# Patient Record
Sex: MALE | Race: WHITE | NOT HISPANIC OR LATINO | Employment: OTHER | URBAN - METROPOLITAN AREA
[De-identification: names, ages, dates, MRNs, and addresses within clinical notes are randomized per-mention and may not be internally consistent; named-entity substitution may affect disease eponyms.]

---

## 2023-04-04 ENCOUNTER — OFFICE VISIT (OUTPATIENT)
Dept: PRIMARY CARE | Facility: CLINIC | Age: 73
End: 2023-04-04
Payer: COMMERCIAL

## 2023-04-04 VITALS
RESPIRATION RATE: 12 BRPM | SYSTOLIC BLOOD PRESSURE: 160 MMHG | DIASTOLIC BLOOD PRESSURE: 80 MMHG | HEIGHT: 72 IN | HEART RATE: 95 BPM | OXYGEN SATURATION: 97 %

## 2023-04-04 DIAGNOSIS — K21.9 GASTRO-ESOPHAGEAL REFLUX DISEASE WITHOUT ESOPHAGITIS: ICD-10-CM

## 2023-04-04 DIAGNOSIS — E55.9 VITAMIN D DEFICIENCY, UNSPECIFIED: ICD-10-CM

## 2023-04-04 DIAGNOSIS — I15.9 SECONDARY HYPERTENSION: ICD-10-CM

## 2023-04-04 DIAGNOSIS — L30.9 DERMATITIS, UNSPECIFIED: ICD-10-CM
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
## 2023-04-04 DIAGNOSIS — D50.9 IRON DEFICIENCY ANEMIA, UNSPECIFIED IRON DEFICIENCY ANEMIA TYPE: ICD-10-CM

## 2023-04-04 DIAGNOSIS — I89.0 LYMPHEDEMA OF BOTH LOWER EXTREMITIES: ICD-10-CM

## 2023-04-04 DIAGNOSIS — I73.9 PERIPHERAL VASCULAR DISEASE, UNSPECIFIED (CMS-HCC): ICD-10-CM

## 2023-04-04 DIAGNOSIS — M17.9 OSTEOARTHRITIS OF KNEE, UNSPECIFIED LATERALITY, UNSPECIFIED OSTEOARTHRITIS TYPE: ICD-10-CM

## 2023-04-04 DIAGNOSIS — D64.9 ANEMIA, UNSPECIFIED TYPE: Primary | ICD-10-CM

## 2023-04-04 DIAGNOSIS — N18.32 STAGE 3B CHRONIC KIDNEY DISEASE (MULTI): ICD-10-CM

## 2023-04-04 DIAGNOSIS — I13.10 HYPERTENSIVE HEART AND CHRONIC KIDNEY DISEASE WITHOUT HEART FAILURE, WITH STAGE 1 THROUGH STAGE 4 CHRONIC KIDNEY DISEASE, OR UNSPECIFIED CHRONIC KIDNEY DISEASE: ICD-10-CM

## 2023-04-04 PROBLEM — R29.6 FALLS: Status: ACTIVE | Noted: 2023-04-04

## 2023-04-04 PROBLEM — N20.0 RENAL STONES: Status: ACTIVE | Noted: 2023-04-04

## 2023-04-04 PROBLEM — I87.013: Status: ACTIVE | Noted: 2023-04-04

## 2023-04-04 PROBLEM — N18.9 CHRONIC KIDNEY DISEASE: Status: ACTIVE | Noted: 2020-10-01

## 2023-04-04 PROBLEM — R26.81 UNSTEADY GAIT: Status: ACTIVE | Noted: 2023-04-04

## 2023-04-04 PROBLEM — I10 HYPERTENSION: Status: ACTIVE | Noted: 2020-08-20

## 2023-04-04 PROCEDURE — 1159F MED LIST DOCD IN RCRD: CPT | Performed by: INTERNAL MEDICINE

## 2023-04-04 PROCEDURE — 3079F DIAST BP 80-89 MM HG: CPT | Performed by: INTERNAL MEDICINE

## 2023-04-04 PROCEDURE — 3077F SYST BP >= 140 MM HG: CPT | Performed by: INTERNAL MEDICINE

## 2023-04-04 PROCEDURE — 99214 OFFICE O/P EST MOD 30 MIN: CPT | Performed by: INTERNAL MEDICINE

## 2023-04-04 RX ORDER — FAMOTIDINE 40 MG/1
20 TABLET, FILM COATED ORAL 2 TIMES DAILY
COMMUNITY
Start: 2016-12-07

## 2023-04-04 NOTE — PROGRESS NOTES
Called pt with recommendations from Ariadna Wing NP. Pt verbalized understanding. Pt scheduled to see Ariadna Wing NP 1/21/21 w/ BMP prior. Ariane LARKIN    Subjective   Chief complaint: Abdi Retana is a 72 y.o. male who presents for NH follow up (Pt is being seen today for nursing home follow up at [facility] ).    HPI:  Patient is discharged from the nursing home and he has been home doing well he has everything arranged in his house he has a wheelchair and a walker he can move around he has a steps to get him in the house and he used the proper transportation when he get the left in the wheelchair patient is doing well today is here to discuss his medication.  He denies any chest pain or shortness of breath        Objective   /80 (BP Location: Left arm, Patient Position: Sitting)   Pulse 95   Resp 12   Ht 1.829 m (6')   SpO2 97%   Physical Exam  Vitals reviewed.   Constitutional:       Appearance: Normal appearance.   HENT:      Head: Normocephalic and atraumatic.   Cardiovascular:      Rate and Rhythm: Normal rate and regular rhythm.   Pulmonary:      Effort: Pulmonary effort is normal.      Breath sounds: Normal breath sounds.   Abdominal:      General: Bowel sounds are normal.      Palpations: Abdomen is soft.   Musculoskeletal:      Cervical back: Neck supple.   Lymphadenopathy:      Comments: Is a lymphedema in the lower   Skin:     General: Skin is warm and dry.      Comments: Venous stasis changes on the skin in the lower extremities dark brown discoloration because of the venous stasis.  There is a +1-2 pitting edema   Neurological:      General: No focal deficit present.      Mental Status: He is alert.      Comments: Patient is weak in the wheelchair   Psychiatric:         Mood and Affect: Mood normal.         Behavior: Behavior is cooperative.         I have reviewed and reconciled the medication list with the patient today.   Current Outpatient Medications:     famotidine (Pepcid) 40 mg tablet, Take 0.5 tablets (20 mg) by mouth in the morning and 0.5 tablets (20 mg) before bedtime., Disp: , Rfl:      Imaging:  No results found.     Labs  reviewed:    Lab Results   Component Value Date    WBC 6.8 08/21/2020    HGB 7.5 (L) 08/21/2020    HCT 24.8 (L) 08/21/2020     08/21/2020    ALT 8 (L) 08/18/2020    AST 12 08/18/2020     08/20/2020    K 4.4 08/20/2020     (H) 08/20/2020    CREATININE 1.27 08/20/2020    BUN 9 08/20/2020    CO2 24 08/20/2020    INR 1.3 (H) 08/12/2020       Assessment/Plan   Problem List Items Addressed This Visit          Circulatory    Hypertension     Patient is not taking blood pressure medication blood pressure running 130/80 at home         Peripheral vascular disease, unspecified (CMS/[facility])     Monitor         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, with stage 1 through stage 4 chronic kidney disease, or unspecified chronic kidney disease     Check renal function panel next visit            Digestive    Gastro-esophageal reflux disease without esophagitis     Pepcid            Genitourinary    Chronic kidney disease       Musculoskeletal    Lymphedema of both lower extremities     Elevate the leg was compression stocking         OA (osteoarthritis) of knee     Tylenol for the pain            Endocrine/Metabolic    Vitamin D deficiency, unspecified     Continue vitamin D            Hematologic    Anemia - Primary    Iron deficiency anemia, unspecified     Continue ferrous sulfate            Infectious/Inflammatory    Dermatitis, unspecified     Topical steroid            Other    Falls     Will need physical therapy            Continue current medications as listed  Follow up in 2 months for complete physical

## 2023-06-06 ENCOUNTER — CLINICAL SUPPORT (OUTPATIENT)
Dept: PRIMARY CARE | Facility: CLINIC | Age: 73
End: 2023-06-06
Payer: COMMERCIAL

## 2023-06-06 DIAGNOSIS — D50.9 IRON DEFICIENCY ANEMIA, UNSPECIFIED IRON DEFICIENCY ANEMIA TYPE: ICD-10-CM

## 2023-06-06 DIAGNOSIS — E78.00 ELEVATED LDL CHOLESTEROL LEVEL: ICD-10-CM

## 2023-06-06 DIAGNOSIS — E03.9 HYPOTHYROIDISM, UNSPECIFIED TYPE: ICD-10-CM

## 2023-06-06 DIAGNOSIS — Z12.5 SCREENING PSA (PROSTATE SPECIFIC ANTIGEN): ICD-10-CM

## 2023-06-06 DIAGNOSIS — E55.9 VITAMIN D DEFICIENCY, UNSPECIFIED: ICD-10-CM

## 2023-06-06 DIAGNOSIS — I10 BENIGN ESSENTIAL HYPERTENSION: ICD-10-CM

## 2023-06-06 DIAGNOSIS — N18.32 STAGE 3B CHRONIC KIDNEY DISEASE (MULTI): ICD-10-CM

## 2023-06-06 DIAGNOSIS — Z00.00 ENCOUNTER FOR ANNUAL WELLNESS VISIT (AWV) IN MEDICARE PATIENT: ICD-10-CM

## 2023-06-06 DIAGNOSIS — D64.9 ANEMIA, UNSPECIFIED TYPE: ICD-10-CM

## 2023-06-06 DIAGNOSIS — I10 PRIMARY HYPERTENSION: ICD-10-CM

## 2023-06-06 LAB
ALANINE AMINOTRANSFERASE (SGPT) (U/L) IN SER/PLAS: 10 U/L (ref 10–52)
ALBUMIN (G/DL) IN SER/PLAS: 3.4 G/DL (ref 3.4–5)
ALKALINE PHOSPHATASE (U/L) IN SER/PLAS: 60 U/L (ref 33–136)
ANION GAP IN SER/PLAS: 12 MMOL/L (ref 10–20)
ASPARTATE AMINOTRANSFERASE (SGOT) (U/L) IN SER/PLAS: 15 U/L (ref 9–39)
BILIRUBIN TOTAL (MG/DL) IN SER/PLAS: 0.6 MG/DL (ref 0–1.2)
CALCIDIOL (25 OH VITAMIN D3) (NG/ML) IN SER/PLAS: 44 NG/ML
CALCIUM (MG/DL) IN SER/PLAS: 8.8 MG/DL (ref 8.6–10.6)
CARBON DIOXIDE, TOTAL (MMOL/L) IN SER/PLAS: 27 MMOL/L (ref 21–32)
CHLORIDE (MMOL/L) IN SER/PLAS: 106 MMOL/L (ref 98–107)
CHOLESTEROL (MG/DL) IN SER/PLAS: 169 MG/DL (ref 0–199)
CHOLESTEROL IN HDL (MG/DL) IN SER/PLAS: 41 MG/DL
CHOLESTEROL/HDL RATIO: 4.1
CREATININE (MG/DL) IN SER/PLAS: 1.12 MG/DL (ref 0.5–1.3)
ERYTHROCYTE DISTRIBUTION WIDTH (RATIO) BY AUTOMATED COUNT: 14.1 % (ref 11.5–14.5)
ERYTHROCYTE MEAN CORPUSCULAR HEMOGLOBIN CONCENTRATION (G/DL) BY AUTOMATED: 30.3 G/DL (ref 32–36)
ERYTHROCYTE MEAN CORPUSCULAR VOLUME (FL) BY AUTOMATED COUNT: 97 FL (ref 80–100)
ERYTHROCYTES (10*6/UL) IN BLOOD BY AUTOMATED COUNT: 4.56 X10E12/L (ref 4.5–5.9)
GFR MALE: 69 ML/MIN/1.73M2
GLUCOSE (MG/DL) IN SER/PLAS: 92 MG/DL (ref 74–99)
HEMATOCRIT (%) IN BLOOD BY AUTOMATED COUNT: 44.2 % (ref 41–52)
HEMOGLOBIN (G/DL) IN BLOOD: 13.4 G/DL (ref 13.5–17.5)
LDL: 118 MG/DL (ref 0–99)
LEUKOCYTES (10*3/UL) IN BLOOD BY AUTOMATED COUNT: 5.9 X10E9/L (ref 4.4–11.3)
NRBC (PER 100 WBCS) BY AUTOMATED COUNT: 0 /100 WBC (ref 0–0)
PLATELETS (10*3/UL) IN BLOOD AUTOMATED COUNT: 219 X10E9/L (ref 150–450)
POTASSIUM (MMOL/L) IN SER/PLAS: 4.5 MMOL/L (ref 3.5–5.3)
PROSTATE SPECIFIC ANTIGEN,SCREEN: 1 NG/ML (ref 0–4)
PROTEIN TOTAL: 6.8 G/DL (ref 6.4–8.2)
SODIUM (MMOL/L) IN SER/PLAS: 140 MMOL/L (ref 136–145)
THYROTROPIN (MIU/L) IN SER/PLAS BY DETECTION LIMIT <= 0.05 MIU/L: 4.34 MIU/L (ref 0.44–3.98)
TRIGLYCERIDE (MG/DL) IN SER/PLAS: 48 MG/DL (ref 0–149)
UREA NITROGEN (MG/DL) IN SER/PLAS: 26 MG/DL (ref 6–23)
VLDL: 10 MG/DL (ref 0–40)

## 2023-06-06 PROCEDURE — 84153 ASSAY OF PSA TOTAL: CPT

## 2023-06-06 PROCEDURE — 84443 ASSAY THYROID STIM HORMONE: CPT

## 2023-06-06 PROCEDURE — 85027 COMPLETE CBC AUTOMATED: CPT

## 2023-06-06 PROCEDURE — 82306 VITAMIN D 25 HYDROXY: CPT

## 2023-06-06 PROCEDURE — 80053 COMPREHEN METABOLIC PANEL: CPT

## 2023-06-06 PROCEDURE — 80061 LIPID PANEL: CPT

## 2023-06-09 ENCOUNTER — OFFICE VISIT (OUTPATIENT)
Dept: PRIMARY CARE | Facility: CLINIC | Age: 73
End: 2023-06-09
Payer: COMMERCIAL

## 2023-06-09 VITALS
SYSTOLIC BLOOD PRESSURE: 172 MMHG | HEART RATE: 73 BPM | RESPIRATION RATE: 12 BRPM | DIASTOLIC BLOOD PRESSURE: 80 MMHG | HEIGHT: 74 IN | OXYGEN SATURATION: 98 %

## 2023-06-09 DIAGNOSIS — N18.32 STAGE 3B CHRONIC KIDNEY DISEASE (MULTI): ICD-10-CM

## 2023-06-09 DIAGNOSIS — I13.10 HYPERTENSIVE HEART AND CHRONIC KIDNEY DISEASE WITHOUT HEART FAILURE, WITH STAGE 1 THROUGH STAGE 4 CHRONIC KIDNEY DISEASE, OR UNSPECIFIED CHRONIC KIDNEY DISEASE: ICD-10-CM

## 2023-06-09 DIAGNOSIS — Z00.00 ENCOUNTER FOR ANNUAL WELLNESS VISIT (AWV) IN MEDICARE PATIENT: ICD-10-CM

## 2023-06-09 DIAGNOSIS — I73.9 PERIPHERAL VASCULAR DISEASE, UNSPECIFIED (CMS-HCC): ICD-10-CM

## 2023-06-09 DIAGNOSIS — D50.9 IRON DEFICIENCY ANEMIA, UNSPECIFIED IRON DEFICIENCY ANEMIA TYPE: ICD-10-CM

## 2023-06-09 DIAGNOSIS — I15.9 SECONDARY HYPERTENSION: ICD-10-CM

## 2023-06-09 DIAGNOSIS — L03.90 CELLULITIS, UNSPECIFIED CELLULITIS SITE: ICD-10-CM

## 2023-06-09 DIAGNOSIS — K21.9 GASTRO-ESOPHAGEAL REFLUX DISEASE WITHOUT ESOPHAGITIS: ICD-10-CM

## 2023-06-09 DIAGNOSIS — L30.9 DERMATITIS, UNSPECIFIED: ICD-10-CM

## 2023-06-09 DIAGNOSIS — D64.9 ANEMIA, UNSPECIFIED TYPE: Primary | ICD-10-CM

## 2023-06-09 PROBLEM — N18.30 CHRONIC KIDNEY DISEASE, STAGE 3 UNSPECIFIED (MULTI): Status: ACTIVE | Noted: 2020-10-01

## 2023-06-09 LAB
POC APPEARANCE, URINE: ABNORMAL
POC BILIRUBIN, URINE: NEGATIVE
POC BLOOD, URINE: NEGATIVE
POC COLOR, URINE: YELLOW
POC GLUCOSE, URINE: NEGATIVE MG/DL
POC KETONES, URINE: NEGATIVE MG/DL
POC LEUKOCYTES, URINE: NEGATIVE
POC NITRITE,URINE: NEGATIVE
POC OCCULT BLOOD STOOL #1: NEGATIVE
POC PH, URINE: 6 PH
POC PROTEIN, URINE: NEGATIVE MG/DL
POC SPECIFIC GRAVITY, URINE: 1.01
POC UROBILINOGEN, URINE: 0.2 EU/DL

## 2023-06-09 PROCEDURE — 81002 URINALYSIS NONAUTO W/O SCOPE: CPT | Performed by: INTERNAL MEDICINE

## 2023-06-09 PROCEDURE — 3079F DIAST BP 80-89 MM HG: CPT | Performed by: INTERNAL MEDICINE

## 2023-06-09 PROCEDURE — 1160F RVW MEDS BY RX/DR IN RCRD: CPT | Performed by: INTERNAL MEDICINE

## 2023-06-09 PROCEDURE — G0439 PPPS, SUBSEQ VISIT: HCPCS | Performed by: INTERNAL MEDICINE

## 2023-06-09 PROCEDURE — 99214 OFFICE O/P EST MOD 30 MIN: CPT | Performed by: INTERNAL MEDICINE

## 2023-06-09 PROCEDURE — 1159F MED LIST DOCD IN RCRD: CPT | Performed by: INTERNAL MEDICINE

## 2023-06-09 PROCEDURE — 3077F SYST BP >= 140 MM HG: CPT | Performed by: INTERNAL MEDICINE

## 2023-06-09 PROCEDURE — 93000 ELECTROCARDIOGRAM COMPLETE: CPT | Performed by: INTERNAL MEDICINE

## 2023-06-09 PROCEDURE — 82270 OCCULT BLOOD FECES: CPT | Performed by: INTERNAL MEDICINE

## 2023-06-09 RX ORDER — DOXYCYCLINE 100 MG/1
100 CAPSULE ORAL 2 TIMES DAILY
Qty: 60 CAPSULE | Refills: 0 | Status: SHIPPED | OUTPATIENT
Start: 2023-06-09 | End: 2023-06-14 | Stop reason: SDUPTHER

## 2023-06-09 RX ORDER — POTASSIUM CHLORIDE 750 MG/1
10 TABLET, FILM COATED, EXTENDED RELEASE ORAL DAILY
Qty: 30 TABLET | Refills: 11 | Status: SHIPPED | OUTPATIENT
Start: 2023-06-09 | End: 2023-06-14 | Stop reason: SDUPTHER

## 2023-06-09 RX ORDER — LOSARTAN POTASSIUM 50 MG/1
50 TABLET ORAL DAILY
Qty: 30 TABLET | Refills: 11 | Status: SHIPPED | OUTPATIENT
Start: 2023-06-09 | End: 2023-06-14 | Stop reason: SDUPTHER

## 2023-06-09 RX ORDER — FERROUS SULFATE 325(65) MG
325 TABLET ORAL EVERY 24 HOURS
COMMUNITY
Start: 2020-10-28 | End: 2023-10-13 | Stop reason: SDUPTHER

## 2023-06-09 RX ORDER — TORSEMIDE 20 MG/1
20 TABLET ORAL DAILY
Qty: 30 TABLET | Refills: 11 | Status: SHIPPED | OUTPATIENT
Start: 2023-06-09 | End: 2023-06-14 | Stop reason: SDUPTHER

## 2023-06-09 RX ORDER — CALCIUM CARBONATE/VITAMIN D3 500-10/5ML
1 LIQUID (ML) ORAL DAILY
Qty: 90 CAPSULE | Refills: 3 | Status: SHIPPED | OUTPATIENT
Start: 2023-06-09 | End: 2023-06-14 | Stop reason: SDUPTHER

## 2023-06-09 ASSESSMENT — ENCOUNTER SYMPTOMS
OCCASIONAL FEELINGS OF UNSTEADINESS: 0
DEPRESSION: 0
LOSS OF SENSATION IN FEET: 0

## 2023-06-09 ASSESSMENT — PATIENT HEALTH QUESTIONNAIRE - PHQ9
2. FEELING DOWN, DEPRESSED OR HOPELESS: NOT AT ALL
SUM OF ALL RESPONSES TO PHQ9 QUESTIONS 1 AND 2: 0
1. LITTLE INTEREST OR PLEASURE IN DOING THINGS: NOT AT ALL

## 2023-06-09 NOTE — PROGRESS NOTES
"Subjective :  Chief Complaint: Abdi Retana is an 72 y.o. male here for an annual wellness visit and general medical care and f/u.     HPI:  Follow-up annual physical exam        Review of Systems  All systems reviewed and negative except for what was mentioned in the HPI    Objective   /80   Pulse 73   Resp 12   Ht 1.88 m (6' 2\")   SpO2 98%     Physical Exam  Musculoskeletal:      Right lower leg: 3+ Edema present.      Left lower leg: 3+ Edema present.   Skin:     Comments: Multiple venous ulcer in the right lower extremity with erythema and right leg         Imaging:  No results found.     Labs reviewed:    Lab Results   Component Value Date    WBC 5.9 06/06/2023    HGB 13.4 (L) 06/06/2023    HCT 44.2 06/06/2023     06/06/2023    CHOL 169 06/06/2023    TRIG 48 06/06/2023    HDL 41.0 06/06/2023    ALT 10 06/06/2023    AST 15 06/06/2023     06/06/2023    K 4.5 06/06/2023     06/06/2023    CREATININE 1.12 06/06/2023    BUN 26 (H) 06/06/2023    CO2 27 06/06/2023    TSH 4.34 (H) 06/06/2023    INR 1.3 (H) 08/12/2020       Past Medical, Surgical, and Family History reviewed and updated in chart.    I have reviewed and reconciled the medication list with the patient today.   Current Outpatient Medications:     aspirin 81.25 MG split tablet, Take 81 mg by mouth once daily., Disp: , Rfl:     famotidine (Pepcid) 40 mg tablet, Take 0.5 tablets (20 mg) by mouth 2 times a day., Disp: , Rfl:     ferrous sulfate (FeroSuL) 325 (65 Fe) MG tablet, Take 1 tablet (325 mg) by mouth once every 24 hours., Disp: , Rfl:     vitamin D3-folic acid 2,500 unit- 1 mg tablet, Take by mouth once every 24 hours., Disp: , Rfl:     zinc oxide 3 X 10 \"-yard bandage, Apply 2 each topically., Disp: , Rfl:      List of current healthcare providers:  Patient Care Team:  Cheyenne Arango MD as PCP - General (Internal Medicine)     HRA:     Over the past 2 weeks, how often have you been bothered by any of the following " problems?  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, with stage 1 through stage 4 chronic kidney disease, or unspecified chronic kidney disease     Check renal function panel next visit            Digestive    Gastro-esophageal reflux disease without esophagitis       Genitourinary    Chronic kidney disease       Hematologic    Anemia - Primary    Iron deficiency anemia, unspecified     Continue ferrous sulfate            Infectious/Inflammatory    Cellulitis    Dermatitis, unspecified       Other    Encounter for annual wellness visit (AWV) in Medicare patient    Relevant Orders    POCT UA (nonautomated) manually resulted (Completed)    POCT occult blood stool manually resulted (Completed)    ECG 12 lead (Clinic Performed)     The following health maintenance schedule was reviewed with the patient and provided in printed form in the after visit summary:  Health Maintenance   Topic Date Due    Medicare Annual Wellness Visit (AWV)  Never done    Hepatitis C Screening  Never done    Zoster Vaccines (1 of 2) Never done    Abdominal Aortic Aneurysm (AAA) Screening  Never done    Pneumococcal Vaccine: 65+ Years (1 - PCV) Never done    Diabetes Screening  08/12/2021    Influenza Vaccine (Season Ended) 09/01/2023    TSH Level  06/06/2024    Lipid Panel  06/06/2028    DTaP/Tdap/Td Vaccines (2 - Td or Tdap) 06/29/2028    Colorectal Cancer Screening  08/13/2030    COVID-19 Vaccine  Completed    HIB Vaccines  Aged Out    Hepatitis B Vaccines  Aged Out    IPV Vaccines  Aged Out    Hepatitis A Vaccines  Aged Out    Meningococcal Vaccine  Aged Out    Rotavirus Vaccines   Aged Out    HPV Vaccines  Aged Out    Irritable Bowel Syndrome  Discontinued       Advance Care Planning   Patient has a living will             Orders Placed This Encounter   Procedures    POCT UA (nonautomated) manually resulted    POCT occult blood stool manually resulted    ECG 12 lead (Clinic Performed)   Patient has cellulitis of the lower extremity we will start doxycycline.  Increased leg edema due to venous stasis Demadex 20 mg daily.  Venous ulcers apply zinc oxide to the open area  Lidex cream to the erythema in the lower extremities.  Hypomagnesemia apply magnesium 400 mg daily  Hypokalemia potassium chloride 10 daily  Lymphedema  GERD Pepcid  Hypertension new onset losartan 50 mg daily  Anemia ferrous sulfate  Hypomagnesemia magnesium.    Continue current medications as listed  Follow up in In 1 months to check the renal function panel

## 2023-06-14 DIAGNOSIS — Z00.00 ENCOUNTER FOR ANNUAL WELLNESS VISIT (AWV) IN MEDICARE PATIENT: Primary | ICD-10-CM

## 2023-06-14 DIAGNOSIS — I15.9 SECONDARY HYPERTENSION: ICD-10-CM

## 2023-06-14 DIAGNOSIS — L03.90 CELLULITIS, UNSPECIFIED CELLULITIS SITE: ICD-10-CM

## 2023-06-14 RX ORDER — POTASSIUM CHLORIDE 750 MG/1
10 TABLET, FILM COATED, EXTENDED RELEASE ORAL DAILY
Qty: 30 TABLET | Refills: 11 | Status: SHIPPED | OUTPATIENT
Start: 2023-06-14 | End: 2024-04-29

## 2023-06-14 RX ORDER — LOSARTAN POTASSIUM 50 MG/1
50 TABLET ORAL DAILY
Qty: 30 TABLET | Refills: 11 | Status: SHIPPED | OUTPATIENT
Start: 2023-06-14 | End: 2024-04-29

## 2023-06-14 RX ORDER — DOXYCYCLINE 100 MG/1
100 CAPSULE ORAL 2 TIMES DAILY
Qty: 60 CAPSULE | Refills: 0 | Status: SHIPPED | OUTPATIENT
Start: 2023-06-14 | End: 2023-07-14

## 2023-06-14 RX ORDER — TORSEMIDE 20 MG/1
20 TABLET ORAL DAILY
Qty: 30 TABLET | Refills: 11 | Status: SHIPPED | OUTPATIENT
Start: 2023-06-14 | End: 2024-04-29

## 2023-06-14 RX ORDER — FLUOCINONIDE 0.5 MG/G
OINTMENT TOPICAL 2 TIMES DAILY PRN
Qty: 60 G | Refills: 0 | Status: SHIPPED | OUTPATIENT
Start: 2023-06-14 | End: 2023-12-11

## 2023-06-14 RX ORDER — CALCIUM CARBONATE/VITAMIN D3 500-10/5ML
1 LIQUID (ML) ORAL DAILY
Qty: 90 CAPSULE | Refills: 3 | Status: SHIPPED | OUTPATIENT
Start: 2023-06-14 | End: 2023-10-13 | Stop reason: SDUPTHER

## 2023-06-23 ENCOUNTER — APPOINTMENT (OUTPATIENT)
Dept: PRIMARY CARE | Facility: CLINIC | Age: 73
End: 2023-06-23
Payer: COMMERCIAL

## 2023-06-30 ENCOUNTER — OFFICE VISIT (OUTPATIENT)
Dept: PRIMARY CARE | Facility: CLINIC | Age: 73
End: 2023-06-30
Payer: COMMERCIAL

## 2023-06-30 VITALS
OXYGEN SATURATION: 95 % | SYSTOLIC BLOOD PRESSURE: 144 MMHG | HEIGHT: 74 IN | RESPIRATION RATE: 12 BRPM | HEART RATE: 87 BPM | DIASTOLIC BLOOD PRESSURE: 78 MMHG

## 2023-06-30 DIAGNOSIS — R26.81 UNSTEADY GAIT: ICD-10-CM

## 2023-06-30 DIAGNOSIS — D50.8 OTHER IRON DEFICIENCY ANEMIA: ICD-10-CM

## 2023-06-30 DIAGNOSIS — I15.9 SECONDARY HYPERTENSION: ICD-10-CM

## 2023-06-30 DIAGNOSIS — I89.0 LYMPHEDEMA OF BOTH LOWER EXTREMITIES: ICD-10-CM

## 2023-06-30 DIAGNOSIS — E55.9 VITAMIN D DEFICIENCY, UNSPECIFIED: ICD-10-CM

## 2023-06-30 DIAGNOSIS — Z00.00 ENCOUNTER FOR ANNUAL WELLNESS VISIT (AWV) IN MEDICARE PATIENT: ICD-10-CM

## 2023-06-30 DIAGNOSIS — N18.2 STAGE 2 CHRONIC KIDNEY DISEASE: Primary | ICD-10-CM

## 2023-06-30 PROCEDURE — 3078F DIAST BP <80 MM HG: CPT | Performed by: INTERNAL MEDICINE

## 2023-06-30 PROCEDURE — 3077F SYST BP >= 140 MM HG: CPT | Performed by: INTERNAL MEDICINE

## 2023-06-30 PROCEDURE — G0439 PPPS, SUBSEQ VISIT: HCPCS | Performed by: INTERNAL MEDICINE

## 2023-06-30 PROCEDURE — 1160F RVW MEDS BY RX/DR IN RCRD: CPT | Performed by: INTERNAL MEDICINE

## 2023-06-30 PROCEDURE — 99214 OFFICE O/P EST MOD 30 MIN: CPT | Performed by: INTERNAL MEDICINE

## 2023-06-30 PROCEDURE — 1159F MED LIST DOCD IN RCRD: CPT | Performed by: INTERNAL MEDICINE

## 2023-06-30 PROCEDURE — 93000 ELECTROCARDIOGRAM COMPLETE: CPT | Performed by: INTERNAL MEDICINE

## 2023-06-30 PROCEDURE — 81003 URINALYSIS AUTO W/O SCOPE: CPT | Performed by: INTERNAL MEDICINE

## 2023-06-30 PROCEDURE — 82274 ASSAY TEST FOR BLOOD FECAL: CPT | Performed by: INTERNAL MEDICINE

## 2023-06-30 ASSESSMENT — ENCOUNTER SYMPTOMS
LOSS OF SENSATION IN FEET: 0
OCCASIONAL FEELINGS OF UNSTEADINESS: 0
DEPRESSION: 0

## 2023-06-30 NOTE — ASSESSMENT & PLAN NOTE
Blood pressure slightly elevated in the office today.  For patient, blood pressure is lower at home.  Asked the patient to keep a blood pressure log to ensure correct medication regimen.

## 2023-06-30 NOTE — ASSESSMENT & PLAN NOTE
Discussed the importance of exercise with the patient.  Encouraged him to continue daily exercise.  Encouraged him to walk and get up from chair whenever he can.

## 2023-06-30 NOTE — PROGRESS NOTES
"Subjective :  Chief Complaint: Abdi Retana is an 73 y.o. male here for an annual wellness visit and general medical care and f/u.     HPI:  Patient is here for his yearly physical exam.  He denies any new medical concerns today.  Patient would like to discuss what he can do to maintain healthy lifestyle.  Patient would also like to review blood work.        Review of Systems  All systems reviewed and negative except for what was mentioned in the HPI    Objective   /78   Pulse 87   Resp 12   Ht 1.88 m (6' 2\")   SpO2 95%     Physical Exam  Constitutional:       Appearance: He is obese. He is not ill-appearing or diaphoretic.   HENT:      Head: Normocephalic.      Nose: Nose normal. No congestion.      Mouth/Throat:      Mouth: Mucous membranes are moist.   Eyes:      Pupils: Pupils are equal, round, and reactive to light.   Cardiovascular:      Rate and Rhythm: Normal rate.   Pulmonary:      Effort: No respiratory distress.      Breath sounds: No wheezing.   Abdominal:      General: Bowel sounds are normal.   Musculoskeletal:         General: No deformity.      Cervical back: Normal range of motion.      Right lower leg: Edema present.      Left lower leg: Edema present.      Comments: Bilateral lower extremity erythematous discoloration   Skin:     General: Skin is dry.      Findings: No rash.   Neurological:      Mental Status: He is alert and oriented to person, place, and time. Mental status is at baseline.      Motor: Weakness present.      Comments: Wheelchair dependent in office         Imaging:  ECG 12 lead (Clinic Performed)    Result Date: 6/9/2023  LVH nonspecific ST and T changes       Labs reviewed:    Lab Results   Component Value Date    WBC 5.9 06/06/2023    HGB 13.4 (L) 06/06/2023    HCT 44.2 06/06/2023     06/06/2023    CHOL 169 06/06/2023    TRIG 48 06/06/2023    HDL 41.0 06/06/2023    ALT 10 06/06/2023    AST 15 06/06/2023     06/06/2023    K 4.5 06/06/2023     " "06/06/2023    CREATININE 1.12 06/06/2023    BUN 26 (H) 06/06/2023    CO2 27 06/06/2023    TSH 4.34 (H) 06/06/2023    INR 1.3 (H) 08/12/2020       Past Medical, Surgical, and Family History reviewed and updated in chart.    I have reviewed and reconciled the medication list with the patient today.   Current Outpatient Medications:     aspirin 81.25 MG split tablet, Take 81 mg by mouth once daily., Disp: , Rfl:     doxycycline (Vibramycin) 100 mg capsule, Take 1 capsule (100 mg) by mouth 2 times a day. Take with at least 8 ounces (large glass) of water, do not lie down for 30 minutes after, Disp: 60 capsule, Rfl: 0    fluocinonide (Lidex) 0.05 % ointment, Apply topically 2 times a day as needed for irritation or rash. Avoid face and groin., Disp: 60 g, Rfl: 0    losartan (Cozaar) 50 mg tablet, Take 1 tablet (50 mg) by mouth once daily., Disp: 30 tablet, Rfl: 11    potassium chloride CR (Klor-Con) 10 mEq ER tablet, Take 1 tablet (10 mEq) by mouth once daily. Do not crush, chew, or split., Disp: 30 tablet, Rfl: 11    torsemide (Demadex) 20 mg tablet, Take 1 tablet (20 mg) by mouth once daily., Disp: 30 tablet, Rfl: 11    famotidine (Pepcid) 40 mg tablet, Take 0.5 tablets (20 mg) by mouth 2 times a day., Disp: , Rfl:     ferrous sulfate (FeroSuL) 325 (65 Fe) MG tablet, Take 1 tablet (325 mg) by mouth once every 24 hours., Disp: , Rfl:     magnesium oxide 400 mg magnesium capsule, Take 1 capsule (400 mg) by mouth once daily. (Patient not taking: Reported on 6/30/2023), Disp: 90 capsule, Rfl: 3    vitamin D3-folic acid 2,500 unit- 1 mg tablet, Take by mouth once every 24 hours., Disp: , Rfl:     zinc oxide 3 X 10 \"-yard bandage, Apply 2 each topically once daily., Disp: 36 each, Rfl: 10     List of current healthcare providers:  Patient Care Team:  Cheyenne Arango MD as PCP - General (Internal Medicine)     HRA:     Over the past 2 weeks, how often have you been bothered by any of the following problems?  Little interest " or pleasure in doing things: Not at all  Feeling down, depressed, or hopeless: Not at all  Patient Health Questionnaire-2 Score: 0                         Assessment/Plan :  Problem List Items Addressed This Visit          Cardiac and Vasculature    Hypertension     Blood pressure slightly elevated in the office today.  For patient, blood pressure is lower at home.  Asked the patient to keep a blood pressure log to ensure correct medication regimen.         Relevant Orders    ECG 12 lead (Clinic Performed)       Endocrine/Metabolic    Vitamin D deficiency, unspecified     No longer deficient on supplementation.            Genitourinary and Reproductive    Chronic kidney disease - Primary     Patient with stable kidney functions per most recent labs.            Health Encounters    Encounter for annual wellness visit (AWV) in Medicare patient    Relevant Orders    ECG 12 lead (Clinic Performed)       Hematology and Neoplasia    Iron deficiency anemia, unspecified     CBC within normal limits.  Continue iron supplement.            Symptoms and Signs    Lymphedema of both lower extremities     Discussed importance of torsemide for lower extremity edema.  Patient stated that he had stopped taking it and took a different supplement instead due to the urinary side effects.  Encourage patient to take torsemide 10 mg by cutting pill in half to avoid severe urinary symptoms.         Unsteady gait     Discussed the importance of exercise with the patient.  Encouraged him to continue daily exercise.  Encouraged him to walk and get up from chair whenever he can.          The following health maintenance schedule was reviewed with the patient and provided in printed form in the after visit summary:  Health Maintenance   Topic Date Due    Hepatitis C Screening  Never done    Zoster Vaccines (1 of 2) Never done    Abdominal Aortic Aneurysm (AAA) Screening  Never done    Pneumococcal Vaccine: 65+ Years (1 - PCV) Never done     Diabetes Screening  08/12/2021    COVID-19 Vaccine (5 - Booster for Moiz series) 08/25/2022    Influenza Vaccine (Season Ended) 09/01/2023    TSH Level  06/06/2024    Medicare Annual Wellness Visit (AWV)  06/10/2024    Lipid Panel  06/06/2028    DTaP/Tdap/Td Vaccines (2 - Td or Tdap) 06/29/2028    Colorectal Cancer Screening  08/13/2030    HIB Vaccines  Aged Out    Hepatitis B Vaccines  Aged Out    IPV Vaccines  Aged Out    Hepatitis A Vaccines  Aged Out    Meningococcal Vaccine  Aged Out    Rotavirus Vaccines  Aged Out    HPV Vaccines  Aged Out    Irritable Bowel Syndrome  Discontinued       Advance Care Planning   Patient does not have a living will.             Orders Placed This Encounter   Procedures    ECG 12 lead (Clinic Performed)       Continue current medications as listed.  Please begin torsemide, but may cut pill in half to avoid urinary symptoms.  Follow up in 4 months.

## 2023-06-30 NOTE — ASSESSMENT & PLAN NOTE
Discussed importance of torsemide for lower extremity edema.  Patient stated that he had stopped taking it and took a different supplement instead due to the urinary side effects.  Encourage patient to take torsemide 10 mg by cutting pill in half to avoid severe urinary symptoms.

## 2023-07-08 NOTE — ASSESSMENT & PLAN NOTE
Thank you for choosing us for your care. I have placed an order for a prescription for antibiotic eye drops so that you can start treatment. View your full visit summary for details by clicking on the link below. Your pharmacist will able to address any questions you may have about the medication.     If you re not feeling better within 2-3 days, please schedule an appointment.  You can schedule an appointment right here in [facility], or call [phone]  If the visit is for the same symptoms as your eVisit, we ll refund the cost of your eVisit if seen within seven days.      Bacterial Conjunctivitis    You have an infection in the membranes covering the white part of the eye. This part of the eye is called the conjunctiva. The infection is called conjunctivitis. The most common symptoms of conjunctivitis include a thick, pus-like discharge from the eye, swollen eyelids, redness, eyelids sticking together upon awakening, and a gritty or scratchy feeling in the eye. Your infection was caused by bacteria. It may be treated with medicine. With treatment, the infection takes about 7 to 10 days to resolve.   Home care    Use prescribed antibiotic eye drops or ointment as directed to treat the infection.    Apply a warm compress (towel soaked in warm water) to the affected eye 3 to 4 times a day. Do this just before applying medicine to the eye.    Use a warm, wet cloth to wipe away crusting of the eyelids in the morning. This is caused by mucus drainage during the night. You may also use saline irrigating solution or artificial tears to rinse away mucus in the eye. Do not put a patch over the eye.    Wash your hands before and after touching the infected eye. This is to prevent spreading the infection to the other eye, and to other people. Don't share your towels or washcloths with others.    You may use acetaminophen or ibuprofen to control pain, unless another medicine was prescribed. Talk with your healthcare  Elevate the leg was compression stocking   provider before using these medicines if you have chronic liver or kidney disease. Also talk with your provider if you have ever had a stomach ulcer or digestive bleeding.    Don't wear contact lenses until your eyes have healed and all symptoms are gone.    Follow-up care  Follow up with your healthcare provider, or as advised.  When to seek medical advice  Call your healthcare provider right away if any of these occur:    Worsening vision    Increasing pain in the eye    Increasing swelling or redness of the eyelid    Redness spreading around the eye  [employer] last reviewed this educational content on 4/1/2020 2000-2022 [employer]. All rights reserved. This information is not intended as a substitute for professional medical care. Always follow your healthcare professional's instructions.

## 2023-09-20 PROBLEM — N18.9 CHRONIC KIDNEY DISEASE: Status: RESOLVED | Noted: 2020-10-01 | Resolved: 2023-09-20

## 2023-10-03 ENCOUNTER — CLINICAL SUPPORT (OUTPATIENT)
Dept: PRIMARY CARE | Facility: CLINIC | Age: 73
End: 2023-10-03
Payer: COMMERCIAL

## 2023-10-03 DIAGNOSIS — E78.00 ELEVATED LDL CHOLESTEROL LEVEL: ICD-10-CM

## 2023-10-03 DIAGNOSIS — I10 BENIGN ESSENTIAL HYPERTENSION: ICD-10-CM

## 2023-10-03 DIAGNOSIS — E03.9 HYPOTHYROIDISM, UNSPECIFIED TYPE: ICD-10-CM

## 2023-10-03 DIAGNOSIS — D50.9 IRON DEFICIENCY ANEMIA, UNSPECIFIED IRON DEFICIENCY ANEMIA TYPE: ICD-10-CM

## 2023-10-03 DIAGNOSIS — I10 PRIMARY HYPERTENSION: ICD-10-CM

## 2023-10-03 LAB
ALBUMIN SERPL BCP-MCNC: 3.6 G/DL (ref 3.4–5)
ALP SERPL-CCNC: 53 U/L (ref 33–136)
ALT SERPL W P-5'-P-CCNC: 11 U/L (ref 10–52)
ANION GAP SERPL CALC-SCNC: 14 MMOL/L (ref 10–20)
AST SERPL W P-5'-P-CCNC: 15 U/L (ref 9–39)
BILIRUB SERPL-MCNC: 0.7 MG/DL (ref 0–1.2)
BUN SERPL-MCNC: 28 MG/DL (ref 6–23)
CALCIUM SERPL-MCNC: 8.8 MG/DL (ref 8.6–10.6)
CHLORIDE SERPL-SCNC: 106 MMOL/L (ref 98–107)
CHOLEST SERPL-MCNC: 155 MG/DL (ref 0–199)
CHOLESTEROL/HDL RATIO: 3.7
CO2 SERPL-SCNC: 24 MMOL/L (ref 21–32)
CREAT SERPL-MCNC: 1.36 MG/DL (ref 0.5–1.3)
ERYTHROCYTE [DISTWIDTH] IN BLOOD BY AUTOMATED COUNT: 14.6 % (ref 11.5–14.5)
GFR SERPL CREATININE-BSD FRML MDRD: 55 ML/MIN/1.73M*2
GLUCOSE SERPL-MCNC: 96 MG/DL (ref 74–99)
HCT VFR BLD AUTO: 38.7 % (ref 41–52)
HDLC SERPL-MCNC: 42 MG/DL
HGB BLD-MCNC: 12.1 G/DL (ref 13.5–17.5)
LDLC SERPL CALC-MCNC: 104 MG/DL (ref 140–190)
MCH RBC QN AUTO: 30.3 PG (ref 26–34)
MCHC RBC AUTO-ENTMCNC: 31.3 G/DL (ref 32–36)
MCV RBC AUTO: 97 FL (ref 80–100)
NON HDL CHOLESTEROL: 113 MG/DL (ref 0–149)
NRBC BLD-RTO: 0 /100 WBCS (ref 0–0)
PLATELET # BLD AUTO: 201 X10*3/UL (ref 150–450)
PMV BLD AUTO: 11 FL (ref 7.5–11.5)
POTASSIUM SERPL-SCNC: 4.6 MMOL/L (ref 3.5–5.3)
PROT SERPL-MCNC: 6.6 G/DL (ref 6.4–8.2)
RBC # BLD AUTO: 4 X10*6/UL (ref 4.5–5.9)
SODIUM SERPL-SCNC: 139 MMOL/L (ref 136–145)
TRIGL SERPL-MCNC: 47 MG/DL (ref 0–149)
VLDL: 9 MG/DL (ref 0–40)
WBC # BLD AUTO: 8.1 X10*3/UL (ref 4.4–11.3)

## 2023-10-03 PROCEDURE — 80053 COMPREHEN METABOLIC PANEL: CPT

## 2023-10-03 PROCEDURE — 36415 COLL VENOUS BLD VENIPUNCTURE: CPT

## 2023-10-03 PROCEDURE — 85027 COMPLETE CBC AUTOMATED: CPT

## 2023-10-03 PROCEDURE — 80061 LIPID PANEL: CPT

## 2023-10-06 ENCOUNTER — OFFICE VISIT (OUTPATIENT)
Dept: PRIMARY CARE | Facility: CLINIC | Age: 73
End: 2023-10-06
Payer: COMMERCIAL

## 2023-10-06 VITALS
OXYGEN SATURATION: 94 % | DIASTOLIC BLOOD PRESSURE: 84 MMHG | HEART RATE: 72 BPM | SYSTOLIC BLOOD PRESSURE: 157 MMHG | RESPIRATION RATE: 12 BRPM

## 2023-10-06 DIAGNOSIS — I15.9 SECONDARY HYPERTENSION: ICD-10-CM

## 2023-10-06 DIAGNOSIS — N18.31 STAGE 3A CHRONIC KIDNEY DISEASE (MULTI): Primary | ICD-10-CM

## 2023-10-06 DIAGNOSIS — Z23 FLU VACCINE NEED: ICD-10-CM

## 2023-10-06 PROCEDURE — 1160F RVW MEDS BY RX/DR IN RCRD: CPT | Performed by: INTERNAL MEDICINE

## 2023-10-06 PROCEDURE — G0008 ADMIN INFLUENZA VIRUS VAC: HCPCS | Performed by: INTERNAL MEDICINE

## 2023-10-06 PROCEDURE — 1126F AMNT PAIN NOTED NONE PRSNT: CPT | Performed by: INTERNAL MEDICINE

## 2023-10-06 PROCEDURE — 3077F SYST BP >= 140 MM HG: CPT | Performed by: INTERNAL MEDICINE

## 2023-10-06 PROCEDURE — 3079F DIAST BP 80-89 MM HG: CPT | Performed by: INTERNAL MEDICINE

## 2023-10-06 PROCEDURE — 1159F MED LIST DOCD IN RCRD: CPT | Performed by: INTERNAL MEDICINE

## 2023-10-06 PROCEDURE — 90662 IIV NO PRSV INCREASED AG IM: CPT | Performed by: INTERNAL MEDICINE

## 2023-10-06 PROCEDURE — 99214 OFFICE O/P EST MOD 30 MIN: CPT | Performed by: INTERNAL MEDICINE

## 2023-10-06 NOTE — PROGRESS NOTES
Subjective   Chief complaint: Matt Retana is a 73 y.o. male who presents for Follow-up (Pt is here for blood work follow up).    HPI:  Pt is here for blood work follow-up. Pt needs a flu shot today. He is also here to fix his medication list.         Objective   /84   Pulse 72   Resp 12   SpO2 94%   Physical Exam  Vitals reviewed.   Constitutional:       Appearance: Normal appearance. He is obese.   HENT:      Head: Normocephalic and atraumatic.   Cardiovascular:      Rate and Rhythm: Normal rate and regular rhythm.   Pulmonary:      Effort: Pulmonary effort is normal.      Breath sounds: Normal breath sounds.   Musculoskeletal:      Comments: Pt uses a wheelchair   Neurological:      Mental Status: He is alert and oriented to person, place, and time.   Psychiatric:         Mood and Affect: Mood normal.         Behavior: Behavior normal.         I have reviewed and reconciled the medication list with the patient today.   Current Outpatient Medications:     aspirin 81.25 MG split tablet, Take 81 mg by mouth once daily., Disp: , Rfl:     famotidine (Pepcid) 40 mg tablet, Take 0.5 tablets (20 mg) by mouth 2 times a day., Disp: , Rfl:     ferrous sulfate (FeroSuL) 325 (65 Fe) MG tablet, Take 1 tablet (325 mg) by mouth once every 24 hours., Disp: , Rfl:     fluocinonide (Lidex) 0.05 % ointment, Apply topically 2 times a day as needed for irritation or rash. Avoid face and groin., Disp: 60 g, Rfl: 0    losartan (Cozaar) 50 mg tablet, Take 1 tablet (50 mg) by mouth once daily., Disp: 30 tablet, Rfl: 11    magnesium oxide 400 mg magnesium capsule, Take 1 capsule (400 mg) by mouth once daily., Disp: 90 capsule, Rfl: 3    potassium chloride CR (Klor-Con) 10 mEq ER tablet, Take 1 tablet (10 mEq) by mouth once daily. Do not crush, chew, or split., Disp: 30 tablet, Rfl: 11    torsemide (Demadex) 20 mg tablet, Take 1 tablet (20 mg) by mouth once daily., Disp: 30 tablet, Rfl: 11    vitamin D3-folic acid 2,500 unit- 1 mg  "tablet, Take by mouth once every 24 hours., Disp: , Rfl:     zinc oxide 3 X 10 \"-yard bandage, Apply 2 each topically once daily., Disp: 36 each, Rfl: 10     Imaging:  No results found.     Labs reviewed:    Lab Results   Component Value Date    WBC 8.1 10/03/2023    HGB 12.1 (L) 10/03/2023    HCT 38.7 (L) 10/03/2023     10/03/2023    CHOL 155 10/03/2023    TRIG 47 10/03/2023    HDL 42.0 10/03/2023    ALT 11 10/03/2023    AST 15 10/03/2023     10/03/2023    K 4.6 10/03/2023     10/03/2023    CREATININE 1.36 (H) 10/03/2023    BUN 28 (H) 10/03/2023    CO2 24 10/03/2023    TSH 4.34 (H) 06/06/2023    INR 1.3 (H) 08/12/2020       Assessment/Plan   Problem List Items Addressed This Visit       Hypertension     Continue with losartan.          Chronic kidney disease, stage 3 unspecified (CMS/[facility]) - Primary     Secondary to diuretic medication. Stable, will continue to monitor.           Other Visit Diagnoses       Flu vaccine need        Relevant Orders    Flu vaccine, quadrivalent, high-dose, preservative free, age 65y+ (FLUZONE)            Continue current medications as listed  Follow up in 3 mo for routine labs, monitor kidney function.      "

## 2023-10-06 NOTE — PROGRESS NOTES
"Subjective   Chief complaint: Matt Retana is a 73 y.o. male who presents for Follow-up (Pt is here for blood work follow up).    HPI:  HPI    Objective   /84   Pulse 72   Resp 12   SpO2 94%   Physical Exam    I have reviewed and reconciled the medication list with the patient today.   Current Outpatient Medications:     aspirin 81.25 MG split tablet, Take 81 mg by mouth once daily., Disp: , Rfl:     famotidine (Pepcid) 40 mg tablet, Take 0.5 tablets (20 mg) by mouth 2 times a day., Disp: , Rfl:     ferrous sulfate (FeroSuL) 325 (65 Fe) MG tablet, Take 1 tablet (325 mg) by mouth once every 24 hours., Disp: , Rfl:     fluocinonide (Lidex) 0.05 % ointment, Apply topically 2 times a day as needed for irritation or rash. Avoid face and groin., Disp: 60 g, Rfl: 0    losartan (Cozaar) 50 mg tablet, Take 1 tablet (50 mg) by mouth once daily., Disp: 30 tablet, Rfl: 11    magnesium oxide 400 mg magnesium capsule, Take 1 capsule (400 mg) by mouth once daily., Disp: 90 capsule, Rfl: 3    potassium chloride CR (Klor-Con) 10 mEq ER tablet, Take 1 tablet (10 mEq) by mouth once daily. Do not crush, chew, or split., Disp: 30 tablet, Rfl: 11    torsemide (Demadex) 20 mg tablet, Take 1 tablet (20 mg) by mouth once daily., Disp: 30 tablet, Rfl: 11    vitamin D3-folic acid 2,500 unit- 1 mg tablet, Take by mouth once every 24 hours., Disp: , Rfl:     zinc oxide 3 X 10 \"-yard bandage, Apply 2 each topically once daily., Disp: 36 each, Rfl: 10     Imaging:  No results found.     Labs reviewed:    Lab Results   Component Value Date    WBC 8.1 10/03/2023    HGB 12.1 (L) 10/03/2023    HCT 38.7 (L) 10/03/2023     10/03/2023    CHOL 155 10/03/2023    TRIG 47 10/03/2023    HDL 42.0 10/03/2023    ALT 11 10/03/2023    AST 15 10/03/2023     10/03/2023    K 4.6 10/03/2023     10/03/2023    CREATININE 1.36 (H) 10/03/2023    BUN 28 (H) 10/03/2023    CO2 24 10/03/2023    TSH 4.34 (H) 06/06/2023    INR 1.3 (H) 08/12/2020 "       Assessment/Plan   Problem List Items Addressed This Visit       Hypertension     Continue with losartan.          Chronic kidney disease, stage 3 unspecified (CMS/HCC) - Primary     Secondary to diuretic medication. Stable, will continue to monitor.           Other Visit Diagnoses       Flu vaccine need        Relevant Orders    Flu vaccine, quadrivalent, high-dose, preservative free, age 65y+ (FLUZONE)            Continue {Continue medications:70038} medications as listed  Follow up in ***

## 2023-10-06 NOTE — PROGRESS NOTES
"Subjective   Chief complaint: Matt Retana is a 73 y.o. male who presents for Follow-up (Pt is here for blood work follow up).    HPI:  HPI    Objective   /84   Pulse 72   Resp 12   SpO2 94%   Physical Exam    I have reviewed and reconciled the medication list with the patient today.   Current Outpatient Medications:     aspirin 81.25 MG split tablet, Take 81 mg by mouth once daily., Disp: , Rfl:     famotidine (Pepcid) 40 mg tablet, Take 0.5 tablets (20 mg) by mouth 2 times a day., Disp: , Rfl:     ferrous sulfate (FeroSuL) 325 (65 Fe) MG tablet, Take 1 tablet (325 mg) by mouth once every 24 hours., Disp: , Rfl:     fluocinonide (Lidex) 0.05 % ointment, Apply topically 2 times a day as needed for irritation or rash. Avoid face and groin., Disp: 60 g, Rfl: 0    losartan (Cozaar) 50 mg tablet, Take 1 tablet (50 mg) by mouth once daily., Disp: 30 tablet, Rfl: 11    magnesium oxide 400 mg magnesium capsule, Take 1 capsule (400 mg) by mouth once daily., Disp: 90 capsule, Rfl: 3    potassium chloride CR (Klor-Con) 10 mEq ER tablet, Take 1 tablet (10 mEq) by mouth once daily. Do not crush, chew, or split., Disp: 30 tablet, Rfl: 11    torsemide (Demadex) 20 mg tablet, Take 1 tablet (20 mg) by mouth once daily., Disp: 30 tablet, Rfl: 11    vitamin D3-folic acid 2,500 unit- 1 mg tablet, Take by mouth once every 24 hours., Disp: , Rfl:     zinc oxide 3 X 10 \"-yard bandage, Apply 2 each topically once daily., Disp: 36 each, Rfl: 10     Imaging:  No results found.     Labs reviewed:    Lab Results   Component Value Date    WBC 8.1 10/03/2023    HGB 12.1 (L) 10/03/2023    HCT 38.7 (L) 10/03/2023     10/03/2023    CHOL 155 10/03/2023    TRIG 47 10/03/2023    HDL 42.0 10/03/2023    ALT 11 10/03/2023    AST 15 10/03/2023     10/03/2023    K 4.6 10/03/2023     10/03/2023    CREATININE 1.36 (H) 10/03/2023    BUN 28 (H) 10/03/2023    CO2 24 10/03/2023    TSH 4.34 (H) 06/06/2023    INR 1.3 (H) 08/12/2020 "       Assessment/Plan   Problem List Items Addressed This Visit       Hypertension     Continue with losartan.          Chronic kidney disease, stage 3 unspecified (CMS/HCC) - Primary     Secondary to diuretic medication. Stable, will continue to monitor.           Other Visit Diagnoses       Flu vaccine need        Relevant Orders    Flu vaccine, quadrivalent, high-dose, preservative free, age 65y+ (FLUZONE)            Continue {Continue medications:08496} medications as listed  Follow up in ***

## 2023-10-13 DIAGNOSIS — L03.90 CELLULITIS, UNSPECIFIED CELLULITIS SITE: ICD-10-CM

## 2023-10-13 DIAGNOSIS — E55.9 VITAMIN D DEFICIENCY: ICD-10-CM

## 2023-10-13 DIAGNOSIS — D64.9 ANEMIA, UNSPECIFIED TYPE: ICD-10-CM

## 2023-10-16 RX ORDER — CALCIUM CARBONATE/VITAMIN D3 500-10/5ML
1 LIQUID (ML) ORAL DAILY
Qty: 90 CAPSULE | Refills: 3 | Status: SHIPPED | OUTPATIENT
Start: 2023-10-16

## 2023-10-16 RX ORDER — FERROUS SULFATE 325(65) MG
325 TABLET ORAL EVERY 24 HOURS
Qty: 90 TABLET | Refills: 3 | Status: SHIPPED | OUTPATIENT
Start: 2023-10-16

## 2023-12-07 DIAGNOSIS — I15.9 SECONDARY HYPERTENSION: ICD-10-CM

## 2023-12-07 DIAGNOSIS — L03.90 CELLULITIS, UNSPECIFIED CELLULITIS SITE: ICD-10-CM

## 2023-12-11 RX ORDER — FLUOCINONIDE 0.5 MG/G
OINTMENT TOPICAL
Qty: 60 G | Refills: 0 | Status: SHIPPED | OUTPATIENT
Start: 2023-12-11 | End: 2024-02-29

## 2024-01-09 ENCOUNTER — CLINICAL SUPPORT (OUTPATIENT)
Dept: PRIMARY CARE | Facility: CLINIC | Age: 74
End: 2024-01-09
Payer: COMMERCIAL

## 2024-01-09 DIAGNOSIS — I15.9 SECONDARY HYPERTENSION: ICD-10-CM

## 2024-01-09 DIAGNOSIS — D64.9 ANEMIA, UNSPECIFIED TYPE: ICD-10-CM

## 2024-01-09 DIAGNOSIS — E03.9 HYPOTHYROIDISM, UNSPECIFIED TYPE: ICD-10-CM

## 2024-01-09 DIAGNOSIS — I10 BENIGN ESSENTIAL HYPERTENSION: ICD-10-CM

## 2024-01-09 DIAGNOSIS — D50.9 IRON DEFICIENCY ANEMIA, UNSPECIFIED IRON DEFICIENCY ANEMIA TYPE: ICD-10-CM

## 2024-01-09 LAB
ALBUMIN SERPL BCP-MCNC: 3.6 G/DL (ref 3.4–5)
ALP SERPL-CCNC: 58 U/L (ref 33–136)
ALT SERPL W P-5'-P-CCNC: 11 U/L (ref 10–52)
ANION GAP SERPL CALC-SCNC: 12 MMOL/L (ref 10–20)
AST SERPL W P-5'-P-CCNC: 13 U/L (ref 9–39)
BILIRUB SERPL-MCNC: 0.5 MG/DL (ref 0–1.2)
BUN SERPL-MCNC: 22 MG/DL (ref 6–23)
CALCIUM SERPL-MCNC: 9.1 MG/DL (ref 8.6–10.6)
CHLORIDE SERPL-SCNC: 105 MMOL/L (ref 98–107)
CO2 SERPL-SCNC: 28 MMOL/L (ref 21–32)
CREAT SERPL-MCNC: 1.33 MG/DL (ref 0.5–1.3)
EGFRCR SERPLBLD CKD-EPI 2021: 56 ML/MIN/1.73M*2
ERYTHROCYTE [DISTWIDTH] IN BLOOD BY AUTOMATED COUNT: 14 % (ref 11.5–14.5)
GLUCOSE SERPL-MCNC: 99 MG/DL (ref 74–99)
HCT VFR BLD AUTO: 38.6 % (ref 41–52)
HGB BLD-MCNC: 12 G/DL (ref 13.5–17.5)
MCH RBC QN AUTO: 30.1 PG (ref 26–34)
MCHC RBC AUTO-ENTMCNC: 31.1 G/DL (ref 32–36)
MCV RBC AUTO: 97 FL (ref 80–100)
NRBC BLD-RTO: 0 /100 WBCS (ref 0–0)
PLATELET # BLD AUTO: 227 X10*3/UL (ref 150–450)
POTASSIUM SERPL-SCNC: 4.5 MMOL/L (ref 3.5–5.3)
PROT SERPL-MCNC: 7.1 G/DL (ref 6.4–8.2)
RBC # BLD AUTO: 3.99 X10*6/UL (ref 4.5–5.9)
SODIUM SERPL-SCNC: 140 MMOL/L (ref 136–145)
WBC # BLD AUTO: 7 X10*3/UL (ref 4.4–11.3)

## 2024-01-09 PROCEDURE — 85027 COMPLETE CBC AUTOMATED: CPT

## 2024-01-09 PROCEDURE — 36415 COLL VENOUS BLD VENIPUNCTURE: CPT

## 2024-01-09 PROCEDURE — 80053 COMPREHEN METABOLIC PANEL: CPT

## 2024-01-12 ENCOUNTER — OFFICE VISIT (OUTPATIENT)
Dept: PRIMARY CARE | Facility: CLINIC | Age: 74
End: 2024-01-12
Payer: COMMERCIAL

## 2024-01-12 ENCOUNTER — APPOINTMENT (OUTPATIENT)
Dept: PRIMARY CARE | Facility: CLINIC | Age: 74
End: 2024-01-12
Payer: COMMERCIAL

## 2024-01-12 VITALS
RESPIRATION RATE: 12 BRPM | OXYGEN SATURATION: 97 % | DIASTOLIC BLOOD PRESSURE: 74 MMHG | HEART RATE: 71 BPM | SYSTOLIC BLOOD PRESSURE: 118 MMHG

## 2024-01-12 DIAGNOSIS — I89.0 LYMPHEDEMA OF BOTH LOWER EXTREMITIES: ICD-10-CM

## 2024-01-12 DIAGNOSIS — N18.31 STAGE 3A CHRONIC KIDNEY DISEASE (MULTI): ICD-10-CM

## 2024-01-12 DIAGNOSIS — S81.801A WOUND OF RIGHT LOWER EXTREMITY, INITIAL ENCOUNTER: ICD-10-CM

## 2024-01-12 DIAGNOSIS — D64.9 ANEMIA, UNSPECIFIED TYPE: Primary | ICD-10-CM

## 2024-01-12 DIAGNOSIS — I73.9 PERIPHERAL VASCULAR DISEASE, UNSPECIFIED (CMS-HCC): ICD-10-CM

## 2024-01-12 PROCEDURE — 3074F SYST BP LT 130 MM HG: CPT | Performed by: INTERNAL MEDICINE

## 2024-01-12 PROCEDURE — 3078F DIAST BP <80 MM HG: CPT | Performed by: INTERNAL MEDICINE

## 2024-01-12 PROCEDURE — 99214 OFFICE O/P EST MOD 30 MIN: CPT | Performed by: INTERNAL MEDICINE

## 2024-01-12 PROCEDURE — 1126F AMNT PAIN NOTED NONE PRSNT: CPT | Performed by: INTERNAL MEDICINE

## 2024-01-12 PROCEDURE — 1159F MED LIST DOCD IN RCRD: CPT | Performed by: INTERNAL MEDICINE

## 2024-01-12 NOTE — ASSESSMENT & PLAN NOTE
Wounds on R lower extremity. Continue zinc oxide dressings BID. Start doxycycline 100 mg BID x 10 days. Follow-up with HUSSEIN Meeks at  wound center on Monday, 1/15/24. Will touch base with NP following evaluation, likely will need to be seen at wound center weekly.

## 2024-01-12 NOTE — ASSESSMENT & PLAN NOTE
1/9/24 CBC - RBC 3.99, hemoglobin 12.0, hematocrit 38.6, MCHC 31.1. Stable, continue to monitor with CBC at follow-ups.

## 2024-01-12 NOTE — ASSESSMENT & PLAN NOTE
1/9/24 CMP - Cr 1.33, eGFR 56. Discussed with patient to increase water intake. Re-check renal function at follow-up with CMP.

## 2024-01-12 NOTE — PROGRESS NOTES
"Subjective   Chief complaint: Abdi Retana \"Matt\" is a 73 y.o. male who presents for Follow-up (Pt is here for blood work follow up. Pt c/o having a sore on right leg that has been there over a month).    HPI:  Patient c/o sore on R leg x 1 month, he states it appeared suddenly one day. Also following up on blood work.        Objective   /74   Pulse 71   Resp 12   SpO2 97%   Physical Exam  Constitutional:       General: He is not in acute distress.     Appearance: Normal appearance.   HENT:      Head: Normocephalic and atraumatic.   Cardiovascular:      Rate and Rhythm: Normal rate and regular rhythm.      Pulses: Normal pulses.      Heart sounds: Normal heart sounds.   Pulmonary:      Effort: Pulmonary effort is normal.      Breath sounds: Normal breath sounds.   Skin:     General: Skin is warm and dry.      Findings: Lesion present.      Comments: 2 ulcers on R lower leg. No erythema. No drainage.   Neurological:      General: No focal deficit present.      Mental Status: He is alert and oriented to person, place, and time.         I have reviewed and reconciled the medication list with the patient today.   Current Outpatient Medications:     aspirin 81.25 MG split tablet, Take 81 mg by mouth once daily., Disp: , Rfl:     famotidine (Pepcid) 40 mg tablet, Take 0.5 tablets (20 mg) by mouth 2 times a day., Disp: , Rfl:     ferrous sulfate (FeroSuL) 325 (65 Fe) MG tablet, Take 1 tablet (325 mg) by mouth once every 24 hours., Disp: 90 tablet, Rfl: 3    fluocinonide (Lidex) 0.05 % ointment, APPLY TOPICALLY TWICE DAILY AS  NEEDED FOR IRRITATION OR RASH.  AVOID FACE AND GROIN, Disp: 60 g, Rfl: 0    losartan (Cozaar) 50 mg tablet, Take 1 tablet (50 mg) by mouth once daily., Disp: 30 tablet, Rfl: 11    magnesium oxide 400 mg magnesium capsule, Take 1 capsule (400 mg) by mouth once daily., Disp: 90 capsule, Rfl: 3    potassium chloride CR (Klor-Con) 10 mEq ER tablet, Take 1 tablet (10 mEq) by mouth once daily. Do " "not crush, chew, or split., Disp: 30 tablet, Rfl: 11    torsemide (Demadex) 20 mg tablet, Take 1 tablet (20 mg) by mouth once daily., Disp: 30 tablet, Rfl: 11    vitamin D3-folic acid 2,500 unit- 1 mg tablet, Take 1 mg by mouth once every 24 hours., Disp: 90 tablet, Rfl: 3    zinc oxide 3 X 10 \"-yard bandage, Apply 2 each topically once daily., Disp: 36 each, Rfl: 10     Imaging:  No results found.     Labs reviewed:    Lab Results   Component Value Date    WBC 7.0 01/09/2024    HGB 12.0 (L) 01/09/2024    HCT 38.6 (L) 01/09/2024     01/09/2024    CHOL 155 10/03/2023    TRIG 47 10/03/2023    HDL 42.0 10/03/2023    ALT 11 01/09/2024    AST 13 01/09/2024     01/09/2024    K 4.5 01/09/2024     01/09/2024    CREATININE 1.33 (H) 01/09/2024    BUN 22 01/09/2024    CO2 28 01/09/2024    TSH 4.34 (H) 06/06/2023    INR 1.3 (H) 08/12/2020       Assessment/Plan   Problem List Items Addressed This Visit       Anemia - Primary     1/9/24 CBC - RBC 3.99, hemoglobin 12.0, hematocrit 38.6, MCHC 31.1. Stable, continue to monitor with CBC at follow-ups.         Peripheral vascular disease, unspecified (CMS/HCC)     Wounds on R lower extremity. Continue zinc oxide dressings BID. Start doxycycline 100 mg BID x 10 days. Follow-up with HUSSEIN Meeks at  wound center on Monday, 1/15/24. Will touch base with NP following evaluation, likely will need to be seen at wound center weekly.         Lymphedema of both lower extremities     Bilateral lower extremity edema. Increase torsemide to 40 mg daily x 1 week, then return to taking 20 mg daily. Will re-evaluate at follow-up appointment.         Chronic kidney disease, stage 3 unspecified (CMS/HCC)     1/9/24 CMP - Cr 1.33, eGFR 56. Discussed with patient to increase water intake. Re-check renal function at follow-up with CMP.            Continue current medications as listed - increase diuretic, start abx  Follow up at wound clinic on Monday, 1/15     "

## 2024-01-12 NOTE — ASSESSMENT & PLAN NOTE
Bilateral lower extremity edema. Increase torsemide to 40 mg daily x 1 week, then return to taking 20 mg daily. Will re-evaluate at follow-up appointment.

## 2024-01-13 RX ORDER — DOXYCYCLINE 100 MG/1
100 CAPSULE ORAL 2 TIMES DAILY
Qty: 60 CAPSULE | Refills: 0 | Status: SHIPPED | OUTPATIENT
Start: 2024-01-13 | End: 2024-02-29

## 2024-01-22 ENCOUNTER — OFFICE VISIT (OUTPATIENT)
Dept: WOUND CARE | Facility: CLINIC | Age: 74
End: 2024-01-22
Payer: COMMERCIAL

## 2024-01-22 PROCEDURE — 11045 DBRDMT SUBQ TISS EACH ADDL: CPT

## 2024-01-22 PROCEDURE — 99213 OFFICE O/P EST LOW 20 MIN: CPT | Mod: 25

## 2024-01-22 PROCEDURE — 11042 DBRDMT SUBQ TIS 1ST 20SQCM/<: CPT

## 2024-01-29 ENCOUNTER — OFFICE VISIT (OUTPATIENT)
Dept: WOUND CARE | Facility: CLINIC | Age: 74
End: 2024-01-29
Payer: COMMERCIAL

## 2024-01-29 PROCEDURE — 11042 DBRDMT SUBQ TIS 1ST 20SQCM/<: CPT

## 2024-02-05 ENCOUNTER — OFFICE VISIT (OUTPATIENT)
Dept: WOUND CARE | Facility: CLINIC | Age: 74
End: 2024-02-05
Payer: COMMERCIAL

## 2024-02-05 PROCEDURE — 11042 DBRDMT SUBQ TIS 1ST 20SQCM/<: CPT

## 2024-02-12 ENCOUNTER — OFFICE VISIT (OUTPATIENT)
Dept: WOUND CARE | Facility: CLINIC | Age: 74
End: 2024-02-12
Payer: COMMERCIAL

## 2024-02-12 PROCEDURE — 11042 DBRDMT SUBQ TIS 1ST 20SQCM/<: CPT

## 2024-02-26 ENCOUNTER — CLINICAL SUPPORT (OUTPATIENT)
Dept: WOUND CARE | Facility: CLINIC | Age: 74
End: 2024-02-26
Payer: COMMERCIAL

## 2024-02-26 PROCEDURE — 29581 APPL MULTLAYER CMPRN SYS LEG: CPT

## 2024-02-29 DIAGNOSIS — L03.90 CELLULITIS, UNSPECIFIED CELLULITIS SITE: ICD-10-CM

## 2024-02-29 DIAGNOSIS — I15.9 SECONDARY HYPERTENSION: ICD-10-CM

## 2024-02-29 DIAGNOSIS — I89.0 LYMPHEDEMA OF BOTH LOWER EXTREMITIES: ICD-10-CM

## 2024-02-29 RX ORDER — FLUOCINONIDE 0.5 MG/G
OINTMENT TOPICAL
Qty: 60 G | Refills: 0 | Status: SHIPPED | OUTPATIENT
Start: 2024-02-29 | End: 2024-05-16

## 2024-02-29 RX ORDER — DOXYCYCLINE 100 MG/1
CAPSULE ORAL
Qty: 60 CAPSULE | Refills: 0 | Status: SHIPPED | OUTPATIENT
Start: 2024-02-29 | End: 2024-05-16

## 2024-03-04 ENCOUNTER — OFFICE VISIT (OUTPATIENT)
Dept: WOUND CARE | Facility: CLINIC | Age: 74
End: 2024-03-04
Payer: COMMERCIAL

## 2024-03-04 PROCEDURE — 29581 APPL MULTLAYER CMPRN SYS LEG: CPT

## 2024-03-04 PROCEDURE — 11045 DBRDMT SUBQ TISS EACH ADDL: CPT

## 2024-03-04 PROCEDURE — 11042 DBRDMT SUBQ TIS 1ST 20SQCM/<: CPT

## 2024-03-11 ENCOUNTER — APPOINTMENT (OUTPATIENT)
Dept: WOUND CARE | Facility: CLINIC | Age: 74
End: 2024-03-11
Payer: COMMERCIAL

## 2024-03-18 ENCOUNTER — APPOINTMENT (OUTPATIENT)
Dept: WOUND CARE | Facility: CLINIC | Age: 74
End: 2024-03-18
Payer: COMMERCIAL

## 2024-03-25 ENCOUNTER — APPOINTMENT (OUTPATIENT)
Dept: WOUND CARE | Facility: CLINIC | Age: 74
End: 2024-03-25
Payer: COMMERCIAL

## 2024-04-09 ENCOUNTER — CLINICAL SUPPORT (OUTPATIENT)
Dept: PRIMARY CARE | Facility: CLINIC | Age: 74
End: 2024-04-09
Payer: COMMERCIAL

## 2024-04-09 DIAGNOSIS — I10 BENIGN ESSENTIAL HYPERTENSION: ICD-10-CM

## 2024-04-09 DIAGNOSIS — D50.9 IRON DEFICIENCY ANEMIA, UNSPECIFIED IRON DEFICIENCY ANEMIA TYPE: ICD-10-CM

## 2024-04-09 DIAGNOSIS — I15.9 SECONDARY HYPERTENSION: ICD-10-CM

## 2024-04-09 DIAGNOSIS — E03.9 HYPOTHYROIDISM, UNSPECIFIED TYPE: ICD-10-CM

## 2024-04-09 LAB
ALBUMIN SERPL BCP-MCNC: 3.3 G/DL (ref 3.4–5)
ALP SERPL-CCNC: 53 U/L (ref 33–136)
ALT SERPL W P-5'-P-CCNC: 10 U/L (ref 10–52)
ANION GAP SERPL CALC-SCNC: 14 MMOL/L (ref 10–20)
AST SERPL W P-5'-P-CCNC: 16 U/L (ref 9–39)
BILIRUB SERPL-MCNC: 0.5 MG/DL (ref 0–1.2)
BUN SERPL-MCNC: 20 MG/DL (ref 6–23)
CALCIUM SERPL-MCNC: 8.7 MG/DL (ref 8.6–10.6)
CHLORIDE SERPL-SCNC: 103 MMOL/L (ref 98–107)
CO2 SERPL-SCNC: 27 MMOL/L (ref 21–32)
CREAT SERPL-MCNC: 1.39 MG/DL (ref 0.5–1.3)
EGFRCR SERPLBLD CKD-EPI 2021: 54 ML/MIN/1.73M*2
GLUCOSE SERPL-MCNC: 81 MG/DL (ref 74–99)
POTASSIUM SERPL-SCNC: 3.9 MMOL/L (ref 3.5–5.3)
PROT SERPL-MCNC: 6.9 G/DL (ref 6.4–8.2)
SODIUM SERPL-SCNC: 140 MMOL/L (ref 136–145)

## 2024-04-09 PROCEDURE — 80053 COMPREHEN METABOLIC PANEL: CPT

## 2024-04-09 PROCEDURE — 36415 COLL VENOUS BLD VENIPUNCTURE: CPT

## 2024-04-11 ENCOUNTER — OFFICE VISIT (OUTPATIENT)
Dept: PRIMARY CARE | Facility: CLINIC | Age: 74
End: 2024-04-11
Payer: COMMERCIAL

## 2024-04-11 VITALS
HEART RATE: 83 BPM | SYSTOLIC BLOOD PRESSURE: 136 MMHG | RESPIRATION RATE: 18 BRPM | DIASTOLIC BLOOD PRESSURE: 76 MMHG | OXYGEN SATURATION: 91 %

## 2024-04-11 DIAGNOSIS — I15.9 SECONDARY HYPERTENSION: ICD-10-CM

## 2024-04-11 DIAGNOSIS — I13.10 HYPERTENSIVE HEART AND CHRONIC KIDNEY DISEASE WITHOUT HEART FAILURE, WITH STAGE 1 THROUGH STAGE 4 CHRONIC KIDNEY DISEASE, OR UNSPECIFIED CHRONIC KIDNEY DISEASE: ICD-10-CM

## 2024-04-11 DIAGNOSIS — N20.0 RENAL STONES: ICD-10-CM

## 2024-04-11 DIAGNOSIS — I87.2 VENOUS (PERIPHERAL) INSUFFICIENCY: ICD-10-CM

## 2024-04-11 DIAGNOSIS — I89.0 LYMPHEDEMA OF BOTH LOWER EXTREMITIES: ICD-10-CM

## 2024-04-11 DIAGNOSIS — M17.9 OSTEOARTHRITIS OF KNEE, UNSPECIFIED LATERALITY, UNSPECIFIED OSTEOARTHRITIS TYPE: ICD-10-CM

## 2024-04-11 DIAGNOSIS — L30.9 DERMATITIS, UNSPECIFIED: ICD-10-CM

## 2024-04-11 DIAGNOSIS — N18.31 STAGE 3A CHRONIC KIDNEY DISEASE (MULTI): Primary | ICD-10-CM

## 2024-04-11 DIAGNOSIS — L03.90 CELLULITIS, UNSPECIFIED CELLULITIS SITE: ICD-10-CM

## 2024-04-11 PROBLEM — S89.90XA INJURY OF LOWER EXTREMITY: Status: ACTIVE | Noted: 2024-04-11

## 2024-04-11 PROCEDURE — 1159F MED LIST DOCD IN RCRD: CPT | Performed by: INTERNAL MEDICINE

## 2024-04-11 PROCEDURE — 3075F SYST BP GE 130 - 139MM HG: CPT | Performed by: INTERNAL MEDICINE

## 2024-04-11 PROCEDURE — 1157F ADVNC CARE PLAN IN RCRD: CPT | Performed by: INTERNAL MEDICINE

## 2024-04-11 PROCEDURE — 3078F DIAST BP <80 MM HG: CPT | Performed by: INTERNAL MEDICINE

## 2024-04-11 PROCEDURE — 81003 URINALYSIS AUTO W/O SCOPE: CPT | Performed by: INTERNAL MEDICINE

## 2024-04-11 PROCEDURE — 99214 OFFICE O/P EST MOD 30 MIN: CPT | Performed by: INTERNAL MEDICINE

## 2024-04-11 PROCEDURE — 1126F AMNT PAIN NOTED NONE PRSNT: CPT | Performed by: INTERNAL MEDICINE

## 2024-04-11 RX ORDER — FLUCONAZOLE 150 MG/1
150 TABLET ORAL DAILY
Qty: 4 TABLET | Refills: 0 | Status: SHIPPED | OUTPATIENT
Start: 2024-04-11

## 2024-04-11 RX ORDER — SULFAMETHOXAZOLE AND TRIMETHOPRIM 800; 160 MG/1; MG/1
1 TABLET ORAL 2 TIMES DAILY
Qty: 28 TABLET | Refills: 0 | Status: SHIPPED | OUTPATIENT
Start: 2024-04-11 | End: 2024-04-25

## 2024-04-11 RX ORDER — CLOTRIMAZOLE AND BETAMETHASONE DIPROPIONATE 10; .64 MG/G; MG/G
1 CREAM TOPICAL 2 TIMES DAILY
Qty: 45 G | Refills: 0 | Status: SHIPPED | OUTPATIENT
Start: 2024-04-11 | End: 2024-05-16

## 2024-04-11 ASSESSMENT — ENCOUNTER SYMPTOMS
OCCASIONAL FEELINGS OF UNSTEADINESS: 1
DEPRESSION: 0
LOSS OF SENSATION IN FEET: 0

## 2024-04-11 ASSESSMENT — PAIN SCALES - GENERAL: PAINLEVEL: 0-NO PAIN

## 2024-04-11 NOTE — PROGRESS NOTES
"Subjective   Chief complaint: Abdi Retana \"Matt\" is a 73 y.o. male who presents for Follow-up (Pt is being seen for followup visit post blood work), Blood in Urine (Pt c/o seeing blood in urine ), and Rash (Pt c/o itchy flaky rash on buttocks).    HPI:  73 y.o. male reports to the office for follow up of   HTN, CHF.  Venous stasis and venous ulcers  Patient is complaining of rash on the buttock area in the scrotum it is very itchy and red and irritated he has been using zinc oxide without improvement  2.  Patient is complaining of a blood in the urine his UA was positive for UTI started Bactrim DS.  Severe pain in both knees left knee is bone-on-bone would like referral to orthopedic but explained to him that until the ulcers in his legs are healed and not going to operate on his knee.        Objective   /76   Pulse 83   Resp 18   SpO2 91%   Physical Exam  Constitutional:       Appearance: Normal appearance.   HENT:      Head: Normocephalic and atraumatic.      Mouth/Throat:      Mouth: Mucous membranes are moist.      Pharynx: Oropharynx is clear.   Eyes:      Pupils: Pupils are equal, round, and reactive to light.   Cardiovascular:      Rate and Rhythm: Normal rate and regular rhythm.   Pulmonary:      Effort: Pulmonary effort is normal. No respiratory distress.   Abdominal:      General: Abdomen is flat. There is no distension.   Musculoskeletal:         General: Normal range of motion.      Cervical back: Normal range of motion.      Comments: Patient has a Unna boot around both feet he follow-up with the wound center.   Skin:     General: Skin is warm and dry.   Neurological:      General: No focal deficit present.      Mental Status: He is alert.       I have reviewed and reconciled the medication list with the patient today.   Current Outpatient Medications:     aspirin 81.25 MG split tablet, Take 81 mg by mouth once daily., Disp: , Rfl:     doxycycline (Vibramycin) 100 mg capsule, TAKE 1 CAPSULE BY " MOUTH TWICE  DAILY - TAKE WITH AT LEAST 8  OUNCES (LARGE GLASS) OF WATER,  DO NOT LIE DOWN FOR 30 MINUTES  AFTER, Disp: 60 capsule, Rfl: 0    famotidine (Pepcid) 40 mg tablet, Take 0.5 tablets (20 mg) by mouth 2 times a day., Disp: , Rfl:     ferrous sulfate (FeroSuL) 325 (65 Fe) MG tablet, Take 1 tablet (325 mg) by mouth once every 24 hours., Disp: 90 tablet, Rfl: 3    fluocinonide (Lidex) 0.05 % ointment, APPLY TOPICALLY TWICE DAILY AS  NEEDED FOR IRRITATION OR RASH.  AVOID FACE AND GROIN, Disp: 60 g, Rfl: 0    losartan (Cozaar) 50 mg tablet, Take 1 tablet (50 mg) by mouth once daily., Disp: 30 tablet, Rfl: 11    magnesium oxide 400 mg magnesium capsule, Take 1 capsule (400 mg) by mouth once daily., Disp: 90 capsule, Rfl: 3    potassium chloride CR (Klor-Con) 10 mEq ER tablet, Take 1 tablet (10 mEq) by mouth once daily. Do not crush, chew, or split., Disp: 30 tablet, Rfl: 11    torsemide (Demadex) 20 mg tablet, Take 1 tablet (20 mg) by mouth once daily., Disp: 30 tablet, Rfl: 11    vitamin D3-folic acid 2,500 unit- 1 mg tablet, Take 1 mg by mouth once every 24 hours., Disp: 90 tablet, Rfl: 3     Imaging:  No results found.     Labs reviewed:    Lab Results   Component Value Date    WBC 7.0 01/09/2024    HGB 12.0 (L) 01/09/2024    HCT 38.6 (L) 01/09/2024     01/09/2024    CHOL 155 10/03/2023    TRIG 47 10/03/2023    HDL 42.0 10/03/2023    ALT 10 04/09/2024    AST 16 04/09/2024     04/09/2024    K 3.9 04/09/2024     04/09/2024    CREATININE 1.39 (H) 04/09/2024    BUN 20 04/09/2024    CO2 27 04/09/2024    TSH 4.34 (H) 06/06/2023    INR 1.3 (H) 08/12/2020       Assessment/Plan   Problem List Items Addressed This Visit       Cellulitis    Dermatitis, unspecified     Dermatitis involve both buttocks  Most likely fungal infection in  Diflucan  Lotrisone mixed with the zinc oxide         Hypertension     Continue with losartan.          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 Increase torsemide to 40 mg daily x 1 week, then return to taking 20 mg daily. Will re-evaluate at follow-up appointment.         OA (osteoarthritis) of knee     Tylenol for the painReferral to orthopedic surgeon         Renal stones     Nonobstructing we will monitor         Venous (peripheral) insufficiency     Venous insufficiency with venous ulcer follow-up with the wound physician         Chronic kidney disease, stage 3 unspecified (CMS/HCC) - Primary     1/9/24 CMP - Cr 1.33, eGFR 56. Discussed with patient to increase water intake. Re-check renal function at follow-up with CMP.            Continue current medications as listed  Follow up in 3 months

## 2024-04-11 NOTE — ASSESSMENT & PLAN NOTE
Dermatitis involve both buttocks  Most likely fungal infection in  Diflucan  Lotrisone mixed with the zinc oxide

## 2024-04-12 ENCOUNTER — APPOINTMENT (OUTPATIENT)
Dept: PRIMARY CARE | Facility: CLINIC | Age: 74
End: 2024-04-12
Payer: COMMERCIAL

## 2024-04-28 DIAGNOSIS — L03.90 CELLULITIS, UNSPECIFIED CELLULITIS SITE: ICD-10-CM

## 2024-04-28 DIAGNOSIS — I15.9 SECONDARY HYPERTENSION: ICD-10-CM

## 2024-04-29 RX ORDER — LOSARTAN POTASSIUM 50 MG/1
50 TABLET ORAL DAILY
Qty: 90 TABLET | Refills: 3 | Status: SHIPPED | OUTPATIENT
Start: 2024-04-29

## 2024-04-29 RX ORDER — POTASSIUM CHLORIDE 750 MG/1
10 TABLET, EXTENDED RELEASE ORAL DAILY
Qty: 90 TABLET | Refills: 3 | Status: SHIPPED | OUTPATIENT
Start: 2024-04-29

## 2024-04-29 RX ORDER — TORSEMIDE 20 MG/1
20 TABLET ORAL DAILY
Qty: 90 TABLET | Refills: 3 | Status: SHIPPED | OUTPATIENT
Start: 2024-04-29

## 2024-05-15 DIAGNOSIS — I89.0 LYMPHEDEMA OF BOTH LOWER EXTREMITIES: ICD-10-CM

## 2024-05-15 DIAGNOSIS — L03.90 CELLULITIS, UNSPECIFIED CELLULITIS SITE: ICD-10-CM

## 2024-05-15 DIAGNOSIS — I15.9 SECONDARY HYPERTENSION: ICD-10-CM

## 2024-05-16 RX ORDER — DOXYCYCLINE 100 MG/1
CAPSULE ORAL
Qty: 60 CAPSULE | Refills: 0 | Status: SHIPPED | OUTPATIENT
Start: 2024-05-16

## 2024-05-16 RX ORDER — CLOTRIMAZOLE AND BETAMETHASONE DIPROPIONATE 10; .64 MG/G; MG/G
CREAM TOPICAL
Qty: 45 G | Refills: 0 | Status: SHIPPED | OUTPATIENT
Start: 2024-05-16

## 2024-05-16 RX ORDER — FLUOCINONIDE 0.5 MG/G
OINTMENT TOPICAL
Qty: 60 G | Refills: 0 | Status: SHIPPED | OUTPATIENT
Start: 2024-05-16

## 2024-07-09 ENCOUNTER — APPOINTMENT (OUTPATIENT)
Dept: PRIMARY CARE | Facility: CLINIC | Age: 74
End: 2024-07-09
Payer: COMMERCIAL

## 2024-07-09 DIAGNOSIS — Z00.00 ENCOUNTER FOR ANNUAL WELLNESS VISIT (AWV) IN MEDICARE PATIENT: ICD-10-CM

## 2024-07-09 DIAGNOSIS — N18.31 STAGE 3A CHRONIC KIDNEY DISEASE (MULTI): ICD-10-CM

## 2024-07-09 DIAGNOSIS — E03.9 HYPOTHYROIDISM, UNSPECIFIED TYPE: ICD-10-CM

## 2024-07-09 DIAGNOSIS — D50.9 IRON DEFICIENCY ANEMIA, UNSPECIFIED IRON DEFICIENCY ANEMIA TYPE: ICD-10-CM

## 2024-07-09 DIAGNOSIS — Z12.5 SCREENING PSA (PROSTATE SPECIFIC ANTIGEN): ICD-10-CM

## 2024-07-09 DIAGNOSIS — E55.9 VITAMIN D DEFICIENCY, UNSPECIFIED: ICD-10-CM

## 2024-07-09 DIAGNOSIS — I10 PRIMARY HYPERTENSION: ICD-10-CM

## 2024-07-09 DIAGNOSIS — E78.00 ELEVATED LDL CHOLESTEROL LEVEL: ICD-10-CM

## 2024-07-09 DIAGNOSIS — I10 BENIGN ESSENTIAL HYPERTENSION: ICD-10-CM

## 2024-07-09 DIAGNOSIS — D64.9 ANEMIA, UNSPECIFIED TYPE: ICD-10-CM

## 2024-07-09 LAB
25(OH)D3 SERPL-MCNC: 39 NG/ML (ref 30–100)
ERYTHROCYTE [DISTWIDTH] IN BLOOD BY AUTOMATED COUNT: 15.1 % (ref 11.5–14.5)
HCT VFR BLD AUTO: 37.4 % (ref 41–52)
HGB BLD-MCNC: 11 G/DL (ref 13.5–17.5)
MCH RBC QN AUTO: 28.3 PG (ref 26–34)
MCHC RBC AUTO-ENTMCNC: 29.4 G/DL (ref 32–36)
MCV RBC AUTO: 96 FL (ref 80–100)
NRBC BLD-RTO: 0 /100 WBCS (ref 0–0)
PLATELET # BLD AUTO: 270 X10*3/UL (ref 150–450)
PSA SERPL-MCNC: 0.96 NG/ML
RBC # BLD AUTO: 3.89 X10*6/UL (ref 4.5–5.9)
TSH SERPL-ACNC: 2.25 MIU/L (ref 0.44–3.98)
WBC # BLD AUTO: 7.1 X10*3/UL (ref 4.4–11.3)

## 2024-07-09 PROCEDURE — 80061 LIPID PANEL: CPT

## 2024-07-09 PROCEDURE — 84443 ASSAY THYROID STIM HORMONE: CPT

## 2024-07-09 PROCEDURE — G0103 PSA SCREENING: HCPCS

## 2024-07-09 PROCEDURE — 80053 COMPREHEN METABOLIC PANEL: CPT

## 2024-07-09 PROCEDURE — 36415 COLL VENOUS BLD VENIPUNCTURE: CPT

## 2024-07-09 PROCEDURE — 85027 COMPLETE CBC AUTOMATED: CPT

## 2024-07-09 PROCEDURE — 82306 VITAMIN D 25 HYDROXY: CPT

## 2024-07-10 LAB
ALBUMIN SERPL BCP-MCNC: 3.4 G/DL (ref 3.4–5)
ALP SERPL-CCNC: 51 U/L (ref 33–136)
ALT SERPL W P-5'-P-CCNC: 6 U/L (ref 10–52)
ANION GAP SERPL CALC-SCNC: 14 MMOL/L (ref 10–20)
AST SERPL W P-5'-P-CCNC: 12 U/L (ref 9–39)
BILIRUB SERPL-MCNC: 0.5 MG/DL (ref 0–1.2)
BUN SERPL-MCNC: 17 MG/DL (ref 6–23)
CALCIUM SERPL-MCNC: 8.5 MG/DL (ref 8.6–10.6)
CHLORIDE SERPL-SCNC: 105 MMOL/L (ref 98–107)
CHOLEST SERPL-MCNC: 131 MG/DL (ref 0–199)
CHOLESTEROL/HDL RATIO: 3.6
CO2 SERPL-SCNC: 26 MMOL/L (ref 21–32)
CREAT SERPL-MCNC: 1.23 MG/DL (ref 0.5–1.3)
EGFRCR SERPLBLD CKD-EPI 2021: 62 ML/MIN/1.73M*2
GLUCOSE SERPL-MCNC: 94 MG/DL (ref 74–99)
HDLC SERPL-MCNC: 36.6 MG/DL
LDLC SERPL CALC-MCNC: 84 MG/DL
NON HDL CHOLESTEROL: 94 MG/DL (ref 0–149)
POTASSIUM SERPL-SCNC: 4.6 MMOL/L (ref 3.5–5.3)
PROT SERPL-MCNC: 6.8 G/DL (ref 6.4–8.2)
SODIUM SERPL-SCNC: 140 MMOL/L (ref 136–145)
TRIGL SERPL-MCNC: 51 MG/DL (ref 0–149)
VLDL: 10 MG/DL (ref 0–40)

## 2024-07-11 ENCOUNTER — APPOINTMENT (OUTPATIENT)
Dept: PRIMARY CARE | Facility: CLINIC | Age: 74
End: 2024-07-11
Payer: COMMERCIAL

## 2024-07-11 VITALS
OXYGEN SATURATION: 92 % | DIASTOLIC BLOOD PRESSURE: 73 MMHG | RESPIRATION RATE: 14 BRPM | HEART RATE: 85 BPM | SYSTOLIC BLOOD PRESSURE: 132 MMHG

## 2024-07-11 DIAGNOSIS — D50.9 IRON DEFICIENCY ANEMIA, UNSPECIFIED IRON DEFICIENCY ANEMIA TYPE: ICD-10-CM

## 2024-07-11 DIAGNOSIS — I15.9 SECONDARY HYPERTENSION: Primary | ICD-10-CM

## 2024-07-11 DIAGNOSIS — K21.9 GASTRO-ESOPHAGEAL REFLUX DISEASE WITHOUT ESOPHAGITIS: ICD-10-CM

## 2024-07-11 DIAGNOSIS — R21 GROIN RASH: ICD-10-CM

## 2024-07-11 DIAGNOSIS — L30.9 DERMATITIS, UNSPECIFIED: ICD-10-CM

## 2024-07-11 DIAGNOSIS — N39.0 URINARY TRACT INFECTION WITHOUT HEMATURIA, SITE UNSPECIFIED: ICD-10-CM

## 2024-07-11 DIAGNOSIS — Z00.00 MEDICARE ANNUAL WELLNESS VISIT, SUBSEQUENT: ICD-10-CM

## 2024-07-11 DIAGNOSIS — D64.9 ANEMIA, UNSPECIFIED TYPE: ICD-10-CM

## 2024-07-11 LAB
POC APPEARANCE, URINE: ABNORMAL
POC BILIRUBIN, URINE: NEGATIVE
POC BLOOD, URINE: NEGATIVE
POC COLOR, URINE: YELLOW
POC GLUCOSE, URINE: NEGATIVE MG/DL
POC KETONES, URINE: NEGATIVE MG/DL
POC LEUKOCYTES, URINE: ABNORMAL
POC NITRITE,URINE: POSITIVE
POC OCCULT BLOOD STOOL #1: NEGATIVE
POC OCCULT BLOOD STOOL #2: NEGATIVE
POC OCCULT BLOOD STOOL #3: NEGATIVE
POC PH, URINE: 8 PH
POC PROTEIN, URINE: ABNORMAL MG/DL
POC SPECIFIC GRAVITY, URINE: 1.01
POC UROBILINOGEN, URINE: 1 EU/DL

## 2024-07-11 ASSESSMENT — LIFESTYLE VARIABLES
AUDIT-C TOTAL SCORE: 0
SKIP TO QUESTIONS 9-10: 1
HOW MANY STANDARD DRINKS CONTAINING ALCOHOL DO YOU HAVE ON A TYPICAL DAY: PATIENT DOES NOT DRINK
HOW OFTEN DO YOU HAVE A DRINK CONTAINING ALCOHOL: NEVER
HOW OFTEN DO YOU HAVE SIX OR MORE DRINKS ON ONE OCCASION: NEVER

## 2024-07-11 ASSESSMENT — ANXIETY QUESTIONNAIRES
3. WORRYING TOO MUCH ABOUT DIFFERENT THINGS: NOT AT ALL
1. FEELING NERVOUS, ANXIOUS, OR ON EDGE: NOT AT ALL
IF YOU CHECKED OFF ANY PROBLEMS ON THIS QUESTIONNAIRE, HOW DIFFICULT HAVE THESE PROBLEMS MADE IT FOR YOU TO DO YOUR WORK, TAKE CARE OF THINGS AT HOME, OR GET ALONG WITH OTHER PEOPLE: NOT DIFFICULT AT ALL
4. TROUBLE RELAXING: NOT AT ALL
GAD7 TOTAL SCORE: 0
2. NOT BEING ABLE TO STOP OR CONTROL WORRYING: NOT AT ALL
7. FEELING AFRAID AS IF SOMETHING AWFUL MIGHT HAPPEN: NOT AT ALL
6. BECOMING EASILY ANNOYED OR IRRITABLE: NOT AT ALL
5. BEING SO RESTLESS THAT IT IS HARD TO SIT STILL: NOT AT ALL

## 2024-07-11 ASSESSMENT — ACTIVITIES OF DAILY LIVING (ADL)
DRESSING YOURSELF: INDEPENDENT
HEARING - RIGHT EAR: FUNCTIONAL
HEARING - LEFT EAR: FUNCTIONAL
ADEQUATE_TO_COMPLETE_ADL: YES
WALKS IN HOME: DEPENDENT
BATHING: INDEPENDENT
GROOMING: INDEPENDENT
PATIENT'S MEMORY ADEQUATE TO SAFELY COMPLETE DAILY ACTIVITIES?: YES
FEEDING YOURSELF: INDEPENDENT
ASSISTIVE_DEVICE: WHEELCHAIR
JUDGMENT_ADEQUATE_SAFELY_COMPLETE_DAILY_ACTIVITIES: YES
TOILETING: INDEPENDENT

## 2024-07-11 ASSESSMENT — COLUMBIA-SUICIDE SEVERITY RATING SCALE - C-SSRS
2. HAVE YOU ACTUALLY HAD ANY THOUGHTS OF KILLING YOURSELF?: NO
6. HAVE YOU EVER DONE ANYTHING, STARTED TO DO ANYTHING, OR PREPARED TO DO ANYTHING TO END YOUR LIFE?: NO
1. IN THE PAST MONTH, HAVE YOU WISHED YOU WERE DEAD OR WISHED YOU COULD GO TO SLEEP AND NOT WAKE UP?: NO

## 2024-07-11 ASSESSMENT — PATIENT HEALTH QUESTIONNAIRE - PHQ9
1. LITTLE INTEREST OR PLEASURE IN DOING THINGS: NOT AT ALL
SUM OF ALL RESPONSES TO PHQ9 QUESTIONS 1 AND 2: 0
2. FEELING DOWN, DEPRESSED OR HOPELESS: NOT AT ALL

## 2024-07-11 ASSESSMENT — SOCIAL DETERMINANTS OF HEALTH (SDOH)

## 2024-07-11 ASSESSMENT — ENCOUNTER SYMPTOMS
DEPRESSION: 0
LOSS OF SENSATION IN FEET: 0
OCCASIONAL FEELINGS OF UNSTEADINESS: 1

## 2024-07-11 ASSESSMENT — PAIN SCALES - GENERAL: PAINLEVEL: 0-NO PAIN

## 2024-07-11 NOTE — PROGRESS NOTES
ANNUAL WELLNESS VISIT    Subjective :  Chief Complaint: Abdi Retana is an 74 y.o. male here for an annual wellness visit and general medical care and f/u.     HPI:  Pt here for annual wellness exam.       Endorses some burning with urination. Completed antibiotic therapy for previous UTI.    FUNGAL INFECTION on back side. Improved with medication.       Objective   /73   Pulse 85   Resp 14   SpO2 92%     Physical Exam  Constitutional:       Appearance: Normal appearance.   HENT:      Head: Normocephalic and atraumatic.      Right Ear: Tympanic membrane and external ear normal.      Left Ear: Tympanic membrane and external ear normal.      Nose: Nose normal.      Mouth/Throat:      Mouth: Mucous membranes are moist.   Eyes:      Extraocular Movements: Extraocular movements intact.   Cardiovascular:      Rate and Rhythm: Normal rate and regular rhythm.   Pulmonary:      Effort: Pulmonary effort is normal. No respiratory distress.      Breath sounds: Normal breath sounds.   Abdominal:      General: Bowel sounds are normal.      Palpations: Abdomen is soft.      Tenderness: There is no abdominal tenderness.   Skin:     General: Skin is dry.   Neurological:      General: No focal deficit present.      Mental Status: He is alert. Mental status is at baseline.   Psychiatric:         Mood and Affect: Mood normal.         Thought Content: Thought content normal.         Judgment: Judgment normal.         Imaging:  No results found.     Labs reviewed:    Lab Results   Component Value Date    WBC 7.1 07/09/2024    HGB 11.0 (L) 07/09/2024    HCT 37.4 (L) 07/09/2024     07/09/2024    CHOL 131 07/09/2024    TRIG 51 07/09/2024    HDL 36.6 07/09/2024    ALT 6 (L) 07/09/2024    AST 12 07/09/2024     07/09/2024    K 4.6 07/09/2024     07/09/2024    CREATININE 1.23 07/09/2024    BUN 17 07/09/2024    CO2 26 07/09/2024    TSH 2.25 07/09/2024    INR 1.3 (H) 08/12/2020       Past Medical, Surgical, and Family  History reviewed and updated in chart.    I have reviewed and reconciled the medication list with the patient today.   Current Outpatient Medications:     aspirin 81.25 MG split tablet, Take 81 mg by mouth once daily., Disp: , Rfl:     clotrimazole-betamethasone (Lotrisone) cream, MIX WITH ZINC OXIDE AND APPLY  TOPICALLY TO AFFECTED AREA(S)  TWICE DAILY, Disp: 45 g, Rfl: 0    famotidine (Pepcid) 40 mg tablet, Take 0.5 tablets (20 mg) by mouth 2 times a day., Disp: , Rfl:     ferrous sulfate (FeroSuL) 325 (65 Fe) MG tablet, Take 1 tablet (325 mg) by mouth once every 24 hours., Disp: 90 tablet, Rfl: 3    fluconazole (Diflucan) 150 mg tablet, Take 1 tablet (150 mg) by mouth once daily., Disp: 4 tablet, Rfl: 0    fluocinonide (Lidex) 0.05 % ointment, APPLY TOPICALLY TWICE DAILY AS  NEEDED FOR IRRITATION OR RASH.  AVOID FACE AND GROIN, Disp: 60 g, Rfl: 0    losartan (Cozaar) 50 mg tablet, TAKE 1 TABLET BY MOUTH ONCE  DAILY, Disp: 90 tablet, Rfl: 3    magnesium oxide 400 mg magnesium capsule, Take 1 capsule (400 mg) by mouth once daily., Disp: 90 capsule, Rfl: 3    potassium chloride CR 10 mEq ER tablet, TAKE 1 TABLET BY MOUTH ONCE  DAILY DO NOT CRUSH, CHEW, OR  SPLIT, Disp: 90 tablet, Rfl: 3    torsemide (Demadex) 20 mg tablet, TAKE 1 TABLET BY MOUTH ONCE  DAILY, Disp: 90 tablet, Rfl: 3    vitamin D3-folic acid 2,500 unit- 1 mg tablet, Take 1 mg by mouth once every 24 hours., Disp: 90 tablet, Rfl: 3    zinc oxide 10 % cream, To mix with Lotrisone and apply twice daily, Disp: 50 g, Rfl: 0     List of current healthcare providers:  Patient Care Team:  Cheyenne Arango MD as PCP - General (Internal Medicine)  Cheyenne Arango MD as PCP - [employer] PCP     HRA:  Over the past 2 weeks, how often have you been bothered by any of the following problems?  Little interest or pleasure in doing things: Not at all  Feeling down, depressed, or hopeless: Not at all  Patient Health Questionnaire-2 Score: 0    Juan Galindo  Risk  One or more falls in the last year? No  How many Times?    Was the patient injured in the fall?    Has trouble stepping onto curb? No  Advised to use a cane or walker to get around safely? Yes  Often has to rush to toilet? Yes  Feels unsteady when walking? Yes  Has lost some feeling in feet? No  Often feels sad or depressed? No  Steadies self on furniture while walking at home? Yes  Takes medicine that makes them feel lightheaded or more tired than usual? No  Worried about Falling? Yes  Takes medicine to sleep or improve mood? No  Needs to push with hands when rising from a chair? Yes                                ADL Screening  Hearing - Right Ear: Functional  Hearing - Left Ear: Functional  Bathing: Independent  Dressing: Independent  Walks in Home: Dependent         Assessment/Plan :  Problem List Items Addressed This Visit          Cardiac and Vasculature    Hypertension - Primary       Gastrointestinal and Abdominal    Gastro-esophageal reflux disease without esophagitis     OTC pepsid            Genitourinary and Reproductive    UTI (urinary tract infection)     Ciprofloxacin 500mg BID for 7 days            Hematology and Neoplasia    Anemia    Iron deficiency anemia, unspecified     OTC iron (non-constipation)            Skin    Dermatitis, unspecified     Lotrisone cream bid     Add diflucan 100mg for 5 days after abx completed for UTI          Other Visit Diagnoses       Medicare annual wellness visit, subsequent        Relevant Orders    POCT UA (nonautomated) manually resulted (Completed)    ECG 12 lead (Clinic Performed)    POCT occult blood stool manually resulted (Completed)          The following health maintenance schedule was reviewed with the patient and provided in printed form in the after visit summary:  Health Maintenance   Topic Date Due    CKD: Urine Protein Screening  Never done    RSV Pregnant patients and/or  patients aged 60+ years (1 - 1-dose 60+ series) Never done    Abdominal  Aortic Aneurysm (AAA) Screening  Never done    Pneumococcal Vaccine: 65+ Years (1 of 1 - PCV) Never done    Influenza Vaccine (1) 09/01/2024    TSH Level  07/09/2025    Medicare Annual Wellness Visit (AWV)  07/12/2025    DTaP/Tdap/Td Vaccines (2 - Td or Tdap) 06/29/2028    Lipid Panel  07/09/2029    Colorectal Cancer Screening  08/13/2030    HIB Vaccines  Aged Out    Hepatitis B Vaccines  Aged Out    IPV Vaccines  Aged Out    Hepatitis A Vaccines  Aged Out    Meningococcal Vaccine  Aged Out    Rotavirus Vaccines  Aged Out    HPV Vaccines  Aged Out    Zoster Vaccines  Discontinued    Hepatitis C Screening  Discontinued    Diabetes Screening  Discontinued    COVID-19 Vaccine  Discontinued    Irritable Bowel Syndrome  Discontinued       Advance Care Planning   ermias             Orders Placed This Encounter   Procedures    POCT UA (nonautomated) manually resulted     Order Specific Question:   Release result to [employer]     Answer:   Immediate [1]    POCT occult blood stool manually resulted     Order Specific Question:   Release result to MyChart     Answer:   Immediate [1]    ECG 12 lead (Clinic Performed)       Continue current medications as listed  Follow up in END OF OCTOBER

## 2024-07-12 RX ORDER — FLUCONAZOLE 150 MG/1
150 TABLET ORAL ONCE
Qty: 1 TABLET | Refills: 0 | Status: SHIPPED | OUTPATIENT
Start: 2024-07-12 | End: 2024-07-12

## 2024-07-12 RX ORDER — CLOTRIMAZOLE AND BETAMETHASONE DIPROPIONATE 10; .64 MG/G; MG/G
1 CREAM TOPICAL 2 TIMES DAILY
Qty: 15 G | Refills: 0 | Status: SHIPPED | OUTPATIENT
Start: 2024-07-12 | End: 2024-09-10

## 2024-07-12 RX ORDER — CIPROFLOXACIN 500 MG/1
500 TABLET ORAL 2 TIMES DAILY
Qty: 14 TABLET | Refills: 0 | Status: SHIPPED | OUTPATIENT
Start: 2024-07-12 | End: 2024-07-19

## 2024-10-22 ENCOUNTER — APPOINTMENT (OUTPATIENT)
Dept: PRIMARY CARE | Facility: CLINIC | Age: 74
End: 2024-10-22
Payer: COMMERCIAL

## 2024-10-24 ENCOUNTER — APPOINTMENT (OUTPATIENT)
Dept: PRIMARY CARE | Facility: CLINIC | Age: 74
End: 2024-10-24
Payer: COMMERCIAL

## 2024-11-26 ENCOUNTER — APPOINTMENT (OUTPATIENT)
Dept: CARDIOLOGY | Facility: HOSPITAL | Age: 74
End: 2024-11-26
Payer: MEDICARE

## 2024-11-26 ENCOUNTER — HOSPITAL ENCOUNTER (OUTPATIENT)
Facility: HOSPITAL | Age: 74
Setting detail: OBSERVATION
Discharge: NURSING, REHAB OR LONG-TERM CARE | End: 2024-11-28
Attending: EMERGENCY MEDICINE | Admitting: INTERNAL MEDICINE
Payer: MEDICARE

## 2024-11-26 ENCOUNTER — APPOINTMENT (OUTPATIENT)
Dept: RADIOLOGY | Facility: HOSPITAL | Age: 74
End: 2024-11-26
Payer: MEDICARE

## 2024-11-26 DIAGNOSIS — R53.1 GENERALIZED WEAKNESS: Primary | ICD-10-CM

## 2024-11-26 DIAGNOSIS — R26.2 UNABLE TO AMBULATE: ICD-10-CM

## 2024-11-26 LAB
ALBUMIN SERPL BCP-MCNC: 3.4 G/DL (ref 3.4–5)
ALP SERPL-CCNC: 61 U/L (ref 33–136)
ALT SERPL W P-5'-P-CCNC: 10 U/L (ref 10–52)
ANION GAP SERPL CALC-SCNC: 12 MMOL/L (ref 10–20)
AST SERPL W P-5'-P-CCNC: 27 U/L (ref 9–39)
BASOPHILS # BLD AUTO: 0.09 X10*3/UL (ref 0–0.1)
BASOPHILS NFR BLD AUTO: 1.1 %
BILIRUB SERPL-MCNC: 0.5 MG/DL (ref 0–1.2)
BNP SERPL-MCNC: 36 PG/ML (ref 0–99)
BUN SERPL-MCNC: 21 MG/DL (ref 6–23)
CALCIUM SERPL-MCNC: 8.5 MG/DL (ref 8.6–10.3)
CARDIAC TROPONIN I PNL SERPL HS: 8 NG/L (ref 0–20)
CARDIAC TROPONIN I PNL SERPL HS: 9 NG/L (ref 0–20)
CHLORIDE SERPL-SCNC: 103 MMOL/L (ref 98–107)
CO2 SERPL-SCNC: 25 MMOL/L (ref 21–32)
CREAT SERPL-MCNC: 1.37 MG/DL (ref 0.5–1.3)
EGFRCR SERPLBLD CKD-EPI 2021: 54 ML/MIN/1.73M*2
EOSINOPHIL # BLD AUTO: 0.14 X10*3/UL (ref 0–0.4)
EOSINOPHIL NFR BLD AUTO: 1.7 %
ERYTHROCYTE [DISTWIDTH] IN BLOOD BY AUTOMATED COUNT: 15.1 % (ref 11.5–14.5)
GLUCOSE SERPL-MCNC: 89 MG/DL (ref 74–99)
HCT VFR BLD AUTO: 35.6 % (ref 41–52)
HGB BLD-MCNC: 10.4 G/DL (ref 13.5–17.5)
IMM GRANULOCYTES # BLD AUTO: 0.02 X10*3/UL (ref 0–0.5)
IMM GRANULOCYTES NFR BLD AUTO: 0.2 % (ref 0–0.9)
LYMPHOCYTES # BLD AUTO: 0.94 X10*3/UL (ref 0.8–3)
LYMPHOCYTES NFR BLD AUTO: 11.4 %
MCH RBC QN AUTO: 26 PG (ref 26–34)
MCHC RBC AUTO-ENTMCNC: 29.2 G/DL (ref 32–36)
MCV RBC AUTO: 89 FL (ref 80–100)
MONOCYTES # BLD AUTO: 0.54 X10*3/UL (ref 0.05–0.8)
MONOCYTES NFR BLD AUTO: 6.5 %
NEUTROPHILS # BLD AUTO: 6.55 X10*3/UL (ref 1.6–5.5)
NEUTROPHILS NFR BLD AUTO: 79.1 %
NRBC BLD-RTO: 0 /100 WBCS (ref 0–0)
PLATELET # BLD AUTO: 309 X10*3/UL (ref 150–450)
POTASSIUM SERPL-SCNC: 4.3 MMOL/L (ref 3.5–5.3)
PROT SERPL-MCNC: 8.5 G/DL (ref 6.4–8.2)
RBC # BLD AUTO: 4 X10*6/UL (ref 4.5–5.9)
SODIUM SERPL-SCNC: 136 MMOL/L (ref 136–145)
WBC # BLD AUTO: 8.3 X10*3/UL (ref 4.4–11.3)

## 2024-11-26 PROCEDURE — 84484 ASSAY OF TROPONIN QUANT: CPT | Performed by: EMERGENCY MEDICINE

## 2024-11-26 PROCEDURE — 85025 COMPLETE CBC W/AUTO DIFF WBC: CPT | Performed by: EMERGENCY MEDICINE

## 2024-11-26 PROCEDURE — 80053 COMPREHEN METABOLIC PANEL: CPT | Performed by: EMERGENCY MEDICINE

## 2024-11-26 PROCEDURE — 36415 COLL VENOUS BLD VENIPUNCTURE: CPT | Performed by: EMERGENCY MEDICINE

## 2024-11-26 PROCEDURE — 83880 ASSAY OF NATRIURETIC PEPTIDE: CPT | Performed by: EMERGENCY MEDICINE

## 2024-11-26 PROCEDURE — 71045 X-RAY EXAM CHEST 1 VIEW: CPT | Mod: FOREIGN READ | Performed by: RADIOLOGY

## 2024-11-26 PROCEDURE — 99222 1ST HOSP IP/OBS MODERATE 55: CPT | Performed by: INTERNAL MEDICINE

## 2024-11-26 PROCEDURE — G0378 HOSPITAL OBSERVATION PER HR: HCPCS

## 2024-11-26 PROCEDURE — 93005 ELECTROCARDIOGRAM TRACING: CPT

## 2024-11-26 PROCEDURE — 71045 X-RAY EXAM CHEST 1 VIEW: CPT

## 2024-11-26 PROCEDURE — 99285 EMERGENCY DEPT VISIT HI MDM: CPT

## 2024-11-26 RX ORDER — LOSARTAN POTASSIUM 50 MG/1
50 TABLET ORAL DAILY
Status: DISCONTINUED | OUTPATIENT
Start: 2024-11-26 | End: 2024-11-28 | Stop reason: HOSPADM

## 2024-11-26 RX ORDER — ACETAMINOPHEN 160 MG/5ML
650 SOLUTION ORAL EVERY 4 HOURS PRN
Status: DISCONTINUED | OUTPATIENT
Start: 2024-11-26 | End: 2024-11-28 | Stop reason: HOSPADM

## 2024-11-26 RX ORDER — ACETAMINOPHEN 325 MG/1
650 TABLET ORAL EVERY 4 HOURS PRN
Status: DISCONTINUED | OUTPATIENT
Start: 2024-11-26 | End: 2024-11-28 | Stop reason: HOSPADM

## 2024-11-26 RX ORDER — PANTOPRAZOLE SODIUM 40 MG/10ML
40 INJECTION, POWDER, LYOPHILIZED, FOR SOLUTION INTRAVENOUS
Status: DISCONTINUED | OUTPATIENT
Start: 2024-11-27 | End: 2024-11-28 | Stop reason: HOSPADM

## 2024-11-26 RX ORDER — ENOXAPARIN SODIUM 100 MG/ML
40 INJECTION SUBCUTANEOUS EVERY 24 HOURS
Status: DISCONTINUED | OUTPATIENT
Start: 2024-11-26 | End: 2024-11-28 | Stop reason: HOSPADM

## 2024-11-26 RX ORDER — FLUOCINONIDE 0.5 MG/G
CREAM TOPICAL 2 TIMES DAILY PRN
Status: DISCONTINUED | OUTPATIENT
Start: 2024-11-26 | End: 2024-11-28 | Stop reason: HOSPADM

## 2024-11-26 RX ORDER — TORSEMIDE 20 MG/1
20 TABLET ORAL DAILY
Status: DISCONTINUED | OUTPATIENT
Start: 2024-11-26 | End: 2024-11-28 | Stop reason: HOSPADM

## 2024-11-26 RX ORDER — ONDANSETRON HYDROCHLORIDE 2 MG/ML
4 INJECTION, SOLUTION INTRAVENOUS EVERY 8 HOURS PRN
Status: DISCONTINUED | OUTPATIENT
Start: 2024-11-26 | End: 2024-11-28 | Stop reason: HOSPADM

## 2024-11-26 RX ORDER — FERROUS SULFATE 325(65) MG
65 TABLET ORAL EVERY 24 HOURS
Status: DISCONTINUED | OUTPATIENT
Start: 2024-11-26 | End: 2024-11-28 | Stop reason: HOSPADM

## 2024-11-26 RX ORDER — ACETAMINOPHEN 650 MG/1
650 SUPPOSITORY RECTAL EVERY 4 HOURS PRN
Status: DISCONTINUED | OUTPATIENT
Start: 2024-11-26 | End: 2024-11-28 | Stop reason: HOSPADM

## 2024-11-26 RX ORDER — ASPIRIN 81 MG/1
81 TABLET ORAL DAILY
Status: DISCONTINUED | OUTPATIENT
Start: 2024-11-26 | End: 2024-11-28 | Stop reason: HOSPADM

## 2024-11-26 RX ORDER — ONDANSETRON 4 MG/1
4 TABLET, FILM COATED ORAL EVERY 8 HOURS PRN
Status: DISCONTINUED | OUTPATIENT
Start: 2024-11-26 | End: 2024-11-28 | Stop reason: HOSPADM

## 2024-11-26 RX ORDER — PANTOPRAZOLE SODIUM 40 MG/1
40 TABLET, DELAYED RELEASE ORAL
Status: DISCONTINUED | OUTPATIENT
Start: 2024-11-27 | End: 2024-11-28 | Stop reason: HOSPADM

## 2024-11-26 ASSESSMENT — PAIN - FUNCTIONAL ASSESSMENT: PAIN_FUNCTIONAL_ASSESSMENT: 0-10

## 2024-11-26 ASSESSMENT — ENCOUNTER SYMPTOMS
WEAKNESS: 1
HEMATOLOGIC/LYMPHATIC NEGATIVE: 1
PSYCHIATRIC NEGATIVE: 1
ACTIVITY CHANGE: 1
FATIGUE: 1
RESPIRATORY NEGATIVE: 1
MUSCULOSKELETAL NEGATIVE: 1
ALLERGIC/IMMUNOLOGIC NEGATIVE: 1
ENDOCRINE NEGATIVE: 1
GASTROINTESTINAL NEGATIVE: 1
EYES NEGATIVE: 1

## 2024-11-26 ASSESSMENT — PAIN SCALES - GENERAL: PAINLEVEL_OUTOF10: 0 - NO PAIN

## 2024-11-26 ASSESSMENT — COLUMBIA-SUICIDE SEVERITY RATING SCALE - C-SSRS
1. IN THE PAST MONTH, HAVE YOU WISHED YOU WERE DEAD OR WISHED YOU COULD GO TO SLEEP AND NOT WAKE UP?: NO
6. HAVE YOU EVER DONE ANYTHING, STARTED TO DO ANYTHING, OR PREPARED TO DO ANYTHING TO END YOUR LIFE?: NO
2. HAVE YOU ACTUALLY HAD ANY THOUGHTS OF KILLING YOURSELF?: NO

## 2024-11-26 NOTE — ED PROVIDER NOTES
HPI   Chief Complaint   Patient presents with    Weakness, Gen    Fall       This is a 74-year-old male who presents to the emergency department complaining of weakness.  The patient states that he has weakness in both of his legs.  The weakness has been progressive.  Patient reports a history of the same and was found to be anemic due to stomach ulcers.  He reports this occurred about 5 years ago.  He feels the same way now.  He denies any blood in the stools or black stools.  He denies fevers and chills.  He has been able to get around with his wheelchair or his walker but today could not get out of bed.  At that point he called EMS.  The patient states that he lives alone.  He also states that he feels short of breath when he exerts himself.    Past medical history: Hypertension, GERD, cellulitis, PVD, UTI, anemia              Patient History   Past Medical History:   Diagnosis Date    Personal history of other diseases of the circulatory system     History of hypertension     No past surgical history on file.  Family History   Problem Relation Name Age of Onset    Dementia Mother      Heart attack Mother      Heart attack Father       Social History     Tobacco Use    Smoking status: Former     Types: Cigarettes    Smokeless tobacco: Not on file   Substance Use Topics    Alcohol use: Yes    Drug use: Never       Physical Exam   ED Triage Vitals [11/26/24 1605]   Temperature Heart Rate Respirations BP   36.6 °C (97.9 °F) 87 18 (!) 152/109      Pulse Ox Temp Source Heart Rate Source Patient Position   96 % Oral Monitor Sitting      BP Location FiO2 (%)     Right arm --       Physical Exam  Vitals and nursing note reviewed.   HENT:      Head: Normocephalic and atraumatic.      Nose: Nose normal.   Eyes:      Conjunctiva/sclera: Conjunctivae normal.   Cardiovascular:      Rate and Rhythm: Normal rate and regular rhythm.      Pulses: Normal pulses.      Heart sounds: Normal heart sounds.   Pulmonary:      Effort:  Pulmonary effort is normal.      Breath sounds: Normal breath sounds.   Abdominal:      General: Bowel sounds are normal.      Palpations: Abdomen is soft.   Musculoskeletal:         General: Normal range of motion.      Cervical back: Normal range of motion and neck supple.      Right lower leg: Edema present.      Left lower leg: Edema present.   Skin:     Findings: No rash.      Comments: Chronic stasis changes of both lower extremity.  Socks cut off to reveal macerated skin.  There is an open area with tendon exposed in the right anterior ankle.   Neurological:      General: No focal deficit present.      Mental Status: He is alert and oriented to person, place, and time.   Psychiatric:         Mood and Affect: Mood normal.           ED Course & MDM   Diagnoses as of 11/27/24 0054   Generalized weakness   Unable to ambulate                 No data recorded     [place] Coma Scale Score: 15 (11/26/24 1604 : Brandon Montano RN)                           Medical Decision Making  Differential diagnosis: I have considered the following conditions in my assessment of this patient's condition:  COPD, asthma, CHF, DKA, ACS, pneumonia, anemia, GI bleed, sepsis, UTI, pulmonary embolus, sepsis, bronchitis, foreign body aspiration.    This is a 74-year-old male who presents to the emergency department complaining of weakness and difficulty walking.  The patient be further evaluated with labs, chest x-ray, EKG and urinalysis. The patient's labs showed a normal troponin.  Creatinine was mildly elevated at 1.37.  Calcium low at 8.5.  Hemoglobin mildly low at 10.4.  The patient requires admission for wound management and likely placement as he cannot ambulate and lives alone.    Amount and/or Complexity of Data Reviewed  ECG/medicine tests: independent interpretation performed.     Details: Normal sinus rhythm, PACs present, heart rate 88, no ST elevation.        Procedure  Procedures     Lew Gates MD  11/27/24 0057

## 2024-11-26 NOTE — ED TRIAGE NOTES
Pt to ED via EMS from home for complaints of general weakness, and repeat falls for about one month.  Pt states that he has cellulitis in bilateral legs, and states that falls have become more frequent.  Pt denies any pain today, and states when he falls he normally slides out of a chair, and denies any injury in the last month from falls.    Pt denies any chest pain, SOB, or any other symptoms.

## 2024-11-27 LAB
ANION GAP SERPL CALC-SCNC: 9 MMOL/L (ref 10–20)
APPEARANCE UR: ABNORMAL
APPEARANCE UR: CLEAR
BACTERIA #/AREA URNS AUTO: ABNORMAL /HPF
BACTERIA #/AREA URNS AUTO: ABNORMAL /HPF
BILIRUB UR STRIP.AUTO-MCNC: NEGATIVE MG/DL
BILIRUB UR STRIP.AUTO-MCNC: NEGATIVE MG/DL
BUN SERPL-MCNC: 20 MG/DL (ref 6–23)
CALCIUM SERPL-MCNC: 8.1 MG/DL (ref 8.6–10.3)
CHLORIDE SERPL-SCNC: 107 MMOL/L (ref 98–107)
CO2 SERPL-SCNC: 26 MMOL/L (ref 21–32)
COLOR UR: ABNORMAL
COLOR UR: COLORLESS
CREAT SERPL-MCNC: 1.42 MG/DL (ref 0.5–1.3)
EGFRCR SERPLBLD CKD-EPI 2021: 52 ML/MIN/1.73M*2
ERYTHROCYTE [DISTWIDTH] IN BLOOD BY AUTOMATED COUNT: 15.1 % (ref 11.5–14.5)
GLUCOSE SERPL-MCNC: 131 MG/DL (ref 74–99)
GLUCOSE UR STRIP.AUTO-MCNC: NORMAL MG/DL
GLUCOSE UR STRIP.AUTO-MCNC: NORMAL MG/DL
HCT VFR BLD AUTO: 26.2 % (ref 41–52)
HGB BLD-MCNC: 8 G/DL (ref 13.5–17.5)
HYALINE CASTS #/AREA URNS AUTO: ABNORMAL /LPF
HYALINE CASTS #/AREA URNS AUTO: ABNORMAL /LPF
KETONES UR STRIP.AUTO-MCNC: NEGATIVE MG/DL
KETONES UR STRIP.AUTO-MCNC: NEGATIVE MG/DL
LEUKOCYTE ESTERASE UR QL STRIP.AUTO: ABNORMAL
LEUKOCYTE ESTERASE UR QL STRIP.AUTO: ABNORMAL
MCH RBC QN AUTO: 26.6 PG (ref 26–34)
MCHC RBC AUTO-ENTMCNC: 30.5 G/DL (ref 32–36)
MCV RBC AUTO: 87 FL (ref 80–100)
MUCOUS THREADS #/AREA URNS AUTO: ABNORMAL /LPF
MUCOUS THREADS #/AREA URNS AUTO: ABNORMAL /LPF
NITRITE UR QL STRIP.AUTO: NEGATIVE
NITRITE UR QL STRIP.AUTO: NEGATIVE
NRBC BLD-RTO: 0 /100 WBCS (ref 0–0)
PH UR STRIP.AUTO: 5.5 [PH]
PH UR STRIP.AUTO: 6 [PH]
PLATELET # BLD AUTO: 276 X10*3/UL (ref 150–450)
POTASSIUM SERPL-SCNC: 3.3 MMOL/L (ref 3.5–5.3)
PROT UR STRIP.AUTO-MCNC: NEGATIVE MG/DL
PROT UR STRIP.AUTO-MCNC: NEGATIVE MG/DL
Q ONSET: 216 MS
QRS COUNT: 15 BEATS
QRS DURATION: 106 MS
QT INTERVAL: 386 MS
QTC CALCULATION(BAZETT): 467 MS
QTC FREDERICIA: 438 MS
R AXIS: 8 DEGREES
RBC # BLD AUTO: 3.01 X10*6/UL (ref 4.5–5.9)
RBC # UR STRIP.AUTO: ABNORMAL /UL
RBC # UR STRIP.AUTO: ABNORMAL /UL
RBC #/AREA URNS AUTO: >20 /HPF
RBC #/AREA URNS AUTO: ABNORMAL /HPF
SODIUM SERPL-SCNC: 139 MMOL/L (ref 136–145)
SP GR UR STRIP.AUTO: 1.01
SP GR UR STRIP.AUTO: 1.01
T AXIS: 6 DEGREES
T OFFSET: 409 MS
UROBILINOGEN UR STRIP.AUTO-MCNC: NORMAL MG/DL
UROBILINOGEN UR STRIP.AUTO-MCNC: NORMAL MG/DL
VENTRICULAR RATE: 88 BPM
WBC # BLD AUTO: 6.3 X10*3/UL (ref 4.4–11.3)
WBC #/AREA URNS AUTO: >50 /HPF
WBC #/AREA URNS AUTO: ABNORMAL /HPF
WBC CLUMPS #/AREA URNS AUTO: ABNORMAL /HPF

## 2024-11-27 PROCEDURE — 85027 COMPLETE CBC AUTOMATED: CPT | Performed by: INTERNAL MEDICINE

## 2024-11-27 PROCEDURE — 99232 SBSQ HOSP IP/OBS MODERATE 35: CPT | Performed by: STUDENT IN AN ORGANIZED HEALTH CARE EDUCATION/TRAINING PROGRAM

## 2024-11-27 PROCEDURE — 97530 THERAPEUTIC ACTIVITIES: CPT | Mod: GO

## 2024-11-27 PROCEDURE — 81001 URINALYSIS AUTO W/SCOPE: CPT | Performed by: STUDENT IN AN ORGANIZED HEALTH CARE EDUCATION/TRAINING PROGRAM

## 2024-11-27 PROCEDURE — 81001 URINALYSIS AUTO W/SCOPE: CPT | Performed by: EMERGENCY MEDICINE

## 2024-11-27 PROCEDURE — 97112 NEUROMUSCULAR REEDUCATION: CPT | Mod: GP

## 2024-11-27 PROCEDURE — 83036 HEMOGLOBIN GLYCOSYLATED A1C: CPT | Mod: AHULAB | Performed by: STUDENT IN AN ORGANIZED HEALTH CARE EDUCATION/TRAINING PROGRAM

## 2024-11-27 PROCEDURE — 97161 PT EVAL LOW COMPLEX 20 MIN: CPT | Mod: GP

## 2024-11-27 PROCEDURE — G0378 HOSPITAL OBSERVATION PER HR: HCPCS

## 2024-11-27 PROCEDURE — 2500000001 HC RX 250 WO HCPCS SELF ADMINISTERED DRUGS (ALT 637 FOR MEDICARE OP): Performed by: INTERNAL MEDICINE

## 2024-11-27 PROCEDURE — 87186 SC STD MICRODIL/AGAR DIL: CPT | Mod: AHULAB | Performed by: EMERGENCY MEDICINE

## 2024-11-27 PROCEDURE — 96372 THER/PROPH/DIAG INJ SC/IM: CPT | Performed by: INTERNAL MEDICINE

## 2024-11-27 PROCEDURE — 82374 ASSAY BLOOD CARBON DIOXIDE: CPT | Performed by: INTERNAL MEDICINE

## 2024-11-27 PROCEDURE — 2500000004 HC RX 250 GENERAL PHARMACY W/ HCPCS (ALT 636 FOR OP/ED): Performed by: INTERNAL MEDICINE

## 2024-11-27 PROCEDURE — 97165 OT EVAL LOW COMPLEX 30 MIN: CPT | Mod: GO

## 2024-11-27 PROCEDURE — 36415 COLL VENOUS BLD VENIPUNCTURE: CPT | Performed by: INTERNAL MEDICINE

## 2024-11-27 PROCEDURE — 36415 COLL VENOUS BLD VENIPUNCTURE: CPT | Performed by: STUDENT IN AN ORGANIZED HEALTH CARE EDUCATION/TRAINING PROGRAM

## 2024-11-27 RX ORDER — ASPIRIN 81 MG/1
81 TABLET ORAL DAILY
COMMUNITY

## 2024-11-27 RX ORDER — AMMONIUM LACTATE 12 G/100G
LOTION TOPICAL DAILY
Status: DISCONTINUED | OUTPATIENT
Start: 2024-11-27 | End: 2024-11-28 | Stop reason: HOSPADM

## 2024-11-27 SDOH — SOCIAL STABILITY: SOCIAL INSECURITY: ARE YOU OR HAVE YOU BEEN THREATENED OR ABUSED PHYSICALLY, EMOTIONALLY, OR SEXUALLY BY ANYONE?: NO

## 2024-11-27 SDOH — ECONOMIC STABILITY: FOOD INSECURITY: WITHIN THE PAST 12 MONTHS, YOU WORRIED THAT YOUR FOOD WOULD RUN OUT BEFORE YOU GOT THE MONEY TO BUY MORE.: NEVER TRUE

## 2024-11-27 SDOH — ECONOMIC STABILITY: INCOME INSECURITY: IN THE PAST 12 MONTHS HAS THE ELECTRIC, GAS, OIL, OR WATER COMPANY THREATENED TO SHUT OFF SERVICES IN YOUR HOME?: NO

## 2024-11-27 SDOH — SOCIAL STABILITY: SOCIAL INSECURITY: WERE YOU ABLE TO COMPLETE ALL THE BEHAVIORAL HEALTH SCREENINGS?: YES

## 2024-11-27 SDOH — SOCIAL STABILITY: SOCIAL INSECURITY: WITHIN THE LAST YEAR, HAVE YOU BEEN AFRAID OF YOUR PARTNER OR EX-PARTNER?: NO

## 2024-11-27 SDOH — SOCIAL STABILITY: SOCIAL INSECURITY: DO YOU FEEL UNSAFE GOING BACK TO THE PLACE WHERE YOU ARE LIVING?: NO

## 2024-11-27 SDOH — ECONOMIC STABILITY: FOOD INSECURITY: WITHIN THE PAST 12 MONTHS, THE FOOD YOU BOUGHT JUST DIDN'T LAST AND YOU DIDN'T HAVE MONEY TO GET MORE.: NEVER TRUE

## 2024-11-27 SDOH — ECONOMIC STABILITY: HOUSING INSECURITY: IN THE LAST 12 MONTHS, WAS THERE A TIME WHEN YOU WERE NOT ABLE TO PAY THE MORTGAGE OR RENT ON TIME?: NO

## 2024-11-27 SDOH — ECONOMIC STABILITY: HOUSING INSECURITY: AT ANY TIME IN THE PAST 12 MONTHS, WERE YOU HOMELESS OR LIVING IN A SHELTER (INCLUDING NOW)?: NO

## 2024-11-27 SDOH — SOCIAL STABILITY: SOCIAL INSECURITY: HAVE YOU HAD ANY THOUGHTS OF HARMING ANYONE ELSE?: NO

## 2024-11-27 SDOH — SOCIAL STABILITY: SOCIAL INSECURITY: ARE THERE ANY APPARENT SIGNS OF INJURIES/BEHAVIORS THAT COULD BE RELATED TO ABUSE/NEGLECT?: NO

## 2024-11-27 SDOH — SOCIAL STABILITY: SOCIAL INSECURITY: ABUSE: ADULT

## 2024-11-27 SDOH — SOCIAL STABILITY: SOCIAL INSECURITY: HAVE YOU HAD THOUGHTS OF HARMING ANYONE ELSE?: NO

## 2024-11-27 SDOH — SOCIAL STABILITY: SOCIAL INSECURITY: WITHIN THE LAST YEAR, HAVE YOU BEEN HUMILIATED OR EMOTIONALLY ABUSED IN OTHER WAYS BY YOUR PARTNER OR EX-PARTNER?: NO

## 2024-11-27 SDOH — SOCIAL STABILITY: SOCIAL INSECURITY
WITHIN THE LAST YEAR, HAVE YOU BEEN KICKED, HIT, SLAPPED, OR OTHERWISE PHYSICALLY HURT BY YOUR PARTNER OR EX-PARTNER?: NO

## 2024-11-27 SDOH — SOCIAL STABILITY: SOCIAL INSECURITY: DO YOU FEEL ANYONE HAS EXPLOITED OR TAKEN ADVANTAGE OF YOU FINANCIALLY OR OF YOUR PERSONAL PROPERTY?: NO

## 2024-11-27 SDOH — ECONOMIC STABILITY: HOUSING INSECURITY: IN THE PAST 12 MONTHS, HOW MANY TIMES HAVE YOU MOVED WHERE YOU WERE LIVING?: 1

## 2024-11-27 SDOH — SOCIAL STABILITY: SOCIAL INSECURITY: DOES ANYONE TRY TO KEEP YOU FROM HAVING/CONTACTING OTHER FRIENDS OR DOING THINGS OUTSIDE YOUR HOME?: NO

## 2024-11-27 SDOH — SOCIAL STABILITY: SOCIAL INSECURITY: HAS ANYONE EVER THREATENED TO HURT YOUR FAMILY OR YOUR PETS?: NO

## 2024-11-27 SDOH — SOCIAL STABILITY: SOCIAL INSECURITY
WITHIN THE LAST YEAR, HAVE YOU BEEN RAPED OR FORCED TO HAVE ANY KIND OF SEXUAL ACTIVITY BY YOUR PARTNER OR EX-PARTNER?: NO

## 2024-11-27 SDOH — ECONOMIC STABILITY: TRANSPORTATION INSECURITY: IN THE PAST 12 MONTHS, HAS LACK OF TRANSPORTATION KEPT YOU FROM MEDICAL APPOINTMENTS OR FROM GETTING MEDICATIONS?: NO

## 2024-11-27 SDOH — ECONOMIC STABILITY: FOOD INSECURITY: HOW HARD IS IT FOR YOU TO PAY FOR THE VERY BASICS LIKE FOOD, HOUSING, MEDICAL CARE, AND HEATING?: NOT HARD AT ALL

## 2024-11-27 ASSESSMENT — COGNITIVE AND FUNCTIONAL STATUS - GENERAL
EATING MEALS: A LOT
DAILY ACTIVITIY SCORE: 14
EATING MEALS: A LOT
MOBILITY SCORE: 11
STANDING UP FROM CHAIR USING ARMS: A LOT
TOILETING: A LOT
HELP NEEDED FOR BATHING: A LOT
MOBILITY SCORE: 11
DRESSING REGULAR LOWER BODY CLOTHING: A LOT
TURNING FROM BACK TO SIDE WHILE IN FLAT BAD: A LOT
MOVING TO AND FROM BED TO CHAIR: A LOT
TURNING FROM BACK TO SIDE WHILE IN FLAT BAD: A LOT
PERSONAL GROOMING: A LITTLE
CLIMB 3 TO 5 STEPS WITH RAILING: TOTAL
TOILETING: A LOT
HELP NEEDED FOR BATHING: A LOT
CLIMB 3 TO 5 STEPS WITH RAILING: TOTAL
MOVING FROM LYING ON BACK TO SITTING ON SIDE OF FLAT BED WITH BEDRAILS: A LOT
DRESSING REGULAR LOWER BODY CLOTHING: TOTAL
MOVING FROM LYING ON BACK TO SITTING ON SIDE OF FLAT BED WITH BEDRAILS: A LOT
WALKING IN HOSPITAL ROOM: A LOT
TURNING FROM BACK TO SIDE WHILE IN FLAT BAD: A LOT
DRESSING REGULAR UPPER BODY CLOTHING: A LOT
MOVING TO AND FROM BED TO CHAIR: A LOT
PERSONAL GROOMING: A LOT
HELP NEEDED FOR BATHING: A LOT
MOBILITY SCORE: 11
MOVING FROM LYING ON BACK TO SITTING ON SIDE OF FLAT BED WITH BEDRAILS: A LOT
PERSONAL GROOMING: A LOT
PATIENT BASELINE BEDBOUND: NO
STANDING UP FROM CHAIR USING ARMS: A LOT
DRESSING REGULAR LOWER BODY CLOTHING: A LOT
DAILY ACTIVITIY SCORE: 12
WALKING IN HOSPITAL ROOM: A LOT
TOILETING: TOTAL
DAILY ACTIVITIY SCORE: 12
CLIMB 3 TO 5 STEPS WITH RAILING: TOTAL
STANDING UP FROM CHAIR USING ARMS: A LOT
MOVING TO AND FROM BED TO CHAIR: A LOT
WALKING IN HOSPITAL ROOM: A LOT
DRESSING REGULAR UPPER BODY CLOTHING: A LITTLE
DRESSING REGULAR UPPER BODY CLOTHING: A LOT

## 2024-11-27 ASSESSMENT — ACTIVITIES OF DAILY LIVING (ADL)
JUDGMENT_ADEQUATE_SAFELY_COMPLETE_DAILY_ACTIVITIES: YES
HEARING - LEFT EAR: FUNCTIONAL
ADEQUATE_TO_COMPLETE_ADL: YES
LACK_OF_TRANSPORTATION: NO
FEEDING YOURSELF: NEEDS ASSISTANCE
WALKS IN HOME: NEEDS ASSISTANCE
BATHING_ASSISTANCE: SPONGE BATHING
ASSISTIVE_DEVICE: WALKER;WHEELCHAIR
ADL_ASSISTANCE: INDEPENDENT
GROOMING: NEEDS ASSISTANCE
DRESSING YOURSELF: NEEDS ASSISTANCE
LACK_OF_TRANSPORTATION: NO
PATIENT'S MEMORY ADEQUATE TO SAFELY COMPLETE DAILY ACTIVITIES?: YES
ADL_ASSISTANCE: INDEPENDENT
LACK_OF_TRANSPORTATION: NO
TOILETING: NEEDS ASSISTANCE
HEARING - RIGHT EAR: FUNCTIONAL
BATHING: NEEDS ASSISTANCE
BATHING_ASSISTANCE: SPONGE BATHING

## 2024-11-27 ASSESSMENT — PAIN - FUNCTIONAL ASSESSMENT
PAIN_FUNCTIONAL_ASSESSMENT: 0-10

## 2024-11-27 ASSESSMENT — PAIN SCALES - GENERAL
PAINLEVEL_OUTOF10: 0 - NO PAIN
PAINLEVEL_OUTOF10: 2
PAINLEVEL_OUTOF10: 5 - MODERATE PAIN
PAINLEVEL_OUTOF10: 2
PAINLEVEL_OUTOF10: 0 - NO PAIN

## 2024-11-27 ASSESSMENT — PATIENT HEALTH QUESTIONNAIRE - PHQ9
2. FEELING DOWN, DEPRESSED OR HOPELESS: NOT AT ALL
1. LITTLE INTEREST OR PLEASURE IN DOING THINGS: NOT AT ALL
SUM OF ALL RESPONSES TO PHQ9 QUESTIONS 1 & 2: 0

## 2024-11-27 ASSESSMENT — PAIN DESCRIPTION - DESCRIPTORS: DESCRIPTORS: SPASM

## 2024-11-27 ASSESSMENT — LIFESTYLE VARIABLES
HOW MANY STANDARD DRINKS CONTAINING ALCOHOL DO YOU HAVE ON A TYPICAL DAY: PATIENT DOES NOT DRINK
HOW OFTEN DO YOU HAVE 6 OR MORE DRINKS ON ONE OCCASION: NEVER
AUDIT-C TOTAL SCORE: 0
AUDIT-C TOTAL SCORE: 0
SKIP TO QUESTIONS 9-10: 1
HOW OFTEN DO YOU HAVE A DRINK CONTAINING ALCOHOL: NEVER

## 2024-11-27 ASSESSMENT — PAIN DESCRIPTION - LOCATION
LOCATION: BACK
LOCATION: BACK

## 2024-11-27 NOTE — DISCHARGE INSTRUCTIONS
Apply lac-hydrin cream to scaly/flaky skin on lower legs every day after bathing.   Also daily: apply xeroform dressing to any denuded/moist and red skin. Cover with abd pad and secure with kerlix wrap.

## 2024-11-27 NOTE — PROGRESS NOTES
11/27/24 1113   Discharge Planning   Living Arrangements Alone   Support Systems Family members  (sister)   Assistance Needed ADLs   Type of Residence Private residence   Number of Stairs to Enter Residence 2   Number of Stairs Within Residence 2   Expected Discharge Disposition SNF   Financial Resource Strain   How hard is it for you to pay for the very basics like food, housing, medical care, and heating? Not hard   Housing Stability   In the last 12 months, was there a time when you were not able to pay the mortgage or rent on time? N   At any time in the past 12 months, were you homeless or living in a shelter (including now)? N   Transportation Needs   In the past 12 months, has lack of transportation kept you from medical appointments or from getting medications? no   In the past 12 months, has lack of transportation kept you from meetings, work, or from getting things needed for daily living? No   Patient Choice   Provider Choice list and CMS website (https://medicare.gov/care-compare#search) for post-acute Quality and Resource Measure Data were provided and reviewed with: Patient   Intensity of Service   Intensity of Service >30 min     TCC met with pt at bedside. Pt lives alone. States his sister comes 1x a week to help him. Sister does pt laundry. Pt has a walker and wc at home. Pt stated he can't walk. PT rec mod. Pt wants a SNF in the [facility] where his sister lives. Referrals sent.    Diagnosis: Generalized weakness with difficulty ambulating    Plan: PT OT       Hypertension  Plan:  Losartan 50 mg orally daily     Chronic venous stasis changes of the lower extremities  Plan:  No active cellulitis  He uses a steroid cream    Disposition: SNF    Barrier: auth    ADOD: 1-3 days

## 2024-11-27 NOTE — H&P
"History Of Present Illness  Abdi Retana \"Louise" is a 74 y.o. male who presented to [facility] ER complaining of generalized weakness.  He has weakness in both of his legs especially.  He had a similar problem when he was anemic with a GI bleed but he denies any dark stools.  He denies any fever chills and has been able to get around in his wheelchair or walker.  Today he fell out of bed and at that point called EMS.  He lives alone.  He does get fatigued and short of breath with activities of daily living.  His past medical history includes hypertension GERD cellulitis of the lower extremities peripheral vascular disease UTI and anemia.     Past Medical History  Past Medical History:   Diagnosis Date    Personal history of other diseases of the circulatory system     History of hypertension        Surgical History  No past surgical history on file.      Social History  He reports that he has quit smoking. His smoking use included cigarettes. He does not have any smokeless tobacco history on file. He reports current alcohol use. He reports that he does not use drugs.    Family History  Family History   Problem Relation Name Age of Onset    Dementia Mother      Heart attack Mother      Heart attack Father          Allergies  Clindamycin and Amoxicillin-pot clavulanate    Review of Systems   Constitutional:  Positive for activity change and fatigue.   HENT: Negative.     Eyes: Negative.    Respiratory: Negative.     Cardiovascular:  Positive for leg swelling.   Gastrointestinal: Negative.    Endocrine: Negative.    Genitourinary: Negative.    Musculoskeletal: Negative.    Skin: Negative.    Allergic/Immunologic: Negative.    Neurological:  Positive for weakness.        Generalized weakness but more so in his legs.  He is unable to ambulate with his walker   Hematological: Negative.    Psychiatric/Behavioral: Negative.     All other systems reviewed and are negative.       Physical Exam  Vitals and nursing note " "reviewed.   Constitutional:       Appearance: Normal appearance.   HENT:      Head: Normocephalic.      Right Ear: External ear normal.      Left Ear: External ear normal.      Nose: Nose normal.      Mouth/Throat:      Mouth: Mucous membranes are dry.      Pharynx: Oropharynx is clear.   Eyes:      Extraocular Movements: Extraocular movements intact.      Conjunctiva/sclera: Conjunctivae normal.      Pupils: Pupils are equal, round, and reactive to light.   Cardiovascular:      Rate and Rhythm: Normal rate and regular rhythm.   Pulmonary:      Effort: Pulmonary effort is normal.      Breath sounds: Normal breath sounds.   Abdominal:      General: Abdomen is flat. Bowel sounds are normal.      Palpations: Abdomen is soft.   Musculoskeletal:         General: Normal range of motion.      Right lower leg: Edema present.      Left lower leg: Edema present.   Skin:     General: Skin is warm and dry.   Neurological:      General: No focal deficit present.      Mental Status: He is alert. Mental status is at baseline.      Motor: Weakness present.      Comments: Generalized weakness   Psychiatric:         Mood and Affect: Mood normal.         Behavior: Behavior normal.          Last Recorded Vitals  Blood pressure (!) 152/109, pulse 87, temperature 36.6 °C (97.9 °F), temperature source Oral, resp. rate 18, height 1.88 m (6' 2\"), weight 136 kg (300 lb), SpO2 96%.    Relevant Results  Meds:  Scheduled medications  aspirin, 81 mg, oral, Daily  enoxaparin, 40 mg, subcutaneous, q24h  ferrous sulfate (325 mg ferrous sulfate), 65 mg of iron, oral, q24h  losartan, 50 mg, oral, Daily  [START ON 11/27/2024] pantoprazole, 40 mg, oral, Daily before breakfast   Or  [START ON 11/27/2024] pantoprazole, 40 mg, intravenous, Daily before breakfast  torsemide, 20 mg, oral, Daily      Continuous medications     PRN medications  PRN medications: acetaminophen **OR** acetaminophen **OR** acetaminophen, fluocinonide, ondansetron **OR** " ondansetron   Current Outpatient Medications   Medication Instructions    aspirin 81 mg, oral, Daily    clotrimazole-betamethasone (Lotrisone) cream MIX WITH ZINC OXIDE AND APPLY  TOPICALLY TO AFFECTED AREA(S)  TWICE DAILY    famotidine (PEPCID) 20 mg, oral, 2 times daily    ferrous sulfate (325 mg ferrous sulfate) (FEROSUL) 325 mg, oral, Every 24 hours    fluconazole (DIFLUCAN) 150 mg, oral, Daily    fluocinonide (Lidex) 0.05 % ointment APPLY TOPICALLY TWICE DAILY AS  NEEDED FOR IRRITATION OR RASH.  AVOID FACE AND GROIN    losartan (COZAAR) 50 mg, oral, Daily    magnesium oxide 400 mg, oral, Daily    potassium chloride CR 10 mEq ER tablet 10 mEq, oral, Daily, DO NOT CRUSH CHEW OR SPLIT    torsemide (DEMADEX) 20 mg, oral, Daily    vitamin D3-folic acid 2,500 unit- 1 mg tablet 1 mg, oral, Every 24 hours    zinc oxide 10 % cream To mix with Lotrisone and apply twice daily        Labs:  Results for orders placed or performed during the hospital encounter of 11/26/24 (from the past 24 hours)   CBC and Auto Differential   Result Value Ref Range    WBC 8.3 4.4 - 11.3 x10*3/uL    nRBC 0.0 0.0 - 0.0 /100 WBCs    RBC 4.00 (L) 4.50 - 5.90 x10*6/uL    Hemoglobin 10.4 (L) 13.5 - 17.5 g/dL    Hematocrit 35.6 (L) 41.0 - 52.0 %    MCV 89 80 - 100 fL    MCH 26.0 26.0 - 34.0 pg    MCHC 29.2 (L) 32.0 - 36.0 g/dL    RDW 15.1 (H) 11.5 - 14.5 %    Platelets 309 150 - 450 x10*3/uL    Neutrophils % 79.1 40.0 - 80.0 %    Immature Granulocytes %, Automated 0.2 0.0 - 0.9 %    Lymphocytes % 11.4 13.0 - 44.0 %    Monocytes % 6.5 2.0 - 10.0 %    Eosinophils % 1.7 0.0 - 6.0 %    Basophils % 1.1 0.0 - 2.0 %    Neutrophils Absolute 6.55 (H) 1.60 - 5.50 x10*3/uL    Immature Granulocytes Absolute, Automated 0.02 0.00 - 0.50 x10*3/uL    Lymphocytes Absolute 0.94 0.80 - 3.00 x10*3/uL    Monocytes Absolute 0.54 0.05 - 0.80 x10*3/uL    Eosinophils Absolute 0.14 0.00 - 0.40 x10*3/uL    Basophils Absolute 0.09 0.00 - 0.10 x10*3/uL   Comprehensive  metabolic panel   Result Value Ref Range    Glucose 89 74 - 99 mg/dL    Sodium 136 136 - 145 mmol/L    Potassium 4.3 3.5 - 5.3 mmol/L    Chloride 103 98 - 107 mmol/L    Bicarbonate 25 21 - 32 mmol/L    Anion Gap 12 10 - 20 mmol/L    Urea Nitrogen 21 6 - 23 mg/dL    Creatinine 1.37 (H) 0.50 - 1.30 mg/dL    eGFR 54 (L) >60 mL/min/1.73m*2    Calcium 8.5 (L) 8.6 - 10.3 mg/dL    Albumin 3.4 3.4 - 5.0 g/dL    Alkaline Phosphatase 61 33 - 136 U/L    Total Protein 8.5 (H) 6.4 - 8.2 g/dL    AST 27 9 - 39 U/L    Bilirubin, Total 0.5 0.0 - 1.2 mg/dL    ALT 10 10 - 52 U/L   B-Type Natriuretic Peptide   Result Value Ref Range    BNP 36 0 - 99 pg/mL   Troponin I, High Sensitivity, Initial   Result Value Ref Range    Troponin I, High Sensitivity 8 0 - 20 ng/L   Troponin, High Sensitivity, 1 Hour   Result Value Ref Range    Troponin I, High Sensitivity 9 0 - 20 ng/L      Imaging:  XR chest 1 view    Result Date: 11/26/2024  STUDY: Chest Radiograph;  11/26/2024 5:30PM INDICATION: Shortness of breath. COMPARISON: 8/5/2020 XR chest ACCESSION NUMBER(S): [id number] ORDERING CLINICIAN: VANGIE QUINTERO TECHNIQUE:  Frontal chest was obtained at 17:29 hours. FINDINGS: CARDIOMEDIASTINAL SILHOUETTE: Cardiomediastinal silhouette is normal in size and configuration.  LUNGS: There is mild elevation of the right hemidiaphragm on today's exam but no definite edema or consolidation is appreciated in the lungs.  There is no visible effusion or evidence of pneumothorax..  ABDOMEN: No remarkable upper abdominal findings.  BONES: No acute osseous changes.    No acute cardiopulmonary disease. Signed by Edwar Vazquez MD      Assessment/Plan   Generalized weakness with difficulty ambulating  Plan:  PT OT    Hypertension  Plan:  Losartan 50 mg orally daily    Chronic venous stasis changes of the lower extremities  Plan:  No active cellulitis  He uses a steroid cream    GERD  Plan:  Protonix    Class II obesity BMI 38  Lifestyle modification    DVT  prophylaxis  Lovenox 40 mg subcutaneously daily  SCDs    I spent 60 minutes in the professional and overall care of this patient.      Tello Lombardo DO

## 2024-11-27 NOTE — PROGRESS NOTES
"Pharmacy Medication History Review   Spoke to the patient, no changes. Pt on ASA    Abdi Retana \"Ray\" is a 74 y.o. male admitted for Generalized weakness. Pharmacy reviewed the patient's [id number] medications and allergies for accuracy.    The list below reflectives the updated PTA list. Please review each medication in order reconciliation for additional clarification and justification.     Prior to Admission Medications   Prescriptions Last Dose Informant   aspirin 81 mg EC tablet 11/26/2024 Morning    Sig: Take 1 tablet (81 mg) by mouth once daily.   clotrimazole-betamethasone (Lotrisone) cream     Sig: MIX WITH ZINC OXIDE AND APPLY  TOPICALLY TO AFFECTED AREA(S)  TWICE DAILY   famotidine (Pepcid) 40 mg tablet     Sig: Take 0.5 tablets (20 mg) by mouth 2 times a day.   ferrous sulfate (FeroSuL) 325 (65 Fe) MG tablet     Sig: Take 1 tablet (325 mg) by mouth once every 24 hours.   fluconazole (Diflucan) 150 mg tablet Not Taking    Sig: Take 1 tablet (150 mg) by mouth once daily.   Patient not taking: Reported on 11/27/2024   fluocinonide (Lidex) 0.05 % ointment     Sig: APPLY TOPICALLY TWICE DAILY AS  NEEDED FOR IRRITATION OR RASH.  AVOID FACE AND GROIN   losartan (Cozaar) 50 mg tablet 11/26/2024    Sig: TAKE 1 TABLET BY MOUTH ONCE  DAILY   magnesium oxide 400 mg magnesium capsule     Sig: Take 1 capsule (400 mg) by mouth once daily.   potassium chloride CR 10 mEq ER tablet 11/26/2024    Sig: TAKE 1 TABLET BY MOUTH ONCE  DAILY DO NOT CRUSH, CHEW, OR  SPLIT   torsemide (Demadex) 20 mg tablet 11/26/2024    Sig: TAKE 1 TABLET BY MOUTH ONCE  DAILY   vitamin D3-folic acid 2,500 unit- 1 mg tablet     Sig: Take 1 mg by mouth once every 24 hours.   zinc oxide 10 % cream     Sig: To mix with Lotrisone and apply twice daily      Facility-Administered Medications: None         The list below reflectives the updated allergy list. Please review each documented allergy for additional clarification and " justification.  Allergies  Reviewed by MONSERRAT Paniagua on 11/27/2024        Severity Reactions Comments    Clindamycin Not Specified Unknown     Amoxicillin-pot Clavulanate Low Rash Possible rash, received multiple antibiotics at this time            Below are additional concerns with the patient's PTA list.      Claire Lynne

## 2024-11-27 NOTE — PROGRESS NOTES
11/27/24 1211   Discharge Planning   Expected Discharge Disposition SNF   Intensity of Service   Intensity of Service >30 min     TCC met with pt at bedside. NFV is foc.

## 2024-11-27 NOTE — PROGRESS NOTES
"Occupational Therapy    Evaluation/Treatment    Patient Name: Abdi Retana \"Matt\"  MRN: [id number]  Department: [facility] [address]  Room: [address] [place] [address]  Today's Date: 11/27/24  Time Calculation  Start Time: 0958  Stop Time: 1032  Time Calculation (min): 34 min       Assessment:  OT Assessment: Pt functioning below baseline for ADLs requiring x2 assist for bed mobility and sit<>stand. Pt with difficulty completing functional ambulation due to decreased strength and activity tolerance.  Prognosis: Good  Evaluation/Treatment Tolerance: Patient tolerated treatment well  Medical Staff Made Aware: Yes  End of Session Communication: Bedside nurse  End of Session Patient Position: Bed, 3 rail up, Alarm on  OT Assessment Results: Decreased ADL status, Decreased endurance, Decreased upper extremity strength, Decreased functional mobility  Prognosis: Good  Evaluation/Treatment Tolerance: Patient tolerated treatment well  Medical Staff Made Aware: Yes  Strengths: Ability to acquire knowledge, Attitude of self, Insight into problems  Barriers to Participation: Comorbidities  Plan:  Treatment Interventions: ADL retraining, Functional transfer training, UE strengthening/ROM, Endurance training, Compensatory technique education  OT Frequency: 3 times per week  OT Discharge Recommendations: Moderate intensity level of continued care  Equipment Recommended upon Discharge: Wheeled walker  OT Recommended Transfer Status: Moderate assist, Assist of 2  OT - OK to Discharge: Yes  Treatment Interventions: ADL retraining, Functional transfer training, UE strengthening/ROM, Endurance training, Compensatory technique education    Subjective   Current Problem:  1. Generalized weakness        2. Unable to ambulate          General:   OT Received On: 11/27/24  General  Reason for Referral: 73 y/o M presenting with c/o generalized weakness > in the BLE.  Referred By: ODALYS Tolliver  Past Medical History Relevant to Rehab: HTN, GERD, cellulitis, PVD, UTI, " anemia.  Family/Caregiver Present: No  Co-Treatment: PT  Co-Treatment Reason: Co-evaluation with PT to maximize pt safety, transfer ability, and participation.  Prior to Session Communication: Bedside nurse  Patient Position Received: Bed, 3 rail up, Alarm on  Preferred Learning Style: auditory, kinesthetic, visual  General Comment: Pt agreeable to OT   Precautions:  Medical Precautions: Fall precautions    Pain:  Pain Assessment  Pain Assessment: 0-10  0-10 (Numeric) Pain Score: 0 - No pain    Objective   Cognition:  Overall Cognitive Status: Within Functional Limits  Orientation Level: Oriented X4    Home Living:  Type of Home: House  Lives With: Alone  Home Adaptive Equipment: Walker rolling or standard, Wheelchair-manual  Home Layout: Two level, Laundry in basement, Able to live on main level with bedroom/bathroom  Home Access: Stairs to enter with rails  Entrance Stairs-Rails: Both  Entrance Stairs-Number of Steps: 2+2  Bathroom Shower/Tub:  (pt sponge bathes)  Bathroom Equipment: Grab bars around toilet  Home Living Comments: pt has a lift chair  Prior Function:  Level of [place]: Independent with ADLs and functional transfers, Needs assistance with homemaking  Receives Help From: Family (sister completes laundry 1x per week)  ADL Assistance: Independent (pt reports indep, difficulty reaching down to B LE's, unable to get into the shower)  Bath: Sponge bathing  Homemaking Assistance: Needs assistance  Meal Prep:  (orders groceries from [employer], meals on wheels a few times a week)  Laundry: Total  Driving/Transportation:  (uses senior transportation)  Shopping:  (orders groceries from [employer] or [employer])  Transfers: Independent, Assistive device  Gait: Independent (Pt reports use of manual WC around home and in the community. IND WC mobility to bathroom door where pt ambulates into bathroom using walker. Pt uses manual WC in the community)  Prior Function Comments: Pt reports 1 other recent fall while  "exiting bathroom at home other than documented fall.     ADL:  LE Dressing Assistance: Total  LE Dressing Deficit: Don/doff L sock, Don/doff R sock  Activity Tolerance:  Endurance: Tolerates 10 - 20 min exercise with multiple rests  Functional Standing Tolerance:     Bed Mobility/Transfers: Bed Mobility  Bed Mobility: Yes  Bed Mobility 1  Bed Mobility 1: Supine to sitting  Level of Assistance 1: Moderate assistance, Minimal verbal cues  Bed Mobility Comments 1: pt requiring assist to bring B LE's bed and fully elevate trunk, VC's to sequence  Bed Mobility 2  Bed Mobility  2: Scooting  Level of Assistance 2: Moderate assistance, +2, Minimal verbal cues  Bed Mobility Comments 2: VC's to sequence, assist at TAPs  Bed Mobility 3  Bed Mobility 3: Sitting to supine  Level of Assistance 3: Moderate assistance, +2  Bed Mobility Comments 3: assist at trunk and to lift B LE's into bed    Transfers  Transfer: Yes  Transfer 1  Technique 1: Sit to stand, Stand to sit  Transfer Device 1: Walker, Gait belt  Transfer Level of Assistance 1: Moderate assistance, +2, Minimal verbal cues, Minimal tactile cues  Trials/Comments 1: pt requesting for assist to be \"slow\", assist requiring to complete stand; upon sitting pt requiring cues for hand placement, assist to lower      Functional Mobility:  Functional Mobility  Functional Mobility Performed: Yes  Functional Mobility 1  Surface 1: Level tile  Device 1: Rolling walker  Functional Mobility Support Devices: Gait belt  Assistance 1: Moderate assistance  Comments 1: pt able to step with L LE to the R, pt with difficulty lifting R LE to take lateral step. pt completed 3x, cues to navigate FWW.  Sitting Balance:  Static Sitting Balance  Static Sitting-Balance Support: Feet supported  Static Sitting-Level of Assistance: Close supervision  Dynamic Sitting Balance  Dynamic Sitting-Balance Support: Bilateral upper extremity supported  Dynamic Sitting-Level of Assistance: Close " supervision  Dynamic Sitting-Balance: Forward lean  Dynamic Sitting-Comments: pt requesting to stretch his back with B UE's on FWW, pt flexing trunk to complete, good balance     Sensation:  Light Touch: Partial deficits in the LLE, Partial deficits in the RLE    Perception:  Inattention/Neglect: Appears intact  Coordination:  Movements are Fluid and Coordinated: Yes   Hand Function:  Hand Function  Gross Grasp: Functional  Coordination: Functional  Extremities: RUE   RUE : Exceptions to WFL (AROM shoulder flexion to ~90 degrees) and LUE   LUE: Exceptions to WFL (AROM shoulder flexion to ~90 degrees)    Outcome Measures: [facility] Daily Activity  Putting on and taking off regular lower body clothing: Total  Bathing (including washing, rinsing, drying): A lot  Putting on and taking off regular upper body clothing: A little  Toileting, which includes using toilet, bedpan or urinal: Total  Taking care of personal grooming such as brushing teeth: A little  Eating Meals: None  Daily Activity - Total Score: 14      Education Documentation  Body Mechanics, taught by Britany Simon OT at 11/27/2024  1:14 PM.  Learner: Patient  Readiness: Acceptance  Method: Demonstration, Explanation  Response: Verbalizes Understanding, Demonstrated Understanding    ADL Training, taught by Britany Simon OT at 11/27/2024  1:14 PM.  Learner: Patient  Readiness: Acceptance  Method: Demonstration, Explanation  Response: Verbalizes Understanding, Demonstrated Understanding    Education Comments  No comments found.           Goals:  Encounter Problems       Encounter Problems (Active)       ADLs       Patient will perform UB and LB bathing with modified independent level of assistance and long-handled sponge.       Start:  11/27/24    Expected End:  12/11/24            Patient with complete upper body dressing with modified independent level of assistance donning and doffing all UE clothes.       Start:  11/27/24    Expected End:  12/11/24             Patient with complete lower body dressing with modified independent level of assistance donning and doffing all LE clothes  with PRN adaptive equipment.       Start:  11/27/24    Expected End:  12/11/24            Patient will complete daily grooming tasks brushing teeth and washing face/hair with modified independent level of assistance and PRN adaptive equipment.       Start:  11/27/24    Expected End:  12/11/24            Patient will complete toileting including hygiene clothing management/hygiene with modified independent level of assistance.       Start:  11/27/24    Expected End:  12/11/24               MOBILITY       Patient will perform Functional mobility min Household distances/Community Distances with modified independent level of assistance and front wheeled walker in order to improve safety and functional mobility.       Start:  11/27/24    Expected End:  12/11/24               TRANSFERS       Patient will perform bed mobility modified independent level of assistance and bed rails in order to improve safety and independence with mobility       Start:  11/27/24    Expected End:  12/11/24            Patient will complete functional transfer to chair with front wheeled walker with modified independent level of assistance.       Start:  11/27/24    Expected End:  12/11/24

## 2024-11-27 NOTE — CARE PLAN
The patient's goals for the shift include  : rest and management of needs.     The clinical goals for the shift include Pt will remain free from injury this shift

## 2024-11-27 NOTE — CARE PLAN
Problem: Pain - Adult  Goal: Verbalizes/displays adequate comfort level or baseline comfort level  Outcome: Progressing     Problem: Safety - Adult  Goal: Free from fall injury  Outcome: Progressing     Problem: Discharge Planning  Goal: Discharge to home or other facility with appropriate resources  Outcome: Progressing     Problem: Chronic Conditions and Co-morbidities  Goal: Patient's chronic conditions and co-morbidity symptoms are monitored and maintained or improved  Outcome: Progressing     Problem: Skin  Goal: Decreased wound size/increased tissue granulation at next dressing change  Outcome: Progressing  Goal: Participates in plan/prevention/treatment measures  Outcome: Progressing  Goal: Prevent/manage excess moisture  Outcome: Progressing  Goal: Prevent/minimize sheer/friction injuries  Outcome: Progressing  Goal: Promote/optimize nutrition  Outcome: Progressing  Goal: Promote skin healing  Outcome: Progressing     Problem: Fall/Injury  Goal: Not fall by end of shift  Outcome: Progressing  Goal: Be free from injury by end of the shift  Outcome: Progressing  Goal: Verbalize understanding of personal risk factors for fall in the hospital  Outcome: Progressing  Goal: Verbalize understanding of risk factor reduction measures to prevent injury from fall in the home  Outcome: Progressing  Goal: Use assistive devices by end of the shift  Outcome: Progressing  Goal: Pace activities to prevent fatigue by end of the shift  Outcome: Progressing     Problem: Nutrition  Goal: Less than 5 days NPO/clear liquids  Outcome: Progressing  Goal: Oral intake greater than 50%  Outcome: Progressing  Goal: Oral intake greater 75%  Outcome: Progressing  Goal: Consume prescribed supplement  Outcome: Progressing  Goal: Adequate PO fluid intake  Outcome: Progressing  Goal: Nutrition support goals are met within 48 hrs  Outcome: Progressing  Goal: Nutrition support is meeting 75% of nutrient needs  Outcome: Progressing  Goal: Tube  feed tolerance  Outcome: Progressing  Goal: BG  mg/dL  Outcome: Progressing  Goal: Lab values WNL  Outcome: Progressing  Goal: Electrolytes WNL  Outcome: Progressing  Goal: Promote healing  Outcome: Progressing  Goal: Maintain stable weight  Outcome: Progressing  Goal: Reduce weight from edema/fluid  Outcome: Progressing  Goal: Gradual weight gain  Outcome: Progressing  Goal: Improve ostomy output  Outcome: Progressing   The patient's goals for the shift include      The clinical goals for the shift include Pt will remain free from injury this shift

## 2024-11-27 NOTE — ED NOTES
This RN brought this patient into ED room 21 in order to clean this patient. This RN along with another RN and medic were cleaning this patient. It was noted that there was dry stool on this patient. Noted several micro-tears on his perineal region. Pt had wraps on his legs and socks. Pt states that he did not remove them for several months. Dr. Gates brought into room where he removed the wraps and socks. Large open sore on the right foot with white and red discoloration all over. Left food had no open sore but copious amounts of dry skin and white discoloration. Pt wrapped with zeroform, vaseline, and gauze.     Chelly Donovan RN  11/26/24 [id number]

## 2024-11-27 NOTE — CONSULTS
Wound Care Consult     Visit Date: 11/27/2024      Patient Name: Matt Retana         MRN: [id number]           YOB: 1950     Reason for Consult: To assess sacrum and BLE wounds. Care was provided as detailed below. Dr. Baird was contacted for orders.        Wound History: present on admission     Pertinent Labs:   Albumin   Date Value Ref Range Status   11/26/2024 3.4 3.4 - 5.0 g/dL Final       Wound Assessment:  Wound 11/27/24 Laceration Pretibial Distal;Right (Active)   Drainage Description Serosanguineous 11/27/24 0950   Drainage Amount Moderate 11/27/24 0950   Dressing Changed Reinforced 11/27/24 0950   Dressing Status Clean;Dry 11/27/24 0033       Wound 11/27/24 Venous Ulcer Leg Right (Active)   Dressing Status Clean;Dry 11/27/24 0950       Wound 11/27/24 Venous Ulcer Pretibial Left (Active)   Dressing Status Clean;Dry 11/27/24 0950       Wound 11/27/24 Other (comment) Coccyx (Active)   Drainage Amount None 11/27/24 0950   Dressing Silicone border dressing 11/27/24 0950   Dressing Changed New 11/27/24 0950                  RLE/anterior ankle.                Wound Team Summary Assessment:   Sacrum and buttocks: majority of purple/red tissue is blanchable. Purple tissue on left sacrum measures 2.5 x 2.5 cm. Sacral mepilex dressings were applied to sacrum and bilateral buttocks. These should be changed every 3 days or as needed with clean up.   Bilateral lower legs are 75% covered with dry, scaly, flaking skin. There are patches of open, macerated skin with pink to red wound base. Lower legs were cleaned with bath wipes at this time. Xeroform was applied to open areas and covered with Xtrasorb dressings secured with kerlix wrap.   Right lower leg, anterior ankle with suspected trauma wound. Moist, red wound bed with moderate serosanguineous drainage with some blood. This measures 2.5 x 10 x 1 cm. This was also dressed with xeroform, Xtrasorb and secured with Kerlix wrap.  Recommend lac-hydrin ointment  for scaly/flaking skin on BLE. To be applied daily after cleaning. Also daily, for open skin and RLE wound, apply xeroform dressing, cover with Xtrasorb or abd pad and secure with Kerlix wrap.     Wound Team Plan: Bedside nursing responsible for wound care. Please contact wound care nurse if wound condition changes.      Sherry Recio, PETREN, RN, CWOCN   With PETER RicksN, RN, CWOCN  11/27/2024  2:32 PM

## 2024-11-27 NOTE — PROGRESS NOTES
Physical Therapy    Physical Therapy Evaluation & Treatment    Patient Name: Matt Retana  MRN: [id number]  Department: [place] [address]  Room: [address] [place] [address]  Today's Date: 11/27/2024   Time Calculation  Start Time: 0950  Stop Time: 1031  Time Calculation (min): 41 min    Assessment/Plan   PT Assessment  PT Assessment Results: Decreased strength, Decreased range of motion, Decreased endurance, Impaired balance, Decreased mobility, Decreased safety awareness, Impaired sensation  Rehab Prognosis: Good  Barriers to Discharge: Pt lives alone and requires assist of 2 people for bed mobility, transfers, and short distance ambulation.  Evaluation/Treatment Tolerance: Patient limited by fatigue  Medical Staff Made Aware: Yes  Strengths: Ability to acquire knowledge, Access to adaptive/assistive products, Premorbid level of function  Barriers to Participation: Comorbidities, Coping skills  End of Session Communication: Bedside nurse, Care Coordinator  Assessment Comment: Pt presents today with decreased BLE strength, ROM, balance, activity tolerance, and Hx of falls. Currently, pt is below the reported PLOF requiring assistance of 2 people for transfers and short distance ambulation. Continued PT 3x/wk would benefit the pt to address the above deficits to bring the pt closer to the PLOF while reducing fall risk. At D/C, pt would benefit from moderate intensity therapy.  End of Session Patient Position: Bed, 3 rail up, Alarm on   IP OR SWING BED PT PLAN  Inpatient or Swing Bed: Inpatient  PT Plan  Treatment/Interventions: Bed mobility, Transfer training, Gait training, Stair training, Balance training, Strengthening, Endurance training, Range of motion, Therapeutic exercise, Therapeutic activity, Home exercise program, Postural re-education  PT Plan: Ongoing PT  PT Frequency: 3 times per week  PT Discharge Recommendations: Moderate intensity level of continued care  Equipment Recommended upon Discharge: Wheeled walker  PT Recommended Transfer  "Status: Assist x2, Assistive device (Moderate assistance)  PT - OK to Discharge: Yes (PT POC initiated today)    Subjective     General Visit Information:  General  Reason for Referral: 73 y/o M presenting with c/o generalized weakness > in the BLE.  Referred By: ODALYS Tolliver  Past Medical History Relevant to Rehab: HTN, GERD, cellulitis, PVD, UTI, anemia.  Family/Caregiver Present: No  Co-Treatment: OT  Co-Treatment Reason: Partial co-evaluation with OT to maximize pt safety, transfer ability, and participation.  Prior to Session Communication: Bedside nurse  Patient Position Received: Bed, 3 rail up, Alarm on  Preferred Learning Style: auditory, kinesthetic, visual  General Comment: Pt supine in bed upon PT arrival. Cleared to participate with RN, and agreeable to PT evaluation.  Home Living:  Home Living  Type of Home: House  Lives With: Alone  Home Adaptive Equipment: Walker rolling or standard, Wheelchair-manual  Home Layout: Two level, Laundry in basement, Able to live on main level with bedroom/bathroom  Home Access: Stairs to enter with rails  Entrance Stairs-Rails: Both  Entrance Stairs-Number of Steps: 2+2  Bathroom Equipment: Grab bars around toilet  Home Living Comments: Pt has lift chair on the main level  Prior Level of Function:  Prior Function Per Pt/Caregiver Report  Level of [place]: Independent with ADLs and functional transfers, Needs assistance with homemaking  Receives Help From: Family (Sister assists with laundry 1x/wk)  ADL Assistance: Independent  Bath: Sponge bathing (At kitchen sink with towel)  Toileting:  (Independent)  Dressing:  (Independent)  Homemaking Assistance: Needs assistance  Meal Prep:  (Pt receives meals on wheels M-F, and orders meals from [employer] or [employer].)  Laundry: Total (Pt reports sister completes Laundry 1x/wk)  Driving/Transportation:  (Pt reports calling \"senior transport\" for transportation)  Transfers: Independent, Assistive device (From manual WC to walker. " "Pt has \"stations\" around the home where he stands using walker)  Gait: Independent, Assistive device (Pt reports use of manual WC around home and in the community. IND WC mobility to bathroom door where pt ambulates into bathroom using walker. Pt uses manual WC in the community)  Prior Function Comments: Pt reports 1 other recent fall while exiting bathroom at home other than documented fall.  Precautions:  Precautions  Medical Precautions: Fall precautions    Objective   Pain:  Pain Assessment  Pain Assessment: 0-10  0-10 (Numeric) Pain Score: 0 - No pain  Pain Type: Chronic pain  Pain Location: Back  Pain Descriptors: Spasm  Pain Frequency: Intermittent  Pain Interventions: Repositioned  Response to Interventions: No change in pain  Cognition:  Cognition  Overall Cognitive Status: Within Functional Limits  Orientation Level: Oriented X4  Impulsive: Within functional limits    General Assessments:  Activity Tolerance  Activity Tolerance Comments: Fair tolerance to seated activity. Increased fatigue with standing    Sensation  Sensation Comment: Pt reports paresthesias in the BLE    Coordination  Coordination Comment: Appears intact    Postural Control  Postural Control: Impaired  Head Control: Forward head posture in sitting and standing  Trunk Control: Trunk flexion in standing with FWW support  Posture Comment: Rounded shoulders    Static Sitting Balance  Static Sitting-Balance Support: Bilateral upper extremity supported, Feet supported  Static Sitting-Level of Assistance: Close supervision  Static Sitting-Comment/Number of Minutes: At EOB  Dynamic Sitting Balance  Dynamic Sitting-Balance Support: Bilateral upper extremity supported, Feet supported  Dynamic Sitting-Level of Assistance: Contact guard, Close supervision  Dynamic Sitting-Balance: Forward lean, Trunk control activities  Dynamic Sitting-Comments: Pt progresses from contact guard to standby assist    Static Standing Balance  Static Standing-Balance " Support: Bilateral upper extremity supported  Static Standing-Level of Assistance: Moderate assistance (2-person)  Static Standing-Comment/Number of Minutes: With FWW support  Dynamic Standing Balance  Dynamic Standing-Balance Support: Bilateral upper extremity supported  Dynamic Standing-Level of Assistance: Moderate assistance (2-person)  Dynamic Standing-Comments: With FWW suppport  Functional Assessments:  Bed Mobility  Bed Mobility: Yes  Bed Mobility 1  Bed Mobility 1: Supine to sitting  Level of Assistance 1: Moderate assistance, Minimal verbal cues  Bed Mobility Comments 1: HOB elevated. Pt able to intiate BLE progression off the EOB but requires assist to complete. VC for reach/grab on bed rail to assist trunk into upright EOB sitting.  Bed Mobility 2  Bed Mobility  2: Scooting  Level of Assistance 2: Moderate assistance, +2, Minimal verbal cues  Bed Mobility Comments 2: VC for hand placement on the bed and for hip progression forward for foot placement on the ground in EOB sitting.  Bed Mobility 3  Bed Mobility 3: Sitting to supine  Level of Assistance 3: Moderate assistance, +2  Bed Mobility Comments 3: Assist with BLE lift into bed and trunk control on descent to the bed  Bed Mobility 4  Bed Mobility 4: Scooting  Level of Assistance 4: Maximum assistance, +2, Minimal verbal cues  Bed Mobility Comments 4: VC for B knee flexion and BLE push on bed to assist in scooting. Hospital bed placed in Trendelenburg-like position to assist in scooting toward the HOB. Assist provided during scoot via draw sheet.    Transfers  Transfer: Yes  Transfer 1  Transfer From 1: Bed to  Transfer to 1: Stand  Technique 1: Sit to stand  Transfer Device 1: Walker, Gait belt  Transfer Level of Assistance 1: Moderate assistance, +2, Minimal verbal cues, Minimal tactile cues  Trials/Comments 1: VC for foot placement on the ground before transfer. VC for B hand palacement on the bed and BUE push to stand . Pt opts to rest L hand on  FWW during transfer. Pt flexed at trunk, hips, and B knees in standing. VC for increased weight bearing through FWW and knee extension for improved posture without return demonstation.  Transfers 2  Transfer From 2: Stand to  Transfer to 2: Bed  Technique 2: Stand to sit  Transfer Device 2: Walker, Gait belt  Transfer Level of Assistance 2: Moderate assistance, +2, Minimal verbal cues  Trials/Comments 2: VC for BUE reach for the bed when sitting 1 at a time for controlled descent. Pt opts to leave L hand resting on FWW during transfer. Assist provided with controlled descent to the bed.    Ambulation/Gait Training  Ambulation/Gait Training Performed: Yes  Ambulation/Gait Training 1  Surface 1: Level tile  Device 1: Rolling walker  Gait Support Devices: Gait belt  Assistance 1: Moderate assistance, Minimal verbal cues  Quality of Gait 1: Wide base of support, Forward flexed posture, Decreased step length  Comments/Distance (ft) 1: 2 sidesteps R along the EOB. Pt demonstrates difficulty lifting the R foot for progression to the right. VC for L foot progresion to the right for more normalized size of AIDAN. VC for sequencing steps with FWW progression. Pt significantly flexed at trunk and hips with gaze directed at the ground. Pt reporting fatigue with trial.    Stairs  Stairs: No  Extremity/Trunk Assessments:  RLE   RLE : Exceptions to WFL  AROM RLE (degrees)  R Knee Extension 0-130:  (About 50% normal range achieved in EOB sitting)  Strength RLE  R Hip Flexion: 2+/5  R Knee Extension: 2+/5  R Ankle Dorsiflexion:  (At least 2-/5)  R Ankle Plantar Flexion:  (At least 2-/5)  AROM LLE (degrees)  L Knee Extension 0-130:  (About 50% normal range achieved in EOB sitting)  Treatments:  Balance/Neuromuscular Re-Education  Balance/Neuromuscular Re-Education Activity Performed: Yes  Balance/Neuromuscular Re-Education Activity 1: Pt performed EOB dynamic balance activity consisting of forward leading with BUE support for  improvement of back spasm and trunk control/seated balance. Performed 2x15 with CGA progressed to standby assist.  Outcome Measures:  [facility] Basic Mobility  Turning from your back to your side while in a flat bed without using bedrails: A lot  Moving from lying on your back to sitting on the side of a flat bed without using bedrails: A lot  Moving to and from bed to chair (including a wheelchair): A lot  Standing up from a chair using your arms (e.g. wheelchair or bedside chair): A lot  To walk in hospital room: A lot  Climbing 3-5 steps with railing: Total  Basic Mobility - Total Score: 11    Encounter Problems       Encounter Problems (Active)       Balance       Goal 1 (Progressing)       Start:  11/27/24    Expected End:  12/11/24       Pt performs all sitting balance with supervision and standing balance with mod assist x 1 using FWW            Mobility       STG - Patient will ambulate (Progressing)       Start:  11/27/24    Expected End:  12/11/24       20 ft with mod assist x 1 using FWW            PT Problem       PT Goal 1 (Not Progressing)       Start:  11/27/24    Expected End:  12/11/24       Pt demonstrates IND in performing 2 sets of 12 reps of prescribed BLE HEP            PT Transfers       STG - Patient to transfer to and from sit to supine (Progressing)       Start:  11/27/24    Expected End:  12/11/24       With CGA         STG - Patient will transfer sit to and from stand (Progressing)       Start:  11/27/24    Expected End:  12/11/24       With mod assist x 1 using FWW              Education Documentation  Precautions, taught by Benja Farmer PT at 11/27/2024 11:17 AM.  Learner: Patient  Readiness: Acceptance  Method: Explanation, Demonstration  Response: Needs Reinforcement    Body Mechanics, taught by Benja Farmer PT at 11/27/2024 11:17 AM.  Learner: Patient  Readiness: Acceptance  Method: Explanation, Demonstration  Response: Needs Reinforcement    Mobility Training, taught by Benja  LACI Farmer, PT at 11/27/2024 11:17 AM.  Learner: Patient  Readiness: Acceptance  Method: Explanation, Demonstration  Response: Needs Reinforcement    Education Comments  Pt educated on proper body mechanics and hand placement during transfers for safety and improved control.

## 2024-11-27 NOTE — PROGRESS NOTES
"Abdi Retana \"Louise" is a 74 y.o. male on day 0 of admission presenting with Generalized weakness.      Subjective   No acute events overnight        Objective     Last Recorded Vitals  /70 (BP Location: Left arm, Patient Position: Lying)   Pulse 79   Temp 36.9 °C (98.5 °F) (Temporal)   Resp 16   Wt 136 kg (300 lb)   SpO2 94%   Intake/Output last 3 Shifts:    Intake/Output Summary (Last 24 hours) at 11/27/2024 1458  Last data filed at 11/27/2024 1145  Gross per 24 hour   Intake 550 ml   Output 1375 ml   Net -825 ml       Admission Weight  Weight: 136 kg (300 lb) (11/26/24 1605)    Daily Weight  11/26/24 : 136 kg (300 lb)    Image Results  ECG 12 lead  Atrial fibrillation  Minimal voltage criteria for LVH, may be normal variant ( R in aVL )  Abnormal ECG  When compared with ECG of 05-AUG-2020 07:53,  Atrial fibrillation has replaced Sinus rhythm  T wave inversion now evident in Inferior leads  T wave inversion no longer evident in Lateral leads  See ED provider note for full interpretation and clinical correlation  Confirmed by Dori Bai ([id number]) on 11/27/2024 11:47:54 AM      Physical Exam  Vitals and nursing note reviewed.   Constitutional:       Appearance: Normal appearance.   HENT:      Head: Normocephalic.      Right Ear: External ear normal.      Left Ear: External ear normal.      Nose: Nose normal.      Mouth/Throat:      Mouth: Mucous membranes are dry.      Pharynx: Oropharynx is clear.   Eyes:      Extraocular Movements: Extraocular movements intact.      Conjunctiva/sclera: Conjunctivae normal.      Pupils: Pupils are equal, round, and reactive to light.   Cardiovascular:      Rate and Rhythm: Normal rate and regular rhythm.   Pulmonary:      Effort: Pulmonary effort is normal.      Breath sounds: Normal breath sounds.   Abdominal:      General: Abdomen is flat. Bowel sounds are normal.      Palpations: Abdomen is soft.   Musculoskeletal:         General: Normal range of motion.      " Right lower leg: Edema present.      Left lower leg: Edema present.   Skin:     General: Skin is warm and dry.   Neurological:      General: No focal deficit present.      Mental Status: He is alert. Mental status is at baseline.      Motor: Weakness present.      Comments: Generalized weakness   Psychiatric:         Mood and Affect: Mood normal.         Behavior: Behavior normal.      Relevant Results               Assessment/Plan                  Assessment & Plan  Generalized weakness  UA, TSH, and A1C order  Plan on discharge tomorrow morning   Generalized weakness with difficulty ambulating  Plan:  PT OT: recommending SNF      Hypertension  Plan:  Losartan 50 mg orally daily     Chronic venous stasis changes of the lower extremities  Plan:  No active cellulitis  He uses a steroid cream     GERD  Plan:  Protonix     Class II obesity BMI 38  Lifestyle modification     DVT prophylaxis  Lovenox 40 mg subcutaneously daily  LANs              Melissa Baird MD

## 2024-11-27 NOTE — CONSULTS
"Nutrition Assessment Note  Nutrition Assessment      Reason for Assessment  Reason for Assessment: Admission nursing screening  Admitted with generalized weakness.  MST score 0, but wounds noted.  BLE wounds are venous and coccyx wound not indicated as pressure related. Wound team following.  No nutrition intervention is indicated at this time. Please reconsult if there are changes in patient status & nutrition therapy is warranted.     History:  Food and Nutrient History  Energy Intake: Good > 75 %  Food and Nutrient History: Patient not available to interview.  Lives at home on his own, sister helps with shopping. Staff reports good appetite and charted intake is >75% all meals.    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, with stage 1 through stage 4 chronic kidney disease, or unspecified chronic kidney disease   Iron deficiency anemia, unspecified   Lymphedema of both lower extremities   OA (osteoarthritis) of knee   Obesity   Postphlebitic syndrome with ulcer of both lower extremities (Multi)   Renal stones   Unsteady gait   Venous (peripheral) insufficiency   Vitamin D deficiency, unspecified   Chronic kidney disease, stage 3 unspecified (Multi)   Obesity, unspecified   Encounter for annual wellness visit (AWV) in Medicare patient   Injury of lower extremity   UTI (urinary tract infection)   Generalized weakness     Anthropometrics:  Height: 188 cm (6' 2.02\")  Weight: 136 kg (300 lb)  BMI (Calculated): 38.5  Weight Change  Weight History / % Weight Change: No weight history available. uses wheel chair no PCP weights recorded.  IBW/kg (Dietitian Calculated): 86.4 kg     Nutrition Interventions/Recommendations   Nutrition Prescription  Individualized Nutrition Prescription Provided for : Consider " 2-3 gr sodium restriction.  Daily MVI.    Food and/or Nutrient Delivery Interventions  Goal: intake meets >75% estimated nutrient needs.          Follow Up  Last Date of Nutrition Visit: 11/27/24  Nutrition Follow-Up Needed?: Dietitian to reassess per policy  Follow up Comment: ERIC

## 2024-11-27 NOTE — CARE PLAN
The patient's goals for the shift include      The clinical goals for the shift include Pt will remain free from injury this shift      Problem: Pain - Adult  Goal: Verbalizes/displays adequate comfort level or baseline comfort level  Outcome: Progressing     Problem: Safety - Adult  Goal: Free from fall injury  Outcome: Progressing     Problem: Discharge Planning  Goal: Discharge to home or other facility with appropriate resources  Outcome: Progressing     Problem: Chronic Conditions and Co-morbidities  Goal: Patient's chronic conditions and co-morbidity symptoms are monitored and maintained or improved  Outcome: Progressing     Problem: Skin  Goal: Decreased wound size/increased tissue granulation at next dressing change  Outcome: Progressing  Goal: Participates in plan/prevention/treatment measures  Outcome: Progressing  Goal: Prevent/manage excess moisture  Outcome: Progressing  Goal: Prevent/minimize sheer/friction injuries  Outcome: Progressing  Goal: Promote/optimize nutrition  Outcome: Progressing  Goal: Promote skin healing  Outcome: Progressing     Problem: Fall/Injury  Goal: Not fall by end of shift  Outcome: Progressing  Goal: Be free from injury by end of the shift  Outcome: Progressing  Goal: Verbalize understanding of personal risk factors for fall in the hospital  Outcome: Progressing  Goal: Verbalize understanding of risk factor reduction measures to prevent injury from fall in the home  Outcome: Progressing  Goal: Use assistive devices by end of the shift  Outcome: Progressing  Goal: Pace activities to prevent fatigue by end of the shift  Outcome: Progressing

## 2024-11-28 ENCOUNTER — APPOINTMENT (OUTPATIENT)
Dept: RADIOLOGY | Facility: HOSPITAL | Age: 74
End: 2024-11-28
Payer: MEDICARE

## 2024-11-28 VITALS
OXYGEN SATURATION: 92 % | DIASTOLIC BLOOD PRESSURE: 58 MMHG | RESPIRATION RATE: 18 BRPM | TEMPERATURE: 96.8 F | SYSTOLIC BLOOD PRESSURE: 137 MMHG | BODY MASS INDEX: 38.5 KG/M2 | HEIGHT: 74 IN | WEIGHT: 300 LBS | HEART RATE: 78 BPM

## 2024-11-28 LAB
EST. AVERAGE GLUCOSE BLD GHB EST-MCNC: 111 MG/DL
HBA1C MFR BLD: 5.5 %

## 2024-11-28 PROCEDURE — 99238 HOSP IP/OBS DSCHRG MGMT 30/<: CPT | Performed by: STUDENT IN AN ORGANIZED HEALTH CARE EDUCATION/TRAINING PROGRAM

## 2024-11-28 PROCEDURE — 72110 X-RAY EXAM L-2 SPINE 4/>VWS: CPT | Performed by: RADIOLOGY

## 2024-11-28 PROCEDURE — 2500000001 HC RX 250 WO HCPCS SELF ADMINISTERED DRUGS (ALT 637 FOR MEDICARE OP): Performed by: INTERNAL MEDICINE

## 2024-11-28 PROCEDURE — 72110 X-RAY EXAM L-2 SPINE 4/>VWS: CPT

## 2024-11-28 PROCEDURE — 2500000002 HC RX 250 W HCPCS SELF ADMINISTERED DRUGS (ALT 637 FOR MEDICARE OP, ALT 636 FOR OP/ED): Mod: MUE | Performed by: STUDENT IN AN ORGANIZED HEALTH CARE EDUCATION/TRAINING PROGRAM

## 2024-11-28 PROCEDURE — 2500000004 HC RX 250 GENERAL PHARMACY W/ HCPCS (ALT 636 FOR OP/ED): Performed by: INTERNAL MEDICINE

## 2024-11-28 PROCEDURE — 2500000001 HC RX 250 WO HCPCS SELF ADMINISTERED DRUGS (ALT 637 FOR MEDICARE OP): Performed by: STUDENT IN AN ORGANIZED HEALTH CARE EDUCATION/TRAINING PROGRAM

## 2024-11-28 PROCEDURE — G0378 HOSPITAL OBSERVATION PER HR: HCPCS

## 2024-11-28 PROCEDURE — 96372 THER/PROPH/DIAG INJ SC/IM: CPT | Performed by: INTERNAL MEDICINE

## 2024-11-28 RX ORDER — POTASSIUM CHLORIDE 20 MEQ/1
40 TABLET, EXTENDED RELEASE ORAL ONCE
Status: COMPLETED | OUTPATIENT
Start: 2024-11-28 | End: 2024-11-28

## 2024-11-28 ASSESSMENT — PAIN SCALES - GENERAL
PAINLEVEL_OUTOF10: 3
PAINLEVEL_OUTOF10: 0 - NO PAIN

## 2024-11-28 ASSESSMENT — COGNITIVE AND FUNCTIONAL STATUS - GENERAL
HELP NEEDED FOR BATHING: A LOT
WALKING IN HOSPITAL ROOM: A LOT
CLIMB 3 TO 5 STEPS WITH RAILING: TOTAL
EATING MEALS: A LOT
DRESSING REGULAR UPPER BODY CLOTHING: A LOT
TURNING FROM BACK TO SIDE WHILE IN FLAT BAD: A LOT
MOBILITY SCORE: 11
STANDING UP FROM CHAIR USING ARMS: A LOT
DRESSING REGULAR LOWER BODY CLOTHING: A LOT
MOVING FROM LYING ON BACK TO SITTING ON SIDE OF FLAT BED WITH BEDRAILS: A LOT
TOILETING: A LOT
PERSONAL GROOMING: A LOT
MOVING TO AND FROM BED TO CHAIR: A LOT
DAILY ACTIVITIY SCORE: 12

## 2024-11-28 ASSESSMENT — PAIN SCALES - WONG BAKER: WONGBAKER_NUMERICALRESPONSE: HURTS LITTLE BIT

## 2024-11-28 ASSESSMENT — PAIN - FUNCTIONAL ASSESSMENT: PAIN_FUNCTIONAL_ASSESSMENT: 0-10

## 2024-11-28 ASSESSMENT — PAIN DESCRIPTION - ORIENTATION: ORIENTATION: MID

## 2024-11-28 NOTE — PROGRESS NOTES
"Abdi Retana \"Louise" is a 74 y.o. male on day 0 of admission presenting with Generalized weakness.      Subjective   No acute events overnight. Patient is doing well. He currently does not have any complaints        Objective     Last Recorded Vitals  /58 (BP Location: Left arm, Patient Position: Lying)   Pulse 78   Temp 36 °C (96.8 °F) (Temporal)   Resp 18   Wt 136 kg (300 lb)   SpO2 92%   Intake/Output last 3 Shifts:    Intake/Output Summary (Last 24 hours) at 11/28/2024 1233  Last data filed at 11/28/2024 0600  Gross per 24 hour   Intake 550 ml   Output 1950 ml   Net -1400 ml       Admission Weight  Weight: 136 kg (300 lb) (11/26/24 1605)    Daily Weight  11/27/24 : 136 kg (300 lb)    Image Results  ECG 12 lead  Atrial fibrillation  Minimal voltage criteria for LVH, may be normal variant ( R in aVL )  Abnormal ECG  When compared with ECG of 05-AUG-2020 07:53,  Atrial fibrillation has replaced Sinus rhythm  T wave inversion now evident in Inferior leads  T wave inversion no longer evident in Lateral leads  See ED provider note for full interpretation and clinical correlation  Confirmed by Dori Bai ([id number]) on 11/27/2024 11:47:54 AM      Physical Exam  Vitals and nursing note reviewed.   Constitutional:       Appearance: Normal appearance.   HENT:      Head: Normocephalic.      Right Ear: External ear normal.      Left Ear: External ear normal.      Nose: Nose normal.      Mouth/Throat:      Mouth: Mucous membranes are dry.      Pharynx: Oropharynx is clear.   Eyes:      Extraocular Movements: Extraocular movements intact.      Conjunctiva/sclera: Conjunctivae normal.      Pupils: Pupils are equal, round, and reactive to light.   Cardiovascular:      Rate and Rhythm: Normal rate and regular rhythm.   Pulmonary:      Effort: Pulmonary effort is normal.      Breath sounds: Normal breath sounds.   Abdominal:      General: Abdomen is flat. Bowel sounds are normal.      Palpations: Abdomen is soft. "   Musculoskeletal:         General: Normal range of motion.      Right lower leg: Edema present.      Left lower leg: Edema present.   Skin:     General: Skin is warm and dry.   Neurological:      General: No focal deficit present.      Mental Status: He is alert. Mental status is at baseline.      Motor: Weakness present.      Comments: Generalized weakness   Psychiatric:         Mood and Affect: Mood normal.         Behavior: Behavior normal.   Relevant Results               Assessment/Plan                  Assessment & Plan  Generalized weakness  UA, TSH, and A1C order  Plan on discharge tomorrow morning. He is awaiting SNF placement   Generalized weakness with difficulty ambulating  Plan:  PT OT: recommending SNF      Hypertension  Plan:  Losartan 50 mg orally daily     Chronic venous stasis changes of the lower extremities  Plan:  No active cellulitis  He uses a steroid cream     GERD  Plan:  Protonix     Class II obesity BMI 38  Lifestyle modification     DVT prophylaxis  Lovenox 40 mg subcutaneously daily  LANs              Melissa Baird MD

## 2024-11-28 NOTE — CARE PLAN
The patient's goals for the shift include injury free    The clinical goals for the shift include remain free of falls    Over the shift, the patient did make progress toward the following goals.           Problem: Fall/Injury  Goal: Not fall by end of shift  Outcome: Progressing  Goal: Be free from injury by end of the shift  Outcome: Progressing  Goal: Verbalize understanding of personal risk factors for fall in the hospital  Outcome: Progressing  Goal: Verbalize understanding of risk factor reduction measures to prevent injury from fall in the home  Outcome: Progressing  Goal: Use assistive devices by end of the shift  Outcome: Progressing  Goal: Pace activities to prevent fatigue by end of the shift  Outcome: Progressing

## 2024-11-28 NOTE — PROGRESS NOTES
11/28/24 1224   Discharge Planning   Expected Discharge Disposition SNF     [facility] called multiple times to talk to charge nurse at [facility]. No one is answering the phone. Will call [facility] tomorrow.

## 2024-11-28 NOTE — PROGRESS NOTES
11/28/24 1259   Discharge Planning   Expected Discharge Disposition SNF     Patient is medically ready for discharge  They are being discharged to: SNF  [facility] will pick patient up                            Facility [facility]             Nurse/ family notified pt will call family             Transport time 330 pm      Patient denies any other needs

## 2024-11-28 NOTE — DISCHARGE SUMMARY
"Discharge Diagnosis  Generalized weakness    Issues Requiring Follow-Up  PCP f/up  Discharge Meds     Medication List      CONTINUE taking these medications     aspirin 81 mg EC tablet   clotrimazole-betamethasone cream; Commonly known as: Lotrisone; MIX WITH   ZINC OXIDE AND APPLY  TOPICALLY TO AFFECTED AREA(S)  TWICE DAILY   famotidine 40 mg tablet; Commonly known as: Pepcid   ferrous sulfate (325 mg ferrous sulfate) tablet; Commonly known as:   FeroSuL; Take 1 tablet (325 mg) by mouth once every 24 hours.   fluocinonide 0.05 % ointment; Commonly known as: Lidex; APPLY TOPICALLY   TWICE DAILY AS  NEEDED FOR IRRITATION OR RASH.  AVOID FACE AND GROIN   losartan 50 mg tablet; Commonly known as: Cozaar; TAKE 1 TABLET BY MOUTH   ONCE  DAILY   magnesium oxide 400 mg magnesium capsule; Take 1 capsule (400 mg) by   mouth once daily.   potassium chloride CR 10 mEq ER tablet; Commonly known as: Klor-Con;   TAKE 1 TABLET BY MOUTH ONCE  DAILY DO NOT CRUSH, CHEW, OR  SPLIT   torsemide 20 mg tablet; Commonly known as: Demadex; TAKE 1 TABLET BY   MOUTH ONCE  DAILY   vitamin D3-folic acid 2,500 unit- 1 mg tablet; Take 1 mg by mouth once   every 24 hours.   zinc oxide 10 % cream; To mix with Lotrisone and apply twice daily     STOP taking these medications     fluconazole 150 mg tablet; Commonly known as: Diflucan       Test Results Pending At Discharge  Pending Labs       Order Current Status    Extra Urine Gray Tube Collected (11/27/24 3633)    Urinalysis with Reflex Culture and Microscopic In process    Urine Culture Preliminary result            Hospital Course   Abdi Retana \"Louise" is a 74 y.o. male who presented to [facility] ER complaining of generalized weakness.  He has weakness in both of his legs especially.  He had a similar problem when he was anemic with a GI bleed but he denies any dark stools.  He denies any fever chills and has been able to get around in his wheelchair or walker.  Today he fell out of bed " and at that point called EMS.  He lives alone.  He does get fatigued and short of breath with activities of daily living.  His past medical history includes hypertension GERD cellulitis of the lower extremities peripheral vascular disease UTI and anemia. Workup and imaging was all initially negative. Bilateral leg weakness most likely 2/2 to deconditioning. Patient discharged to SNF for rehab     On 11/29: Xray of lumbar spine read Likely large expansile lytic lesion of the L4 vertebral body raising high degree of concern for neoplasm. Called facility and contacted patient's NP- Beatriz at [facility] communicated these findings. Requested that patient be sent to [facility] ED for further workup and oncology consultation which we do not have at [facility]. Transfer now being facilitated     Pertinent Physical Exam At Time of Discharge  Physical Exam  Vitals and nursing note reviewed.   Constitutional:       Appearance: Normal appearance.   HENT:      Head: Normocephalic.      Right Ear: External ear normal.      Left Ear: External ear normal.      Nose: Nose normal.      Mouth/Throat:      Mouth: Mucous membranes are dry.      Pharynx: Oropharynx is clear.   Eyes:      Extraocular Movements: Extraocular movements intact.      Conjunctiva/sclera: Conjunctivae normal.      Pupils: Pupils are equal, round, and reactive to light.   Cardiovascular:      Rate and Rhythm: Normal rate and regular rhythm.   Pulmonary:      Effort: Pulmonary effort is normal.      Breath sounds: Normal breath sounds.   Abdominal:      General: Abdomen is flat. Bowel sounds are normal.      Palpations: Abdomen is soft.   Musculoskeletal:         General: Normal range of motion.      Right lower leg: Edema present.      Left lower leg: Edema present.   Skin:     General: Skin is warm and dry.   Neurological:      General: No focal deficit present.      Mental Status: He is alert. Mental status is at baseline.      Motor: Weakness present.       Comments: Generalized weakness   Psychiatric:         Mood and Affect: Mood normal.         Behavior: Behavior normal.   Outpatient Follow-Up  No future appointments.      Melissa Baird MD

## 2024-11-28 NOTE — NURSING NOTE
Tried twice to call report. Phone kept ringing both times on hold for 5-6 mins each. Writing out report

## 2024-11-28 NOTE — CARE PLAN
The patient's goals for the shift include      The clinical goals for the shift include pt will remain safe throughout this shift    Over the shift, the patient did make progress toward the following goals.   Problem: Pain - Adult  Goal: Verbalizes/displays adequate comfort level or baseline comfort level  Outcome: Progressing     Problem: Safety - Adult  Goal: Free from fall injury  Outcome: Progressing     Problem: Discharge Planning  Goal: Discharge to home or other facility with appropriate resources  Outcome: Progressing     Problem: Chronic Conditions and Co-morbidities  Goal: Patient's chronic conditions and co-morbidity symptoms are monitored and maintained or improved  Outcome: Progressing     Problem: Skin  Goal: Decreased wound size/increased tissue granulation at next dressing change  Outcome: Progressing  Flowsheets (Taken 11/27/2024 2206)  Decreased wound size/increased tissue granulation at next dressing change: Promote sleep for wound healing  Goal: Participates in plan/prevention/treatment measures  Outcome: Progressing  Flowsheets (Taken 11/27/2024 2206)  Participates in plan/prevention/treatment measures: Elevate heels  Goal: Prevent/manage excess moisture  Outcome: Progressing  Flowsheets (Taken 11/27/2024 2206)  Prevent/manage excess moisture: Follow provider orders for dressing changes  Goal: Prevent/minimize sheer/friction injuries  Outcome: Progressing  Flowsheets (Taken 11/27/2024 2206)  Prevent/minimize sheer/friction injuries: Use pull sheet  Goal: Promote/optimize nutrition  Outcome: Progressing  Flowsheets (Taken 11/27/2024 2206)  Promote/optimize nutrition: Consume > 50% meals/supplements  Goal: Promote skin healing  Outcome: Progressing  Flowsheets (Taken 11/27/2024 2206)  Promote skin healing: Protective dressings over bony prominences     Problem: Fall/Injury  Goal: Not fall by end of shift  Outcome: Progressing  Goal: Be free from injury by end of the shift  Outcome:  Progressing  Goal: Verbalize understanding of personal risk factors for fall in the hospital  Outcome: Progressing  Goal: Verbalize understanding of risk factor reduction measures to prevent injury from fall in the home  Outcome: Progressing  Goal: Use assistive devices by end of the shift  Outcome: Progressing  Goal: Pace activities to prevent fatigue by end of the shift  Outcome: Progressing     Problem: Nutrition  Goal: Less than 5 days NPO/clear liquids  Outcome: Progressing  Goal: Oral intake greater than 50%  Outcome: Progressing  Goal: Oral intake greater 75%  Outcome: Progressing  Goal: Consume prescribed supplement  Outcome: Progressing  Goal: Adequate PO fluid intake  Outcome: Progressing  Goal: Nutrition support goals are met within 48 hrs  Outcome: Progressing  Goal: Nutrition support is meeting 75% of nutrient needs  Outcome: Progressing  Goal: Tube feed tolerance  Outcome: Progressing  Goal: BG  mg/dL  Outcome: Progressing  Goal: Lab values WNL  Outcome: Progressing  Goal: Electrolytes WNL  Outcome: Progressing  Goal: Promote healing  Outcome: Progressing  Goal: Maintain stable weight  Outcome: Progressing  Goal: Reduce weight from edema/fluid  Outcome: Progressing  Goal: Gradual weight gain  Outcome: Progressing  Goal: Improve ostomy output  Outcome: Progressing

## 2024-11-29 LAB — BACTERIA UR CULT: ABNORMAL

## 2024-11-30 ENCOUNTER — HOSPITAL ENCOUNTER (INPATIENT)
Facility: HOSPITAL | Age: 74
End: 2024-11-30
Attending: EMERGENCY MEDICINE | Admitting: STUDENT IN AN ORGANIZED HEALTH CARE EDUCATION/TRAINING PROGRAM
Payer: MEDICARE

## 2024-11-30 ENCOUNTER — APPOINTMENT (OUTPATIENT)
Dept: RADIOLOGY | Facility: HOSPITAL | Age: 74
End: 2024-11-30
Payer: MEDICARE

## 2024-11-30 DIAGNOSIS — N28.89 RENAL MASS, RIGHT: ICD-10-CM

## 2024-11-30 DIAGNOSIS — M89.9 LESION OF LUMBAR SPINE: Primary | ICD-10-CM

## 2024-11-30 LAB
ALBUMIN SERPL BCP-MCNC: 2.8 G/DL (ref 3.4–5)
ALP SERPL-CCNC: 52 U/L (ref 33–136)
ALT SERPL W P-5'-P-CCNC: 9 U/L (ref 10–52)
ANION GAP SERPL CALC-SCNC: 10 MMOL/L (ref 10–20)
APTT PPP: 31 SECONDS (ref 27–38)
AST SERPL W P-5'-P-CCNC: 19 U/L (ref 9–39)
BASOPHILS # BLD AUTO: 0.08 X10*3/UL (ref 0–0.1)
BASOPHILS NFR BLD AUTO: 1.1 %
BILIRUB SERPL-MCNC: 0.4 MG/DL (ref 0–1.2)
BUN SERPL-MCNC: 19 MG/DL (ref 6–23)
CALCIUM SERPL-MCNC: 8.3 MG/DL (ref 8.6–10.6)
CHLORIDE SERPL-SCNC: 101 MMOL/L (ref 98–107)
CO2 SERPL-SCNC: 28 MMOL/L (ref 21–32)
CREAT SERPL-MCNC: 1.27 MG/DL (ref 0.5–1.3)
EGFRCR SERPLBLD CKD-EPI 2021: 59 ML/MIN/1.73M*2
EOSINOPHIL # BLD AUTO: 0.27 X10*3/UL (ref 0–0.4)
EOSINOPHIL NFR BLD AUTO: 3.6 %
ERYTHROCYTE [DISTWIDTH] IN BLOOD BY AUTOMATED COUNT: 14.9 % (ref 11.5–14.5)
GLUCOSE SERPL-MCNC: 102 MG/DL (ref 74–99)
HCT VFR BLD AUTO: 29.8 % (ref 41–52)
HGB BLD-MCNC: 9.4 G/DL (ref 13.5–17.5)
HOLD SPECIMEN: NORMAL
IMM GRANULOCYTES # BLD AUTO: 0.03 X10*3/UL (ref 0–0.5)
IMM GRANULOCYTES NFR BLD AUTO: 0.4 % (ref 0–0.9)
INR PPP: 1.3 (ref 0.9–1.1)
LYMPHOCYTES # BLD AUTO: 0.85 X10*3/UL (ref 0.8–3)
LYMPHOCYTES NFR BLD AUTO: 11.4 %
MCH RBC QN AUTO: 25.5 PG (ref 26–34)
MCHC RBC AUTO-ENTMCNC: 31.5 G/DL (ref 32–36)
MCV RBC AUTO: 81 FL (ref 80–100)
MONOCYTES # BLD AUTO: 0.47 X10*3/UL (ref 0.05–0.8)
MONOCYTES NFR BLD AUTO: 6.3 %
NEUTROPHILS # BLD AUTO: 5.78 X10*3/UL (ref 1.6–5.5)
NEUTROPHILS NFR BLD AUTO: 77.2 %
NRBC BLD-RTO: 0 /100 WBCS (ref 0–0)
PLATELET # BLD AUTO: 353 X10*3/UL (ref 150–450)
POTASSIUM SERPL-SCNC: 3.9 MMOL/L (ref 3.5–5.3)
PROT SERPL-MCNC: 7.3 G/DL (ref 6.4–8.2)
PROTHROMBIN TIME: 14.9 SECONDS (ref 9.8–12.8)
RBC # BLD AUTO: 3.69 X10*6/UL (ref 4.5–5.9)
SODIUM SERPL-SCNC: 135 MMOL/L (ref 136–145)
WBC # BLD AUTO: 7.5 X10*3/UL (ref 4.4–11.3)

## 2024-11-30 PROCEDURE — A9575 INJ GADOTERATE MEGLUMI 0.1ML: HCPCS | Performed by: EMERGENCY MEDICINE

## 2024-11-30 PROCEDURE — 36415 COLL VENOUS BLD VENIPUNCTURE: CPT | Performed by: NURSE PRACTITIONER

## 2024-11-30 PROCEDURE — 99285 EMERGENCY DEPT VISIT HI MDM: CPT | Mod: 25

## 2024-11-30 PROCEDURE — 74177 CT ABD & PELVIS W/CONTRAST: CPT

## 2024-11-30 PROCEDURE — 72128 CT CHEST SPINE W/O DYE: CPT | Mod: RCN

## 2024-11-30 PROCEDURE — 72128 CT CHEST SPINE W/O DYE: CPT | Mod: RCN | Performed by: RADIOLOGY

## 2024-11-30 PROCEDURE — 96374 THER/PROPH/DIAG INJ IV PUSH: CPT

## 2024-11-30 PROCEDURE — 72158 MRI LUMBAR SPINE W/O & W/DYE: CPT

## 2024-11-30 PROCEDURE — G0378 HOSPITAL OBSERVATION PER HR: HCPCS

## 2024-11-30 PROCEDURE — 2500000004 HC RX 250 GENERAL PHARMACY W/ HCPCS (ALT 636 FOR OP/ED): Performed by: NURSE PRACTITIONER

## 2024-11-30 PROCEDURE — 2500000001 HC RX 250 WO HCPCS SELF ADMINISTERED DRUGS (ALT 637 FOR MEDICARE OP)

## 2024-11-30 PROCEDURE — 1100000001 HC PRIVATE ROOM DAILY

## 2024-11-30 PROCEDURE — 2550000001 HC RX 255 CONTRASTS: Performed by: EMERGENCY MEDICINE

## 2024-11-30 PROCEDURE — 71260 CT THORAX DX C+: CPT | Performed by: RADIOLOGY

## 2024-11-30 PROCEDURE — 74177 CT ABD & PELVIS W/CONTRAST: CPT | Performed by: RADIOLOGY

## 2024-11-30 PROCEDURE — 71260 CT THORAX DX C+: CPT

## 2024-11-30 PROCEDURE — 99285 EMERGENCY DEPT VISIT HI MDM: CPT | Performed by: NURSE PRACTITIONER

## 2024-11-30 PROCEDURE — 72131 CT LUMBAR SPINE W/O DYE: CPT | Mod: RCN | Performed by: RADIOLOGY

## 2024-11-30 PROCEDURE — 85025 COMPLETE CBC W/AUTO DIFF WBC: CPT | Performed by: NURSE PRACTITIONER

## 2024-11-30 PROCEDURE — 80053 COMPREHEN METABOLIC PANEL: CPT | Performed by: NURSE PRACTITIONER

## 2024-11-30 PROCEDURE — 72131 CT LUMBAR SPINE W/O DYE: CPT | Mod: RCN

## 2024-11-30 PROCEDURE — 72158 MRI LUMBAR SPINE W/O & W/DYE: CPT | Performed by: RADIOLOGY

## 2024-11-30 PROCEDURE — 85610 PROTHROMBIN TIME: CPT | Performed by: NURSE PRACTITIONER

## 2024-11-30 RX ORDER — MORPHINE SULFATE 4 MG/ML
2 INJECTION INTRAVENOUS
Status: COMPLETED | OUTPATIENT
Start: 2024-11-30 | End: 2024-11-30

## 2024-11-30 RX ORDER — MORPHINE SULFATE 4 MG/ML
4 INJECTION INTRAVENOUS ONCE
Status: DISCONTINUED | OUTPATIENT
Start: 2024-11-30 | End: 2024-11-30

## 2024-11-30 RX ORDER — ACETAMINOPHEN 325 MG/1
975 TABLET ORAL EVERY 8 HOURS PRN
Status: DISCONTINUED | OUTPATIENT
Start: 2024-11-30 | End: 2024-12-05 | Stop reason: HOSPADM

## 2024-11-30 RX ORDER — FERROUS SULFATE 325(65) MG
65 TABLET ORAL
Status: DISCONTINUED | OUTPATIENT
Start: 2024-12-01 | End: 2024-12-05 | Stop reason: HOSPADM

## 2024-11-30 RX ORDER — ASPIRIN 81 MG/1
81 TABLET ORAL DAILY
Status: DISCONTINUED | OUTPATIENT
Start: 2024-11-30 | End: 2024-12-05 | Stop reason: HOSPADM

## 2024-11-30 RX ORDER — PANTOPRAZOLE SODIUM 40 MG/1
40 TABLET, DELAYED RELEASE ORAL
Status: DISCONTINUED | OUTPATIENT
Start: 2024-12-01 | End: 2024-12-01

## 2024-11-30 RX ORDER — FAMOTIDINE 20 MG/1
20 TABLET, FILM COATED ORAL 2 TIMES DAILY
Status: DISCONTINUED | OUTPATIENT
Start: 2024-11-30 | End: 2024-12-05 | Stop reason: HOSPADM

## 2024-11-30 RX ORDER — AMOXICILLIN 250 MG
2 CAPSULE ORAL NIGHTLY
Status: DISCONTINUED | OUTPATIENT
Start: 2024-11-30 | End: 2024-12-05 | Stop reason: HOSPADM

## 2024-11-30 RX ORDER — OXYCODONE HYDROCHLORIDE 5 MG/1
5 TABLET ORAL EVERY 6 HOURS PRN
Status: DISCONTINUED | OUTPATIENT
Start: 2024-11-30 | End: 2024-12-04

## 2024-11-30 RX ORDER — LOSARTAN POTASSIUM 50 MG/1
50 TABLET ORAL DAILY
Status: DISCONTINUED | OUTPATIENT
Start: 2024-11-30 | End: 2024-12-05 | Stop reason: HOSPADM

## 2024-11-30 RX ORDER — POLYETHYLENE GLYCOL 3350 17 G/17G
17 POWDER, FOR SOLUTION ORAL DAILY
Status: DISCONTINUED | OUTPATIENT
Start: 2024-11-30 | End: 2024-12-05 | Stop reason: HOSPADM

## 2024-11-30 RX ORDER — GADOTERATE MEGLUMINE 376.9 MG/ML
20 INJECTION INTRAVENOUS
Status: COMPLETED | OUTPATIENT
Start: 2024-11-30 | End: 2024-11-30

## 2024-11-30 RX ORDER — HYDROMORPHONE HYDROCHLORIDE 1 MG/ML
0.2 INJECTION, SOLUTION INTRAMUSCULAR; INTRAVENOUS; SUBCUTANEOUS EVERY 4 HOURS PRN
Status: DISCONTINUED | OUTPATIENT
Start: 2024-11-30 | End: 2024-12-04

## 2024-11-30 RX ORDER — ENOXAPARIN SODIUM 100 MG/ML
40 INJECTION SUBCUTANEOUS EVERY 24 HOURS
Status: DISCONTINUED | OUTPATIENT
Start: 2024-11-30 | End: 2024-12-05 | Stop reason: HOSPADM

## 2024-11-30 RX ADMIN — IOHEXOL 90 ML: 350 INJECTION, SOLUTION INTRAVENOUS at 11:47

## 2024-11-30 RX ADMIN — ACETAMINOPHEN 975 MG: 325 TABLET ORAL at 20:29

## 2024-11-30 RX ADMIN — MORPHINE SULFATE 2 MG: 4 INJECTION INTRAVENOUS at 13:19

## 2024-11-30 RX ADMIN — FAMOTIDINE 20 MG: 20 TABLET ORAL at 20:29

## 2024-11-30 RX ADMIN — GADOTERATE MEGLUMINE 20 ML: 376.9 INJECTION INTRAVENOUS at 14:20

## 2024-11-30 RX ADMIN — SENNOSIDES AND DOCUSATE SODIUM 2 TABLET: 50; 8.6 TABLET ORAL at 20:29

## 2024-11-30 ASSESSMENT — COLUMBIA-SUICIDE SEVERITY RATING SCALE - C-SSRS
2. HAVE YOU ACTUALLY HAD ANY THOUGHTS OF KILLING YOURSELF?: NO
1. IN THE PAST MONTH, HAVE YOU WISHED YOU WERE DEAD OR WISHED YOU COULD GO TO SLEEP AND NOT WAKE UP?: NO
6. HAVE YOU EVER DONE ANYTHING, STARTED TO DO ANYTHING, OR PREPARED TO DO ANYTHING TO END YOUR LIFE?: NO

## 2024-11-30 ASSESSMENT — PAIN SCALES - GENERAL
PAINLEVEL_OUTOF10: 0 - NO PAIN
PAINLEVEL_OUTOF10: 4
PAINLEVEL_OUTOF10: 0 - NO PAIN

## 2024-11-30 ASSESSMENT — COGNITIVE AND FUNCTIONAL STATUS - GENERAL
STANDING UP FROM CHAIR USING ARMS: TOTAL
MOVING TO AND FROM BED TO CHAIR: A LOT
TOILETING: A LOT
CLIMB 3 TO 5 STEPS WITH RAILING: TOTAL
TOILETING: A LOT
MOBILITY SCORE: 9
CLIMB 3 TO 5 STEPS WITH RAILING: TOTAL
HELP NEEDED FOR BATHING: A LOT
STANDING UP FROM CHAIR USING ARMS: TOTAL
WALKING IN HOSPITAL ROOM: TOTAL
DRESSING REGULAR LOWER BODY CLOTHING: A LOT
MOBILITY SCORE: 9
DRESSING REGULAR LOWER BODY CLOTHING: A LOT
MOVING FROM LYING ON BACK TO SITTING ON SIDE OF FLAT BED WITH BEDRAILS: A LOT
TURNING FROM BACK TO SIDE WHILE IN FLAT BAD: A LOT
MOVING FROM LYING ON BACK TO SITTING ON SIDE OF FLAT BED WITH BEDRAILS: A LOT
DAILY ACTIVITIY SCORE: 16
DRESSING REGULAR UPPER BODY CLOTHING: A LOT
DAILY ACTIVITIY SCORE: 16
DRESSING REGULAR UPPER BODY CLOTHING: A LOT
TURNING FROM BACK TO SIDE WHILE IN FLAT BAD: A LOT
MOVING TO AND FROM BED TO CHAIR: A LOT
WALKING IN HOSPITAL ROOM: TOTAL
HELP NEEDED FOR BATHING: A LOT

## 2024-11-30 ASSESSMENT — ENCOUNTER SYMPTOMS
WEAKNESS: 1
FATIGUE: 1
SHORTNESS OF BREATH: 1
CONSTIPATION: 1
BACK PAIN: 1
WOUND: 1

## 2024-11-30 ASSESSMENT — PAIN - FUNCTIONAL ASSESSMENT
PAIN_FUNCTIONAL_ASSESSMENT: 0-10

## 2024-11-30 ASSESSMENT — PAIN DESCRIPTION - LOCATION: LOCATION: OTHER (COMMENT)

## 2024-11-30 NOTE — ED TRIAGE NOTES
Pt to ED via EMS from [facility] due to concerning CT scan by thalia. Pt is here to learn more about lesions that thalia found on CT scan of pt spine. Pt expresses his concern for generalized weakness, recent falls, and back spasms. Pt vital signs stable on arrival.

## 2024-11-30 NOTE — ED PROVIDER NOTES
Emergency Department Encounter  [facility] EMERGENCY MEDICINE    Patient: Abdi Retana  MRN: [id number]  : 1950  Date of Evaluation: 2024  ED Provider: DIVINE Bhardwaj      Chief Complaint       Chief Complaint   Patient presents with    Weakness, Gen        Limitations to History: none  Historian: patient  Records reviewed: EMR inpatient and outpatient notes, Care Everywhere    This is a 74-year-old male with a PMH of hypertension, GERD, cellulitis who presents to the emergency room for an abnormal finding on a lumbar x-ray.  Patient was recently admitted to [facility] with generalized weakness, especially to the lower extremities.  Patient had an x-ray of his lumbar spine just prior to discharge on 2024 and was sent to a SNF facility.  Patient states that someone called the SNF facility and told him to come to [facility] for further evaluation because there was a abnormal finding on the lumbar x-ray.  Patient states that for 2 weeks he has had difficulty ambulating and feels like he has decreased strength in his lower extremities.  Denies any incontinence, numbness or tingling.  He reports to intermittent lower back pain especially with position changes.  Denies any back pain currently.  Denies any abdominal pain, weight loss, nausea, vomiting, chest pain, cough or shortness of breath.    PMH: HTN, GERD, cellulitis  PSH: Denies  Allergies: Clindamycin, Augmentin  Social HX: Former smoker, denies alcohol use, + former marijuana use.  Family HX: No family history pertinent to current presenting problem  Medications: Reviewed per EMR    ROS:     Review of Systems   Musculoskeletal:  Positive for back pain.   Neurological:  Positive for weakness.     14 systems reviewed and otherwise acutely negative except as in the [place].        Past History     Past Medical History:   Diagnosis Date    Personal history of other diseases of the circulatory system     History of  hypertension     No past surgical history on file.      Medications/Allergies     Previous Medications    ASPIRIN 81 MG EC TABLET    Take 1 tablet (81 mg) by mouth once daily.    CLOTRIMAZOLE-BETAMETHASONE (LOTRISONE) CREAM    MIX WITH ZINC OXIDE AND APPLY  TOPICALLY TO AFFECTED AREA(S)  TWICE DAILY    FAMOTIDINE (PEPCID) 40 MG TABLET    Take 0.5 tablets (20 mg) by mouth 2 times a day.    FERROUS SULFATE (FEROSUL) 325 (65 FE) MG TABLET    Take 1 tablet (325 mg) by mouth once every 24 hours.    FLUOCINONIDE (LIDEX) 0.05 % OINTMENT    APPLY TOPICALLY TWICE DAILY AS  NEEDED FOR IRRITATION OR RASH.  AVOID FACE AND GROIN    LOSARTAN (COZAAR) 50 MG TABLET    TAKE 1 TABLET BY MOUTH ONCE  DAILY    MAGNESIUM OXIDE 400 MG MAGNESIUM CAPSULE    Take 1 capsule (400 mg) by mouth once daily.    POTASSIUM CHLORIDE CR 10 MEQ ER TABLET    TAKE 1 TABLET BY MOUTH ONCE  DAILY DO NOT CRUSH, CHEW, OR  SPLIT    TORSEMIDE (DEMADEX) 20 MG TABLET    TAKE 1 TABLET BY MOUTH ONCE  DAILY    VITAMIN D3-FOLIC ACID 2,500 UNIT- 1 MG TABLET    Take 1 mg by mouth once every 24 hours.    ZINC OXIDE 10 % CREAM    To mix with Lotrisone and apply twice daily     Allergies   Allergen Reactions    Clindamycin Unknown    Amoxicillin-Pot Clavulanate Rash     Possible rash, received multiple antibiotics at this time        Physical Exam       ED Triage Vitals [11/30/24 0948]   Temperature Heart Rate Respirations BP   36.8 °C (98.2 °F) 83 16 129/71      Pulse Ox Temp Source Heart Rate Source Patient Position   94 % Oral Monitor Lying      BP Location FiO2 (%)     -- --       Physical Exam:    Appearance: Alert, oriented , cooperative,  in no acute distress.     Skin: Chronic changes to lower extremities.    ENT: Hearing grossly intact. External auditory canals patent, tympanic membranes intact with visible landmarks. Nares patent, mucus membranes moist. Dentition without lesions. Pharynx clear, uvula midline.     Neck: Supple, without meningismus.     Pulmonary:  Clear bilaterally with good chest wall excursion. No rales, rhonchi or wheezing. No accessory muscle use or stridor.    Cardiac: Normal S1, S2 without murmur, rub, gallop or extrasystole. No JVD, Carotids without bruits.    Abdomen: Soft, nontender, active bowel sounds.  No palpable organomegaly.  No rebound or guarding.      Musculoskeletal: Full range of motion. No deformity. Pulses full and equal.     Neurological:  Normal sensation, no weakness, no focal findings identified.    Psychiatric: Appropriate mood and affect.       Diagnostics   Labs:  Results for orders placed or performed during the hospital encounter of 11/30/24 (from the past 24 hours)   CBC and Auto Differential   Result Value Ref Range    WBC 7.5 4.4 - 11.3 x10*3/uL    nRBC 0.0 0.0 - 0.0 /100 WBCs    RBC 3.69 (L) 4.50 - 5.90 x10*6/uL    Hemoglobin 9.4 (L) 13.5 - 17.5 g/dL    Hematocrit 29.8 (L) 41.0 - 52.0 %    MCV 81 80 - 100 fL    MCH 25.5 (L) 26.0 - 34.0 pg    MCHC 31.5 (L) 32.0 - 36.0 g/dL    RDW 14.9 (H) 11.5 - 14.5 %    Platelets 353 150 - 450 x10*3/uL    Neutrophils % 77.2 40.0 - 80.0 %    Immature Granulocytes %, Automated 0.4 0.0 - 0.9 %    Lymphocytes % 11.4 13.0 - 44.0 %    Monocytes % 6.3 2.0 - 10.0 %    Eosinophils % 3.6 0.0 - 6.0 %    Basophils % 1.1 0.0 - 2.0 %    Neutrophils Absolute 5.78 (H) 1.60 - 5.50 x10*3/uL    Immature Granulocytes Absolute, Automated 0.03 0.00 - 0.50 x10*3/uL    Lymphocytes Absolute 0.85 0.80 - 3.00 x10*3/uL    Monocytes Absolute 0.47 0.05 - 0.80 x10*3/uL    Eosinophils Absolute 0.27 0.00 - 0.40 x10*3/uL    Basophils Absolute 0.08 0.00 - 0.10 x10*3/uL   Comprehensive metabolic panel   Result Value Ref Range    Glucose 102 (H) 74 - 99 mg/dL    Sodium 135 (L) 136 - 145 mmol/L    Potassium 3.9 3.5 - 5.3 mmol/L    Chloride 101 98 - 107 mmol/L    Bicarbonate 28 21 - 32 mmol/L    Anion Gap 10 10 - 20 mmol/L    Urea Nitrogen 19 6 - 23 mg/dL    Creatinine 1.27 0.50 - 1.30 mg/dL    eGFR 59 (L) >60 mL/min/1.73m*2     Calcium 8.3 (L) 8.6 - 10.6 mg/dL    Albumin 2.8 (L) 3.4 - 5.0 g/dL    Alkaline Phosphatase 52 33 - 136 U/L    Total Protein 7.3 6.4 - 8.2 g/dL    AST 19 9 - 39 U/L    Bilirubin, Total 0.4 0.0 - 1.2 mg/dL    ALT 9 (L) 10 - 52 U/L   Coagulation Screen   Result Value Ref Range    Protime 14.9 (H) 9.8 - 12.8 seconds    INR 1.3 (H) 0.9 - 1.1    aPTT 31 27 - 38 seconds   Lavender Top   Result Value Ref Range    Extra Tube Hold for add-ons.    SST TOP   Result Value Ref Range    Extra Tube Hold for add-ons.    PST Top   Result Value Ref Range    Extra Tube Hold for add-ons.       Radiographs:  MR lumbar spine w and wo IV contrast   Final Result   1. Expansile enhancing marrow replacing lesion involving the near   entirety of the L4 vertebral body with extension into the pedicles   bilaterally, suggestive of a neoplastic process. There is mild   associated compression deformity of the L4 vertebral body and   convexity of the posterior margin of the vertebral body, with   associated soft tissue component extending into the epidural space   and bilateral neural foramina. It results in moderate to severe   spinal canal narrowing at the L4 level and moderate to severe   bilateral neural foramina narrowing at L4-L5.   2. Multilevel degenerative changes throughout the lumbar spine, most   pronounced at L1-L2.        I personally reviewed the images/study and I agree with the findings   as stated by Dr. Anthony Slater M.D. This study was interpreted at   [facility], [place], [place].        MACRO:   None        Signed by: Desi Becerra 11/30/2024 3:28 PM   Dictation workstation:   [id number]      CT chest abdomen pelvis w IV contrast   Final Result   1. 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 Mildly dilated segmental branches of the renal vein and   the main renal vein with hypoattenuation  in the lumen which likely   represents a thrombus likely tumor thrombus. No evidence of thrombus   in the IVC.   2. Enhancing right adrenal nodule highly concerning for metastasis.   Indeterminate left adrenal 1.3 cm nodule.   3. Expansile lytic osseous lesion centered at the L4 vertebral body,   likely representing metastatic lesion. Please see dedicated CT   imaging of the lumbar spine for further evaluation.   4. Innumerable solid pulmonary nodules measuring up to 1.1 cm are new   compared to the prior CT on 08/05/2020 likely representing metastasis   from the renal mass.   5. Right pleural effusion with adjacent lung atelectasis..   6. Focal wall thickening and adjacent stranding near sigmoid colon   diverticula, suggestive of mild acute diverticulitis. No pericolonic   abscess, fluid collection or perforation.   7. Large amount of stool burden in the rectum with mild mesorectal   fat stranding likely representing stercoral proctitis.             I personally reviewed the image(s)/study and resident interpretation.   I agree with the findings as stated by resident Dillon Thompson.   Data analyzed and images interpreted at [facility], [place], [place].        MACRO:   None.        Signed by: Jesus Rodriguez 11/30/2024 1:25 PM   Dictation workstation:   [id number]      CT lumbar spine wo IV contrast   Final Result   There is a lytic expansile osseous lesion involving the L4 vertebral   body with extension to the bilateral L4 pedicles most compatible with   underlying osseous neoplastic process. There is mild pathologic   collapse of the L4 vertebral body. There is convexity of the lytic   ill-defined posterior margin of the L4 vertebral body as well as   suspected associated epidural soft tissue contributing to   encroachment upon the spinal canal with concern for severe narrowing   of the spinal canal at the L4 level. There is extension of soft   tissue fullness into the  paraspinal soft tissues surrounding the L4   vertebral body. Further evaluation with MRI imaging would be   recommended.        There are bilateral L3 pars interarticularis defects.        The coronal reconstructed images demonstrate a levocurvature of the   lower thoracic and lumbar spine.        There is exaggeration of the kyphotic curvature of the thoracic spine.        There is multilevel spondylosis within the thoracic and lumbar   regions.        Please see the dedicated report of the CT of the chest, abdomen, and   pelvis for details of findings in these regions.        MACRO:   None        Signed by: Juan Jose Quiroz 11/30/2024 12:17 PM   Dictation workstation:   [id number]      CT thoracic spine wo IV contrast   Final Result   There is a lytic expansile osseous lesion involving the L4 vertebral   body with extension to the bilateral L4 pedicles most compatible with   underlying osseous neoplastic process. There is mild pathologic   collapse of the L4 vertebral body. There is convexity of the lytic   ill-defined posterior margin of the L4 vertebral body as well as   suspected associated epidural soft tissue contributing to   encroachment upon the spinal canal with concern for severe narrowing   of the spinal canal at the L4 level. There is extension of soft   tissue fullness into the paraspinal soft tissues surrounding the L4   vertebral body. Further evaluation with MRI imaging would be   recommended.        There are bilateral L3 pars interarticularis defects.        The coronal reconstructed images demonstrate a levocurvature of the   lower thoracic and lumbar spine.        There is exaggeration of the kyphotic curvature of the thoracic spine.        There is multilevel spondylosis within the thoracic and lumbar   regions.        Please see the dedicated report of the CT of the chest, abdomen, and   pelvis for details of findings in these regions.        MACRO:   None        Signed by: Juan Jose Quiroz  "11/30/2024 12:17 PM   Dictation workstation:   [id number]              Assessment   In brief, Abdi Retana is a 74 y.o. male who presented to the emergency department for an abnormal lumbar x-ray.          ED Course/MDM     Diagnoses as of 11/30/24 1644   Lesion of lumbar spine   Renal mass, right      Visit Vitals  /66   Pulse 77   Temp 36.8 °C (98.2 °F) (Oral)   Resp 17   Ht 1.88 m (6' 2\")   Wt 122 kg (270 lb) Comment: per pt guess.   SpO2 95%   BMI 34.67 kg/m²   Smoking Status Former   BSA 2.52 m²       Medications   morphine injection 4 mg (4 mg intravenous Not Given 11/30/24 1402)   iohexol (OMNIPaque) 350 mg iodine/mL solution 90 mL (90 mL intravenous Given 11/30/24 1147)   morphine injection 2 mg (2 mg intravenous Given 11/30/24 1319)   gadoterate meglumine (Dotarem) 0.5 mmol/mL contrast injection 20 mL (20 mL intravenous Given 11/30/24 1420)       Patient remained stable while in the emergency department. Previous outpatient and ED records were reviewed. Outside records were reviewed.  IV was established and labs obtained.  CBC with a normal white blood cell count of 7.5.  Hemoglobin and hematocrit slightly low but stable at 9.4 and 29.8.  Comprehensive metabolic panel with a glucose of 102, sodium 135 and calcium of 8.3.  Initially a CT chest, abdomen and pelvis with IV contrast was obtained.  Large infiltrative right renal mass with perinephric extension, invasion of the collecting system, most consistent with a renal cell carcinoma.  Mildly dilated segmental branches of the renal vein and the main renal vein with hypoattenuation in the lumen which likely represents a thrombus likely tumor thrombus.  No evidence of thrombus in the IVC.  Enhancing right adrenal nodule highly concerning for metastasis.  Expansile lytic osseous lesion centered at the L4 vertebral body, likely represents metastatic lesion.  Innumerable solid pulmonary nodules measuring up to 1.1 cm are new compared to the prior CT on " 8/5/2020 likely represent metastasis from the renal mass.  Focal wall thickening and adjacent stranding near the sigmoid colon, suggestive of mild acute diverticulitis. Patient did not have any abdominal pain, nausea or vomiting and therefore antibiotics were not initiated.  CT L-spine was obtained which shows a lytic expansile osseous lesion involving the L4 vertebral body with extension to the bilateral L4 pedicles most compatible with underlying osseous neoplastic process.  Mild pathological collapse of the L4 vertebral body.  Convexity of the lytic ill-defined posterior margin of the L4 vertebral body as well as suspected associated epidural soft tissue contributing to encroachment on the spinal canal with concern for severe narrowing of the spinal canal at the L4 level.  MRI of the L-spine was obtained.  IMPRESSION:  1. Expansile enhancing marrow replacing lesion involving the near  entirety of the L4 vertebral body with extension into the pedicles  bilaterally, suggestive of a neoplastic process. There is mild  associated compression deformity of the L4 vertebral body and  convexity of the posterior margin of the vertebral body, with  associated soft tissue component extending into the epidural space  and bilateral neural foramina. It results in moderate to severe  spinal canal narrowing at the L4 level and moderate to severe  bilateral neural foramina narrowing at L4-L5.    Patient does not have any neurological deficits on exam.  No signs of cauda equina on the MRI.  Patient did not have any back pain. Will need a spine consult inpatient but oncology work up should take priority at this point since the patient did not have any neurological deficits or back pain, will defer to inpatient team for spine consult.  Patient was admitted to the medicine team for further evaluation and management    Final Impression      1. Lesion of lumbar spine      2.  Renal mass      DISPOSITION  Disposition: Admitted to the  medicine team    Comment: Please note this report has been produced using speech recognition software and may contain errors related to that system including errors in grammar, punctuation, and spelling, as well as words and phrases that may be inappropriate.  If there are any questions or concerns please feel free to contact the dictating provider for clarification.    HUSSEIN Bhardwaj-DIVINE Cordero  11/30/24 [id number]

## 2024-11-30 NOTE — SIGNIFICANT EVENT
S  CC  LE weakness    HPI  73 yo with PMH most notable for HTN, PVD, chronic lymphedema, and CKDIII (baseline Cr 1.2-1.3, GFR 50s) presenting from SNF after imaging resulted w c/f new L4/lesion. Given weakness pt just admitted for c/f possible spinal cord involvement. Pt reports 2 weeks of worsening weakness when trying to switch from chair to walker. Reports multiple falls w/o LOC or hitting head due to this. Typically is able to get around well on his own with his walker. Denies bladder/bowel incontinence, saddle anesthesia, numbness/tingling anywhere. Pt reports back spasms and lumber back pain for the past 2 weeks. Denies additional complaints. Denies hematuria.     MEDs  Current Outpatient Medications   Medication Instructions    aspirin 81 mg, Daily    clotrimazole-betamethasone (Lotrisone) cream MIX WITH ZINC OXIDE AND APPLY  TOPICALLY TO AFFECTED AREA(S)  TWICE DAILY    famotidine (PEPCID) 20 mg, oral, 2 times daily    ferrous sulfate (325 mg ferrous sulfate) (FEROSUL) 325 mg, oral, Every 24 hours    fluocinonide (Lidex) 0.05 % ointment APPLY TOPICALLY TWICE DAILY AS  NEEDED FOR IRRITATION OR RASH.  AVOID FACE AND GROIN    losartan (COZAAR) 50 mg, oral, Daily    magnesium oxide 400 mg, oral, Daily    potassium chloride CR 10 mEq ER tablet 10 mEq, oral, Daily, DO NOT CRUSH CHEW OR SPLIT    torsemide (DEMADEX) 20 mg, oral, Daily    vitamin D3-folic acid 2,500 unit- 1 mg tablet 1 mg, oral, Every 24 hours    zinc oxide 10 % cream To mix with Lotrisone and apply twice daily        PSH  -reviewed (non-contributory)    Fam hx:   Cancer: colon in father; no other reported family hx of cancer    SOC  -lives alone  -ambulates with walker or wheel chair at home, able to get to bathroom and walk some on own  -tobacco: former 1ppd smoker, quit in 20s  -alcohol: 1-2 drinks per day, denies withdrawal symptoms  -substance: denies  -barriers to healthcare: denies  -code status: full  -surrogate decision maker: brother,  Fabiano      O  VT  Temperature:  [36.8 °C (98.2 °F)] 36.8 °C (98.2 °F)  Heart Rate:  [77-89] 77  Respirations:  [16-18] 17  BP: (129-131)/(65-71) 131/66     RESP        I/O  No intake or output data in the 24 hours ending 11/30/24 1642     PE  General: no acute distress  Cardio: normal rate, regular rhythm, no rubs/murmurs, no S3/S4  Pulm: non-labored, no accessory muscle usage, BS symmetric, no crackles/wheezing/stridor  GI: non-distended, appropriately soft, no masses, non-tender  : no CVA tenderness, significant point tenderness in L4/L5 region  MSK: no joint swelling, grossly full active ROM  HEENT: grossly normocephalic  Neuro: grossly oriented, CN grossly intact, extremities moving symmetrically, equal sensation in bilateral UE and LE; strength 4/5 in LE; 5/5 in UE  Psych: appropriate affect  Volume: mucous membranes moist, 3+ pitting edema bilaterally; venous stasis dermatitis changes with ulcers bilaterally- no obvious drainage noted  Perfusion: no cyanosis, distal extremities warm    LAB  -see EMR    IMG  CT CAP 11/30  IMPRESSION:  1. 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 Mildly dilated segmental branches of the renal vein and  the main renal vein with hypoattenuation in the lumen which likely  represents a thrombus likely tumor thrombus. No evidence of thrombus  in the IVC.  2. Enhancing right adrenal nodule highly concerning for metastasis.  Indeterminate left adrenal 1.3 cm nodule.  3. Expansile lytic osseous lesion centered at the L4 vertebral body,  likely representing metastatic lesion. Please see dedicated CT  imaging of the lumbar spine for further evaluation.  4. Innumerable solid pulmonary nodules measuring up to 1.1 cm are new  compared to the prior CT on 08/05/2020 likely representing metastasis  from the renal mass.  5. Right pleural effusion with adjacent lung atelectasis..  6. Focal wall  thickening and adjacent stranding near sigmoid colon  diverticula, suggestive of mild acute diverticulitis. No pericolonic  abscess, fluid collection or perforation.  7. Large amount of stool burden in the rectum with mild mesorectal  fat stranding likely representing stercoral proctitis.    CT T-L spine 11/30  IMPRESSION:  There is a lytic expansile osseous lesion involving the L4 vertebral  body with extension to the bilateral L4 pedicles most compatible with  underlying osseous neoplastic process. There is mild pathologic  collapse of the L4 vertebral body. There is convexity of the lytic  ill-defined posterior margin of the L4 vertebral body as well as  suspected associated epidural soft tissue contributing to  encroachment upon the spinal canal with concern for severe narrowing  of the spinal canal at the L4 level. There is extension of soft  tissue fullness into the paraspinal soft tissues surrounding the L4  vertebral body. Further evaluation with MRI imaging would be  recommended.      There are bilateral L3 pars interarticularis defects.      The coronal reconstructed images demonstrate a levocurvature of the  lower thoracic and lumbar spine.      There is exaggeration of the kyphotic curvature of the thoracic spine.      There is multilevel spondylosis within the thoracic and lumbar  regions.    MRI L spine 11/30   IMPRESSION:  1. Expansile enhancing marrow replacing lesion involving the near  entirety of the L4 vertebral body with extension into the pedicles  bilaterally, suggestive of a neoplastic process. There is mild  associated compression deformity of the L4 vertebral body and  convexity of the posterior margin of the vertebral body, with  associated soft tissue component extending into the epidural space  and bilateral neural foramina. It results in moderate to severe  spinal canal narrowing at the L4 level and moderate to severe  bilateral neural foramina narrowing at L4-L5.  2. Multilevel  degenerative changes throughout the lumbar spine, most  pronounced at L1-L2.    A/P  75 yo with PMH most notable for HTN, PVD, chronic lymphedema, and CKDIII (baseline Cr 1.2-1.3, GFR 50s) presenting from SNF with LE weakness iso recently discovered L4/5 lesion with mod to severe spinal cord compression. On exam pt has 4/5 strength in LE with normal sensation. Denies bladder/bowel incontinence. 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 Pt with history of smoking but no family hx of RCC. Ortho spine consulted given worsening weakness with spinal lesion. Keeping NPO and holding on AC for now. Imaging also notable for new R renal v thrombus likely related to suspected malignancy. Kidney function is at baseline and making urine. Will continue to monitor off AC prior to ortho spine eval. Pt likely to need IR guided biopsy of lesion for tissue diagnosis pending ortho eval. Remainder of plan as below.     #weakness  #new L4 lesion with canal narrowing  #R renal mass with lesions in adrenals, lungs c/f RCC  -imaging c/f metastatic RCC to adrenals, lung, spine - no bx yet  -MRI L spine c/f L4 lesion with mod to severe spinal canal narrowing likely contributing to weakness  -RCRI: 0 pts, 3.9% risk of major cardiac event; 3.63 METs per DASI score  -tylenol 975mg q8h mild pain, oxy 5mg for mod-severe q6h, dilaudid 0.2mg for breakthrough q4h  -ortho spine consulted for additional recs give MRI findings  -IR consult in AM pending ortho final recs for biopsy- possible bx of kidney mass or adrenal mass; pt also with lung nodules that could potentially be targets if needed  -will need onc f/up as outpatient for further eval, consider discussion with onc in AM on further scans needed for staging (possible brain MRI?)   -keep NPO for now, holding AC - will give diet once hear back from ortho  -pt informed of c/f metastatic cancer based on imaging    #R renal v  thrombus  -seen on CT, likely related to underlying suspected malignancy  -Cr at baseline, having urine output   -ideally pt should be anticoagulated for this however with high risk lesion in spine and possible intervention holding on full AC for now  -added APLS labs on to AM labs in case pt to be sent home on DOAC  -could consider vasc med input on AC pending interventions    #CKDIII   -baseline Cr 1.2-1.3, GFR 50s  -avoid nephrotoxic agents  -renally dose medications  -monitor I/Os    #chronic lymphedema  #PVD  #LE wounds  -follows [facility] through [facility]  -no white count or wound discharge, low c/f infection  -on torsemide 20mg daily, holding for now will add back as indicated  -holding home K, Mag supplement-will replete as indicated inpatient  -wound care consult placed    #HTN  -holding losartan for the time being pending surgical eval    #GERD  #prior hx gastric ulcer (H Pylori negative)  -on ppi BID unsure of when stopped (did not see in notes)  -continue pepcid 20mg BID   -add pantoprazole 40mg daily   -avoid NSAIDs    #SAMAI  -continue home iron supplement daily    Disposition  -PT/OT ordered  -f/u: PCP, ortho spine, likely onc    Diet: NPO pending spine eval  Electrolytes: K >4; Mg >2  DVT ppx: holding lovenox pending spine eval, SCDs ordered  GI ppx: not indicated  Lines/tubes: PIV  O2: RA  Baseline Cr: 1.2-1.3  T+S: ordered for AM draw  Code status: full code- confirmed on admit  Surrogate decision maker: Fabiano Retana, brother, [phone]     Pt to be formally staffed with attending in AM.     Dee Serna  Internal Medicine, PGY3

## 2024-11-30 NOTE — H&P
History Of Present Illness  Matt Retana is a 74-year-old male with a medical history of hypertension, GERD, cellulitis, and peripheral vascular disease, presenting to the emergency department for evaluation of an abnormal finding on a lumbar spine x-ray.    For the past 3 weeks, the patient has experienced progressive difficulty ambulating and decreased strength in his lower extremities. He has used a walker for 2-3 years but recently noticed worsening bilateral leg weakness, with multiple near-falls that he prevented by holding onto walls. Last week, while using his lift chair, he was unable to take steps due to profound leg weakness, accompanied by a strange sensation in his lower back and legs. He denies incontinence, numbness, or tingling.  The patient reports intermittent chronic lower back pain that worsens with positional changes, relieved by leaning forward while using his walker. However, he denies current back pain. He also reports new-onset exertional shortness of breath over the past 3 weeks. He denies abdominal pain, weight loss, nausea, vomiting, chest pain, fever, chills, night sweats, or cough.      Of note he was recently hospitalized at [facility] for generalized weakness, primarily in the lower extremities, and was diagnosed with a UTI, deconditioning, and peripheral vascular disease. He was discharged to a skilled nursing facility. Before discharge, a lumbar spine x-ray revealed a concerning abnormality, prompting referral to [facility] for further evaluation.    Imaging Findings:  Lumbar x-ray from [facility]:    Large expansile lytic lesion in the L4 vertebral body, raising high suspicion for neoplasm.    Xray in [facility]:  large expansile lytic lesion of the L4 vertebral body raising high degree of concern for neoplasm.      Past Medical History  He has a past medical history of Personal history of other diseases of the circulatory system.    Surgical History  He has no past surgical  history on file.     Social History  He reports that he has quit smoking. His smoking use included cigarettes. He does not have any smokeless tobacco history on file. He reports current alcohol use. He reports that he does not use drugs.    Family History  Family History   Problem Relation Name Age of Onset    Dementia Mother      Heart attack Mother      Heart attack Father          Allergies  Clindamycin and Amoxicillin-pot clavulanate    Review of Systems   Constitutional:  Positive for fatigue.   Respiratory:  Positive for shortness of breath.    Cardiovascular:  Positive for leg swelling.   Gastrointestinal:  Positive for constipation.   Musculoskeletal:  Positive for back pain and gait problem.   Skin:  Positive for wound.   Neurological:  Positive for weakness.        Physical Exam  Constitutional:       Appearance: Normal appearance. He is obese.   HENT:      Mouth/Throat:      Mouth: Mucous membranes are moist.   Eyes:      Extraocular Movements: Extraocular movements intact.      Pupils: Pupils are equal, round, and reactive to light.   Cardiovascular:      Rate and Rhythm: Normal rate and regular rhythm.      Pulses: Normal pulses.      Heart sounds: Normal heart sounds.   Pulmonary:      Effort: Pulmonary effort is normal.      Breath sounds: Normal breath sounds.   Abdominal:      General: Bowel sounds are normal.      Palpations: Abdomen is soft.   Musculoskeletal:         General: Swelling present.      Right lower leg: Edema present.      Left lower leg: Edema present.      Comments: Strength : RUE 5/5 LUE 5/5, RLE 2/5 LLE 2/5, sensation intact, no saddle anesthesia, both lower extremities have ace wrapped, bilateral pitting edema, pulses palpable      Skin:     General: Skin is warm.   Neurological:      General: No focal deficit present.      Mental Status: He is alert and oriented to person, place, and time.      Motor: Weakness present.   Psychiatric:         Mood and Affect: Mood normal.          Behavior: Behavior normal.          Last Recorded Vitals  /66   Pulse 77   Temp 36.8 °C (98.2 °F) (Oral)   Resp 17   Wt 122 kg (270 lb) Comment: per pt guess.  SpO2 95%     Relevant Results  Results for orders placed or performed during the hospital encounter of 11/30/24 (from the past 24 hours)   CBC and Auto Differential   Result Value Ref Range    WBC 7.5 4.4 - 11.3 x10*3/uL    nRBC 0.0 0.0 - 0.0 /100 WBCs    RBC 3.69 (L) 4.50 - 5.90 x10*6/uL    Hemoglobin 9.4 (L) 13.5 - 17.5 g/dL    Hematocrit 29.8 (L) 41.0 - 52.0 %    MCV 81 80 - 100 fL    MCH 25.5 (L) 26.0 - 34.0 pg    MCHC 31.5 (L) 32.0 - 36.0 g/dL    RDW 14.9 (H) 11.5 - 14.5 %    Platelets 353 150 - 450 x10*3/uL    Neutrophils % 77.2 40.0 - 80.0 %    Immature Granulocytes %, Automated 0.4 0.0 - 0.9 %    Lymphocytes % 11.4 13.0 - 44.0 %    Monocytes % 6.3 2.0 - 10.0 %    Eosinophils % 3.6 0.0 - 6.0 %    Basophils % 1.1 0.0 - 2.0 %    Neutrophils Absolute 5.78 (H) 1.60 - 5.50 x10*3/uL    Immature Granulocytes Absolute, Automated 0.03 0.00 - 0.50 x10*3/uL    Lymphocytes Absolute 0.85 0.80 - 3.00 x10*3/uL    Monocytes Absolute 0.47 0.05 - 0.80 x10*3/uL    Eosinophils Absolute 0.27 0.00 - 0.40 x10*3/uL    Basophils Absolute 0.08 0.00 - 0.10 x10*3/uL   Comprehensive metabolic panel   Result Value Ref Range    Glucose 102 (H) 74 - 99 mg/dL    Sodium 135 (L) 136 - 145 mmol/L    Potassium 3.9 3.5 - 5.3 mmol/L    Chloride 101 98 - 107 mmol/L    Bicarbonate 28 21 - 32 mmol/L    Anion Gap 10 10 - 20 mmol/L    Urea Nitrogen 19 6 - 23 mg/dL    Creatinine 1.27 0.50 - 1.30 mg/dL    eGFR 59 (L) >60 mL/min/1.73m*2    Calcium 8.3 (L) 8.6 - 10.6 mg/dL    Albumin 2.8 (L) 3.4 - 5.0 g/dL    Alkaline Phosphatase 52 33 - 136 U/L    Total Protein 7.3 6.4 - 8.2 g/dL    AST 19 9 - 39 U/L    Bilirubin, Total 0.4 0.0 - 1.2 mg/dL    ALT 9 (L) 10 - 52 U/L   Coagulation Screen   Result Value Ref Range    Protime 14.9 (H) 9.8 - 12.8 seconds    INR 1.3 (H) 0.9 - 1.1    aPTT 31 27 -  38 seconds   Lavender Top   Result Value Ref Range    Extra Tube Hold for add-ons.    SST TOP   Result Value Ref Range    Extra Tube Hold for add-ons.    PST Top   Result Value Ref Range    Extra Tube Hold for add-ons.      Last colonoscopy in 2020:   - Hemorrhoids found on perianal exam.                         - Diverticulosis in the sigmoid colon.                         - One 10 mm polyp at the anus. Biopsied.    EGD in 2020:  - Normal upper third of esophagus and middle third of  esophagus.                         - Small hiatal hernia.                         - Non-bleeding gastric ulcer with a visible vessel.                          Injected. Clip (MR conditional) was placed.                         - A single gastric polyp. Resection not attempted.                         - Normal gastric fundus. Biopsied.                         - Non-obstructing oozing duodenal ulcers with adherent                          clot. NSAID induced etiology. There is no evidence of                          perforation. Injected. Treated with bipolar cautery.     Biopsies:   A. GASTRIC, RANDOM, BIOPSY:     - CHRONIC INACTIVE ANTRAL AND OXYNTIC GASTRITIS.  B. ANUS, BIOPSY:     - BENIGN HYPERPLASTIC SQUAMOUS MUCOSA.    Spine MRI 11/30:  1. Expansile enhancing marrow replacing lesion involving the near  entirety of the L4 vertebral body with extension into the pedicles  bilaterally, suggestive of a neoplastic process. There is mild  associated compression deformity of the L4 vertebral body and  convexity of the posterior margin of the vertebral body, with  associated soft tissue component extending into the epidural space  and bilateral neural foramina. It results in moderate to severe  spinal canal narrowing at the L4 level and moderate to severe  bilateral neural foramina narrowing at L4-L5.  2. Multilevel degenerative changes throughout the lumbar spine, most  pronounced at L1-L2.       CT thoracic and spine:  There is a lytic  expansile osseous lesion involving the L4 vertebral  body with extension to the bilateral L4 pedicles most compatible with  underlying osseous neoplastic process. There is mild pathologic  collapse of the L4 vertebral body. There is convexity of the lytic  ill-defined posterior margin of the L4 vertebral body as well as  suspected associated epidural soft tissue contributing to  encroachment upon the spinal canal with concern for severe narrowing  of the spinal canal at the L4 level. There is extension of soft  tissue fullness into the paraspinal soft tissues surrounding the L4  vertebral body. Further evaluation with MRI imaging would be  recommended.  There are bilateral L3 pars interarticularis defects.  The coronal reconstructed images demonstrate a levocurvature of the  lower thoracic and lumbar spine.  There is exaggeration of the kyphotic curvature of the thoracic spine.  There is multilevel spondylosis within the thoracic and lumbar  regions.     CT abd/pel/chest 11/30:  1. 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 Mildly dilated segmental branches of the renal vein and  the main renal vein with hypoattenuation in the lumen which likely  represents a thrombus likely tumor thrombus. No evidence of thrombus  in the IVC.  2. Enhancing right adrenal nodule highly concerning for metastasis.  Indeterminate left adrenal 1.3 cm nodule.  3. Expansile lytic osseous lesion centered at the L4 vertebral body,  likely representing metastatic lesion. Please see dedicated CT  imaging of the lumbar spine for further evaluation.  4. Innumerable solid pulmonary nodules measuring up to 1.1 cm are new  compared to the prior CT on 08/05/2020 likely representing metastasis  from the renal mass.  5. Right pleural effusion with adjacent lung atelectasis..  6. Focal wall thickening and adjacent stranding near sigmoid  colon  diverticula, suggestive of mild acute diverticulitis. No pericolonic  abscess, fluid collection or perforation.  7. Large amount of stool burden in the rectum with mild mesorectal  fat stranding likely representing stercoral proctitis.      Assessment/Plan     Matt Retana is a 74 y.o male with PMH significant for HTN, GERD, PVD, anemia, GIB and history of cellulitis presented with BLE weakness and imaging concerning for expansile lytic osseous lesion in L4 vertebral body and mod to severe lumbar canal narrowing with no saddle anesthesia and no sensory deficit, secondary to metastasis and found to have a right renal mass with metastasis to adrenal , lung and spine. 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  Ortho spine consulted. Anti coagulation pending on ortho eval.      Plan:  # BLE weakness   # spinal canal narrowing 2/2  bone metastasis   # lytic expansile osseous lesion at L4   :: MRI 11/30: Expansile enhancing marrow replacing lesion involving the near  entirety of the L4 vertebral body with extension into the pedicles  bilaterally, suggestive of a neoplastic process with moderate to severe  spinal canal narrowing at the L4 level  :: no fecal/ urine incontinence, no saddle anesthesia  :: BLE strength 2/5   Plan:  - ortho spine surgery consulted and recs appreciated       # renal mass   # renal vein thrombosis  :: Mildly dilated segmental branches of the renal vein and  the main renal vein with hypoattenuation in the lumen which likely  represents a thrombus likely tumor thrombus. No evidence of thrombus  in the IVC.  :: likely secondary to renal cell carcinoma   Plan:  - consider starting AC awaiting for ortho clearance first     # innumerable metastatic pulmonary nodules  # right metastatic adrenal nodule   # spine metastasis   :: consistent with stage 4 renal cell carcinoma in the right kidney and metastatic to the bone, lung and adrenal   Plan:  - consult oncology in AM       #chronic normocytic  anemia   :: history of GIB   :: Anemia secondary to UGI bleed/large duodenal ulcers s/p EGD with endoscopic therapy in 2020   :: EGD was consistent with Non-bleeding gastric ulcer with a visible vessel, clips placed in 2020   :: baseline Hb 10-9 , now 9.4 MCV 81  :: was diagnosed with SAMIA in 2020 after episode of GIB  Plan:  - monitor CBC   - iron, ferritin, TIBC, SI, B12, folate levels       #Chronic venous stasis changes of the lower extremities  # peripheral vascular disease   :: history of cellulitis, no active wound now, use steroid cream at home   :: following with wound clinic   Plan:  - wound care consult        # diverticulosis   # GERD   :: colonoscopy in 8/13/20: Diverticulosis in the sigmoid colon  :: CTAP 11/30/24: Focal wall thickening and adjacent stranding near sigmoid colon  diverticula, suggestive of mild acute diverticulitis. No pericolonic  abscess, fluid collection or perforation.  :: no hematochezia, no recent bleeding  :: chronic constipation   Plan:  - bowel regimen, miralax , senna as needed   - add protonix     #HTN   On losartan 50 mg daily at home   Plan:  - hold on losartan 50 for now     #CKD stage III   :: baseline creatinine 1.3-1.4   :: presentation creatinine 1.27  :: had contrast for imaging    Plan:  - avoid nephrotoxin  - renally dose medications   - avoid NSAIDs     Diet: NPO pending spine eval  Electrolytes: K >4; Mg >2  DVT ppx: holding lovenox pending spine eval   GI ppx: Protonix   Lines/tubes: PIV  O2: RA  Baseline Cr: 1.2-1.3  Code status: full code- confirmed on admit  Surrogate decision maker: Fabiano Retana, brother, [phone]   Annabelle Villarreal MD

## 2024-12-01 VITALS
HEART RATE: 79 BPM | HEIGHT: 74 IN | DIASTOLIC BLOOD PRESSURE: 65 MMHG | WEIGHT: 270 LBS | TEMPERATURE: 98.2 F | OXYGEN SATURATION: 94 % | BODY MASS INDEX: 34.65 KG/M2 | RESPIRATION RATE: 19 BRPM | SYSTOLIC BLOOD PRESSURE: 113 MMHG

## 2024-12-01 LAB
ABO GROUP (TYPE) IN BLOOD: NORMAL
ALBUMIN SERPL BCP-MCNC: 2.9 G/DL (ref 3.4–5)
ALP SERPL-CCNC: 52 U/L (ref 33–136)
ALT SERPL W P-5'-P-CCNC: 10 U/L (ref 10–52)
ANION GAP SERPL CALC-SCNC: 11 MMOL/L (ref 10–20)
ANTIBODY SCREEN: NORMAL
APTT PPP: 31 SECONDS (ref 27–38)
AST SERPL W P-5'-P-CCNC: 17 U/L (ref 9–39)
B2 GLYCOPROT1 IGA SER-ACNC: 4.2 U/ML
B2 GLYCOPROT1 IGG SER-ACNC: <1.4 U/ML
B2 GLYCOPROT1 IGM SER-ACNC: 6.4 U/ML
BASOPHILS # BLD AUTO: 0.08 X10*3/UL (ref 0–0.1)
BASOPHILS NFR BLD AUTO: 1.1 %
BILIRUB DIRECT SERPL-MCNC: 0.1 MG/DL (ref 0–0.3)
BILIRUB SERPL-MCNC: 0.4 MG/DL (ref 0–1.2)
BUN SERPL-MCNC: 22 MG/DL (ref 6–23)
CALCIUM SERPL-MCNC: 8.8 MG/DL (ref 8.6–10.6)
CARDIOLIPIN IGA SERPL-ACNC: 2.5 APL U/ML
CARDIOLIPIN IGG SER IA-ACNC: <1.6 GPL U/ML
CARDIOLIPIN IGM SER IA-ACNC: 4.3 MPL U/ML
CHLORIDE SERPL-SCNC: 101 MMOL/L (ref 98–107)
CO2 SERPL-SCNC: 29 MMOL/L (ref 21–32)
CREAT SERPL-MCNC: 1.42 MG/DL (ref 0.5–1.3)
EGFRCR SERPLBLD CKD-EPI 2021: 52 ML/MIN/1.73M*2
EOSINOPHIL # BLD AUTO: 0.36 X10*3/UL (ref 0–0.4)
EOSINOPHIL NFR BLD AUTO: 4.9 %
ERYTHROCYTE [DISTWIDTH] IN BLOOD BY AUTOMATED COUNT: 15.3 % (ref 11.5–14.5)
GLUCOSE SERPL-MCNC: 94 MG/DL (ref 74–99)
HCT VFR BLD AUTO: 32.3 % (ref 41–52)
HGB BLD-MCNC: 9.6 G/DL (ref 13.5–17.5)
IMM GRANULOCYTES # BLD AUTO: 0.02 X10*3/UL (ref 0–0.5)
IMM GRANULOCYTES NFR BLD AUTO: 0.3 % (ref 0–0.9)
INR PPP: 1.3 (ref 0.9–1.1)
LYMPHOCYTES # BLD AUTO: 1.16 X10*3/UL (ref 0.8–3)
LYMPHOCYTES NFR BLD AUTO: 15.7 %
MAGNESIUM SERPL-MCNC: 2.12 MG/DL (ref 1.6–2.4)
MCH RBC QN AUTO: 25.3 PG (ref 26–34)
MCHC RBC AUTO-ENTMCNC: 29.7 G/DL (ref 32–36)
MCV RBC AUTO: 85 FL (ref 80–100)
MONOCYTES # BLD AUTO: 0.57 X10*3/UL (ref 0.05–0.8)
MONOCYTES NFR BLD AUTO: 7.7 %
NEUTROPHILS # BLD AUTO: 5.21 X10*3/UL (ref 1.6–5.5)
NEUTROPHILS NFR BLD AUTO: 70.3 %
NRBC BLD-RTO: 0 /100 WBCS (ref 0–0)
PHOSPHATE SERPL-MCNC: 4.1 MG/DL (ref 2.5–4.9)
PLATELET # BLD AUTO: 375 X10*3/UL (ref 150–450)
POTASSIUM SERPL-SCNC: 4 MMOL/L (ref 3.5–5.3)
PROT SERPL-MCNC: 7.5 G/DL (ref 6.4–8.2)
PROTHROMBIN TIME: 14.8 SECONDS (ref 9.8–12.8)
RBC # BLD AUTO: 3.8 X10*6/UL (ref 4.5–5.9)
RH FACTOR (ANTIGEN D): NORMAL
SODIUM SERPL-SCNC: 137 MMOL/L (ref 136–145)
WBC # BLD AUTO: 7.4 X10*3/UL (ref 4.4–11.3)

## 2024-12-01 PROCEDURE — 86850 RBC ANTIBODY SCREEN: CPT

## 2024-12-01 PROCEDURE — 85730 THROMBOPLASTIN TIME PARTIAL: CPT

## 2024-12-01 PROCEDURE — 36415 COLL VENOUS BLD VENIPUNCTURE: CPT

## 2024-12-01 PROCEDURE — 83735 ASSAY OF MAGNESIUM: CPT

## 2024-12-01 PROCEDURE — G0378 HOSPITAL OBSERVATION PER HR: HCPCS

## 2024-12-01 PROCEDURE — 2500000004 HC RX 250 GENERAL PHARMACY W/ HCPCS (ALT 636 FOR OP/ED)

## 2024-12-01 PROCEDURE — 86900 BLOOD TYPING SEROLOGIC ABO: CPT

## 2024-12-01 PROCEDURE — 2500000001 HC RX 250 WO HCPCS SELF ADMINISTERED DRUGS (ALT 637 FOR MEDICARE OP)

## 2024-12-01 PROCEDURE — 86146 BETA-2 GLYCOPROTEIN ANTIBODY: CPT

## 2024-12-01 PROCEDURE — 86147 CARDIOLIPIN ANTIBODY EA IG: CPT

## 2024-12-01 PROCEDURE — 99223 1ST HOSP IP/OBS HIGH 75: CPT

## 2024-12-01 PROCEDURE — 85025 COMPLETE CBC W/AUTO DIFF WBC: CPT

## 2024-12-01 PROCEDURE — 84100 ASSAY OF PHOSPHORUS: CPT

## 2024-12-01 PROCEDURE — 80048 BASIC METABOLIC PNL TOTAL CA: CPT

## 2024-12-01 PROCEDURE — 97530 THERAPEUTIC ACTIVITIES: CPT | Mod: GP

## 2024-12-01 PROCEDURE — 1100000001 HC PRIVATE ROOM DAILY

## 2024-12-01 PROCEDURE — 82248 BILIRUBIN DIRECT: CPT

## 2024-12-01 PROCEDURE — 97161 PT EVAL LOW COMPLEX 20 MIN: CPT | Mod: GP

## 2024-12-01 PROCEDURE — 85610 PROTHROMBIN TIME: CPT

## 2024-12-01 PROCEDURE — 97165 OT EVAL LOW COMPLEX 30 MIN: CPT | Mod: GO

## 2024-12-01 PROCEDURE — 97535 SELF CARE MNGMENT TRAINING: CPT | Mod: GO

## 2024-12-01 RX ORDER — BISACODYL 10 MG/1
10 SUPPOSITORY RECTAL DAILY PRN
Status: DISCONTINUED | OUTPATIENT
Start: 2024-12-01 | End: 2024-12-05 | Stop reason: HOSPADM

## 2024-12-01 RX ADMIN — SENNOSIDES AND DOCUSATE SODIUM 2 TABLET: 50; 8.6 TABLET ORAL at 20:09

## 2024-12-01 RX ADMIN — ASPIRIN 81 MG: 81 TABLET, COATED ORAL at 08:41

## 2024-12-01 RX ADMIN — FAMOTIDINE 20 MG: 20 TABLET ORAL at 08:44

## 2024-12-01 RX ADMIN — FERROUS SULFATE TAB 325 MG (65 MG ELEMENTAL FE) 325 MG: 325 (65 FE) TAB at 08:41

## 2024-12-01 RX ADMIN — FAMOTIDINE 20 MG: 20 TABLET ORAL at 20:09

## 2024-12-01 RX ADMIN — PANTOPRAZOLE SODIUM 40 MG: 40 TABLET, DELAYED RELEASE ORAL at 06:36

## 2024-12-01 RX ADMIN — POLYETHYLENE GLYCOL 3350 17 G: 17 POWDER, FOR SOLUTION ORAL at 08:41

## 2024-12-01 RX ADMIN — ENOXAPARIN SODIUM 40 MG: 100 INJECTION SUBCUTANEOUS at 20:10

## 2024-12-01 RX ADMIN — ACETAMINOPHEN 975 MG: 325 TABLET ORAL at 15:00

## 2024-12-01 ASSESSMENT — COGNITIVE AND FUNCTIONAL STATUS - GENERAL
MOVING FROM LYING ON BACK TO SITTING ON SIDE OF FLAT BED WITH BEDRAILS: TOTAL
TURNING FROM BACK TO SIDE WHILE IN FLAT BAD: A LOT
MOVING TO AND FROM BED TO CHAIR: TOTAL
MOBILITY SCORE: 6
WALKING IN HOSPITAL ROOM: TOTAL
MOVING FROM LYING ON BACK TO SITTING ON SIDE OF FLAT BED WITH BEDRAILS: A LOT
CLIMB 3 TO 5 STEPS WITH RAILING: TOTAL
DRESSING REGULAR LOWER BODY CLOTHING: TOTAL
MOVING TO AND FROM BED TO CHAIR: A LOT
HELP NEEDED FOR BATHING: TOTAL
DRESSING REGULAR LOWER BODY CLOTHING: A LOT
EATING MEALS: A LITTLE
TOILETING: A LOT
STANDING UP FROM CHAIR USING ARMS: TOTAL
TOILETING: A LOT
DAILY ACTIVITIY SCORE: 13
DAILY ACTIVITIY SCORE: 16
STANDING UP FROM CHAIR USING ARMS: TOTAL
PERSONAL GROOMING: A LITTLE
TURNING FROM BACK TO SIDE WHILE IN FLAT BAD: TOTAL
HELP NEEDED FOR BATHING: A LOT
DRESSING REGULAR UPPER BODY CLOTHING: A LITTLE
WALKING IN HOSPITAL ROOM: TOTAL
DRESSING REGULAR UPPER BODY CLOTHING: A LOT
CLIMB 3 TO 5 STEPS WITH RAILING: TOTAL
MOBILITY SCORE: 9

## 2024-12-01 ASSESSMENT — PAIN SCALES - GENERAL
PAINLEVEL_OUTOF10: 0 - NO PAIN
PAINLEVEL_OUTOF10: 3

## 2024-12-01 ASSESSMENT — ACTIVITIES OF DAILY LIVING (ADL)
BATHING_ASSISTANCE: TOTAL
ADL_ASSISTANCE: INDEPENDENT
ADL_ASSISTANCE: INDEPENDENT
HOME_MANAGEMENT_TIME_ENTRY: 15
HOME_MANAGEMENT_TIME_ENTRY: 24

## 2024-12-01 ASSESSMENT — PAIN - FUNCTIONAL ASSESSMENT
PAIN_FUNCTIONAL_ASSESSMENT: 0-10

## 2024-12-01 NOTE — PROGRESS NOTES
"Occupational Therapy    Evaluation/Treatment    Patient Name: Abdi Retana \"Matt\"  MRN: [id number]  Department: [facility]  Room: [id number]  Today's Date: 12/01/24  Time Calculation  Start Time: 0853  Stop Time: 0937  Time Calculation (min): 44 min       Assessment:  OT Assessment: Pt will benefit from continued skilled OT to increase independence in ADLs, functional mobility, activity tolerance, safety, strength, and cognition.  Prognosis: Good  Barriers to Discharge: None  Evaluation/Treatment Tolerance: Patient tolerated treatment well  Medical Staff Made Aware: Yes  End of Session Communication: Bedside nurse  End of Session Patient Position: Bed, 3 rail up, Alarm on  OT Assessment Results: Decreased ADL status, Decreased upper extremity strength, Decreased safe judgment during ADL, Decreased cognition, Decreased endurance, Decreased functional mobility, Decreased gross motor control, Decreased IADLs  Prognosis: Good  Barriers to Discharge: None  Evaluation/Treatment Tolerance: Patient tolerated treatment well  Medical Staff Made Aware: Yes  Strengths: Attitude of self  Barriers to Participation: Comorbidities  Plan:  Treatment Interventions: ADL retraining, Functional transfer training, UE strengthening/ROM, Endurance training, Cognitive reorientation, Patient/family training, Equipment evaluation/education, Neuromuscular reeducation, Compensatory technique education  OT Frequency: 3 times per week  OT Discharge Recommendations: Moderate intensity level of continued care  Equipment Recommended upon Discharge:  (TBD)  OT Recommended Transfer Status: Assist of 2  OT - OK to Discharge: Yes (upon medical clearance)  Treatment Interventions: ADL retraining, Functional transfer training, UE strengthening/ROM, Endurance training, Cognitive reorientation, Patient/family training, Equipment evaluation/education, Neuromuscular reeducation, Compensatory technique education    Subjective   Current Problem:  1. Lesion of " lumbar spine        2. Renal mass, right          General:   OT Received On: 12/01/24  General  Reason for Referral: L4 osteolytic lesion, 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, Other Injuries: L kidney mass, multiple pulmonary nodules  Past Medical History Relevant to Rehab: HTN, GERD, PVD, cellulitis, former smoker  Family/Caregiver Present: No  Co-Treatment: PT  Co-Treatment Reason: low AMPAC, to maximize pt safety with mobility  Prior to Session Communication: Bedside nurse  Patient Position Received: Bed, 3 rail up, Alarm on  General Comment: Pt supine in bed upon arrival, agreeable to OT, tangential throughout session   Precautions:  Medical Precautions: Fall precautions    Pain:  Pain Assessment  Pain Assessment: 0-10  0-10 (Numeric) Pain Score: 0 - No pain    Objective   Cognition:  Orientation Level: Oriented X4  Cognition Comments: pt tangential throughout session, reporting forgetful and requesting therapist to write down name  Insight: Mild  Impulsive: Within functional limits  Processing Speed: Within funtional limits     Home Living:  Type of Home: House  Lives With: Alone  Home Adaptive Equipment: Walker rolling or standard, Wheelchair-manual (lift chair)  Home Layout: Two level, Able to live on main level with bedroom/bathroom  Home Access: Stairs to enter with rails  Entrance Stairs-Number of Steps: 2 + 2  Bathroom Shower/Tub:  (sponge bathes)  Bathroom Equipment: Grab bars around toilet  Home Living Comments: pt admitted from SNF, has been there since recent d/c on 11/26  Prior Function:  Level of [place]: Independent with ADLs and functional transfers, Needs assistance with homemaking  Receives Help From: Family  ADL Assistance: Independent (sponge bathes)  Homemaking Assistance: Needs assistance (has meal on wheels daily, sister completes laundry)  Ambulatory Assistance:  (reports w/c in house, walker for transfers\)  Prior Function Comments: reports 3 recent falls    ADL:  Eating Assistance: Stand  by (anticipated)  Grooming Assistance: Stand by (anticipated)  Bathing Assistance: Total (anticipated)  UE Dressing Assistance: Minimal  LE Dressing Assistance: Total (anticipated)  Toileting Assistance with Device: Maximal (anticipated)  Activities of Daily Living:      UE Dressing  UE Dressing Comments: donned/doffed gown seated EOB min A    Activity Tolerance:  Endurance: Tolerates 30 min exercise with multiple rests    Bed Mobility/Transfers: Bed Mobility  Bed Mobility: Yes  Bed Mobility 1  Bed Mobility 1: Supine to sitting, Sitting to supine  Level of Assistance 1: Minimal verbal cues, Maximum assistance, +2  Bed Mobility Comments 1: HOB elevated  Bed Mobility 2  Bed Mobility  2: Scooting  Level of Assistance 2: Dependent, +2  Bed Mobility Comments 2: boost up in bed    Transfers  Transfer: Yes  Transfer 1  Transfer From 1: Sit to, Stand to  Transfer to 1: Stand, Sit  Technique 1: Sit to stand, Stand to sit  Transfer Device 1:  (B arm in arm)  Transfer Level of Assistance 1: Maximum assistance, +2, Minimal verbal cues  Trials/Comments 1: att 2x, trial 1 unable to clear bed max A x2, trial 2 cleared bed max A x2    Functional Mobility:  Functional Mobility  Functional Mobility Performed: No  Sitting Balance:  Static Sitting Balance  Static Sitting-Balance Support: Feet supported  Static Sitting-Level of Assistance: Close supervision  Dynamic Sitting Balance  Dynamic Sitting-Balance Support: Feet supported  Dynamic Sitting-Level of Assistance: Close supervision  Standing Balance:  Static Standing Balance  Static Standing-Balance Support: Bilateral upper extremity supported  Static Standing-Level of Assistance: Maximum assistance (x2)    Modalities:  Modalities Used: No    Therapy/Activity:      Therapeutic Activity  Therapeutic Activity Performed: Yes  Therapeutic Activity 1: bed mob, transfer, extended time seated EOB  Therapeutic Activity 2: extended time for pt education on OT POC and d/c  rec    Sensation:  Light Touch: No apparent deficits  Strength:  Strength Comments: B shoulder flex/abduction 2+/5, distally WFL    Extremities: RUE   RUE : Exceptions to WFL and LUE   LUE: Exceptions to WFL    Outcome Measures: [facility] Daily Activity  Putting on and taking off regular lower body clothing: Total  Bathing (including washing, rinsing, drying): Total  Putting on and taking off regular upper body clothing: A little  Toileting, which includes using toilet, bedpan or urinal: A lot  Taking care of personal grooming such as brushing teeth: A little  Eating Meals: A little  Daily Activity - Total Score: 13     and OT Adult Other Outcome Measures  4AT: negative    Education Documentation  Body Mechanics, taught by Giorgi Mcgill OT at 12/1/2024 11:45 AM.  Learner: Patient  Readiness: Acceptance  Method: Explanation  Response: Verbalizes Understanding, Needs Reinforcement    Precautions, taught by Giorgi Mcgill OT at 12/1/2024 11:45 AM.  Learner: Patient  Readiness: Acceptance  Method: Explanation  Response: Verbalizes Understanding, Needs Reinforcement    ADL Training, taught by Giorgi Mcgill OT at 12/1/2024 11:45 AM.  Learner: Patient  Readiness: Acceptance  Method: Explanation  Response: Verbalizes Understanding, Needs Reinforcement    Education Comments  No comments found.      Goals:  Encounter Problems       Encounter Problems (Active)       ADLs       Patient with complete upper body dressing with set-up level of assistance donning and doffing all UE clothes with PRN adaptive equipment while edge of bed  (Progressing)       Start:  12/01/24    Expected End:  12/22/24            Patient will complete daily grooming tasks washing face/hair with modified independent level of assistance and PRN adaptive equipment while edge of bed . (Progressing)       Start:  12/01/24    Expected End:  12/22/24               COGNITION/SAFETY       Patient will score WFL on standardized cognitive assessment with verbal cues  and within reasonable time frame (Progressing)       Start:  12/01/24    Expected End:  12/22/24               TRANSFERS       Patient will perform bed mobility moderate assist level of assistance and bed rails in order to improve safety and independence with mobility (Progressing)       Start:  12/01/24    Expected End:  12/22/24            Patient will complete sit to stand transfer with moderate assist level of assistance and least restrictive device in order to improve safety and prepare for out of bed mobility. (Progressing)       Start:  12/01/24    Expected End:  12/22/24               VERO Cr/MELITON

## 2024-12-01 NOTE — PROGRESS NOTES
"bAdi Retana \"Matt\" is a 74 y.o. male on day 1 of admission presenting with Lesion of lumbar spine.      Subjective   Patient was seen at bedside. He was asking about to get a written form of information from his imaging finding and care plan. He was working with PT while we saw him. He stated \" maybe I am not looking comprehensive at this time because I have beard which I have never looked like this\"        Objective     Last Recorded Vitals  /67 (BP Location: Left arm, Patient Position: Lying)   Pulse 71   Temp 35.9 °C (96.7 °F) (Temporal)   Resp 18   Wt 122 kg (270 lb) Comment: per pt guess.  SpO2 95%   Intake/Output last 3 Shifts:    Intake/Output Summary (Last 24 hours) at 12/1/2024 1013  Last data filed at 11/30/2024 1825  Gross per 24 hour   Intake 0 ml   Output --   Net 0 ml       Admission Weight  Weight: 122 kg (270 lb) (per pt guess.) (11/30/24 0948)    Daily Weight  11/30/24 : 122 kg (270 lb)    Image Results  MR lumbar spine w and wo IV contrast  Narrative: Interpreted By:  Desi Becerra and Baker Zachary   STUDY:  MR LUMBAR SPINE W AND WO IV CONTRAST;  11/30/2024 2:29 pm      INDICATION:  Signs/Symptoms:lower extremity weakness, lytic lesion on CT with  severe spinal cord compression.      COMPARISON:  Same day lumbar spine CT at 12:01 p.m.      ACCESSION NUMBER(S):  [id number]      ORDERING CLINICIAN:  LATA REN      TECHNIQUE:  Pre and postcontrast sagittal T1, T2, STIR, pre and postcontrast  axial T1 and T2 weighted images of the lumbar spine were acquired. 20  mL of intravenous Dotarem was administered without complication.      FINDINGS:  Alignment: There is trace anterolisthesis of L2 on L3 and trace  retrolisthesis of L5 on S1. Alignment is otherwise maintained.      Vertebrae/Intervertebral Discs: There is expansile marrow replacement  of the near entirety of the L4 vertebral body with extension into the  pedicles bilaterally and associated heterogenous enhancement " on  postcontrast imaging. There is also a mild compression deformity of  the L4 vertebral body with a approximately 20% height loss, as well  as mild convexity of the posterior margin of the vertebral body.  There is epidural extension of neoplasm along the posterior margin of  the vertebral body measuring up to 7 cm in maximum thickness along  the anterolateral aspect of thecal sac. There is abnormal soft tissue  encroaching upon bilateral L3-L4 and L4-L5 neural foramina, most  significantly causing moderate to severe bilateral neural foraminal  narrowing at L4-L5. The prevertebral soft tissue component is also  noted, left greater than right encroaching upon bilateral psoas  muscles.      The remaining vertebral bodies demonstrate normal signal without  height loss. There are mild multilevel degenerative endplate changes  and anterior osteophytosis. No additional areas of abnormal  enhancement. Abnormal areas of enhancement and increased STIR signal  also noted throughout the L3-L4 and L4-L5 intervertebral disc spaces.  The intervertebral discs otherwise demonstrate normal signal and  morphology.      Conus: The lower thoracic cord appears unremarkable. The conus  terminates at L1.      T12-L1: Small disc bulge with mild bilateral neural foramina  narrowing. No significant spinal canal narrowing.      L1-2: Disc bulge, bilateral facet hypertrophy, and ligamentum flavum  thickening resulting in moderate to severe spinal canal narrowing and  moderate bilateral neural foramina narrowing.      L2-3: Circumferential disc bulge, bilateral facet hypertrophy, and  ligamentum flavum thickening resulting in flattening of the anterior  thecal sac and moderate bilateral neural foramina narrowing. No  significant spinal canal narrowing.      L3-4: Small disc bulge, severe bilateral facet hypertrophy, and  ligamentum flavum thickening resulting in moderate bilateral neural  foramina narrowing. No significant spinal canal  narrowing at this  level.      L4-5: Expansile marrow replacing lesion within the L4 vertebral body  with associated epidural and neural foramina soft tissue component  which results in moderate to severe spinal canal narrowing at the L4  level, and moderate to severe bilateral neural foramina narrowing at  L4-L5. Additionally bilateral facet hypertrophy contributes to neural  foramina narrowing.      L5-S1: Small disc bulge, bilateral facet hypertrophy, and ligamentum  flavum thickening resulting in moderate-to-severe bilateral neural  foramina narrowing. No significant spinal canal narrowing.      Increased STIR signal and enhancement within th prevertebral soft  tissues at the L4 and L5 levels. Paravertebral soft tissues otherwise  unremarkable. Nonspecific edema within the dependent portions of the  posterior lumbar soft tissues      Impression: 1. Expansile enhancing marrow replacing lesion involving the near  entirety of the L4 vertebral body with extension into the pedicles  bilaterally, suggestive of a neoplastic process. There is mild  associated compression deformity of the L4 vertebral body and  convexity of the posterior margin of the vertebral body, with  associated soft tissue component extending into the epidural space  and bilateral neural foramina. It results in moderate to severe  spinal canal narrowing at the L4 level and moderate to severe  bilateral neural foramina narrowing at L4-L5.  2. Multilevel degenerative changes throughout the lumbar spine, most  pronounced at L1-L2.      I personally reviewed the images/study and I agree with the findings  as stated by Dr. Anthony Slater M.D. This study was interpreted at  [facility], [place], [place].      MACRO:  None      Signed by: Desi Becerra 11/30/2024 3:28 PM  Dictation workstation:   [id number]  CT chest abdomen pelvis w IV contrast  Narrative: Interpreted By:  Jesus Rodriguez,  and Donna Carranza    STUDY:  CT CHEST ABDOMEN PELVIS W IV CONTRAST;  11/30/2024 12:01 pm      INDICATION:  Signs/Symptoms:lytic lesion found on x-ray lumbar spine 11-28-24.      COMPARISON:  None.      ACCESSION NUMBER(S):  [id number]      ORDERING CLINICIAN:  LATA REN      TECHNIQUE:  Contiguous axial images of the chest, abdomen, and pelvis were  obtained after the intravenous administration of 100 mL Omnipaque 350  contrast.  Coronal and sagittal reformatted images were reconstructed  from the axial data.      FINDINGS:  CT CHEST:      MEDIASTINUM AND LYMPH NODES:  No enlarged intrathoracic or axillary  lymph nodes by imaging criteria.  The esophagus appears within normal  limits. No pneumomediastinum.      VESSELS:  Normal caliber aorta without dissection. No significant  aortic atherosclerosis.      HEART: Normal size.  No coronary artery calcifications. No  significant pericardial effusion.      LUNG, AIRWAYS, PLEURA: There is a 1.2 cm soft tissue density nodule  in the right lateral tracheal wall above the level of the reshma  which may represent a polyp or a retained mucus. Small amount of  mucus within the distal trachea. There is a small right pleural  effusion with the adjacent lung atelectasis. No pneumothorax.  Innumerable solid pulmonary throughout the lungs, for example  measuring 1.0 cm in the right middle lobe (series 204, image 143),  and 1.1 cm in the left lower lobe (series 204, image 158).      CHEST WALL SOFT TISSUES: Right-greater-than-left gynecomastia.      OSSEOUS STRUCTURES: No acute osseous abnormality. Please see  separately dictated thoracic spine.          CT ABDOMEN/PELVIS:      ABDOMINAL WALL: No acute abnormality.      LIVER: The liver is normal in size and homogeneous in attenuation.  There is a 3.0 cm hypodense cystic lesion in the right hepatic dome,  unchanged from the prior exam.      BILE DUCTS: No significant intrahepatic or extrahepatic dilatation.      GALLBLADDER: No significant  abnormality.      SPLEEN: No significant abnormality.      PANCREAS: No significant abnormality.      ADRENALS: There is a 2.9 x 1.6 x 1.4 cm right adrenal nodule highly  concerning for metastasis. There is also a indeterminate 1.3 x 1.3 cm  left adrenal nodule.      KIDNEYS, URETERS, BLADDER: There is a large infiltrative right upper  and interpolar region heterogeneously enhancing mass measuring 7.8 x  6.7 cm in axial dimensions and 10 cm in craniocaudal dimension. The  mass has central areas of necrosis and demonstrates lobular areas of  extension into the perinephric fat anteriorly and posteriorly. There  is contiguous infiltration into the renal collecting system. The main  renal vein and segmental branches appear mildly dilated. There is  questionable linear hypoattenuation within the segmental branches of  the renal vein as well as the main renal vein which raises concern  for thrombus likely tumor thrombus. The confluence of the renal vein  with the IVC appears unremarkable. No evidence of  thrombus within  the IVC. There is a tortuous perinephric collateral vein draining  into the IVC anteriorly. There is mild right renal pelvis urothelial  thickening which can be secondary to tumor extension into the renal  pelvis.          The left kidney is atrophic in appearance with multiple large  calcifications extending to the renal pelvis.      Numerous bilateral simple attenuating cysts, measuring up to 8 cm in  the lower pole of the right kidney. Nonobstructive calcifications  likely representing stones are identified in the right kidney. No  hydronephrosis. The bladder is unremarkable.      REPRODUCTIVE ORGANS: Prostate is nonenlarged.      VESSELS: No acute vascular injury.      LYMPH NODES/RETROPERITONEUM: No acute retroperitoneal abnormality. No  enlarged lymph nodes.      BOWEL/MESENTERY/PERITONEUM: There is a focal area of mild wall  thickening and adjacent stranding centered around a diverticula of  the  sigmoid colon (series 301, image 113 and series 302, image 54)  suggestive of mild acute diverticulitis. There is a large amount of  stool burden in the rectum with the mild mesorectal and presacral fat  stranding likely representing stercoral proctitis. The stomach is  unremarkable. Small and large bowel loops are otherwise within normal  limits. The appendix is not definitively seen, however there is no  pericecal stranding or fluid. Normal appendix. No ascites, free air  or fluid collection.      MUSCULOSKELETAL: There is an expansile lytic osseous lesion centered  in the L4 vertebral body with the adjacent prevertebral soft tissue  component and extension posteriorly into the spinal canal, most  compatible with underlying osseous neoplastic process. Please see  dedicated CT imaging of the lumbar spine for further evaluation.          Impression: 1. 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 Mildly dilated segmental branches of the renal vein and  the main renal vein with hypoattenuation in the lumen which likely  represents a thrombus likely tumor thrombus. No evidence of thrombus  in the IVC.  2. Enhancing right adrenal nodule highly concerning for metastasis.  Indeterminate left adrenal 1.3 cm nodule.  3. Expansile lytic osseous lesion centered at the L4 vertebral body,  likely representing metastatic lesion. Please see dedicated CT  imaging of the lumbar spine for further evaluation.  4. Innumerable solid pulmonary nodules measuring up to 1.1 cm are new  compared to the prior CT on 08/05/2020 likely representing metastasis  from the renal mass.  5. Right pleural effusion with adjacent lung atelectasis..  6. Focal wall thickening and adjacent stranding near sigmoid colon  diverticula, suggestive of mild acute diverticulitis. No pericolonic  abscess, fluid collection or perforation.  7. Large amount of stool  burden in the rectum with mild mesorectal  fat stranding likely representing stercoral proctitis.          I personally reviewed the image(s)/study and resident interpretation.  I agree with the findings as stated by resident Dillon Thompson.  Data analyzed and images interpreted at [facility], [place], [place].      MACRO:  None.      Signed by: Jesus Rodriguez 11/30/2024 1:25 PM  Dictation workstation:   [id number]  CT lumbar spine wo IV contrast, CT thoracic spine wo IV contrast  Narrative: Interpreted By:  Juan Jose Quiroz,   STUDY:  CT LUMBAR SPINE WO IV CONTRAST; CT THORACIC SPINE WO IV CONTRAST  11/30/2024 12:01 pm      INDICATION:  Signs/Symptoms:lytic lesion on x-ray lumbar spine;  Signs/Symptoms:lytic lesion found on lumbar x-ray          COMPARISON:  None.      ACCESSION NUMBER(S):  [id number]; [id number]      ORDERING CLINICIAN:  LATA REN      TECHNIQUE:  Axial, coronal, and sagittal CT images of the thoracic and lumbar  spine were reconstructed from a CT of the chest, abdomen, and pelvis  dated 11/30/2024.      FINDINGS:  There is a lytic expansile osseous lesion involving the L4 vertebral  body with extension to the bilateral L4 pedicles most compatible with  underlying osseous neoplastic process. There is mild pathologic  collapse of the L4 vertebral body. There is convexity of the lytic  ill-defined posterior margin of the L4 vertebral body as well as  suspected associated epidural soft tissue contributing to  encroachment upon the spinal canal with concern for severe narrowing  of the spinal canal at the L4 level. There is extension of soft  tissue fullness into the paraspinal soft tissues surrounding the L4  vertebral body. Further evaluation with MRI imaging would be  recommended.      There are bilateral L3 pars interarticularis defects.      The coronal reconstructed images demonstrate a levocurvature of the  lower thoracic and lumbar spine.       There is exaggeration of the kyphotic curvature of the thoracic spine.      There is multilevel spondylosis within the thoracic and lumbar  regions.      At the L1/2 level, there is a posterior disc/osteophyte complex  contributing to at least moderate spinal canal narrowing.      At the T12/L1 level, there is posterior osteophytic spurring  contributing to mild-to-moderate bony encroachment upon the spinal  canal.      At the T10/11 level, there are hypertrophic degenerative facet  changes a posterior disc/osteophyte complex contributing to moderate  bony encroachment upon the spinal canal.      At the T9/10 level, there is a posterior disc/osteophyte complex and  degenerative facet changes contributing to moderate bony encroachment  upon the spinal canal.      At the T8/9 level, there is a posterior disc/osteophyte complex and  degenerative facet changes contributing to mild-to-moderate bony  encroachment upon the spinal canal.      At the T7/8 level, there is a posterior disc/osteophyte complex and  degenerative facet changes contributing to mild-to-moderate spinal  canal narrowing.      At the T6/7 level, there is a posterior disc/osteophyte complex and  degenerative facet changes contributing to mild spinal canal  narrowing.      At the T5/6 level, there are degenerative facet changes contributing  to mild bony encroachment upon the spinal canal.      Please see the dedicated report of the CT of the chest, abdomen, and  pelvis for details of findings in these regions.          Impression: There is a lytic expansile osseous lesion involving the L4 vertebral  body with extension to the bilateral L4 pedicles most compatible with  underlying osseous neoplastic process. There is mild pathologic  collapse of the L4 vertebral body. There is convexity of the lytic  ill-defined posterior margin of the L4 vertebral body as well as  suspected associated epidural soft tissue contributing to  encroachment upon the spinal  canal with concern for severe narrowing  of the spinal canal at the L4 level. There is extension of soft  tissue fullness into the paraspinal soft tissues surrounding the L4  vertebral body. Further evaluation with MRI imaging would be  recommended.      There are bilateral L3 pars interarticularis defects.      The coronal reconstructed images demonstrate a levocurvature of the  lower thoracic and lumbar spine.      There is exaggeration of the kyphotic curvature of the thoracic spine.      There is multilevel spondylosis within the thoracic and lumbar  regions.      Please see the dedicated report of the CT of the chest, abdomen, and  pelvis for details of findings in these regions.      MACRO:  None      Signed by: Juan Jose Quiroz 11/30/2024 12:17 PM  Dictation workstation:   [id number]      Physical Exam  Constitutional:       Appearance: Normal appearance. He is obese.   HENT:      Mouth/Throat:      Mouth: Mucous membranes are moist.   Eyes:      Extraocular Movements: Extraocular movements intact.      Pupils: Pupils are equal, round, and reactive to light.   Cardiovascular:      Rate and Rhythm: Normal rate and regular rhythm.      Pulses: Normal pulses.      Heart sounds: Normal heart sounds.   Pulmonary:      Effort: Pulmonary effort is normal.      Breath sounds: Normal breath sounds.   Abdominal:      General: Bowel sounds are normal.      Palpations: Abdomen is soft.   Musculoskeletal:         General: Swelling present.      Right lower leg: Edema present.      Left lower leg: Edema present.      Comments: Strength : RUE 5/5 LUE 5/5, RLE 2-3/5 LLE 2-3/5, sensation intact, no saddle anesthesia, both lower extremities have ace wrapped, bilateral pitting edema, pulses palpable      Skin:     General: Skin is warm.   Neurological:      General: No focal deficit present.      Mental Status: He is alert and oriented to person, place, and time.      Motor: Weakness present.   Psychiatric:         Mood and Affect:  Mood normal.         Behavior: Behavior normal.   Relevant Results  Results for orders placed or performed during the hospital encounter of 11/30/24 (from the past 24 hours)   CBC and Auto Differential   Result Value Ref Range    WBC 7.5 4.4 - 11.3 x10*3/uL    nRBC 0.0 0.0 - 0.0 /100 WBCs    RBC 3.69 (L) 4.50 - 5.90 x10*6/uL    Hemoglobin 9.4 (L) 13.5 - 17.5 g/dL    Hematocrit 29.8 (L) 41.0 - 52.0 %    MCV 81 80 - 100 fL    MCH 25.5 (L) 26.0 - 34.0 pg    MCHC 31.5 (L) 32.0 - 36.0 g/dL    RDW 14.9 (H) 11.5 - 14.5 %    Platelets 353 150 - 450 x10*3/uL    Neutrophils % 77.2 40.0 - 80.0 %    Immature Granulocytes %, Automated 0.4 0.0 - 0.9 %    Lymphocytes % 11.4 13.0 - 44.0 %    Monocytes % 6.3 2.0 - 10.0 %    Eosinophils % 3.6 0.0 - 6.0 %    Basophils % 1.1 0.0 - 2.0 %    Neutrophils Absolute 5.78 (H) 1.60 - 5.50 x10*3/uL    Immature Granulocytes Absolute, Automated 0.03 0.00 - 0.50 x10*3/uL    Lymphocytes Absolute 0.85 0.80 - 3.00 x10*3/uL    Monocytes Absolute 0.47 0.05 - 0.80 x10*3/uL    Eosinophils Absolute 0.27 0.00 - 0.40 x10*3/uL    Basophils Absolute 0.08 0.00 - 0.10 x10*3/uL   Comprehensive metabolic panel   Result Value Ref Range    Glucose 102 (H) 74 - 99 mg/dL    Sodium 135 (L) 136 - 145 mmol/L    Potassium 3.9 3.5 - 5.3 mmol/L    Chloride 101 98 - 107 mmol/L    Bicarbonate 28 21 - 32 mmol/L    Anion Gap 10 10 - 20 mmol/L    Urea Nitrogen 19 6 - 23 mg/dL    Creatinine 1.27 0.50 - 1.30 mg/dL    eGFR 59 (L) >60 mL/min/1.73m*2    Calcium 8.3 (L) 8.6 - 10.6 mg/dL    Albumin 2.8 (L) 3.4 - 5.0 g/dL    Alkaline Phosphatase 52 33 - 136 U/L    Total Protein 7.3 6.4 - 8.2 g/dL    AST 19 9 - 39 U/L    Bilirubin, Total 0.4 0.0 - 1.2 mg/dL    ALT 9 (L) 10 - 52 U/L   Coagulation Screen   Result Value Ref Range    Protime 14.9 (H) 9.8 - 12.8 seconds    INR 1.3 (H) 0.9 - 1.1    aPTT 31 27 - 38 seconds   Lavender Top   Result Value Ref Range    Extra Tube Hold for add-ons.    SST TOP   Result Value Ref Range    Extra Tube  Hold for add-ons.    PST Top   Result Value Ref Range    Extra Tube Hold for add-ons.    CBC and Auto Differential   Result Value Ref Range    WBC 7.4 4.4 - 11.3 x10*3/uL    nRBC 0.0 0.0 - 0.0 /100 WBCs    RBC 3.80 (L) 4.50 - 5.90 x10*6/uL    Hemoglobin 9.6 (L) 13.5 - 17.5 g/dL    Hematocrit 32.3 (L) 41.0 - 52.0 %    MCV 85 80 - 100 fL    MCH 25.3 (L) 26.0 - 34.0 pg    MCHC 29.7 (L) 32.0 - 36.0 g/dL    RDW 15.3 (H) 11.5 - 14.5 %    Platelets 375 150 - 450 x10*3/uL    Neutrophils % 70.3 40.0 - 80.0 %    Immature Granulocytes %, Automated 0.3 0.0 - 0.9 %    Lymphocytes % 15.7 13.0 - 44.0 %    Monocytes % 7.7 2.0 - 10.0 %    Eosinophils % 4.9 0.0 - 6.0 %    Basophils % 1.1 0.0 - 2.0 %    Neutrophils Absolute 5.21 1.60 - 5.50 x10*3/uL    Immature Granulocytes Absolute, Automated 0.02 0.00 - 0.50 x10*3/uL    Lymphocytes Absolute 1.16 0.80 - 3.00 x10*3/uL    Monocytes Absolute 0.57 0.05 - 0.80 x10*3/uL    Eosinophils Absolute 0.36 0.00 - 0.40 x10*3/uL    Basophils Absolute 0.08 0.00 - 0.10 x10*3/uL   Hepatic function panel   Result Value Ref Range    Albumin 2.9 (L) 3.4 - 5.0 g/dL    Bilirubin, Total 0.4 0.0 - 1.2 mg/dL    Bilirubin, Direct 0.1 0.0 - 0.3 mg/dL    Alkaline Phosphatase 52 33 - 136 U/L    ALT 10 10 - 52 U/L    AST 17 9 - 39 U/L    Total Protein 7.5 6.4 - 8.2 g/dL   Magnesium   Result Value Ref Range    Magnesium 2.12 1.60 - 2.40 mg/dL   Type and Screen   Result Value Ref Range    ABO TYPE A     Rh TYPE POS     ANTIBODY SCREEN NEG    Coagulation Screen   Result Value Ref Range    Protime 14.8 (H) 9.8 - 12.8 seconds    INR 1.3 (H) 0.9 - 1.1    aPTT 31 27 - 38 seconds   Phosphorus   Result Value Ref Range    Phosphorus 4.1 2.5 - 4.9 mg/dL   Basic Metabolic Panel   Result Value Ref Range    Glucose 94 74 - 99 mg/dL    Sodium 137 136 - 145 mmol/L    Potassium 4.0 3.5 - 5.3 mmol/L    Chloride 101 98 - 107 mmol/L    Bicarbonate 29 21 - 32 mmol/L    Anion Gap 11 10 - 20 mmol/L    Urea Nitrogen 22 6 - 23 mg/dL     Creatinine 1.42 (H) 0.50 - 1.30 mg/dL    eGFR 52 (L) >60 mL/min/1.73m*2    Calcium 8.8 8.6 - 10.6 mg/dL       Assessment/Plan      75 yo with PMH most notable for HTN, PVD, chronic lymphedema, and CKDIII (baseline Cr 1.2-1.3, GFR 50s) presenting from SNF with LE weakness iso recently discovered L4/5 lesion with mod to severe spinal cord compression. On exam pt has 4/5 strength in LE with normal sensation. Denies bladder/bowel incontinence. 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 Ortho spine consulted given worsening weakness with spinal lesion recommended IR biopsy of L4 .Imaging also notable for new R renal v thrombus likely related to suspected malignancy. Kidney function is at baseline and making urine.     Updates 12/1:  - FU on IR biopsy of L4 osteolytic lesion   - FU on brain and C/T MRI for cancer staging  - ortho spine following recs appreciated, no need for steroid for now     #weakness  #new L4 lesion with canal narrowing  #R renal mass with lesions in adrenals, lungs c/f RCC  -imaging c/f metastatic RCC to adrenals, lung, spine - no bx yet  -MRI L spine c/f L4 lesion with mod to severe spinal canal narrowing likely contributing to weakness  -RCRI: 0 pts, 3.9% risk of major cardiac event; 3.63 METs per DASI score  -tylenol 975mg q8h mild pain, oxy 5mg for mod-severe q6h, dilaudid 0.2mg for breakthrough q4h  -ortho spine consulted for additional recs give MRI findings  -IR consult  biopsy of spine  -will need onc f/up as outpatient for further eval, consider discussion with onc   -keep NPO midnight   -pt informed of c/f metastatic cancer based on imaging  - brain, C/T MRI pending      #R renal v thrombus  -seen on CT, likely related to underlying suspected malignancy  -Cr at baseline, having urine output   -ideally pt should be anticoagulated for this however with high risk lesion in spine and possible intervention holding on full AC for  now       #CKDIII   -baseline Cr 1.2-1.3, GFR 50s  -avoid nephrotoxic agents  -renally dose medications  -monitor I/Os     #chronic lymphedema  #PVD  #LE wounds  -follows with [facility] through [facility]  -no white count or wound discharge, low c/f infection  -on torsemide 20mg daily, holding for now will add back as indicated  -holding home K, Mag supplement-will replete as indicated inpatient  -wound care consult placed     #HTN  -holding losartan for the time being pending surgical eval     #GERD  #prior hx gastric ulcer (H Pylori negative)  -on ppi BID unsure of when stopped (did not see in notes)  -continue pepcid 20mg BID   -add pantoprazole 40mg daily   -avoid NSAIDs     #SAMIA  -continue home iron supplement daily     Disposition  -PT/OT ordered  -f/u: PCP, ortho spine, likely onc     Diet: regular diet   Electrolytes: K >4; Mg >2  DVT ppx: lovenox  GI ppx: protonix   Lines/tubes: PIV  O2: RA  Baseline Cr: 1.2-1.3  Code status: full code- confirmed on admit  Surrogate decision maker: Fabiano Retana, brother, [phone]     Annabelle Villarreal MD

## 2024-12-01 NOTE — PROGRESS NOTES
Physical Therapy    Physical Therapy Evaluation & Treatment    Patient Name: Matt Retana  MRN: [id number]  Department: [place] [id number]  Room: [facility]  Today's Date: 12/1/2024   Time Calculation  Start Time: 0853  Stop Time: 0937  Time Calculation (min): 44 min    Assessment/Plan   PT Assessment  PT Assessment Results: Decreased strength, Decreased range of motion, Decreased endurance, Impaired balance, Decreased mobility  Rehab Prognosis: Good  Barriers to Discharge: medical POC  End of Session Communication: Bedside nurse  Assessment Comment: 75yo male presents with dec strength, balance, endurance limiting functional mobility.  Pt would benefit from continued skilled therapy to address these deficits and improve safety and indep  End of Session Patient Position: Bed, 3 rail up, Alarm on   IP OR SWING BED PT PLAN  Inpatient or Swing Bed: Inpatient  PT Plan  Treatment/Interventions: Bed mobility, Transfer training, Gait training, Stair training, Balance training, Strengthening, Endurance training, Range of motion, Therapeutic exercise, Therapeutic activity, Postural re-education  PT Plan: Ongoing PT  PT Frequency: 3 times per week  PT Discharge Recommendations: Moderate intensity level of continued care  Equipment Recommended upon Discharge:  (TBD)  PT Recommended Transfer Status: Assist x2, Total assist  PT - OK to Discharge: Yes (PT eval completed & recs made)    Subjective     General Visit Information:  General  Reason for Referral: L4 osteolytic lesion, 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, also with L kidney mass, multiple pulmonary nodules  Past Medical History Relevant to Rehab: HTN, GERD, PVD, cellulitis, former smoker  Family/Caregiver Present: No  Co-Treatment: OT  Co-Treatment Reason: low AMPAC, to maximize pt safety with mobility  Prior to Session Communication: Bedside nurse  Patient Position Received: Bed, 3 rail up, Alarm on  Preferred Learning Style: auditory, verbal, written  General Comment: Pt supine in bed  upon arrival, cooperative with redirection  Home Living:  Home Living  Type of Home: House  Lives With: Alone  Home Adaptive Equipment: Walker rolling or standard, Wheelchair-manual (lift chair)  Home Layout: Two level, Able to live on main level with bedroom/bathroom  Home Access: Stairs to enter with rails  Entrance Stairs-Number of Steps: 4 total  Bathroom Equipment: Grab bars around toilet  Home Living Comments: Was at SNF x ~1 day PTA  Prior Level of Function:  Prior Function Per Pt/Caregiver Report  Level of [place]: Independent with ADLs and functional transfers, Needs assistance with homemaking  Receives Help From: Family  ADL Assistance: Independent  Homemaking Assistance:  (MOW daily; sister does laundry)  Ambulatory Assistance:  (minimal amb with WW; otherwise using wheelchair)  Prior Function Comments: reports 3 recent falls  Precautions:  Precautions  Medical Precautions: Fall precautions  Precautions Comment: per ortho spine consult: WBAT, out of bed as tolerated, no restrictions    Objective   Pain:  Pain Assessment  Pain Assessment: 0-10  0-10 (Numeric) Pain Score: 0 - No pain  Cognition:  Cognition  Orientation Level: Oriented X4  Cognition Comments: tangential needing frequent redirection; endorses feeling forgetful & having a hard time keeping up with everything going on recently  Insight: Mild    General Assessments:     Activity Tolerance  Endurance: Tolerates 30 min exercise with multiple rests    Sensation  Light Touch: No apparent deficits    Perception  Inattention/Neglect: Appears intact  Initiation: Appears intact  Motor Planning: Appears intact  Perseveration: Not present    Coordination  Movements are Fluid and Coordinated: Yes    Postural Control  Postural Control: Impaired  Head Control: FHP    Static Sitting Balance  Static Sitting-Balance Support: Feet supported  Static Sitting-Level of Assistance: Close supervision    Static Standing Balance  Static Standing-Balance Support:  Bilateral upper extremity supported  Static Standing-Level of Assistance: Maximum assistance (x2)  Functional Assessments:  Bed Mobility  Bed Mobility: Yes  Bed Mobility 1  Bed Mobility 1: Supine to sitting, Sitting to supine  Level of Assistance 1: Maximum assistance, +2, Moderate verbal cues, Moderate tactile cues  Bed Mobility Comments 1: HOB elevated, use of draw sheet  Bed Mobility 2  Bed Mobility  2: Scooting  Level of Assistance 2: Dependent, +2    Transfers  Transfer: Yes  Transfer 1  Technique 1: Sit to stand, Stand to sit  Transfer Level of Assistance 1: Arm in arm assistance, Maximum assistance, +2  Trials/Comments 1: x2 trials; first, did not clear bed; second achieves ~75% standing    Ambulation/Gait Training  Ambulation/Gait Training Performed: No    Stairs  Stairs: No  Extremity/Trunk Assessments:  RLE   RLE :  (DF/PF 4-/5; knee ext 2-/5)  LLE   LLE :  (DF/PF 4-/5; knee ext 2-/5)  Treatments:  Therapeutic Activity  Therapeutic Activity Performed: Yes  Therapeutic Activity 1: Pt radha sitting EOB x ~20 min - initially with min/CGA, progressing to SBA with cues for upright posture. Completes small ant/post WSing with SBA for positional changes.  Outcome Measures:  [facility] Basic Mobility  Turning from your back to your side while in a flat bed without using bedrails: Total  Moving from lying on your back to sitting on the side of a flat bed without using bedrails: Total  Moving to and from bed to chair (including a wheelchair): Total  Standing up from a chair using your arms (e.g. wheelchair or bedside chair): Total  To walk in hospital room: Total  Climbing 3-5 steps with railing: Total  Basic Mobility - Total Score: 6    Encounter Problems       Encounter Problems (Active)       Balance       STG - Maintains static standing balance with upper extremity support on WW and modA x 1 min as a pregait activity  (Progressing)       Start:  12/01/24    Expected End:  12/15/24       INTERVENTIONS:  1. Practice  standing with minimal support.  2. Educate patient about standing tolerance.  3. Educate patient about independence with gait, transfers, and ADL's.  4. Educate patient about use of assistive device.  5. Educate patient about self-directed care.            Mobility       STG - Patient will ambulate >5' with modA and WW (Not Progressing)       Start:  12/01/24    Expected End:  12/15/24               PT Transfers       STG - Patient to transfer to and from sit to supine with modA  (Progressing)       Start:  12/01/24    Expected End:  12/15/24            STG - Patient will transfer sit to and from stand with modA and WW (Progressing)       Start:  12/01/24    Expected End:  12/15/24               PT Transfers       STG - Transfer from bed to chair with modAx2 with WW (Not Progressing)       Start:  12/01/24    Expected End:  12/15/24               Pain - Adult              Education Documentation  Precautions, taught by Judy Durant PT at 12/1/2024  2:28 PM.  Learner: Patient  Readiness: Acceptance  Method: Explanation  Response: Needs Reinforcement  Comment: role of PT, POC, safety with mobility    Mobility Training, taught by Judy Durant PT at 12/1/2024  2:28 PM.  Learner: Patient  Readiness: Acceptance  Method: Explanation  Response: Needs Reinforcement  Comment: role of PT, POC, safety with mobility    Education Comments  No comments found.      12/01/24 at 2:30 PM - Judy Durant PT

## 2024-12-01 NOTE — PROGRESS NOTES
"Adbi Retana \"Louise" is a 74 y.o. male on day 1 of admission presenting with Lesion of lumbar spine.    Transitional Care Coordinator Note: Met with patient to discuss discharge planning s/p admission.  Spoke with patient and sister Micaela via phone regarding therapy recs. List provided to both patient and sister via email to review provide choices. TCC team to follow.  Andreea Wang RN TCC via Epic.      "

## 2024-12-01 NOTE — PROGRESS NOTES
"Pharmacy Medication History Review    Abdi Retana \"Louise" is a 74 y.o. male admitted for Lesion of lumbar spine. Pharmacy reviewed the patient's [id number] medications and allergies for accuracy.    Medications ADDED:  None  Medications CHANGED:  None  Medications REMOVED:   None    The list below reflects the updated PTA list.   Prior to Admission Medications   Prescriptions Last Dose Informant   aspirin 81 mg EC tablet  Self   Sig: Take 1 tablet (81 mg) by mouth once daily.   clotrimazole-betamethasone (Lotrisone) cream  Self   Sig: MIX WITH ZINC OXIDE AND APPLY  TOPICALLY TO AFFECTED AREA(S)  TWICE DAILY   famotidine (Pepcid) 40 mg tablet  Self   Sig: Take 0.5 tablets (20 mg) by mouth 2 times a day.   ferrous sulfate (FeroSuL) 325 (65 Fe) MG tablet  Self   Sig: Take 1 tablet (325 mg) by mouth once every 24 hours.   fluocinonide (Lidex) 0.05 % ointment Not Taking Self   Sig: APPLY TOPICALLY TWICE DAILY AS  NEEDED FOR IRRITATION OR RASH.  AVOID FACE AND GROIN   Patient not taking: Reported on 12/1/2024   losartan (Cozaar) 50 mg tablet  Self   Sig: TAKE 1 TABLET BY MOUTH ONCE  DAILY   magnesium oxide 400 mg magnesium capsule  Self   Sig: Take 1 capsule (400 mg) by mouth once daily.   potassium chloride CR 10 mEq ER tablet  Self   Sig: TAKE 1 TABLET BY MOUTH ONCE  DAILY DO NOT CRUSH, CHEW, OR  SPLIT   torsemide (Demadex) 20 mg tablet  Self   Sig: TAKE 1 TABLET BY MOUTH ONCE  DAILY   vitamin D3-folic acid 2,500 unit- 1 mg tablet  Self   Sig: Take 1 mg by mouth once every 24 hours.   zinc oxide 10 % cream  Self   Sig: To mix with Lotrisone and apply twice daily      Facility-Administered Medications: None        The list below reflects the updated allergy list. Please review each documented allergy for additional clarification and justification.  Allergies  Reviewed by Akash Johnson on 12/1/2024        Severity Reactions Comments    Clindamycin Not Specified Unknown     Amoxicillin-pot Clavulanate Low Rash Possible " "rash, received multiple antibiotics at this time            Patient declines M2B at discharge.     Sources:   Pharmacy dispense history  Patient interview Moderate historian  Chart Review  Care Everywhere  Discharge medication list from [facility] 11/26/24         Additional Comments:  Pt stated he has went from home to [facility] to [facility] for 1 night then was transferred to [facility] in the last week.      CARRI KUO  Pharmacy Technician  12/01/24     Secure Chat preferred   If no response call [phone] or Vocera \"Med Rec\"    "

## 2024-12-01 NOTE — HOSPITAL COURSE
73 yo with PMH most notable for HTN, PVD, chronic lymphedema, and CKDIII (baseline Cr 1.2-1.3, GFR 50s) presenting from SNF with LE weakness iso recently discovered L4/5 lesion with mod to severe spinal cord compression. On exam pt has 4/5 strength in LE with normal sensation. Denies bladder/bowel incontinence. 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 Ortho spine consulted given worsening weakness with spinal lesion recommended IR biopsy of L4. Imaging also notable for new R renal v thrombus likely related to suspected malignancy.    12/2/24: Per discussion with IR team, the IR-guided biopsy will occur as an outpatient. Per discussion with ortho spine team, the MRI C-spine and MRI T-spine can be done as an outpatient.     12/3/24: Precert was initiated and follow-up appointment with IR for L4 biopsy was ordered (spoke with outpatient IR team). Patient did not tolerate MRI brain + C/T spine wwo (per radiology, will need anesthesia), so this order was cancelled and will be done as an outpatient.     12/4/24. Patient remained stable. Precert accepted.     12/5/24. Patient discharged to [place] at [facility]. Referral placed for follow-up with hematology-oncology, and outpatient orders for IR-guided biopsy of L4 vertebral body lesion and for MRI brain, C-spine, and T-spine with and without contrast placed.

## 2024-12-01 NOTE — CARE PLAN
"The patient's goals for the shift include \"Sleep.\"    The clinical goals for the shift include Patient will remain injury free this shift.      Problem: Pain - Adult  Goal: Verbalizes/displays adequate comfort level or baseline comfort level  12/1/2024 0748 by Michael Cook RN  Outcome: Progressing  12/1/2024 0748 by Michael Cook RN  Outcome: Progressing     Problem: Safety - Adult  Goal: Free from fall injury  12/1/2024 0748 by Michael Cook RN  Outcome: Progressing  12/1/2024 0748 by Michael Cook RN  Outcome: Progressing     Problem: Discharge Planning  Goal: Discharge to home or other facility with appropriate resources  12/1/2024 0748 by Michael Cook RN  Outcome: Progressing  12/1/2024 0748 by Michael Cook RN  Outcome: Progressing     Problem: Chronic Conditions and Co-morbidities  Goal: Patient's chronic conditions and co-morbidity symptoms are monitored and maintained or improved  12/1/2024 0748 by Michael Cook RN  Outcome: Progressing  12/1/2024 0748 by Michael Cook RN  Outcome: Progressing     Problem: Skin  Goal: Prevent/manage excess moisture  Outcome: Progressing  Goal: Prevent/minimize sheer/friction injuries  Outcome: Progressing  Goal: Promote/optimize nutrition  Outcome: Progressing       "

## 2024-12-01 NOTE — CARE PLAN
"The patient's goals for the shift include \"Sleep.\"    The clinical goals for the shift include Patient will remain injury free this shift.      Problem: Skin  Goal: Prevent/manage excess moisture  12/1/2024 0749 by Michael Cook RN  Flowsheets (Taken 12/1/2024 0749)  Prevent/manage excess moisture: Moisturize dry skin  12/1/2024 0748 by Michael Cook RN  Outcome: Progressing  Goal: Prevent/minimize sheer/friction injuries  12/1/2024 0749 by Michael Cook RN  Flowsheets (Taken 12/1/2024 0749)  Prevent/minimize sheer/friction injuries: Use pull sheet  12/1/2024 0748 by Michael Cook RN  Outcome: Progressing  Goal: Promote/optimize nutrition  12/1/2024 0749 by Michael Cook RN  Flowsheets (Taken 12/1/2024 0749)  Promote/optimize nutrition: Monitor/record intake including meals  12/1/2024 0748 by Michael Cook RN  Outcome: Progressing       "

## 2024-12-01 NOTE — CONSULTS
Orthopaedic Surgery Consult H&P    HPI:   Orthopaedic Problems/Injuries: L4 osteolytic lesion  Other Injuries: L kidney mass, multiple pulmonary nodules    74 y.o. male PMH HTN, GERD, PVD, cellulitis, former smoker presents w 2 weeks progressive BLLE weakness and inability to ambulate. Patient reports that the weakness caused him to fall at home, requiring him to call 911 prior to admission to [facility]. The patient notes that he has used a walker for the last several years and was able to go up and down several steps prior to the last week. He lives on his own, and since he has gotten weak his ability to ambulate around his house has declined. Patient denies bowel/bladder incontinence, and dense numbness.    PMH: per above/EMR  PSH: per above/EMR  SocHx:      - Former smoker      - EtOH use per EHR      - Other drug use per EHR  FamHx:  Non-contributory to this patient's acute orthopaedic problem.   Allergies: Reviewed in EMR  Meds: Reviewed in EMR    ROS      - 14 point ROS negative except as above    Physical Exam:  Gen: AOx3, NAD  HEENT: normocephalic atraumatic  Psych: appropriate mood and affect  Resp: nonlabored breathing  Cardiac: Extremities WWP, RRR to peripheral palpation  Neuro: CN 2-12 grossly intact  Skin: no rashes    Spine Exam:    C5: SILT   Deltoid 5/5 Left; 5/5 Right  C6: SILT   Wrist Ext: 5/5 Left; 5/5 Right  C7: SILT   Triceps: 5/5 Left; 5/5 Right  C8: SILT   Finger flexion: 5/5 Left; 5/5 Right  T1: SILT    Interossei: 5/5 Left; 5/5 Right    Bicep Reflex 2+   Bilaterally  Last: Negative    L1: SILT       L2: SILT      Hip flexors 4/5 Left; 4/5 Right  L3: SILT      Knee extension 5/5 Left; 5/5 Right  L4: SILT      Tib Ant. (Dorsiflexion) 5/5 Left; 5/5 Right  L5: SILT      EHL 5/5 Left; 5/5 Right  S1: SILT      Plantarflexion 5/5 Left; 5/5 Right    Patellar reflex: 2+   Bilaterally    Babinkski: Intact  5-6 beats clonus Bilaterally    A full secondary exam was performed and all relevant findings  discussed and noted above.    Imaging:  MR lumbar spine w and wo IV contrast   Final Result   1. Expansile enhancing marrow replacing lesion involving the near   entirety of the L4 vertebral body with extension into the pedicles   bilaterally, suggestive of a neoplastic process. There is mild   associated compression deformity of the L4 vertebral body and   convexity of the posterior margin of the vertebral body, with   associated soft tissue component extending into the epidural space   and bilateral neural foramina. It results in moderate to severe   spinal canal narrowing at the L4 level and moderate to severe   bilateral neural foramina narrowing at L4-L5.   2. Multilevel degenerative changes throughout the lumbar spine, most   pronounced at L1-L2.        I personally reviewed the images/study and I agree with the findings   as stated by Dr. Anthony Slater M.D. This study was interpreted at   [facility], [place], [place].        MACRO:   None        Signed by: Desi Becerra 11/30/2024 3:28 PM   Dictation workstation:   [id number]      CT chest abdomen pelvis w IV contrast   Final Result   1. 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 Mildly dilated segmental branches of the renal vein and   the main renal vein with hypoattenuation in the lumen which likely   represents a thrombus likely tumor thrombus. No evidence of thrombus   in the IVC.   2. Enhancing right adrenal nodule highly concerning for metastasis.   Indeterminate left adrenal 1.3 cm nodule.   3. Expansile lytic osseous lesion centered at the L4 vertebral body,   likely representing metastatic lesion. Please see dedicated CT   imaging of the lumbar spine for further evaluation.   4. Innumerable solid pulmonary nodules measuring up to 1.1 cm are new   compared to the prior CT on 08/05/2020 likely representing  metastasis   from the renal mass.   5. Right pleural effusion with adjacent lung atelectasis..   6. Focal wall thickening and adjacent stranding near sigmoid colon   diverticula, suggestive of mild acute diverticulitis. No pericolonic   abscess, fluid collection or perforation.   7. Large amount of stool burden in the rectum with mild mesorectal   fat stranding likely representing stercoral proctitis.             I personally reviewed the image(s)/study and resident interpretation.   I agree with the findings as stated by resident Dillon Thompson.   Data analyzed and images interpreted at [facility], [place], [place].        MACRO:   None.        Signed by: Jesus Rodriguez 11/30/2024 1:25 PM   Dictation workstation:   [id number]      CT lumbar spine wo IV contrast   Final Result   There is a lytic expansile osseous lesion involving the L4 vertebral   body with extension to the bilateral L4 pedicles most compatible with   underlying osseous neoplastic process. There is mild pathologic   collapse of the L4 vertebral body. There is convexity of the lytic   ill-defined posterior margin of the L4 vertebral body as well as   suspected associated epidural soft tissue contributing to   encroachment upon the spinal canal with concern for severe narrowing   of the spinal canal at the L4 level. There is extension of soft   tissue fullness into the paraspinal soft tissues surrounding the L4   vertebral body. Further evaluation with MRI imaging would be   recommended.        There are bilateral L3 pars interarticularis defects.        The coronal reconstructed images demonstrate a levocurvature of the   lower thoracic and lumbar spine.        There is exaggeration of the kyphotic curvature of the thoracic spine.        There is multilevel spondylosis within the thoracic and lumbar   regions.        Please see the dedicated report of the CT of the chest, abdomen, and   pelvis for details  of findings in these regions.        MACRO:   None        Signed by: Juan Jose Quiroz 11/30/2024 12:17 PM   Dictation workstation:   [id number]      CT thoracic spine wo IV contrast   Final Result   There is a lytic expansile osseous lesion involving the L4 vertebral   body with extension to the bilateral L4 pedicles most compatible with   underlying osseous neoplastic process. There is mild pathologic   collapse of the L4 vertebral body. There is convexity of the lytic   ill-defined posterior margin of the L4 vertebral body as well as   suspected associated epidural soft tissue contributing to   encroachment upon the spinal canal with concern for severe narrowing   of the spinal canal at the L4 level. There is extension of soft   tissue fullness into the paraspinal soft tissues surrounding the L4   vertebral body. Further evaluation with MRI imaging would be   recommended.        There are bilateral L3 pars interarticularis defects.        The coronal reconstructed images demonstrate a levocurvature of the   lower thoracic and lumbar spine.        There is exaggeration of the kyphotic curvature of the thoracic spine.        There is multilevel spondylosis within the thoracic and lumbar   regions.        Please see the dedicated report of the CT of the chest, abdomen, and   pelvis for details of findings in these regions.        MACRO:   None        Signed by: Juan Jose Quiroz 11/30/2024 12:17 PM   Dictation workstation:   [id number]          Assessment:  Orthopaedic Problems/Injuries:  L4 osteolytic lesion    74M with new R renal mass with suspected mets to the lungs, adrenal and spine with findings of a L4 osteolytic lesion.    Plan:  - 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  - Agree with medicine admission for Tumor of unknown origin workup  - Given suspicion for RCC mets, patient will require MRI C and T spine for completion  - IR biopsy of L4 lesion for formal diagnosis, suspected RCC mets  - WB: WBAT, out of bed as tolerated,  no restrictions  - Abx: Not  indicated at this time  - Diet: Regular ok from orthopedic perspective  - DVT: Per primary, treatment of renal vein thrombosis as needed  - Cho: per primary    - Dispo: Pending hospital course    Barrie Joseph MD  On Call Resident - PGY-1 Orthopedic Surgery  [facility]  Epic Message Preferred  On-Call Pager [phone]    This patient was seen and staffed within 30 minutes of initial consult.  _________________________________________________________    This patient will be followed by the Ortho Spine Team while inpatient. See team members and contacts below:    First Call: Daryl Serra, PGY-2  Second Call: Michel Viera, PGY-4

## 2024-12-02 LAB
ALBUMIN SERPL BCP-MCNC: 2.9 G/DL (ref 3.4–5)
ANION GAP SERPL CALC-SCNC: 12 MMOL/L (ref 10–20)
APTT PPP: 30 SECONDS (ref 27–38)
BASOPHILS # BLD AUTO: 0.08 X10*3/UL (ref 0–0.1)
BASOPHILS NFR BLD AUTO: 1.1 %
BUN SERPL-MCNC: 23 MG/DL (ref 6–23)
CALCIUM SERPL-MCNC: 8.5 MG/DL (ref 8.6–10.6)
CHLORIDE SERPL-SCNC: 101 MMOL/L (ref 98–107)
CO2 SERPL-SCNC: 27 MMOL/L (ref 21–32)
CREAT SERPL-MCNC: 1.43 MG/DL (ref 0.5–1.3)
DRVVT SCREEN TO CONFIRM RATIO: 1.01 RATIO
DRVVT/DRVVT CFM NRMLZD PPP-RTO: 1.11 RATIO
DRVVT/DRVVT CFM P DOAC NEUT NORM PPP-RTO: 0.91 RATIO
EGFRCR SERPLBLD CKD-EPI 2021: 51 ML/MIN/1.73M*2
EOSINOPHIL # BLD AUTO: 0.33 X10*3/UL (ref 0–0.4)
EOSINOPHIL NFR BLD AUTO: 4.5 %
ERYTHROCYTE [DISTWIDTH] IN BLOOD BY AUTOMATED COUNT: 15.1 % (ref 11.5–14.5)
GLUCOSE SERPL-MCNC: 101 MG/DL (ref 74–99)
HCT VFR BLD AUTO: 30.1 % (ref 41–52)
HGB BLD-MCNC: 9.3 G/DL (ref 13.5–17.5)
IMM GRANULOCYTES # BLD AUTO: 0.04 X10*3/UL (ref 0–0.5)
IMM GRANULOCYTES NFR BLD AUTO: 0.5 % (ref 0–0.9)
INR PPP: 1.3 (ref 0.9–1.1)
LA 2 SCREEN W REFLEX-IMP: NORMAL
LYMPHOCYTES # BLD AUTO: 1.12 X10*3/UL (ref 0.8–3)
LYMPHOCYTES NFR BLD AUTO: 15.2 %
MAGNESIUM SERPL-MCNC: 2.2 MG/DL (ref 1.6–2.4)
MCH RBC QN AUTO: 26.4 PG (ref 26–34)
MCHC RBC AUTO-ENTMCNC: 30.9 G/DL (ref 32–36)
MCV RBC AUTO: 86 FL (ref 80–100)
MONOCYTES # BLD AUTO: 0.54 X10*3/UL (ref 0.05–0.8)
MONOCYTES NFR BLD AUTO: 7.3 %
NEUTROPHILS # BLD AUTO: 5.27 X10*3/UL (ref 1.6–5.5)
NEUTROPHILS NFR BLD AUTO: 71.4 %
NORMALIZED SCT PPP-RTO: 0.87 RATIO
NRBC BLD-RTO: 0 /100 WBCS (ref 0–0)
PHOSPHATE SERPL-MCNC: 3.7 MG/DL (ref 2.5–4.9)
PLATELET # BLD AUTO: 356 X10*3/UL (ref 150–450)
POTASSIUM SERPL-SCNC: 4.1 MMOL/L (ref 3.5–5.3)
PROTHROMBIN TIME: 15.2 SECONDS (ref 9.8–12.8)
RBC # BLD AUTO: 3.52 X10*6/UL (ref 4.5–5.9)
SILICA CLOTTING TIME CONFIRMATION: 1.16 RATIO
SILICA CLOTTING TIME SCREEN: 1.01 RATIO
SODIUM SERPL-SCNC: 136 MMOL/L (ref 136–145)
WBC # BLD AUTO: 7.4 X10*3/UL (ref 4.4–11.3)

## 2024-12-02 PROCEDURE — 36415 COLL VENOUS BLD VENIPUNCTURE: CPT

## 2024-12-02 PROCEDURE — 1100000001 HC PRIVATE ROOM DAILY

## 2024-12-02 PROCEDURE — 2500000004 HC RX 250 GENERAL PHARMACY W/ HCPCS (ALT 636 FOR OP/ED): Performed by: STUDENT IN AN ORGANIZED HEALTH CARE EDUCATION/TRAINING PROGRAM

## 2024-12-02 PROCEDURE — 85025 COMPLETE CBC W/AUTO DIFF WBC: CPT

## 2024-12-02 PROCEDURE — 80069 RENAL FUNCTION PANEL: CPT

## 2024-12-02 PROCEDURE — 84100 ASSAY OF PHOSPHORUS: CPT

## 2024-12-02 PROCEDURE — 2500000001 HC RX 250 WO HCPCS SELF ADMINISTERED DRUGS (ALT 637 FOR MEDICARE OP)

## 2024-12-02 PROCEDURE — 83735 ASSAY OF MAGNESIUM: CPT

## 2024-12-02 PROCEDURE — 99232 SBSQ HOSP IP/OBS MODERATE 35: CPT

## 2024-12-02 PROCEDURE — 85610 PROTHROMBIN TIME: CPT

## 2024-12-02 PROCEDURE — G0378 HOSPITAL OBSERVATION PER HR: HCPCS

## 2024-12-02 RX ORDER — LORAZEPAM 0.5 MG/1
0.25 TABLET ORAL ONCE
Status: COMPLETED | OUTPATIENT
Start: 2024-12-02 | End: 2024-12-03

## 2024-12-02 RX ADMIN — SENNOSIDES AND DOCUSATE SODIUM 2 TABLET: 50; 8.6 TABLET ORAL at 20:39

## 2024-12-02 RX ADMIN — ENOXAPARIN SODIUM 40 MG: 100 INJECTION SUBCUTANEOUS at 20:39

## 2024-12-02 RX ADMIN — FAMOTIDINE 20 MG: 20 TABLET ORAL at 20:39

## 2024-12-02 ASSESSMENT — PAIN SCALES - GENERAL
PAINLEVEL_OUTOF10: 0 - NO PAIN
PAINLEVEL_OUTOF10: 0 - NO PAIN

## 2024-12-02 ASSESSMENT — PAIN - FUNCTIONAL ASSESSMENT: PAIN_FUNCTIONAL_ASSESSMENT: 0-10

## 2024-12-02 NOTE — CONSULTS
Wound Care Consult     Visit Date: 12/2/2024      Patient Name: Matt Retana         MRN: [id number]           YOB: 1950     Reason for Consult: ARLETTE LE        Wound History: a 74-year-old male with a medical history of hypertension, GERD, cellulitis, and peripheral vascular disease     Wound location: ARLETTE LE                 Undermining: no  Tracking: no  Wound type: venous compromise resulting in tissue damage.   Wound bed: Dry red scar tissue with flaky scaly plaques overlaying  Draining: min serous fluid  Periwound skin: dry, edematous, hyperkeratotic    Recommendations: DAILY for ARLETTE LE.  Wash and dry bilateral legs and feet then apply a good moisturizer such as Eucerin/Aquaphor (pharmacy item need a physician order).  FOR WOUNDS: Clean with normal saline or Vashe wound cleanser ([employer] order # [id number]).  Apply Xeroform dressing to open wound bed. Cover with ABD Pads and secure with kerlix gauze. Elevate heels off the bed surface at all times.      Wound location: Right anterior ankle        Undermining: no  Tracking: no  Wound type: MDRPI  Wound bed: moist red early granulation tissue  Draining: serous   Periwound skin: Dry red scar tissue with flaky scaly plaques overlaying    Recommendations: DAILY For right anterior ankle. Clean with normal saline or Vashe wound cleanser ([employer] order # [id number]). Cover wound bed with Medihoney ([employer] order #[id number]), then cover with Aquacel Extra ([employer] order #[id number]). Cover with ABD and Kerlix gauze.     Wound Team Plan: Primary provider, please review recommendation. If you agree with recommendation please enter as wound orders in EMR. Thank you.      While inpatient, Secure chat with questions or reconsult wound care if condition worsens or changes. For urgent communications please message the group through [employer] messaging at: [facility], Thank you.     Qing Mzaariegos RN, CWON  12/2/2024  4:04 PM

## 2024-12-02 NOTE — CARE PLAN
"The patient's goals for the shift include \"Sleep.\"    The clinical goals for the shift include Pt will remain safe and free of injury during night.    Pt remained safe and free of injury during night. No pain or other distress noted. MNNPO for IR biopsy today.  No other distress noted. Call light in reach. Resting quietly at this time.     Alma Canales RN      "

## 2024-12-02 NOTE — PROGRESS NOTES
"Abdi Retana \"Louise" is a 74 y.o. male on day 2 of admission presenting with Lesion of lumbar spine.    Subjective   No acute events overnight. No acute worsening of LE weakness overnight. NPO for IR-guided biopsy of L4 lesion today.        Objective     Last Recorded Vitals  /84   Pulse 73   Temp 36.5 °C (97.7 °F) (Temporal)   Resp 18   Wt 122 kg (270 lb) Comment: per pt guess.  SpO2 93%   Intake/Output last 3 Shifts:    Intake/Output Summary (Last 24 hours) at 12/2/2024 1120  Last data filed at 12/2/2024 0600  Gross per 24 hour   Intake --   Output 275 ml   Net -275 ml       Admission Weight  Weight: 122 kg (270 lb) (per pt guess.) (11/30/24 0948)    Daily Weight  11/30/24 : 122 kg (270 lb)    Image Results  MR lumbar spine w and wo IV contrast  Narrative: Interpreted By:  Desi Becerra and Baker Zachary   STUDY:  MR LUMBAR SPINE W AND WO IV CONTRAST;  11/30/2024 2:29 pm      INDICATION:  Signs/Symptoms:lower extremity weakness, lytic lesion on CT with  severe spinal cord compression.      COMPARISON:  Same day lumbar spine CT at 12:01 p.m.      ACCESSION NUMBER(S):  [id number]      ORDERING CLINICIAN:  LATA REN      TECHNIQUE:  Pre and postcontrast sagittal T1, T2, STIR, pre and postcontrast  axial T1 and T2 weighted images of the lumbar spine were acquired. 20  mL of intravenous Dotarem was administered without complication.      FINDINGS:  Alignment: There is trace anterolisthesis of L2 on L3 and trace  retrolisthesis of L5 on S1. Alignment is otherwise maintained.      Vertebrae/Intervertebral Discs: There is expansile marrow replacement  of the near entirety of the L4 vertebral body with extension into the  pedicles bilaterally and associated heterogenous enhancement on  postcontrast imaging. There is also a mild compression deformity of  the L4 vertebral body with a approximately 20% height loss, as well  as mild convexity of the posterior margin of the vertebral body.  There is epidural " extension of neoplasm along the posterior margin of  the vertebral body measuring up to 7 cm in maximum thickness along  the anterolateral aspect of thecal sac. There is abnormal soft tissue  encroaching upon bilateral L3-L4 and L4-L5 neural foramina, most  significantly causing moderate to severe bilateral neural foraminal  narrowing at L4-L5. The prevertebral soft tissue component is also  noted, left greater than right encroaching upon bilateral psoas  muscles.      The remaining vertebral bodies demonstrate normal signal without  height loss. There are mild multilevel degenerative endplate changes  and anterior osteophytosis. No additional areas of abnormal  enhancement. Abnormal areas of enhancement and increased STIR signal  also noted throughout the L3-L4 and L4-L5 intervertebral disc spaces.  The intervertebral discs otherwise demonstrate normal signal and  morphology.      Conus: The lower thoracic cord appears unremarkable. The conus  terminates at L1.      T12-L1: Small disc bulge with mild bilateral neural foramina  narrowing. No significant spinal canal narrowing.      L1-2: Disc bulge, bilateral facet hypertrophy, and ligamentum flavum  thickening resulting in moderate to severe spinal canal narrowing and  moderate bilateral neural foramina narrowing.      L2-3: Circumferential disc bulge, bilateral facet hypertrophy, and  ligamentum flavum thickening resulting in flattening of the anterior  thecal sac and moderate bilateral neural foramina narrowing. No  significant spinal canal narrowing.      L3-4: Small disc bulge, severe bilateral facet hypertrophy, and  ligamentum flavum thickening resulting in moderate bilateral neural  foramina narrowing. No significant spinal canal narrowing at this  level.      L4-5: Expansile marrow replacing lesion within the L4 vertebral body  with associated epidural and neural foramina soft tissue component  which results in moderate to severe spinal canal narrowing at  the L4  level, and moderate to severe bilateral neural foramina narrowing at  L4-L5. Additionally bilateral facet hypertrophy contributes to neural  foramina narrowing.      L5-S1: Small disc bulge, bilateral facet hypertrophy, and ligamentum  flavum thickening resulting in moderate-to-severe bilateral neural  foramina narrowing. No significant spinal canal narrowing.      Increased STIR signal and enhancement within th prevertebral soft  tissues at the L4 and L5 levels. Paravertebral soft tissues otherwise  unremarkable. Nonspecific edema within the dependent portions of the  posterior lumbar soft tissues      Impression: 1. Expansile enhancing marrow replacing lesion involving the near  entirety of the L4 vertebral body with extension into the pedicles  bilaterally, suggestive of a neoplastic process. There is mild  associated compression deformity of the L4 vertebral body and  convexity of the posterior margin of the vertebral body, with  associated soft tissue component extending into the epidural space  and bilateral neural foramina. It results in moderate to severe  spinal canal narrowing at the L4 level and moderate to severe  bilateral neural foramina narrowing at L4-L5.  2. Multilevel degenerative changes throughout the lumbar spine, most  pronounced at L1-L2.      I personally reviewed the images/study and I agree with the findings  as stated by Dr. Anthony Slater M.D. This study was interpreted at  [place], [place].      MACRO:  None      Signed by: Desi Becerra 11/30/2024 3:28 PM  Dictation workstation:   [id number]  CT chest abdomen pelvis w IV contrast  Narrative: Interpreted By:  Jesus Rodriguez  and Donna Carranza   STUDY:  CT CHEST ABDOMEN PELVIS W IV CONTRAST;  11/30/2024 12:01 pm      INDICATION:  Signs/Symptoms:lytic lesion found on x-ray lumbar spine 11-28-24.      COMPARISON:  None.      ACCESSION NUMBER(S):  [id number]      ORDERING  CLINICIAN:  LATA REN      TECHNIQUE:  Contiguous axial images of the chest, abdomen, and pelvis were  obtained after the intravenous administration of 100 mL Omnipaque 350  contrast.  Coronal and sagittal reformatted images were reconstructed  from the axial data.      FINDINGS:  CT CHEST:      MEDIASTINUM AND LYMPH NODES:  No enlarged intrathoracic or axillary  lymph nodes by imaging criteria.  The esophagus appears within normal  limits. No pneumomediastinum.      VESSELS:  Normal caliber aorta without dissection. No significant  aortic atherosclerosis.      HEART: Normal size.  No coronary artery calcifications. No  significant pericardial effusion.      LUNG, AIRWAYS, PLEURA: There is a 1.2 cm soft tissue density nodule  in the right lateral tracheal wall above the level of the reshma  which may represent a polyp or a retained mucus. Small amount of  mucus within the distal trachea. There is a small right pleural  effusion with the adjacent lung atelectasis. No pneumothorax.  Innumerable solid pulmonary throughout the lungs, for example  measuring 1.0 cm in the right middle lobe (series 204, image 143),  and 1.1 cm in the left lower lobe (series 204, image 158).      CHEST WALL SOFT TISSUES: Right-greater-than-left gynecomastia.      OSSEOUS STRUCTURES: No acute osseous abnormality. Please see  separately dictated thoracic spine.          CT ABDOMEN/PELVIS:      ABDOMINAL WALL: No acute abnormality.      LIVER: The liver is normal in size and homogeneous in attenuation.  There is a 3.0 cm hypodense cystic lesion in the right hepatic dome,  unchanged from the prior exam.      BILE DUCTS: No significant intrahepatic or extrahepatic dilatation.      GALLBLADDER: No significant abnormality.      SPLEEN: No significant abnormality.      PANCREAS: No significant abnormality.      ADRENALS: There is a 2.9 x 1.6 x 1.4 cm right adrenal nodule highly  concerning for metastasis. There is also a indeterminate 1.3 x 1.3  cm  left adrenal nodule.      KIDNEYS, URETERS, BLADDER: There is a large infiltrative right upper  and interpolar region heterogeneously enhancing mass measuring 7.8 x  6.7 cm in axial dimensions and 10 cm in craniocaudal dimension. The  mass has central areas of necrosis and demonstrates lobular areas of  extension into the perinephric fat anteriorly and posteriorly. There  is contiguous infiltration into the renal collecting system. The main  renal vein and segmental branches appear mildly dilated. There is  questionable linear hypoattenuation within the segmental branches of  the renal vein as well as the main renal vein which raises concern  for thrombus likely tumor thrombus. The confluence of the renal vein  with the IVC appears unremarkable. No evidence of  thrombus within  the IVC. There is a tortuous perinephric collateral vein draining  into the IVC anteriorly. There is mild right renal pelvis urothelial  thickening which can be secondary to tumor extension into the renal  pelvis.          The left kidney is atrophic in appearance with multiple large  calcifications extending to the renal pelvis.      Numerous bilateral simple attenuating cysts, measuring up to 8 cm in  the lower pole of the right kidney. Nonobstructive calcifications  likely representing stones are identified in the right kidney. No  hydronephrosis. The bladder is unremarkable.      REPRODUCTIVE ORGANS: Prostate is nonenlarged.      VESSELS: No acute vascular injury.      LYMPH NODES/RETROPERITONEUM: No acute retroperitoneal abnormality. No  enlarged lymph nodes.      BOWEL/MESENTERY/PERITONEUM: There is a focal area of mild wall  thickening and adjacent stranding centered around a diverticula of  the sigmoid colon (series 301, image 113 and series 302, image 54)  suggestive of mild acute diverticulitis. There is a large amount of  stool burden in the rectum with the mild mesorectal and presacral fat  stranding likely representing  stercoral proctitis. The stomach is  unremarkable. Small and large bowel loops are otherwise within normal  limits. The appendix is not definitively seen, however there is no  pericecal stranding or fluid. Normal appendix. No ascites, free air  or fluid collection.      MUSCULOSKELETAL: There is an expansile lytic osseous lesion centered  in the L4 vertebral body with the adjacent prevertebral soft tissue  component and extension posteriorly into the spinal canal, most  compatible with underlying osseous neoplastic process. Please see  dedicated CT imaging of the lumbar spine for further evaluation.          Impression: 1. 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 Mildly dilated segmental branches of the renal vein and  the main renal vein with hypoattenuation in the lumen which likely  represents a thrombus likely tumor thrombus. No evidence of thrombus  in the IVC.  2. Enhancing right adrenal nodule highly concerning for metastasis.  Indeterminate left adrenal 1.3 cm nodule.  3. Expansile lytic osseous lesion centered at the L4 vertebral body,  likely representing metastatic lesion. Please see dedicated CT  imaging of the lumbar spine for further evaluation.  4. Innumerable solid pulmonary nodules measuring up to 1.1 cm are new  compared to the prior CT on 08/05/2020 likely representing metastasis  from the renal mass.  5. Right pleural effusion with adjacent lung atelectasis..  6. Focal wall thickening and adjacent stranding near sigmoid colon  diverticula, suggestive of mild acute diverticulitis. No pericolonic  abscess, fluid collection or perforation.  7. Large amount of stool burden in the rectum with mild mesorectal  fat stranding likely representing stercoral proctitis.          I personally reviewed the image(s)/study and resident interpretation.  I agree with the findings as stated by resident Carranza  Donna.  Data analyzed and images interpreted at [facility], [place], [place].      MACRO:  None.      Signed by: Jesus Rodriguez 11/30/2024 1:25 PM  Dictation workstation:   [id number]  CT lumbar spine wo IV contrast, CT thoracic spine wo IV contrast  Narrative: Interpreted By:  Juan Jose Quiroz,   STUDY:  CT LUMBAR SPINE WO IV CONTRAST; CT THORACIC SPINE WO IV CONTRAST  11/30/2024 12:01 pm      INDICATION:  Signs/Symptoms:lytic lesion on x-ray lumbar spine;  Signs/Symptoms:lytic lesion found on lumbar x-ray          COMPARISON:  None.      ACCESSION NUMBER(S):  [id number]; [id number]      ORDERING CLINICIAN:  LATA REN      TECHNIQUE:  Axial, coronal, and sagittal CT images of the thoracic and lumbar  spine were reconstructed from a CT of the chest, abdomen, and pelvis  dated 11/30/2024.      FINDINGS:  There is a lytic expansile osseous lesion involving the L4 vertebral  body with extension to the bilateral L4 pedicles most compatible with  underlying osseous neoplastic process. There is mild pathologic  collapse of the L4 vertebral body. There is convexity of the lytic  ill-defined posterior margin of the L4 vertebral body as well as  suspected associated epidural soft tissue contributing to  encroachment upon the spinal canal with concern for severe narrowing  of the spinal canal at the L4 level. There is extension of soft  tissue fullness into the paraspinal soft tissues surrounding the L4  vertebral body. Further evaluation with MRI imaging would be  recommended.      There are bilateral L3 pars interarticularis defects.      The coronal reconstructed images demonstrate a levocurvature of the  lower thoracic and lumbar spine.      There is exaggeration of the kyphotic curvature of the thoracic spine.      There is multilevel spondylosis within the thoracic and lumbar  regions.      At the L1/2 level, there is a posterior disc/osteophyte complex  contributing  to at least moderate spinal canal narrowing.      At the T12/L1 level, there is posterior osteophytic spurring  contributing to mild-to-moderate bony encroachment upon the spinal  canal.      At the T10/11 level, there are hypertrophic degenerative facet  changes a posterior disc/osteophyte complex contributing to moderate  bony encroachment upon the spinal canal.      At the T9/10 level, there is a posterior disc/osteophyte complex and  degenerative facet changes contributing to moderate bony encroachment  upon the spinal canal.      At the T8/9 level, there is a posterior disc/osteophyte complex and  degenerative facet changes contributing to mild-to-moderate bony  encroachment upon the spinal canal.      At the T7/8 level, there is a posterior disc/osteophyte complex and  degenerative facet changes contributing to mild-to-moderate spinal  canal narrowing.      At the T6/7 level, there is a posterior disc/osteophyte complex and  degenerative facet changes contributing to mild spinal canal  narrowing.      At the T5/6 level, there are degenerative facet changes contributing  to mild bony encroachment upon the spinal canal.      Please see the dedicated report of the CT of the chest, abdomen, and  pelvis for details of findings in these regions.          Impression: There is a lytic expansile osseous lesion involving the L4 vertebral  body with extension to the bilateral L4 pedicles most compatible with  underlying osseous neoplastic process. There is mild pathologic  collapse of the L4 vertebral body. There is convexity of the lytic  ill-defined posterior margin of the L4 vertebral body as well as  suspected associated epidural soft tissue contributing to  encroachment upon the spinal canal with concern for severe narrowing  of the spinal canal at the L4 level. There is extension of soft  tissue fullness into the paraspinal soft tissues surrounding the L4  vertebral body. Further evaluation with MRI imaging would  be  recommended.      There are bilateral L3 pars interarticularis defects.      The coronal reconstructed images demonstrate a levocurvature of the  lower thoracic and lumbar spine.      There is exaggeration of the kyphotic curvature of the thoracic spine.      There is multilevel spondylosis within the thoracic and lumbar  regions.      Please see the dedicated report of the CT of the chest, abdomen, and  pelvis for details of findings in these regions.      MACRO:  None      Signed by: Juan Jose Quiroz 11/30/2024 12:17 PM  Dictation workstation:   [id number]      Physical Exam  Constitutional:       Appearance: Normal appearance. He is obese.   HENT:      Mouth/Throat:      Mouth: Mucous membranes are moist.   Eyes:      Extraocular Movements: Extraocular movements intact.      Pupils: Pupils are equal, round, and reactive to light.   Cardiovascular:      Rate and Rhythm: Normal rate and regular rhythm.      Pulses: Normal pulses.      Heart sounds: Normal heart sounds.   Pulmonary:      Effort: Pulmonary effort is normal.      Breath sounds: Normal breath sounds.   Abdominal:      General: Bowel sounds are normal.      Palpations: Abdomen is soft.   Musculoskeletal:         General: Swelling present.      Right lower leg: Edema present.      Left lower leg: Edema present.      Comments: Strength : RUE 5/5 LUE 5/5, RLE 3/5 LLE 3/5, sensation intact, no saddle anesthesia, both lower extremities have ace wrapped, bilateral pitting edema, pulses palpable      Skin:     General: Skin is warm.   Neurological:      General: No focal deficit present.      Mental Status: He is alert and oriented to person, place, and time.      Motor: Weakness present.   Psychiatric:         Mood and Affect: Mood normal.         Behavior: Behavior normal.   Relevant Results  Results for orders placed or performed during the hospital encounter of 11/30/24 (from the past 24 hours)   CBC and Auto Differential   Result Value Ref Range    WBC  7.4 4.4 - 11.3 x10*3/uL    nRBC 0.0 0.0 - 0.0 /100 WBCs    RBC 3.52 (L) 4.50 - 5.90 x10*6/uL    Hemoglobin 9.3 (L) 13.5 - 17.5 g/dL    Hematocrit 30.1 (L) 41.0 - 52.0 %    MCV 86 80 - 100 fL    MCH 26.4 26.0 - 34.0 pg    MCHC 30.9 (L) 32.0 - 36.0 g/dL    RDW 15.1 (H) 11.5 - 14.5 %    Platelets 356 150 - 450 x10*3/uL    Neutrophils % 71.4 40.0 - 80.0 %    Immature Granulocytes %, Automated 0.5 0.0 - 0.9 %    Lymphocytes % 15.2 13.0 - 44.0 %    Monocytes % 7.3 2.0 - 10.0 %    Eosinophils % 4.5 0.0 - 6.0 %    Basophils % 1.1 0.0 - 2.0 %    Neutrophils Absolute 5.27 1.60 - 5.50 x10*3/uL    Immature Granulocytes Absolute, Automated 0.04 0.00 - 0.50 x10*3/uL    Lymphocytes Absolute 1.12 0.80 - 3.00 x10*3/uL    Monocytes Absolute 0.54 0.05 - 0.80 x10*3/uL    Eosinophils Absolute 0.33 0.00 - 0.40 x10*3/uL    Basophils Absolute 0.08 0.00 - 0.10 x10*3/uL   Renal function panel   Result Value Ref Range    Glucose 101 (H) 74 - 99 mg/dL    Sodium 136 136 - 145 mmol/L    Potassium 4.1 3.5 - 5.3 mmol/L    Chloride 101 98 - 107 mmol/L    Bicarbonate 27 21 - 32 mmol/L    Anion Gap 12 10 - 20 mmol/L    Urea Nitrogen 23 6 - 23 mg/dL    Creatinine 1.43 (H) 0.50 - 1.30 mg/dL    eGFR 51 (L) >60 mL/min/1.73m*2    Calcium 8.5 (L) 8.6 - 10.6 mg/dL    Phosphorus 3.7 2.5 - 4.9 mg/dL    Albumin 2.9 (L) 3.4 - 5.0 g/dL   Magnesium   Result Value Ref Range    Magnesium 2.20 1.60 - 2.40 mg/dL   Coagulation Screen   Result Value Ref Range    Protime 15.2 (H) 9.8 - 12.8 seconds    INR 1.3 (H) 0.9 - 1.1    aPTT 30 27 - 38 seconds       Assessment/Plan      75 yo with PMH most notable for HTN, PVD, chronic lymphedema, and CKDIII (baseline Cr 1.2-1.3, GFR 50s) presenting from SNF with LE weakness iso recently discovered L4/5 lesion with mod to severe spinal cord compression. On exam pt has 4/5 strength in LE with normal sensation. Denies bladder/bowel incontinence. Further imaging reveals large renal mass with lesions noted in adrenals, lung, and spine  c/f metastatic RCC. Ortho spine consulted given worsening weakness with spinal lesion recommended IR biopsy of L4 .Imaging also notable for new R renal v thrombus likely related to suspected malignancy. Kidney function is at baseline and making urine.     Updates 12/2:  - FU on IR biopsy of L4 osteolytic lesion today  - FU on brain and C/T MRI for cancer staging today  - ortho spine following recs appreciated, no need for steroid for now     #weakness  #new L4 lesion with canal narrowing  #R renal mass with lesions in adrenals, lungs c/f RCC  -imaging c/f metastatic RCC to adrenals, lung, spine - no bx yet  -MRI L spine c/f L4 lesion with mod to severe spinal canal narrowing likely contributing to weakness  -RCRI: 0 pts, 3.9% risk of major cardiac event; 3.63 METs per DASI score  -tylenol 975mg q8h mild pain, oxy 5mg for mod-severe q6h, dilaudid 0.2mg for breakthrough q4h  -ortho spine consulted for additional recs give MRI findings  -IR consult  biopsy of spine  -will need onc f/up as outpatient for further eval, consider discussion with onc   -keep NPO until procedure today   -pt informed of c/f metastatic cancer based on imaging  -brain, C/T MRI pending      #R renal v thrombus  -seen on CT, likely related to underlying suspected malignancy  -Cr at baseline, having urine output   -ideally pt should be anticoagulated for this however with high risk lesion in spine and possible intervention holding on full AC for now    #CKDIII   -baseline Cr 1.2-1.3, GFR 50s  -avoid nephrotoxic agents  -renally dose medications  -monitor I/Os     #chronic lymphedema  #PVD  #LE wounds  -follows [facility] through [facility]  -no white count or wound discharge, low c/f infection  -on torsemide 20mg daily, holding for now will add back as indicated  -holding home K, Mag supplement-will replete as indicated inpatient  -wound care consult placed     #HTN  -holding losartan for the time being pending surgical eval      #GERD  #prior hx gastric ulcer (H Pylori negative)  -on ppi BID unsure of when stopped (did not see in notes)  -continue pepcid 20mg BID   -add pantoprazole 40mg daily   -avoid NSAIDs     #SAMIA  -continue home iron supplement daily     Disposition  -PT/OT ordered  -f/u: PCP, ortho spine, likely onc     Diet: regular diet   Electrolytes: K >4; Mg >2  DVT ppx: lovenox  GI ppx: protonix   Lines/tubes: PIV  O2: RA  Baseline Cr: 1.2-1.3  Code status: full code- confirmed on admit  Surrogate decision maker: Fabiano Retana, brother, [phone]     Adalberto Christensen MD   PGY-1, Internal Medicine Resident  [facility]

## 2024-12-02 NOTE — PROGRESS NOTES
Per previous Care Transitions note, patient and sister have been provided with a SNF list. LSW followed up with patient. Patient states sister will provide SNF choices. Patient provided sisters contact info Isai Retana . LSW attempted to follow up with sister but line was busy. LSW submitted referral to SNF that were listed on SNF list that was provided. Care Transitions will continue to follow.    [id number]-LSW updated patient that sisters # that was provided earlier is non working. Patient provided correct # for sister Isai Retana . Contact info added to chart. Sister reports she wants to discuss with patient before confirming FOC. Patient verbalized understanding. Care Transitions will continue to follow. Precert needed for discharge.     Laura Murry, KATELYNW

## 2024-12-02 NOTE — CARE PLAN
"The patient's goals for the shift include \"Sleep.\"    The clinical goals for the shift include patient will remain safe and free from injury    Over the shift, the patient did not make progress toward the following goals. Barriers to progression include none. Recommendations to address these barriers include none  Problem: Pain - Adult  Goal: Verbalizes/displays adequate comfort level or baseline comfort level  Outcome: Progressing     Problem: Safety - Adult  Goal: Free from fall injury  Outcome: Progressing     Problem: Discharge Planning  Goal: Discharge to home or other facility with appropriate resources  Outcome: Progressing     Problem: Chronic Conditions and Co-morbidities  Goal: Patient's chronic conditions and co-morbidity symptoms are monitored and maintained or improved  Outcome: Progressing     Problem: Skin  Goal: Prevent/manage excess moisture  Outcome: Progressing  Goal: Prevent/minimize sheer/friction injuries  Outcome: Progressing  Goal: Promote/optimize nutrition  Outcome: Progressing     Problem: Fall/Injury  Goal: Not fall by end of shift  Outcome: Progressing  Goal: Be free from injury by end of the shift  Outcome: Progressing  Goal: Verbalize understanding of personal risk factors for fall in the hospital  Outcome: Progressing  Goal: Verbalize understanding of risk factor reduction measures to prevent injury from fall in the home  Outcome: Progressing  Goal: Use assistive devices by end of the shift  Outcome: Progressing  Goal: Pace activities to prevent fatigue by end of the shift  Outcome: Progressing   .    "

## 2024-12-03 ENCOUNTER — APPOINTMENT (OUTPATIENT)
Dept: RADIOLOGY | Facility: HOSPITAL | Age: 74
End: 2024-12-03
Payer: MEDICARE

## 2024-12-03 LAB
ALBUMIN SERPL BCP-MCNC: 2.9 G/DL (ref 3.4–5)
ANION GAP SERPL CALC-SCNC: 11 MMOL/L (ref 10–20)
BASOPHILS # BLD AUTO: 0.1 X10*3/UL (ref 0–0.1)
BASOPHILS NFR BLD AUTO: 1.3 %
BUN SERPL-MCNC: 23 MG/DL (ref 6–23)
CALCIUM SERPL-MCNC: 8.8 MG/DL (ref 8.6–10.6)
CHLORIDE SERPL-SCNC: 104 MMOL/L (ref 98–107)
CO2 SERPL-SCNC: 27 MMOL/L (ref 21–32)
CREAT SERPL-MCNC: 1.48 MG/DL (ref 0.5–1.3)
EGFRCR SERPLBLD CKD-EPI 2021: 49 ML/MIN/1.73M*2
EOSINOPHIL # BLD AUTO: 0.35 X10*3/UL (ref 0–0.4)
EOSINOPHIL NFR BLD AUTO: 4.4 %
ERYTHROCYTE [DISTWIDTH] IN BLOOD BY AUTOMATED COUNT: 15.1 % (ref 11.5–14.5)
GLUCOSE SERPL-MCNC: 93 MG/DL (ref 74–99)
HCT VFR BLD AUTO: 31.1 % (ref 41–52)
HGB BLD-MCNC: 9.2 G/DL (ref 13.5–17.5)
IMM GRANULOCYTES # BLD AUTO: 0.04 X10*3/UL (ref 0–0.5)
IMM GRANULOCYTES NFR BLD AUTO: 0.5 % (ref 0–0.9)
LYMPHOCYTES # BLD AUTO: 1.27 X10*3/UL (ref 0.8–3)
LYMPHOCYTES NFR BLD AUTO: 16.1 %
MAGNESIUM SERPL-MCNC: 2.33 MG/DL (ref 1.6–2.4)
MCH RBC QN AUTO: 25.4 PG (ref 26–34)
MCHC RBC AUTO-ENTMCNC: 29.6 G/DL (ref 32–36)
MCV RBC AUTO: 86 FL (ref 80–100)
MONOCYTES # BLD AUTO: 0.58 X10*3/UL (ref 0.05–0.8)
MONOCYTES NFR BLD AUTO: 7.4 %
NEUTROPHILS # BLD AUTO: 5.53 X10*3/UL (ref 1.6–5.5)
NEUTROPHILS NFR BLD AUTO: 70.3 %
NRBC BLD-RTO: 0 /100 WBCS (ref 0–0)
PHOSPHATE SERPL-MCNC: 3.1 MG/DL (ref 2.5–4.9)
PLATELET # BLD AUTO: 395 X10*3/UL (ref 150–450)
POTASSIUM SERPL-SCNC: 4.1 MMOL/L (ref 3.5–5.3)
RBC # BLD AUTO: 3.62 X10*6/UL (ref 4.5–5.9)
SODIUM SERPL-SCNC: 138 MMOL/L (ref 136–145)
WBC # BLD AUTO: 7.9 X10*3/UL (ref 4.4–11.3)

## 2024-12-03 PROCEDURE — 99232 SBSQ HOSP IP/OBS MODERATE 35: CPT | Performed by: STUDENT IN AN ORGANIZED HEALTH CARE EDUCATION/TRAINING PROGRAM

## 2024-12-03 PROCEDURE — G0378 HOSPITAL OBSERVATION PER HR: HCPCS

## 2024-12-03 PROCEDURE — 2500000004 HC RX 250 GENERAL PHARMACY W/ HCPCS (ALT 636 FOR OP/ED)

## 2024-12-03 PROCEDURE — 2500000001 HC RX 250 WO HCPCS SELF ADMINISTERED DRUGS (ALT 637 FOR MEDICARE OP): Performed by: STUDENT IN AN ORGANIZED HEALTH CARE EDUCATION/TRAINING PROGRAM

## 2024-12-03 PROCEDURE — 80069 RENAL FUNCTION PANEL: CPT | Performed by: STUDENT IN AN ORGANIZED HEALTH CARE EDUCATION/TRAINING PROGRAM

## 2024-12-03 PROCEDURE — 2500000004 HC RX 250 GENERAL PHARMACY W/ HCPCS (ALT 636 FOR OP/ED): Performed by: STUDENT IN AN ORGANIZED HEALTH CARE EDUCATION/TRAINING PROGRAM

## 2024-12-03 PROCEDURE — 72141 MRI NECK SPINE W/O DYE: CPT | Mod: RSC | Performed by: RADIOLOGY

## 2024-12-03 PROCEDURE — 72146 MRI CHEST SPINE W/O DYE: CPT

## 2024-12-03 PROCEDURE — 83735 ASSAY OF MAGNESIUM: CPT

## 2024-12-03 PROCEDURE — 72141 MRI NECK SPINE W/O DYE: CPT

## 2024-12-03 PROCEDURE — 36415 COLL VENOUS BLD VENIPUNCTURE: CPT | Performed by: STUDENT IN AN ORGANIZED HEALTH CARE EDUCATION/TRAINING PROGRAM

## 2024-12-03 PROCEDURE — 70551 MRI BRAIN STEM W/O DYE: CPT | Mod: RSC | Performed by: RADIOLOGY

## 2024-12-03 PROCEDURE — 2500000001 HC RX 250 WO HCPCS SELF ADMINISTERED DRUGS (ALT 637 FOR MEDICARE OP)

## 2024-12-03 PROCEDURE — 72146 MRI CHEST SPINE W/O DYE: CPT | Mod: RSC | Performed by: RADIOLOGY

## 2024-12-03 PROCEDURE — 85025 COMPLETE CBC W/AUTO DIFF WBC: CPT | Performed by: STUDENT IN AN ORGANIZED HEALTH CARE EDUCATION/TRAINING PROGRAM

## 2024-12-03 PROCEDURE — 70551 MRI BRAIN STEM W/O DYE: CPT | Mod: RSC

## 2024-12-03 PROCEDURE — 1100000001 HC PRIVATE ROOM DAILY

## 2024-12-03 RX ORDER — ENOXAPARIN SODIUM 100 MG/ML
40 INJECTION SUBCUTANEOUS EVERY 24 HOURS
Qty: 30 EACH | Refills: 0 | Status: CANCELLED | OUTPATIENT
Start: 2024-12-03 | End: 2025-01-02

## 2024-12-03 RX ADMIN — POLYETHYLENE GLYCOL 3350 17 G: 17 POWDER, FOR SOLUTION ORAL at 08:23

## 2024-12-03 RX ADMIN — ACETAMINOPHEN 975 MG: 325 TABLET ORAL at 19:29

## 2024-12-03 RX ADMIN — ENOXAPARIN SODIUM 40 MG: 100 INJECTION SUBCUTANEOUS at 22:00

## 2024-12-03 RX ADMIN — LORAZEPAM 0.25 MG: 0.5 TABLET ORAL at 19:22

## 2024-12-03 RX ADMIN — FERROUS SULFATE TAB 325 MG (65 MG ELEMENTAL FE) 325 MG: 325 (65 FE) TAB at 08:23

## 2024-12-03 RX ADMIN — FAMOTIDINE 20 MG: 20 TABLET ORAL at 22:00

## 2024-12-03 RX ADMIN — FAMOTIDINE 20 MG: 20 TABLET ORAL at 08:23

## 2024-12-03 RX ADMIN — ASPIRIN 81 MG: 81 TABLET, COATED ORAL at 08:23

## 2024-12-03 RX ADMIN — LOSARTAN POTASSIUM 50 MG: 50 TABLET, FILM COATED ORAL at 08:23

## 2024-12-03 RX ADMIN — ACETAMINOPHEN 975 MG: 325 TABLET ORAL at 08:26

## 2024-12-03 ASSESSMENT — PAIN SCALES - GENERAL
PAINLEVEL_OUTOF10: 3
PAINLEVEL_OUTOF10: 0 - NO PAIN
PAINLEVEL_OUTOF10: 0 - NO PAIN
PAINLEVEL_OUTOF10: 1

## 2024-12-03 ASSESSMENT — PAIN - FUNCTIONAL ASSESSMENT
PAIN_FUNCTIONAL_ASSESSMENT: 0-10

## 2024-12-03 NOTE — CARE PLAN
"The patient's goals for the shift include \"Sleep.\"    The clinical goals for the shift include Pt will remain safe and free of injury during night.    Pt remained safe and free of injury during night. No pain or other distress noted. Call light in reach. Resting quietly at this time.     Alma Canales RN      "

## 2024-12-03 NOTE — PROGRESS NOTES
"Abdi Retana \"Louise" is a 74 y.o. male on day 3 of admission presenting with Lesion of lumbar spine.    Subjective   No acute events overnight. No acute worsening of LE weakness overnight. Per discussion with interventional radiology team, the IR-guided biopsy to be coordinated as an outpatient.        Objective     Last Recorded Vitals  /67 (BP Location: Left arm, Patient Position: Lying)   Pulse 76   Temp 36 °C (96.8 °F) (Temporal)   Resp 17   Wt 122 kg (270 lb) Comment: per pt guess.  SpO2 93%   Intake/Output last 3 Shifts:    Intake/Output Summary (Last 24 hours) at 12/3/2024 1001  Last data filed at 12/3/2024 0346  Gross per 24 hour   Intake --   Output 650 ml   Net -650 ml       Admission Weight  Weight: 122 kg (270 lb) (per pt guess.) (11/30/24 0948)    Daily Weight  11/30/24 : 122 kg (270 lb)    Image Results  MR lumbar spine w and wo IV contrast  Narrative: Interpreted By:  Desi Becerra and Baker Zachary   STUDY:  MR LUMBAR SPINE W AND WO IV CONTRAST;  11/30/2024 2:29 pm      INDICATION:  Signs/Symptoms:lower extremity weakness, lytic lesion on CT with  severe spinal cord compression.      COMPARISON:  Same day lumbar spine CT at 12:01 p.m.      ACCESSION NUMBER(S):  [id number]      ORDERING CLINICIAN:  LATA REN      TECHNIQUE:  Pre and postcontrast sagittal T1, T2, STIR, pre and postcontrast  axial T1 and T2 weighted images of the lumbar spine were acquired. 20  mL of intravenous Dotarem was administered without complication.      FINDINGS:  Alignment: There is trace anterolisthesis of L2 on L3 and trace  retrolisthesis of L5 on S1. Alignment is otherwise maintained.      Vertebrae/Intervertebral Discs: There is expansile marrow replacement  of the near entirety of the L4 vertebral body with extension into the  pedicles bilaterally and associated heterogenous enhancement on  postcontrast imaging. There is also a mild compression deformity of  the L4 vertebral body with a approximately " 20% height loss, as well  as mild convexity of the posterior margin of the vertebral body.  There is epidural extension of neoplasm along the posterior margin of  the vertebral body measuring up to 7 cm in maximum thickness along  the anterolateral aspect of thecal sac. There is abnormal soft tissue  encroaching upon bilateral L3-L4 and L4-L5 neural foramina, most  significantly causing moderate to severe bilateral neural foraminal  narrowing at L4-L5. The prevertebral soft tissue component is also  noted, left greater than right encroaching upon bilateral psoas  muscles.      The remaining vertebral bodies demonstrate normal signal without  height loss. There are mild multilevel degenerative endplate changes  and anterior osteophytosis. No additional areas of abnormal  enhancement. Abnormal areas of enhancement and increased STIR signal  also noted throughout the L3-L4 and L4-L5 intervertebral disc spaces.  The intervertebral discs otherwise demonstrate normal signal and  morphology.      Conus: The lower thoracic cord appears unremarkable. The conus  terminates at L1.      T12-L1: Small disc bulge with mild bilateral neural foramina  narrowing. No significant spinal canal narrowing.      L1-2: Disc bulge, bilateral facet hypertrophy, and ligamentum flavum  thickening resulting in moderate to severe spinal canal narrowing and  moderate bilateral neural foramina narrowing.      L2-3: Circumferential disc bulge, bilateral facet hypertrophy, and  ligamentum flavum thickening resulting in flattening of the anterior  thecal sac and moderate bilateral neural foramina narrowing. No  significant spinal canal narrowing.      L3-4: Small disc bulge, severe bilateral facet hypertrophy, and  ligamentum flavum thickening resulting in moderate bilateral neural  foramina narrowing. No significant spinal canal narrowing at this  level.      L4-5: Expansile marrow replacing lesion within the L4 vertebral body  with associated  epidural and neural foramina soft tissue component  which results in moderate to severe spinal canal narrowing at the L4  level, and moderate to severe bilateral neural foramina narrowing at  L4-L5. Additionally bilateral facet hypertrophy contributes to neural  foramina narrowing.      L5-S1: Small disc bulge, bilateral facet hypertrophy, and ligamentum  flavum thickening resulting in moderate-to-severe bilateral neural  foramina narrowing. No significant spinal canal narrowing.      Increased STIR signal and enhancement within th prevertebral soft  tissues at the L4 and L5 levels. Paravertebral soft tissues otherwise  unremarkable. Nonspecific edema within the dependent portions of the  posterior lumbar soft tissues      Impression: 1. Expansile enhancing marrow replacing lesion involving the near  entirety of the L4 vertebral body with extension into the pedicles  bilaterally, suggestive of a neoplastic process. There is mild  associated compression deformity of the L4 vertebral body and  convexity of the posterior margin of the vertebral body, with  associated soft tissue component extending into the epidural space  and bilateral neural foramina. It results in moderate to severe  spinal canal narrowing at the L4 level and moderate to severe  bilateral neural foramina narrowing at L4-L5.  2. Multilevel degenerative changes throughout the lumbar spine, most  pronounced at L1-L2.      I personally reviewed the images/study and I agree with the findings  as stated by Dr. Anthony Slater M.D. This study was interpreted at  [facility], [place], [place].      MACRO:  None      Signed by: Desi Becerra 11/30/2024 3:28 PM  Dictation workstation:   [id number]  CT chest abdomen pelvis w IV contrast  Narrative: Interpreted By:  Jesus Rodriguez,  and Donna Carranza   STUDY:  CT CHEST ABDOMEN PELVIS W IV CONTRAST;  11/30/2024 12:01 pm      INDICATION:  Signs/Symptoms:lytic lesion  found on x-ray lumbar spine 11-28-24.      COMPARISON:  None.      ACCESSION NUMBER(S):  [id number]      ORDERING CLINICIAN:  LATA REN      TECHNIQUE:  Contiguous axial images of the chest, abdomen, and pelvis were  obtained after the intravenous administration of 100 mL Omnipaque 350  contrast.  Coronal and sagittal reformatted images were reconstructed  from the axial data.      FINDINGS:  CT CHEST:      MEDIASTINUM AND LYMPH NODES:  No enlarged intrathoracic or axillary  lymph nodes by imaging criteria.  The esophagus appears within normal  limits. No pneumomediastinum.      VESSELS:  Normal caliber aorta without dissection. No significant  aortic atherosclerosis.      HEART: Normal size.  No coronary artery calcifications. No  significant pericardial effusion.      LUNG, AIRWAYS, PLEURA: There is a 1.2 cm soft tissue density nodule  in the right lateral tracheal wall above the level of the reshma  which may represent a polyp or a retained mucus. Small amount of  mucus within the distal trachea. There is a small right pleural  effusion with the adjacent lung atelectasis. No pneumothorax.  Innumerable solid pulmonary throughout the lungs, for example  measuring 1.0 cm in the right middle lobe (series 204, image 143),  and 1.1 cm in the left lower lobe (series 204, image 158).      CHEST WALL SOFT TISSUES: Right-greater-than-left gynecomastia.      OSSEOUS STRUCTURES: No acute osseous abnormality. Please see  separately dictated thoracic spine.          CT ABDOMEN/PELVIS:      ABDOMINAL WALL: No acute abnormality.      LIVER: The liver is normal in size and homogeneous in attenuation.  There is a 3.0 cm hypodense cystic lesion in the right hepatic dome,  unchanged from the prior exam.      BILE DUCTS: No significant intrahepatic or extrahepatic dilatation.      GALLBLADDER: No significant abnormality.      SPLEEN: No significant abnormality.      PANCREAS: No significant abnormality.      ADRENALS: There is a  2.9 x 1.6 x 1.4 cm right adrenal nodule highly  concerning for metastasis. There is also a indeterminate 1.3 x 1.3 cm  left adrenal nodule.      KIDNEYS, URETERS, BLADDER: There is a large infiltrative right upper  and interpolar region heterogeneously enhancing mass measuring 7.8 x  6.7 cm in axial dimensions and 10 cm in craniocaudal dimension. The  mass has central areas of necrosis and demonstrates lobular areas of  extension into the perinephric fat anteriorly and posteriorly. There  is contiguous infiltration into the renal collecting system. The main  renal vein and segmental branches appear mildly dilated. There is  questionable linear hypoattenuation within the segmental branches of  the renal vein as well as the main renal vein which raises concern  for thrombus likely tumor thrombus. The confluence of the renal vein  with the IVC appears unremarkable. No evidence of  thrombus within  the IVC. There is a tortuous perinephric collateral vein draining  into the IVC anteriorly. There is mild right renal pelvis urothelial  thickening which can be secondary to tumor extension into the renal  pelvis.          The left kidney is atrophic in appearance with multiple large  calcifications extending to the renal pelvis.      Numerous bilateral simple attenuating cysts, measuring up to 8 cm in  the lower pole of the right kidney. Nonobstructive calcifications  likely representing stones are identified in the right kidney. No  hydronephrosis. The bladder is unremarkable.      REPRODUCTIVE ORGANS: Prostate is nonenlarged.      VESSELS: No acute vascular injury.      LYMPH NODES/RETROPERITONEUM: No acute retroperitoneal abnormality. No  enlarged lymph nodes.      BOWEL/MESENTERY/PERITONEUM: There is a focal area of mild wall  thickening and adjacent stranding centered around a diverticula of  the sigmoid colon (series 301, image 113 and series 302, image 54)  suggestive of mild acute diverticulitis. There is a large  amount of  stool burden in the rectum with the mild mesorectal and presacral fat  stranding likely representing stercoral proctitis. The stomach is  unremarkable. Small and large bowel loops are otherwise within normal  limits. The appendix is not definitively seen, however there is no  pericecal stranding or fluid. Normal appendix. No ascites, free air  or fluid collection.      MUSCULOSKELETAL: There is an expansile lytic osseous lesion centered  in the L4 vertebral body with the adjacent prevertebral soft tissue  component and extension posteriorly into the spinal canal, most  compatible with underlying osseous neoplastic process. Please see  dedicated CT imaging of the lumbar spine for further evaluation.          Impression: 1. 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 Mildly dilated segmental branches of the renal vein and  the main renal vein with hypoattenuation in the lumen which likely  represents a thrombus likely tumor thrombus. No evidence of thrombus  in the IVC.  2. Enhancing right adrenal nodule highly concerning for metastasis.  Indeterminate left adrenal 1.3 cm nodule.  3. Expansile lytic osseous lesion centered at the L4 vertebral body,  likely representing metastatic lesion. Please see dedicated CT  imaging of the lumbar spine for further evaluation.  4. Innumerable solid pulmonary nodules measuring up to 1.1 cm are new  compared to the prior CT on 08/05/2020 likely representing metastasis  from the renal mass.  5. Right pleural effusion with adjacent lung atelectasis..  6. Focal wall thickening and adjacent stranding near sigmoid colon  diverticula, suggestive of mild acute diverticulitis. No pericolonic  abscess, fluid collection or perforation.  7. Large amount of stool burden in the rectum with mild mesorectal  fat stranding likely representing stercoral proctitis.          I personally  reviewed the image(s)/study and resident interpretation.  I agree with the findings as stated by resident Dillon Thompson.  Data analyzed and images interpreted at [facility], [place], [place].      MACRO:  None.      Signed by: Jesus Rodriguez 11/30/2024 1:25 PM  Dictation workstation:   [id number]  CT lumbar spine wo IV contrast, CT thoracic spine wo IV contrast  Narrative: Interpreted By:  Juan Jose Quiroz,   STUDY:  CT LUMBAR SPINE WO IV CONTRAST; CT THORACIC SPINE WO IV CONTRAST  11/30/2024 12:01 pm      INDICATION:  Signs/Symptoms:lytic lesion on x-ray lumbar spine;  Signs/Symptoms:lytic lesion found on lumbar x-ray          COMPARISON:  None.      ACCESSION NUMBER(S):  [id number]; [id number]      ORDERING CLINICIAN:  LATA REN      TECHNIQUE:  Axial, coronal, and sagittal CT images of the thoracic and lumbar  spine were reconstructed from a CT of the chest, abdomen, and pelvis  dated 11/30/2024.      FINDINGS:  There is a lytic expansile osseous lesion involving the L4 vertebral  body with extension to the bilateral L4 pedicles most compatible with  underlying osseous neoplastic process. There is mild pathologic  collapse of the L4 vertebral body. There is convexity of the lytic  ill-defined posterior margin of the L4 vertebral body as well as  suspected associated epidural soft tissue contributing to  encroachment upon the spinal canal with concern for severe narrowing  of the spinal canal at the L4 level. There is extension of soft  tissue fullness into the paraspinal soft tissues surrounding the L4  vertebral body. Further evaluation with MRI imaging would be  recommended.      There are bilateral L3 pars interarticularis defects.      The coronal reconstructed images demonstrate a levocurvature of the  lower thoracic and lumbar spine.      There is exaggeration of the kyphotic curvature of the thoracic spine.      There is multilevel spondylosis within the  thoracic and lumbar  regions.      At the L1/2 level, there is a posterior disc/osteophyte complex  contributing to at least moderate spinal canal narrowing.      At the T12/L1 level, there is posterior osteophytic spurring  contributing to mild-to-moderate bony encroachment upon the spinal  canal.      At the T10/11 level, there are hypertrophic degenerative facet  changes a posterior disc/osteophyte complex contributing to moderate  bony encroachment upon the spinal canal.      At the T9/10 level, there is a posterior disc/osteophyte complex and  degenerative facet changes contributing to moderate bony encroachment  upon the spinal canal.      At the T8/9 level, there is a posterior disc/osteophyte complex and  degenerative facet changes contributing to mild-to-moderate bony  encroachment upon the spinal canal.      At the T7/8 level, there is a posterior disc/osteophyte complex and  degenerative facet changes contributing to mild-to-moderate spinal  canal narrowing.      At the T6/7 level, there is a posterior disc/osteophyte complex and  degenerative facet changes contributing to mild spinal canal  narrowing.      At the T5/6 level, there are degenerative facet changes contributing  to mild bony encroachment upon the spinal canal.      Please see the dedicated report of the CT of the chest, abdomen, and  pelvis for details of findings in these regions.          Impression: There is a lytic expansile osseous lesion involving the L4 vertebral  body with extension to the bilateral L4 pedicles most compatible with  underlying osseous neoplastic process. There is mild pathologic  collapse of the L4 vertebral body. There is convexity of the lytic  ill-defined posterior margin of the L4 vertebral body as well as  suspected associated epidural soft tissue contributing to  encroachment upon the spinal canal with concern for severe narrowing  of the spinal canal at the L4 level. There is extension of soft  tissue fullness  into the paraspinal soft tissues surrounding the L4  vertebral body. Further evaluation with MRI imaging would be  recommended.      There are bilateral L3 pars interarticularis defects.      The coronal reconstructed images demonstrate a levocurvature of the  lower thoracic and lumbar spine.      There is exaggeration of the kyphotic curvature of the thoracic spine.      There is multilevel spondylosis within the thoracic and lumbar  regions.      Please see the dedicated report of the CT of the chest, abdomen, and  pelvis for details of findings in these regions.      MACRO:  None      Signed by: Juan Jose Quiroz 11/30/2024 12:17 PM  Dictation workstation:   [id number]      Physical Exam  Constitutional:       Appearance: Normal appearance. He is obese.   HENT:      Mouth/Throat:      Mouth: Mucous membranes are moist.   Eyes:      Extraocular Movements: Extraocular movements intact.      Pupils: Pupils are equal, round, and reactive to light.   Cardiovascular:      Rate and Rhythm: Normal rate and regular rhythm.      Pulses: Normal pulses.      Heart sounds: Normal heart sounds.   Pulmonary:      Effort: Pulmonary effort is normal.      Breath sounds: Normal breath sounds.   Abdominal:      General: Bowel sounds are normal.      Palpations: Abdomen is soft.   Musculoskeletal:         General: Swelling present.      Right lower leg: Edema present.      Left lower leg: Edema present.      Comments: Strength : RUE 5/5 LUE 5/5, RLE 3/5 LLE 3/5, sensation intact, no saddle anesthesia, both lower extremities have ace wrapped, bilateral pitting edema, pulses palpable      Skin:     General: Skin is warm.   Neurological:      General: No focal deficit present.      Mental Status: He is alert and oriented to person, place, and time.      Motor: Weakness present.   Psychiatric:         Mood and Affect: Mood normal.         Behavior: Behavior normal.   Relevant Results  Results for orders placed or performed during the  hospital encounter of 11/30/24 (from the past 24 hours)   Magnesium   Result Value Ref Range    Magnesium 2.33 1.60 - 2.40 mg/dL   CBC and Auto Differential   Result Value Ref Range    WBC 7.9 4.4 - 11.3 x10*3/uL    nRBC 0.0 0.0 - 0.0 /100 WBCs    RBC 3.62 (L) 4.50 - 5.90 x10*6/uL    Hemoglobin 9.2 (L) 13.5 - 17.5 g/dL    Hematocrit 31.1 (L) 41.0 - 52.0 %    MCV 86 80 - 100 fL    MCH 25.4 (L) 26.0 - 34.0 pg    MCHC 29.6 (L) 32.0 - 36.0 g/dL    RDW 15.1 (H) 11.5 - 14.5 %    Platelets 395 150 - 450 x10*3/uL    Neutrophils % 70.3 40.0 - 80.0 %    Immature Granulocytes %, Automated 0.5 0.0 - 0.9 %    Lymphocytes % 16.1 13.0 - 44.0 %    Monocytes % 7.4 2.0 - 10.0 %    Eosinophils % 4.4 0.0 - 6.0 %    Basophils % 1.3 0.0 - 2.0 %    Neutrophils Absolute 5.53 (H) 1.60 - 5.50 x10*3/uL    Immature Granulocytes Absolute, Automated 0.04 0.00 - 0.50 x10*3/uL    Lymphocytes Absolute 1.27 0.80 - 3.00 x10*3/uL    Monocytes Absolute 0.58 0.05 - 0.80 x10*3/uL    Eosinophils Absolute 0.35 0.00 - 0.40 x10*3/uL    Basophils Absolute 0.10 0.00 - 0.10 x10*3/uL   Renal function panel   Result Value Ref Range    Glucose 93 74 - 99 mg/dL    Sodium 138 136 - 145 mmol/L    Potassium 4.1 3.5 - 5.3 mmol/L    Chloride 104 98 - 107 mmol/L    Bicarbonate 27 21 - 32 mmol/L    Anion Gap 11 10 - 20 mmol/L    Urea Nitrogen 23 6 - 23 mg/dL    Creatinine 1.48 (H) 0.50 - 1.30 mg/dL    eGFR 49 (L) >60 mL/min/1.73m*2    Calcium 8.8 8.6 - 10.6 mg/dL    Phosphorus 3.1 2.5 - 4.9 mg/dL    Albumin 2.9 (L) 3.4 - 5.0 g/dL       Assessment/Plan      73 yo with PMH most notable for HTN, PVD, chronic lymphedema, and CKDIII (baseline Cr 1.2-1.3, GFR 50s) presenting from SNF with LE weakness iso recently discovered L4/5 lesion with mod to severe spinal cord compression. On exam pt has 4/5 strength in LE with normal sensation. Denies bladder/bowel incontinence. 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  Ortho spine consulted given worsening weakness with spinal lesion recommended IR biopsy of L4 .Imaging also notable for new R renal v thrombus likely related to suspected malignancy. Kidney function is at baseline and making urine.     #weakness  #new L4 lesion with canal narrowing  #R renal mass with lesions in adrenals, lungs c/f RCC  -imaging c/f metastatic RCC to adrenals, lung, spine - no bx yet  -MRI L spine c/f L4 lesion with mod to severe spinal canal narrowing likely contributing to weakness  -RCRI: 0 pts, 3.9% risk of major cardiac event; 3.63 METs per DASI score  -tylenol 975mg q8h mild pain, oxy 5mg for mod-severe q6h, dilaudid 0.2mg for breakthrough q4h  -ortho spine is okay with MRI brain + C/T spine being done as outpatient   -IR consult biopsy of spine to be done as outpatient (ordered and discussing with outpatient IR coordinator)  -will need onc f/up as outpatient for further eval, consider discussion with onc   -pt informed of c/f metastatic cancer based on imaging     #R renal v thrombus  -seen on CT, likely related to underlying suspected malignancy  -Cr at baseline, having urine output   -likely tumor thrombus which will not warrant therapeutic anticoagulation; on lovenox 40 mg daily     #CKDIII   -baseline Cr 1.2-1.3, GFR 50s  -avoid nephrotoxic agents  -renally dose medications  -monitor I/Os     #chronic lymphedema  #PVD  #LE wounds  -follows [facility] through [facility]  -no white count or wound discharge, low c/f infection  -on torsemide 20mg daily, holding for now will add back as indicated  -holding home K, Mag supplement-will replete as indicated inpatient  -wound care consult placed     #HTN  -losartan 50 mg daily      #GERD  #prior hx gastric ulcer (H Pylori negative)  -on ppi BID unsure of when stopped (did not see in notes)  -continue pepcid 20mg BID   -add pantoprazole 40mg daily   -avoid NSAIDs     #SAMIA  -continue home iron supplement daily      Disposition  -PT/OT ordered  -f/u: PCP, ortho spine, hematology-oncology  -will discussion with IDT regarding disposition to a different SNF than [facility] (current facility)     Diet: regular diet   Electrolytes: K >4; Mg >2  DVT ppx: lovenox  GI ppx: protonix   Lines/tubes: PIV  O2: RA  Baseline Cr: 1.2-1.3  Code status: full code- confirmed on admit  Surrogate decision maker: Fabiano Retana, brother, [phone]     Adalberto Christensen MD   PGY-1, Internal Medicine Resident  [facility]

## 2024-12-03 NOTE — DISCHARGE INSTRUCTIONS
Nawafgali Mr. Retana,     You were admitted to the hospital for progressive difficulty with ambulation.     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   We started you on lovenox; we will defer DVT prophylaxis to the providers at your skilled nursing home.   While you were here we gave you prophylactic lovenox and continued your home medications.     We're glad you are feeling well enough to go home.     Medications:  Please take the following medications as prescribed    Rest of medications per after visit summary    Appointments/follow-up:  Primary care follow-up (please have [facility] provider coordinate timing)  Interventional radiology appointment for IR-guided biopsy of L4 vertebral lesion, consult order placed (please have [facility] provider call to coordinate timing)  Hematology-oncology (after biopsy is performed), referral placed (please have [facility] provider call to coordinate timing)    We have requested an appointment with your doctor as soon as possible.   We have made those referrals, please schedule them at your convenience.  The central scheduling line is [phone] if you do not hear from one of these services or if you need to reschedule.    It was a pleasure caring for you at .    Sincerely,  Your  [facility]

## 2024-12-03 NOTE — PROGRESS NOTES
"Abdi Retana \"Louise" is a 74 y.o. male on day 3 of admission presenting with Lesion of lumbar spine.  Transitional Care Coordination Progress Note:  Patient discussed during interdisciplinary rounds.   Team members present: MD and TCC  Plan per Medical/Surgical team: Patient pending an MRI, patient will need to follow up with IR for Biopsy outpatient.  Payor: [facility] Medicare.  Discharge disposition: [facility].  Potential Barriers: None  ADOD: 12/05/24    TOMMIE OROZCO RN      "

## 2024-12-03 NOTE — CARE PLAN
"The patient's goals for the shift include \"Sleep.\"    The clinical goals for the shift include Pt will remain safe and free from injury    Over the shift, the patient did not make progress toward the following goals. Barriers to progression include none. Recommendations to address these barriers include none  Problem: Pain - Adult  Goal: Verbalizes/displays adequate comfort level or baseline comfort level  Outcome: Progressing     Problem: Safety - Adult  Goal: Free from fall injury  Outcome: Progressing     Problem: Discharge Planning  Goal: Discharge to home or other facility with appropriate resources  Outcome: Progressing     Problem: Chronic Conditions and Co-morbidities  Goal: Patient's chronic conditions and co-morbidity symptoms are monitored and maintained or improved  Outcome: Progressing     Problem: Skin  Goal: Prevent/manage excess moisture  Outcome: Progressing  Goal: Prevent/minimize sheer/friction injuries  Outcome: Progressing  Goal: Promote/optimize nutrition  Outcome: Progressing     Problem: Fall/Injury  Goal: Not fall by end of shift  Outcome: Progressing  Goal: Be free from injury by end of the shift  Outcome: Progressing  Goal: Verbalize understanding of personal risk factors for fall in the hospital  Outcome: Progressing  Goal: Verbalize understanding of risk factor reduction measures to prevent injury from fall in the home  Outcome: Progressing  Goal: Use assistive devices by end of the shift  Outcome: Progressing  Goal: Pace activities to prevent fatigue by end of the shift  Outcome: Progressing   .    "

## 2024-12-03 NOTE — PROGRESS NOTES
LSW followed up with sister Micaela. Sister confirmed [facility] as FOC. [facility] accepted. LSW requested direct precert team to initiate precert. Care Transitions will continue to follow.     [id number] notified sister on acceptance and precert process. Precert has been initiated and is pending.     HUMA Gottlieb

## 2024-12-04 LAB
ALBUMIN SERPL BCP-MCNC: 2.9 G/DL (ref 3.4–5)
ANION GAP SERPL CALC-SCNC: 11 MMOL/L (ref 10–20)
BUN SERPL-MCNC: 25 MG/DL (ref 6–23)
CALCIUM SERPL-MCNC: 8.7 MG/DL (ref 8.6–10.6)
CHLORIDE SERPL-SCNC: 104 MMOL/L (ref 98–107)
CO2 SERPL-SCNC: 27 MMOL/L (ref 21–32)
CREAT SERPL-MCNC: 1.41 MG/DL (ref 0.5–1.3)
EGFRCR SERPLBLD CKD-EPI 2021: 52 ML/MIN/1.73M*2
GLUCOSE SERPL-MCNC: 99 MG/DL (ref 74–99)
MAGNESIUM SERPL-MCNC: 2.45 MG/DL (ref 1.6–2.4)
PHOSPHATE SERPL-MCNC: 3.3 MG/DL (ref 2.5–4.9)
POTASSIUM SERPL-SCNC: 3.9 MMOL/L (ref 3.5–5.3)
SODIUM SERPL-SCNC: 138 MMOL/L (ref 136–145)

## 2024-12-04 PROCEDURE — 97530 THERAPEUTIC ACTIVITIES: CPT | Mod: GP

## 2024-12-04 PROCEDURE — 97535 SELF CARE MNGMENT TRAINING: CPT | Mod: GO

## 2024-12-04 PROCEDURE — 2500000001 HC RX 250 WO HCPCS SELF ADMINISTERED DRUGS (ALT 637 FOR MEDICARE OP)

## 2024-12-04 PROCEDURE — 36415 COLL VENOUS BLD VENIPUNCTURE: CPT

## 2024-12-04 PROCEDURE — 1100000001 HC PRIVATE ROOM DAILY

## 2024-12-04 PROCEDURE — 99233 SBSQ HOSP IP/OBS HIGH 50: CPT | Performed by: INTERNAL MEDICINE

## 2024-12-04 PROCEDURE — 83735 ASSAY OF MAGNESIUM: CPT

## 2024-12-04 PROCEDURE — 97530 THERAPEUTIC ACTIVITIES: CPT | Mod: GO

## 2024-12-04 PROCEDURE — 2500000001 HC RX 250 WO HCPCS SELF ADMINISTERED DRUGS (ALT 637 FOR MEDICARE OP): Performed by: STUDENT IN AN ORGANIZED HEALTH CARE EDUCATION/TRAINING PROGRAM

## 2024-12-04 PROCEDURE — 80069 RENAL FUNCTION PANEL: CPT | Performed by: STUDENT IN AN ORGANIZED HEALTH CARE EDUCATION/TRAINING PROGRAM

## 2024-12-04 PROCEDURE — 2500000004 HC RX 250 GENERAL PHARMACY W/ HCPCS (ALT 636 FOR OP/ED): Performed by: STUDENT IN AN ORGANIZED HEALTH CARE EDUCATION/TRAINING PROGRAM

## 2024-12-04 PROCEDURE — G0378 HOSPITAL OBSERVATION PER HR: HCPCS

## 2024-12-04 RX ADMIN — ACETAMINOPHEN 975 MG: 325 TABLET ORAL at 08:30

## 2024-12-04 RX ADMIN — FERROUS SULFATE TAB 325 MG (65 MG ELEMENTAL FE) 325 MG: 325 (65 FE) TAB at 08:31

## 2024-12-04 RX ADMIN — ENOXAPARIN SODIUM 40 MG: 100 INJECTION SUBCUTANEOUS at 20:00

## 2024-12-04 RX ADMIN — FAMOTIDINE 20 MG: 20 TABLET ORAL at 08:30

## 2024-12-04 RX ADMIN — FAMOTIDINE 20 MG: 20 TABLET ORAL at 20:00

## 2024-12-04 RX ADMIN — LOSARTAN POTASSIUM 50 MG: 50 TABLET, FILM COATED ORAL at 08:31

## 2024-12-04 ASSESSMENT — PAIN SCALES - GENERAL
PAINLEVEL_OUTOF10: 0 - NO PAIN
PAINLEVEL_OUTOF10: 3
PAINLEVEL_OUTOF10: 0 - NO PAIN
PAINLEVEL_OUTOF10: 0 - NO PAIN

## 2024-12-04 ASSESSMENT — COGNITIVE AND FUNCTIONAL STATUS - GENERAL
HELP NEEDED FOR BATHING: A LOT
STANDING UP FROM CHAIR USING ARMS: A LOT
DRESSING REGULAR UPPER BODY CLOTHING: A LITTLE
MOBILITY SCORE: 13
WALKING IN HOSPITAL ROOM: A LITTLE
PERSONAL GROOMING: A LITTLE
MOVING TO AND FROM BED TO CHAIR: A LOT
TOILETING: A LOT
CLIMB 3 TO 5 STEPS WITH RAILING: A LOT
MOVING FROM LYING ON BACK TO SITTING ON SIDE OF FLAT BED WITH BEDRAILS: A LOT
TURNING FROM BACK TO SIDE WHILE IN FLAT BAD: A LOT
DAILY ACTIVITIY SCORE: 14
EATING MEALS: A LITTLE
DRESSING REGULAR LOWER BODY CLOTHING: TOTAL

## 2024-12-04 ASSESSMENT — PAIN - FUNCTIONAL ASSESSMENT
PAIN_FUNCTIONAL_ASSESSMENT: 0-10

## 2024-12-04 ASSESSMENT — ACTIVITIES OF DAILY LIVING (ADL): HOME_MANAGEMENT_TIME_ENTRY: 8

## 2024-12-04 NOTE — PROGRESS NOTES
"Abdi Retana \"Louise" is a 74 y.o. male on day 4 of admission presenting with Lesion of lumbar spine.    Subjective   No acute events overnight. No acute worsening of LE weakness overnight. Per discussion with radiology team, the patient's MRI brain + C/T-spine will require anesthesia, so will plan to do this as an outpatient. Precert for new skilled nursing home is pending.        Objective     Last Recorded Vitals  /72   Pulse 66   Temp 36.4 °C (97.5 °F)   Resp 16   Wt 122 kg (270 lb) Comment: per pt guess.  SpO2 96%   Intake/Output last 3 Shifts:    Intake/Output Summary (Last 24 hours) at 12/4/2024 1112  Last data filed at 12/4/2024 1027  Gross per 24 hour   Intake 100 ml   Output 1300 ml   Net -1200 ml       Admission Weight  Weight: 122 kg (270 lb) (per pt guess.) (11/30/24 0948)    Daily Weight  11/30/24 : 122 kg (270 lb)    Image Results  MR lumbar spine w and wo IV contrast  Narrative: Interpreted By:  Desi Becerra and Baker Zachary   STUDY:  MR LUMBAR SPINE W AND WO IV CONTRAST;  11/30/2024 2:29 pm      INDICATION:  Signs/Symptoms:lower extremity weakness, lytic lesion on CT with  severe spinal cord compression.      COMPARISON:  Same day lumbar spine CT at 12:01 p.m.      ACCESSION NUMBER(S):  [id number]      ORDERING CLINICIAN:  LATA REN      TECHNIQUE:  Pre and postcontrast sagittal T1, T2, STIR, pre and postcontrast  axial T1 and T2 weighted images of the lumbar spine were acquired. 20  mL of intravenous Dotarem was administered without complication.      FINDINGS:  Alignment: There is trace anterolisthesis of L2 on L3 and trace  retrolisthesis of L5 on S1. Alignment is otherwise maintained.      Vertebrae/Intervertebral Discs: There is expansile marrow replacement  of the near entirety of the L4 vertebral body with extension into the  pedicles bilaterally and associated heterogenous enhancement on  postcontrast imaging. There is also a mild compression deformity of  the L4 vertebral " body with a approximately 20% height loss, as well  as mild convexity of the posterior margin of the vertebral body.  There is epidural extension of neoplasm along the posterior margin of  the vertebral body measuring up to 7 cm in maximum thickness along  the anterolateral aspect of thecal sac. There is abnormal soft tissue  encroaching upon bilateral L3-L4 and L4-L5 neural foramina, most  significantly causing moderate to severe bilateral neural foraminal  narrowing at L4-L5. The prevertebral soft tissue component is also  noted, left greater than right encroaching upon bilateral psoas  muscles.      The remaining vertebral bodies demonstrate normal signal without  height loss. There are mild multilevel degenerative endplate changes  and anterior osteophytosis. No additional areas of abnormal  enhancement. Abnormal areas of enhancement and increased STIR signal  also noted throughout the L3-L4 and L4-L5 intervertebral disc spaces.  The intervertebral discs otherwise demonstrate normal signal and  morphology.      Conus: The lower thoracic cord appears unremarkable. The conus  terminates at L1.      T12-L1: Small disc bulge with mild bilateral neural foramina  narrowing. No significant spinal canal narrowing.      L1-2: Disc bulge, bilateral facet hypertrophy, and ligamentum flavum  thickening resulting in moderate to severe spinal canal narrowing and  moderate bilateral neural foramina narrowing.      L2-3: Circumferential disc bulge, bilateral facet hypertrophy, and  ligamentum flavum thickening resulting in flattening of the anterior  thecal sac and moderate bilateral neural foramina narrowing. No  significant spinal canal narrowing.      L3-4: Small disc bulge, severe bilateral facet hypertrophy, and  ligamentum flavum thickening resulting in moderate bilateral neural  foramina narrowing. No significant spinal canal narrowing at this  level.      L4-5: Expansile marrow replacing lesion within the L4 vertebral  body  with associated epidural and neural foramina soft tissue component  which results in moderate to severe spinal canal narrowing at the L4  level, and moderate to severe bilateral neural foramina narrowing at  L4-L5. Additionally bilateral facet hypertrophy contributes to neural  foramina narrowing.      L5-S1: Small disc bulge, bilateral facet hypertrophy, and ligamentum  flavum thickening resulting in moderate-to-severe bilateral neural  foramina narrowing. No significant spinal canal narrowing.      Increased STIR signal and enhancement within th prevertebral soft  tissues at the L4 and L5 levels. Paravertebral soft tissues otherwise  unremarkable. Nonspecific edema within the dependent portions of the  posterior lumbar soft tissues      Impression: 1. Expansile enhancing marrow replacing lesion involving the near  entirety of the L4 vertebral body with extension into the pedicles  bilaterally, suggestive of a neoplastic process. There is mild  associated compression deformity of the L4 vertebral body and  convexity of the posterior margin of the vertebral body, with  associated soft tissue component extending into the epidural space  and bilateral neural foramina. It results in moderate to severe  spinal canal narrowing at the L4 level and moderate to severe  bilateral neural foramina narrowing at L4-L5.  2. Multilevel degenerative changes throughout the lumbar spine, most  pronounced at L1-L2.      I personally reviewed the images/study and I agree with the findings  as stated by Dr. Anthony Slater M.D. This study was interpreted at  [facility], [place], [place].      MACRO:  None      Signed by: Desi Becerra 11/30/2024 3:28 PM  Dictation workstation:   [id number]  CT chest abdomen pelvis w IV contrast  Narrative: Interpreted By:  Jesus Rodriguez,  and Donna Carranza   STUDY:  CT CHEST ABDOMEN PELVIS W IV CONTRAST;  11/30/2024 12:01 pm       INDICATION:  Signs/Symptoms:lytic lesion found on x-ray lumbar spine 11-28-24.      COMPARISON:  None.      ACCESSION NUMBER(S):  [id number]      ORDERING CLINICIAN:  LATA REN      TECHNIQUE:  Contiguous axial images of the chest, abdomen, and pelvis were  obtained after the intravenous administration of 100 mL Omnipaque 350  contrast.  Coronal and sagittal reformatted images were reconstructed  from the axial data.      FINDINGS:  CT CHEST:      MEDIASTINUM AND LYMPH NODES:  No enlarged intrathoracic or axillary  lymph nodes by imaging criteria.  The esophagus appears within normal  limits. No pneumomediastinum.      VESSELS:  Normal caliber aorta without dissection. No significant  aortic atherosclerosis.      HEART: Normal size.  No coronary artery calcifications. No  significant pericardial effusion.      LUNG, AIRWAYS, PLEURA: There is a 1.2 cm soft tissue density nodule  in the right lateral tracheal wall above the level of the reshma  which may represent a polyp or a retained mucus. Small amount of  mucus within the distal trachea. There is a small right pleural  effusion with the adjacent lung atelectasis. No pneumothorax.  Innumerable solid pulmonary throughout the lungs, for example  measuring 1.0 cm in the right middle lobe (series 204, image 143),  and 1.1 cm in the left lower lobe (series 204, image 158).      CHEST WALL SOFT TISSUES: Right-greater-than-left gynecomastia.      OSSEOUS STRUCTURES: No acute osseous abnormality. Please see  separately dictated thoracic spine.          CT ABDOMEN/PELVIS:      ABDOMINAL WALL: No acute abnormality.      LIVER: The liver is normal in size and homogeneous in attenuation.  There is a 3.0 cm hypodense cystic lesion in the right hepatic dome,  unchanged from the prior exam.      BILE DUCTS: No significant intrahepatic or extrahepatic dilatation.      GALLBLADDER: No significant abnormality.      SPLEEN: No significant abnormality.      PANCREAS: No  significant abnormality.      ADRENALS: There is a 2.9 x 1.6 x 1.4 cm right adrenal nodule highly  concerning for metastasis. There is also a indeterminate 1.3 x 1.3 cm  left adrenal nodule.      KIDNEYS, URETERS, BLADDER: There is a large infiltrative right upper  and interpolar region heterogeneously enhancing mass measuring 7.8 x  6.7 cm in axial dimensions and 10 cm in craniocaudal dimension. The  mass has central areas of necrosis and demonstrates lobular areas of  extension into the perinephric fat anteriorly and posteriorly. There  is contiguous infiltration into the renal collecting system. The main  renal vein and segmental branches appear mildly dilated. There is  questionable linear hypoattenuation within the segmental branches of  the renal vein as well as the main renal vein which raises concern  for thrombus likely tumor thrombus. The confluence of the renal vein  with the IVC appears unremarkable. No evidence of  thrombus within  the IVC. There is a tortuous perinephric collateral vein draining  into the IVC anteriorly. There is mild right renal pelvis urothelial  thickening which can be secondary to tumor extension into the renal  pelvis.          The left kidney is atrophic in appearance with multiple large  calcifications extending to the renal pelvis.      Numerous bilateral simple attenuating cysts, measuring up to 8 cm in  the lower pole of the right kidney. Nonobstructive calcifications  likely representing stones are identified in the right kidney. No  hydronephrosis. The bladder is unremarkable.      REPRODUCTIVE ORGANS: Prostate is nonenlarged.      VESSELS: No acute vascular injury.      LYMPH NODES/RETROPERITONEUM: No acute retroperitoneal abnormality. No  enlarged lymph nodes.      BOWEL/MESENTERY/PERITONEUM: There is a focal area of mild wall  thickening and adjacent stranding centered around a diverticula of  the sigmoid colon (series 301, image 113 and series 302, image 54)  suggestive  of mild acute diverticulitis. There is a large amount of  stool burden in the rectum with the mild mesorectal and presacral fat  stranding likely representing stercoral proctitis. The stomach is  unremarkable. Small and large bowel loops are otherwise within normal  limits. The appendix is not definitively seen, however there is no  pericecal stranding or fluid. Normal appendix. No ascites, free air  or fluid collection.      MUSCULOSKELETAL: There is an expansile lytic osseous lesion centered  in the L4 vertebral body with the adjacent prevertebral soft tissue  component and extension posteriorly into the spinal canal, most  compatible with underlying osseous neoplastic process. Please see  dedicated CT imaging of the lumbar spine for further evaluation.          Impression: 1. 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 Mildly dilated segmental branches of the renal vein and  the main renal vein with hypoattenuation in the lumen which likely  represents a thrombus likely tumor thrombus. No evidence of thrombus  in the IVC.  2. Enhancing right adrenal nodule highly concerning for metastasis.  Indeterminate left adrenal 1.3 cm nodule.  3. Expansile lytic osseous lesion centered at the L4 vertebral body,  likely representing metastatic lesion. Please see dedicated CT  imaging of the lumbar spine for further evaluation.  4. Innumerable solid pulmonary nodules measuring up to 1.1 cm are new  compared to the prior CT on 08/05/2020 likely representing metastasis  from the renal mass.  5. Right pleural effusion with adjacent lung atelectasis..  6. Focal wall thickening and adjacent stranding near sigmoid colon  diverticula, suggestive of mild acute diverticulitis. No pericolonic  abscess, fluid collection or perforation.  7. Large amount of stool burden in the rectum with mild mesorectal  fat stranding likely representing  stercoral proctitis.          I personally reviewed the image(s)/study and resident interpretation.  I agree with the findings as stated by resident Dillon Thompson.  Data analyzed and images interpreted at [facility], [place], [place].      MACRO:  None.      Signed by: Jesus Rodriguez 11/30/2024 1:25 PM  Dictation workstation:   [id number]  CT lumbar spine wo IV contrast, CT thoracic spine wo IV contrast  Narrative: Interpreted By:  Juan Jose Quiroz,   STUDY:  CT LUMBAR SPINE WO IV CONTRAST; CT THORACIC SPINE WO IV CONTRAST  11/30/2024 12:01 pm      INDICATION:  Signs/Symptoms:lytic lesion on x-ray lumbar spine;  Signs/Symptoms:lytic lesion found on lumbar x-ray          COMPARISON:  None.      ACCESSION NUMBER(S):  [id number]; [id number]      ORDERING CLINICIAN:  LATA REN      TECHNIQUE:  Axial, coronal, and sagittal CT images of the thoracic and lumbar  spine were reconstructed from a CT of the chest, abdomen, and pelvis  dated 11/30/2024.      FINDINGS:  There is a lytic expansile osseous lesion involving the L4 vertebral  body with extension to the bilateral L4 pedicles most compatible with  underlying osseous neoplastic process. There is mild pathologic  collapse of the L4 vertebral body. There is convexity of the lytic  ill-defined posterior margin of the L4 vertebral body as well as  suspected associated epidural soft tissue contributing to  encroachment upon the spinal canal with concern for severe narrowing  of the spinal canal at the L4 level. There is extension of soft  tissue fullness into the paraspinal soft tissues surrounding the L4  vertebral body. Further evaluation with MRI imaging would be  recommended.      There are bilateral L3 pars interarticularis defects.      The coronal reconstructed images demonstrate a levocurvature of the  lower thoracic and lumbar spine.      There is exaggeration of the kyphotic curvature of the thoracic spine.       There is multilevel spondylosis within the thoracic and lumbar  regions.      At the L1/2 level, there is a posterior disc/osteophyte complex  contributing to at least moderate spinal canal narrowing.      At the T12/L1 level, there is posterior osteophytic spurring  contributing to mild-to-moderate bony encroachment upon the spinal  canal.      At the T10/11 level, there are hypertrophic degenerative facet  changes a posterior disc/osteophyte complex contributing to moderate  bony encroachment upon the spinal canal.      At the T9/10 level, there is a posterior disc/osteophyte complex and  degenerative facet changes contributing to moderate bony encroachment  upon the spinal canal.      At the T8/9 level, there is a posterior disc/osteophyte complex and  degenerative facet changes contributing to mild-to-moderate bony  encroachment upon the spinal canal.      At the T7/8 level, there is a posterior disc/osteophyte complex and  degenerative facet changes contributing to mild-to-moderate spinal  canal narrowing.      At the T6/7 level, there is a posterior disc/osteophyte complex and  degenerative facet changes contributing to mild spinal canal  narrowing.      At the T5/6 level, there are degenerative facet changes contributing  to mild bony encroachment upon the spinal canal.      Please see the dedicated report of the CT of the chest, abdomen, and  pelvis for details of findings in these regions.          Impression: There is a lytic expansile osseous lesion involving the L4 vertebral  body with extension to the bilateral L4 pedicles most compatible with  underlying osseous neoplastic process. There is mild pathologic  collapse of the L4 vertebral body. There is convexity of the lytic  ill-defined posterior margin of the L4 vertebral body as well as  suspected associated epidural soft tissue contributing to  encroachment upon the spinal canal with concern for severe narrowing  of the spinal canal at the L4  level. There is extension of soft  tissue fullness into the paraspinal soft tissues surrounding the L4  vertebral body. Further evaluation with MRI imaging would be  recommended.      There are bilateral L3 pars interarticularis defects.      The coronal reconstructed images demonstrate a levocurvature of the  lower thoracic and lumbar spine.      There is exaggeration of the kyphotic curvature of the thoracic spine.      There is multilevel spondylosis within the thoracic and lumbar  regions.      Please see the dedicated report of the CT of the chest, abdomen, and  pelvis for details of findings in these regions.      MACRO:  None      Signed by: Juan Jose Quiroz 11/30/2024 12:17 PM  Dictation workstation:   [id number]      Physical Exam  Constitutional:       Appearance: Normal appearance. He is obese.   HENT:      Mouth/Throat:      Mouth: Mucous membranes are moist.   Eyes:      Extraocular Movements: Extraocular movements intact.      Pupils: Pupils are equal, round, and reactive to light.   Cardiovascular:      Rate and Rhythm: Normal rate and regular rhythm.      Pulses: Normal pulses.      Heart sounds: Normal heart sounds.   Pulmonary:      Effort: Pulmonary effort is normal.      Breath sounds: Normal breath sounds.   Abdominal:      General: Bowel sounds are normal.      Palpations: Abdomen is soft.   Musculoskeletal:         General: Swelling present.      Right lower leg: Edema present.      Left lower leg: Edema present.      Comments: Strength : RUE 5/5 LUE 5/5, RLE 3/5 LLE 3/5, sensation intact, no saddle anesthesia, both lower extremities have ace wrapped, bilateral pitting edema, pulses palpable      Skin:     General: Skin is warm.   Neurological:      General: No focal deficit present.      Mental Status: He is alert and oriented to person, place, and time.      Motor: Weakness present.   Psychiatric:         Mood and Affect: Mood normal.         Behavior: Behavior normal.   Relevant  Results  Results for orders placed or performed during the hospital encounter of 11/30/24 (from the past 24 hours)   Magnesium   Result Value Ref Range    Magnesium 2.45 (H) 1.60 - 2.40 mg/dL   Renal function panel   Result Value Ref Range    Glucose 99 74 - 99 mg/dL    Sodium 138 136 - 145 mmol/L    Potassium 3.9 3.5 - 5.3 mmol/L    Chloride 104 98 - 107 mmol/L    Bicarbonate 27 21 - 32 mmol/L    Anion Gap 11 10 - 20 mmol/L    Urea Nitrogen 25 (H) 6 - 23 mg/dL    Creatinine 1.41 (H) 0.50 - 1.30 mg/dL    eGFR 52 (L) >60 mL/min/1.73m*2    Calcium 8.7 8.6 - 10.6 mg/dL    Phosphorus 3.3 2.5 - 4.9 mg/dL    Albumin 2.9 (L) 3.4 - 5.0 g/dL       Assessment/Plan      75 yo with PMH most notable for HTN, PVD, chronic lymphedema, and CKDIII (baseline Cr 1.2-1.3, GFR 50s) presenting from SNF with LE weakness iso recently discovered L4/5 lesion with mod to severe spinal cord compression. On exam pt has 4/5 strength in LE with normal sensation. Denies bladder/bowel incontinence. 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 Ortho spine consulted given worsening weakness with spinal lesion recommended IR biopsy of L4 .Imaging also notable for new R renal v thrombus likely related to suspected malignancy. Kidney function is at baseline and making urine.     #weakness  #new L4 lesion with canal narrowing  #R renal mass with lesions in adrenals, lungs c/f RCC  -imaging c/f metastatic RCC to adrenals, lung, spine - no bx yet  -MRI L spine c/f L4 lesion with mod to severe spinal canal narrowing likely contributing to weakness  -RCRI: 0 pts, 3.9% risk of major cardiac event; 3.63 METs per DASI score  -tylenol 975mg q8h mild pain, oxy 5mg for mod-severe q6h, dilaudid 0.2mg for breakthrough q4h  -ortho spine is okay with MRI brain + C/T spine being done as outpatient   -IR consult biopsy of spine to be done as outpatient (ordered and discussing with outpatient IR  coordinator)  -will need onc f/up as outpatient for further eval, consider discussion with onc   -pt informed of c/f metastatic cancer based on imaging     #R renal v thrombus  -seen on CT, likely related to underlying suspected malignancy  -Cr at baseline, having urine output   -likely tumor thrombus which will not warrant therapeutic anticoagulation; on lovenox 40 mg daily     #CKDIII   -baseline Cr 1.2-1.3, GFR 50s  -avoid nephrotoxic agents  -renally dose medications  -monitor I/Os     #chronic lymphedema  #PVD  #LE wounds  -follows with [facility] through [facility]  -no white count or wound discharge, low c/f infection  -on torsemide 20mg daily, holding for now will add back as indicated  -holding home K, Mag supplement-will replete as indicated inpatient  -wound care consult placed     #HTN  -losartan 50 mg daily      #GERD  #prior hx gastric ulcer (H Pylori negative)  -on ppi BID unsure of when stopped (did not see in notes)  -continue pepcid 20mg BID   -add pantoprazole 40mg daily   -avoid NSAIDs     #SAMIA  -continue home iron supplement daily     Disposition  -PT/OT ordered  -f/u: PCP, ortho spine, hematology-oncology  -will discussion with IDT regarding disposition to a different SNF than [facility] (current facility)     Diet: regular diet   Electrolytes: K >4; Mg >2  DVT ppx: lovenox  GI ppx: protonix   Lines/tubes: PIV  O2: RA  Baseline Cr: 1.2-1.3  Code status: full code- confirmed on admit  Surrogate decision maker: Fabiano Retana, brother, [phone]     Adalberto Christensen MD   PGY-1, Internal Medicine Resident  [facility]

## 2024-12-04 NOTE — CARE PLAN
The clinical goals for the shift include patient will remain safe and free from injury through end of shift      Problem: Pain - Adult  Goal: Verbalizes/displays adequate comfort level or baseline comfort level  Outcome: Progressing     Problem: Safety - Adult  Goal: Free from fall injury  Outcome: Progressing     Problem: Discharge Planning  Goal: Discharge to home or other facility with appropriate resources  Outcome: Progressing     Problem: Chronic Conditions and Co-morbidities  Goal: Patient's chronic conditions and co-morbidity symptoms are monitored and maintained or improved  Outcome: Progressing     Problem: Skin  Goal: Prevent/manage excess moisture  Outcome: Progressing  Flowsheets (Taken 12/4/2024 0530)  Prevent/manage excess moisture: Cleanse incontinence/protect with barrier cream  Goal: Prevent/minimize sheer/friction injuries  Outcome: Progressing  Flowsheets (Taken 12/4/2024 0530)  Prevent/minimize sheer/friction injuries: Turn/reposition every 2 hours/use positioning/transfer devices  Goal: Promote/optimize nutrition  Outcome: Progressing  Flowsheets (Taken 12/4/2024 0530)  Promote/optimize nutrition: Monitor/record intake including meals     Problem: Fall/Injury  Goal: Not fall by end of shift  Outcome: Progressing  Goal: Be free from injury by end of the shift  Outcome: Progressing  Goal: Verbalize understanding of personal risk factors for fall in the hospital  Outcome: Progressing  Goal: Verbalize understanding of risk factor reduction measures to prevent injury from fall in the home  Outcome: Progressing  Goal: Use assistive devices by end of the shift  Outcome: Progressing  Goal: Pace activities to prevent fatigue by end of the shift  Outcome: Progressing

## 2024-12-04 NOTE — PROGRESS NOTES
"Physical Therapy    Physical Therapy Treatment    Patient Name: Abdi Retana \"Matt\"  MRN: [id number]  Department: [place] [id number]  Room: [address] [place] [address]  Today's Date: 12/4/2024  Time Calculation  Start Time: 1058  Stop Time: 1139  Time Calculation (min): 41 min         Assessment/Plan   PT Assessment  Barriers to Discharge: medical needs  End of Session Communication: Bedside nurse  Assessment Comment: Pt tolerated session well.  continues to require max Ax2 for bed mobility but able to transfer sit to stand x2 trials with mod Ax2.  Standing balance with CGA and RW.  Sidestepping to reposition at HOB with Min A for RW negotiation and CGAx2 for balance.  Pt continues to benefit from skilled PT and remains appropriate for moderate intensity PT upon discharge.  End of Session Patient Position: Bed, 3 rail up, Alarm on  PT Plan  Inpatient/Swing Bed or Outpatient: Inpatient  PT Plan  Treatment/Interventions: Bed mobility, Transfer training, Gait training, Stair training, Balance training, Strengthening, Endurance training, Range of motion, Therapeutic exercise, Therapeutic activity, Postural re-education  PT Plan: Ongoing PT  PT Frequency: 3 times per week  PT Discharge Recommendations: Moderate intensity level of continued care  Equipment Recommended upon Discharge:  (TBD at next level of care)  PT Recommended Transfer Status: Assist x2, Assistive device (RW)  PT - OK to Discharge: Yes (eval complete and discharge recommendations made)      General Visit Information:   PT  Visit  PT Received On: 12/04/24  General  Reason for Referral: L4 osteolytic lesion, 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, also with L kidney mass, multiple pulmonary nodules  Past Medical History Relevant to Rehab: HTN, GERD, PVD, cellulitis, former smoker  Co-Treatment: OT  Co-Treatment Reason: to maximize safety with mobility and therpeautic potential  Prior to Session Communication: Bedside nurse  Patient Position Received: Bed, 4 rail up, Alarm on  General Comment: " Pt cleared for PT by RN. Pt alert and agreeable to PT. +PIV, external catheter    Subjective   Precautions:  Precautions  Medical Precautions: Fall precautions    Objective   Pain:  Pain Assessment  Pain Assessment: 0-10  0-10 (Numeric) Pain Score: 0 - No pain  Cognition:  Cognition  Overall Cognitive Status: Within Functional Limits  Coordination:  Movements are Fluid and Coordinated: Yes  Postural Control:  Postural Control  Postural Control: Within Functional Limits  Static Sitting Balance  Static Sitting-Balance Support: No upper extremity supported, Feet supported  Static Sitting-Level of Assistance: Close supervision  Static Sitting-Comment/Number of Minutes: 20 mins total in sitting  Dynamic Sitting Balance  Dynamic Sitting-Balance Support: Bilateral upper extremity supported, Feet supported  Dynamic Sitting-Level of Assistance: Close supervision  Dynamic Sitting-Balance: Forward lean, Lateral lean  Static Standing Balance  Static Standing-Balance Support: Bilateral upper extremity supported (RW)  Static Standing-Level of Assistance: Contact guard  Static Standing-Comment/Number of Minutes: 4 mins x2 trials  Dynamic Standing Balance  Dynamic Standing-Balance Support: Bilateral upper extremity supported (RW)  Dynamic Standing-Level of Assistance: Contact guard  Dynamic Standing-Balance:  (sidestepping to reposition at HOB)  Activity Tolerance:  Activity Tolerance  Endurance: Tolerates 10 - 20 min exercise with multiple rests  Treatments:  Therapeutic Activity  Therapeutic Activity Performed: Yes  Therapeutic Activity 1: bed mobility, transfers, sitting balance with fwd/bwd and lateral weight shifts, standing balance, sidestepping, pt edu on PT POC, answering lots of questions about therapy plan, understanding why LEs are weak, and benefits of therapy at this time    Bed Mobility  Bed Mobility: Yes  Bed Mobility 1  Bed Mobility 1: Supine to sitting, Sitting to supine  Level of Assistance 1: Maximum assistance,  +2  Bed Mobility 2  Bed Mobility  2: Scooting (forward at EOB)  Level of Assistance 2: Maximum assistance    Ambulation/Gait Training  Ambulation/Gait Training Performed: Yes  Ambulation/Gait Training 1  Surface 1: Level tile  Device 1: Rolling walker  Assistance 1: Contact guard, Minimum assistance (x2)  Quality of Gait 1: Wide base of support, Decreased step length, Forward flexed posture  Comments/Distance (ft) 1: sidestepping to R at EOB, 1 step fwd/bwd.  Min A for RW negotiation, CGA x2 for balance  Transfers  Transfer: Yes  Transfer 1  Transfer From 1: Bed to  Transfer to 1: Stand  Technique 1: Sit to stand, Stand to sit  Transfer Device 1: Walker  Transfer Level of Assistance 1: Moderate assistance, +2  Trials/Comments 1: 2x throughout session    Stairs  Stairs: No    Outcome Measures:  [facility] Basic Mobility  Turning from your back to your side while in a flat bed without using bedrails: A lot  Moving from lying on your back to sitting on the side of a flat bed without using bedrails: A lot  Moving to and from bed to chair (including a wheelchair): A lot  Standing up from a chair using your arms (e.g. wheelchair or bedside chair): A lot  To walk in hospital room: A little  Climbing 3-5 steps with railing: A lot  Basic Mobility - Total Score: 13    Education Documentation  Body Mechanics, taught by Jessica Loyola, PT at 12/4/2024 12:00 PM.  Learner: Patient  Readiness: Acceptance  Method: Explanation  Response: Verbalizes Understanding    Mobility Training, taught by Jessica Loyola, PT at 12/4/2024 12:00 PM.  Learner: Patient  Readiness: Acceptance  Method: Explanation  Response: Verbalizes Understanding    Education Comments  No comments found.      Encounter Problems       Encounter Problems (Active)       Balance       STG - Maintains static standing balance with upper extremity support on WW and modA x 1 min as a pregait activity  (Progressing)       Start:  12/01/24    Expected End:  12/15/24                    Mobility       STG - Patient will ambulate >5' with modA and WW (Progressing)       Start:  12/01/24    Expected End:  12/15/24               PT Transfers       STG - Patient to transfer to and from sit to supine with modA  (Progressing)       Start:  12/01/24    Expected End:  12/15/24            STG - Patient will transfer sit to and from stand with modA and WW (Progressing)       Start:  12/01/24    Expected End:  12/15/24               PT Transfers       STG - Transfer from bed to chair with modAx2 with WW (Progressing)       Start:  12/01/24    Expected End:  12/15/24               Pain - Adult

## 2024-12-04 NOTE — PROGRESS NOTES
"Occupational Therapy    Occupational Therapy Treatment    Name: Abdi Retana \"Matt\"  MRN: [id number]  : 1950  Date: 24  Room: [address] [place], [place] [address]      Time Calculation  Start Time: 1058  Stop Time: 1139  Time Calculation (min): 41 min    Assessment:  OT Assessment: Pt demos improved activity tolerance, functional transfers, and mobility. Continued skilled OT services at Mod intensity to allow for a safe and functional d/c.  Prognosis: Good  Barriers to Discharge: Decreased caregiver support  End of Session Communication: Bedside nurse  End of Session Patient Position: Bed, 3 rail up, Alarm on  Plan:  Treatment Interventions: ADL retraining, Functional transfer training, UE strengthening/ROM, Endurance training, Cognitive reorientation, Patient/family training, Equipment evaluation/education, Neuromuscular reeducation, Compensatory technique education  OT Frequency: 3 times per week  OT Discharge Recommendations: Moderate intensity level of continued care  Equipment Recommended upon Discharge:  (TBD at next level of care)  OT Recommended Transfer Status: Assist of 2  OT - OK to Discharge: Yes (upon medical clearance)    Subjective   General:  OT Last Visit  OT Received On: 24  Reason for Referral: L4 osteolytic lesion, 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 Pleasant and agreeable to OT/PT session   Precautions:  Medical Precautions: Fall precautions    Lines/Tubes/Drains:  External Urinary Catheter Male (Active)   Number of days: 2       Cognition:  Overall Cognitive Status: Within Functional Limits  Orientation Level: Oriented X4    Pain Assessment:  Pain Assessment  Pain Assessment: " 0-10  0-10 (Numeric) Pain Score: 0 - No pain     Objective   Activities of Daily Living:     LE Dressing  LE Dressing: Yes  Sock Level of Assistance: Dependent       Functional Standing Tolerance:  Functional Standing Tolerance  Time: 4 min  Activity: steps and static standing  Functional Standing Tolerance Comments: 2x  Functional Mobility  Functional Mobility Performed: Yes  Functional Mobility 1  Surface 1: Level tile  Device 1: Rolling walker  Assistance 1: Minimum assistance (x2)  Comments 1: Side stepping x5 (2x) and 2 steps forward and back. Assist for balance, sequencing, and walker management.  Bed Mobility/Transfers:   Bed Mobility  Bed Mobility: Yes  Bed Mobility 1  Bed Mobility 1: Supine to sitting, Sitting to supine  Level of Assistance 1: Maximum assistance, +2  Bed Mobility 2  Bed Mobility  2: Scooting (forward at EOB)  Level of Assistance 2: Maximum assistance  Transfers  Transfer: Yes  Transfer 1  Transfer From 1: Bed to  Transfer to 1: Stand  Technique 1: Sit to stand, Stand to sit  Transfer Device 1: Walker  Transfer Level of Assistance 1: Moderate assistance, +2  Trials/Comments 1: 2x      Balance:  Static Sitting Balance  Static Sitting-Balance Support: Feet supported  Static Sitting-Level of Assistance: Close supervision  Static Standing Balance  Static Standing-Balance Support: Bilateral upper extremity supported  Static Standing-Level of Assistance: Minimum assistance (x2)    Therapy/Activity:      Therapeutic Activity  Therapeutic Activity 1: extensive pt edu on dispo planning, understanding hospital stay, therapy plan and it's benefits. Pt responsive to edu, all questions answered.     Outcome Measures:  [facility] Daily Activity  Putting on and taking off regular lower body clothing: Total  Bathing (including washing, rinsing, drying): A lot  Putting on and taking off regular upper body clothing: A little  Toileting, which includes using toilet, bedpan or urinal: A lot  Taking care of personal  grooming such as brushing teeth: A little  Eating Meals: A little  Daily Activity - Total Score: 14     Education Documentation  Body Mechanics, taught by Margarita Bradford OT at 12/4/2024 12:44 PM.  Learner: Patient  Readiness: Acceptance  Method: Explanation  Response: Verbalizes Understanding    Precautions, taught by Margarita Bradford OT at 12/4/2024 12:44 PM.  Learner: Patient  Readiness: Acceptance  Method: Explanation  Response: Verbalizes Understanding    ADL Training, taught by Margarita Bradford OT at 12/4/2024 12:44 PM.  Learner: Patient  Readiness: Acceptance  Method: Explanation  Response: Verbalizes Understanding    Education Comments  No comments found.        Goals:  Encounter Problems       Encounter Problems (Active)       ADLs       Patient with complete upper body dressing with set-up level of assistance donning and doffing all UE clothes with PRN adaptive equipment while edge of bed  (Progressing)       Start:  12/01/24    Expected End:  12/22/24            Patient will complete daily grooming tasks washing face/hair with modified independent level of assistance and PRN adaptive equipment while edge of bed . (Progressing)       Start:  12/01/24    Expected End:  12/22/24               COGNITION/SAFETY       Patient will score WFL on standardized cognitive assessment with verbal cues and within reasonable time frame (Progressing)       Start:  12/01/24    Expected End:  12/22/24               TRANSFERS       Patient will perform bed mobility moderate assist level of assistance and bed rails in order to improve safety and independence with mobility (Progressing)       Start:  12/01/24    Expected End:  12/22/24            Patient will complete sit to stand transfer with moderate assist level of assistance and least restrictive device in order to improve safety and prepare for out of bed mobility. (Progressing)       Start:  12/01/24    Expected End:  12/22/24 12/04/24 at 12:45 PM   MARGARITA  SMITH DIANA, OT   [phone]

## 2024-12-04 NOTE — PROGRESS NOTES
Updated therapy notes sent to [facility]. LSW escalated precert. Care Transitions will continue to follow.     1355-LSW updated patient on acceptance at [facility]. LSW explained patient is medically ready for discharge and once auth is received transportation will be arranged. Patient expressed concern about MRI inpatient vs outpatient. Chart notes report procedures to be done as outpatient. Patient reports wanting procedures while inpatient. LSW updated TCC and MD. Pending precert. Care transitions will continue to follow.     1621-Auth received. Ave at [facility] reports they received 7000 from [facility] and requested a PASRR from [facility]. Ave at [facility] state they cannot admit patient without a PASRR. LSW spoke with sister who states patient was previously at [facility] in 2020 for about 2 years and went home after long term stay. Sister reports patient went to [facility] around Thanksgiving time and then discharge to [facility] for 1 day prior to [place] [facility]. LSW provided updated to [facility] and asked for confirmation that only 7000 is needed for discharge. Team confirmed they are agreeable to morning discharge. LSW requested stretcher transport for 9am; pending Roundtrip confirmation.     HUMA Gottlieb

## 2024-12-05 VITALS
DIASTOLIC BLOOD PRESSURE: 70 MMHG | BODY MASS INDEX: 34.65 KG/M2 | OXYGEN SATURATION: 95 % | HEIGHT: 74 IN | HEART RATE: 66 BPM | SYSTOLIC BLOOD PRESSURE: 126 MMHG | WEIGHT: 270 LBS | TEMPERATURE: 97.5 F | RESPIRATION RATE: 18 BRPM

## 2024-12-05 LAB
ALBUMIN SERPL BCP-MCNC: 2.8 G/DL (ref 3.4–5)
ANION GAP SERPL CALC-SCNC: 12 MMOL/L (ref 10–20)
BUN SERPL-MCNC: 26 MG/DL (ref 6–23)
CALCIUM SERPL-MCNC: 8.6 MG/DL (ref 8.6–10.6)
CHLORIDE SERPL-SCNC: 105 MMOL/L (ref 98–107)
CO2 SERPL-SCNC: 25 MMOL/L (ref 21–32)
CREAT SERPL-MCNC: 1.46 MG/DL (ref 0.5–1.3)
EGFRCR SERPLBLD CKD-EPI 2021: 50 ML/MIN/1.73M*2
GLUCOSE SERPL-MCNC: 90 MG/DL (ref 74–99)
MAGNESIUM SERPL-MCNC: 2.35 MG/DL (ref 1.6–2.4)
PHOSPHATE SERPL-MCNC: 3.1 MG/DL (ref 2.5–4.9)
POTASSIUM SERPL-SCNC: 4.3 MMOL/L (ref 3.5–5.3)
SODIUM SERPL-SCNC: 138 MMOL/L (ref 136–145)

## 2024-12-05 PROCEDURE — 80069 RENAL FUNCTION PANEL: CPT | Performed by: STUDENT IN AN ORGANIZED HEALTH CARE EDUCATION/TRAINING PROGRAM

## 2024-12-05 PROCEDURE — 2500000001 HC RX 250 WO HCPCS SELF ADMINISTERED DRUGS (ALT 637 FOR MEDICARE OP): Performed by: STUDENT IN AN ORGANIZED HEALTH CARE EDUCATION/TRAINING PROGRAM

## 2024-12-05 PROCEDURE — 2500000001 HC RX 250 WO HCPCS SELF ADMINISTERED DRUGS (ALT 637 FOR MEDICARE OP)

## 2024-12-05 PROCEDURE — 2500000004 HC RX 250 GENERAL PHARMACY W/ HCPCS (ALT 636 FOR OP/ED)

## 2024-12-05 PROCEDURE — G0378 HOSPITAL OBSERVATION PER HR: HCPCS

## 2024-12-05 PROCEDURE — 36415 COLL VENOUS BLD VENIPUNCTURE: CPT

## 2024-12-05 PROCEDURE — 99239 HOSP IP/OBS DSCHRG MGMT >30: CPT | Performed by: INTERNAL MEDICINE

## 2024-12-05 PROCEDURE — 83735 ASSAY OF MAGNESIUM: CPT

## 2024-12-05 RX ADMIN — ASPIRIN 81 MG: 81 TABLET, COATED ORAL at 08:37

## 2024-12-05 RX ADMIN — POLYETHYLENE GLYCOL 3350 17 G: 17 POWDER, FOR SOLUTION ORAL at 08:37

## 2024-12-05 RX ADMIN — FAMOTIDINE 20 MG: 20 TABLET ORAL at 08:37

## 2024-12-05 RX ADMIN — ACETAMINOPHEN 975 MG: 325 TABLET ORAL at 01:22

## 2024-12-05 RX ADMIN — FERROUS SULFATE TAB 325 MG (65 MG ELEMENTAL FE) 325 MG: 325 (65 FE) TAB at 08:37

## 2024-12-05 RX ADMIN — ACETAMINOPHEN 975 MG: 325 TABLET ORAL at 08:37

## 2024-12-05 RX ADMIN — LOSARTAN POTASSIUM 50 MG: 50 TABLET, FILM COATED ORAL at 08:37

## 2024-12-05 ASSESSMENT — PAIN SCALES - GENERAL
PAINLEVEL_OUTOF10: 1
PAINLEVEL_OUTOF10: 0 - NO PAIN

## 2024-12-05 ASSESSMENT — PAIN - FUNCTIONAL ASSESSMENT
PAIN_FUNCTIONAL_ASSESSMENT: 0-10
PAIN_FUNCTIONAL_ASSESSMENT: 0-10

## 2024-12-05 NOTE — CARE PLAN
The clinical goals for the shift include pt will remin safe and free from falls through end of shift    Problem: Pain - Adult  Goal: Verbalizes/displays adequate comfort level or baseline comfort level  Outcome: Progressing     Problem: Safety - Adult  Goal: Free from fall injury  Outcome: Progressing     Problem: Discharge Planning  Goal: Discharge to home or other facility with appropriate resources  Outcome: Progressing     Problem: Chronic Conditions and Co-morbidities  Goal: Patient's chronic conditions and co-morbidity symptoms are monitored and maintained or improved  Outcome: Progressing     Problem: Skin  Goal: Prevent/manage excess moisture  Outcome: Progressing  Flowsheets (Taken 12/5/2024 0417)  Prevent/manage excess moisture: Cleanse incontinence/protect with barrier cream  Goal: Prevent/minimize sheer/friction injuries  Outcome: Progressing  Flowsheets (Taken 12/5/2024 0417)  Prevent/minimize sheer/friction injuries: Turn/reposition every 2 hours/use positioning/transfer devices  Goal: Promote/optimize nutrition  Outcome: Progressing  Flowsheets (Taken 12/5/2024 0417)  Promote/optimize nutrition: Monitor/record intake including meals     Problem: Fall/Injury  Goal: Not fall by end of shift  Outcome: Progressing  Goal: Be free from injury by end of the shift  Outcome: Progressing  Goal: Verbalize understanding of personal risk factors for fall in the hospital  Outcome: Progressing  Goal: Verbalize understanding of risk factor reduction measures to prevent injury from fall in the home  Outcome: Progressing  Goal: Use assistive devices by end of the shift  Outcome: Progressing  Goal: Pace activities to prevent fatigue by end of the shift  Outcome: Progressing

## 2024-12-05 NOTE — DISCHARGE SUMMARY
Discharge Diagnosis  Lesion of lumbar spine    Issues Requiring Follow-Up  #Right renal mass with lesions in adrenal glands, lungs, spine, concern for underlying malignancy with metastases   #Lesion involving L4 vertebral body with impingement of thecal sac   #R renal tumor thrombus   #CKD stage 3   #chronic lymphedema   #Hypertension  #GERD  #SAMIA    Discharge Meds     Medication List      CONTINUE taking these medications     aspirin 81 mg EC tablet   clotrimazole-betamethasone cream; Commonly known as: Lotrisone; MIX WITH   ZINC OXIDE AND APPLY  TOPICALLY TO AFFECTED AREA(S)  TWICE DAILY   famotidine 40 mg tablet; Commonly known as: Pepcid   ferrous sulfate (325 mg ferrous sulfate) tablet; Commonly known as:   FeroSuL; Take 1 tablet (325 mg) by mouth once every 24 hours.   losartan 50 mg tablet; Commonly known as: Cozaar; TAKE 1 TABLET BY MOUTH   ONCE  DAILY   magnesium oxide 400 mg magnesium capsule; Take 1 capsule (400 mg) by   mouth once daily.   potassium chloride CR 10 mEq ER tablet; Commonly known as: Klor-Con;   TAKE 1 TABLET BY MOUTH ONCE  DAILY DO NOT CRUSH, CHEW, OR  SPLIT   torsemide 20 mg tablet; Commonly known as: Demadex; TAKE 1 TABLET BY   MOUTH ONCE  DAILY   vitamin D3-folic acid 2,500 unit- 1 mg tablet; Take 1 mg by mouth once   every 24 hours.   zinc oxide 10 % cream; To mix with Lotrisone and apply twice daily     STOP taking these medications     fluocinonide 0.05 % ointment; Commonly known as: Lidex       Test Results Pending At Discharge  Pending Labs       No current pending labs.            Hospital Course  73 yo with PMH most notable for HTN, PVD, chronic lymphedema, and CKDIII (baseline Cr 1.2-1.3, GFR 50s) presenting from SNF with LE weakness iso recently discovered L4/5 lesion with mod to severe spinal cord compression. On exam pt has 4/5 strength in LE with normal sensation. Denies bladder/bowel incontinence. Further imaging reveals large renal mass with lesions noted in adrenals,  lung, and spine c/f metastatic RCC. Ortho spine consulted given worsening weakness with spinal lesion recommended IR biopsy of L4. Imaging also notable for new R renal v thrombus likely related to suspected malignancy.    12/2/24: Per discussion with IR team, the IR-guided biopsy will occur as an outpatient. Per discussion with ortho spine team, the MRI C-spine and MRI T-spine can be done as an outpatient.     12/3/24: Precert was initiated and follow-up appointment with IR for L4 biopsy was ordered (spoke with outpatient IR team). Patient did not tolerate MRI brain + C/T spine wwo (per radiology, will need anesthesia), so this order was cancelled and will be done as an outpatient.     12/4/24. Patient remained stable. Precert accepted.     12/5/24. Patient discharged to [facility]. Referral placed for follow-up with hematology-oncology, and outpatient orders for IR-guided biopsy of L4 vertebral body lesion and for MRI brain, C-spine, and T-spine with and without contrast placed.     Pertinent Physical Exam At Time of Discharge  Physical Exam  Constitutional:       Appearance: Normal appearance. He is obese.   HENT:      Mouth/Throat:      Mouth: Mucous membranes are moist.   Eyes:      Extraocular Movements: Extraocular movements intact.      Pupils: Pupils are equal, round, and reactive to light.   Cardiovascular:      Rate and Rhythm: Normal rate and regular rhythm.      Pulses: Normal pulses.      Heart sounds: Normal heart sounds.   Pulmonary:      Effort: Pulmonary effort is normal.      Breath sounds: Normal breath sounds.   Abdominal:      General: Bowel sounds are normal.      Palpations: Abdomen is soft.   Musculoskeletal:         General: Swelling present.      Right lower leg: Edema present.      Left lower leg: Edema present.      Comments: Strength : RUE 5/5 LUE 5/5, RLE 3/5 LLE 3/5, sensation intact, no saddle anesthesia, both lower extremities have ace wrapped,  bilateral pitting edema, pulses palpable     Outpatient Follow-Up  No future appointments.    Adalberto Christensen MD   PGY-1, Internal Medicine Resident  [facility]

## 2024-12-05 NOTE — PROGRESS NOTES
[facility] confirmed they can admit with a new 7000 since they one they received from [facility] was from 2020. New 7000 available in [facility]. [facility] confirmed 9am transport for discharge to [facility]. Report #[phone]. LSW updated KARISSA, MD, sister, SNF, and the floor. LSW attempted to notify patient on room phone but issue with connection.     HUMA Gottlieb

## 2024-12-05 NOTE — CARE PLAN
Problem: Pain - Adult  Goal: Verbalizes/displays adequate comfort level or baseline comfort level  Outcome: Progressing     Problem: Safety - Adult  Goal: Free from fall injury  Outcome: Progressing     Problem: Discharge Planning  Goal: Discharge to home or other facility with appropriate resources  Outcome: Progressing     Problem: Chronic Conditions and Co-morbidities  Goal: Patient's chronic conditions and co-morbidity symptoms are monitored and maintained or improved  Outcome: Progressing     Problem: Skin  Goal: Prevent/manage excess moisture  Outcome: Progressing  Goal: Prevent/minimize sheer/friction injuries  Outcome: Progressing  Goal: Promote/optimize nutrition  Outcome: Progressing     Problem: Fall/Injury  Goal: Not fall by end of shift  Outcome: Progressing  Goal: Be free from injury by end of the shift  Outcome: Progressing  Goal: Verbalize understanding of personal risk factors for fall in the hospital  Outcome: Progressing  Goal: Verbalize understanding of risk factor reduction measures to prevent injury from fall in the home  Outcome: Progressing  Goal: Use assistive devices by end of the shift  Outcome: Progressing  Goal: Pace activities to prevent fatigue by end of the shift  Outcome: Progressing   The patient's goals for the shift include    The clinical goals for the shift include pt will remin safe and free from falls through end of shift

## 2024-12-06 ENCOUNTER — NURSING HOME VISIT (OUTPATIENT)
Dept: POST ACUTE CARE | Facility: EXTERNAL LOCATION | Age: 74
End: 2024-12-06
Payer: MEDICARE

## 2024-12-06 DIAGNOSIS — I13.10 HYPERTENSIVE HEART AND CHRONIC KIDNEY DISEASE WITHOUT HEART FAILURE, WITH STAGE 1 THROUGH STAGE 4 CHRONIC KIDNEY DISEASE, OR UNSPECIFIED CHRONIC KIDNEY DISEASE: Primary | ICD-10-CM

## 2024-12-06 DIAGNOSIS — M89.9 LESION OF LUMBAR SPINE: ICD-10-CM

## 2024-12-06 DIAGNOSIS — K21.9 GASTRO-ESOPHAGEAL REFLUX DISEASE WITHOUT ESOPHAGITIS: ICD-10-CM

## 2024-12-06 DIAGNOSIS — I73.9 PERIPHERAL VASCULAR DISEASE, UNSPECIFIED (CMS-HCC): ICD-10-CM

## 2024-12-06 DIAGNOSIS — D64.9 ANEMIA, UNSPECIFIED TYPE: ICD-10-CM

## 2024-12-06 DIAGNOSIS — N28.89 RENAL MASS: ICD-10-CM

## 2024-12-06 DIAGNOSIS — R53.1 WEAKNESS: ICD-10-CM

## 2024-12-06 PROCEDURE — 99309 SBSQ NF CARE MODERATE MDM 30: CPT | Performed by: NURSE PRACTITIONER

## 2024-12-06 NOTE — LETTER
"Patient: Matt Retana  : 1950    Encounter Date: 2024    PROGRESS NOTE    Subjective  Chief complaint: Abdi Retana \"Louise" is a 74 y.o. male who is an acute skilled patient being seen and evaluated for weakness    HPI: Recently admitted to this facility for rehabilitation.   Had required hospitalization for investigation of a suspicious lesion on his vertebrae.   He reports that he is scheduled for further scans and a biopsy - for a suspicious a mass on his kidney , vertebrae and lung tissue.   He reports that he has a good appetite. Is set up for dinner.   Requires extensive assistance with bed repositioning. Requires mechanical lift for all transfers   Denies any pain. Reports that he is fatigued.   HPI      Objective  Vital signs: 130/78-68-18-97.5     Physical Exam  Vitals and nursing note reviewed.   Constitutional:       General: He is not in acute distress.     Appearance: He is well-groomed. He is obese.   Eyes:      Extraocular Movements: Extraocular movements intact.   Cardiovascular:      Rate and Rhythm: Normal rate and regular rhythm.      Comments: Both legs wrapped with ace wraps   Dependent edema evident   Reports good tactile sensation in his feet   Pulmonary:      Effort: Pulmonary effort is normal.      Breath sounds: Normal breath sounds.      Comments: Lungs clear   No cough   Abdominal:      General: Bowel sounds are normal.      Palpations: Abdomen is soft.   Musculoskeletal:      Cervical back: Neck supple.      Right lower leg: No edema.      Left lower leg: No edema.   Neurological:      Mental Status: He is alert.   Psychiatric:         Mood and Affect: Mood normal.         Behavior: Behavior is cooperative.         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, with stage 1 through stage 4 chronic kidney disease, or unspecified chronic kidney disease - Primary     Monitor blood pressure  Losartan potassium  Monitor labs  Avoid nephrotoxins         Lesion of lumbar spine     F/u for biopsy with IR  Multiple outpatient appointments          Renal mass     F/u outpatient  Requires biopsy   Has multiple outpatient appointments          Weakness     Working with therapy towards goals, continue  Monitor progress  Encourage all abilities   Requires mechanical lift for all transfers           Medications, treatments, and labs reviewed  Continue medications and treatments as listed in EMR    DIVINE Presley      Electronically Signed By: DIVINE Presley   12/12/24  6:57 PM

## 2024-12-08 NOTE — PROGRESS NOTES
"PROGRESS NOTE    Subjective   Chief complaint: Abdi Retana \"Louise" is a 74 y.o. male who is an acute skilled patient being seen and evaluated for weakness    HPI: Recently admitted to this facility for rehabilitation.   Had required hospitalization for investigation of a suspicious lesion on his vertebrae.   He reports that he is scheduled for further scans and a biopsy - for a suspicious a mass on his kidney , vertebrae and lung tissue.   He reports that he has a good appetite. Is set up for dinner.   Requires extensive assistance with bed repositioning. Requires mechanical lift for all transfers   Denies any pain. Reports that he is fatigued.   HPI      Objective   Vital signs: 130/78-68-18-97.5     Physical Exam  Vitals and nursing note reviewed.   Constitutional:       General: He is not in acute distress.     Appearance: He is well-groomed. He is obese.   Eyes:      Extraocular Movements: Extraocular movements intact.   Cardiovascular:      Rate and Rhythm: Normal rate and regular rhythm.      Comments: Both legs wrapped with ace wraps   Dependent edema evident   Reports good tactile sensation in his feet   Pulmonary:      Effort: Pulmonary effort is normal.      Breath sounds: Normal breath sounds.      Comments: Lungs clear   No cough   Abdominal:      General: Bowel sounds are normal.      Palpations: Abdomen is soft.   Musculoskeletal:      Cervical back: Neck supple.      Right lower leg: No edema.      Left lower leg: No edema.   Neurological:      Mental Status: He is alert.   Psychiatric:         Mood and Affect: Mood normal.         Behavior: Behavior is cooperative.         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, with stage 1 through stage 4 chronic kidney disease, or unspecified chronic kidney disease - Primary     Monitor blood pressure  Losartan potassium  Monitor labs  Avoid nephrotoxins         Lesion of lumbar spine     F/u for biopsy with IR  Multiple outpatient appointments          Renal mass     F/u outpatient  Requires biopsy   Has multiple outpatient appointments          Weakness     Working with therapy towards goals, continue  Monitor progress  Encourage all abilities   Requires mechanical lift for all transfers           Medications, treatments, and labs reviewed  Continue medications and treatments as listed in EMR    Sherry Tate, APRN-CNP

## 2024-12-09 ENCOUNTER — NURSING HOME VISIT (OUTPATIENT)
Dept: POST ACUTE CARE | Facility: EXTERNAL LOCATION | Age: 74
End: 2024-12-09
Payer: MEDICARE

## 2024-12-09 DIAGNOSIS — I73.9 PERIPHERAL VASCULAR DISEASE, UNSPECIFIED (CMS-HCC): ICD-10-CM

## 2024-12-09 DIAGNOSIS — M89.9 LESION OF LUMBAR SPINE: Primary | ICD-10-CM

## 2024-12-09 DIAGNOSIS — I13.10 HYPERTENSIVE HEART AND CHRONIC KIDNEY DISEASE WITHOUT HEART FAILURE, WITH STAGE 1 THROUGH STAGE 4 CHRONIC KIDNEY DISEASE, OR UNSPECIFIED CHRONIC KIDNEY DISEASE: ICD-10-CM

## 2024-12-09 DIAGNOSIS — N28.89 RENAL MASS: ICD-10-CM

## 2024-12-09 DIAGNOSIS — K21.9 GASTRO-ESOPHAGEAL REFLUX DISEASE WITHOUT ESOPHAGITIS: ICD-10-CM

## 2024-12-09 DIAGNOSIS — D64.9 ANEMIA, UNSPECIFIED TYPE: ICD-10-CM

## 2024-12-09 PROBLEM — R53.1 WEAKNESS: Status: ACTIVE | Noted: 2024-12-09

## 2024-12-09 PROCEDURE — 99305 1ST NF CARE MODERATE MDM 35: CPT | Performed by: INTERNAL MEDICINE

## 2024-12-09 NOTE — PROGRESS NOTES
"HISTORY & PHYSICAL    Subjective   Chief complaint: Abdi Retana \"Matt\" is a 74 y.o. male who is being seen and evaluated for multiple medical problems.  Patient presents for admission to SNF.    HPI:  HPI  Patient is a 74-year-old male presenting for admission nursing facility following a hospitalization.  Patient had presented to hospital with lower extremity weakness, recently discovered L4-5 lesion with moderate to severe spinal cord compression.  Imaging also revealed new right renal mass with lesion concerning for malignancy.  Patient is to be following up outpatient for interventional radiology guided biopsy of L4 vertebral body lesion, further imaging of brain, C-spine and T-spine.  PT OT evaluated patient with recommendations for continued therapy at SNF.  Patient was seen and examined at the bedside, appears to be in no acute distress.  Denies chest pain, shortness of breath, nausea vomiting fever chills.  Past Medical History:   Diagnosis Date    CKD (chronic kidney disease)     GERD (gastroesophageal reflux disease)     Hypertension     OA (osteoarthritis)     Personal history of other diseases of the circulatory system     History of hypertension    PVD (peripheral vascular disease) (CMS-[facility])        No past surgical history on file.    Family History   Problem Relation Name Age of Onset    Dementia Mother      Heart attack Mother      Heart attack Father         Social History     Socioeconomic History    Marital status: Single   Tobacco Use    Smoking status: Former     Types: Cigarettes   Substance and Sexual Activity    Alcohol use: Yes    Drug use: Never     Social Drivers of Health     Financial Resource Strain: Low Risk  (11/27/2024)    Overall Financial Resource Strain (CARDIA)     Difficulty of Paying Living Expenses: Not hard at all   Food Insecurity: No Food Insecurity (11/27/2024)    Hunger Vital Sign     Worried About Running Out of Food in the Last Year: Never true     Ran Out of Food in the " Last Year: Never true   Transportation Needs: No Transportation Needs (11/27/2024)    PRAPARE - Transportation     Lack of Transportation (Medical): No     Lack of Transportation (Non-Medical): No   Intimate Partner Violence: Not At Risk (11/27/2024)    Humiliation, Afraid, Rape, and Kick questionnaire     Fear of Current or Ex-Partner: No     Emotionally Abused: No     Physically Abused: No     Sexually Abused: No   Housing Stability: Low Risk  (11/27/2024)    Housing Stability Vital Sign     Unable to Pay for Housing in the Last Year: No     Number of Times Moved in the Last Year: 1     Homeless in the Last Year: No       Vital signs: 134/73, 97.3, 79, 18, 97%    Objective   Physical Exam  Constitutional:       General: He is not in acute distress.  Eyes:      Extraocular Movements: Extraocular movements intact.   Cardiovascular:      Rate and Rhythm: Normal rate and regular rhythm.   Pulmonary:      Effort: Pulmonary effort is normal.      Breath sounds: Normal breath sounds.   Abdominal:      General: Bowel sounds are normal.      Palpations: Abdomen is soft.   Musculoskeletal:      Cervical back: Neck supple.      Right lower leg: No edema.      Left lower leg: No edema.   Neurological:      Mental Status: He is alert.   Psychiatric:         Mood and Affect: Mood normal.         Behavior: Behavior is cooperative.         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, with stage 1 through stage 4 chronic kidney disease, or unspecified chronic kidney disease     Monitor blood pressure  Losartan potassium  Monitor labs  Avoid nephrotoxins         Lesion of lumbar spine  - Primary     F/u for biopsy with IR         Renal mass     F/u outpatient          Hospital records reviewed  Medications, treatments, and labs reviewed  Continue medications and treatments as listed in EMR  Discussed with nursing and therapy      Scribe Attestation  I, Andrey Ferris   attest that this documentation has been prepared under the direction and in the presence of Cheyenne Arango MD    Provider Attestation - Scribe documentation  All medical record entries made by the Scribe were at my direction and personally dictated by me. I have reviewed the chart and agree that the record accurately reflects my personal performance of the history, physical exam, discussion and plan.   Cheyenne Arango MD

## 2024-12-09 NOTE — LETTER
"Patient: Matt Retana  : 1950    Encounter Date: 2024    HISTORY & PHYSICAL    Subjective  Chief complaint: Abdi Retana \"Louise" is a 74 y.o. male who is being seen and evaluated for multiple medical problems.  Patient presents for admission to SNF.    HPI:  HPI  Patient is a 74-year-old male presenting for admission nursing facility following a hospitalization.  Patient had presented to hospital with lower extremity weakness, recently discovered L4-5 lesion with moderate to severe spinal cord compression.  Imaging also revealed new right renal mass with lesion concerning for malignancy.  Patient is to be following up outpatient for interventional radiology guided biopsy of L4 vertebral body lesion, further imaging of brain, C-spine and T-spine.  PT OT evaluated patient with recommendations for continued therapy at SNF.  Patient was seen and examined at the bedside, appears to be in no acute distress.  Denies chest pain, shortness of breath, nausea vomiting fever chills.  Past Medical History:   Diagnosis Date   • CKD (chronic kidney disease)    • GERD (gastroesophageal reflux disease)    • Hypertension    • OA (osteoarthritis)    • Personal history of other diseases of the circulatory system     History of hypertension   • PVD (peripheral vascular disease) (CMS-HCC)        No past surgical history on file.    Family History   Problem Relation Name Age of Onset   • Dementia Mother     • Heart attack Mother     • Heart attack Father         Social History     Socioeconomic History   • Marital status: Single   Tobacco Use   • Smoking status: Former     Types: Cigarettes   Substance and Sexual Activity   • Alcohol use: Yes   • Drug use: Never     Social Drivers of Health     Financial Resource Strain: Low Risk  (2024)    Overall Financial Resource Strain (CARDIA)    • Difficulty of Paying Living Expenses: Not hard at all   Food Insecurity: No Food Insecurity (2024)    Hunger Vital Sign    • Worried " About Running Out of Food in the Last Year: Never true    • Ran Out of Food in the Last Year: Never true   Transportation Needs: No Transportation Needs (11/27/2024)    PRAPARE - Transportation    • Lack of Transportation (Medical): No    • Lack of Transportation (Non-Medical): No   Intimate Partner Violence: Not At Risk (11/27/2024)    Humiliation, Afraid, Rape, and Kick questionnaire    • Fear of Current or Ex-Partner: No    • Emotionally Abused: No    • Physically Abused: No    • Sexually Abused: No   Housing Stability: Low Risk  (11/27/2024)    Housing Stability Vital Sign    • Unable to Pay for Housing in the Last Year: No    • Number of Times Moved in the Last Year: 1    • Homeless in the Last Year: No       Vital signs: 134/73, 97.3, 79, 18, 97%    Objective  Physical Exam  Constitutional:       General: He is not in acute distress.  Eyes:      Extraocular Movements: Extraocular movements intact.   Cardiovascular:      Rate and Rhythm: Normal rate and regular rhythm.   Pulmonary:      Effort: Pulmonary effort is normal.      Breath sounds: Normal breath sounds.   Abdominal:      General: Bowel sounds are normal.      Palpations: Abdomen is soft.   Musculoskeletal:      Cervical back: Neck supple.      Right lower leg: No edema.      Left lower leg: No edema.   Neurological:      Mental Status: He is alert.   Psychiatric:         Mood and Affect: Mood normal.         Behavior: Behavior is cooperative.         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, with stage 1 through stage 4 chronic kidney disease, or unspecified chronic kidney disease     Monitor blood  pressure  Losartan potassium  Monitor labs  Avoid nephrotoxins         Lesion of lumbar spine - Primary     F/u for biopsy with IR         Renal mass     F/u outpatient          Hospital records reviewed  Medications, treatments, and labs reviewed  Continue medications and treatments as listed in EMR  Discussed with nursing and therapy      Scribe Attestation  IYana Scribe   attest that this documentation has been prepared under the direction and in the presence of Cheyenne Arango MD    Provider Attestation - Scribe documentation  All medical record entries made by the Scribe were at my direction and personally dictated by me. I have reviewed the chart and agree that the record accurately reflects my personal performance of the history, physical exam, discussion and plan.   Cheyenne Arango MD      Electronically Signed By: Cheyenne Arango MD   12/10/24  1:02 PM

## 2024-12-10 ENCOUNTER — NURSING HOME VISIT (OUTPATIENT)
Dept: POST ACUTE CARE | Facility: EXTERNAL LOCATION | Age: 74
End: 2024-12-10
Payer: MEDICARE

## 2024-12-10 DIAGNOSIS — K21.9 GASTRO-ESOPHAGEAL REFLUX DISEASE WITHOUT ESOPHAGITIS: ICD-10-CM

## 2024-12-10 DIAGNOSIS — R53.1 WEAKNESS: ICD-10-CM

## 2024-12-10 DIAGNOSIS — I13.10 HYPERTENSIVE HEART AND CHRONIC KIDNEY DISEASE WITHOUT HEART FAILURE, WITH STAGE 1 THROUGH STAGE 4 CHRONIC KIDNEY DISEASE, OR UNSPECIFIED CHRONIC KIDNEY DISEASE: ICD-10-CM

## 2024-12-10 DIAGNOSIS — I73.9 PERIPHERAL VASCULAR DISEASE, UNSPECIFIED (CMS-HCC): Primary | ICD-10-CM

## 2024-12-10 PROCEDURE — 99309 SBSQ NF CARE MODERATE MDM 30: CPT | Performed by: INTERNAL MEDICINE

## 2024-12-10 NOTE — LETTER
"Patient: Matt Retana  : 1950    Encounter Date: 12/10/2024    PROGRESS NOTE    Subjective  Chief complaint: Abdi Retana \"Louise" is a 74 y.o. male who is an acute skilled patient being seen and evaluated for weakness    HPI:  HPI  Patient is working in PT OT. Therapy has been working with the patient to improve strength and endurance with ADLs, transfers, and mobility for increased independence with functional tasks.  Patient seen and examined at the bedside, appears to be in no acute distress.  Patient is stable and has no new complaints.  Nursing staff voices no new concerns today.    Objective  Vital signs: 134/73, 98.1, 79, 18, 97%    Physical Exam  Constitutional:       General: He is not in acute distress.  Eyes:      Extraocular Movements: Extraocular movements intact.   Cardiovascular:      Rate and Rhythm: Normal rate and regular rhythm.   Pulmonary:      Effort: Pulmonary effort is normal.      Breath sounds: Normal breath sounds.   Abdominal:      General: Bowel sounds are normal.      Palpations: Abdomen is soft.   Musculoskeletal:      Cervical back: Neck supple.      Right lower leg: No edema.      Left lower leg: No edema.   Neurological:      Mental Status: He is alert.      Motor: Weakness present.   Psychiatric:         Mood and Affect: Mood normal.         Behavior: Behavior is cooperative.         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, with stage 1 through stage 4 chronic kidney disease, or unspecified chronic kidney disease     Monitor blood pressure  Losartan potassium  Monitor labs  Avoid nephrotoxins         Weakness     Continue to work with therapy towards goals " established          Medications, treatments, and labs reviewed  Continue medications and treatments as listed in EMR      Scribe Attestation  I, Andrey Ferris   attest that this documentation has been prepared under the direction and in the presence of Cheyenne Arango MD    Provider Attestation - Scribe documentation  All medical record entries made by the Scribe were at my direction and personally dictated by me. I have reviewed the chart and agree that the record accurately reflects my personal performance of the history, physical exam, discussion and plan.   Cheyenne Arango MD        Electronically Signed By: Cheyenne Arango MD   12/11/24  1:45 PM

## 2024-12-10 NOTE — PROGRESS NOTES
"PROGRESS NOTE    Subjective   Chief complaint: Abdi Retana \"Louise" is a 74 y.o. male who is an acute skilled patient being seen and evaluated for weakness    HPI:  HPI  Patient is working in PT OT. Therapy has been working with the patient to improve strength and endurance with ADLs, transfers, and mobility for increased independence with functional tasks.  Patient seen and examined at the bedside, appears to be in no acute distress.  Patient is stable and has no new complaints.  Nursing staff voices no new concerns today.    Objective   Vital signs: 134/73, 98.1, 79, 18, 97%    Physical Exam  Constitutional:       General: He is not in acute distress.  Eyes:      Extraocular Movements: Extraocular movements intact.   Cardiovascular:      Rate and Rhythm: Normal rate and regular rhythm.   Pulmonary:      Effort: Pulmonary effort is normal.      Breath sounds: Normal breath sounds.   Abdominal:      General: Bowel sounds are normal.      Palpations: Abdomen is soft.   Musculoskeletal:      Cervical back: Neck supple.      Right lower leg: No edema.      Left lower leg: No edema.   Neurological:      Mental Status: He is alert.      Motor: Weakness present.   Psychiatric:         Mood and Affect: Mood normal.         Behavior: Behavior is cooperative.         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, with stage 1 through stage 4 chronic kidney disease, or unspecified chronic kidney disease     Monitor blood pressure  Losartan potassium  Monitor labs  Avoid nephrotoxins         Weakness     Continue to work with therapy towards goals established          Medications, treatments, and labs reviewed  Continue " medications and treatments as listed in EMR      Scribe Attestation  I, Andrey Ferris   attest that this documentation has been prepared under the direction and in the presence of Cheyenne Arango MD    Provider Attestation - Scribe documentation  All medical record entries made by the Scribe were at my direction and personally dictated by me. I have reviewed the chart and agree that the record accurately reflects my personal performance of the history, physical exam, discussion and plan.   Cheyenne Arango MD

## 2024-12-12 ENCOUNTER — NURSING HOME VISIT (OUTPATIENT)
Dept: POST ACUTE CARE | Facility: EXTERNAL LOCATION | Age: 74
End: 2024-12-12
Payer: MEDICARE

## 2024-12-12 DIAGNOSIS — I13.10 HYPERTENSIVE HEART AND CHRONIC KIDNEY DISEASE WITHOUT HEART FAILURE, WITH STAGE 1 THROUGH STAGE 4 CHRONIC KIDNEY DISEASE, OR UNSPECIFIED CHRONIC KIDNEY DISEASE: Primary | ICD-10-CM

## 2024-12-12 DIAGNOSIS — K21.9 GASTRO-ESOPHAGEAL REFLUX DISEASE WITHOUT ESOPHAGITIS: ICD-10-CM

## 2024-12-12 DIAGNOSIS — I73.9 PERIPHERAL VASCULAR DISEASE, UNSPECIFIED (CMS-HCC): ICD-10-CM

## 2024-12-12 DIAGNOSIS — R53.1 WEAKNESS: ICD-10-CM

## 2024-12-12 PROBLEM — I89.0 LYMPHEDEMA OF BOTH LOWER EXTREMITIES: Status: RESOLVED | Noted: 2023-04-04 | Resolved: 2024-12-12

## 2024-12-12 PROBLEM — I10 HYPERTENSION: Status: RESOLVED | Noted: 2020-08-20 | Resolved: 2024-12-12

## 2024-12-12 PROBLEM — I87.013: Status: RESOLVED | Noted: 2023-04-04 | Resolved: 2024-12-12

## 2024-12-12 PROCEDURE — 99308 SBSQ NF CARE LOW MDM 20: CPT | Performed by: INTERNAL MEDICINE

## 2024-12-12 NOTE — PROGRESS NOTES
"PROGRESS NOTE    Subjective   Chief complaint: Abdi Retana \"Louise" is a 74 y.o. male who is an acute skilled patient being seen and evaluated for weakness    HPI:  HPI  Patient is working with therapies. Therapy has been working with the patient to improve strength and endurance with ADLs, transfers, and mobility for increased independence with functional tasks.  Patient is working on standing balance in parallel bars with therapy patient seen and examined at the bedside, appears to be in no acute distress.  Patient is stable and has no new complaints.  Nursing staff voices no new concerns today.    Objective   Vital signs: 127/71, 97.4, 75, 16, 97%    Physical Exam  Constitutional:       General: He is not in acute distress.  Eyes:      Extraocular Movements: Extraocular movements intact.   Cardiovascular:      Rate and Rhythm: Normal rate and regular rhythm.   Pulmonary:      Effort: Pulmonary effort is normal.      Breath sounds: Normal breath sounds.   Abdominal:      General: Bowel sounds are normal.      Palpations: Abdomen is soft.   Musculoskeletal:      Cervical back: Neck supple.      Right lower leg: No edema.      Left lower leg: No edema.   Neurological:      Mental Status: He is alert.      Motor: Weakness present.   Psychiatric:         Mood and Affect: Mood normal.         Behavior: Behavior is cooperative.         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, with stage 1 through stage 4 chronic kidney disease, or unspecified chronic kidney disease - Primary     Monitor blood pressure  Losartan potassium  Monitor labs  Avoid nephrotoxins         Weakness     Working with therapy towards goals, " continue          Medications, treatments, and labs reviewed  Continue medications and treatments as listed in EMR      Scribe Attestation  I, Andrey Ferris   attest that this documentation has been prepared under the direction and in the presence of Cheyenne Arango MD    Provider Attestation - Scribe documentation  All medical record entries made by the Scribe were at my direction and personally dictated by me. I have reviewed the chart and agree that the record accurately reflects my personal performance of the history, physical exam, discussion and plan.   Cheyenne Arango MD

## 2024-12-12 NOTE — LETTER
"Patient: Matt Retana  : 1950    Encounter Date: 2024    PROGRESS NOTE    Subjective  Chief complaint: Abdi Retana \"Louise" is a 74 y.o. male who is an acute skilled patient being seen and evaluated for weakness    HPI:  HPI  Patient is working with therapies. Therapy has been working with the patient to improve strength and endurance with ADLs, transfers, and mobility for increased independence with functional tasks.  Patient is working on standing balance in parallel bars with therapy patient seen and examined at the bedside, appears to be in no acute distress.  Patient is stable and has no new complaints.  Nursing staff voices no new concerns today.    Objective  Vital signs: 127/71, 97.4, 75, 16, 97%    Physical Exam  Constitutional:       General: He is not in acute distress.  Eyes:      Extraocular Movements: Extraocular movements intact.   Cardiovascular:      Rate and Rhythm: Normal rate and regular rhythm.   Pulmonary:      Effort: Pulmonary effort is normal.      Breath sounds: Normal breath sounds.   Abdominal:      General: Bowel sounds are normal.      Palpations: Abdomen is soft.   Musculoskeletal:      Cervical back: Neck supple.      Right lower leg: No edema.      Left lower leg: No edema.   Neurological:      Mental Status: He is alert.      Motor: Weakness present.   Psychiatric:         Mood and Affect: Mood normal.         Behavior: Behavior is cooperative.         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, with stage 1 through stage 4 chronic kidney disease, or unspecified chronic kidney disease - Primary     Monitor blood pressure  Losartan potassium  Monitor labs  Avoid nephrotoxins   "       Weakness     Working with therapy towards goals, continue          Medications, treatments, and labs reviewed  Continue medications and treatments as listed in EMR      Scribe Attestation  I, Andrey Ferris   attest that this documentation has been prepared under the direction and in the presence of Cheyenne Arango MD    Provider Attestation - Scribe documentation  All medical record entries made by the Scribe were at my direction and personally dictated by me. I have reviewed the chart and agree that the record accurately reflects my personal performance of the history, physical exam, discussion and plan.   Cheyenne Arango MD        Electronically Signed By: Cheyenne Arango MD   12/13/24  4:57 PM

## 2024-12-12 NOTE — ASSESSMENT & PLAN NOTE
Working with therapy towards goals, continue  Monitor progress  Encourage all abilities   Requires mechanical lift for all transfers

## 2024-12-12 NOTE — ASSESSMENT & PLAN NOTE
Bilateral lower extremity wounds, treatment in place  Wound care practitioner follows   No inflammation   Hx lymphedema

## 2024-12-13 ENCOUNTER — NURSING HOME VISIT (OUTPATIENT)
Dept: POST ACUTE CARE | Facility: EXTERNAL LOCATION | Age: 74
End: 2024-12-13
Payer: MEDICARE

## 2024-12-13 DIAGNOSIS — K21.9 GASTRO-ESOPHAGEAL REFLUX DISEASE WITHOUT ESOPHAGITIS: ICD-10-CM

## 2024-12-13 DIAGNOSIS — I13.10 HYPERTENSIVE HEART AND CHRONIC KIDNEY DISEASE WITHOUT HEART FAILURE, WITH STAGE 1 THROUGH STAGE 4 CHRONIC KIDNEY DISEASE, OR UNSPECIFIED CHRONIC KIDNEY DISEASE: Primary | ICD-10-CM

## 2024-12-13 DIAGNOSIS — D64.9 ANEMIA, UNSPECIFIED TYPE: ICD-10-CM

## 2024-12-13 DIAGNOSIS — I73.9 PERIPHERAL VASCULAR DISEASE, UNSPECIFIED (CMS-HCC): ICD-10-CM

## 2024-12-13 DIAGNOSIS — M89.9 LESION OF LUMBAR SPINE: ICD-10-CM

## 2024-12-13 DIAGNOSIS — N28.89 RENAL MASS: ICD-10-CM

## 2024-12-13 DIAGNOSIS — R53.1 WEAKNESS: ICD-10-CM

## 2024-12-13 PROCEDURE — 99309 SBSQ NF CARE MODERATE MDM 30: CPT | Performed by: NURSE PRACTITIONER

## 2024-12-13 NOTE — LETTER
"Patient: Matt Retana  : 1950    Encounter Date: 2024    PROGRESS NOTE    Subjective  Chief complaint: Abdi Retana \"Louise" is a 74 y.o. male who is an acute skilled patient being seen and evaluated for weakness    HPI: Is currently in the therapy gym, the therapist reports that he is making progress. He is concerned about his upcoming biopsy. Continues to require mechanical lift for all transfers.   Stressed with him the benefit for exercise program. He denies any pain or discomfort. Has limited tactile sensation in feet and legs. The wound care provider - reports that his open wound on his legs are improving.   Reviewed medications and labs.   HPI      Objective  Vital signs: 123/62-77-18-98.4     Physical Exam  Vitals and nursing note reviewed.   Constitutional:       General: He is not in acute distress.     Appearance: He is well-groomed. He is obese.   Eyes:      Extraocular Movements: Extraocular movements intact.   Cardiovascular:      Rate and Rhythm: Normal rate and regular rhythm.      Comments: Both legs wrapped with ace wraps   Dependent edema evident   Reports limited tactile sensation in his feet   Pulmonary:      Effort: Pulmonary effort is normal.      Breath sounds: Normal breath sounds.      Comments: Lungs clear   No cough   Abdominal:      General: Bowel sounds are normal.      Palpations: Abdomen is soft.   Musculoskeletal:      Cervical back: Neck supple.      Right lower leg: No edema.      Left lower leg: No edema.   Neurological:      Mental Status: He is alert.   Psychiatric:         Mood and Affect: Mood normal.         Behavior: Behavior is cooperative.         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, with stage 1 through stage 4 chronic kidney disease, or unspecified chronic kidney disease - Primary     Monitor blood pressure  Losartan potassium  Monitor labs  Avoid nephrotoxins  Lungs clear    Edema stable          Lesion of lumbar spine     F/u for biopsy with IR  Multiple outpatient appointments          Renal mass     F/u outpatient  Requires biopsy   Has multiple outpatient appointments          Weakness     Continue therapy, working towards goals  Monitor progress   Requires mechanical lift for all transfers           Medications, treatments, and labs reviewed  Continue medications and treatments as listed in EMR    DIVINE Presley      Electronically Signed By: DIVINE Presley   12/17/24  9:28 PM

## 2024-12-15 NOTE — PROGRESS NOTES
"PROGRESS NOTE    Subjective   Chief complaint: Abdi Retana \"Louise" is a 74 y.o. male who is an acute skilled patient being seen and evaluated for weakness    HPI: Is currently in the therapy gym, the therapist reports that he is making progress. He is concerned about his upcoming biopsy. Continues to require mechanical lift for all transfers.   Stressed with him the benefit for exercise program. He denies any pain or discomfort. Has limited tactile sensation in feet and legs. The wound care provider - reports that his open wound on his legs are improving.   Reviewed medications and labs.   HPI      Objective   Vital signs: 123/62-77-18-98.4     Physical Exam  Vitals and nursing note reviewed.   Constitutional:       General: He is not in acute distress.     Appearance: He is well-groomed. He is obese.   Eyes:      Extraocular Movements: Extraocular movements intact.   Cardiovascular:      Rate and Rhythm: Normal rate and regular rhythm.      Comments: Both legs wrapped with ace wraps   Dependent edema evident   Reports limited tactile sensation in his feet   Pulmonary:      Effort: Pulmonary effort is normal.      Breath sounds: Normal breath sounds.      Comments: Lungs clear   No cough   Abdominal:      General: Bowel sounds are normal.      Palpations: Abdomen is soft.   Musculoskeletal:      Cervical back: Neck supple.      Right lower leg: No edema.      Left lower leg: No edema.   Neurological:      Mental Status: He is alert.   Psychiatric:         Mood and Affect: Mood normal.         Behavior: Behavior is cooperative.         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, with stage 1 through stage 4 chronic kidney disease, or unspecified chronic kidney disease - Primary     Monitor blood pressure  Losartan potassium  Monitor labs  Avoid nephrotoxins  Lungs clear    Edema stable          Lesion of lumbar spine     F/u for biopsy with IR  Multiple outpatient appointments          Renal mass     F/u outpatient  Requires biopsy   Has multiple outpatient appointments          Weakness     Continue therapy, working towards goals  Monitor progress   Requires mechanical lift for all transfers           Medications, treatments, and labs reviewed  Continue medications and treatments as listed in EMR    Sherry Tate, APRN-CNP

## 2024-12-17 ENCOUNTER — NURSING HOME VISIT (OUTPATIENT)
Dept: POST ACUTE CARE | Facility: EXTERNAL LOCATION | Age: 74
End: 2024-12-17
Payer: MEDICARE

## 2024-12-17 DIAGNOSIS — R53.1 WEAKNESS: ICD-10-CM

## 2024-12-17 DIAGNOSIS — I13.10 HYPERTENSIVE HEART AND CHRONIC KIDNEY DISEASE WITHOUT HEART FAILURE, WITH STAGE 1 THROUGH STAGE 4 CHRONIC KIDNEY DISEASE, OR UNSPECIFIED CHRONIC KIDNEY DISEASE: Primary | ICD-10-CM

## 2024-12-17 DIAGNOSIS — K21.9 GASTRO-ESOPHAGEAL REFLUX DISEASE WITHOUT ESOPHAGITIS: ICD-10-CM

## 2024-12-17 PROCEDURE — 99309 SBSQ NF CARE MODERATE MDM 30: CPT | Performed by: INTERNAL MEDICINE

## 2024-12-17 NOTE — PROGRESS NOTES
"PROGRESS NOTE    Subjective   Chief complaint: Abdi Retana \"Matt\" is a 74 y.o. male who is an acute skilled patient being seen and evaluated for weakness    HPI:  HPI    An interactive audio and/or video telecommunication system which permits real time communications between the patient (at the originating site) and provider (at a distant site) was utilized to provide this telehealth service after obtaining verbal consent.   Patient presents for f/u therapy and general medical care.  Patient seen and examined at bedside.  Therapy has been working with the patient to improve strength, endurance, ADLs, and transfers d/t generalized weakness.  Patient has no acute concerns today. Patient denies constitutional symptoms.  Nursing reporting no new concerns.    Objective   Vital signs: 127/71, 97.8, 80, 18, 98%    Physical Exam  Constitutional:       General: He is not in acute distress.  Eyes:      Extraocular Movements: Extraocular movements intact.   Pulmonary:      Effort: Pulmonary effort is normal.   Musculoskeletal:      Cervical back: Neck supple.   Neurological:      Mental Status: He is alert.   Psychiatric:         Mood and Affect: Mood normal.         Behavior: Behavior is cooperative.         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, with stage 1 through stage 4 chronic kidney disease, or unspecified chronic kidney disease - Primary     Monitor blood pressure  Losartan potassium  Monitor labs  Avoid nephrotoxins         Weakness     Continue therapy, working towards goals          Medications, treatments, and labs reviewed  Continue medications and treatments as listed in EMR      Scribe Attestation  MARIANA, Andrey Ferris   attest that this documentation has been prepared under the direction and in the presence of " Cheyenne Arango MD    Provider Attestation - Scribe documentation  All medical record entries made by the Scribe were at my direction and personally dictated by me. I have reviewed the chart and agree that the record accurately reflects my personal performance of the history, physical exam, discussion and plan.   Cheyenne Arango MD

## 2024-12-17 NOTE — LETTER
"Patient: Matt Retana  : 1950    Encounter Date: 2024    PROGRESS NOTE    Subjective  Chief complaint: Abdi Retana \"Louise" is a 74 y.o. male who is an acute skilled patient being seen and evaluated for weakness    HPI:  HPI    An interactive audio and/or video telecommunication system which permits real time communications between the patient (at the originating site) and provider (at a distant site) was utilized to provide this telehealth service after obtaining verbal consent.   Patient presents for f/u therapy and general medical care.  Patient seen and examined at bedside.  Therapy has been working with the patient to improve strength, endurance, ADLs, and transfers d/t generalized weakness.  Patient has no acute concerns today. Patient denies constitutional symptoms.  Nursing reporting no new concerns.    Objective  Vital signs: 127/71, 97.8, 80, 18, 98%    Physical Exam  Constitutional:       General: He is not in acute distress.  Eyes:      Extraocular Movements: Extraocular movements intact.   Pulmonary:      Effort: Pulmonary effort is normal.   Musculoskeletal:      Cervical back: Neck supple.   Neurological:      Mental Status: He is alert.   Psychiatric:         Mood and Affect: Mood normal.         Behavior: Behavior is cooperative.         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, with stage 1 through stage 4 chronic kidney disease, or unspecified chronic kidney disease - Primary     Monitor blood pressure  Losartan potassium  Monitor labs  Avoid nephrotoxins         Weakness     Continue therapy, working towards goals          Medications, treatments, and labs reviewed  Continue medications and treatments as listed in EMR      Scribe Attestation  I, Andrey Ferris   attest that this documentation " has been prepared under the direction and in the presence of Cheyenne Arango MD    Provider Attestation - Scribe documentation  All medical record entries made by the Scribe were at my direction and personally dictated by me. I have reviewed the chart and agree that the record accurately reflects my personal performance of the history, physical exam, discussion and plan.   Cheyenne Arango MD        Electronically Signed By: Cheyenne Arango MD   12/18/24 10:32 AM

## 2024-12-18 ENCOUNTER — NURSING HOME VISIT (OUTPATIENT)
Dept: POST ACUTE CARE | Facility: EXTERNAL LOCATION | Age: 74
End: 2024-12-18
Payer: MEDICARE

## 2024-12-18 DIAGNOSIS — N28.89 RENAL MASS: ICD-10-CM

## 2024-12-18 DIAGNOSIS — K21.9 GASTRO-ESOPHAGEAL REFLUX DISEASE WITHOUT ESOPHAGITIS: ICD-10-CM

## 2024-12-18 DIAGNOSIS — I13.10 HYPERTENSIVE HEART AND CHRONIC KIDNEY DISEASE WITHOUT HEART FAILURE, WITH STAGE 1 THROUGH STAGE 4 CHRONIC KIDNEY DISEASE, OR UNSPECIFIED CHRONIC KIDNEY DISEASE: ICD-10-CM

## 2024-12-18 DIAGNOSIS — D64.9 ANEMIA, UNSPECIFIED TYPE: Primary | ICD-10-CM

## 2024-12-18 DIAGNOSIS — N39.0 URINARY TRACT INFECTION WITHOUT HEMATURIA, SITE UNSPECIFIED: ICD-10-CM

## 2024-12-18 DIAGNOSIS — I73.9 PERIPHERAL VASCULAR DISEASE, UNSPECIFIED (CMS-HCC): ICD-10-CM

## 2024-12-18 PROCEDURE — 99309 SBSQ NF CARE MODERATE MDM 30: CPT | Performed by: NURSE PRACTITIONER

## 2024-12-18 NOTE — LETTER
"Patient: Matt Retana  : 1950    Encounter Date: 2024    PROGRESS NOTE    Subjective  Chief complaint: Abdi Retana \"Louise" is a 74 y.o. male who is an acute skilled patient being seen and evaluated for weakness    HPI: Remains in therapy. Is currently in bed.   Is motivated to get up. Continues to require mechanical lift for all transfers.    Reviewed labs - urine culture. Indicative of acute UTI. Will start antibiotics.    Remains afebrile. Denies any dysuria. Urine - dark in color, mae colored. To encourage fluids. Is scheduled for renal biopsy this week.   HPI      Objective  Vital signs: 127/71-80-18-97.8     Physical Exam  Vitals and nursing note reviewed.   Constitutional:       General: He is not in acute distress.     Appearance: He is well-groomed. He is obese.   Eyes:      Extraocular Movements: Extraocular movements intact.   Cardiovascular:      Rate and Rhythm: Normal rate and regular rhythm.      Comments: Both legs wrapped with ace wraps   Dependent edema evident   Reports limited tactile sensation in his feet   Pulmonary:      Effort: Pulmonary effort is normal.      Breath sounds: Normal breath sounds.      Comments: Lungs clear   No cough   Abdominal:      General: Bowel sounds are normal.      Palpations: Abdomen is soft.   Musculoskeletal:      Cervical back: Neck supple.      Right lower leg: No edema.      Left lower leg: No edema.   Neurological:      Mental Status: He is alert.   Psychiatric:         Mood and Affect: Mood normal.         Behavior: Behavior is cooperative.         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

## 2024-12-18 NOTE — ASSESSMENT & PLAN NOTE
Monitor blood pressure  Losartan potassium  Monitor labs  Avoid nephrotoxins  Lungs clear    Edema stable

## 2024-12-18 NOTE — ASSESSMENT & PLAN NOTE
Bilateral lower extremity wounds, treatment in place  Wound care practitioner follows - reports wounds are healing   No inflammation   Hx lymphedema

## 2024-12-18 NOTE — ASSESSMENT & PLAN NOTE
Continue therapy, working towards goals  Monitor progress   Requires mechanical lift for all transfers

## 2024-12-19 ENCOUNTER — NURSING HOME VISIT (OUTPATIENT)
Dept: POST ACUTE CARE | Facility: EXTERNAL LOCATION | Age: 74
End: 2024-12-19
Payer: MEDICARE

## 2024-12-19 DIAGNOSIS — I13.10 HYPERTENSIVE HEART AND CHRONIC KIDNEY DISEASE WITHOUT HEART FAILURE, WITH STAGE 1 THROUGH STAGE 4 CHRONIC KIDNEY DISEASE, OR UNSPECIFIED CHRONIC KIDNEY DISEASE: Primary | ICD-10-CM

## 2024-12-19 DIAGNOSIS — I73.9 PERIPHERAL VASCULAR DISEASE, UNSPECIFIED (CMS-HCC): ICD-10-CM

## 2024-12-19 DIAGNOSIS — R53.1 WEAKNESS: ICD-10-CM

## 2024-12-19 DIAGNOSIS — D64.9 ANEMIA, UNSPECIFIED TYPE: ICD-10-CM

## 2024-12-19 DIAGNOSIS — M89.9 LESION OF LUMBAR SPINE: ICD-10-CM

## 2024-12-19 PROCEDURE — 99309 SBSQ NF CARE MODERATE MDM 30: CPT | Performed by: INTERNAL MEDICINE

## 2024-12-19 NOTE — LETTER
"Patient: Matt Retana  : 1950    Encounter Date: 2024    PROGRESS NOTE    Subjective  Chief complaint: Abdi Retana \"Louise" is a 74 y.o. male who is an acute skilled patient being seen and evaluated for weakness    HPI:  HPI  An interactive audio and/or video telecommunication system which permits real time communications between the patient (at the originating site) and provider (at a distant site) was utilized to provide this telehealth service after obtaining verbal consent.   Patient presents for f/u therapy and general medical care.  Patient seen and examined at bedside.  Therapy has been working with the patient to improve strength, endurance, ADLs, and transfers d/t generalized weakness.  Patient has no acute concerns today. Patient denies constitutional symptoms.  Nursing reporting no new concerns.    Objective  Vital signs: 127/71, 97.8, 80, 18, 98%    Physical Exam  Constitutional:       General: He is not in acute distress.  Eyes:      Extraocular Movements: Extraocular movements intact.   Pulmonary:      Effort: Pulmonary effort is normal.   Musculoskeletal:      Cervical back: Neck supple.   Neurological:      Mental Status: He is alert.   Psychiatric:         Mood and Affect: Mood normal.         Behavior: Behavior is cooperative.         Assessment/Plan  Problem List Items Addressed This Visit       Anemia     Monitor labs  Ferrous sulfate         Peripheral vascular disease, unspecified (CMS-HCC)     Bilateral lower extremity wounds, treatment in place.  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, with stage 1 through stage 4 chronic kidney disease, or unspecified chronic kidney disease - Primary     Monitor blood pressure  Losartan potassium  Monitor labs  Avoid nephrotoxins         Lesion of lumbar spine     F/u for biopsy with IR         Weakness     Continue therapy, work towards goals          Medications, treatments, and " labs reviewed  Continue medications and treatments as listed in EMR      Scribe Attestation  I, Andrey Ferris   attest that this documentation has been prepared under the direction and in the presence of Cheyenne Arango MD    Provider Attestation - Scribe documentation  All medical record entries made by the Scribe were at my direction and personally dictated by me. I have reviewed the chart and agree that the record accurately reflects my personal performance of the history, physical exam, discussion and plan.   Cheyenne Arango MD        Electronically Signed By: Cheyenne Arango MD   12/20/24  3:11 PM

## 2024-12-19 NOTE — PROGRESS NOTES
"PROGRESS NOTE    Subjective   Chief complaint: Abdi Retana \"Louise" is a 74 y.o. male who is an acute skilled patient being seen and evaluated for weakness    HPI:  HPI  An interactive audio and/or video telecommunication system which permits real time communications between the patient (at the originating site) and provider (at a distant site) was utilized to provide this telehealth service after obtaining verbal consent.   Patient presents for f/u therapy and general medical care.  Patient seen and examined at bedside.  Therapy has been working with the patient to improve strength, endurance, ADLs, and transfers d/t generalized weakness.  Patient has no acute concerns today. Patient denies constitutional symptoms.  Nursing reporting no new concerns.    Objective   Vital signs: 127/71, 97.8, 80, 18, 98%    Physical Exam  Constitutional:       General: He is not in acute distress.  Eyes:      Extraocular Movements: Extraocular movements intact.   Pulmonary:      Effort: Pulmonary effort is normal.   Musculoskeletal:      Cervical back: Neck supple.   Neurological:      Mental Status: He is alert.   Psychiatric:         Mood and Affect: Mood normal.         Behavior: Behavior is cooperative.         Assessment/Plan   Problem List Items Addressed This Visit       Anemia     Monitor labs  Ferrous sulfate         Peripheral vascular disease, unspecified (CMS-[facility])     Bilateral lower extremity wounds, treatment in place.  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, with stage 1 through stage 4 chronic kidney disease, or unspecified chronic kidney disease - Primary     Monitor blood pressure  Losartan potassium  Monitor labs  Avoid nephrotoxins         Lesion of lumbar spine     F/u for biopsy with IR         Weakness     Continue therapy, work towards goals          Medications, treatments, and labs reviewed  Continue medications and treatments as listed in " EMR      Scribe Attestation  I, Andrey Ferris   attest that this documentation has been prepared under the direction and in the presence of Cheyenne Arango MD    Provider Attestation - Scribe documentation  All medical record entries made by the Scribe were at my direction and personally dictated by me. I have reviewed the chart and agree that the record accurately reflects my personal performance of the history, physical exam, discussion and plan.   Cheyenne Arango MD

## 2024-12-19 NOTE — PROGRESS NOTES
"PROGRESS NOTE    Subjective   Chief complaint: Abdi Retana \"Louise" is a 74 y.o. male who is an acute skilled patient being seen and evaluated for weakness    HPI: Remains in therapy. Is currently in bed.   Is motivated to get up. Continues to require mechanical lift for all transfers.    Reviewed labs - urine culture. Indicative of acute UTI. Will start antibiotics.    Remains afebrile. Denies any dysuria. Urine - dark in color, mae colored. To encourage fluids. Is scheduled for renal biopsy this week.   HPI      Objective   Vital signs: 127/71-80-18-97.8     Physical Exam  Vitals and nursing note reviewed.   Constitutional:       General: He is not in acute distress.     Appearance: He is well-groomed. He is obese.   Eyes:      Extraocular Movements: Extraocular movements intact.   Cardiovascular:      Rate and Rhythm: Normal rate and regular rhythm.      Comments: Both legs wrapped with ace wraps   Dependent edema evident   Reports limited tactile sensation in his feet   Pulmonary:      Effort: Pulmonary effort is normal.      Breath sounds: Normal breath sounds.      Comments: Lungs clear   No cough   Abdominal:      General: Bowel sounds are normal.      Palpations: Abdomen is soft.   Musculoskeletal:      Cervical back: Neck supple.      Right lower leg: No edema.      Left lower leg: No edema.   Neurological:      Mental Status: He is alert.   Psychiatric:         Mood and Affect: Mood normal.         Behavior: Behavior is cooperative.         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

## 2024-12-20 ENCOUNTER — APPOINTMENT (OUTPATIENT)
Dept: RADIOLOGY | Facility: HOSPITAL | Age: 74
End: 2024-12-20
Payer: MEDICARE

## 2024-12-20 ENCOUNTER — HOSPITAL ENCOUNTER (OUTPATIENT)
Dept: RADIOLOGY | Facility: HOSPITAL | Age: 74
End: 2024-12-20
Payer: MEDICARE

## 2024-12-20 ENCOUNTER — NURSING HOME VISIT (OUTPATIENT)
Dept: POST ACUTE CARE | Facility: EXTERNAL LOCATION | Age: 74
End: 2024-12-20

## 2024-12-20 DIAGNOSIS — K21.9 GASTRO-ESOPHAGEAL REFLUX DISEASE WITHOUT ESOPHAGITIS: ICD-10-CM

## 2024-12-20 DIAGNOSIS — N28.89 RENAL MASS: ICD-10-CM

## 2024-12-20 DIAGNOSIS — I13.10 HYPERTENSIVE HEART AND CHRONIC KIDNEY DISEASE WITHOUT HEART FAILURE, WITH STAGE 1 THROUGH STAGE 4 CHRONIC KIDNEY DISEASE, OR UNSPECIFIED CHRONIC KIDNEY DISEASE: ICD-10-CM

## 2024-12-20 DIAGNOSIS — R53.1 WEAKNESS: ICD-10-CM

## 2024-12-20 DIAGNOSIS — I73.9 PERIPHERAL VASCULAR DISEASE, UNSPECIFIED (CMS-HCC): ICD-10-CM

## 2024-12-20 DIAGNOSIS — D64.9 ANEMIA, UNSPECIFIED TYPE: Primary | ICD-10-CM

## 2024-12-20 PROCEDURE — 99309 SBSQ NF CARE MODERATE MDM 30: CPT | Performed by: NURSE PRACTITIONER

## 2024-12-20 NOTE — LETTER
"Patient: Matt Retana  : 1950    Encounter Date: 2024    PROGRESS NOTE    Subjective  Chief complaint: Abdi Retana \"Louise" is a 74 y.o. male who is an acute skilled patient being seen and evaluated for weakness    HPI: Remains in therapy.  Was scheduled for biopsy this morning.  Is disappointed that his biopsy appointment has been postponed because of transportation problems - states that he wasn't to get biopsy done so he can find out about appropriate treatment.   Continues to require mechanical lift for all transfers. Currently denies any discomfort.   Reassurance offered. Nursing is rescheduling appointment.   Is scheduled for therapy this morning.   Reports good sensation in lower legs.   HPI      Objective  Vital signs: 121/64-72-18-98.3     Physical Exam  Vitals and nursing note reviewed.   Constitutional:       General: He is not in acute distress.     Appearance: He is well-groomed. He is obese.   Eyes:      Extraocular Movements: Extraocular movements intact.   Cardiovascular:      Rate and Rhythm: Normal rate and regular rhythm.      Comments: Both legs wrapped with ace wraps   Dependent edema evident     Pulmonary:      Effort: Pulmonary effort is normal.      Breath sounds: Normal breath sounds.      Comments: Lungs clear   No cough   Abdominal:      General: Bowel sounds are normal.      Palpations: Abdomen is soft.   Musculoskeletal:      Cervical back: Neck supple.      Right lower leg: No edema.      Left lower leg: No edema.   Neurological:      Mental Status: He is alert.   Psychiatric:         Mood and Affect: Mood normal.         Behavior: Behavior is cooperative.         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

## 2024-12-21 ENCOUNTER — APPOINTMENT (OUTPATIENT)
Dept: RADIOLOGY | Facility: HOSPITAL | Age: 74
End: 2024-12-21
Payer: MEDICARE

## 2024-12-24 DIAGNOSIS — M89.9 LESION OF LUMBAR SPINE: ICD-10-CM

## 2024-12-24 DIAGNOSIS — N28.89 RENAL MASS: ICD-10-CM

## 2024-12-25 NOTE — PROGRESS NOTES
"PROGRESS NOTE    Subjective   Chief complaint: Abdi Retana \"Louise" is a 74 y.o. male who is an acute skilled patient being seen and evaluated for weakness    HPI: Remains in therapy.  Was scheduled for biopsy this morning.  Is disappointed that his biopsy appointment has been postponed because of transportation problems - states that he wasn't to get biopsy done so he can find out about appropriate treatment.   Continues to require mechanical lift for all transfers. Currently denies any discomfort.   Reassurance offered. Nursing is rescheduling appointment.   Is scheduled for therapy this morning.   Reports good sensation in lower legs.   HPI      Objective   Vital signs: 121/64-72-18-98.3     Physical Exam  Vitals and nursing note reviewed.   Constitutional:       General: He is not in acute distress.     Appearance: He is well-groomed. He is obese.   Eyes:      Extraocular Movements: Extraocular movements intact.   Cardiovascular:      Rate and Rhythm: Normal rate and regular rhythm.      Comments: Both legs wrapped with ace wraps   Dependent edema evident     Pulmonary:      Effort: Pulmonary effort is normal.      Breath sounds: Normal breath sounds.      Comments: Lungs clear   No cough   Abdominal:      General: Bowel sounds are normal.      Palpations: Abdomen is soft.   Musculoskeletal:      Cervical back: Neck supple.      Right lower leg: No edema.      Left lower leg: No edema.   Neurological:      Mental Status: He is alert.   Psychiatric:         Mood and Affect: Mood normal.         Behavior: Behavior is cooperative.         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

## 2024-12-26 NOTE — ASSESSMENT & PLAN NOTE
Continue therapy, work towards goals  Monitor progress   Requires mechanical lift for all transfers

## 2024-12-27 ENCOUNTER — NURSING HOME VISIT (OUTPATIENT)
Dept: POST ACUTE CARE | Facility: EXTERNAL LOCATION | Age: 74
End: 2024-12-27
Payer: MEDICARE

## 2024-12-27 DIAGNOSIS — D64.9 ANEMIA, UNSPECIFIED TYPE: Primary | ICD-10-CM

## 2024-12-27 DIAGNOSIS — I73.9 PERIPHERAL VASCULAR DISEASE, UNSPECIFIED (CMS-HCC): ICD-10-CM

## 2024-12-27 DIAGNOSIS — K21.9 GASTRO-ESOPHAGEAL REFLUX DISEASE WITHOUT ESOPHAGITIS: ICD-10-CM

## 2024-12-27 DIAGNOSIS — R53.1 WEAKNESS: ICD-10-CM

## 2024-12-27 DIAGNOSIS — I13.10 HYPERTENSIVE HEART AND CHRONIC KIDNEY DISEASE WITHOUT HEART FAILURE, WITH STAGE 1 THROUGH STAGE 4 CHRONIC KIDNEY DISEASE, OR UNSPECIFIED CHRONIC KIDNEY DISEASE: ICD-10-CM

## 2024-12-27 PROCEDURE — 99309 SBSQ NF CARE MODERATE MDM 30: CPT | Performed by: NURSE PRACTITIONER

## 2024-12-27 NOTE — LETTER
"Patient: Matt Retana  : 1950    Encounter Date: 2024    PROGRESS NOTE    Subjective  Chief complaint: Abdi Retana \"Louise" is a 74 y.o. male who is an acute skilled patient being seen and evaluated for weakness    HPI: Visited him in bed. Reports that he is working with therapy. Denies any pain or discomfort. Reports diminished tactile sensation in both legs and feet. Continues to require mechanical lift for all transfers. Reports frustration regarding - limited mobility. Reassurance provided   HPI      Objective  Vital signs: 138/76-76-19-97.4     Physical Exam  Vitals and nursing note reviewed.   Constitutional:       General: He is not in acute distress.     Appearance: He is well-groomed. He is obese.   Eyes:      Extraocular Movements: Extraocular movements intact.   Cardiovascular:      Rate and Rhythm: Normal rate and regular rhythm.      Comments: Both legs wrapped with ace wraps   Dependent edema evident     Pulmonary:      Effort: Pulmonary effort is normal.      Breath sounds: Normal breath sounds.      Comments: Lungs clear   No cough   Abdominal:      General: Bowel sounds are normal.      Palpations: Abdomen is soft.   Musculoskeletal:      Cervical back: Neck supple.      Right lower leg: No edema.      Left lower leg: No edema.   Neurological:      Mental Status: He is alert.   Psychiatric:         Mood and Affect: Mood normal.         Behavior: Behavior is cooperative.         Assessment/Plan  Problem List Items Addressed This Visit       Anemia - Primary     Monitor labs  Ferrous sulfate         Gastro-esophageal reflux disease without esophagitis     Monitor for GI symptoms  Famotidine         Peripheral vascular disease, unspecified (CMS-HCC)     Bilateral lower extremity wounds, treatment in place  Wound care practitioner follows - reports wounds are healing   No inflammation   Hx lymphedema   Limited tactile sensation in both legs and feet - chronic          Hypertensive heart and " chronic kidney disease without heart failure, with stage 1 through stage 4 chronic kidney disease, or unspecified chronic kidney disease     Monitor blood pressure  Losartan potassium  Monitor labs  Avoid nephrotoxins         Weakness     Continue therapy, work towards goals  Monitor progress   Requires mechanical lift for all transfers           Medications, treatments, and labs reviewed  Continue medications and treatments as listed in EMR    DIVINE Presley      Electronically Signed By: DIVINE Presley   12/28/24 12:03 PM   Quality 130: Documentation Of Current Medications In The Medical Record: Current Medications Documented Detail Level: Detailed Quality 110: Preventive Care And Screening: Influenza Immunization: Influenza Immunization not Administered for Documented Reasons. Quality 111:Pneumonia Vaccination Status For Older Adults: Pneumococcal Vaccination not Administered or Previously Received, Reason not Otherwise Specified

## 2024-12-28 NOTE — ASSESSMENT & PLAN NOTE
Bilateral lower extremity wounds, treatment in place  Wound care practitioner follows - reports wounds are healing   No inflammation   Hx lymphedema   Limited tactile sensation in both legs and feet - chronic

## 2024-12-28 NOTE — PROGRESS NOTES
"PROGRESS NOTE    Subjective   Chief complaint: Abdi Retana \"Louise" is a 74 y.o. male who is an acute skilled patient being seen and evaluated for weakness    HPI: Visited him in bed. Reports that he is working with therapy. Denies any pain or discomfort. Reports diminished tactile sensation in both legs and feet. Continues to require mechanical lift for all transfers. Reports frustration regarding - limited mobility. Reassurance provided   HPI      Objective   Vital signs: 138/76-76-19-97.4     Physical Exam  Vitals and nursing note reviewed.   Constitutional:       General: He is not in acute distress.     Appearance: He is well-groomed. He is obese.   Eyes:      Extraocular Movements: Extraocular movements intact.   Cardiovascular:      Rate and Rhythm: Normal rate and regular rhythm.      Comments: Both legs wrapped with ace wraps   Dependent edema evident     Pulmonary:      Effort: Pulmonary effort is normal.      Breath sounds: Normal breath sounds.      Comments: Lungs clear   No cough   Abdominal:      General: Bowel sounds are normal.      Palpations: Abdomen is soft.   Musculoskeletal:      Cervical back: Neck supple.      Right lower leg: No edema.      Left lower leg: No edema.   Neurological:      Mental Status: He is alert.   Psychiatric:         Mood and Affect: Mood normal.         Behavior: Behavior is cooperative.         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

## 2024-12-30 DIAGNOSIS — N28.89 RENAL MASS: Primary | ICD-10-CM

## 2025-01-02 ENCOUNTER — HOSPITAL ENCOUNTER (INPATIENT)
Facility: HOSPITAL | Age: 75
LOS: 2 days | Discharge: ANOTHER HOSPITAL | DRG: 872 | End: 2025-01-04
Attending: STUDENT IN AN ORGANIZED HEALTH CARE EDUCATION/TRAINING PROGRAM | Admitting: INTERNAL MEDICINE
Payer: MEDICARE

## 2025-01-02 ENCOUNTER — APPOINTMENT (OUTPATIENT)
Dept: RADIOLOGY | Facility: HOSPITAL | Age: 75
DRG: 872 | End: 2025-01-02
Payer: MEDICARE

## 2025-01-02 ENCOUNTER — APPOINTMENT (OUTPATIENT)
Dept: CARDIOLOGY | Facility: HOSPITAL | Age: 75
DRG: 872 | End: 2025-01-02
Payer: MEDICARE

## 2025-01-02 DIAGNOSIS — N17.9 AKI (ACUTE KIDNEY INJURY) (CMS-HCC): ICD-10-CM

## 2025-01-02 DIAGNOSIS — M89.9 LESION OF LUMBAR SPINE: Primary | ICD-10-CM

## 2025-01-02 DIAGNOSIS — N28.89 RENAL MASS: ICD-10-CM

## 2025-01-02 LAB
ALBUMIN SERPL BCP-MCNC: 2.8 G/DL (ref 3.4–5)
ALBUMIN SERPL BCP-MCNC: 3.2 G/DL (ref 3.4–5)
ALP SERPL-CCNC: 67 U/L (ref 33–136)
ALP SERPL-CCNC: 68 U/L (ref 33–136)
ALT SERPL W P-5'-P-CCNC: 11 U/L (ref 10–52)
ALT SERPL W P-5'-P-CCNC: 9 U/L (ref 10–52)
ANION GAP SERPL CALC-SCNC: 14 MMOL/L (ref 10–20)
ANION GAP SERPL CALC-SCNC: 16 MMOL/L (ref 10–20)
APPEARANCE UR: ABNORMAL
AST SERPL W P-5'-P-CCNC: 17 U/L (ref 9–39)
AST SERPL W P-5'-P-CCNC: 24 U/L (ref 9–39)
BACTERIA #/AREA URNS AUTO: ABNORMAL /HPF
BASOPHILS # BLD AUTO: 0.06 X10*3/UL (ref 0–0.1)
BASOPHILS # BLD AUTO: 0.09 X10*3/UL (ref 0–0.1)
BASOPHILS NFR BLD AUTO: 0.5 %
BASOPHILS NFR BLD AUTO: 0.5 %
BILIRUB SERPL-MCNC: 0.5 MG/DL (ref 0–1.2)
BILIRUB SERPL-MCNC: 0.7 MG/DL (ref 0–1.2)
BILIRUB UR STRIP.AUTO-MCNC: NEGATIVE MG/DL
BUN SERPL-MCNC: 48 MG/DL (ref 6–23)
BUN SERPL-MCNC: 50 MG/DL (ref 6–23)
CALCIUM SERPL-MCNC: 8.5 MG/DL (ref 8.6–10.3)
CALCIUM SERPL-MCNC: 9.1 MG/DL (ref 8.6–10.3)
CARDIAC TROPONIN I PNL SERPL HS: 9 NG/L (ref 0–20)
CHLORIDE SERPL-SCNC: 100 MMOL/L (ref 98–107)
CHLORIDE SERPL-SCNC: 103 MMOL/L (ref 98–107)
CO2 SERPL-SCNC: 21 MMOL/L (ref 21–32)
CO2 SERPL-SCNC: 25 MMOL/L (ref 21–32)
COLOR UR: ABNORMAL
CREAT SERPL-MCNC: 2.52 MG/DL (ref 0.5–1.3)
CREAT SERPL-MCNC: 2.66 MG/DL (ref 0.5–1.3)
EGFRCR SERPLBLD CKD-EPI 2021: 24 ML/MIN/1.73M*2
EGFRCR SERPLBLD CKD-EPI 2021: 26 ML/MIN/1.73M*2
EOSINOPHIL # BLD AUTO: 0.03 X10*3/UL (ref 0–0.4)
EOSINOPHIL # BLD AUTO: 0.04 X10*3/UL (ref 0–0.4)
EOSINOPHIL NFR BLD AUTO: 0.2 %
EOSINOPHIL NFR BLD AUTO: 0.3 %
ERYTHROCYTE [DISTWIDTH] IN BLOOD BY AUTOMATED COUNT: 16.1 % (ref 11.5–14.5)
ERYTHROCYTE [DISTWIDTH] IN BLOOD BY AUTOMATED COUNT: 16.5 % (ref 11.5–14.5)
GLUCOSE BLD MANUAL STRIP-MCNC: 119 MG/DL (ref 74–99)
GLUCOSE SERPL-MCNC: 116 MG/DL (ref 74–99)
GLUCOSE SERPL-MCNC: 133 MG/DL (ref 74–99)
GLUCOSE UR STRIP.AUTO-MCNC: NORMAL MG/DL
HCT VFR BLD AUTO: 32.4 % (ref 41–52)
HCT VFR BLD AUTO: 37.4 % (ref 41–52)
HGB BLD-MCNC: 11.4 G/DL (ref 13.5–17.5)
HGB BLD-MCNC: 9.9 G/DL (ref 13.5–17.5)
IMM GRANULOCYTES # BLD AUTO: 0.06 X10*3/UL (ref 0–0.5)
IMM GRANULOCYTES # BLD AUTO: 0.09 X10*3/UL (ref 0–0.5)
IMM GRANULOCYTES NFR BLD AUTO: 0.5 % (ref 0–0.9)
IMM GRANULOCYTES NFR BLD AUTO: 0.5 % (ref 0–0.9)
KETONES UR STRIP.AUTO-MCNC: NEGATIVE MG/DL
LACTATE SERPL-SCNC: 1.7 MMOL/L (ref 0.4–2)
LACTATE SERPL-SCNC: 2.7 MMOL/L (ref 0.4–2)
LEUKOCYTE ESTERASE UR QL STRIP.AUTO: ABNORMAL
LYMPHOCYTES # BLD AUTO: 0.18 X10*3/UL (ref 0.8–3)
LYMPHOCYTES # BLD AUTO: 0.27 X10*3/UL (ref 0.8–3)
LYMPHOCYTES NFR BLD AUTO: 1.5 %
LYMPHOCYTES NFR BLD AUTO: 1.5 %
MCH RBC QN AUTO: 26.1 PG (ref 26–34)
MCH RBC QN AUTO: 26.1 PG (ref 26–34)
MCHC RBC AUTO-ENTMCNC: 30.5 G/DL (ref 32–36)
MCHC RBC AUTO-ENTMCNC: 30.6 G/DL (ref 32–36)
MCV RBC AUTO: 86 FL (ref 80–100)
MCV RBC AUTO: 86 FL (ref 80–100)
MONOCYTES # BLD AUTO: 0.19 X10*3/UL (ref 0.05–0.8)
MONOCYTES # BLD AUTO: 0.5 X10*3/UL (ref 0.05–0.8)
MONOCYTES NFR BLD AUTO: 1.6 %
MONOCYTES NFR BLD AUTO: 2.7 %
MUCOUS THREADS #/AREA URNS AUTO: ABNORMAL /LPF
NEUTROPHILS # BLD AUTO: 11.58 X10*3/UL (ref 1.6–5.5)
NEUTROPHILS # BLD AUTO: 17.27 X10*3/UL (ref 1.6–5.5)
NEUTROPHILS NFR BLD AUTO: 94.6 %
NEUTROPHILS NFR BLD AUTO: 95.6 %
NITRITE UR QL STRIP.AUTO: NEGATIVE
NRBC BLD-RTO: 0 /100 WBCS (ref 0–0)
NRBC BLD-RTO: 0 /100 WBCS (ref 0–0)
PH UR STRIP.AUTO: 8.5 [PH]
PLATELET # BLD AUTO: 222 X10*3/UL (ref 150–450)
PLATELET # BLD AUTO: 294 X10*3/UL (ref 150–450)
POTASSIUM SERPL-SCNC: 4.6 MMOL/L (ref 3.5–5.3)
POTASSIUM SERPL-SCNC: 4.7 MMOL/L (ref 3.5–5.3)
POTASSIUM SERPL-SCNC: 6 MMOL/L (ref 3.5–5.3)
PROT SERPL-MCNC: 7.5 G/DL (ref 6.4–8.2)
PROT SERPL-MCNC: 8.6 G/DL (ref 6.4–8.2)
PROT UR STRIP.AUTO-MCNC: ABNORMAL MG/DL
RBC # BLD AUTO: 3.79 X10*6/UL (ref 4.5–5.9)
RBC # BLD AUTO: 4.37 X10*6/UL (ref 4.5–5.9)
RBC # UR STRIP.AUTO: ABNORMAL /UL
RBC #/AREA URNS AUTO: >20 /HPF
SODIUM SERPL-SCNC: 133 MMOL/L (ref 136–145)
SODIUM SERPL-SCNC: 135 MMOL/L (ref 136–145)
SP GR UR STRIP.AUTO: 1.02
UROBILINOGEN UR STRIP.AUTO-MCNC: NORMAL MG/DL
WBC # BLD AUTO: 12.1 X10*3/UL (ref 4.4–11.3)
WBC # BLD AUTO: 18.3 X10*3/UL (ref 4.4–11.3)
WBC #/AREA URNS AUTO: >50 /HPF

## 2025-01-02 PROCEDURE — 99222 1ST HOSP IP/OBS MODERATE 55: CPT

## 2025-01-02 PROCEDURE — 96365 THER/PROPH/DIAG IV INF INIT: CPT

## 2025-01-02 PROCEDURE — 36415 COLL VENOUS BLD VENIPUNCTURE: CPT | Performed by: STUDENT IN AN ORGANIZED HEALTH CARE EDUCATION/TRAINING PROGRAM

## 2025-01-02 PROCEDURE — 2500000004 HC RX 250 GENERAL PHARMACY W/ HCPCS (ALT 636 FOR OP/ED): Performed by: NURSE PRACTITIONER

## 2025-01-02 PROCEDURE — 2500000005 HC RX 250 GENERAL PHARMACY W/O HCPCS: Performed by: STUDENT IN AN ORGANIZED HEALTH CARE EDUCATION/TRAINING PROGRAM

## 2025-01-02 PROCEDURE — 1200000002 HC GENERAL ROOM WITH TELEMETRY DAILY

## 2025-01-02 PROCEDURE — 84132 ASSAY OF SERUM POTASSIUM: CPT | Performed by: NURSE PRACTITIONER

## 2025-01-02 PROCEDURE — 2500000004 HC RX 250 GENERAL PHARMACY W/ HCPCS (ALT 636 FOR OP/ED): Performed by: STUDENT IN AN ORGANIZED HEALTH CARE EDUCATION/TRAINING PROGRAM

## 2025-01-02 PROCEDURE — 84484 ASSAY OF TROPONIN QUANT: CPT | Performed by: STUDENT IN AN ORGANIZED HEALTH CARE EDUCATION/TRAINING PROGRAM

## 2025-01-02 PROCEDURE — 85025 COMPLETE CBC W/AUTO DIFF WBC: CPT | Performed by: NURSE PRACTITIONER

## 2025-01-02 PROCEDURE — 99223 1ST HOSP IP/OBS HIGH 75: CPT | Performed by: NURSE PRACTITIONER

## 2025-01-02 PROCEDURE — 71045 X-RAY EXAM CHEST 1 VIEW: CPT

## 2025-01-02 PROCEDURE — 82947 ASSAY GLUCOSE BLOOD QUANT: CPT

## 2025-01-02 PROCEDURE — 81001 URINALYSIS AUTO W/SCOPE: CPT | Performed by: PHYSICIAN ASSISTANT

## 2025-01-02 PROCEDURE — 2500000001 HC RX 250 WO HCPCS SELF ADMINISTERED DRUGS (ALT 637 FOR MEDICARE OP): Performed by: NURSE PRACTITIONER

## 2025-01-02 PROCEDURE — 71250 CT THORAX DX C-: CPT | Performed by: RADIOLOGY

## 2025-01-02 PROCEDURE — 87040 BLOOD CULTURE FOR BACTERIA: CPT | Mod: AHULAB | Performed by: STUDENT IN AN ORGANIZED HEALTH CARE EDUCATION/TRAINING PROGRAM

## 2025-01-02 PROCEDURE — 87086 URINE CULTURE/COLONY COUNT: CPT | Mod: AHULAB | Performed by: PHYSICIAN ASSISTANT

## 2025-01-02 PROCEDURE — 93010 ELECTROCARDIOGRAM REPORT: CPT | Performed by: INTERNAL MEDICINE

## 2025-01-02 PROCEDURE — 83605 ASSAY OF LACTIC ACID: CPT | Performed by: STUDENT IN AN ORGANIZED HEALTH CARE EDUCATION/TRAINING PROGRAM

## 2025-01-02 PROCEDURE — 85025 COMPLETE CBC W/AUTO DIFF WBC: CPT | Performed by: STUDENT IN AN ORGANIZED HEALTH CARE EDUCATION/TRAINING PROGRAM

## 2025-01-02 PROCEDURE — 99291 CRITICAL CARE FIRST HOUR: CPT | Performed by: INTERNAL MEDICINE

## 2025-01-02 PROCEDURE — 74176 CT ABD & PELVIS W/O CONTRAST: CPT | Performed by: RADIOLOGY

## 2025-01-02 PROCEDURE — 2500000004 HC RX 250 GENERAL PHARMACY W/ HCPCS (ALT 636 FOR OP/ED)

## 2025-01-02 PROCEDURE — 74176 CT ABD & PELVIS W/O CONTRAST: CPT

## 2025-01-02 PROCEDURE — 84132 ASSAY OF SERUM POTASSIUM: CPT | Performed by: STUDENT IN AN ORGANIZED HEALTH CARE EDUCATION/TRAINING PROGRAM

## 2025-01-02 PROCEDURE — 83605 ASSAY OF LACTIC ACID: CPT | Performed by: NURSE PRACTITIONER

## 2025-01-02 PROCEDURE — 71045 X-RAY EXAM CHEST 1 VIEW: CPT | Performed by: RADIOLOGY

## 2025-01-02 PROCEDURE — 99285 EMERGENCY DEPT VISIT HI MDM: CPT | Mod: 25 | Performed by: STUDENT IN AN ORGANIZED HEALTH CARE EDUCATION/TRAINING PROGRAM

## 2025-01-02 PROCEDURE — 96366 THER/PROPH/DIAG IV INF ADDON: CPT

## 2025-01-02 PROCEDURE — 93005 ELECTROCARDIOGRAM TRACING: CPT

## 2025-01-02 PROCEDURE — 96361 HYDRATE IV INFUSION ADD-ON: CPT

## 2025-01-02 PROCEDURE — 80053 COMPREHEN METABOLIC PANEL: CPT | Performed by: STUDENT IN AN ORGANIZED HEALTH CARE EDUCATION/TRAINING PROGRAM

## 2025-01-02 RX ORDER — ACETAMINOPHEN 650 MG/1
650 SUPPOSITORY RECTAL EVERY 4 HOURS PRN
Status: DISCONTINUED | OUTPATIENT
Start: 2025-01-02 | End: 2025-01-04 | Stop reason: HOSPADM

## 2025-01-02 RX ORDER — FERROUS SULFATE 325(65) MG
65 TABLET ORAL DAILY
Status: DISCONTINUED | OUTPATIENT
Start: 2025-01-02 | End: 2025-01-04 | Stop reason: HOSPADM

## 2025-01-02 RX ORDER — BISACODYL 10 MG/1
10 SUPPOSITORY RECTAL DAILY PRN
Status: ON HOLD | COMMUNITY

## 2025-01-02 RX ORDER — HEPARIN SODIUM 5000 [USP'U]/ML
5000 INJECTION, SOLUTION INTRAVENOUS; SUBCUTANEOUS EVERY 8 HOURS
Status: DISCONTINUED | OUTPATIENT
Start: 2025-01-02 | End: 2025-01-04 | Stop reason: HOSPADM

## 2025-01-02 RX ORDER — ACETAMINOPHEN 160 MG/5ML
650 SOLUTION ORAL EVERY 4 HOURS PRN
Status: DISCONTINUED | OUTPATIENT
Start: 2025-01-02 | End: 2025-01-02

## 2025-01-02 RX ORDER — SODIUM CHLORIDE, SODIUM LACTATE, POTASSIUM CHLORIDE, CALCIUM CHLORIDE 600; 310; 30; 20 MG/100ML; MG/100ML; MG/100ML; MG/100ML
75 INJECTION, SOLUTION INTRAVENOUS CONTINUOUS
Status: ACTIVE | OUTPATIENT
Start: 2025-01-02 | End: 2025-01-03

## 2025-01-02 RX ORDER — ACETAMINOPHEN 325 MG/1
650 TABLET ORAL 2 TIMES DAILY
Status: ON HOLD | COMMUNITY

## 2025-01-02 RX ORDER — HYDROMORPHONE HYDROCHLORIDE 0.2 MG/ML
0.2 INJECTION INTRAMUSCULAR; INTRAVENOUS; SUBCUTANEOUS EVERY 4 HOURS PRN
Status: DISCONTINUED | OUTPATIENT
Start: 2025-01-02 | End: 2025-01-04 | Stop reason: HOSPADM

## 2025-01-02 RX ORDER — ACETAMINOPHEN 160 MG/5ML
650 SOLUTION ORAL EVERY 4 HOURS PRN
Status: DISCONTINUED | OUTPATIENT
Start: 2025-01-02 | End: 2025-01-02 | Stop reason: SDUPTHER

## 2025-01-02 RX ORDER — ACETAMINOPHEN 325 MG/1
650 TABLET ORAL EVERY 4 HOURS PRN
Status: DISCONTINUED | OUTPATIENT
Start: 2025-01-02 | End: 2025-01-04 | Stop reason: HOSPADM

## 2025-01-02 RX ORDER — TRAMADOL HYDROCHLORIDE 50 MG/1
50 TABLET ORAL EVERY 6 HOURS PRN
Status: ON HOLD | COMMUNITY

## 2025-01-02 RX ORDER — TORSEMIDE 20 MG/1
20 TABLET ORAL DAILY
Status: DISCONTINUED | OUTPATIENT
Start: 2025-01-02 | End: 2025-01-04 | Stop reason: HOSPADM

## 2025-01-02 RX ORDER — FAMOTIDINE 20 MG/1
20 TABLET, FILM COATED ORAL 2 TIMES DAILY
Status: DISCONTINUED | OUTPATIENT
Start: 2025-01-02 | End: 2025-01-04 | Stop reason: HOSPADM

## 2025-01-02 RX ORDER — ACETAMINOPHEN 325 MG/1
650 TABLET ORAL EVERY 4 HOURS PRN
Status: DISCONTINUED | OUTPATIENT
Start: 2025-01-02 | End: 2025-01-02

## 2025-01-02 RX ORDER — ONDANSETRON HYDROCHLORIDE 2 MG/ML
INJECTION, SOLUTION INTRAVENOUS
Status: COMPLETED
Start: 2025-01-02 | End: 2025-01-02

## 2025-01-02 RX ORDER — ONDANSETRON HYDROCHLORIDE 2 MG/ML
4 INJECTION, SOLUTION INTRAVENOUS EVERY 4 HOURS PRN
Status: DISCONTINUED | OUTPATIENT
Start: 2025-01-02 | End: 2025-01-04 | Stop reason: HOSPADM

## 2025-01-02 RX ORDER — LOSARTAN POTASSIUM 50 MG/1
50 TABLET ORAL DAILY
Status: DISCONTINUED | OUTPATIENT
Start: 2025-01-02 | End: 2025-01-04 | Stop reason: HOSPADM

## 2025-01-02 RX ORDER — DEXTROMETHORPHAN POLISTIREX 30 MG/5 ML
1 SUSPENSION, EXTENDED RELEASE 12 HR ORAL DAILY PRN
Status: ON HOLD | COMMUNITY

## 2025-01-02 RX ORDER — ACETAMINOPHEN 160 MG/5ML
650 SOLUTION ORAL EVERY 4 HOURS PRN
Status: DISCONTINUED | OUTPATIENT
Start: 2025-01-02 | End: 2025-01-04 | Stop reason: HOSPADM

## 2025-01-02 RX ORDER — ACETAMINOPHEN 650 MG/1
650 SUPPOSITORY RECTAL EVERY 4 HOURS PRN
Status: DISCONTINUED | OUTPATIENT
Start: 2025-01-02 | End: 2025-01-02

## 2025-01-02 RX ORDER — ADHESIVE BANDAGE
30 BANDAGE TOPICAL DAILY PRN
Status: ON HOLD | COMMUNITY

## 2025-01-02 RX ORDER — DEXTROMETHORPHAN HYDROBROMIDE, GUAIFENESIN 5; 100 MG/5ML; MG/5ML
650 LIQUID ORAL EVERY 6 HOURS PRN
Status: ON HOLD | COMMUNITY

## 2025-01-02 RX ORDER — ACETAMINOPHEN 325 MG/1
650 TABLET ORAL EVERY 4 HOURS PRN
Status: DISCONTINUED | OUTPATIENT
Start: 2025-01-02 | End: 2025-01-02 | Stop reason: SDUPTHER

## 2025-01-02 RX ORDER — ASPIRIN 81 MG/1
81 TABLET ORAL DAILY
Status: DISCONTINUED | OUTPATIENT
Start: 2025-01-02 | End: 2025-01-04 | Stop reason: HOSPADM

## 2025-01-02 RX ADMIN — FERROUS SULFATE TAB 325 MG (65 MG ELEMENTAL FE) 325 MG: 325 (65 FE) TAB at 20:35

## 2025-01-02 RX ADMIN — SODIUM CHLORIDE, POTASSIUM CHLORIDE, SODIUM LACTATE AND CALCIUM CHLORIDE 75 ML/HR: 600; 310; 30; 20 INJECTION, SOLUTION INTRAVENOUS at 18:27

## 2025-01-02 RX ADMIN — FAMOTIDINE 20 MG: 20 TABLET, FILM COATED ORAL at 20:36

## 2025-01-02 RX ADMIN — ONDANSETRON 4 MG: 2 INJECTION, SOLUTION INTRAMUSCULAR; INTRAVENOUS at 22:41

## 2025-01-02 RX ADMIN — SODIUM CHLORIDE 1000 ML: 9 INJECTION, SOLUTION INTRAVENOUS at 06:54

## 2025-01-02 RX ADMIN — DEXTROSE MONOHYDRATE 2 G: 5 INJECTION INTRAVENOUS at 17:37

## 2025-01-02 RX ADMIN — ACETAMINOPHEN 650 MG: 650 SUPPOSITORY RECTAL at 22:40

## 2025-01-02 RX ADMIN — SULFAMETHOXAZOLE AND TRIMETHOPRIM 496 MG OF TRIMETHOPRIM: 80; 16 INJECTION, SOLUTION, CONCENTRATE INTRAVENOUS at 14:30

## 2025-01-02 RX ADMIN — HYDROMORPHONE HYDROCHLORIDE 0.2 MG: 0.2 INJECTION, SOLUTION INTRAMUSCULAR; INTRAVENOUS; SUBCUTANEOUS at 20:35

## 2025-01-02 ASSESSMENT — ACTIVITIES OF DAILY LIVING (ADL)
JUDGMENT_ADEQUATE_SAFELY_COMPLETE_DAILY_ACTIVITIES: UNABLE TO ASSESS
PATIENT'S MEMORY ADEQUATE TO SAFELY COMPLETE DAILY ACTIVITIES?: UNABLE TO ASSESS
WALKS IN HOME: UNABLE TO ASSESS
DRESSING YOURSELF: UNABLE TO ASSESS
TOILETING: UNABLE TO ASSESS
ASSISTIVE_DEVICE: OTHER (COMMENT)
BATHING: UNABLE TO ASSESS
LACK_OF_TRANSPORTATION: NO
HEARING - RIGHT EAR: UNABLE TO ASSESS
GROOMING: UNABLE TO ASSESS
FEEDING YOURSELF: UNABLE TO ASSESS
ADEQUATE_TO_COMPLETE_ADL: UNABLE TO ASSESS

## 2025-01-02 ASSESSMENT — COLUMBIA-SUICIDE SEVERITY RATING SCALE - C-SSRS
6. HAVE YOU EVER DONE ANYTHING, STARTED TO DO ANYTHING, OR PREPARED TO DO ANYTHING TO END YOUR LIFE?: NO
1. IN THE PAST MONTH, HAVE YOU WISHED YOU WERE DEAD OR WISHED YOU COULD GO TO SLEEP AND NOT WAKE UP?: NO
2. HAVE YOU ACTUALLY HAD ANY THOUGHTS OF KILLING YOURSELF?: NO

## 2025-01-02 ASSESSMENT — ENCOUNTER SYMPTOMS
BACK PAIN: 1
RESPIRATORY NEGATIVE: 1
PSYCHIATRIC NEGATIVE: 1
GASTROINTESTINAL NEGATIVE: 1
ACTIVITY CHANGE: 1

## 2025-01-02 ASSESSMENT — PAIN SCALES - GENERAL
PAINLEVEL_OUTOF10: 0 - NO PAIN
PAINLEVEL_OUTOF10: 4

## 2025-01-02 ASSESSMENT — PAIN - FUNCTIONAL ASSESSMENT: PAIN_FUNCTIONAL_ASSESSMENT: 0-10

## 2025-01-02 NOTE — H&P
History Of Present Illness  Matt Retana is a 74 y.o. male with a past medical history of recently found right renal mass, GERD, anemia, obesity, PVD, CKD recently admitted in November 2024 as well as December 2024 for weakness, patient was also found to have a lesion evolving L4 vertebral body, mets to adrenal glands,  lung as well, was discharged to skilled nursing facility from there with plans to follow-up with heme-onc as well as to get an IR guided biopsy of the L4 lesion and outpatient MRIs of the brain and spine with and without contrast.      Patient presents today as staff noticed blood in patient's urine as well as he had back pain.  Patient reports pain is in his right lower back, and has been going on over the past month.  Due to this patient came to the emergency department.  WBC was noted to be 18.3, hemoglobin 11.4, hematocrit 37.4, patient urine was positive for blood, protein, leukocyte Estrace and 3+ bacteria.  Creatinine was noted to be 2.52, his baseline is 1.42.  Patient's potassium was hemolyzed with a repeat of 4.6.  Patient's sister who is primary historian reports that patient was not able to get his biopsy last month as the facility mixed up transport.  She expresses worry that patient may fall to the cracks with his testing as he is very weak and unable to get around easily.    CT chest abdomen pelvis without IV contrast revealed istory of right renal cell carcinoma.  Multiple pulmonary nodules consistent with metastatic disease.  ABDOMEN AND PELVIS:  History upright or renal cell carcinoma. There is an unchanged necrotic mass involving the upper pole of the right kidney.  Bilateral renal cysts.  Bilateral nonobstructing intrarenal calculi with staghorn type calculi in the left kidney. There is no renal obstruction.  Complete bony destruction of L4 with a soft tissue mass with expansion of the vertebral body. Lytic lesion involving the superior endplate of L5. Findings are consistent with  bony metastases.      Past Medical History  He has a past medical history of CKD (chronic kidney disease), GERD (gastroesophageal reflux disease), Hypertension, OA (osteoarthritis), Personal history of other diseases of the circulatory system, and PVD (peripheral vascular disease) (CMS-[facility]).    Surgical History  He has no past surgical history on file.     Social History  He reports that he has quit smoking. His smoking use included cigarettes. He does not have any smokeless tobacco history on file. He reports current alcohol use. He reports that he does not use drugs.    Family History  Family History   Problem Relation Name Age of Onset    Dementia Mother      Heart attack Mother      Heart attack Father          Allergies  Clindamycin and Amoxicillin-pot clavulanate    Review of Systems   Constitutional:  Positive for activity change.   HENT: Negative.     Respiratory: Negative.     Gastrointestinal: Negative.    Genitourinary: Negative.    Musculoskeletal:  Positive for back pain.   Psychiatric/Behavioral: Negative.          Physical Exam  Constitutional:       Appearance: He is ill-appearing.   HENT:      Head: Normocephalic.      Mouth/Throat:      Mouth: Mucous membranes are moist.   Cardiovascular:      Rate and Rhythm: Normal rate.   Pulmonary:      Effort: Pulmonary effort is normal.   Abdominal:      General: Bowel sounds are normal.   Musculoskeletal:      Cervical back: Neck supple.      Comments: Back pain   Skin:     General: Skin is warm.      Comments: Bilateral lower extremity venous stasis   Neurological:      Mental Status: He is alert and oriented to person, place, and time.   Psychiatric:         Mood and Affect: Mood normal.         Behavior: Behavior normal.          Last Recorded Vitals  BP (!) 117/96 (BP Location: Right arm, Patient Position: Lying)   Pulse (!) 101   Temp 36.9 °C (98.5 °F) (Oral)   Resp 18   Wt 125 kg (276 lb)   SpO2 96%     Relevant Results      Results for orders  placed or performed during the hospital encounter of 01/02/25 (from the past 24 hours)   CBC and Auto Differential   Result Value Ref Range    WBC 18.3 (H) 4.4 - 11.3 x10*3/uL    nRBC 0.0 0.0 - 0.0 /100 WBCs    RBC 4.37 (L) 4.50 - 5.90 x10*6/uL    Hemoglobin 11.4 (L) 13.5 - 17.5 g/dL    Hematocrit 37.4 (L) 41.0 - 52.0 %    MCV 86 80 - 100 fL    MCH 26.1 26.0 - 34.0 pg    MCHC 30.5 (L) 32.0 - 36.0 g/dL    RDW 16.1 (H) 11.5 - 14.5 %    Platelets 294 150 - 450 x10*3/uL    Neutrophils % 94.6 40.0 - 80.0 %    Immature Granulocytes %, Automated 0.5 0.0 - 0.9 %    Lymphocytes % 1.5 13.0 - 44.0 %    Monocytes % 2.7 2.0 - 10.0 %    Eosinophils % 0.2 0.0 - 6.0 %    Basophils % 0.5 0.0 - 2.0 %    Neutrophils Absolute 17.27 (H) 1.60 - 5.50 x10*3/uL    Immature Granulocytes Absolute, Automated 0.09 0.00 - 0.50 x10*3/uL    Lymphocytes Absolute 0.27 (L) 0.80 - 3.00 x10*3/uL    Monocytes Absolute 0.50 0.05 - 0.80 x10*3/uL    Eosinophils Absolute 0.03 0.00 - 0.40 x10*3/uL    Basophils Absolute 0.09 0.00 - 0.10 x10*3/uL   Comprehensive metabolic panel   Result Value Ref Range    Glucose 116 (H) 74 - 99 mg/dL    Sodium 133 (L) 136 - 145 mmol/L    Potassium 6.0 (H) 3.5 - 5.3 mmol/L    Chloride 100 98 - 107 mmol/L    Bicarbonate 25 21 - 32 mmol/L    Anion Gap 14 10 - 20 mmol/L    Urea Nitrogen 48 (H) 6 - 23 mg/dL    Creatinine 2.52 (H) 0.50 - 1.30 mg/dL    eGFR 26 (L) >60 mL/min/1.73m*2    Calcium 9.1 8.6 - 10.3 mg/dL    Albumin 3.2 (L) 3.4 - 5.0 g/dL    Alkaline Phosphatase 67 33 - 136 U/L    Total Protein 8.6 (H) 6.4 - 8.2 g/dL    AST 24 9 - 39 U/L    Bilirubin, Total 0.7 0.0 - 1.2 mg/dL    ALT 11 10 - 52 U/L   Lactate   Result Value Ref Range    Lactate 1.7 0.4 - 2.0 mmol/L   Troponin I, High Sensitivity   Result Value Ref Range    Troponin I, High Sensitivity 9 0 - 20 ng/L   Potassium   Result Value Ref Range    Potassium 4.6 3.5 - 5.3 mmol/L   Urinalysis with Reflex Culture and Microscopic   Result Value Ref Range    Color, Urine  Red (N) Light-Yellow, Yellow, Dark-Yellow    Appearance, Urine Ex.Turbid (N) Clear    Specific Gravity, Urine 1.022 1.005 - 1.035    pH, Urine 8.5 (N) 5.0, 5.5, 6.0, 6.5, 7.0, 7.5, 8.0    Protein, Urine 300 (3+) (A) NEGATIVE, 10 (TRACE), 20 (TRACE) mg/dL    Glucose, Urine Normal Normal mg/dL    Blood, Urine OVER (3+) (A) NEGATIVE    Ketones, Urine NEGATIVE NEGATIVE mg/dL    Bilirubin, Urine NEGATIVE NEGATIVE    Urobilinogen, Urine Normal Normal mg/dL    Nitrite, Urine NEGATIVE NEGATIVE    Leukocyte Esterase, Urine 500 Pamela/µL (A) NEGATIVE   Microscopic Only, Urine   Result Value Ref Range    WBC, Urine >50 (A) 1-5, NONE /HPF    RBC, Urine >20 (A) NONE, 1-2, 3-5 /HPF    Bacteria, Urine 3+ (A) NONE SEEN /HPF    Mucus, Urine 1+ Reference range not established. /LPF          Assessment/Plan   Assessment & Plan  JAMAL (acute kidney injury) (CMS-[facility])  Hematuria  Urinary tract infection  Leukocytosis  Bilateral nonobstructing intrarenal calculi with staghorn type calculi in the left kidney  Bony destruction of L4 with soft tissue mass with expansion of the vertebral body, lytic lesion L5    Plan:  IV ceftriaxone  IV hydration  Hold nephrotoxic medications losartan and torsemide  Trend creatinine, consider nephrology consult  Will consult IR to see if patient can possibly get biopsy while he is inpatient since he missed his appointment on 1220    Patient would benefit from neurosurgery consult given L4 lesion, patient will benefit from MRI imaging of his brain CT, T, and L-spine with and without contrast during this admission given patient immobility and risk for falling through the cracks as we navigate new diagnosis of likely RCC.  Patient's renal function will need to improve prior to getting contrasted MRI studies, so we will hold off for now.  Patient Sister Micaela updated with this plan.      Bilateral lower extremity venous stasis  -Consult wound care    Please call patient's Sister Micaela with updates  [phone]    DVT prophylaxis-SCDs, will need to hold heparin at this point given hematuria       Mariano Grant, APRN-CNP

## 2025-01-02 NOTE — ASSESSMENT & PLAN NOTE
Hematuria  Urinary tract infection  Leukocytosis  Bilateral nonobstructing intrarenal calculi with staghorn type calculi in the left kidney  Bony destruction of L4 with soft tissue mass with expansion of the vertebral body, lytic lesion L5

## 2025-01-02 NOTE — CONSULTS
"Reason For Consult  Hematuria, renal mass    History Of Present Illness  Matt Retana is a 74 y.o. male with PMH for anemia, HTN, PVD, GERD, chronic lymphedema, renal mass with mets, and CKDIII (baseline crt 1.2) presented this morning from nursing home for hematuria. Patient is slightly confused and is accompanied by his sister who is helping provide information. His sister reports patient being admitted to [facility] in November where he was newly diagnosed with right renal mass and lesions in lungs and spine which has been causing him a tremendous amount of back pain within the past 2 months. He was moved into a nursing facility in the beginning of December because of immobility and around that time both the patient and sister noted bloody urine. Patient states urine has been like that for weeks now but they are unsure if he had a UA or was treated for UTI. He denies fever, chills, flank or suprapubic pain, dysuria, incomplete emptying, or frequency. He does note he feels urine is \"hard to get out\" at times due to the gel like consistency. His sister states he was supposed to have a biopsy for renal mass and spinal lesions with MRI a couple weeks ago but he missed the appointment due to transportation issues.     In the ED patient is tachycardic () and normotensive (-129). Labs remarkable for hyperkalemia, JAMAL-crt 2.5, EGFR 26, BUN 48, leukocytosis- WBC 18.3, H&H 11.4/37.4 (most recent 9.2/31.1 on 12/3). UA with 500 LE, WBC >50, >20 RBC, 3+ bacteria. CT A/P completed demonstrating unchanged  necrotic mass involving the upper pole of the right kidney and bilateral nonobstructing intrarenal calculi with staghorn type calculi in the left kidney. There is no renal obstruction. Urology was consulted.      Past Medical History  He has a past medical history of CKD (chronic kidney disease), GERD (gastroesophageal reflux disease), Hypertension, OA (osteoarthritis), Personal history of other diseases of the circulatory " "system, and PVD (peripheral vascular disease) (CMS-[facility]).    Surgical History  He has no past surgical history on file.     Social History  He reports that he has quit smoking. His smoking use included cigarettes. He does not have any smokeless tobacco history on file. He reports current alcohol use. He reports that he does not use drugs.    Family History  Family History   Problem Relation Name Age of Onset    Dementia Mother      Heart attack Mother      Heart attack Father          Allergies  Clindamycin and Amoxicillin-pot clavulanate    Review of Systems  12 point ROS negative unless stated above       Physical Exam  PE:  Constitutional: pale. Calm, drowsy  Eyes: PERRL, clear sclera   ENMT: dry mucous membranes  Cardiovascular: RRR. 2+ equal pulses of the distal extremities.  Respiratory/Thorax: Good symmetric chest expansion. On RA  Gastrointestinal: Abdomen slightly distended, soft, non tender  Genitourinary: external catheter in place with dark wine colored urine in canister- no obvious clots noted. No CVA tenderness  Extremities: No peripheral edema  Neurological: A&Oxself, place;  confused/forgetful.   Psychological: Appropriate mood   Skin: Warm and dry       Last Recorded Vitals  Blood pressure 102/50, pulse 88, temperature 36.9 °C (98.5 °F), temperature source Oral, resp. rate 18, height 1.88 m (6' 2\"), weight 125 kg (276 lb), SpO2 (!) 93%.    Relevant Results  Scheduled medications  [Held by provider] aspirin, 81 mg, oral, Daily  cefTRIAXone, 2 g, intravenous, q24h  famotidine, 20 mg, oral, BID  ferrous sulfate (325 mg ferrous sulfate), 65 mg of iron, oral, Daily  [Held by provider] heparin (porcine), 5,000 Units, subcutaneous, q8h  [Held by provider] losartan, 50 mg, oral, Daily  [Held by provider] torsemide, 20 mg, oral, Daily      Continuous medications  lactated Ringer's, 75 mL/hr      PRN medications  PRN medications: acetaminophen **OR** acetaminophen **OR** acetaminophen, HYDROmorphone  Results " for orders placed or performed during the hospital encounter of 01/02/25 (from the past 24 hours)   CBC and Auto Differential   Result Value Ref Range    WBC 18.3 (H) 4.4 - 11.3 x10*3/uL    nRBC 0.0 0.0 - 0.0 /100 WBCs    RBC 4.37 (L) 4.50 - 5.90 x10*6/uL    Hemoglobin 11.4 (L) 13.5 - 17.5 g/dL    Hematocrit 37.4 (L) 41.0 - 52.0 %    MCV 86 80 - 100 fL    MCH 26.1 26.0 - 34.0 pg    MCHC 30.5 (L) 32.0 - 36.0 g/dL    RDW 16.1 (H) 11.5 - 14.5 %    Platelets 294 150 - 450 x10*3/uL    Neutrophils % 94.6 40.0 - 80.0 %    Immature Granulocytes %, Automated 0.5 0.0 - 0.9 %    Lymphocytes % 1.5 13.0 - 44.0 %    Monocytes % 2.7 2.0 - 10.0 %    Eosinophils % 0.2 0.0 - 6.0 %    Basophils % 0.5 0.0 - 2.0 %    Neutrophils Absolute 17.27 (H) 1.60 - 5.50 x10*3/uL    Immature Granulocytes Absolute, Automated 0.09 0.00 - 0.50 x10*3/uL    Lymphocytes Absolute 0.27 (L) 0.80 - 3.00 x10*3/uL    Monocytes Absolute 0.50 0.05 - 0.80 x10*3/uL    Eosinophils Absolute 0.03 0.00 - 0.40 x10*3/uL    Basophils Absolute 0.09 0.00 - 0.10 x10*3/uL   Comprehensive metabolic panel   Result Value Ref Range    Glucose 116 (H) 74 - 99 mg/dL    Sodium 133 (L) 136 - 145 mmol/L    Potassium 6.0 (H) 3.5 - 5.3 mmol/L    Chloride 100 98 - 107 mmol/L    Bicarbonate 25 21 - 32 mmol/L    Anion Gap 14 10 - 20 mmol/L    Urea Nitrogen 48 (H) 6 - 23 mg/dL    Creatinine 2.52 (H) 0.50 - 1.30 mg/dL    eGFR 26 (L) >60 mL/min/1.73m*2    Calcium 9.1 8.6 - 10.3 mg/dL    Albumin 3.2 (L) 3.4 - 5.0 g/dL    Alkaline Phosphatase 67 33 - 136 U/L    Total Protein 8.6 (H) 6.4 - 8.2 g/dL    AST 24 9 - 39 U/L    Bilirubin, Total 0.7 0.0 - 1.2 mg/dL    ALT 11 10 - 52 U/L   Lactate   Result Value Ref Range    Lactate 1.7 0.4 - 2.0 mmol/L   Blood Culture    Specimen: Peripheral Venipuncture; Blood culture   Result Value Ref Range    Blood Culture Loaded on Instrument - Culture in progress    Blood Culture    Specimen: Peripheral Venipuncture; Blood culture   Result Value Ref Range     Blood Culture Loaded on Instrument - Culture in progress    Troponin I, High Sensitivity   Result Value Ref Range    Troponin I, High Sensitivity 9 0 - 20 ng/L   Potassium   Result Value Ref Range    Potassium 4.6 3.5 - 5.3 mmol/L   Urinalysis with Reflex Culture and Microscopic   Result Value Ref Range    Color, Urine Red (N) Light-Yellow, Yellow, Dark-Yellow    Appearance, Urine Ex.Turbid (N) Clear    Specific Gravity, Urine 1.022 1.005 - 1.035    pH, Urine 8.5 (N) 5.0, 5.5, 6.0, 6.5, 7.0, 7.5, 8.0    Protein, Urine 300 (3+) (A) NEGATIVE, 10 (TRACE), 20 (TRACE) mg/dL    Glucose, Urine Normal Normal mg/dL    Blood, Urine OVER (3+) (A) NEGATIVE    Ketones, Urine NEGATIVE NEGATIVE mg/dL    Bilirubin, Urine NEGATIVE NEGATIVE    Urobilinogen, Urine Normal Normal mg/dL    Nitrite, Urine NEGATIVE NEGATIVE    Leukocyte Esterase, Urine 500 Pamela/µL (A) NEGATIVE   Microscopic Only, Urine   Result Value Ref Range    WBC, Urine >50 (A) 1-5, NONE /HPF    RBC, Urine >20 (A) NONE, 1-2, 3-5 /HPF    Bacteria, Urine 3+ (A) NONE SEEN /HPF    Mucus, Urine 1+ Reference range not established. /LPF     CT chest abdomen pelvis wo IV contrast   Final Result   CHEST:   1.  History of right renal cell carcinoma.   2. Multiple pulmonary nodules consistent with metastatic disease.        ABDOMEN AND PELVIS:   1.  History upright or renal cell carcinoma. There is an unchanged   necrotic mass involving the upper pole of the right kidney.   2. Bilateral renal cysts.   3. Bilateral nonobstructing intrarenal calculi with staghorn type   calculi in the left kidney. There is no renal obstruction.   4. Complete bony destruction of L4 with a soft tissue mass with   expansion of the vertebral body. Lytic lesion involving the superior   endplate of L5. Findings are consistent with bony metastases.        MACRO:   None        Signed by: Lucia Desai 1/2/2025 10:33 AM   Dictation workstation:   [id number]      XR chest 1 view   Final Result   No  pneumonic consolidations are noted. There questionable perihilar   nodules which may correspond to the masses noted on the prior CT.             MACRO:   None        Signed by: Patrick Arnold 1/2/2025 8:33 AM   Dictation workstation:   [id number]             Assessment/Plan     Matt Retana is a 74 y.o. male with PMH for anemia, HTN, PVD, GERD, chronic lymphedema, renal mass with mets, and CKDIII (baseline crt 1.2) presented this morning from nursing home for hematuria. In the ED patient is tachycardic and normotensive. Labs remarkable for hyperkalemia, JAMAL-crt 2.5, EGFR 26, BUN 48, leukocytosis- WBC 18.3, H&H 11.4/37.4 (most recent 9.2/31.1 on 12/3). UA + for UTI. CT A/P demonstrates unchanged necrotic mass involving the upper pole of the right kidney and bilateral nonobstructing intrarenal calculi with staghorn type calculi in the left kidney.     Plan: renal mass, staghorn calculi, JAMAL, complicated UTI.   - Reviewed patient and findings with with Dr. Neville. Since there is no notable renal obstruction, we will proceed with medical management for now and hold off on any surgical urological intervention.   - trend labs tomorrow AM   - IV antibiotics  - Bladder scan patient to ensure he is not retaining urine. If retaining, please place a hematuria catheter and irrigate bladder as needed.   - PRN pain control  - monitor vitals signs   - IVF  - needs biopsy for renal mass-- likely be completed outpatient.       I spent 50 minutes in the professional and overall care of this patient.      Kalyn Posada, APRN-CNP

## 2025-01-02 NOTE — PROGRESS NOTES
Pharmacy Medication History     Source of Information: Per med list from SNF     Additional concerns with the patient's PTA list.   N/a   The following updates were made to the Prior to Admission medication list:     Medications ADDED:   Tylenol   Dulcolax   Enema   Milk of mag   Tramadol   Medications CHANGED:  N/a   Medications REMOVED:   N/a   Medications NOT TAKING:   Zinc oxide   Lotrisone     Allergy reviewed : No    Meds 2 Beds : No    Outpatient pharmacy confirmed and updated in chart : N/A    Pharmacy name: [facility]    The list below reflectives the updated PTA list. Please review each medication in order reconciliation for additional clarification and justification.    Prior to Admission Medications   Prescriptions Last Dose Informant   acetaminophen (Tylenol 8 HOUR) 650 mg ER tablet     Sig: Take 1 tablet (650 mg) by mouth every 6 hours if needed for mild pain (1 - 3). Do not crush, chew, or split.   acetaminophen (Tylenol) 325 mg tablet     Sig: Take 2 tablets (650 mg) by mouth 2 times a day.   aspirin 81 mg EC tablet  Self   Sig: Take 1 tablet (81 mg) by mouth once daily.   bisacodyl (Gentle Laxative, bisacodyl,) 10 mg suppository     Sig: Insert 1 suppository (10 mg) into the rectum once daily as needed for constipation.              famotidine (Pepcid) 20 mg tablet  Self   Sig: Take 1 tablet (20 mg) by mouth 2 times a day.   ferrous sulfate (FeroSuL) 325 (65 Fe) MG tablet  Self   Sig: Take 1 tablet (325 mg) by mouth once every 24 hours.   losartan (Cozaar) 50 mg tablet  Self   Sig: TAKE 1 TABLET BY MOUTH ONCE  DAILY   magnesium hydroxide (Milk of Magnesia) 400 mg/5 mL suspension     Sig: Take 30 mL by mouth once daily as needed for constipation (if no BM in 3 consecutive days).   magnesium oxide 400 mg magnesium capsule  Self   Sig: Take 1 capsule (400 mg) by mouth once daily.   mineral oil enema     Sig: Insert 133 mL (1 enema) into the rectum once daily as needed for constipation (if no BM in 8 hours  after getting suppository).   potassium chloride CR 10 mEq ER tablet  Self   Sig: TAKE 1 TABLET BY MOUTH ONCE  DAILY DO NOT CRUSH, CHEW, OR  SPLIT   torsemide (Demadex) 20 mg tablet  Self   Sig: TAKE 1 TABLET BY MOUTH ONCE  DAILY   traMADol (Ultram) 50 mg tablet     Sig: Take 1 tablet (50 mg) by mouth every 6 hours if needed for severe pain (7 - 10).   vitamin D3-folic acid 2,500 unit- 1 mg tablet  Self   Sig: Take 1 mg by mouth once every 24 hours.   Patient taking differently: Take 1 tablet by mouth once every 24 hours.                 Facility-Administered Medications: None       The list below reflectives the updated allergy list. Please review each documented allergy for additional clarification and justification.    Allergies   Allergen Reactions    Clindamycin Unknown    Amoxicillin-Pot Clavulanate Rash     Possible rash, received multiple antibiotics at this time          01/02/25 at 3:24 PM - Amelia Velasquez

## 2025-01-02 NOTE — PROGRESS NOTES
01/02/25 1246   [facility] Disability Status   Are you deaf or do you have serious difficulty hearing? N   Are you blind or do you have serious difficulty seeing, even when wearing glasses? N   Because of a physical, mental, or emotional condition, do you have serious difficulty concentrating, remembering, or making decisions? (5 years old or older) N   Do you have serious difficulty walking or climbing stairs? Y   Do you have serious difficulty dressing or bathing? Y   Because of a physical, mental, or emotional condition, do you have serious difficulty doing errands alone such as visiting the doctor? Y

## 2025-01-02 NOTE — ED TRIAGE NOTES
Tonight the nurse found blood in the pt's urine and combined with his back pain his facility nurse sent him into be seen

## 2025-01-02 NOTE — PROGRESS NOTES
Transitional Care Coordination Progress Note:  Plan per Medical/Surgical team: treatment of hematuria & JAMAL with IV fluids  Status: Inpatient   Payor source: [place] [facility]- WILL NEED PRECERT   Discharge disposition: [facility]   Potential Barriers: renal 48/2.52  Hx of renal cancer with mets to adrenals, lung & spine  Need prognosis & goals of care conversation   ADOD: 1/4/2025  MELITON Zamorano RN, BSN Transitional Care Coordinator ED# [phone]      01/02/25 1247   Discharge Planning   Living Arrangements Alone   Support Systems Family members   Assistance Needed renal work up, prognosis & goals of care conversation   Type of Residence Skilled nursing facility   Do you have animals or pets at home? No   Home or Post Acute Services Post acute facilities (Rehab/SNF/etc)   Type of Post Acute Facility Services Skilled nursing   Expected Discharge Disposition SNF   Does the patient need discharge transport arranged? Yes   RoundTrip coordination needed? Yes   Has discharge transport been arranged? No   Financial Resource Strain   How hard is it for you to pay for the very basics like food, housing, medical care, and heating? Not hard   Housing Stability   In the last 12 months, was there a time when you were not able to pay the mortgage or rent on time? N   In the past 12 months, how many times have you moved where you were living? 1   At any time in the past 12 months, were you homeless or living in a shelter (including now)? N   Transportation Needs   In the past 12 months, has lack of transportation kept you from medical appointments or from getting medications? no   In the past 12 months, has lack of transportation kept you from meetings, work, or from getting things needed for daily living? No   Patient Choice   Provider Choice list and CMS website (https://medicare.gov/care-compare#search) for post-acute Quality and Resource Measure Data were provided and reviewed with: Patient;Family   Patient /  Family choosing to utilize agency / facility established prior to hospitalization Yes   Stroke Family Assessment   Stroke Family Assessment Needed No   Intensity of Service   Intensity of Service 0-30 min

## 2025-01-02 NOTE — ED PROVIDER NOTES
"HPI   Chief Complaint   Patient presents with    Back Pain    Blood in Urine       HPI  74-year-old male with history of CKD, malignant neoplasm of the right kidney, HTN presents with reported hematuria from a nursing facility.  Patient had reportedly also complained of some back pain, on my evaluation patient states \"I sit in my chair from 8 AM to 10 PM every day, and at 10 PM my back starts hurting\".  He reports this happens daily and is not a new occurrence.  He reports that he is on antibiotics currently for UTI, this is not documented in his nursing facility notations though unclear whether or not he is currently truly on antibiotic course.  He denies fevers, chills, flank pain, dysuria, chest pain, palpitations, shortness of breath, abdominal pain, nausea, vomiting, diarrhea, constipation.      Patient History   Past Medical History:   Diagnosis Date    CKD (chronic kidney disease)     GERD (gastroesophageal reflux disease)     Hypertension     OA (osteoarthritis)     Personal history of other diseases of the circulatory system     History of hypertension    PVD (peripheral vascular disease) (CMS-[facility])      No past surgical history on file.  Family History   Problem Relation Name Age of Onset    Dementia Mother      Heart attack Mother      Heart attack Father       Social History     Tobacco Use    Smoking status: Former     Types: Cigarettes    Smokeless tobacco: Not on file   Substance Use Topics    Alcohol use: Yes    Drug use: Never       Physical Exam   ED Triage Vitals [01/02/25 0531]   Temperature Heart Rate Respirations BP   36.9 °C (98.5 °F) (!) 110 18 (!) 124/42      Pulse Ox Temp Source Heart Rate Source Patient Position   (!) 93 % Oral Monitor Lying      BP Location FiO2 (%)     Left arm --       Physical Exam  Vitals reviewed.   Constitutional:       Appearance: He is not toxic-appearing.   Eyes:      Pupils: Pupils are equal, round, and reactive to light.   Cardiovascular:      Rate and Rhythm: " Normal rate and regular rhythm.   Pulmonary:      Effort: Pulmonary effort is normal.      Breath sounds: Normal breath sounds. No stridor. No wheezing, rhonchi or rales.   Abdominal:      Palpations: Abdomen is soft.      Tenderness: There is no abdominal tenderness. There is no guarding or rebound.   Musculoskeletal:         General: Normal range of motion.      Cervical back: Normal range of motion.   Skin:     General: Skin is warm and dry.   Neurological:      General: No focal deficit present.      Mental Status: He is alert.           ED Course & MDM   ED Course as of 01/02/25 1603   Thu Jan 02, 2025   0649 EKG, interpreted me: Sinus tachycardia, rate 110 bpm.  PACs.  Normal axis.  No acute ST elevation or depression.  Significant artifact intermittently difficult interpretation.  QTc 446.  No evidence of STEMI at this time. [JG]   0717 WBC(!): 18.3  Concern for cystitis as source [JG]   0818 Creatinine(!): 2.52  Increased from 1.46 two weeks prior [JG]   1303 IMPRESSION:  CHEST:  1.  History of right renal cell carcinoma.  2. Multiple pulmonary nodules consistent with metastatic disease.    ABDOMEN AND PELVIS:  1.  History upright or renal cell carcinoma. There is an unchanged necrotic mass involving the upper pole of the right kidney.   2. Bilateral renal cysts.  3. Bilateral nonobstructing intrarenal calculi with staghorn type calculi in the left kidney. There is no renal obstruction.  4. Complete bony destruction of L4 with a soft tissue mass with expansion of the vertebral body. Lytic lesion involving the superior endplate of L5. Findings are consistent with bony metastases. [JG]      ED Course User Index  [JG] Elana Hwang MD         Diagnoses as of 01/02/25 1603   JAMAL (acute kidney injury) (CMS-HCC)                 No data recorded     Khushi Coma Scale Score: 15 (01/02/25 0546 : Pineda Blankenship RN)                     Medical Decision Making  74-year-old male with history of CKD, malignant  "neoplasm of the right kidney, HTN presents with reported hematuria from a nursing facility.  Patient had reportedly also complained of some back pain, on my evaluation patient states \"I sit in my chair from 8 AM to 10 PM every day, and at 10 PM my back starts hurting\".  He reports this happens daily and is not a new occurrence.  He reports that he is on antibiotics currently for UTI, this is not documented in his nursing facility notations though unclear whether or not he is currently truly on antibiotic course.  Alert, oriented to self. On lab workup patient found to have leukocytosis with WBC 18K and UA positive for 500 leukocyte esterase and WBC > 50. Started on IV antibiotics. Significant JAMAL with Cr 2.52 elevated from baseline of 1.46 two weeks prior. CT chest abdomen pelvis showing necrotic atrophic right kidney and new pulmonary nodules with lytic lesion in lumbar spine. Patient admitted for JAMAL with cystitis.    Sepsis documentation update:  UA resulted at 11:58. Patient meeting septic criteria at this time with identified source and end organ damage with Cr 2.52. Antibiotics ordered within sepsis time frame and administered at 14:30.     I performed a sepsis reperfusion exam on Abdi Retana on 1/3/2025 15:00.    A targeted fluid bolus of 1000 mL was given, patient with history of CHF and CKD, fluid resuscitation given accordingly.    Elana Hwang MD    Procedure  Procedures     Elana Hwang MD  01/02/25 0293       Elaan Hwang MD  01/03/25 1004    "

## 2025-01-03 ENCOUNTER — OFFICE VISIT (OUTPATIENT)
Dept: HEMATOLOGY/ONCOLOGY | Facility: HOSPITAL | Age: 75
DRG: 456 | End: 2025-01-03
Payer: MEDICARE

## 2025-01-03 LAB
ABO GROUP (TYPE) IN BLOOD: NORMAL
ALBUMIN SERPL BCP-MCNC: 2.6 G/DL (ref 3.4–5)
ANION GAP SERPL CALC-SCNC: 11 MMOL/L (ref 10–20)
ANTIBODY SCREEN: NORMAL
ATRIAL RATE: 127 BPM
BACTERIA UR CULT: NORMAL
BASOPHILS # BLD MANUAL: 0 X10*3/UL (ref 0–0.1)
BASOPHILS NFR BLD MANUAL: 0 %
BUN SERPL-MCNC: 55 MG/DL (ref 6–23)
CALCIUM SERPL-MCNC: 8.4 MG/DL (ref 8.6–10.3)
CHLORIDE SERPL-SCNC: 106 MMOL/L (ref 98–107)
CO2 SERPL-SCNC: 24 MMOL/L (ref 21–32)
CREAT SERPL-MCNC: 3.03 MG/DL (ref 0.5–1.3)
EGFRCR SERPLBLD CKD-EPI 2021: 21 ML/MIN/1.73M*2
EOSINOPHIL # BLD MANUAL: 0 X10*3/UL (ref 0–0.4)
EOSINOPHIL NFR BLD MANUAL: 0 %
ERYTHROCYTE [DISTWIDTH] IN BLOOD BY AUTOMATED COUNT: 16.8 % (ref 11.5–14.5)
GLUCOSE BLD MANUAL STRIP-MCNC: 108 MG/DL (ref 74–99)
GLUCOSE BLD MANUAL STRIP-MCNC: 111 MG/DL (ref 74–99)
GLUCOSE BLD MANUAL STRIP-MCNC: 118 MG/DL (ref 74–99)
GLUCOSE BLD MANUAL STRIP-MCNC: 128 MG/DL (ref 74–99)
GLUCOSE BLD MANUAL STRIP-MCNC: 96 MG/DL (ref 74–99)
GLUCOSE SERPL-MCNC: 134 MG/DL (ref 74–99)
HCT VFR BLD AUTO: 30.5 % (ref 41–52)
HGB BLD-MCNC: 9.1 G/DL (ref 13.5–17.5)
IMM GRANULOCYTES # BLD AUTO: 1.09 X10*3/UL (ref 0–0.5)
IMM GRANULOCYTES NFR BLD AUTO: 6.8 % (ref 0–0.9)
LACTATE SERPL-SCNC: 1.8 MMOL/L (ref 0.4–2)
LYMPHOCYTES # BLD MANUAL: 0.16 X10*3/UL (ref 0.8–3)
LYMPHOCYTES NFR BLD MANUAL: 1 %
MCH RBC QN AUTO: 26.1 PG (ref 26–34)
MCHC RBC AUTO-ENTMCNC: 29.8 G/DL (ref 32–36)
MCV RBC AUTO: 87 FL (ref 80–100)
MONOCYTES # BLD MANUAL: 0.64 X10*3/UL (ref 0.05–0.8)
MONOCYTES NFR BLD MANUAL: 4 %
NEUTROPHILS # BLD MANUAL: 15.2 X10*3/UL (ref 1.6–5.5)
NEUTS BAND # BLD MANUAL: 0.32 X10*3/UL (ref 0–0.5)
NEUTS BAND NFR BLD MANUAL: 2 %
NEUTS SEG # BLD MANUAL: 14.88 X10*3/UL (ref 1.6–5)
NEUTS SEG NFR BLD MANUAL: 93 %
NRBC BLD-RTO: 0 /100 WBCS (ref 0–0)
OVALOCYTES BLD QL SMEAR: ABNORMAL
PHOSPHATE SERPL-MCNC: 3.2 MG/DL (ref 2.5–4.9)
PLATELET # BLD AUTO: 222 X10*3/UL (ref 150–450)
POTASSIUM SERPL-SCNC: 5.1 MMOL/L (ref 3.5–5.3)
Q ONSET: 220 MS
QRS COUNT: 19 BEATS
QRS DURATION: 94 MS
QT INTERVAL: 330 MS
QTC CALCULATION(BAZETT): 464 MS
QTC FREDERICIA: 414 MS
R AXIS: 44 DEGREES
RBC # BLD AUTO: 3.49 X10*6/UL (ref 4.5–5.9)
RBC MORPH BLD: ABNORMAL
RH FACTOR (ANTIGEN D): NORMAL
SODIUM SERPL-SCNC: 136 MMOL/L (ref 136–145)
T AXIS: 59 DEGREES
T OFFSET: 385 MS
TOTAL CELLS COUNTED BLD: 100
VENTRICULAR RATE: 119 BPM
WBC # BLD AUTO: 16 X10*3/UL (ref 4.4–11.3)

## 2025-01-03 PROCEDURE — 2500000004 HC RX 250 GENERAL PHARMACY W/ HCPCS (ALT 636 FOR OP/ED): Performed by: INTERNAL MEDICINE

## 2025-01-03 PROCEDURE — 85027 COMPLETE CBC AUTOMATED: CPT | Performed by: NURSE PRACTITIONER

## 2025-01-03 PROCEDURE — 85007 BL SMEAR W/DIFF WBC COUNT: CPT | Performed by: NURSE PRACTITIONER

## 2025-01-03 PROCEDURE — 2500000004 HC RX 250 GENERAL PHARMACY W/ HCPCS (ALT 636 FOR OP/ED): Performed by: NURSE PRACTITIONER

## 2025-01-03 PROCEDURE — 2500000001 HC RX 250 WO HCPCS SELF ADMINISTERED DRUGS (ALT 637 FOR MEDICARE OP): Performed by: NURSE PRACTITIONER

## 2025-01-03 PROCEDURE — 99221 1ST HOSP IP/OBS SF/LOW 40: CPT | Performed by: NURSE PRACTITIONER

## 2025-01-03 PROCEDURE — 99223 1ST HOSP IP/OBS HIGH 75: CPT | Performed by: NEUROLOGICAL SURGERY

## 2025-01-03 PROCEDURE — 99239 HOSP IP/OBS DSCHRG MGMT >30: CPT | Performed by: INTERNAL MEDICINE

## 2025-01-03 PROCEDURE — 80069 RENAL FUNCTION PANEL: CPT | Performed by: NURSE PRACTITIONER

## 2025-01-03 PROCEDURE — 83605 ASSAY OF LACTIC ACID: CPT | Performed by: NURSE PRACTITIONER

## 2025-01-03 PROCEDURE — 36415 COLL VENOUS BLD VENIPUNCTURE: CPT | Performed by: NURSE PRACTITIONER

## 2025-01-03 PROCEDURE — 2500000001 HC RX 250 WO HCPCS SELF ADMINISTERED DRUGS (ALT 637 FOR MEDICARE OP)

## 2025-01-03 PROCEDURE — 93005 ELECTROCARDIOGRAM TRACING: CPT

## 2025-01-03 PROCEDURE — 2060000001 HC INTERMEDIATE ICU ROOM DAILY

## 2025-01-03 PROCEDURE — 93010 ELECTROCARDIOGRAM REPORT: CPT | Performed by: INTERNAL MEDICINE

## 2025-01-03 PROCEDURE — 86901 BLOOD TYPING SEROLOGIC RH(D): CPT | Performed by: NURSE PRACTITIONER

## 2025-01-03 PROCEDURE — 82947 ASSAY GLUCOSE BLOOD QUANT: CPT

## 2025-01-03 PROCEDURE — 99231 SBSQ HOSP IP/OBS SF/LOW 25: CPT | Performed by: NURSE PRACTITIONER

## 2025-01-03 RX ORDER — POLYETHYLENE GLYCOL 3350 17 G/17G
17 POWDER, FOR SOLUTION ORAL DAILY PRN
Status: DISCONTINUED | OUTPATIENT
Start: 2025-01-03 | End: 2025-01-04 | Stop reason: HOSPADM

## 2025-01-03 RX ADMIN — SODIUM CHLORIDE, POTASSIUM CHLORIDE, SODIUM LACTATE AND CALCIUM CHLORIDE 75 ML/HR: 600; 310; 30; 20 INJECTION, SOLUTION INTRAVENOUS at 08:55

## 2025-01-03 RX ADMIN — FAMOTIDINE 20 MG: 20 TABLET, FILM COATED ORAL at 08:56

## 2025-01-03 RX ADMIN — ACETAMINOPHEN 650 MG: 325 TABLET, FILM COATED ORAL at 21:08

## 2025-01-03 RX ADMIN — FAMOTIDINE 20 MG: 20 TABLET, FILM COATED ORAL at 21:04

## 2025-01-03 RX ADMIN — ACETAMINOPHEN 650 MG: 325 TABLET, FILM COATED ORAL at 08:56

## 2025-01-03 RX ADMIN — CEFEPIME 2 G: 2 INJECTION, POWDER, FOR SOLUTION INTRAVENOUS at 23:00

## 2025-01-03 RX ADMIN — SODIUM CHLORIDE 500 ML: 9 INJECTION, SOLUTION INTRAVENOUS at 01:25

## 2025-01-03 RX ADMIN — SODIUM CHLORIDE 500 ML: 9 INJECTION, SOLUTION INTRAVENOUS at 01:59

## 2025-01-03 RX ADMIN — POLYETHYLENE GLYCOL 3350 17 G: 17 POWDER, FOR SOLUTION ORAL at 10:29

## 2025-01-03 RX ADMIN — CEFEPIME 2 G: 2 INJECTION, POWDER, FOR SOLUTION INTRAVENOUS at 00:47

## 2025-01-03 RX ADMIN — CEFEPIME 2 G: 2 INJECTION, POWDER, FOR SOLUTION INTRAVENOUS at 11:17

## 2025-01-03 ASSESSMENT — COGNITIVE AND FUNCTIONAL STATUS - GENERAL
DRESSING REGULAR UPPER BODY CLOTHING: A LITTLE
DRESSING REGULAR LOWER BODY CLOTHING: A LITTLE
MOVING TO AND FROM BED TO CHAIR: A LITTLE
WALKING IN HOSPITAL ROOM: A LITTLE
DRESSING REGULAR UPPER BODY CLOTHING: A LITTLE
CLIMB 3 TO 5 STEPS WITH RAILING: A LITTLE
CLIMB 3 TO 5 STEPS WITH RAILING: A LITTLE
MOVING TO AND FROM BED TO CHAIR: A LITTLE
MOVING TO AND FROM BED TO CHAIR: A LITTLE
HELP NEEDED FOR BATHING: A LITTLE
HELP NEEDED FOR BATHING: A LITTLE
DRESSING REGULAR UPPER BODY CLOTHING: A LITTLE
MOBILITY SCORE: 20
HELP NEEDED FOR BATHING: A LITTLE
DAILY ACTIVITIY SCORE: 20
TOILETING: A LITTLE
CLIMB 3 TO 5 STEPS WITH RAILING: A LITTLE
STANDING UP FROM CHAIR USING ARMS: A LITTLE
STANDING UP FROM CHAIR USING ARMS: A LITTLE
DAILY ACTIVITIY SCORE: 20
WALKING IN HOSPITAL ROOM: A LITTLE
MOBILITY SCORE: 20
DRESSING REGULAR LOWER BODY CLOTHING: A LITTLE
MOBILITY SCORE: 20
TOILETING: A LITTLE
STANDING UP FROM CHAIR USING ARMS: A LITTLE
DRESSING REGULAR LOWER BODY CLOTHING: A LITTLE
TOILETING: A LITTLE
DAILY ACTIVITIY SCORE: 20
WALKING IN HOSPITAL ROOM: A LITTLE

## 2025-01-03 ASSESSMENT — PAIN SCALES - GENERAL
PAINLEVEL_OUTOF10: 0 - NO PAIN
PAINLEVEL_OUTOF10: 3
PAINLEVEL_OUTOF10: 0 - NO PAIN
PAINLEVEL_OUTOF10: 2
PAINLEVEL_OUTOF10: 0 - NO PAIN
PAINLEVEL_OUTOF10: 2
PAINLEVEL_OUTOF10: 0 - NO PAIN

## 2025-01-03 ASSESSMENT — PAIN - FUNCTIONAL ASSESSMENT
PAIN_FUNCTIONAL_ASSESSMENT: 0-10

## 2025-01-03 ASSESSMENT — PAIN DESCRIPTION - LOCATION
LOCATION: BACK
LOCATION: TOE (COMMENT WHICH ONE)

## 2025-01-03 ASSESSMENT — PAIN SCALES - PAIN ASSESSMENT IN ADVANCED DEMENTIA (PAINAD): TOTALSCORE: MEDICATION (SEE MAR)

## 2025-01-03 ASSESSMENT — PAIN DESCRIPTION - ORIENTATION: ORIENTATION: LEFT

## 2025-01-03 NOTE — SIGNIFICANT EVENT
Due to suspected renal cell carcinoma and lytic lesions of lumbar spine we are currently in process of hopefully getting patient transferred to a higher level of care at [facility] given the availability of oncology and ortho-spine/neurosurgery, services paged via transfer center.  Patient Sister Micaela updated with plan of care.

## 2025-01-03 NOTE — CONSULTS
Wound Care Consult     Visit Date: 1/3/2025      Patient Name: Matt Retana         MRN: [id number]           YOB: 1950     Reason for Consult: BLE & buttocks         Skin prevention supplies applied/in place:   EHOB TAP system with green turning wedges  EHOB Truvue boots (green)       Pertinent Labs:   Albumin   Date Value Ref Range Status   01/03/2025 2.6 (L) 3.4 - 5.0 g/dL Final       Wound Assessment:  Wound 11/27/24 Laceration Pretibial Distal;Right (Active)   Wound Image   01/03/25 1401   Site Assessment Pink 01/03/25 1401   Ava-Wound Assessment Dry 01/03/25 1401   Shape irregular 01/03/25 1401   Wound Length (cm) 1 cm 01/03/25 1401   Wound Width (cm) 8 cm 01/03/25 1401   Wound Surface Area (cm^2) 8 cm^2 01/03/25 1401   Dressing Changed Changed 01/03/25 1401       Wound 11/27/24 Venous Ulcer Leg Right (Active)   Wound Image   01/03/25 1402   Site Assessment Pink;Red 01/03/25 1402   Shape scattered 01/03/25 1402   Wound Length (cm) 1 cm 01/03/25 1402   Wound Width (cm) 2 cm 01/03/25 1402   Wound Surface Area (cm^2) 2 cm^2 01/03/25 1402   Dressing Gauze;ABD 01/03/25 1225   Dressing Changed Changed 01/03/25 1402   Dressing Status Clean;Dry 01/03/25 1621       Wound 11/27/24 Other (comment) Coccyx (Active)   Site Assessment Blanchable erythema 01/03/25 1621       Wound 01/03/25 Tibial Dorsal;Left;Proximal (Active)   Wound Image   01/03/25 1403   Shape irregular 01/03/25 1403   Wound Length (cm) 1 cm 01/03/25 1403   Wound Width (cm) 2 cm 01/03/25 1403   Wound Surface Area (cm^2) 2 cm^2 01/03/25 1403   Drainage Description Serosanguineous 01/03/25 1403   Drainage Amount Small 01/03/25 1403   Dressing Changed Changed 01/03/25 1403       Wound Team Summary Assessment: Notified Dr. Castillo of wound care recommendations. Additional supplies left at bedside.     BLE- Chronic venous status ulcers   Irrigate with normal saline or wound cleanser, Pat dry.  Apply lotion to intact skin   Cover weeping areas with  Triad Hydrophillic wound dressing ointment   Pad and protect with ABD, Kerlix and paper tape.   Change every day and as needed   Secure with Tubigrip ( Apply from base of toes to 2 finger width below the bend of knee)     Gluteal cleft & buttocks-MASD/Irritant contact dermatitis due to fecal, urinary or dual incontinent.   Cleanse with bath wipes or soap and water. Rinse well and pat dry.   Apply no sting skin barrier (cavilon)   Apply Triad hydrophilic wound dressing ointment to gluteal fold/groin three times a day and as needed.      While in bed patient should only be on one fitted sheet, and one chux. Please, do not use brief while patient is resting in bed. Elevate heels off the bed surface at all times. Turn and reposition at least every 2 hours.       Wound Team Plan: Thank you for this consult. Assessment has been completed and orders have been placed. Wound care to be completed by nursing per orders. Please place new consult if there is a change in wound status.        Sandee Perez RN BSN,[facility],CWOCN  [phone]   1/3/2025  6:35 PM

## 2025-01-03 NOTE — PROGRESS NOTES
"Rapid Response Note    Called to bedside d/t tachycardia, tachypnea in association with fever. Additionally having bladder incontinence with hematuria per nursing.     /60 Comment: manual  Pulse (!) 115   Temp (!) 39.7 °C (103.4 °F)   Resp 22   Ht 1.88 m (6' 2\")   Wt 125 kg (276 lb)   SpO2 96%   BMI 35.44 kg/m²   Chronically ill appearing elderly male,   GCS E4, V3, M6, not able to dorsiflex or plantar flex ankles b/l, unable to extend knees b/l, unable to flex hips b/l   Tachycardic   No accessory muscle recruitment   Extremities are warm and well perfused   Skin chronic venous stasis to lower extremities b/l       CT of chest abdomen and pelvis from 11/2024 compared to 1/2/2025 - large right renal mass in addition to L4 vertebral body collapse (noted on 11/30/2024 CT). Neuro exam from 11/30/2024 admission also reviewed.     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 Given bladder incontinence and b/l lower extremity weakness, there is concern for cord compression.   Sepsis d/t complicated UTI with Stage 2 JAMAL- no evidence of shock     - recommend transfer to [facility] for neuro/spine surgical eval   - Sepsis bundle   - reperfusion exam performed at 23:45  - broaden Abx to either Cefepime or Pip-tazo  - discussed with Hospitalist service     Critical care time: 30 min   "

## 2025-01-03 NOTE — CONSULTS
"Consults    Reason For Consult  L4 biopsy    History Of Present Illness  Abdi Retana \"Louise" is a 74 y.o. male presenting with back pain and hematuria. He was initially admitted in late November with weakness of BLE and a fall. Imaging demonstrated a large right renal mass consistent with RCC, as well as lesions in L4, adrenal, and lung concerning for metastasis. He was discharged to a rehab facility given weakness, and scheduled for outpatient biopsy. Unfortunately he did not make this appointment due to transportation issues. He was also unable to complete his brain, cervical and thoracic MRI without sedation, and will be rescheduled for those. This admission, he was noted to have a UTI and is being treated for urosepsis. IR consulted for L4 biopsy as he missed his appointment. He notes urinary incontinence, denies numbness or tingling. He notes weakness in his legs, unable to ambulate with a walker. Can stand with PT and a walker. Reports at least two episodes of being unable to move his legs at all.      Past Medical History  He has a past medical history of CKD (chronic kidney disease), GERD (gastroesophageal reflux disease), Hypertension, OA (osteoarthritis), Personal history of other diseases of the circulatory system, and PVD (peripheral vascular disease) (CMS-[facility]).    Surgical History  He has no past surgical history on file.     Social History  He reports that he has quit smoking. His smoking use included cigarettes. He does not have any smokeless tobacco history on file. He reports current alcohol use. He reports that he does not use drugs.    Family History  Family History   Problem Relation Name Age of Onset    Dementia Mother      Heart attack Mother      Heart attack Father          Allergies  Clindamycin and Amoxicillin-pot clavulanate    MEDS:    Current Facility-Administered Medications:     acetaminophen (Tylenol) tablet 650 mg, 650 mg, oral, q4h PRN, 650 mg at 01/03/25 0856 **OR** acetaminophen " (Tylenol) oral liquid 650 mg, 650 mg, oral, q4h PRN **OR** acetaminophen (Tylenol) suppository 650 mg, 650 mg, rectal, q4h PRN, Alexa Lozada, ComfortD    [Held by provider] aspirin EC tablet 81 mg, 81 mg, oral, Daily, HUSSEIN Chase-CNP    cefepime (Maxipime) 2 g in dextrose 5%  mL, 2 g, intravenous, q12h, DIVINE Chase, Stopped at 01/03/25 0141    famotidine (Pepcid) tablet 20 mg, 20 mg, oral, BID, DIVINE Chase, 20 mg at 01/03/25 0856    ferrous sulfate (325 mg ferrous sulfate) tablet 325 mg, 65 mg of iron, oral, Daily, DIVINE Chase, 325 mg at 01/02/25 2035    [Held by provider] heparin (porcine) injection 5,000 Units, 5,000 Units, subcutaneous, q8h, HUSSEIN Chase-CNP    HYDROmorphone PF (Dilaudid) injection 0.2 mg, 0.2 mg, intravenous, q4h PRN, DIVINE Chase, 0.2 mg at 01/02/25 2035    lactated Ringer's infusion, 75 mL/hr, intravenous, Continuous, DIVINE Chase, Last Rate: 75 mL/hr at 01/03/25 0855, 75 mL/hr at 01/03/25 0855    [Held by provider] losartan (Cozaar) tablet 50 mg, 50 mg, oral, Daily, HUSSEIN Chase-CNP    ondansetron (Zofran) injection 4 mg, 4 mg, intravenous, q4h PRN, DIVINE Gannon    polyethylene glycol (Glycolax, Miralax) packet 17 g, 17 g, oral, Daily PRN, Jeremy Castillo DO    sodium chloride 0.9 % bolus 500 mL, 500 mL, intravenous, Once, DIVINE Chase, 500 mL at 01/03/25 0159    [Held by provider] torsemide (Demadex) tablet 20 mg, 20 mg, oral, Daily, Mariano Grant, APRN-CNP    Review of Systems  History obtained from chart review and the patient  General ROS: negative  Respiratory ROS: no cough, shortness of breath, or wheezing  Cardiovascular ROS: no chest pain or dyspnea on exertion  Gastrointestinal ROS: no abdominal pain, change in bowel habits, or black or bloody stools  Genitourinary ROS: positive for - incontinence  Musculoskeletal ROS: positive for - gait disturbance and weakness     Last Recorded  "Vitals  /78 (BP Location: Left arm, Patient Position: Lying)   Pulse 84   Temp 36 °C (96.8 °F) (Temporal)   Resp 19   Wt 125 kg (276 lb)   SpO2 100%      Physical Exam  Orientation: oriented to person, place, time, and general circumstances  HEENT: normocephalic, atraumatic  Pulm: normal  Cardiac: Regular rate and rhythm  GI: soft, nontender, normal bowel sounds  Vasc: Venous stasus changes to BLE.   Musc: HF/Quad/Gastroc 4/5 bilaterally. Ant tib 3/5 bilaterally. EHL 4/5.   SILT L2-S1.     Relevant Results    LABS:  Lab Results   Component Value Date    WBC 16.0 (H) 01/03/2025    HGB 9.1 (L) 01/03/2025    HCT 30.5 (L) 01/03/2025    MCV 87 01/03/2025     01/03/2025      Results from last 72 hours   Lab Units 01/03/25  0613   SODIUM mmol/L 136   POTASSIUM mmol/L 5.1   CHLORIDE mmol/L 106   CO2 mmol/L 24   BUN mg/dL 55*   CREATININE mg/dL 3.03*   GLUCOSE mg/dL 134*   CALCIUM mg/dL 8.4*   ANION GAP mmol/L 11   EGFR mL/min/1.73m*2 21*     Results from last 72 hours   Lab Units 01/03/25  0613 01/02/25  2315   ALK PHOS U/L  --  68   BILIRUBIN TOTAL mg/dL  --  0.5   PROTEIN TOTAL g/dL  --  7.5   ALT U/L  --  9*   AST U/L  --  17   ALBUMIN g/dL 2.6* 2.8*           No lab exists for component: \"PT\"    MICRO:  No results found for the last 14 days.      IMAGING:   CT chest abdomen pelvis wo IV contrast   Final Result   CHEST:   1.  History of right renal cell carcinoma.   2. Multiple pulmonary nodules consistent with metastatic disease.        ABDOMEN AND PELVIS:   1.  History upright or renal cell carcinoma. There is an unchanged   necrotic mass involving the upper pole of the right kidney.   2. Bilateral renal cysts.   3. Bilateral nonobstructing intrarenal calculi with staghorn type   calculi in the left kidney. There is no renal obstruction.   4. Complete bony destruction of L4 with a soft tissue mass with   expansion of the vertebral body. Lytic lesion involving the superior   endplate of L5. Findings " are consistent with bony metastases.        MACRO:   None        Signed by: Lucia Desai 1/2/2025 10:33 AM   Dictation workstation:   [id number]      XR chest 1 view   Final Result   No pneumonic consolidations are noted. There questionable perihilar   nodules which may correspond to the masses noted on the prior CT.             MACRO:   None        Signed by: Patrick Arnold 1/2/2025 8:33 AM   Dictation workstation:   [id number]      Consult to Interventional Radiology    (Results Pending)        Assessment/Plan     This is a 74 year old male admitted with back pain and hematuria. IR consulted for L4 biopsy. Patient was scheduled as an outpatient 12/20 for an L4 biopsy due to marrow replacing lesion concerning for metastasis. Large right renal mass consistent with RCC. He unfortunately missed this appointment due to transportation.     MRI lumbar spine 11/30 personally reviewed with moderate to severe L1-2 stenosis secondary to HNP, conus at L1. Moderate stenosis at L4 secondary to expansile marrow replacing lesion.   CT C/A/P done this admission 1/2/25, now with complete bony destruction of L4, new lytic lesion to the superior endplate of L5 concerning for metastasis.   Findings amenable to percutaneous biopsy.     Overnight patient had a RRT, is being treated for urosepsis with noted incontinence and retention (s/p hendrickson placement).   Rapid Response note suggests worsening neuro deficit, unable to move BLE overnight. Primary team attempted to initiate transfer to higher level of case with stat neurosurg eval.     Patient was personally examined this morning, with 4/5 strength throughout, Ant tib 3/5.   Hendrickson catheter in place. SILT. He notes prior episodes of being unable to move his legs. Per patient, is able to stand with PT and a walker, unable to ambulate. This is a change compared to 1 month ago where he was able to ambulate with a walker.     Recommend proceeding with neurosurgery eval as noted,  completion of brain, cervical, thoracic MRIs as able.     Can consider percutaneous biopsy of L4 lesion early next week if still in house, or can reschedule as outpatient as appropriate.       Heidi Mendoza, APRN-CNP

## 2025-01-03 NOTE — CONSULTS
Nutrition Assessment Note  Nutrition Assessment      Reason for Assessment  Reason for Assessment: Admission nursing screening    Abdi Retana is a 74 y.o. year old male patient with JAMAL (acute kidney injury) (CMS-HCC) [N17.9]    referred for MST-2 unsure wt loss      Chart reviewed and pt visited.  Past Medical History:   Diagnosis Date    CKD (chronic kidney disease)     GERD (gastroesophageal reflux disease)     Hypertension     OA (osteoarthritis)     Personal history of other diseases of the circulatory system     History of hypertension    PVD (peripheral vascular disease) (CMS-HCC)      Per chart review:  -JAMAL on CKD  -During recent admission, L4 lytic lesion, mets to adrenal glands and lung found  -To be transferred to [facility]     Per pt:  -3 meals daily  -No food allergies  -No difficulties chewing/swallowing  -Relies on meals on wheels  -Has lost wt- intentional, Dr recommended he lose some wt  -Would like to try nutritional supplement    Scheduled medications  [Held by provider] aspirin, 81 mg, oral, Daily  cefepime, 2 g, intravenous, q12h  famotidine, 20 mg, oral, BID  ferrous sulfate (325 mg ferrous sulfate), 65 mg of iron, oral, Daily  [Held by provider] heparin (porcine), 5,000 Units, subcutaneous, q8h  [Held by provider] losartan, 50 mg, oral, Daily  sodium chloride, 500 mL, intravenous, Once  [Held by provider] torsemide, 20 mg, oral, Daily      Continuous medications  lactated Ringer's, 75 mL/hr, Last Rate: 75 mL/hr (01/03/25 0855)      PRN medications  PRN medications: acetaminophen **OR** acetaminophen **OR** acetaminophen, HYDROmorphone, ondansetron, polyethylene glycol     Latest Reference Range & Units 01/02/25 23:15 01/03/25 06:13   GLUCOSE 74 - 99 mg/dL 133 (H) 134 (H)   SODIUM 136 - 145 mmol/L 135 (L) 136   POTASSIUM 3.5 - 5.3 mmol/L 4.7 5.1   CHLORIDE 98 - 107 mmol/L 103 106   Bicarbonate 21 - 32 mmol/L 21 24   Anion Gap 10 - 20 mmol/L 16 11   Blood Urea Nitrogen 6 - 23 mg/dL 50 (H)  "55 (H)   Creatinine 0.50 - 1.30 mg/dL 2.66 (H) 3.03 (H)   EGFR >60 mL/min/1.73m*2 24 (L) 21 (L)   Calcium 8.6 - 10.3 mg/dL 8.5 (L) 8.4 (L)   PHOSPHORUS 2.5 - 4.9 mg/dL  3.2   Albumin 3.4 - 5.0 g/dL 2.8 (L) 2.6 (L)   Alkaline Phosphatase 33 - 136 U/L 68    ALT 10 - 52 U/L 9 (L)    AST 9 - 39 U/L 17    Bilirubin Total 0.0 - 1.2 mg/dL 0.5    Total Protein 6.4 - 8.2 g/dL 7.5    Lactate 0.4 - 2.0 mmol/L 2.7 (H)    (H): Data is abnormally high  (L): Data is abnormally low    Dietary Orders (From admission, onward)       Start     Ordered    01/03/25 1458  Oral nutritional supplements  Until discontinued        Question Answer Comment   Deliver with Breakfast    Deliver with Dinner    Select supplement: Tamir        01/03/25 1457    01/02/25 2220  May Participate in Room Service  ( ROOM SERVICE MAY PARTICIPATE)  Once        Question:  .  Answer:  Yes    01/02/25 2219    01/02/25 1727  Adult diet Regular, Cardiac; 70 gm fat; 2 - 3 grams Sodium  Diet effective now        Question Answer Comment   Diet type Regular    Diet type Cardiac    Fat restriction: 70 gm fat    Sodium restriction: 2 - 3 grams Sodium        01/02/25 1726                  History:  Food and Nutrient History  Energy Intake: Good > 75 %, Fair 50-75 %  Food and Nutrient History: Pt states eats 3 meals daily; has been making smaller meals lately to try and lose wt    Anthropometrics:  Height: 188 cm (6' 2.02\")  Weight: 125 kg (274 lb 11.2 oz)  BMI (Calculated): 35.25    Weight Change: -0.47    Wt Readings from Last 3 Encounters:   01/03/25 125 kg (274 lb 11.2 oz)   11/30/24 122 kg (270 lb)   11/27/24 136 kg (300 lb)     Weight Change  Significant Weight Loss: Yes       IBW/kg (Dietitian Calculated): 86.4 kg  Percent of IBW: 144 %       Energy Needs:  Calculated Energy Needs Using Equations  Height: 188 cm (6' 2.02\")  Temp: 36.3 °C (97.3 °F)    Estimated Energy Needs  Total Energy Estimated Needs (kCal): 1875 kCal  Total Estimated Energy Need per Day " (kCal/kg): 2250 kCal/kg  Method for Estimating Needs: 15-18kcal/kg    Estimated Protein Needs  Total Protein Estimated Needs (g): 80 g  Total Protein Estimated Needs (g/kg): 120 g/kg  Method for Estimating Needs: 1.0-1.5g/kg (IBW)    Estimated Fluid Needs  Method for Estimating Needs: 1mL/kcal or MD recommendations       Nutrition Focused Physical Findings:  Subcutaneous Fat Loss  Orbital Fat Pads: Mild-Moderate (slight dark circles and slight hollowing)  Buccal Fat Pads: Well nourished (full, rounded cheeks)    Muscle Wasting  Temporalis: Well nourished (well-defined muscle)    Edema  Edema Location: Non-pitting RLE/LLE       Physical Findings (Nutrition Deficiency/Toxicity)  Skin: Positive (Venous wounds leg/pretibial; coccyx wound; laceration to pretibial)       Nutrition Diagnosis        Patient has Nutrition Diagnosis: Yes  Nutrition Diagnosis 1: Increased nutrient needs  Diagnosis Status (1): New  Related to (1): acute illness; wounds  As Evidenced by (1): JAMAL; multiple wounds       Nutrition Interventions/Recommendations      Food and/or Nutrient Delivery Interventions  Meals and Snacks: General healthful diet     Medical Food Supplement: Commercial beverage  Goal: Tamir BID       Nutrition Monitoring and Evaluation   Food and Nutrient Related History  Energy Intake: Estimated energy intake  Criteria: >75% of EEN via po    Fluid Intake: Estimated fluid intake    Amount of Food: Estimated amout of food, Medical food intake  Criteria: >75% of meals and nutritional supplements    Anthropometrics: Body Composition/Growth/Weight History  Weight: Measured weight, Weight change    Weight Change: Weight change percentage, Weight gain, Weight loss    Body Mass: Body mass index (BMI)       Biochemical Data, Medical Tests and Procedures  Electrolyte and Renal Panel: Other (Comment), Sodium, BUN, Creatinine, Calcium, serum, Calcium, ionized  Criteria: As clinically indicated       Glucose/Endocrine Profile: Glucose,  casual  Criteria: As clinically indicated       Vitamin Profile: Other (Comment)  Criteria: As clinically indicated    Nutrition Focused Physical Findings       Bones: Other (Comment)       Skin: Impaired wound healing    Other: overall appearance and I/Os       Follow Up  Time Spent (min): 60 minutes  Last Date of Nutrition Visit: 01/03/25  Nutrition Follow-Up Needed?: Dietitian to reassess per policy  Follow up Comment: [facility] [place]

## 2025-01-03 NOTE — PROGRESS NOTES
"Abdi Retana \"Louise" is a 74 y.o. male on day 1 of admission presenting with JAMAL (acute kidney injury) (CMS-[facility]).    Subjective   Pt reports lowe back pain this morning. Hendrickson catheter was placed overnight- pt and RN are unsure as to the reasoning- pt denies any discomfort.        Objective     Physical Exam  Constitutional:       Appearance: Normal appearance.   Cardiovascular:      Rate and Rhythm: Normal rate.   Pulmonary:      Effort: Pulmonary effort is normal.   Abdominal:      General: There is no distension.      Palpations: Abdomen is soft.   Genitourinary:     Comments: 16 Fr hendrickson catheter in place with mae urine in the tubing, no clots. Bladder non distended.   Musculoskeletal:         General: Normal range of motion.   Skin:     General: Skin is warm and dry.   Neurological:      Mental Status: He is oriented to person, place, and time.   Psychiatric:         Mood and Affect: Mood normal.         Behavior: Behavior normal.         Last Recorded Vitals  Blood pressure 124/78, pulse 84, temperature 36 °C (96.8 °F), temperature source Temporal, resp. rate 19, height 1.88 m (6' 2\"), weight 125 kg (276 lb), SpO2 100%.  Intake/Output last 3 Shifts:  I/O last 3 completed shifts:  In: - (0 mL/kg)   Out: 160 (1.3 mL/kg) [Urine:160 (0 mL/kg/hr)]  Weight: 125.2 kg     Medications:   Scheduled medications  [Held by provider] aspirin, 81 mg, oral, Daily  cefepime, 2 g, intravenous, q12h  famotidine, 20 mg, oral, BID  ferrous sulfate (325 mg ferrous sulfate), 65 mg of iron, oral, Daily  [Held by provider] heparin (porcine), 5,000 Units, subcutaneous, q8h  [Held by provider] losartan, 50 mg, oral, Daily  sodium chloride, 500 mL, intravenous, Once  [Held by provider] torsemide, 20 mg, oral, Daily      Continuous medications  lactated Ringer's, 75 mL/hr, Last Rate: 75 mL/hr (01/03/25 0855)      PRN medications  PRN medications: acetaminophen **OR** acetaminophen **OR** acetaminophen, HYDROmorphone, ondansetron, " polyethylene glycol    Relevant Results  Results for orders placed or performed during the hospital encounter of 01/02/25 (from the past 24 hours)   Urinalysis with Reflex Culture and Microscopic   Result Value Ref Range    Color, Urine Red (N) Light-Yellow, Yellow, Dark-Yellow    Appearance, Urine Ex.Turbid (N) Clear    Specific Gravity, Urine 1.022 1.005 - 1.035    pH, Urine 8.5 (N) 5.0, 5.5, 6.0, 6.5, 7.0, 7.5, 8.0    Protein, Urine 300 (3+) (A) NEGATIVE, 10 (TRACE), 20 (TRACE) mg/dL    Glucose, Urine Normal Normal mg/dL    Blood, Urine OVER (3+) (A) NEGATIVE    Ketones, Urine NEGATIVE NEGATIVE mg/dL    Bilirubin, Urine NEGATIVE NEGATIVE    Urobilinogen, Urine Normal Normal mg/dL    Nitrite, Urine NEGATIVE NEGATIVE    Leukocyte Esterase, Urine 500 Pamela/µL (A) NEGATIVE   Microscopic Only, Urine   Result Value Ref Range    WBC, Urine >50 (A) 1-5, NONE /HPF    RBC, Urine >20 (A) NONE, 1-2, 3-5 /HPF    Bacteria, Urine 3+ (A) NONE SEEN /HPF    Mucus, Urine 1+ Reference range not established. /LPF   POCT GLUCOSE   Result Value Ref Range    POCT Glucose 119 (H) 74 - 99 mg/dL   Electrocardiogram, 12-lead PRN ACS symptoms   Result Value Ref Range    Ventricular Rate 119 BPM    Atrial Rate 127 BPM    QRS Duration 94 ms    QT Interval 330 ms    QTC Calculation(Bazett) 464 ms    R Axis 44 degrees    T Axis 59 degrees    QRS Count 19 beats    Q Onset 220 ms    T Offset 385 ms    QTC Fredericia 414 ms   CBC and Auto Differential   Result Value Ref Range    WBC 12.1 (H) 4.4 - 11.3 x10*3/uL    nRBC 0.0 0.0 - 0.0 /100 WBCs    RBC 3.79 (L) 4.50 - 5.90 x10*6/uL    Hemoglobin 9.9 (L) 13.5 - 17.5 g/dL    Hematocrit 32.4 (L) 41.0 - 52.0 %    MCV 86 80 - 100 fL    MCH 26.1 26.0 - 34.0 pg    MCHC 30.6 (L) 32.0 - 36.0 g/dL    RDW 16.5 (H) 11.5 - 14.5 %    Platelets 222 150 - 450 x10*3/uL    Neutrophils % 95.6 40.0 - 80.0 %    Immature Granulocytes %, Automated 0.5 0.0 - 0.9 %    Lymphocytes % 1.5 13.0 - 44.0 %    Monocytes % 1.6 2.0 -  10.0 %    Eosinophils % 0.3 0.0 - 6.0 %    Basophils % 0.5 0.0 - 2.0 %    Neutrophils Absolute 11.58 (H) 1.60 - 5.50 x10*3/uL    Immature Granulocytes Absolute, Automated 0.06 0.00 - 0.50 x10*3/uL    Lymphocytes Absolute 0.18 (L) 0.80 - 3.00 x10*3/uL    Monocytes Absolute 0.19 0.05 - 0.80 x10*3/uL    Eosinophils Absolute 0.04 0.00 - 0.40 x10*3/uL    Basophils Absolute 0.06 0.00 - 0.10 x10*3/uL   Comprehensive Metabolic Panel   Result Value Ref Range    Glucose 133 (H) 74 - 99 mg/dL    Sodium 135 (L) 136 - 145 mmol/L    Potassium 4.7 3.5 - 5.3 mmol/L    Chloride 103 98 - 107 mmol/L    Bicarbonate 21 21 - 32 mmol/L    Anion Gap 16 10 - 20 mmol/L    Urea Nitrogen 50 (H) 6 - 23 mg/dL    Creatinine 2.66 (H) 0.50 - 1.30 mg/dL    eGFR 24 (L) >60 mL/min/1.73m*2    Calcium 8.5 (L) 8.6 - 10.3 mg/dL    Albumin 2.8 (L) 3.4 - 5.0 g/dL    Alkaline Phosphatase 68 33 - 136 U/L    Total Protein 7.5 6.4 - 8.2 g/dL    AST 17 9 - 39 U/L    Bilirubin, Total 0.5 0.0 - 1.2 mg/dL    ALT 9 (L) 10 - 52 U/L   Lactate   Result Value Ref Range    Lactate 2.7 (H) 0.4 - 2.0 mmol/L   Lactate   Result Value Ref Range    Lactate 1.8 0.4 - 2.0 mmol/L   POCT GLUCOSE   Result Value Ref Range    POCT Glucose 118 (H) 74 - 99 mg/dL   Type And Screen   Result Value Ref Range    ABO TYPE A     Rh TYPE POS     ANTIBODY SCREEN NEG    CBC and Auto Differential   Result Value Ref Range    WBC 16.0 (H) 4.4 - 11.3 x10*3/uL    nRBC 0.0 0.0 - 0.0 /100 WBCs    RBC 3.49 (L) 4.50 - 5.90 x10*6/uL    Hemoglobin 9.1 (L) 13.5 - 17.5 g/dL    Hematocrit 30.5 (L) 41.0 - 52.0 %    MCV 87 80 - 100 fL    MCH 26.1 26.0 - 34.0 pg    MCHC 29.8 (L) 32.0 - 36.0 g/dL    RDW 16.8 (H) 11.5 - 14.5 %    Platelets 222 150 - 450 x10*3/uL    Immature Granulocytes %, Automated 6.8 (H) 0.0 - 0.9 %    Immature Granulocytes Absolute, Automated 1.09 (H) 0.00 - 0.50 x10*3/uL   Renal Function Panel   Result Value Ref Range    Glucose 134 (H) 74 - 99 mg/dL    Sodium 136 136 - 145 mmol/L     Potassium 5.1 3.5 - 5.3 mmol/L    Chloride 106 98 - 107 mmol/L    Bicarbonate 24 21 - 32 mmol/L    Anion Gap 11 10 - 20 mmol/L    Urea Nitrogen 55 (H) 6 - 23 mg/dL    Creatinine 3.03 (H) 0.50 - 1.30 mg/dL    eGFR 21 (L) >60 mL/min/1.73m*2    Calcium 8.4 (L) 8.6 - 10.3 mg/dL    Phosphorus 3.2 2.5 - 4.9 mg/dL    Albumin 2.6 (L) 3.4 - 5.0 g/dL   Manual Differential   Result Value Ref Range    Neutrophils %, Manual 93.0 40.0 - 80.0 %    Bands %, Manual 2.0 0.0 - 5.0 %    Lymphocytes %, Manual 1.0 13.0 - 44.0 %    Monocytes %, Manual 4.0 2.0 - 10.0 %    Eosinophils %, Manual 0.0 0.0 - 6.0 %    Basophils %, Manual 0.0 0.0 - 2.0 %    Seg Neutrophils Absolute, Manual 14.88 (H) 1.60 - 5.00 x10*3/uL    Bands Absolute, Manual 0.32 0.00 - 0.50 x10*3/uL    Lymphocytes Absolute, Manual 0.16 (L) 0.80 - 3.00 x10*3/uL    Monocytes Absolute, Manual 0.64 0.05 - 0.80 x10*3/uL    Eosinophils Absolute, Manual 0.00 0.00 - 0.40 x10*3/uL    Basophils Absolute, Manual 0.00 0.00 - 0.10 x10*3/uL    Total Cells Counted 100     Neutrophils Absolute, Manual 15.20 (H) 1.60 - 5.50 x10*3/uL    RBC Morphology See Below     Ovalocytes Few    POCT GLUCOSE   Result Value Ref Range    POCT Glucose 111 (H) 74 - 99 mg/dL     Electrocardiogram, 12-lead PRN ACS symptoms    Result Date: 1/3/2025  Atrial fibrillation with rapid ventricular response Nonspecific ST and T wave abnormality Abnormal ECG When compared with ECG of 02-JAN-2025 05:37, (unconfirmed) Atrial fibrillation has replaced Sinus rhythm ST now depressed in Inferior leads Nonspecific T wave abnormality now evident in Anterolateral leads    CT chest abdomen pelvis wo IV contrast    Result Date: 1/2/2025  Interpreted By:  Lucia Desai, STUDY: CT CHEST ABDOMEN PELVIS WO CONTRAST;  1/2/2025 9:55 am   INDICATION: Signs/Symptoms:Severe JAMAL in setting of malignant neoplasm of right kidney.   COMPARISON: 11/30/2024   ACCESSION NUMBER(S): [id number]   ORDERING CLINICIAN: MARYA MURRAY    TECHNIQUE: CT of the chest, abdomen, and pelvis was performed. Contiguous axial images were obtained at 3 mm slice thickness through the chest, abdomen and pelvis. Coronal and sagittal reconstructions at 3 mm slice thickness were performed. No oral, no intravenous contrast.   FINDINGS: Please note that the study is limited without intravenous contrast.   CHEST:   LUNG/PLEURA/LARGE AIRWAYS: There is no infiltrate or pleural fluid. There are multiple pulmonary nodules consistent with metastatic disease.   VESSELS: The thoracic vessels are unremarkable.   HEART: The heart is unremarkable.   MEDIASTINUM AND MIKEL: There is no hilar or mediastinal adenopathy.   CHEST WALL AND LOWER NECK: The chest wall is unremarkable. There is no abnormality of the lower neck.   ABDOMEN:   LIVER: There is no hepatic mass.   BILE DUCTS: There is no intrahepatic, common hepatic or common bile ductal dilatation.   GALLBLADDER: The gallbladder is unremarkable.   PANCREAS: There is no abnormality of the pancreas.   SPLEEN: The spleen is unremarkable. There is no splenic mass. There is no splenomegaly   ADRENAL GLANDS: There is a 1.4 cm right adrenal nodule, nonspecific.   KIDNEYS AND URETERS:. The left kidney is small and atrophic with cortical thinning. There are bilateral renal masses. Specifically there is a known necrotic mass in the upper pole of the right kidney, unchanged. This is not as well delineated secondary to lack of intravenous contrast. This measures 7.4 x 6.1 cm. There are bilateral renal cysts. There are bilateral nonobstructing intrarenal calculi. There is a staghorn type calculus in the left kidney.   PELVIS:   BLADDER: The bladder is unremarkable.   REPRODUCTIVE ORGANS: There is no abnormality of the reproductive organs.   BOWEL: There is no bowel wall thickening, dilatation or obstruction. There is a moderate amount of stool throughout the colon particularly in the rectum. There is nonspecific presacral edema.   VESSELS:  The abdominal and pelvic vessels are unremarkable.   PERITONEUM/RETROPERITONEUM/LYMPH NODES: There is no retroperitoneal or pelvic adenopathy.  There is no ascites.   ABDOMINAL WALL: The abdominal wall is unremarkable.   BONES AND SOFT TISSUES: There is a lytic lesion involving L4 with soft tissue mass in bony destruction. This was seen on 11/30/2024. There is expansion of the vertebral body. There is a lytic lesion involving the superior endplate of L5 seen previously.   There is no soft tissue abnormality.       CHEST: 1.  History of right renal cell carcinoma. 2. Multiple pulmonary nodules consistent with metastatic disease.   ABDOMEN AND PELVIS: 1.  History upright or renal cell carcinoma. There is an unchanged necrotic mass involving the upper pole of the right kidney. 2. Bilateral renal cysts. 3. Bilateral nonobstructing intrarenal calculi with staghorn type calculi in the left kidney. There is no renal obstruction. 4. Complete bony destruction of L4 with a soft tissue mass with expansion of the vertebral body. Lytic lesion involving the superior endplate of L5. Findings are consistent with bony metastases.   MACRO: None   Signed by: Lucia Desai 1/2/2025 10:33 AM Dictation workstation:   [id number]    XR chest 1 view    Result Date: 1/2/2025  Interpreted By:  Patrick Arnold, STUDY: XR CHEST 1 VIEW; ;  1/2/2025 8:03 am   INDICATION: Signs/Symptoms:AMS, borderline hypoxia, r/o infiltrate, r/o PTX, r/o pleural effusion.     COMPARISON: 11/26/2024 chest radiograph, 11/30/2024 chest CT   ACCESSION NUMBER(S): [id number]   ORDERING CLINICIAN: MARYA MURRAY   TECHNIQUE: AP erect portable chest 7:40 a.m.   FINDINGS: The lungs are hypoventilated bilaterally similar to the prior radiograph.   There are questionable nodules in the perihilar regions corresponding to the nodules noted on the CT no pneumonic consolidation or pleural effusion is noted.   Cardiac silhouette size is normal.       No pneumonic  consolidations are noted. There questionable perihilar nodules which may correspond to the masses noted on the prior CT.     MACRO: None   Signed by: Patrick Arnold 1/2/2025 8:33 AM Dictation workstation:   [id number]       Assessment/Plan   Assessment & Plan  JAMAL (acute kidney injury) (CMS-[facility])    Abdi Retana is a 73 yo male with known hx of right renal mass with mets to the lung and spine as well as CKD III ( baseline Cr 1.2) who presented to the ED from his nursing facility with hematuria. Pt reports that this has been going on for about 1 month. He was seen by our team yesterday and it was recommended that hendrickson catheter was placed IF he developed urinary retention or worsening hematuria. Last night a 16 Fr hendrickson catheter was placed and there is mae urine without clots in the tubing this morning, He is oliguric with 160cc of urine recorded/ 24h. No evidence in the chart to suggest that he was retaining. Bladder palpated and does not seem to be distended.     Plan: Hx RCC, hematuria, acute on chronic JAMAL, staghorn calculi, complicated UTI  - no renal obstruction per Dr Neville, no surgical urologic interventions at this time.   - continue hendrickson  - bladder scan to make sure he is not retaining given low UO  - flush hendrickson as needed   - continue abx for UTI  - will need renal biopsy as OP  - continue to trend Cr 3.03 from 2.66  - trend WBC 16 from 12.1   - H/H stable   - supportive care per primary team    Dispo: urology will continue to follow, [place] is currently [place] [facility] [place] spent 35 minutes in the professional and overall care of this patient.      Judith Yepez, APRN-CNP

## 2025-01-03 NOTE — SIGNIFICANT EVENT
RAPID RESPONSE NOTE    Reason/Time:   Fever, tachycardia, 2040  Bedside RN:   Gita Ford RN  Code Status:   Full Code  Provider Team:  Hospitalist  Attending Provider:  Dr. Castillo  Notification Method/Time: Mariano Grant CNP at bedside and recommendations communicated by Dr. Barger.    BRIEF HISTORY  Abdi Retana is a 74 y.o. male with PMHx R renal mass, GERD, anemia, obesity, PVD, and CKD, admitted on 1/2 with hematuria and back pain. Other pertinent info: Patient recently admitted for weakness and treated for UTI d/t citrobacter freundii.    LAST VITALS  Temp 39.7/  / NIBP 141/60 / RR 22 / 96% on 2L O2 /     PHYSICAL EXAM  Gen: Alert, but disoriented.  Toxic appearing.   CV:  EKG sinus tachycardia    Pulm: Tachypnea     MSK/Skin: B/L LE hemosiderin staining     DIAGNOSTICS/LABS  Results for orders placed or performed during the hospital encounter of 01/02/25 (from the past 24 hours)   CBC and Auto Differential   Result Value Ref Range    WBC 18.3 (H) 4.4 - 11.3 x10*3/uL    nRBC 0.0 0.0 - 0.0 /100 WBCs    RBC 4.37 (L) 4.50 - 5.90 x10*6/uL    Hemoglobin 11.4 (L) 13.5 - 17.5 g/dL    Hematocrit 37.4 (L) 41.0 - 52.0 %    MCV 86 80 - 100 fL    MCH 26.1 26.0 - 34.0 pg    MCHC 30.5 (L) 32.0 - 36.0 g/dL    RDW 16.1 (H) 11.5 - 14.5 %    Platelets 294 150 - 450 x10*3/uL    Neutrophils % 94.6 40.0 - 80.0 %    Immature Granulocytes %, Automated 0.5 0.0 - 0.9 %    Lymphocytes % 1.5 13.0 - 44.0 %    Monocytes % 2.7 2.0 - 10.0 %    Eosinophils % 0.2 0.0 - 6.0 %    Basophils % 0.5 0.0 - 2.0 %    Neutrophils Absolute 17.27 (H) 1.60 - 5.50 x10*3/uL    Immature Granulocytes Absolute, Automated 0.09 0.00 - 0.50 x10*3/uL    Lymphocytes Absolute 0.27 (L) 0.80 - 3.00 x10*3/uL    Monocytes Absolute 0.50 0.05 - 0.80 x10*3/uL    Eosinophils Absolute 0.03 0.00 - 0.40 x10*3/uL    Basophils Absolute 0.09 0.00 - 0.10 x10*3/uL   Comprehensive metabolic panel   Result Value Ref Range    Glucose 116 (H) 74 - 99 mg/dL    Sodium 133 (L)  136 - 145 mmol/L    Potassium 6.0 (H) 3.5 - 5.3 mmol/L    Chloride 100 98 - 107 mmol/L    Bicarbonate 25 21 - 32 mmol/L    Anion Gap 14 10 - 20 mmol/L    Urea Nitrogen 48 (H) 6 - 23 mg/dL    Creatinine 2.52 (H) 0.50 - 1.30 mg/dL    eGFR 26 (L) >60 mL/min/1.73m*2    Calcium 9.1 8.6 - 10.3 mg/dL    Albumin 3.2 (L) 3.4 - 5.0 g/dL    Alkaline Phosphatase 67 33 - 136 U/L    Total Protein 8.6 (H) 6.4 - 8.2 g/dL    AST 24 9 - 39 U/L    Bilirubin, Total 0.7 0.0 - 1.2 mg/dL    ALT 11 10 - 52 U/L   Lactate   Result Value Ref Range    Lactate 1.7 0.4 - 2.0 mmol/L   Blood Culture    Specimen: Peripheral Venipuncture; Blood culture   Result Value Ref Range    Blood Culture Loaded on Instrument - Culture in progress    Blood Culture    Specimen: Peripheral Venipuncture; Blood culture   Result Value Ref Range    Blood Culture Loaded on Instrument - Culture in progress    Troponin I, High Sensitivity   Result Value Ref Range    Troponin I, High Sensitivity 9 0 - 20 ng/L   Potassium   Result Value Ref Range    Potassium 4.6 3.5 - 5.3 mmol/L   Urinalysis with Reflex Culture and Microscopic   Result Value Ref Range    Color, Urine Red (N) Light-Yellow, Yellow, Dark-Yellow    Appearance, Urine Ex.Turbid (N) Clear    Specific Gravity, Urine 1.022 1.005 - 1.035    pH, Urine 8.5 (N) 5.0, 5.5, 6.0, 6.5, 7.0, 7.5, 8.0    Protein, Urine 300 (3+) (A) NEGATIVE, 10 (TRACE), 20 (TRACE) mg/dL    Glucose, Urine Normal Normal mg/dL    Blood, Urine OVER (3+) (A) NEGATIVE    Ketones, Urine NEGATIVE NEGATIVE mg/dL    Bilirubin, Urine NEGATIVE NEGATIVE    Urobilinogen, Urine Normal Normal mg/dL    Nitrite, Urine NEGATIVE NEGATIVE    Leukocyte Esterase, Urine 500 Pamela/µL (A) NEGATIVE   Microscopic Only, Urine   Result Value Ref Range    WBC, Urine >50 (A) 1-5, NONE /HPF    RBC, Urine >20 (A) NONE, 1-2, 3-5 /HPF    Bacteria, Urine 3+ (A) NONE SEEN /HPF    Mucus, Urine 1+ Reference range not established. /LPF   POCT GLUCOSE   Result Value Ref Range    POCT  Glucose 119 (H) 74 - 99 mg/dL     CT chest abdomen pelvis wo IV contrast    Result Date: 1/2/2025  Interpreted By:  Lucia Desai, STUDY: CT CHEST ABDOMEN PELVIS WO CONTRAST;  1/2/2025 9:55 am   INDICATION: Signs/Symptoms:Severe JAMAL in setting of malignant neoplasm of right kidney.   COMPARISON: 11/30/2024   ACCESSION NUMBER(S): [id number]   ORDERING CLINICIAN: MARYA MURRAY   TECHNIQUE: CT of the chest, abdomen, and pelvis was performed. Contiguous axial images were obtained at 3 mm slice thickness through the chest, abdomen and pelvis. Coronal and sagittal reconstructions at 3 mm slice thickness were performed. No oral, no intravenous contrast.   FINDINGS: Please note that the study is limited without intravenous contrast.   CHEST:   LUNG/PLEURA/LARGE AIRWAYS: There is no infiltrate or pleural fluid. There are multiple pulmonary nodules consistent with metastatic disease.   VESSELS: The thoracic vessels are unremarkable.   HEART: The heart is unremarkable.   MEDIASTINUM AND MIKEL: There is no hilar or mediastinal adenopathy.   CHEST WALL AND LOWER NECK: The chest wall is unremarkable. There is no abnormality of the lower neck.   ABDOMEN:   LIVER: There is no hepatic mass.   BILE DUCTS: There is no intrahepatic, common hepatic or common bile ductal dilatation.   GALLBLADDER: The gallbladder is unremarkable.   PANCREAS: There is no abnormality of the pancreas.   SPLEEN: The spleen is unremarkable. There is no splenic mass. There is no splenomegaly   ADRENAL GLANDS: There is a 1.4 cm right adrenal nodule, nonspecific.   KIDNEYS AND URETERS:. The left kidney is small and atrophic with cortical thinning. There are bilateral renal masses. Specifically there is a known necrotic mass in the upper pole of the right kidney, unchanged. This is not as well delineated secondary to lack of intravenous contrast. This measures 7.4 x 6.1 cm. There are bilateral renal cysts. There are bilateral nonobstructing intrarenal  calculi. There is a staghorn type calculus in the left kidney.   PELVIS:   BLADDER: The bladder is unremarkable.   REPRODUCTIVE ORGANS: There is no abnormality of the reproductive organs.   BOWEL: There is no bowel wall thickening, dilatation or obstruction. There is a moderate amount of stool throughout the colon particularly in the rectum. There is nonspecific presacral edema.   VESSELS: The abdominal and pelvic vessels are unremarkable.   PERITONEUM/RETROPERITONEUM/LYMPH NODES: There is no retroperitoneal or pelvic adenopathy.  There is no ascites.   ABDOMINAL WALL: The abdominal wall is unremarkable.   BONES AND SOFT TISSUES: There is a lytic lesion involving L4 with soft tissue mass in bony destruction. This was seen on 11/30/2024. There is expansion of the vertebral body. There is a lytic lesion involving the superior endplate of L5 seen previously.   There is no soft tissue abnormality.       CHEST: 1.  History of right renal cell carcinoma. 2. Multiple pulmonary nodules consistent with metastatic disease.   ABDOMEN AND PELVIS: 1.  History upright or renal cell carcinoma. There is an unchanged necrotic mass involving the upper pole of the right kidney. 2. Bilateral renal cysts. 3. Bilateral nonobstructing intrarenal calculi with staghorn type calculi in the left kidney. There is no renal obstruction. 4. Complete bony destruction of L4 with a soft tissue mass with expansion of the vertebral body. Lytic lesion involving the superior endplate of L5. Findings are consistent with bony metastases.   MACRO: None   Signed by: Lucia Desai 1/2/2025 10:33 AM Dictation workstation:   [id number]    XR chest 1 view    Result Date: 1/2/2025  Interpreted By:  Patrick Arnold, STUDY: XR CHEST 1 VIEW; ;  1/2/2025 8:03 am   INDICATION: Signs/Symptoms:AMS, borderline hypoxia, r/o infiltrate, r/o PTX, r/o pleural effusion.     COMPARISON: 11/26/2024 chest radiograph, 11/30/2024 chest CT   ACCESSION NUMBER(S): [id number]    ORDERING CLINICIAN: MARYA MURRAY   TECHNIQUE: AP erect portable chest 7:40 a.m.   FINDINGS: The lungs are hypoventilated bilaterally similar to the prior radiograph.   There are questionable nodules in the perihilar regions corresponding to the nodules noted on the CT no pneumonic consolidation or pleural effusion is noted.   Cardiac silhouette size is normal.       No pneumonic consolidations are noted. There questionable perihilar nodules which may correspond to the masses noted on the prior CT.     MACRO: None   Signed by: Patrick Arnold 1/2/2025 8:33 AM Dictation workstation:   [id number]       ASSESSMENT/PLAN  #UTI   - Initiate sepsis bundle (current SOFA score 4).  - Recommend broadening coverage to Zosyn or Cefepime (cristiano given previous citrobacter freundii infection).    I spent 20 minutes in the professional and overall care of this patient.    Juanita Parra, APRN-CNP

## 2025-01-03 NOTE — PROGRESS NOTES
01/03/25 1239   Discharge Planning   Expected Discharge Disposition SNF          Patient is in with JAMAL. Renal is following. He is waiting for bed at [facility] for oncology. When patient is medically ready will go back to SNF and will need auth. Will continue to follow for discharge needs.

## 2025-01-03 NOTE — NURSING NOTE
The following patient has a RADAR score of 9. Unit: [id number], Room: [address] [place] [address], T: 39.4, P: 123, R: 33, BP: 141/60, PulseOx: 96     These RADAR vitals taken during Rapid Response, see Rapid Response documentation for further details.    Jaqueline Chikka CCRN  Rapid Response Team

## 2025-01-03 NOTE — CARE PLAN
The patient's goals for the shift include      The clinical goals for the shift include Pt will remain safe and free from falls throughout shift.      Problem: Skin  Goal: Decreased wound size/increased tissue granulation at next dressing change  Outcome: Progressing  Goal: Participates in plan/prevention/treatment measures  Outcome: Progressing  Goal: Prevent/manage excess moisture  Outcome: Progressing  Goal: Prevent/minimize sheer/friction injuries  Outcome: Progressing  Flowsheets (Taken 1/3/2025 1259)  Prevent/minimize sheer/friction injuries: Complete micro-shifts as needed if patient unable. Adjust patient position to relieve pressure points, not a full turn  Goal: Promote/optimize nutrition  Outcome: Progressing  Goal: Promote skin healing  Outcome: Progressing     Problem: Pain - Adult  Goal: Verbalizes/displays adequate comfort level or baseline comfort level  Outcome: Progressing     Problem: Safety - Adult  Goal: Free from fall injury  Outcome: Progressing     Problem: Discharge Planning  Goal: Discharge to home or other facility with appropriate resources  Outcome: Progressing     Problem: Chronic Conditions and Co-morbidities  Goal: Patient's chronic conditions and co-morbidity symptoms are monitored and maintained or improved  Outcome: Progressing

## 2025-01-03 NOTE — NURSING NOTE
Rapid Response Nurse Note:     Matt Retana is a 74 y.o. male on day 1 of admission presenting with JAMAL (acute kidney injury) (CMS-HCC).    Rounded on patient due to recent rapid response, reviewed patient's chart and checked with bedside nurse for any questions or concerns. There were none voiced at this time.    The patient's current RADAR score is 0    /78 (BP Location: Left arm, Patient Position: Lying)   Pulse 84   Temp 36 °C (96.8 °F) (Temporal)   Resp 19   Wt 125 kg (276 lb)   SpO2 100%     No signs/symptoms of distress at this time. Bedside nurse notified to escalate concerns if patient condition changes      Perez Amin RN

## 2025-01-03 NOTE — SIGNIFICANT EVENT
Rapid response called on patient due to tachypnea and tachycardia.  Patient blood pressure was noted to be elevated in the 180s systolically, he was febrile at 103, EKG was sinus tachycardia.  Patient did have a vomiting episode as well, continues to have hematuria.  Changed Rocephin to IV cefepime every 12 hours, and continue IV hydration.  Patient reviewed with intensivist, given the size of renal mass may need to consider goals of care at this point, but patient will still need an oncologic evaluation.  Will continue to monitor patient.  CBC/CMP/lactate drawn.

## 2025-01-03 NOTE — CONSULTS
Inpatient consult to Neurosurgery  Consult performed by: Clay Valadez MD  Consult ordered by: Jeremy Castillo DO        Reason For Consult  L4 lytic lesion    History Of Present Illness  Matt Retana is a 74 y.o. male with h/o renal cell carcinoma with mets to the spine (s/p L4 lesion biopsy , adrenal gland, and lung; HTN, HLD, CKD, anemia, CAD on ASA, obesity, PVD, COPD, GRETA, 11/30 p/w BLE weakness, MRI LS  L4 VB lytic lesion with extension to the b/l L4 pedicles, 1/3 p/w hematuria and back pain, CT CAP L4 VB and superior endplate of L5 lytic lesion     The patient reported a history of falling a few times at home and being unable to walk, resulting in spending most of the day in a wheelchair. Initially, the patient could use a walker for short distances and exercise but gradually lost the ability to move the legs, about a week prior to Thanksgiving. Prior to this, the patient had difficulty walking for about a week before Thanksgiving.     The patient has experienced excruciating back pain especially after physical therapy sessions. Additionally, the patient reported episodes of blood in the urine, sometimes appearing as a gel. The patient has been in a walker and wheelchair for a year with no prior issues until the recent decline in mobility.    Of note, patient was seen by orthospine at [facility] on 11/30 for BLE weakness and difficulty with ambulation and was found to have L4 lytic lesion and recommended biopsy.      Past Medical History  He has a past medical history of CKD (chronic kidney disease), GERD (gastroesophageal reflux disease), Hypertension, OA (osteoarthritis), Personal history of other diseases of the circulatory system, and PVD (peripheral vascular disease) (CMS-[facility]).    Surgical History  He has no past surgical history on file.     Social History  He reports that he has quit smoking. His smoking use included cigarettes. He does not have any smokeless tobacco history on file. He reports current  "alcohol use. He reports that he does not use drugs.    Family History  Family History   Problem Relation Name Age of Onset    Dementia Mother      Heart attack Mother      Heart attack Father          Allergies  Clindamycin and Amoxicillin-pot clavulanate    Review of Systems  Review of systems was reviewed and otherwise negative other than what was listed in the HPI.    Physical Exam  General: No distress, alert, interactive and cooperative.   Resp: breathing comfortably on 2L NC   Cardiovascular: Radial pulses +2 and equal. BLE swelling  Neuro:     A&Ox3     OU3R, EOMI      Face symmetric, Facial SILT      Tongue midline and symmetric     Shoulder shrugs symmetric     Hearing intact to finger rubs bilaterally        RUE D5 / B5 / T5 / HG 5/ IO 5     LUE D5 / B5 / T5 / HG 5/ IO 5     Negative puentes       RLE HF5 / KE 5/ DF 5/ PF 5     LLE HF5 / KE 5/ DF 5/ PF 5     BLE clonus       SILT  Psych: appropriate  Skin: BLE skin hyperkeratosis, lesions       Last Recorded Vitals  Blood pressure 106/58, pulse 84, temperature 36.3 °C (97.3 °F), temperature source Temporal, resp. rate 18, height 1.88 m (6' 2\"), weight 125 kg (276 lb), SpO2 98%.    Relevant Results  Results for orders placed or performed during the hospital encounter of 01/02/25 (from the past 24 hours)   POCT GLUCOSE   Result Value Ref Range    POCT Glucose 119 (H) 74 - 99 mg/dL   Electrocardiogram, 12-lead PRN ACS symptoms   Result Value Ref Range    Ventricular Rate 119 BPM    Atrial Rate 127 BPM    QRS Duration 94 ms    QT Interval 330 ms    QTC Calculation(Bazett) 464 ms    R Axis 44 degrees    T Axis 59 degrees    QRS Count 19 beats    Q Onset 220 ms    T Offset 385 ms    QTC Fredericia 414 ms   CBC and Auto Differential   Result Value Ref Range    WBC 12.1 (H) 4.4 - 11.3 x10*3/uL    nRBC 0.0 0.0 - 0.0 /100 WBCs    RBC 3.79 (L) 4.50 - 5.90 x10*6/uL    Hemoglobin 9.9 (L) 13.5 - 17.5 g/dL    Hematocrit 32.4 (L) 41.0 - 52.0 %    MCV 86 80 - 100 fL    MCH " 26.1 26.0 - 34.0 pg    MCHC 30.6 (L) 32.0 - 36.0 g/dL    RDW 16.5 (H) 11.5 - 14.5 %    Platelets 222 150 - 450 x10*3/uL    Neutrophils % 95.6 40.0 - 80.0 %    Immature Granulocytes %, Automated 0.5 0.0 - 0.9 %    Lymphocytes % 1.5 13.0 - 44.0 %    Monocytes % 1.6 2.0 - 10.0 %    Eosinophils % 0.3 0.0 - 6.0 %    Basophils % 0.5 0.0 - 2.0 %    Neutrophils Absolute 11.58 (H) 1.60 - 5.50 x10*3/uL    Immature Granulocytes Absolute, Automated 0.06 0.00 - 0.50 x10*3/uL    Lymphocytes Absolute 0.18 (L) 0.80 - 3.00 x10*3/uL    Monocytes Absolute 0.19 0.05 - 0.80 x10*3/uL    Eosinophils Absolute 0.04 0.00 - 0.40 x10*3/uL    Basophils Absolute 0.06 0.00 - 0.10 x10*3/uL   Comprehensive Metabolic Panel   Result Value Ref Range    Glucose 133 (H) 74 - 99 mg/dL    Sodium 135 (L) 136 - 145 mmol/L    Potassium 4.7 3.5 - 5.3 mmol/L    Chloride 103 98 - 107 mmol/L    Bicarbonate 21 21 - 32 mmol/L    Anion Gap 16 10 - 20 mmol/L    Urea Nitrogen 50 (H) 6 - 23 mg/dL    Creatinine 2.66 (H) 0.50 - 1.30 mg/dL    eGFR 24 (L) >60 mL/min/1.73m*2    Calcium 8.5 (L) 8.6 - 10.3 mg/dL    Albumin 2.8 (L) 3.4 - 5.0 g/dL    Alkaline Phosphatase 68 33 - 136 U/L    Total Protein 7.5 6.4 - 8.2 g/dL    AST 17 9 - 39 U/L    Bilirubin, Total 0.5 0.0 - 1.2 mg/dL    ALT 9 (L) 10 - 52 U/L   Lactate   Result Value Ref Range    Lactate 2.7 (H) 0.4 - 2.0 mmol/L   Lactate   Result Value Ref Range    Lactate 1.8 0.4 - 2.0 mmol/L   POCT GLUCOSE   Result Value Ref Range    POCT Glucose 118 (H) 74 - 99 mg/dL   Type And Screen   Result Value Ref Range    ABO TYPE A     Rh TYPE POS     ANTIBODY SCREEN NEG    CBC and Auto Differential   Result Value Ref Range    WBC 16.0 (H) 4.4 - 11.3 x10*3/uL    nRBC 0.0 0.0 - 0.0 /100 WBCs    RBC 3.49 (L) 4.50 - 5.90 x10*6/uL    Hemoglobin 9.1 (L) 13.5 - 17.5 g/dL    Hematocrit 30.5 (L) 41.0 - 52.0 %    MCV 87 80 - 100 fL    MCH 26.1 26.0 - 34.0 pg    MCHC 29.8 (L) 32.0 - 36.0 g/dL    RDW 16.8 (H) 11.5 - 14.5 %    Platelets 222 150 -  450 x10*3/uL    Immature Granulocytes %, Automated 6.8 (H) 0.0 - 0.9 %    Immature Granulocytes Absolute, Automated 1.09 (H) 0.00 - 0.50 x10*3/uL   Renal Function Panel   Result Value Ref Range    Glucose 134 (H) 74 - 99 mg/dL    Sodium 136 136 - 145 mmol/L    Potassium 5.1 3.5 - 5.3 mmol/L    Chloride 106 98 - 107 mmol/L    Bicarbonate 24 21 - 32 mmol/L    Anion Gap 11 10 - 20 mmol/L    Urea Nitrogen 55 (H) 6 - 23 mg/dL    Creatinine 3.03 (H) 0.50 - 1.30 mg/dL    eGFR 21 (L) >60 mL/min/1.73m*2    Calcium 8.4 (L) 8.6 - 10.3 mg/dL    Phosphorus 3.2 2.5 - 4.9 mg/dL    Albumin 2.6 (L) 3.4 - 5.0 g/dL   Manual Differential   Result Value Ref Range    Neutrophils %, Manual 93.0 40.0 - 80.0 %    Bands %, Manual 2.0 0.0 - 5.0 %    Lymphocytes %, Manual 1.0 13.0 - 44.0 %    Monocytes %, Manual 4.0 2.0 - 10.0 %    Eosinophils %, Manual 0.0 0.0 - 6.0 %    Basophils %, Manual 0.0 0.0 - 2.0 %    Seg Neutrophils Absolute, Manual 14.88 (H) 1.60 - 5.00 x10*3/uL    Bands Absolute, Manual 0.32 0.00 - 0.50 x10*3/uL    Lymphocytes Absolute, Manual 0.16 (L) 0.80 - 3.00 x10*3/uL    Monocytes Absolute, Manual 0.64 0.05 - 0.80 x10*3/uL    Eosinophils Absolute, Manual 0.00 0.00 - 0.40 x10*3/uL    Basophils Absolute, Manual 0.00 0.00 - 0.10 x10*3/uL    Total Cells Counted 100     Neutrophils Absolute, Manual 15.20 (H) 1.60 - 5.50 x10*3/uL    RBC Morphology See Below     Ovalocytes Few    POCT GLUCOSE   Result Value Ref Range    POCT Glucose 111 (H) 74 - 99 mg/dL   POCT GLUCOSE   Result Value Ref Range    POCT Glucose 128 (H) 74 - 99 mg/dL     Electrocardiogram, 12-lead PRN ACS symptoms    Result Date: 1/3/2025  Atrial fibrillation with rapid ventricular response Nonspecific ST and T wave abnormality Abnormal ECG When compared with ECG of 02-JAN-2025 05:37, (unconfirmed) Atrial fibrillation has replaced Sinus rhythm ST now depressed in Inferior leads Nonspecific T wave abnormality now evident in Anterolateral leads    CT chest abdomen pelvis  wo IV contrast    Result Date: 1/2/2025  Interpreted By:  Lucia Desai, STUDY: CT CHEST ABDOMEN PELVIS WO CONTRAST;  1/2/2025 9:55 am   INDICATION: Signs/Symptoms:Severe JAMAL in setting of malignant neoplasm of right kidney.   COMPARISON: 11/30/2024   ACCESSION NUMBER(S): [id number]   ORDERING CLINICIAN: MARYA MURRAY   TECHNIQUE: CT of the chest, abdomen, and pelvis was performed. Contiguous axial images were obtained at 3 mm slice thickness through the chest, abdomen and pelvis. Coronal and sagittal reconstructions at 3 mm slice thickness were performed. No oral, no intravenous contrast.   FINDINGS: Please note that the study is limited without intravenous contrast.   CHEST:   LUNG/PLEURA/LARGE AIRWAYS: There is no infiltrate or pleural fluid. There are multiple pulmonary nodules consistent with metastatic disease.   VESSELS: The thoracic vessels are unremarkable.   HEART: The heart is unremarkable.   MEDIASTINUM AND MIKEL: There is no hilar or mediastinal adenopathy.   CHEST WALL AND LOWER NECK: The chest wall is unremarkable. There is no abnormality of the lower neck.   ABDOMEN:   LIVER: There is no hepatic mass.   BILE DUCTS: There is no intrahepatic, common hepatic or common bile ductal dilatation.   GALLBLADDER: The gallbladder is unremarkable.   PANCREAS: There is no abnormality of the pancreas.   SPLEEN: The spleen is unremarkable. There is no splenic mass. There is no splenomegaly   ADRENAL GLANDS: There is a 1.4 cm right adrenal nodule, nonspecific.   KIDNEYS AND URETERS:. The left kidney is small and atrophic with cortical thinning. There are bilateral renal masses. Specifically there is a known necrotic mass in the upper pole of the right kidney, unchanged. This is not as well delineated secondary to lack of intravenous contrast. This measures 7.4 x 6.1 cm. There are bilateral renal cysts. There are bilateral nonobstructing intrarenal calculi. There is a staghorn type calculus in the left kidney.    PELVIS:   BLADDER: The bladder is unremarkable.   REPRODUCTIVE ORGANS: There is no abnormality of the reproductive organs.   BOWEL: There is no bowel wall thickening, dilatation or obstruction. There is a moderate amount of stool throughout the colon particularly in the rectum. There is nonspecific presacral edema.   VESSELS: The abdominal and pelvic vessels are unremarkable.   PERITONEUM/RETROPERITONEUM/LYMPH NODES: There is no retroperitoneal or pelvic adenopathy.  There is no ascites.   ABDOMINAL WALL: The abdominal wall is unremarkable.   BONES AND SOFT TISSUES: There is a lytic lesion involving L4 with soft tissue mass in bony destruction. This was seen on 11/30/2024. There is expansion of the vertebral body. There is a lytic lesion involving the superior endplate of L5 seen previously.   There is no soft tissue abnormality.       CHEST: 1.  History of right renal cell carcinoma. 2. Multiple pulmonary nodules consistent with metastatic disease.   ABDOMEN AND PELVIS: 1.  History upright or renal cell carcinoma. There is an unchanged necrotic mass involving the upper pole of the right kidney. 2. Bilateral renal cysts. 3. Bilateral nonobstructing intrarenal calculi with staghorn type calculi in the left kidney. There is no renal obstruction. 4. Complete bony destruction of L4 with a soft tissue mass with expansion of the vertebral body. Lytic lesion involving the superior endplate of L5. Findings are consistent with bony metastases.   MACRO: None   Signed by: Lucia Desai 1/2/2025 10:33 AM Dictation workstation:   [id number]    XR chest 1 view    Result Date: 1/2/2025  Interpreted By:  Patrick Arnold, STUDY: XR CHEST 1 VIEW; ;  1/2/2025 8:03 am   INDICATION: Signs/Symptoms:AMS, borderline hypoxia, r/o infiltrate, r/o PTX, r/o pleural effusion.     COMPARISON: 11/26/2024 chest radiograph, 11/30/2024 chest CT   ACCESSION NUMBER(S): [id number]   ORDERING CLINICIAN: MARYA MURRAY   TECHNIQUE: AP erect  portable chest 7:40 a.m.   FINDINGS: The lungs are hypoventilated bilaterally similar to the prior radiograph.   There are questionable nodules in the perihilar regions corresponding to the nodules noted on the CT no pneumonic consolidation or pleural effusion is noted.   Cardiac silhouette size is normal.       No pneumonic consolidations are noted. There questionable perihilar nodules which may correspond to the masses noted on the prior CT.     MACRO: None   Signed by: Patrick Arnold 1/2/2025 8:33 AM Dictation workstation:   [id number]        Assessment/Plan     Ray Mazin is a 74 y.o. male with h/o renal cell carcinoma with mets to the spine (s/p L4 lesion biopsy , adrenal gland, and lung; HTN, HLD, CKD, anemia, CAD on ASA, obesity, PVD, COPD, GRETA, 11/30 p/w BLE weakness, MRI LS  L4 VB lytic lesion with extension to the b/l L4 pedicles, 1/3 p/w hematuria and back pain, CT CAP L4 VB and superior endplate of L5 lytic lesion    Patient is presenting with back pain and hematuria in the setting of known renal mass with mets to the spine, adrenal gland and lung.  Patient was initially seen by orthospine at the main campus on 11/30 for L4 lytic lesion and the recommended biopsy.  Biopsy was not obtained.     On exam, patient has full strength is in all extremities.  However, does have bilateral clonus.  Imaging is notable for L4  vertebral body lytic lesion on CT CAP that was obtained today after presenting with hematuria.  MRI obtained on 11/30 shows L4 vertebral body osteolytic lesion with canal stenosis.     Recs  Recommend transferring patient to [facility] oncology service for comprehensive oncology workup and evaluation.      Patient will need tissue diagnosis, medical oncology and radiation oncology consult and evaluation.  Patient also need MRI of brain with and without contrast, MRI C, T and L-spine with and without contrast.  Per chart review, patient will likely need sedated MRI as prior MRI were not successful as  patient was claustrophobic.  For all these comprehensive workup, patient is more [facility] for [facility].    Regarding lumbar spine pathology, we recommend consulting orthospine as they initially evaluated the patient on 11/30.     No acute neurosurgical interventions needed at this time for the lumbar spine pathology as patient does not have any weakness.     Patient will need RCRI and prognosis for any future surgical interventions.     Patient discussed with attending physician, Dr. Avalos.     Clay Valadez MD

## 2025-01-03 NOTE — DISCHARGE INSTRUCTIONS
Wound care consult/recommendations:     BLE- Chronic venous status ulcers   Irrigate with normal saline or wound cleanser, Pat dry.  Apply lotion to intact skin   Cover weeping areas with Triad Hydrophillic wound dressing ointment   Pad and protect with ABD, Kerlix and paper tape.   Change every day and as needed   Secure with Tubigrip ( Apply from base of toes to 2 finger width below the bend of knee)     Gluteal cleft & buttocks-MASD/Irritant contact dermatitis due to fecal, urinary or dual incontinent.   Cleanse with bath wipes or soap and water. Rinse well and pat dry.   Apply no sting skin barrier (cavilon)   Apply Triad hydrophilic wound dressing ointment to gluteal fold/groin three times a day and as needed.      While in bed patient should only be on one fitted sheet, and one chux. Please, do not use brief while patient is resting in bed. Elevate heels off the bed surface at all times. Turn and reposition at least every 2 hours.

## 2025-01-03 NOTE — CONSULTS
INFECTIOUS DISEASE INPATIENT INITIAL CONSULTATION    Referred By: Mariano Grant    Reason For Consult:  Complicated urinary tract infection    HPI:  This is a 74 y.o. male with PMH of RCC, CKD who presented from NH with hematuria.    Having back pain. May have been on PO abx prior to admission. No fever/chills. No chest pain, n/v/c/d.    Fever 103.4 here. Tachy, Hypotensive. WBC 18, 12 ,16. Given Bactrim and IV CTX. On IV Cefepime now. Blood cx with Proteus. Urine cx with contamination. Lactate was 2.7 and improved to 1.8.      Allergies:  Clindamycin and Amoxicillin-pot clavulanate     Vitals (Last 24 Hours):  Heart Rate:  []   Temp:  [36 °C (96.8 °F)-39.7 °C (103.4 °F)]   Resp:  [18-43]   BP: ()/()   SpO2:  [84 %-100 %]      PHYSICAL EXAM:  Gen - NAD  Heart - RRR, no murmurs  Lungs - clear bilaterally, no wheezing  Abd - soft, no ttp, BS present   - Cho present  Skin - no rash    MEDS:    Current Facility-Administered Medications:     acetaminophen (Tylenol) tablet 650 mg, 650 mg, oral, q4h PRN, 650 mg at 01/03/25 0856 **OR** acetaminophen (Tylenol) oral liquid 650 mg, 650 mg, oral, q4h PRN **OR** acetaminophen (Tylenol) suppository 650 mg, 650 mg, rectal, q4h PRN, Comfort GoldmanD    [Held by provider] aspirin EC tablet 81 mg, 81 mg, oral, Daily, DIVINE Chase    cefepime (Maxipime) 2 g in dextrose 5%  mL, 2 g, intravenous, q12h, DIVINE Chase, Stopped at 01/03/25 1226    famotidine (Pepcid) tablet 20 mg, 20 mg, oral, BID, DIVINE Chase, 20 mg at 01/03/25 0856    ferrous sulfate (325 mg ferrous sulfate) tablet 325 mg, 65 mg of iron, oral, Daily, DIVINE Chase, 325 mg at 01/02/25 2035    [Held by provider] heparin (porcine) injection 5,000 Units, 5,000 Units, subcutaneous, q8h, DIVINE Chase    HYDROmorphone PF (Dilaudid) injection 0.2 mg, 0.2 mg, intravenous, q4h PRN, DIVINE Chase, 0.2 mg at 01/02/25 2035    lactated Ringer's  infusion, 75 mL/hr, intravenous, Continuous, DIVINE Chase, Last Rate: 75 mL/hr at 01/03/25 0855, 75 mL/hr at 01/03/25 0855    [Held by provider] losartan (Cozaar) tablet 50 mg, 50 mg, oral, Daily, DIVINE Chase    ondansetron (Zofran) injection 4 mg, 4 mg, intravenous, q4h PRN, HUSSEIN Gannon-CNP    polyethylene glycol (Glycolax, Miralax) packet 17 g, 17 g, oral, Daily PRN, Jeremy Carreonfortunato, DO, 17 g at 01/03/25 1029    sodium chloride 0.9 % bolus 500 mL, 500 mL, intravenous, Once, DIVINE Chase, 500 mL at 01/03/25 0159    [Held by provider] torsemide (Demadex) tablet 20 mg, 20 mg, oral, Daily, DIVINE Chase     LABS:  Lab Results   Component Value Date    WBC 16.0 (H) 01/03/2025    HGB 9.1 (L) 01/03/2025    HCT 30.5 (L) 01/03/2025    MCV 87 01/03/2025     01/03/2025      Results from last 72 hours   Lab Units 01/03/25  0613   SODIUM mmol/L 136   POTASSIUM mmol/L 5.1   CHLORIDE mmol/L 106   CO2 mmol/L 24   BUN mg/dL 55*   CREATININE mg/dL 3.03*   GLUCOSE mg/dL 134*   CALCIUM mg/dL 8.4*   ANION GAP mmol/L 11   EGFR mL/min/1.73m*2 21*     Results from last 72 hours   Lab Units 01/03/25  0613 01/02/25  2315   ALK PHOS U/L  --  68   BILIRUBIN TOTAL mg/dL  --  0.5   PROTEIN TOTAL g/dL  --  7.5   ALT U/L  --  9*   AST U/L  --  17   ALBUMIN g/dL 2.6* 2.8*     Estimated Creatinine Clearance: 30 mL/min (A) (by C-G formula based on SCr of 3.03 mg/dL (H)).    IMAGING:  CT C/A/P 1/2  Impression:     CHEST:  1.  History of right renal cell carcinoma.  2. Multiple pulmonary nodules consistent with metastatic disease.      ABDOMEN AND PELVIS:  1.  History upright or renal cell carcinoma. There is an unchanged  necrotic mass involving the upper pole of the right kidney.  2. Bilateral renal cysts.  3. Bilateral nonobstructing intrarenal calculi with staghorn type  calculi in the left kidney. There is no renal obstruction.  4. Complete bony destruction of L4 with a soft tissue  mass with  expansion of the vertebral body. Lytic lesion involving the superior  endplate of L5. Findings are consistent with bony metastases.       ASSESSMENT/PLAN:    Sepsis with end organ dysfunction (other - lactate elevation)  Proteus Bacteremia  Complicated UTI  CKD - CrCl about 30  Abx Allergies - Clindamycin, Amox/Clav. Limits abx options.    IV Cefepime renally dosed. Awaiting sensis on Proteus isolate.    Monitoring for adverse effects of abx such as rash/itching/diarrhea.    Will follow peripherally over the weekend. Please call Dr. Davis with questions. Thanks!    Matthew Barbosa MD  [facility]  Office #[phone]

## 2025-01-03 NOTE — NURSING NOTE
Nurse started shift and di rounds with pt, pain medication was given after assessment was done with night time medication, nurse did have to help pt place medication in mouth, at start of shift pt was noted to be A&O 1-2, per report A&0 status was unknown. About two hours later another floor nurse informed this nurse that pt stated that he was cold, when this nurse entered the room pt was shaking and sweating vitals done at that time showed a temp of 103.4 pt urine was bright red ( per notes pt did have blood in urine with admit.) pt was warm but cold to touch between his enter legs, /70 heart rate 74 RR 30. Rapid was called after this assessment was done, during rapid pt was given APA suppository while given medication pt was trned on his right side and had a emesis x1 iv zofran was given, lab work was collected and a new ATB was started d/t the possibility of sepsis. Nurse gave IV ATB. Vitals obtain after rapid showed that pt blood pressure was now hypotensive with a reading of 84/37 recheck was done twice, legs placed above head and CNP was updated 500ml bolus of NS was ordered x2 dose, both was given and pt remain clammy to touch () and reading continue to show hypotensive results. Rapid nurse and CNP was updated, blood work showed H&H or 9.9/32.4 CNP ordered type and Screen which was collected and sent off. Pt was transferred to SDU after.

## 2025-01-04 ENCOUNTER — OFFICE VISIT (OUTPATIENT)
Dept: HEMATOLOGY/ONCOLOGY | Facility: HOSPITAL | Age: 75
DRG: 456 | End: 2025-01-04
Payer: MEDICARE

## 2025-01-04 ENCOUNTER — HOSPITAL ENCOUNTER (INPATIENT)
Facility: HOSPITAL | Age: 75
End: 2025-01-04
Attending: INTERNAL MEDICINE | Admitting: INTERNAL MEDICINE
Payer: MEDICARE

## 2025-01-04 VITALS
OXYGEN SATURATION: 98 % | SYSTOLIC BLOOD PRESSURE: 140 MMHG | DIASTOLIC BLOOD PRESSURE: 60 MMHG | WEIGHT: 274.7 LBS | BODY MASS INDEX: 35.25 KG/M2 | HEART RATE: 77 BPM | TEMPERATURE: 98.6 F | RESPIRATION RATE: 19 BRPM | HEIGHT: 74 IN

## 2025-01-04 DIAGNOSIS — R26.81 UNSTEADY GAIT: ICD-10-CM

## 2025-01-04 DIAGNOSIS — I63.9 CEREBRAL INFARCTION, UNSPECIFIED: ICD-10-CM

## 2025-01-04 DIAGNOSIS — M89.9 LESION OF LUMBAR SPINE: ICD-10-CM

## 2025-01-04 DIAGNOSIS — Z86.73 STROKE OCCURRING WITHIN LAST MONTH: ICD-10-CM

## 2025-01-04 DIAGNOSIS — N17.9 AKI (ACUTE KIDNEY INJURY) (CMS-HCC): Primary | ICD-10-CM

## 2025-01-04 DIAGNOSIS — I13.10 HYPERTENSIVE HEART AND CHRONIC KIDNEY DISEASE WITHOUT HEART FAILURE, WITH STAGE 1 THROUGH STAGE 4 CHRONIC KIDNEY DISEASE, OR UNSPECIFIED CHRONIC KIDNEY DISEASE: ICD-10-CM

## 2025-01-04 LAB
ANION GAP SERPL CALC-SCNC: 10 MMOL/L (ref 10–20)
APPEARANCE UR: ABNORMAL
BILIRUB UR STRIP.AUTO-MCNC: NEGATIVE MG/DL
BUN SERPL-MCNC: 46 MG/DL (ref 6–23)
CALCIUM SERPL-MCNC: 8 MG/DL (ref 8.6–10.3)
CHLORIDE SERPL-SCNC: 106 MMOL/L (ref 98–107)
CHLORIDE UR-SCNC: 54 MMOL/L
CHLORIDE/CREATININE (MMOL/G) IN URINE: 71 MMOL/G CREAT (ref 23–275)
CO2 SERPL-SCNC: 24 MMOL/L (ref 21–32)
COLOR UR: ABNORMAL
CREAT SERPL-MCNC: 2.28 MG/DL (ref 0.5–1.3)
CREAT UR-MCNC: 76.3 MG/DL (ref 20–370)
EGFRCR SERPLBLD CKD-EPI 2021: 29 ML/MIN/1.73M*2
ERYTHROCYTE [DISTWIDTH] IN BLOOD BY AUTOMATED COUNT: 16.6 % (ref 11.5–14.5)
GLUCOSE BLD MANUAL STRIP-MCNC: 89 MG/DL (ref 74–99)
GLUCOSE SERPL-MCNC: 90 MG/DL (ref 74–99)
GLUCOSE UR STRIP.AUTO-MCNC: NORMAL MG/DL
HCT VFR BLD AUTO: 27.6 % (ref 41–52)
HGB BLD-MCNC: 8.3 G/DL (ref 13.5–17.5)
HOLD SPECIMEN: NORMAL
KETONES UR STRIP.AUTO-MCNC: NEGATIVE MG/DL
LEUKOCYTE ESTERASE UR QL STRIP.AUTO: ABNORMAL
MCH RBC QN AUTO: 26.1 PG (ref 26–34)
MCHC RBC AUTO-ENTMCNC: 30.1 G/DL (ref 32–36)
MCV RBC AUTO: 87 FL (ref 80–100)
MUCOUS THREADS #/AREA URNS AUTO: ABNORMAL /LPF
NITRITE UR QL STRIP.AUTO: NEGATIVE
NRBC BLD-RTO: 0 /100 WBCS (ref 0–0)
PH UR STRIP.AUTO: 6 [PH]
PLATELET # BLD AUTO: 174 X10*3/UL (ref 150–450)
POTASSIUM SERPL-SCNC: 4.6 MMOL/L (ref 3.5–5.3)
POTASSIUM UR-SCNC: 26 MMOL/L
POTASSIUM/CREAT UR-RTO: 34 MMOL/G CREAT
PROT UR STRIP.AUTO-MCNC: ABNORMAL MG/DL
RBC # BLD AUTO: 3.18 X10*6/UL (ref 4.5–5.9)
RBC # UR STRIP.AUTO: ABNORMAL /UL
RBC #/AREA URNS AUTO: >20 /HPF
SODIUM SERPL-SCNC: 135 MMOL/L (ref 136–145)
SODIUM UR-SCNC: 61 MMOL/L
SODIUM/CREAT UR-RTO: 80 MMOL/G CREAT
SP GR UR STRIP.AUTO: 1.02
UROBILINOGEN UR STRIP.AUTO-MCNC: NORMAL MG/DL
WBC # BLD AUTO: 7.7 X10*3/UL (ref 4.4–11.3)
WBC #/AREA URNS AUTO: >50 /HPF

## 2025-01-04 PROCEDURE — 81001 URINALYSIS AUTO W/SCOPE: CPT

## 2025-01-04 PROCEDURE — 93010 ELECTROCARDIOGRAM REPORT: CPT | Performed by: INTERNAL MEDICINE

## 2025-01-04 PROCEDURE — 82947 ASSAY GLUCOSE BLOOD QUANT: CPT

## 2025-01-04 PROCEDURE — 99223 1ST HOSP IP/OBS HIGH 75: CPT

## 2025-01-04 PROCEDURE — 2500000004 HC RX 250 GENERAL PHARMACY W/ HCPCS (ALT 636 FOR OP/ED): Mod: JZ,TB

## 2025-01-04 PROCEDURE — 1170000001 HC PRIVATE ONCOLOGY ROOM DAILY

## 2025-01-04 PROCEDURE — 2500000001 HC RX 250 WO HCPCS SELF ADMINISTERED DRUGS (ALT 637 FOR MEDICARE OP)

## 2025-01-04 PROCEDURE — 80048 BASIC METABOLIC PNL TOTAL CA: CPT

## 2025-01-04 PROCEDURE — 87086 URINE CULTURE/COLONY COUNT: CPT

## 2025-01-04 PROCEDURE — 93005 ELECTROCARDIOGRAM TRACING: CPT

## 2025-01-04 PROCEDURE — 82436 ASSAY OF URINE CHLORIDE: CPT

## 2025-01-04 PROCEDURE — 2500000001 HC RX 250 WO HCPCS SELF ADMINISTERED DRUGS (ALT 637 FOR MEDICARE OP): Performed by: NURSE PRACTITIONER

## 2025-01-04 PROCEDURE — 85027 COMPLETE CBC AUTOMATED: CPT

## 2025-01-04 PROCEDURE — 82570 ASSAY OF URINE CREATININE: CPT

## 2025-01-04 PROCEDURE — 36415 COLL VENOUS BLD VENIPUNCTURE: CPT

## 2025-01-04 RX ORDER — ASPIRIN 81 MG/1
81 TABLET ORAL DAILY
Status: DISCONTINUED | OUTPATIENT
Start: 2025-01-05 | End: 2025-01-24 | Stop reason: HOSPADM

## 2025-01-04 RX ORDER — SENNOSIDES 8.6 MG/1
1 TABLET ORAL NIGHTLY
Status: DISCONTINUED | OUTPATIENT
Start: 2025-01-04 | End: 2025-01-18

## 2025-01-04 RX ORDER — ACETAMINOPHEN 325 MG/1
975 TABLET ORAL 3 TIMES DAILY
Status: DISCONTINUED | OUTPATIENT
Start: 2025-01-04 | End: 2025-01-24 | Stop reason: HOSPADM

## 2025-01-04 RX ORDER — HEPARIN SODIUM 5000 [USP'U]/ML
5000 INJECTION, SOLUTION INTRAVENOUS; SUBCUTANEOUS EVERY 8 HOURS
Status: ACTIVE | OUTPATIENT
Start: 2025-01-04 | End: 2025-01-06

## 2025-01-04 RX ORDER — ONDANSETRON HYDROCHLORIDE 2 MG/ML
4 INJECTION, SOLUTION INTRAVENOUS EVERY 8 HOURS PRN
Status: COMPLETED | OUTPATIENT
Start: 2025-01-04 | End: 2025-01-05

## 2025-01-04 RX ORDER — HYDROMORPHONE HYDROCHLORIDE 1 MG/ML
0.3 INJECTION, SOLUTION INTRAMUSCULAR; INTRAVENOUS; SUBCUTANEOUS EVERY 4 HOURS PRN
Status: DISCONTINUED | OUTPATIENT
Start: 2025-01-04 | End: 2025-01-20

## 2025-01-04 RX ORDER — CEFEPIME 1 G/50ML
1 INJECTION, SOLUTION INTRAVENOUS EVERY 12 HOURS
Status: DISCONTINUED | OUTPATIENT
Start: 2025-01-04 | End: 2025-01-06

## 2025-01-04 RX ORDER — POLYETHYLENE GLYCOL 3350 17 G/17G
17 POWDER, FOR SOLUTION ORAL DAILY
Status: DISCONTINUED | OUTPATIENT
Start: 2025-01-05 | End: 2025-01-18

## 2025-01-04 RX ORDER — OXYCODONE HYDROCHLORIDE 10 MG/1
10 TABLET ORAL EVERY 4 HOURS PRN
Status: DISCONTINUED | OUTPATIENT
Start: 2025-01-04 | End: 2025-01-24 | Stop reason: HOSPADM

## 2025-01-04 RX ORDER — POLYETHYLENE GLYCOL 3350 17 G/17G
17 POWDER, FOR SOLUTION ORAL ONCE
Status: COMPLETED | OUTPATIENT
Start: 2025-01-04 | End: 2025-01-04

## 2025-01-04 RX ORDER — OXYCODONE HYDROCHLORIDE 5 MG/1
5 TABLET ORAL EVERY 4 HOURS PRN
Status: DISCONTINUED | OUTPATIENT
Start: 2025-01-04 | End: 2025-01-04

## 2025-01-04 RX ORDER — OXYCODONE HYDROCHLORIDE 5 MG/1
5 TABLET ORAL EVERY 4 HOURS PRN
Status: DISCONTINUED | OUTPATIENT
Start: 2025-01-04 | End: 2025-01-24 | Stop reason: HOSPADM

## 2025-01-04 RX ADMIN — ACETAMINOPHEN 650 MG: 325 TABLET, FILM COATED ORAL at 09:06

## 2025-01-04 RX ADMIN — POLYETHYLENE GLYCOL 3350 17 G: 17 POWDER, FOR SOLUTION ORAL at 14:39

## 2025-01-04 RX ADMIN — CEFEPIME 1 G: 1 INJECTION, SOLUTION INTRAVENOUS at 15:16

## 2025-01-04 RX ADMIN — ACETAMINOPHEN 650 MG: 325 TABLET, FILM COATED ORAL at 03:02

## 2025-01-04 RX ADMIN — SENNOSIDES 8.6 MG: 8.6 TABLET, FILM COATED ORAL at 20:27

## 2025-01-04 RX ADMIN — ONDANSETRON 4 MG: 2 INJECTION INTRAMUSCULAR; INTRAVENOUS at 13:37

## 2025-01-04 RX ADMIN — FAMOTIDINE 20 MG: 20 TABLET, FILM COATED ORAL at 09:02

## 2025-01-04 RX ADMIN — ACETAMINOPHEN 975 MG: 325 TABLET, FILM COATED ORAL at 20:26

## 2025-01-04 RX ADMIN — ACETAMINOPHEN 975 MG: 325 TABLET, FILM COATED ORAL at 14:00

## 2025-01-04 SDOH — SOCIAL STABILITY: SOCIAL INSECURITY: DO YOU FEEL UNSAFE GOING BACK TO THE PLACE WHERE YOU ARE LIVING?: NO

## 2025-01-04 SDOH — HEALTH STABILITY: PHYSICAL HEALTH
HOW OFTEN DO YOU NEED TO HAVE SOMEONE HELP YOU WHEN YOU READ INSTRUCTIONS, PAMPHLETS, OR OTHER WRITTEN MATERIAL FROM YOUR DOCTOR OR PHARMACY?: RARELY

## 2025-01-04 SDOH — ECONOMIC STABILITY: FOOD INSECURITY: HOW HARD IS IT FOR YOU TO PAY FOR THE VERY BASICS LIKE FOOD, HOUSING, MEDICAL CARE, AND HEATING?: NOT HARD AT ALL

## 2025-01-04 SDOH — ECONOMIC STABILITY: INCOME INSECURITY: IN THE PAST 12 MONTHS HAS THE ELECTRIC, GAS, OIL, OR WATER COMPANY THREATENED TO SHUT OFF SERVICES IN YOUR HOME?: NO

## 2025-01-04 SDOH — ECONOMIC STABILITY: HOUSING INSECURITY: IN THE LAST 12 MONTHS, WAS THERE A TIME WHEN YOU WERE NOT ABLE TO PAY THE MORTGAGE OR RENT ON TIME?: NO

## 2025-01-04 SDOH — SOCIAL STABILITY: SOCIAL NETWORK
DO YOU BELONG TO ANY CLUBS OR ORGANIZATIONS SUCH AS CHURCH GROUPS, UNIONS, FRATERNAL OR ATHLETIC GROUPS, OR SCHOOL GROUPS?: NO

## 2025-01-04 SDOH — SOCIAL STABILITY: SOCIAL INSECURITY: ARE YOU MARRIED, WIDOWED, DIVORCED, SEPARATED, NEVER MARRIED, OR LIVING WITH A PARTNER?: NEVER MARRIED

## 2025-01-04 SDOH — SOCIAL STABILITY: SOCIAL NETWORK: HOW OFTEN DO YOU ATTEND CHURCH OR RELIGIOUS SERVICES?: NEVER

## 2025-01-04 SDOH — SOCIAL STABILITY: SOCIAL NETWORK: HOW OFTEN DO YOU GET TOGETHER WITH FRIENDS OR RELATIVES?: TWICE A WEEK

## 2025-01-04 SDOH — SOCIAL STABILITY: SOCIAL NETWORK
IN A TYPICAL WEEK, HOW MANY TIMES DO YOU TALK ON THE PHONE WITH FAMILY, FRIENDS, OR NEIGHBORS?: MORE THAN THREE TIMES A WEEK

## 2025-01-04 SDOH — SOCIAL STABILITY: SOCIAL NETWORK: HOW OFTEN DO YOU ATTEND MEETINGS OF THE CLUBS OR ORGANIZATIONS YOU BELONG TO?: NEVER

## 2025-01-04 SDOH — HEALTH STABILITY: MENTAL HEALTH
DO YOU FEEL STRESS - TENSE, RESTLESS, NERVOUS, OR ANXIOUS, OR UNABLE TO SLEEP AT NIGHT BECAUSE YOUR MIND IS TROUBLED ALL THE TIME - THESE DAYS?: TO SOME EXTENT

## 2025-01-04 SDOH — SOCIAL STABILITY: SOCIAL INSECURITY: ARE YOU OR HAVE YOU BEEN THREATENED OR ABUSED PHYSICALLY, EMOTIONALLY, OR SEXUALLY BY ANYONE?: NO

## 2025-01-04 SDOH — SOCIAL STABILITY: SOCIAL INSECURITY: DOES ANYONE TRY TO KEEP YOU FROM HAVING/CONTACTING OTHER FRIENDS OR DOING THINGS OUTSIDE YOUR HOME?: NO

## 2025-01-04 SDOH — ECONOMIC STABILITY: HOUSING INSECURITY: IN THE PAST 12 MONTHS, HOW MANY TIMES HAVE YOU MOVED WHERE YOU WERE LIVING?: 1

## 2025-01-04 SDOH — ECONOMIC STABILITY: HOUSING INSECURITY: AT ANY TIME IN THE PAST 12 MONTHS, WERE YOU HOMELESS OR LIVING IN A SHELTER (INCLUDING NOW)?: NO

## 2025-01-04 SDOH — HEALTH STABILITY: PHYSICAL HEALTH: ON AVERAGE, HOW MANY MINUTES DO YOU ENGAGE IN EXERCISE AT THIS LEVEL?: 0 MIN

## 2025-01-04 SDOH — SOCIAL STABILITY: SOCIAL INSECURITY: DO YOU FEEL ANYONE HAS EXPLOITED OR TAKEN ADVANTAGE OF YOU FINANCIALLY OR OF YOUR PERSONAL PROPERTY?: NO

## 2025-01-04 SDOH — SOCIAL STABILITY: SOCIAL INSECURITY: ARE THERE ANY APPARENT SIGNS OF INJURIES/BEHAVIORS THAT COULD BE RELATED TO ABUSE/NEGLECT?: NO

## 2025-01-04 SDOH — SOCIAL STABILITY: SOCIAL INSECURITY: HAVE YOU HAD THOUGHTS OF HARMING ANYONE ELSE?: NO

## 2025-01-04 SDOH — HEALTH STABILITY: PHYSICAL HEALTH: ON AVERAGE, HOW MANY DAYS PER WEEK DO YOU ENGAGE IN MODERATE TO STRENUOUS EXERCISE (LIKE A BRISK WALK)?: 0 DAYS

## 2025-01-04 SDOH — SOCIAL STABILITY: SOCIAL INSECURITY: WERE YOU ABLE TO COMPLETE ALL THE BEHAVIORAL HEALTH SCREENINGS?: YES

## 2025-01-04 SDOH — SOCIAL STABILITY: SOCIAL INSECURITY: ABUSE: ADULT

## 2025-01-04 SDOH — SOCIAL STABILITY: SOCIAL INSECURITY: HAVE YOU HAD ANY THOUGHTS OF HARMING ANYONE ELSE?: NO

## 2025-01-04 SDOH — SOCIAL STABILITY: SOCIAL INSECURITY: HAS ANYONE EVER THREATENED TO HURT YOUR FAMILY OR YOUR PETS?: NO

## 2025-01-04 ASSESSMENT — ACTIVITIES OF DAILY LIVING (ADL)
PATIENT'S MEMORY ADEQUATE TO SAFELY COMPLETE DAILY ACTIVITIES?: YES
GROOMING: INDEPENDENT
FEEDING YOURSELF: INDEPENDENT
ASSISTIVE_DEVICE: WALKER
JUDGMENT_ADEQUATE_SAFELY_COMPLETE_DAILY_ACTIVITIES: YES
LACK_OF_TRANSPORTATION: NO
DRESSING YOURSELF: NEEDS ASSISTANCE
HEARING - LEFT EAR: FUNCTIONAL
ADEQUATE_TO_COMPLETE_ADL: YES
HEARING - RIGHT EAR: FUNCTIONAL
BATHING: NEEDS ASSISTANCE
WALKS IN HOME: NEEDS ASSISTANCE
TOILETING: NEEDS ASSISTANCE

## 2025-01-04 ASSESSMENT — LIFESTYLE VARIABLES
HAVE YOU OR SOMEONE ELSE BEEN INJURED AS A RESULT OF YOUR DRINKING: NO
AUDIT TOTAL SCORE: 4
AUDIT-C TOTAL SCORE: 4
SUBSTANCE_ABUSE_PAST_12_MONTHS: NO
HOW OFTEN DURING THE LAST YEAR HAVE YOU NEEDED AN ALCOHOLIC DRINK FIRST THING IN THE MORNING TO GET YOURSELF GOING AFTER A NIGHT OF HEAVY DRINKING: NEVER
HOW OFTEN DO YOU HAVE 6 OR MORE DRINKS ON ONE OCCASION: NEVER
HOW OFTEN DURING THE LAST YEAR HAVE YOU FOUND THAT YOU WERE NOT ABLE TO STOP DRINKING ONCE YOU HAD STARTED: NEVER
SKIP TO QUESTIONS 9-10: 1
HOW OFTEN DURING THE LAST YEAR HAVE YOU HAD A FEELING OF GUILT OR REMORSE AFTER DRINKING: NEVER
HOW OFTEN DURING THE LAST YEAR HAVE YOU FAILED TO DO WHAT WAS NORMALLY EXPECTED FROM YOU BECAUSE OF DRINKING: NEVER
AUDIT TOTAL SCORE: 0
HOW OFTEN DURING THE LAST YEAR HAVE YOU BEEN UNABLE TO REMEMBER WHAT HAPPENED THE NIGHT BEFORE BECAUSE YOU HAD BEEN DRINKING: NEVER
HOW MANY STANDARD DRINKS CONTAINING ALCOHOL DO YOU HAVE ON A TYPICAL DAY: 1 OR 2
AUDIT-C TOTAL SCORE: 4
HOW OFTEN DO YOU HAVE A DRINK CONTAINING ALCOHOL: 4 OR MORE TIMES A WEEK
PRESCIPTION_ABUSE_PAST_12_MONTHS: NO
HAS A RELATIVE, FRIEND, DOCTOR, OR ANOTHER HEALTH PROFESSIONAL EXPRESSED CONCERN ABOUT YOUR DRINKING OR SUGGESTED YOU CUT DOWN: NO

## 2025-01-04 ASSESSMENT — COGNITIVE AND FUNCTIONAL STATUS - GENERAL
WALKING IN HOSPITAL ROOM: A LOT
DRESSING REGULAR LOWER BODY CLOTHING: A LITTLE
CLIMB 3 TO 5 STEPS WITH RAILING: TOTAL
DAILY ACTIVITIY SCORE: 19
DRESSING REGULAR UPPER BODY CLOTHING: A LITTLE
PATIENT BASELINE BEDBOUND: NO
TOILETING: A LITTLE
PERSONAL GROOMING: A LITTLE
HELP NEEDED FOR BATHING: A LITTLE
MOVING FROM LYING ON BACK TO SITTING ON SIDE OF FLAT BED WITH BEDRAILS: A LITTLE
MOVING TO AND FROM BED TO CHAIR: A LOT
STANDING UP FROM CHAIR USING ARMS: A LOT
TURNING FROM BACK TO SIDE WHILE IN FLAT BAD: A LITTLE
MOBILITY SCORE: 13

## 2025-01-04 ASSESSMENT — PAIN DESCRIPTION - LOCATION: LOCATION: FOOT

## 2025-01-04 ASSESSMENT — PATIENT HEALTH QUESTIONNAIRE - PHQ9
1. LITTLE INTEREST OR PLEASURE IN DOING THINGS: SEVERAL DAYS
SUM OF ALL RESPONSES TO PHQ9 QUESTIONS 1 & 2: 2
2. FEELING DOWN, DEPRESSED OR HOPELESS: SEVERAL DAYS

## 2025-01-04 ASSESSMENT — PAIN DESCRIPTION - ORIENTATION: ORIENTATION: LEFT

## 2025-01-04 ASSESSMENT — PAIN SCALES - GENERAL
PAINLEVEL_OUTOF10: 3
PAINLEVEL_OUTOF10: 3
PAINLEVEL_OUTOF10: 2
PAINLEVEL_OUTOF10: 4
PAINLEVEL_OUTOF10: 4
PAINLEVEL_OUTOF10: 0 - NO PAIN
PAINLEVEL_OUTOF10: 2

## 2025-01-04 ASSESSMENT — PAIN - FUNCTIONAL ASSESSMENT
PAIN_FUNCTIONAL_ASSESSMENT: 0-10

## 2025-01-04 NOTE — NURSING NOTE
Rapid Response Nurse Note:     Matt Retana is a 74 y.o. male on day 2 of admission presenting with JAMAL (acute kidney injury) (CMS-HCC).    Rounded on patient due to recent rapid response, reviewed patient's chart and checked with bedside nurse for any questions or concerns. There were none voiced at this time.    The patient's current RADAR score is 2    /61 (BP Location: Left arm, Patient Position: Lying)   Pulse 71   Temp 36.3 °C (97.3 °F) (Temporal)   Resp 20   Wt 125 kg (274 lb 11.2 oz)   SpO2 94%     No signs/symptoms of distress at this time. Bedside nurse notified to escalate concerns if patient condition changes      Mera Mcnamara RN

## 2025-01-04 NOTE — Clinical Note
Patient in Neuro Angio 3 for spinal angiogram and tumor embolization under care of anesthesia. Plan is for MAC anesthesia. See anesthesia flowsheet for vitals and event notes.

## 2025-01-04 NOTE — CARE PLAN
The patient's goals for the shift include      The clinical goals for the shift include patuient to remain fall and injury free      Problem: Skin  Goal: Decreased wound size/increased tissue granulation at next dressing change  Outcome: Progressing  Flowsheets (Taken 1/4/2025 1608)  Decreased wound size/increased tissue granulation at next dressing change: Promote sleep for wound healing  Goal: Participates in plan/prevention/treatment measures  Outcome: Progressing  Goal: Prevent/manage excess moisture  Outcome: Progressing  Flowsheets (Taken 1/4/2025 1608)  Prevent/manage excess moisture: Follow provider orders for dressing changes  Goal: Prevent/minimize sheer/friction injuries  Outcome: Progressing  Goal: Promote/optimize nutrition  Outcome: Progressing  Goal: Promote skin healing  Outcome: Progressing     Problem: Fall/Injury  Goal: Not fall by end of shift  Outcome: Progressing  Goal: Be free from injury by end of the shift  Outcome: Progressing  Goal: Verbalize understanding of personal risk factors for fall in the hospital  Outcome: Progressing  Goal: Verbalize understanding of risk factor reduction measures to prevent injury from fall in the home  Outcome: Progressing  Goal: Use assistive devices by end of the shift  Outcome: Progressing  Goal: Pace activities to prevent fatigue by end of the shift  Outcome: Progressing     Problem: Pain - Adult  Goal: Verbalizes/displays adequate comfort level or baseline comfort level  Outcome: Progressing     Problem: Safety - Adult  Goal: Free from fall injury  Outcome: Progressing     Problem: Discharge Planning  Goal: Discharge to home or other facility with appropriate resources  Outcome: Progressing     Problem: Chronic Conditions and Co-morbidities  Goal: Patient's chronic conditions and co-morbidity symptoms are monitored and maintained or improved  Outcome: Progressing

## 2025-01-04 NOTE — DISCHARGE SUMMARY
Discharge Diagnosis  JAMAL (acute kidney injury) (CMS-[facility]), renal mass, lumbar spine lesion    Issues Requiring Follow-Up  Follow-up with PCP    Discharge Meds     Medication List      START taking these medications     cefepime IV; Commonly known as: Maxipime; Infuse 100 mL (2 g) over 0.5   hours into a venous catheter every 12 hours.     CHANGE how you take these medications     vitamin D3-folic acid 2,500 unit- 1 mg tablet; Take 1 mg by mouth once   every 24 hours.     CONTINUE taking these medications     * acetaminophen 650 mg ER tablet; Commonly known as: Tylenol 8 HOUR   * acetaminophen 325 mg tablet; Commonly known as: Tylenol   aspirin 81 mg EC tablet   famotidine 20 mg tablet; Commonly known as: Pepcid   ferrous sulfate (325 mg ferrous sulfate) tablet; Commonly known as:   FeroSuL; Take 1 tablet (325 mg) by mouth once every 24 hours.   Gentle Laxative (bisacodyl) 10 mg suppository; Generic drug: bisacodyl   losartan 50 mg tablet; Commonly known as: Cozaar; TAKE 1 TABLET BY MOUTH   ONCE  DAILY   magnesium hydroxide 400 mg/5 mL suspension; Commonly known as: Milk of   Magnesia   magnesium oxide 400 mg magnesium capsule; Take 1 capsule (400 mg) by   mouth once daily.   mineral oil enema   potassium chloride CR 10 mEq ER tablet; Commonly known as: Klor-Con;   TAKE 1 TABLET BY MOUTH ONCE  DAILY DO NOT CRUSH, CHEW, OR  SPLIT   torsemide 20 mg tablet; Commonly known as: Demadex; TAKE 1 TABLET BY   MOUTH ONCE  DAILY   traMADol 50 mg tablet; Commonly known as: Ultram  * This list has 2 medication(s) that are the same as other medications   prescribed for you. Read the directions carefully, and ask your doctor or   other care provider to review them with you.     STOP taking these medications     clotrimazole-betamethasone cream; Commonly known as: Lotrisone   zinc oxide 10 % cream       Test Results Pending At Discharge  Pending Labs       Order Current Status    Extra Urine Gray Tube Collected (01/02/25 7650)     Urinalysis with Reflex Culture and Microscopic In process    Blood Culture Preliminary result    Blood Culture Preliminary result            Hospital Course   Matt Retana is a 74 y.o. male with a past medical history of recently found right renal mass, GERD, anemia, obesity, PVD, CKD recently admitted in November 2024 as well as December 2024 for weakness, patient was also found to have a lesion evolving L4 vertebral body, mets to adrenal glands,  lung as well, was discharged to skilled nursing facility from there with plans to follow-up with heme-onc as well as to get an IR guided biopsy of the L4 lesion and outpatient MRIs of the brain and spine with and without contrast.       Patient presents today as staff noticed blood in patient's urine as well as he had back pain.  Patient reports pain is in his right lower back, and has been going on over the past month.  Due to this patient came to the emergency department.  WBC was noted to be 18.3, hemoglobin 11.4, hematocrit 37.4, patient urine was positive for blood, protein, leukocyte Estrace and 3+ bacteria.  Creatinine was noted to be 2.52, his baseline is 1.42.  Patient's potassium was hemolyzed with a repeat of 4.6.  Patient's sister who is primary historian reports that patient was not able to get his biopsy last month as the facility mixed up transport.  She expresses worry that patient may fall to the cracks with his testing as he is very weak and unable to get around easily.     CT chest abdomen pelvis without IV contrast revealed istory of right renal cell carcinoma.  Multiple pulmonary nodules consistent with metastatic disease.  ABDOMEN AND PELVIS:  History upright or renal cell carcinoma. There is an unchanged necrotic mass involving the upper pole of the right kidney.  Bilateral renal cysts.  Bilateral nonobstructing intrarenal calculi with staghorn type calculi in the left kidney. There is no renal obstruction.  Complete bony destruction of L4 with a soft  tissue mass with expansion of the vertebral body. Lytic lesion involving the superior endplate of L5. Findings are consistent with bony metastases.     Patient was admitted for further management, presented with hematuria, urinary tract infection, leukocytosis, and bilateral nonobstructing intrarenal calculi with staghorn type calculi in the left kidney.  Most notably on CT patient showed bony destruction of L4 with soft tissue mass with expression of the vertebral body, lytic lesion at L5 as well.  Patient was recently discharged from [facility] on December 5, 2024 with diagnosis of lumbar spine lesion.  During hospitalization renal mass was found, will lesion in the adrenal gland, lungs, fine concerning with underlying malignancy with metastasis.  Lesion involving L4 vertebral body showed impingement of the thecal sac.  Also imaging shows renal tumor thrombus.  Ortho spine consulted given worsening weakness with spinal lesion recommended IR biopsy of L4. Imaging also notable for new R renal v thrombus likely related to suspected malignancy.  During hospital course, admitting team discussed finding with IR, with plan for outpatient biopsy.  Orthospine team recommended outpatient follow-up with MRI of the C-spine and MRI of the T-spine.  He was discharged to [facility] with appropriate referrals made for heme oncology, and plan IR guided biopsy of L4 vertebral body as well as imaging with MRI of brain and C-spine and T-spine without contrast..    On arrival to the floor or stepdown from ED, patient has episode of tachycardia, and elevated BP, resulting in a rapid response code, and a follow-up evaluation by intensivist.  Clinical picture supported diagnosis of sepsis with endorgan dysfunction, secondary to complicated UTI. ID was consulted, Cefepime IV piggyback initiated pending sensitivity on Proteus isolate.   Urology was consulted, recommended conservative management with IV antibiotic  treatment.   Patient remained stable on hospital day 2, was seen and evaluated by IR initially recommended consulting neurosurgery, then considering following up with biopsy.  Neurosurgery was consulted, upon evaluation recommended transfer to [facility] for further management by orthospine, who has seen patient during last admission.  Neurosurgery did not have any acute intervention planned at this time, given absence of weakness.  Patient will need RCRI and prognosis for any future surgical interventions.   Transfer center contacted, will request for patient to be transferred to oncology services, upon conference with accepting physician, plan made for transfer to [facility] pending bed availability.    Greater than 30 minutes were dedicated to this discharge that included discussing clinical condition with patient, coordinating care with consulting services and transfer center.    Pertinent Physical Exam At Time of Discharge  Physical Exam  Constitutional: Pleasant gentleman, not in distress, on 2 L O2 via nasal cannula, alert active and cooperative  Eyes: PERRLA, clear sclera  ENMT: Moist mucosal membranes, no exudate  Head / Neck: Atraumatic, normocephalic, supple neck, JVP not visualized  Lungs: Patent airways, CTABL  Heart: RRR, S1S2, no murmurs appreciated, palpable pulses in all extremities  GI: Soft, NT, ND, bowel sounds present in all quadrants  MSK: Moves all extremities freely, no restriction  of ROM, no joint edema  Extremities: Bilateral lower extremities peripheral edema  : No Cho catheter inserted  Breast: Deferred  Neurological: AAO x 3 to person, place and date, facial muscles symmetrical, sensation intact, strength 4/4, no acute focal neurological deficits appreciated  Psychological: Appropriate mood and behavior    Outpatient Follow-Up  Future Appointments   Date Time Provider Department Center   2/3/2025  7:30 AM [facility]   2/3/2025  8:15 AM [facility]   2/3/2025   8:45 AM [facility] 6 [facility]   2/11/2025 10:30 AM Karsten Brizuela MD [facility]         Jeremyjane Castillo DO

## 2025-01-04 NOTE — H&P
"S  CC  OSH Transfer ([facility] - likely new malignancy workup, P. mirabilis bacteremia 2/2 UTI    HPI  Mr. Abdi Retana is a 75 yo M w/ the following oncologic Hx - no formal Dx at this time. Presenting symptom was b/l LE weakness. Was admitted to [facility] 11/30-12/5 for this after IMG revealed a concerning L4-5 lesion w/ mod-severe cord compression in s/o R menal mass and suspicious lesions in adrenals lungs, and spine. This was c/f undiagnosed metastatic R RCC. Case was discussed w/ both IR and ortho spine. It was deemed that no inpt Bx nor surgery was indicated and pt was DC to SNF w/ plan for IR-guided L4 Bx (scheduled for 12/20; pt missed d/t transport issues w/ SNF). MRI brain and C/T spine w/ contrast was attempted but pt did not tolerate d/t claustrophobia - there was a plan to complete it OP w/ anesthesia. Was TC to SNF at [facility].     ED course:  Mr. Patton represented to [facility] ED from his SNF for gross hematuria and back pain. He was found to have mod-R P. mirabilis bacteremia 2/2 UTI and is most recently being treated w/ cefepime. Remained admitted there 1/2-1/4 before being transferred to [facility] (onc) service for ongoing management of this infection in addition to expedited onc workup per NSG request. NSG evalauted the pt there on 1/3 - while they do not plan an intervention of their own they rec'd transfer to [facility] onc service to complete is onc workup. At [facility] daughter had expressed concerns about getting this completed as an OP after his SNF had difficulty getting him to his IR appt for the L4 Bx. They also rec'd engaging ortho spine for these discussions as they previously followed the pt back in November.    At bedside:  No complaints aside from nausea. Updated on plan of care. Says emeka was new at [facility] for \"convenience.\" Denies being obstructed. Some midline back pain in lower back. No incontinence.    No LH/dizziness. No CP/SOB. No abdominal pain or diarrhea. Constipated, " doesn't remember last BM. No dysuria, but some discomfort w/ hendrickson. No LE pain. Baseline swelling. Says legs generalized weak over last weeks. No acute changes.    ROS  10-point ROS otherwise negative except for above.    PMH  -see HPI    ALL  Allergies   Allergen Reactions    Clindamycin Unknown    Amoxicillin-Pot Clavulanate Rash     Possible rash, received multiple antibiotics at this time      MEDs  Current Outpatient Medications   Medication Instructions    acetaminophen (TYLENOL 8 HOUR) 650 mg, oral, Every 6 hours PRN, Do not crush, chew, or split.    acetaminophen (TYLENOL) 650 mg, oral, 2 times daily    aspirin 81 mg, oral, Daily    bisacodyl (GENTLE LAXATIVE (BISACODYL)) 10 mg, rectal, Daily PRN    cefepime (MAXIPIME) 2 g, intravenous, Every 12 hours    famotidine (PEPCID) 20 mg, oral, 2 times daily    ferrous sulfate (325 mg ferrous sulfate) (FEROSUL) 325 mg, oral, Every 24 hours    losartan (COZAAR) 50 mg, oral, Daily    magnesium hydroxide (Milk of Magnesia) 400 mg/5 mL suspension 30 mL, oral, Daily PRN    magnesium oxide 400 mg, oral, Daily    mineral oil enema 1 enema, rectal, Daily PRN    potassium chloride CR 10 mEq ER tablet 10 mEq, oral, Daily, DO NOT CRUSH CHEW OR SPLIT    torsemide (DEMADEX) 20 mg, oral, Daily    traMADol (ULTRAM) 50 mg, oral, Every 6 hours PRN    vitamin D3-folic acid 2,500 unit- 1 mg tablet 1 mg, oral, Every 24 hours      PSH  -reviewed (non-contributory)    SOC  -tobacco: remote  -alcohol: rare  -substance: denies  -barriers to healthcare: difficult transport to [facility], came from [facility]      O  VT  Temp:  [36.3 °C (97.3 °F)-37.2 °C (98.9 °F)] 36.5 °C (97.7 °F)  Heart Rate:  [71-93] 86  Resp:  [16-20] 19  BP: ()/(52-63) 115/58     PE  General: no acute distress, hendrickson, LEs wrapped in kerlex  Cardio: normal rate, regular rhythm, no murmurs  Pulm: non-labored, BS symmetric, no crackles/wheezing/stridor  GI: non-distended, appropriately soft, no masses,  non-tender  : hendrickson  MSK: no joint swelling, midline tenderness over lumbar back  HEENT: grossly normocephalic  Neuro: grossly oriented, CN grossly intact, extremities moving symmetrically including LE  Psych: appropriate affect  Volume: mucous membranes moist, no LE pitting edema  Perfusion: no cyanosis, distal extremities warm    Onc Hx:  CT CAP non 1/2:  1.  History of right renal cell carcinoma.  2. Multiple pulmonary nodules consistent with metastatic disease.      ABDOMEN AND PELVIS:  1.  History upright or renal cell carcinoma. There is an unchanged  necrotic mass involving the upper pole of the right kidney.  2. Bilateral renal cysts.  3. Bilateral nonobstructing intrarenal calculi with staghorn type  calculi in the left kidney. There is no renal obstruction.  4. Complete bony destruction of L4 with a soft tissue mass with  expansion of the vertebral body. Lytic lesion involving the superior  endplate of L5. Findings are consistent with bony metastases.    MR L spine con 11/30:  1. Expansile enhancing marrow replacing lesion involving the near  entirety of the L4 vertebral body with extension into the pedicles  bilaterally, suggestive of a neoplastic process. There is mild  associated compression deformity of the L4 vertebral body and  convexity of the posterior margin of the vertebral body, with  associated soft tissue component extending into the epidural space  and bilateral neural foramina. It results in moderate to severe  spinal canal narrowing at the L4 level and moderate to severe  bilateral neural foramina narrowing at L4-L5.  2. Multilevel degenerative changes throughout the lumbar spine, most  pronounced at L1-L2.      A/P  Mr. Abdi Retana is a 75 yo M w/ the following oncologic Hx - no formal Dx at this time. Presenting symptom was b/l LE weakness. Was admitted to [facility] 11/30-12/5 for this after IMG revealed a concerning L4-5 lesion w/ mod-severe cord compression in s/o R menal mass and  suspicious lesions in adrenals lungs, and spine. This was c/f undiagnosed metastatic R RCC. Case was discussed w/ both IR and ortho spine. It was deemed that no inpt Bx nor surgery was indicated and pt was DC to SNF w/ plan for IR-guided L4 Bx (scheduled for 12/20; pt missed d/t transport issues w/ SNF). MRI brain and C/T spine w/ contrast was attempted but pt did not tolerate d/t claustrophobia - there was a plan to complete it OP w/ anesthesia. Was TC to SNF at [facility].     Mr. Patton represented to [facility] ED from his SNF for gross hematuria and back pain. He was found to have mod-R P. mirabilis bacteremia 2/2 UTI and is most recently being treated w/ cefepime. Remained admitted there 1/2-1/4 before being transferred to [facility] (onc) service for ongoing management of this infection in addition to expedited onc workup per NSG request. NSG evalauted the pt there on 1/3 - while they do not plan an intervention of their own they rec'd transfer to [facility] onc service to complete is onc workup. At [facility], daughter had expressed concerns about getting this completed as an OP after his SNF had difficulty getting him to his IR appt for the L4 Bx. They also rec'd engaging ortho spine for these discussions as they previously followed the pt back in November.    On admission, plan to continue cefepime (renally dosed for CrCl 50) per sensitivities. Three are b/l non-obstructing renal calculi on [facility] IMG. Urology saw pt there and did not recommend intervention as were non-obstructing. May have to engage ID here for antibiotic duration, procedure recommendation given that pt is bacteremic from this UTI. Stones likely colonized and may lead to recurrent infection. Will hold on urology for now perhaps until hearing from ID. Will not repeat Cx as GNB and pt appears to be improving w/ current regimen.    Regarding onc, will place IR consult for I suppose an inpt Bx of the L4 region. Not on AC, is on aspirin that  "has been held since being at [facility], but should not be problem as kidney lesion unlikely to get Bx. Will hold aspirin for now just in case. Will d/w ortho spine as well, do not expect any intervention unless there is an acute neurologic change. Will order the MRI brain and C/T/L spine con w/ anesthesia, not sure when this will take place. Unlikely to get any procedures done this weekend nor the MRI but will make NPO overnight just in case, will also hold pharm DVT ppx for now.     P. mirabilis bacteremia 2/2 UTI  -HDS  -associated w/ R renal mass, b/l non-obstructing renal stones  -sensitivities as above  -continue cefepime (renally dosed for CrCl 50, will need continually readjusted)  -seen by uro at [facility], did not have plan for procedure given non-obstructive  -consider ID consult for duration, procedure rec given complex anatomy of stones and renal mass - c/f seeding  -will not repeat BC as GNB, clinically improving on current therapy    C/f undiagnosed metastatic R RCC  -IR consult for possible inpt Bx, plan for Bx of L4-5 lesion  -no erythrocytosis suggest high EPO  -plan for possible MRI brain, C/T spine w/ contrast (had lumbar done already); pt requesting w/ anesthesia as says \"did not fit\" when took him down for this    L4-5 lesion w/ radiographic evidence cord compression  -pt at baseline 4/5 LE weakness on exam, symmetric, no incontinence or saddle anesthesia  -there have been no plans for procedures per prior evaluations  -IMG as above  -d/w ortho spine, said if going for anesthesia should do the full spine  -consider formal ortho spine consult if neuro change and/or IMG/Bx completed  -had been seen by NSG 1/3 who thought exam was okay w/o weakness, did not rec an intervention    Indwelling hendrickson  -pt said placed at [facility] for convenience, no evidence obstruction  -TOV requested    HTN  -hold losartan 50mg PO pending labs, OR plans if any; add back as appropriate PRN    Disposition  -barriers: clinical " improvement  -destination: likely back to SNF ([facility])  -f/u: pending    Diet: cardiac (NPO after MN if possible procedure)  Electrolytes: K >3.5; Mg >2  DVT ppx: SCD (otherwise subcutaneous heparin for JAMAL; momentarily held pending waiting on Bx/procedures)  GI ppx: not indicated  Lines/tubes: PIV  O2: none  ggt: none  Baseline Cr: 1.5  T+S: 1/5  Code status: Full code  Surrogate decision maker: Micaela Retana (sister) [phone]      Anthony Devlin MD  IM, PGY3

## 2025-01-04 NOTE — CARE PLAN
The patient's goals for the shift include      The clinical goals for the shift include Pt will remain safe and HDS during shift

## 2025-01-04 NOTE — Clinical Note
Spinal angiogram and tumor embolization performed. Access via R femoral artery. Patient under care of anesthesia (MAC) throughout case. R femoral closure with 5Fr Mynx. Hemostasis achieved. No visible bleeding or hematoma at R groin site. Gauze and tegaderm applied. Mynx deployment @1400 and 3 hour ambulation @1700. Neurovascular flowsheet started @1400 and handed off to Willem/Meet ARSHAD RN(s). See anesthesia flowsheet for vitals and even t notes. R leg immobilizer applied.

## 2025-01-05 VITALS
WEIGHT: 264 LBS | HEIGHT: 74 IN | OXYGEN SATURATION: 93 % | DIASTOLIC BLOOD PRESSURE: 76 MMHG | HEART RATE: 77 BPM | SYSTOLIC BLOOD PRESSURE: 155 MMHG | BODY MASS INDEX: 33.88 KG/M2 | RESPIRATION RATE: 16 BRPM | TEMPERATURE: 97.3 F

## 2025-01-05 LAB
ABO GROUP (TYPE) IN BLOOD: NORMAL
ALBUMIN SERPL BCP-MCNC: 2.8 G/DL (ref 3.4–5)
ALP SERPL-CCNC: 95 U/L (ref 33–136)
ALT SERPL W P-5'-P-CCNC: 17 U/L (ref 10–52)
ANION GAP SERPL CALC-SCNC: 12 MMOL/L (ref 10–20)
ANTIBODY SCREEN: NORMAL
APTT PPP: 29 SECONDS (ref 27–38)
AST SERPL W P-5'-P-CCNC: 25 U/L (ref 9–39)
BACTERIA BLD AEROBE CULT: ABNORMAL
BACTERIA BLD AEROBE CULT: ABNORMAL
BACTERIA BLD CULT: ABNORMAL
BACTERIA UR CULT: NO GROWTH
BASOPHILS # BLD AUTO: 0.05 X10*3/UL (ref 0–0.1)
BASOPHILS NFR BLD AUTO: 0.8 %
BILIRUB DIRECT SERPL-MCNC: 0.1 MG/DL (ref 0–0.3)
BILIRUB SERPL-MCNC: 0.2 MG/DL (ref 0–1.2)
BUN SERPL-MCNC: 32 MG/DL (ref 6–23)
CALCIUM SERPL-MCNC: 9 MG/DL (ref 8.6–10.6)
CHLORIDE SERPL-SCNC: 106 MMOL/L (ref 98–107)
CO2 SERPL-SCNC: 25 MMOL/L (ref 21–32)
CREAT SERPL-MCNC: 1.51 MG/DL (ref 0.5–1.3)
EGFRCR SERPLBLD CKD-EPI 2021: 48 ML/MIN/1.73M*2
EOSINOPHIL # BLD AUTO: 0.19 X10*3/UL (ref 0–0.4)
EOSINOPHIL NFR BLD AUTO: 3.2 %
ERYTHROCYTE [DISTWIDTH] IN BLOOD BY AUTOMATED COUNT: 16.6 % (ref 11.5–14.5)
GLUCOSE SERPL-MCNC: 92 MG/DL (ref 74–99)
GRAM STN SPEC: ABNORMAL
HCT VFR BLD AUTO: 28.8 % (ref 41–52)
HGB BLD-MCNC: 8.8 G/DL (ref 13.5–17.5)
IMM GRANULOCYTES # BLD AUTO: 0.01 X10*3/UL (ref 0–0.5)
IMM GRANULOCYTES NFR BLD AUTO: 0.2 % (ref 0–0.9)
INR PPP: 1.2 (ref 0.9–1.1)
LYMPHOCYTES # BLD AUTO: 0.77 X10*3/UL (ref 0.8–3)
LYMPHOCYTES NFR BLD AUTO: 13 %
MAGNESIUM SERPL-MCNC: 2.46 MG/DL (ref 1.6–2.4)
MCH RBC QN AUTO: 26.3 PG (ref 26–34)
MCHC RBC AUTO-ENTMCNC: 30.6 G/DL (ref 32–36)
MCV RBC AUTO: 86 FL (ref 80–100)
MONOCYTES # BLD AUTO: 0.45 X10*3/UL (ref 0.05–0.8)
MONOCYTES NFR BLD AUTO: 7.6 %
NEUTROPHILS # BLD AUTO: 4.47 X10*3/UL (ref 1.6–5.5)
NEUTROPHILS NFR BLD AUTO: 75.2 %
NRBC BLD-RTO: 0 /100 WBCS (ref 0–0)
PHOSPHATE SERPL-MCNC: 2.6 MG/DL (ref 2.5–4.9)
PLATELET # BLD AUTO: 210 X10*3/UL (ref 150–450)
POTASSIUM SERPL-SCNC: 4.7 MMOL/L (ref 3.5–5.3)
PROT SERPL-MCNC: 7.1 G/DL (ref 6.4–8.2)
PROTHROMBIN TIME: 13.1 SECONDS (ref 9.8–12.8)
RBC # BLD AUTO: 3.35 X10*6/UL (ref 4.5–5.9)
RH FACTOR (ANTIGEN D): NORMAL
SODIUM SERPL-SCNC: 138 MMOL/L (ref 136–145)
WBC # BLD AUTO: 5.9 X10*3/UL (ref 4.4–11.3)

## 2025-01-05 PROCEDURE — 99222 1ST HOSP IP/OBS MODERATE 55: CPT | Performed by: INTERNAL MEDICINE

## 2025-01-05 PROCEDURE — 2500000001 HC RX 250 WO HCPCS SELF ADMINISTERED DRUGS (ALT 637 FOR MEDICARE OP)

## 2025-01-05 PROCEDURE — 2500000004 HC RX 250 GENERAL PHARMACY W/ HCPCS (ALT 636 FOR OP/ED)

## 2025-01-05 PROCEDURE — 36415 COLL VENOUS BLD VENIPUNCTURE: CPT

## 2025-01-05 PROCEDURE — 86901 BLOOD TYPING SEROLOGIC RH(D): CPT

## 2025-01-05 PROCEDURE — 82248 BILIRUBIN DIRECT: CPT

## 2025-01-05 PROCEDURE — 84100 ASSAY OF PHOSPHORUS: CPT

## 2025-01-05 PROCEDURE — 1170000001 HC PRIVATE ONCOLOGY ROOM DAILY

## 2025-01-05 PROCEDURE — 85610 PROTHROMBIN TIME: CPT

## 2025-01-05 PROCEDURE — 83735 ASSAY OF MAGNESIUM: CPT

## 2025-01-05 PROCEDURE — 80048 BASIC METABOLIC PNL TOTAL CA: CPT

## 2025-01-05 PROCEDURE — 93010 ELECTROCARDIOGRAM REPORT: CPT | Performed by: INTERNAL MEDICINE

## 2025-01-05 PROCEDURE — 99233 SBSQ HOSP IP/OBS HIGH 50: CPT | Performed by: INTERNAL MEDICINE

## 2025-01-05 PROCEDURE — 80053 COMPREHEN METABOLIC PANEL: CPT

## 2025-01-05 PROCEDURE — 85025 COMPLETE CBC W/AUTO DIFF WBC: CPT

## 2025-01-05 RX ORDER — AMOXICILLIN AND CLAVULANATE POTASSIUM 875; 125 MG/1; MG/1
1 TABLET, FILM COATED ORAL ONCE
Status: COMPLETED | OUTPATIENT
Start: 2025-01-05 | End: 2025-01-05

## 2025-01-05 RX ADMIN — POLYETHYLENE GLYCOL 3350 17 G: 17 POWDER, FOR SOLUTION ORAL at 10:00

## 2025-01-05 RX ADMIN — CEFEPIME 1 G: 1 INJECTION, SOLUTION INTRAVENOUS at 01:28

## 2025-01-05 RX ADMIN — CEFEPIME 1 G: 1 INJECTION, SOLUTION INTRAVENOUS at 23:19

## 2025-01-05 RX ADMIN — AMOXICILLIN AND CLAVULANATE POTASSIUM 1 TABLET: 875; 125 TABLET, FILM COATED ORAL at 15:50

## 2025-01-05 RX ADMIN — ACETAMINOPHEN 975 MG: 325 TABLET, FILM COATED ORAL at 20:46

## 2025-01-05 RX ADMIN — ACETAMINOPHEN 975 MG: 325 TABLET, FILM COATED ORAL at 15:30

## 2025-01-05 RX ADMIN — ONDANSETRON 4 MG: 2 INJECTION INTRAMUSCULAR; INTRAVENOUS at 10:15

## 2025-01-05 RX ADMIN — ACETAMINOPHEN 975 MG: 325 TABLET, FILM COATED ORAL at 09:59

## 2025-01-05 RX ADMIN — SENNOSIDES 8.6 MG: 8.6 TABLET, FILM COATED ORAL at 20:46

## 2025-01-05 RX ADMIN — CEFEPIME 1 G: 1 INJECTION, SOLUTION INTRAVENOUS at 12:37

## 2025-01-05 SDOH — HEALTH STABILITY: PHYSICAL HEALTH: ON AVERAGE, HOW MANY MINUTES DO YOU ENGAGE IN EXERCISE AT THIS LEVEL?: 0 MIN

## 2025-01-05 SDOH — HEALTH STABILITY: PHYSICAL HEALTH: ON AVERAGE, HOW MANY DAYS PER WEEK DO YOU ENGAGE IN MODERATE TO STRENUOUS EXERCISE (LIKE A BRISK WALK)?: 0 DAYS

## 2025-01-05 ASSESSMENT — COGNITIVE AND FUNCTIONAL STATUS - GENERAL
MOBILITY SCORE: 15
TOILETING: A LITTLE
WALKING IN HOSPITAL ROOM: A LOT
STANDING UP FROM CHAIR USING ARMS: A LOT
CLIMB 3 TO 5 STEPS WITH RAILING: A LOT
MOVING TO AND FROM BED TO CHAIR: A LITTLE
MOVING FROM LYING ON BACK TO SITTING ON SIDE OF FLAT BED WITH BEDRAILS: A LITTLE
EATING MEALS: A LITTLE
TURNING FROM BACK TO SIDE WHILE IN FLAT BAD: A LITTLE
DAILY ACTIVITIY SCORE: 18
DRESSING REGULAR UPPER BODY CLOTHING: A LITTLE
PERSONAL GROOMING: A LITTLE
DRESSING REGULAR LOWER BODY CLOTHING: A LITTLE
HELP NEEDED FOR BATHING: A LITTLE

## 2025-01-05 ASSESSMENT — PAIN - FUNCTIONAL ASSESSMENT: PAIN_FUNCTIONAL_ASSESSMENT: 0-10

## 2025-01-05 ASSESSMENT — PAIN SCALES - GENERAL
PAINLEVEL_OUTOF10: 4
PAINLEVEL_OUTOF10: 3

## 2025-01-05 NOTE — PROGRESS NOTES
"Abdi Retana \"Louise" is a 74 y.o. male on day 1 of admission presenting with JAMAL (acute kidney injury) (CMS-HCC).      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 Also has Proteus Mirabilis UTI c/b bacteremia.    TIGRE. Admitted to back pain and feeling nauseous this morning. Denied any fever,chills, urine or stool incontinence.      Objective     Last Recorded Vitals  /70 (BP Location: Left arm, Patient Position: Lying)   Pulse 77   Temp 36.4 °C (97.5 °F) (Temporal)   Resp 16   Wt 120 kg (264 lb)   SpO2 95%   Intake/Output last 3 Shifts:    Intake/Output Summary (Last 24 hours) at 1/5/2025 1631  Last data filed at 1/5/2025 1548  Gross per 24 hour   Intake --   Output 1030 ml   Net -1030 ml       Admission Weight  Weight: 120 kg (264 lb) (01/04/25 1900)    Daily Weight  01/04/25 : 120 kg (264 lb)    No current facility-administered medications on file prior to encounter.     Current Outpatient Medications on File Prior to Encounter   Medication Sig Dispense Refill    acetaminophen (Tylenol 8 HOUR) 650 mg ER tablet Take 1 tablet (650 mg) by mouth every 6 hours if needed for mild pain (1 - 3). Do not crush, chew, or split.      acetaminophen (Tylenol) 325 mg tablet Take 2 tablets (650 mg) by mouth 2 times a day.      aspirin 81 mg EC tablet Take 1 tablet (81 mg) by mouth once daily.      bisacodyl (Gentle Laxative, bisacodyl,) 10 mg suppository Insert 1 suppository (10 mg) into the rectum once daily as needed for constipation.      cefepime (Maxipime) IV Infuse 100 mL (2 g) over 0.5 hours into a venous catheter every 12 hours.      famotidine (Pepcid) 20 mg tablet Take 1 tablet (20 mg) by mouth 2 times a day.      ferrous sulfate (FeroSuL) 325 (65 Fe) MG tablet Take 1 tablet (325 mg) by mouth once every 24 hours. 90 tablet 3    losartan (Cozaar) 50 mg tablet TAKE 1 TABLET BY MOUTH ONCE  DAILY 90 tablet 3    magnesium hydroxide (Milk of Magnesia) 400 mg/5 mL " suspension Take 30 mL by mouth once daily as needed for constipation (if no BM in 3 consecutive days).      magnesium oxide 400 mg magnesium capsule Take 1 capsule (400 mg) by mouth once daily. 90 capsule 3    mineral oil enema Insert 133 mL (1 enema) into the rectum once daily as needed for constipation (if no BM in 8 hours after getting suppository).      potassium chloride CR 10 mEq ER tablet TAKE 1 TABLET BY MOUTH ONCE  DAILY DO NOT CRUSH, CHEW, OR  SPLIT 90 tablet 3    torsemide (Demadex) 20 mg tablet TAKE 1 TABLET BY MOUTH ONCE  DAILY 90 tablet 3    traMADol (Ultram) 50 mg tablet Take 1 tablet (50 mg) by mouth every 6 hours if needed for severe pain (7 - 10).      vitamin D3-folic acid 2,500 unit- 1 mg tablet Take 1 mg by mouth once every 24 hours. 90 tablet 3    [DISCONTINUED] clotrimazole-betamethasone (Lotrisone) cream MIX WITH ZINC OXIDE AND APPLY  TOPICALLY TO AFFECTED AREA(S)  TWICE DAILY (Patient not taking: Reported on 1/2/2025) 45 g 0    [DISCONTINUED] zinc oxide 10 % cream To mix with Lotrisone and apply twice daily (Patient not taking: Reported on 1/2/2025) 50 g 0        Physical Exam  HENT:      Head: Normocephalic and atraumatic.      Mouth/Throat:      Mouth: Mucous membranes are moist.   Eyes:      General: No scleral icterus.     Extraocular Movements: Extraocular movements intact.      Pupils: Pupils are equal, round, and reactive to light.   Cardiovascular:      Rate and Rhythm: Normal rate and regular rhythm.      Heart sounds: No murmur heard.  Pulmonary:      Effort: No respiratory distress.      Breath sounds: No wheezing or rales.   Abdominal:      General: There is no distension.      Tenderness: There is no abdominal tenderness. There is no guarding.   Musculoskeletal:      Comments: Legs with dressing on, images on epic show possible stasis dermatitis with ulcers   Skin:     Capillary Refill: Capillary refill takes less than 2 seconds.   Neurological:      General: No focal deficit  present.      Mental Status: He is alert and oriented to person, place, and time.      Motor: Weakness present.      Comments: Richter in b/l LE 3/5, significantly limited by pain   Psychiatric:         Mood and Affect: Mood normal.         Relevant Results    Results for orders placed or performed during the hospital encounter of 01/04/25 (from the past 24 hours)   CBC and Auto Differential   Result Value Ref Range    WBC 5.9 4.4 - 11.3 x10*3/uL    nRBC 0.0 0.0 - 0.0 /100 WBCs    RBC 3.35 (L) 4.50 - 5.90 x10*6/uL    Hemoglobin 8.8 (L) 13.5 - 17.5 g/dL    Hematocrit 28.8 (L) 41.0 - 52.0 %    MCV 86 80 - 100 fL    MCH 26.3 26.0 - 34.0 pg    MCHC 30.6 (L) 32.0 - 36.0 g/dL    RDW 16.6 (H) 11.5 - 14.5 %    Platelets 210 150 - 450 x10*3/uL    Neutrophils % 75.2 40.0 - 80.0 %    Immature Granulocytes %, Automated 0.2 0.0 - 0.9 %    Lymphocytes % 13.0 13.0 - 44.0 %    Monocytes % 7.6 2.0 - 10.0 %    Eosinophils % 3.2 0.0 - 6.0 %    Basophils % 0.8 0.0 - 2.0 %    Neutrophils Absolute 4.47 1.60 - 5.50 x10*3/uL    Immature Granulocytes Absolute, Automated 0.01 0.00 - 0.50 x10*3/uL    Lymphocytes Absolute 0.77 (L) 0.80 - 3.00 x10*3/uL    Monocytes Absolute 0.45 0.05 - 0.80 x10*3/uL    Eosinophils Absolute 0.19 0.00 - 0.40 x10*3/uL    Basophils Absolute 0.05 0.00 - 0.10 x10*3/uL   Magnesium   Result Value Ref Range    Magnesium 2.46 (H) 1.60 - 2.40 mg/dL   Hepatic function panel   Result Value Ref Range    Albumin 2.8 (L) 3.4 - 5.0 g/dL    Bilirubin, Total 0.2 0.0 - 1.2 mg/dL    Bilirubin, Direct 0.1 0.0 - 0.3 mg/dL    Alkaline Phosphatase 95 33 - 136 U/L    ALT 17 10 - 52 U/L    AST 25 9 - 39 U/L    Total Protein 7.1 6.4 - 8.2 g/dL   Coagulation Screen   Result Value Ref Range    Protime 13.1 (H) 9.8 - 12.8 seconds    INR 1.2 (H) 0.9 - 1.1    aPTT 29 27 - 38 seconds   Type and Screen   Result Value Ref Range    ABO TYPE A     Rh TYPE POS     ANTIBODY SCREEN NEG    Phosphorus   Result Value Ref Range    Phosphorus 2.6 2.5 - 4.9  mg/dL   Basic Metabolic Panel   Result Value Ref Range    Glucose 92 74 - 99 mg/dL    Sodium 138 136 - 145 mmol/L    Potassium 4.7 3.5 - 5.3 mmol/L    Chloride 106 98 - 107 mmol/L    Bicarbonate 25 21 - 32 mmol/L    Anion Gap 12 10 - 20 mmol/L    Urea Nitrogen 32 (H) 6 - 23 mg/dL    Creatinine 1.51 (H) 0.50 - 1.30 mg/dL    eGFR 48 (L) >60 mL/min/1.73m*2    Calcium 9.0 8.6 - 10.6 mg/dL      CT chest abdomen pelvis wo IV contrast    Result Date: 1/2/2025  CHEST: 1.  History of right renal cell carcinoma. 2. Multiple pulmonary nodules consistent with metastatic disease.   ABDOMEN AND PELVIS: 1.  History upright or renal cell carcinoma. There is an unchanged necrotic mass involving the upper pole of the right kidney. 2. Bilateral renal cysts. 3. Bilateral nonobstructing intrarenal calculi with staghorn type calculi in the left kidney. There is no renal obstruction. 4. Complete bony destruction of L4 with a soft tissue mass with expansion of the vertebral body. Lytic lesion involving the superior endplate of L5. Findings are consistent with bony metastases.   MACRO: None   Signed by: Lucia Desai 1/2/2025 10:33 AM Dictation workstation:   [id number]    XR chest 1 view    Result Date: 1/2/2025  No pneumonic consolidations are noted. There questionable perihilar nodules which may correspond to the masses noted on the prior CT.     MACRO: None   Signed by: Patrick Arnold 1/2/2025 8:33 AM Dictation workstation:   [id number]      Assessment/Plan      Assessment & Plan  JAMAL (acute kidney injury) (CMS-HCC)    Mr. Abdi Retana is a 75 yo M with PMHx of HTN, GERD, SAMIA, Chronic lymphedema, venous insufficiency, OA, CKD-3(b/l 1.2-1.5), also w/ the following oncologic Hx - no formal Dx at this time. Presenting symptom was b/l LE weakness. Was admitted to [facility] 11/30-12/5 for this after IMG revealed a concerning L4-5 lesion w/ mod-severe cord compression in s/o R menal mass and suspicious lesions in adrenals lungs, and spine.  "This was c/f undiagnosed metastatic R RCC. Case was discussed w/ both IR and ortho spine. It was deemed that no inpt Bx nor surgery was indicated and pt was DC to SNF w/ plan for IR-guided L4 Bx (scheduled for 12/20; pt missed d/t transport issues w/ SNF). MRI brain and C/T spine w/ contrast was attempted but pt did not tolerate d/t claustrophobia - there was a plan to complete it OP w/ anesthesia. Transferred from [facility] for expedited work up of suspected R. RCC with L4/5 mets. Also has Proteus Mirabilis UTI c/b bacteremia I/s/o bilateral non obstructing renal stones. ID consulted for antibiotic management. IR consulted for L4 biopsy and Ortho spine wants brain MRI and full spine MRI.    Update 1/5/25  -ID consult  -Biopsy with IR  -Head MRI and MRI full spine  -NSGY consulted-No acute neurosurgical intervention at this time.      P. mirabilis bacteremia 2/2 UTI  -HDS  -associated w/ R renal mass, b/l non-obstructing renal stones  -sensitivities as above  -continue cefepime (renally dosed for CrCl 50, will need continually readjusted)  -seen by uro at [facility], did not have plan for procedure given non-obstructive  -consider ID consult for duration, procedure rec given complex anatomy of stones and renal mass - c/f seeding  -ID consulted, appreciate recs     C/f undiagnosed metastatic R RCC  -IR consult for possible inpt Bx, plan for Bx of L4-5 lesion  -no erythrocytosis to suggest high EPO  -plan for possible MRI brain, MRI spine w/ contrast (had lumbar done already); pt requesting w/ anesthesia as says \"did not fit\" when took him down for this     L4-5 lesion w/ radiographic evidence cord compression  -pt at baseline 4/5 LE weakness on exam, symmetric, no incontinence or saddle anesthesia  -there have been no plans for procedures per prior evaluations  -IMG as above  -d/w ortho spine, said if going for anesthesia should do the full spine  -consider formal  spine surgery consult if neuro change and/or IMG/Bx " completed  -had been seen by NSG 1/3 who thought exam was okay w/o weakness, did not rec an intervention  -On scheduled tylenol and prn oxy/dilaudid     HTN  CAD  -holding home  losartan 50mg  I/s/o recent rise in Cr  -holding home Aspirin while pending biopsy    Lymphedema  Venous insufficiency/venous ulcers  -wound care consulted  -hold home torsemide 20 mg daily for now    -OSH wound recs  BLE- Chronic venous status ulcers   Irrigate with normal saline or wound cleanser, Pat dry.  Apply lotion to intact skin   Cover weeping areas with Triad Hydrophillic wound dressing ointment   Pad and protect with ABD, Kerlix and paper tape.   Change every day and as needed   Secure with Tubigrip ( Apply from base of toes to 2 finger width below the bend of knee)      Gluteal cleft & buttocks-MASD/Irritant contact dermatitis due to fecal, urinary or dual incontinent.   Cleanse with bath wipes or soap and water. Rinse well and pat dry.   Apply no sting skin barrier (cavilon)   Apply Triad hydrophilic wound dressing ointment to gluteal fold/groin three times a day and as needed.     GERD  -c/w home famotidine 20 mg bid    Disposition  -barriers: clinical improvement  -destination: likely back to SNF ([facility])  -f/u: pending     Diet: cardiac (NPO after MN if possible procedure)  Electrolytes: K >3.5; Mg >2  DVT ppx: SCD (otherwise subcutaneous heparin for JAMAL; momentarily held pending waiting on Bx/procedures)  GI ppx: not indicated  Lines/tubes: PIV  O2: none  ggt: none  Baseline Cr: 1.5  T+S: 1/5  Code status: Full code  Surrogate decision maker: Micaela Retana (sister) [phone]    Ganesh Jones MD  Internal Medicne  PGY-1      ---------------------------------------------------------------------------------------------------  ---------------------------------------------------------------------------------------------------  I have seen and evaluated the patient with Dr. Ganesh Jones, a resident.  I  reviewed the patient’s medical and family history, the resident's findings on physical examination, and the patient’s diagnosis and treatment plan with the resident. I discussed the case with the resident in details and agree with the findings and plan as documented in the resident’s note.    73 yo M with PMHx of HTN, GERD, SAMIA, Chronic lymphedema, venous insufficiency, OA, CKD-3(b/l 1.2-1.5), also w/ the following oncologic Hx - no formal Dx at this time. Presenting symptom was b/l LE weakness. Was admitted to [facility] 11/30-12/5 for this after IMG revealed a concerning L4-5 lesion w/ mod-severe cord compression in s/o R menal mass and suspicious lesions in adrenals lungs, and spine. This was c/f undiagnosed metastatic R RCC. Transferred from [facility] for expedited work up of suspected R. RCC with L4/5 mets. Transferred to [facility] on 1/4/2025. Plan is biopsy by IR and MRI brain and spine under sedation on Mon. He has Proteus Mirabilis UTI c/b bacteremia I/s/o bilateral non obstructing renal stones. Currently on Cefepime ID following.       Judy Salas MD, PhD  Hematology/Oncology  [facility]

## 2025-01-05 NOTE — NURSING NOTE
"Patient is agreeable to have Augmentin for penicillin allergy trial. Patient reports he is unsure if he is allergic to it or another medication that starts with a \"C.\"  "

## 2025-01-05 NOTE — CARE PLAN
Problem: Skin  Goal: Decreased wound size/increased tissue granulation at next dressing change  Outcome: Progressing  Flowsheets (Taken 1/4/2025 1608 by Skyla Kulkarni RN)  Decreased wound size/increased tissue granulation at next dressing change: Promote sleep for wound healing  Goal: Participates in plan/prevention/treatment measures  Outcome: Progressing  Flowsheets (Taken 1/5/2025 0736)  Participates in plan/prevention/treatment measures: Elevate heels  Goal: Prevent/manage excess moisture  Outcome: Progressing  Flowsheets (Taken 1/5/2025 0736)  Prevent/manage excess moisture: Follow provider orders for dressing changes  Goal: Prevent/minimize sheer/friction injuries  Outcome: Progressing  Flowsheets (Taken 1/5/2025 0736)  Prevent/minimize sheer/friction injuries:   HOB 30 degrees or less   Complete micro-shifts as needed if patient unable. Adjust patient position to relieve pressure points, not a full turn  Goal: Promote/optimize nutrition  Outcome: Progressing  Flowsheets (Taken 1/5/2025 0736)  Promote/optimize nutrition: Consume > 50% meals/supplements  Goal: Promote skin healing  Outcome: Progressing  Flowsheets (Taken 1/5/2025 0736)  Promote skin healing: Assess skin/pad under line(s)/device(s)     Problem: Fall/Injury  Goal: Not fall by end of shift  Outcome: Progressing  Goal: Be free from injury by end of the shift  Outcome: Progressing  Goal: Verbalize understanding of personal risk factors for fall in the hospital  Outcome: Progressing  Goal: Verbalize understanding of risk factor reduction measures to prevent injury from fall in the home  Outcome: Progressing  Goal: Use assistive devices by end of the shift  Outcome: Progressing  Goal: Pace activities to prevent fatigue by end of the shift  Outcome: Progressing     Problem: Pain - Adult  Goal: Verbalizes/displays adequate comfort level or baseline comfort level  Outcome: Progressing     Problem: Safety - Adult  Goal: Free from fall  injury  Outcome: Progressing     Problem: Discharge Planning  Goal: Discharge to home or other facility with appropriate resources  Outcome: Progressing     Problem: Chronic Conditions and Co-morbidities  Goal: Patient's chronic conditions and co-morbidity symptoms are monitored and maintained or improved  Outcome: Progressing    The clinical goals for the shift include Patient to remain VSS,HDS,and free from falls and injury    Patient had one bladder scan that showed 140 urine. He has since perfused with a urine amount of 410mL. Patient has remained stable throughout the night and is awaiting MRIs today.

## 2025-01-05 NOTE — CONSULTS
Inpatient consult to Infectious Diseases  Consult performed by: Elana Canseco MD PhD  Consult ordered by: Judy Salas MD        Primary MD: Cheyenne Arango MD    Reason For Consult  Antibiotic management of Proteus mirabilis complicated urinary tract infection and bacteremia.    History Of Present Illness  This is a 74 y.o. male with PMH of RCC, CKD who presented to OSH with hematuria.  Patient found to have Proteus in his urine and in his blood, and we are consulted for antibiotic management.  Of note, the patient is a poor historian.  His Proteus is susceptible to Augmentin, Unasyn, cefazolin, and Zosyn.  He is currently receiving cefepime.  We are specifically asked about duration and selection of antibiotics.  Patient is markedly improved since his admission.  He is afebrile.  His white cell count is normal.    I elicited additional history from him about his antibiotic allergies in his chart.  He states that the antibiotic that caused the rash started with the letter C.  He states that the rash was itchy but did not cause any skin loss, blistering, or airway compromise.     Past Medical History  He has a past medical history of CKD (chronic kidney disease), GERD (gastroesophageal reflux disease), Hypertension, OA (osteoarthritis), Personal history of other diseases of the circulatory system, and PVD (peripheral vascular disease) (CMS-[facility]).    Surgical History  He has no past surgical history on file.     Social History     Occupational History    Not on file   Tobacco Use    Smoking status: Former     Types: Cigarettes    Smokeless tobacco: Not on file   Substance and Sexual Activity    Alcohol use: Yes    Drug use: Never    Sexual activity: Not on file     Travel History   Travel since 12/05/24    No documented travel since 12/05/24                Family History  Family History   Problem Relation Name Age of Onset    Dementia Mother      Heart attack Mother      Heart attack Father        Allergies  Clindamycin and Amoxicillin-pot clavulanate     Immunization History   Administered Date(s) Administered    COVID-19, mRNA, LNP-S, PF, 30 mcg/0.3 mL dose 12/26/2020, 01/16/2021, 10/18/2021, 06/30/2022    Flu vaccine, quadrivalent, high-dose, preservative free, age 65y+ (FLUZONE) 10/06/2023    Influenza, seasonal, injectable 10/02/2020, 10/18/2021    PPD Test 08/21/2020, 08/28/2020    Tdap vaccine, age 7 year and older (BOOSTRIX, ADACEL) 06/29/2018     Medications  Home medications:  Medications Prior to Admission   Medication Sig Dispense Refill Last Dose/Taking    acetaminophen (Tylenol 8 HOUR) 650 mg ER tablet Take 1 tablet (650 mg) by mouth every 6 hours if needed for mild pain (1 - 3). Do not crush, chew, or split.       acetaminophen (Tylenol) 325 mg tablet Take 2 tablets (650 mg) by mouth 2 times a day.       aspirin 81 mg EC tablet Take 1 tablet (81 mg) by mouth once daily.       bisacodyl (Gentle Laxative, bisacodyl,) 10 mg suppository Insert 1 suppository (10 mg) into the rectum once daily as needed for constipation.       cefepime (Maxipime) IV Infuse 100 mL (2 g) over 0.5 hours into a venous catheter every 12 hours.       famotidine (Pepcid) 20 mg tablet Take 1 tablet (20 mg) by mouth 2 times a day.       ferrous sulfate (FeroSuL) 325 (65 Fe) MG tablet Take 1 tablet (325 mg) by mouth once every 24 hours. 90 tablet 3     losartan (Cozaar) 50 mg tablet TAKE 1 TABLET BY MOUTH ONCE  DAILY 90 tablet 3     magnesium hydroxide (Milk of Magnesia) 400 mg/5 mL suspension Take 30 mL by mouth once daily as needed for constipation (if no BM in 3 consecutive days).       magnesium oxide 400 mg magnesium capsule Take 1 capsule (400 mg) by mouth once daily. 90 capsule 3     mineral oil enema Insert 133 mL (1 enema) into the rectum once daily as needed for constipation (if no BM in 8 hours after getting suppository).       potassium chloride CR 10 mEq ER tablet TAKE 1 TABLET BY MOUTH ONCE  DAILY DO NOT  "CRUSH, CHEW, OR  SPLIT 90 tablet 3     torsemide (Demadex) 20 mg tablet TAKE 1 TABLET BY MOUTH ONCE  DAILY 90 tablet 3     traMADol (Ultram) 50 mg tablet Take 1 tablet (50 mg) by mouth every 6 hours if needed for severe pain (7 - 10).       vitamin D3-folic acid 2,500 unit- 1 mg tablet Take 1 mg by mouth once every 24 hours. 90 tablet 3      Current medications:  Scheduled medications  acetaminophen, 975 mg, oral, TID  [Held by provider] aspirin, 81 mg, oral, Daily  cefepime, 1 g, intravenous, q12h  [Held by provider] heparin (porcine), 5,000 Units, subcutaneous, q8h  polyethylene glycol, 17 g, oral, Daily  sennosides, 1 tablet, oral, Nightly      Continuous medications     PRN medications  PRN medications: HYDROmorphone, oxyCODONE, oxyCODONE    Review of Systems  As per HPI.     Objective  Range of Vitals (last 24 hours)  Heart Rate:  [73-77]   Temp:  [35.9 °C (96.6 °F)-36.4 °C (97.5 °F)]   Resp:  [16-18]   BP: (114-155)/(56-71)   Height:  [188 cm (6' 2\")]   Weight:  [120 kg (264 lb)]   SpO2:  [93 %-96 %]   Daily Weight  01/04/25 : 120 kg (264 lb)    Body mass index is 33.9 kg/m².     Physical Exam  He is in no acute distress.  His conjunctiva are pink.  He has no icterus.  His oropharynx is clear.  His cardiac exam reveals a regular rate and rhythm, S1-S2, and a 1 out of 6 systolic ejection murmur.  His lungs are clear.  His abdomen is benign.  His extremities are without rashes or edema.     Relevant Results    Labs  Results from last 72 hours   Lab Units 01/05/25  0759 01/04/25  0514 01/03/25  0613 01/02/25  2315   WBC AUTO x10*3/uL 5.9 7.7 16.0* 12.1*   HEMOGLOBIN g/dL 8.8* 8.3* 9.1* 9.9*   HEMATOCRIT % 28.8* 27.6* 30.5* 32.4*   PLATELETS AUTO x10*3/uL 210 174 222 222   NEUTROS PCT AUTO % 75.2  --   --  95.6   LYMPHO PCT MAN %  --   --  1.0  --    LYMPHS PCT AUTO % 13.0  --   --  1.5   MONO PCT MAN %  --   --  4.0  --    MONOS PCT AUTO % 7.6  --   --  1.6   EOSINO PCT MAN %  --   --  0.0  --    EOS PCT AUTO % " "3.2  --   --  0.3     Results from last 72 hours   Lab Units 01/05/25  0759 01/04/25  0514 01/03/25  0613   SODIUM mmol/L 138 135* 136   POTASSIUM mmol/L 4.7 4.6 5.1   CHLORIDE mmol/L 106 106 106   CO2 mmol/L 25 24 24   BUN mg/dL 32* 46* 55*   CREATININE mg/dL 1.51* 2.28* 3.03*   GLUCOSE mg/dL 92 90 134*   CALCIUM mg/dL 9.0 8.0* 8.4*   ANION GAP mmol/L 12 10 11   EGFR mL/min/1.73m*2 48* 29* 21*   PHOSPHORUS mg/dL 2.6  --  3.2     Results from last 72 hours   Lab Units 01/05/25  0759 01/03/25  0613 01/02/25  2315   ALK PHOS U/L 95  --  68   BILIRUBIN TOTAL mg/dL 0.2  --  0.5   BILIRUBIN DIRECT mg/dL 0.1  --   --    PROTEIN TOTAL g/dL 7.1  --  7.5   ALT U/L 17  --  9*   AST U/L 25  --  17   ALBUMIN g/dL 2.8* 2.6* 2.8*     Estimated Creatinine Clearance: 59.1 mL/min (A) (by C-G formula based on SCr of 1.51 mg/dL (H)).  CRP   Date Value Ref Range Status   08/14/2020 11.07 (A) mg/dL Final     Comment:     REF VALUE  < 1.00       Sedimentation Rate   Date Value Ref Range Status   08/08/2020 79 (H) 0 - 20 mm/h Final     No results found for: \"HIV1X2\", \"HIVCONF\", \"YZOBCS4IM\"  No results found for: \"HEPCABINIT\", \"HEPCAB\", \"HCVPCRQUANT\"  Microbiology  Susceptibility data from last 90 days.  Collected Specimen Info Organism Amoxicillin/Clavulanate Ampicillin Ampicillin/Sulbactam Cefazolin Cefepime Ciprofloxacin Gentamicin Levofloxacin Nitrofurantoin Piperacillin/Tazobactam Tetracycline Tobramycin   01/02/25 Blood culture from Peripheral Venipuncture Proteus mirabilis               01/02/25 Blood culture from Peripheral Venipuncture Proteus mirabilis  S  R  S  S   R  S  R   S  R    11/27/24 Urine from Clean Catch/Voided Citrobacter freundii complex  R  R  R  R  S  R  I   I  S   I     Collected Specimen Info Organism Trimethoprim/Sulfamethoxazole   01/02/25 Blood culture from Peripheral Venipuncture Proteus mirabilis    01/02/25 Blood culture from Peripheral Venipuncture Proteus mirabilis  R   11/27/24 Urine from Clean " Catch/Voided Citrobacter freundii complex  S       Imaging  Electrocardiogram, 12-lead PRN ACS symptoms    Result Date: 1/3/2025  Atrial fibrillation with rapid ventricular response Nonspecific ST and T wave abnormality Abnormal ECG When compared with ECG of 02-JAN-2025 05:37, (unconfirmed) Atrial fibrillation has replaced Sinus rhythm ST now depressed in Inferior leads Nonspecific T wave abnormality now evident in Anterolateral leads    CT chest abdomen pelvis wo IV contrast    Result Date: 1/2/2025  Interpreted By:  Lucia Desai, STUDY: CT CHEST ABDOMEN PELVIS WO CONTRAST;  1/2/2025 9:55 am   INDICATION: Signs/Symptoms:Severe JAMAL in setting of malignant neoplasm of right kidney.   COMPARISON: 11/30/2024   ACCESSION NUMBER(S): [id number]   ORDERING CLINICIAN: MARYA MURRAY   TECHNIQUE: CT of the chest, abdomen, and pelvis was performed. Contiguous axial images were obtained at 3 mm slice thickness through the chest, abdomen and pelvis. Coronal and sagittal reconstructions at 3 mm slice thickness were performed. No oral, no intravenous contrast.   FINDINGS: Please note that the study is limited without intravenous contrast.   CHEST:   LUNG/PLEURA/LARGE AIRWAYS: There is no infiltrate or pleural fluid. There are multiple pulmonary nodules consistent with metastatic disease.   VESSELS: The thoracic vessels are unremarkable.   HEART: The heart is unremarkable.   MEDIASTINUM AND MIKEL: There is no hilar or mediastinal adenopathy.   CHEST WALL AND LOWER NECK: The chest wall is unremarkable. There is no abnormality of the lower neck.   ABDOMEN:   LIVER: There is no hepatic mass.   BILE DUCTS: There is no intrahepatic, common hepatic or common bile ductal dilatation.   GALLBLADDER: The gallbladder is unremarkable.   PANCREAS: There is no abnormality of the pancreas.   SPLEEN: The spleen is unremarkable. There is no splenic mass. There is no splenomegaly   ADRENAL GLANDS: There is a 1.4 cm right adrenal nodule,  nonspecific.   KIDNEYS AND URETERS:. The left kidney is small and atrophic with cortical thinning. There are bilateral renal masses. Specifically there is a known necrotic mass in the upper pole of the right kidney, unchanged. This is not as well delineated secondary to lack of intravenous contrast. This measures 7.4 x 6.1 cm. There are bilateral renal cysts. There are bilateral nonobstructing intrarenal calculi. There is a staghorn type calculus in the left kidney.   PELVIS:   BLADDER: The bladder is unremarkable.   REPRODUCTIVE ORGANS: There is no abnormality of the reproductive organs.   BOWEL: There is no bowel wall thickening, dilatation or obstruction. There is a moderate amount of stool throughout the colon particularly in the rectum. There is nonspecific presacral edema.   VESSELS: The abdominal and pelvic vessels are unremarkable.   PERITONEUM/RETROPERITONEUM/LYMPH NODES: There is no retroperitoneal or pelvic adenopathy.  There is no ascites.   ABDOMINAL WALL: The abdominal wall is unremarkable.   BONES AND SOFT TISSUES: There is a lytic lesion involving L4 with soft tissue mass in bony destruction. This was seen on 11/30/2024. There is expansion of the vertebral body. There is a lytic lesion involving the superior endplate of L5 seen previously.   There is no soft tissue abnormality.       CHEST: 1.  History of right renal cell carcinoma. 2. Multiple pulmonary nodules consistent with metastatic disease.   ABDOMEN AND PELVIS: 1.  History upright or renal cell carcinoma. There is an unchanged necrotic mass involving the upper pole of the right kidney. 2. Bilateral renal cysts. 3. Bilateral nonobstructing intrarenal calculi with staghorn type calculi in the left kidney. There is no renal obstruction. 4. Complete bony destruction of L4 with a soft tissue mass with expansion of the vertebral body. Lytic lesion involving the superior endplate of L5. Findings are consistent with bony metastases.   MACRO: None    Signed by: Lucia Desai 2025 10:33 AM Dictation workstation:   [id number]    XR chest 1 view    Result Date: 2025  Interpreted By:  Patrick Arnold, STUDY: XR CHEST 1 VIEW; ;  2025 8:03 am   INDICATION: Signs/Symptoms:AMS, borderline hypoxia, r/o infiltrate, r/o PTX, r/o pleural effusion.     COMPARISON: 2024 chest radiograph, 2024 chest CT   ACCESSION NUMBER(S): [id number]   ORDERING CLINICIAN: MARYA MURRAY   TECHNIQUE: AP erect portable chest 7:40 a.m.   FINDINGS: The lungs are hypoventilated bilaterally similar to the prior radiograph.   There are questionable nodules in the perihilar regions corresponding to the nodules noted on the CT no pneumonic consolidation or pleural effusion is noted.   Cardiac silhouette size is normal.       No pneumonic consolidations are noted. There questionable perihilar nodules which may correspond to the masses noted on the prior CT.     MACRO: None   Signed by: Patrick Arnold 2025 8:33 AM Dictation workstation:   [id number]        Assessment/Plan     74-year-old gentleman with previous renal cell carcinoma and Proteus bacteremia in the setting of likely urinary tract infection.  He is doing well on cefepime.  ID is asked to provide input into antibiotic selection and duration.    This patient should be treated as a complex urinary tract infection.  He would therefore merit a longer course of antibiotics, typically 14 to 21 days.  It would be reasonable to treat him with oral amoxicillin clavulanic acid, but we should first give him a test dose of this drug while he is in the hospital to ensure that he does not develop a rash.  He had no symptoms of anaphylaxis or more severe skin reactions with his previous antibiotics and his risk  for re-challenge is  thus very low.  Therefore, we recommend the followin.  Continue cefepime for now  2.  Please give the patient 1 dose of amoxicillin clavulanic acid today.  Patient should then be monitored  for 24 hours to ensure that he does not develop a rash.  If he does not develop a rash in the next 24 hours, cefepime can be discontinued and he can complete a 3-week course of antibiotics with amoxicillin clavulanic acid.    Discussed with patient and team.    ID will continue to follow this patient.  Please do not hesitate to reach out to me via [email or web address] chat with any questions or concerns.    I spent 65 minutes in the professional and overall care of this patient.      Elana Canseco MD PhD

## 2025-01-06 LAB
ALBUMIN SERPL BCP-MCNC: 3 G/DL (ref 3.4–5)
ANION GAP SERPL CALC-SCNC: 12 MMOL/L (ref 10–20)
BACTERIA BLD AEROBE CULT: ABNORMAL
BACTERIA BLD CULT: ABNORMAL
BASOPHILS # BLD AUTO: 0.08 X10*3/UL (ref 0–0.1)
BASOPHILS NFR BLD AUTO: 1.3 %
BUN SERPL-MCNC: 25 MG/DL (ref 6–23)
CALCIUM SERPL-MCNC: 9.1 MG/DL (ref 8.6–10.6)
CHLORIDE SERPL-SCNC: 106 MMOL/L (ref 98–107)
CO2 SERPL-SCNC: 24 MMOL/L (ref 21–32)
CREAT SERPL-MCNC: 1.34 MG/DL (ref 0.5–1.3)
EGFRCR SERPLBLD CKD-EPI 2021: 56 ML/MIN/1.73M*2
EOSINOPHIL # BLD AUTO: 0.21 X10*3/UL (ref 0–0.4)
EOSINOPHIL NFR BLD AUTO: 3.3 %
ERYTHROCYTE [DISTWIDTH] IN BLOOD BY AUTOMATED COUNT: 16.8 % (ref 11.5–14.5)
GLUCOSE SERPL-MCNC: 98 MG/DL (ref 74–99)
GRAM STN SPEC: ABNORMAL
GRAM STN SPEC: ABNORMAL
HCT VFR BLD AUTO: 30.9 % (ref 41–52)
HGB BLD-MCNC: 9.2 G/DL (ref 13.5–17.5)
IMM GRANULOCYTES # BLD AUTO: 0.02 X10*3/UL (ref 0–0.5)
IMM GRANULOCYTES NFR BLD AUTO: 0.3 % (ref 0–0.9)
LYMPHOCYTES # BLD AUTO: 0.95 X10*3/UL (ref 0.8–3)
LYMPHOCYTES NFR BLD AUTO: 14.9 %
MAGNESIUM SERPL-MCNC: 2.34 MG/DL (ref 1.6–2.4)
MCH RBC QN AUTO: 25.4 PG (ref 26–34)
MCHC RBC AUTO-ENTMCNC: 29.8 G/DL (ref 32–36)
MCV RBC AUTO: 85 FL (ref 80–100)
MONOCYTES # BLD AUTO: 0.58 X10*3/UL (ref 0.05–0.8)
MONOCYTES NFR BLD AUTO: 9.1 %
NEUTROPHILS # BLD AUTO: 4.53 X10*3/UL (ref 1.6–5.5)
NEUTROPHILS NFR BLD AUTO: 71.1 %
NRBC BLD-RTO: 0 /100 WBCS (ref 0–0)
PHOSPHATE SERPL-MCNC: 2.8 MG/DL (ref 2.5–4.9)
PLATELET # BLD AUTO: 217 X10*3/UL (ref 150–450)
POTASSIUM SERPL-SCNC: 4.6 MMOL/L (ref 3.5–5.3)
RBC # BLD AUTO: 3.62 X10*6/UL (ref 4.5–5.9)
SODIUM SERPL-SCNC: 137 MMOL/L (ref 136–145)
WBC # BLD AUTO: 6.4 X10*3/UL (ref 4.4–11.3)

## 2025-01-06 PROCEDURE — 80069 RENAL FUNCTION PANEL: CPT

## 2025-01-06 PROCEDURE — 83735 ASSAY OF MAGNESIUM: CPT

## 2025-01-06 PROCEDURE — 97163 PT EVAL HIGH COMPLEX 45 MIN: CPT | Mod: GP | Performed by: PHYSICAL THERAPIST

## 2025-01-06 PROCEDURE — 2500000001 HC RX 250 WO HCPCS SELF ADMINISTERED DRUGS (ALT 637 FOR MEDICARE OP)

## 2025-01-06 PROCEDURE — 36415 COLL VENOUS BLD VENIPUNCTURE: CPT

## 2025-01-06 PROCEDURE — 85025 COMPLETE CBC W/AUTO DIFF WBC: CPT

## 2025-01-06 PROCEDURE — 99232 SBSQ HOSP IP/OBS MODERATE 35: CPT | Performed by: INTERNAL MEDICINE

## 2025-01-06 PROCEDURE — 99232 SBSQ HOSP IP/OBS MODERATE 35: CPT

## 2025-01-06 PROCEDURE — 2500000004 HC RX 250 GENERAL PHARMACY W/ HCPCS (ALT 636 FOR OP/ED): Mod: JZ,TB

## 2025-01-06 PROCEDURE — 1170000001 HC PRIVATE ONCOLOGY ROOM DAILY

## 2025-01-06 RX ORDER — FAMOTIDINE 20 MG/1
20 TABLET, FILM COATED ORAL 2 TIMES DAILY
Status: DISCONTINUED | OUTPATIENT
Start: 2025-01-06 | End: 2025-01-24 | Stop reason: HOSPADM

## 2025-01-06 RX ORDER — AMOXICILLIN AND CLAVULANATE POTASSIUM 875; 125 MG/1; MG/1
1 TABLET, FILM COATED ORAL EVERY 12 HOURS SCHEDULED
Status: DISPENSED | OUTPATIENT
Start: 2025-01-06 | End: 2025-01-23

## 2025-01-06 RX ADMIN — ACETAMINOPHEN 975 MG: 325 TABLET, FILM COATED ORAL at 20:59

## 2025-01-06 RX ADMIN — FAMOTIDINE 20 MG: 20 TABLET ORAL at 20:59

## 2025-01-06 RX ADMIN — AMOXICILLIN AND CLAVULANATE POTASSIUM 1 TABLET: 875; 125 TABLET, FILM COATED ORAL at 20:59

## 2025-01-06 RX ADMIN — SENNOSIDES 8.6 MG: 8.6 TABLET, FILM COATED ORAL at 20:59

## 2025-01-06 RX ADMIN — OXYCODONE HYDROCHLORIDE 5 MG: 5 TABLET ORAL at 14:59

## 2025-01-06 RX ADMIN — ACETAMINOPHEN 975 MG: 325 TABLET, FILM COATED ORAL at 14:44

## 2025-01-06 RX ADMIN — CEFEPIME 1 G: 1 INJECTION, SOLUTION INTRAVENOUS at 14:46

## 2025-01-06 ASSESSMENT — COGNITIVE AND FUNCTIONAL STATUS - GENERAL
TURNING FROM BACK TO SIDE WHILE IN FLAT BAD: A LITTLE
MOVING TO AND FROM BED TO CHAIR: TOTAL
WALKING IN HOSPITAL ROOM: TOTAL
STANDING UP FROM CHAIR USING ARMS: A LOT
MOVING TO AND FROM BED TO CHAIR: A LOT
MOVING FROM LYING ON BACK TO SITTING ON SIDE OF FLAT BED WITH BEDRAILS: TOTAL
HELP NEEDED FOR BATHING: A LITTLE
PERSONAL GROOMING: A LITTLE
WALKING IN HOSPITAL ROOM: TOTAL
DRESSING REGULAR UPPER BODY CLOTHING: A LITTLE
DAILY ACTIVITIY SCORE: 17
MOBILITY SCORE: 12
TURNING FROM BACK TO SIDE WHILE IN FLAT BAD: A LITTLE
CLIMB 3 TO 5 STEPS WITH RAILING: TOTAL
DRESSING REGULAR LOWER BODY CLOTHING: A LITTLE
TURNING FROM BACK TO SIDE WHILE IN FLAT BAD: TOTAL
DAILY ACTIVITIY SCORE: 16
DRESSING REGULAR UPPER BODY CLOTHING: A LITTLE
WALKING IN HOSPITAL ROOM: TOTAL
EATING MEALS: A LITTLE
DRESSING REGULAR LOWER BODY CLOTHING: A LOT
PERSONAL GROOMING: A LITTLE
MOBILITY SCORE: 13
STANDING UP FROM CHAIR USING ARMS: TOTAL
EATING MEALS: A LITTLE
CLIMB 3 TO 5 STEPS WITH RAILING: TOTAL
TOILETING: A LOT
MOVING TO AND FROM BED TO CHAIR: A LOT
HELP NEEDED FOR BATHING: A LITTLE
CLIMB 3 TO 5 STEPS WITH RAILING: TOTAL
STANDING UP FROM CHAIR USING ARMS: A LOT
MOBILITY SCORE: 6
MOVING FROM LYING ON BACK TO SITTING ON SIDE OF FLAT BED WITH BEDRAILS: A LITTLE
TOILETING: A LOT

## 2025-01-06 ASSESSMENT — PAIN - FUNCTIONAL ASSESSMENT
PAIN_FUNCTIONAL_ASSESSMENT: 0-10

## 2025-01-06 ASSESSMENT — ACTIVITIES OF DAILY LIVING (ADL)
ADL_ASSISTANCE: INDEPENDENT
ADLS_ADDRESSED: NO

## 2025-01-06 ASSESSMENT — PAIN SCALES - GENERAL
PAINLEVEL_OUTOF10: 0 - NO PAIN
PAINLEVEL_OUTOF10: 5 - MODERATE PAIN

## 2025-01-06 NOTE — CARE PLAN
The patient's goals for the shift include      The clinical goals for the shift include Patient to remain HDS, VSS, and remain fall and injury free      Problem: Skin  Goal: Decreased wound size/increased tissue granulation at next dressing change  Outcome: Progressing  Goal: Participates in plan/prevention/treatment measures  Outcome: Progressing  Goal: Prevent/manage excess moisture  Outcome: Progressing  Goal: Prevent/minimize sheer/friction injuries  Outcome: Progressing  Goal: Promote/optimize nutrition  Outcome: Progressing  Goal: Promote skin healing  Outcome: Progressing     Problem: Fall/Injury  Goal: Not fall by end of shift  Outcome: Progressing  Goal: Be free from injury by end of the shift  Outcome: Progressing  Goal: Verbalize understanding of personal risk factors for fall in the hospital  Outcome: Progressing  Goal: Verbalize understanding of risk factor reduction measures to prevent injury from fall in the home  Outcome: Progressing  Goal: Use assistive devices by end of the shift  Outcome: Progressing  Goal: Pace activities to prevent fatigue by end of the shift  Outcome: Progressing     Problem: Pain - Adult  Goal: Verbalizes/displays adequate comfort level or baseline comfort level  Outcome: Progressing     Problem: Safety - Adult  Goal: Free from fall injury  Outcome: Progressing     Problem: Discharge Planning  Goal: Discharge to home or other facility with appropriate resources  Outcome: Progressing     Problem: Chronic Conditions and Co-morbidities  Goal: Patient's chronic conditions and co-morbidity symptoms are monitored and maintained or improved  Outcome: Progressing

## 2025-01-06 NOTE — CARE PLAN
The patient's goals for the shift include      The clinical goals for the shift include Patient to remain HDS, VSS, and remain fall and injury free      Problem: Skin  Goal: Decreased wound size/increased tissue granulation at next dressing change  1/6/2025 1512 by Skyla Kulkarni RN  Outcome: Progressing  1/6/2025 1511 by Skyla Kulkarni RN  Outcome: Progressing  Goal: Participates in plan/prevention/treatment measures  1/6/2025 1512 by Skyla Kulkarni RN  Outcome: Progressing  1/6/2025 1511 by Skyla Kulkarni RN  Outcome: Progressing  Goal: Prevent/manage excess moisture  1/6/2025 1512 by Skyla Kulkarni RN  Outcome: Progressing  1/6/2025 1511 by Skyla Kulkarni RN  Outcome: Progressing  Goal: Prevent/minimize sheer/friction injuries  1/6/2025 1512 by Skyla Kulkarni RN  Flowsheets (Taken 1/6/2025 1512)  Prevent/minimize sheer/friction injuries: Use pull sheet  1/6/2025 1512 by Skyla Kulkarni RN  Outcome: Progressing  Flowsheets (Taken 1/6/2025 1512)  Prevent/minimize sheer/friction injuries: Use pull sheet  1/6/2025 1511 by Skyla Kulkarni RN  Outcome: Progressing  Goal: Promote/optimize nutrition  1/6/2025 1512 by Skyla Kulkarni RN  Outcome: Progressing  1/6/2025 1511 by Skyla Kulkarni RN  Outcome: Progressing  Goal: Promote skin healing  1/6/2025 1512 by Skyla Kulkarni RN  Outcome: Progressing  1/6/2025 1511 by Skyla Kulkarni RN  Outcome: Progressing     Problem: Fall/Injury  Goal: Not fall by end of shift  1/6/2025 1512 by Skyla Kulkarni RN  Outcome: Progressing  1/6/2025 1511 by Skyla Kulkarni RN  Outcome: Progressing  Goal: Be free from injury by end of the shift  1/6/2025 1512 by Skyla Kulkarni RN  Outcome: Progressing  1/6/2025 1511 by Skyla Kulkarni RN  Outcome: Progressing  Goal: Verbalize understanding of personal risk factors for fall in the hospital  1/6/2025 1512 by Skyla Outenreath, RN  Outcome: Progressing  1/6/2025 1511 by Skyla Kulkarni, RN  Outcome: Progressing  Goal:  Verbalize understanding of risk factor reduction measures to prevent injury from fall in the home  1/6/2025 1512 by Skyla Kulkarni RN  Outcome: Progressing  1/6/2025 1511 by Skyla Kulkarni RN  Outcome: Progressing  Goal: Use assistive devices by end of the shift  1/6/2025 1512 by Skyla Kulkarni RN  Outcome: Progressing  1/6/2025 1511 by Skyla Kulkarni RN  Outcome: Progressing  Goal: Pace activities to prevent fatigue by end of the shift  1/6/2025 1512 by Skyla Kulkarni RN  Outcome: Progressing  1/6/2025 1511 by Skyla Kulkarni RN  Outcome: Progressing     Problem: Pain - Adult  Goal: Verbalizes/displays adequate comfort level or baseline comfort level  1/6/2025 1512 by Skyla Kulkarni RN  Outcome: Progressing  1/6/2025 1511 by Skyla Kulkarni RN  Outcome: Progressing     Problem: Safety - Adult  Goal: Free from fall injury  1/6/2025 1512 by Skyla Kulkarni RN  Outcome: Progressing  1/6/2025 1511 by Skyla Kulkarni RN  Outcome: Progressing     Problem: Discharge Planning  Goal: Discharge to home or other facility with appropriate resources  1/6/2025 1512 by Sklya Kulkarni RN  Outcome: Progressing  1/6/2025 1511 by Skyla Kulkarni RN  Outcome: Progressing     Problem: Chronic Conditions and Co-morbidities  Goal: Patient's chronic conditions and co-morbidity symptoms are monitored and maintained or improved  1/6/2025 1512 by Skyla Kulkarni RN  Outcome: Progressing  1/6/2025 1511 by Skyla Kulkarni RN  Outcome: Progressing   1/6/2025 1511 by Skyla Kulkarni RN  Outcome: Progressing

## 2025-01-06 NOTE — PROGRESS NOTES
"Physical Therapy    Physical Therapy Evaluation    Patient Name: Abdi Retana \"Mtat\"  MRN: [id number]  Department: [facility]  Room: [address] [place] [address]  Today's Date: 1/6/2025   Time Calculation  Start Time: 0931  Stop Time: 1007  Time Calculation (min): 36 min    Assessment/Plan   PT Assessment  PT Assessment Results: Decreased strength, Decreased range of motion, Decreased endurance, Impaired balance, Decreased mobility, Impaired hearing, Decreased cognition, Decreased skin integrity, Obesity, Pain  Rehab Prognosis: Fair  Barriers to Discharge Home: Caregiver assistance, Physical needs  Caregiver Assistance: Patient lives alone and/or does not have reliable caregiver assistance  Physical Needs: Stair navigation into home limited by function/safety, In-home setup navigation limited by function/safety, 24hr mobility assistance needed, 24hr ADL assistance needed  Evaluation/Treatment Tolerance: Patient limited by pain  Medical Staff Made Aware: Yes  Strengths: Support of Caregivers  Barriers to Participation: Other (Comment) (pain/apprehension about pain)  End of Session Communication: Bedside nurse  Assessment Comment: 73 yo male with malignant renal cell carcinoma with L4/L5 lytic lesions (pending further imaging), anemia, and weakness presents with significant apprehension about pain limiting mobility today. Once medically stable, recommend moderate intensity therapy as pt lives alone and has stairs, is weak and has complicated medical issues and is not at baseline  End of Session Patient Position: Bed, 3 rail up, Alarm on  IP OR SWING BED PT PLAN  Inpatient or Swing Bed: Inpatient  PT Plan  Treatment/Interventions: Bed mobility, Transfer training, Gait training, Balance training, Neuromuscular re-education, Strengthening, Range of motion, Endurance training, Therapeutic exercise, Therapeutic activity, Home exercise program, Orthotic fitting/training, Positioning, Postural re-education  PT Plan: Ongoing PT  PT Frequency: 3 times " per week  PT Discharge Recommendations: Moderate intensity level of continued care  Equipment Recommended upon Discharge: Other (comment) (unable to determine today)  PT Recommended Transfer Status: Other (comment) (dependent; unable today)  PT - OK to Discharge: Yes (PT eval completed & DC recs made)    Subjective   General Visit Information:  General  Reason for Referral: Admitted 1/2 (transfer to [facility] 1/4) with hematuria from nursing facility; dx: UTI, back pain, L4/L5 lytic lesion, weakness; pending multi level MRI  Past Medical History Relevant to Rehab: CKD, malignant renal cell carcinoma Right (L4 lesion, mets to adrenal glands & lung), HTN, GERD, OA, PVD, anemia, obesity, PVD, CKD, chronic lymphedema, fall  Family/Caregiver Present: No  Prior to Session Communication: Bedside nurse  Patient Position Received: Bed, 3 rail up, Alarm on  Preferred Learning Style: verbal, kinesthetic  General Comment: Pt alert, reports apprehension about back pain; pt reports that at [facility] he was better able to controls his bed and he wants a different bed (RN aware). Pt reports too much back pain to come sit EOB or progress mobility further. He reports that he was last walking with a walker short household distanced around [facility] but was having a lot of difficulty. Pt reports that he asked to be sedated for his MRIs.  Home Living:  Home Living  Type of Home: House  Lives With: Alone  Home Adaptive Equipment:  (multiple walker (wheeled walker, standard walker), cane, lift recliner, 2 wheelechairs, toilet grab bar and raised seat)  Home Layout: Multi-level  Home Access: Stairs to enter with rails  Entrance Stairs-Rails:  (1)  Entrance Stairs-Number of Steps: 3 +3  Bathroom Shower/Tub: Tub/shower unit  Bathroom Toilet:  (raised with grab bar)  Bathroom Equipment: None  Bathroom Accessibility: 1st floor; pt washes up at sink (doesn't use shower)  Home Living Comments: sleeps in recliner on 1st floor  Prior Level of  "Function:  Prior Function Per Pt/Caregiver Report  Level of [place]:  (admitted from SNF where most recently standing with walker briefly with PT; last walked with walker around Thanksgiving (\"but I was having a hard time\"); didn't answer the stairclimbing questions, 3 falls in last 12 months)  Receives Help From:  (sister comes 1x/wk and does his laundry)  ADL Assistance: Independent (recently needs assist; in November was independent with chair bathing in bathroom and getting dressed)  Homemaking Assistance: Needs assistance (recently gets assist for all at SNF; in November sister did laundry, groceries delivered and [employer]Whe[employer], he cleaned)  Driving/Transportation:  (does not drive)  Ambulatory Assistance: Independent  Vocational: Retired (\"I did a myriad of things\")  Hand Dominance: Right  Prior Function Comments: at SNF \"for a while, I'm not sure\" and last at home doing okay was around Thanksgiving  Precautions:  Precautions  Medical Precautions: Fall precautions (appears anxious/fearful of pain, anemia, spine (lytic lesions L$/L%))     Vital Signs (Past 2hrs)        Date/Time Vitals Session Patient Position Pulse Resp SpO2 BP MAP (mmHg)    01/06/25 0931 During PT  Lying  70  --  92 %  147/79  --                                    Objective   Pain:  Pain Assessment  Pain Assessment: 0-10  0-10 (Numeric) Pain Score:  (no pain at back pre/post, but reports back pain overall with any change in bed movement (unrated))  Pain Interventions: Repositioned, Ambulation/increased activity, Emotional support, Therapeutic presence, Therapeutic touch, Rest, Relaxation technique  Response to Interventions:  (no pain supine at end of session)  Cognition:  Cognition  Orientation Level:  (oriented to place, situation, month, day (not date))  Problem Solving: Exceptions to WFL  Processing Speed: Delayed    General Assessments:  General Observation  General Observation: anxious/apprehensive about back pain       Activity " Tolerance  Endurance: Other (Comment) (limited by pain and apprehension about back pain)    Sensation  Light Touch:  (grossly intact)       Postural Control  Postural Control:  (unable to assess (pt refused sitting EOB due to back pain))            Functional Assessments:  ADL  ADL's Addressed: No    Bed Mobility  Bed Mobility: Yes  Bed Mobility 1  Bed Mobility 1: Rolling right, Rolling left (pt too anxious- refused)  Bed Mobility 2  Bed Mobility  2: Supine to sitting, Sitting to supine (used bed to elevate HOB 75 degrees, started to attempts to rolling to sit EOB (PT & RN) but pt too apprehensive about pain and declined)  Bed Mobility 3  Bed Mobility 3: Scooting (supine)  Level of Assistance 3: Dependent, +2 (using TAP (incrementally lowered position of HOB))    Transfers  Transfer: No (pt unable d/t pain)    Ambulation/Gait Training  Ambulation/Gait Training Performed: No (pt unable)  Extremity/Trunk Assessments:  RUE   RUE : Exceptions to WFL  RUE AROM (degrees)  RUE AROM Comment: AAROM: grasp/elbow grossly WFL, shoulder elevation 85-90 degrees (muscle guarding)  RUE Strength  RUE Overall Strength:  (grossly: grasp >4, elbow flex/ext >4, shoulder elevation >3- (guarding so unable to accurately assess))  LUE   LUE: Exceptions to WFL  LUE AROM (degrees)  LUE AROM Comment:  (AAROM: grasp/elbow grossly WFL, shoulder elevation 90 degrees (muscle guarding))  LUE Strength  LUE Overall Strength:  (grossly: grasp >4, elbow flex/ext >4, shoulder elevation >3- (guarding so unable to accurately assess))  RLE   RLE : Exceptions to WFL  AROM RLE (degrees)  RLE AROM Comment:  (AAROM: ankle DF/PF grossly WFL, knee extension -20, knee flexion >65 (limited by pain), hip flexion >65 (limited by pain), hip abd 15 degrees)  Strength RLE  RLE Overall Strength:  (ankle DF >3, ankle PF >2+, knee flexion >2, knee extension >2+, hip flexion >2+ (limited by pain/apprehension of pain))  LLE   LLE : Exceptions to WFL  AROM LLE (degrees)  LLE  AROM Comment:  (AAROM: ankle DF/PF grossly WFL, knee extension -15, knee flexion >65 (limited by pain), hip flexion >65 (limited by pain), hip abd 15 degrees)  Strength LLE  LLE Overall Strength:  (ankle DF >3, ankle PF >2+, knee flexion >2, knee extension >2+, hip flexion >2+ (limited by pain/apprehension of pain))  Outcome Measures:  [facility] Basic Mobility  Turning from your back to your side while in a flat bed without using bedrails: Total  Moving from lying on your back to sitting on the side of a flat bed without using bedrails: Total  Moving to and from bed to chair (including a wheelchair): Total  Standing up from a chair using your arms (e.g. wheelchair or bedside chair): Total  To walk in hospital room: Total  Climbing 3-5 steps with railing: Total  Basic Mobility - Total Score: 6    Encounter Problems       Encounter Problems (Active)       General Goals       Pt will have bilateral LE strength >4 (ankle DF, hip flexion, knee flex/ext) and AROM WFL hip flexion/knee flex & extension and ankle DF bilaterally (Progressing)       Start:  01/06/25    Expected End:  01/20/25            Pt will supine to/from sit via logrolling (HOB elevated 45 degrees, use of rail) with mod A +2 (Not Progressing)       Start:  01/06/25    Expected End:  01/20/25            Pt will sit EOB with min A +1 >7 minutes with pain <2 (Not Progressing)       Start:  01/06/25    Expected End:  01/20/25               Mobility       Pt will come sit to/from stand, bed to/from chair with WW with mod A +2 (no knee buckling, pain <2) (Not Progressing)       Start:  01/06/25    Expected End:  01/20/25            Pt will stand static with WW and min/mod A +2 (Not Progressing)       Start:  01/06/25    Expected End:  01/20/25            Pt will ambulate 10' with WW with min/mod A +2 and chair follow for safety (Not Progressing)       Start:  01/06/25    Expected End:  01/20/25               Pain - Adult              Education Documentation  Body  Mechanics, taught by Bekah Brewer PT at 1/6/2025 10:14 AM.  Learner: Patient  Readiness: Acceptance  Method: Explanation, Demonstration, Teach-back  Response: Needs Reinforcement, Verbalizes Understanding  Comment: PT purpose/POC, importance of mobility as able    Mobility Training, taught by Bekah Brewer PT at 1/6/2025 10:14 AM.  Learner: Patient  Readiness: Acceptance  Method: Explanation, Demonstration, Teach-back  Response: Needs Reinforcement, Verbalizes Understanding  Comment: PT purpose/POC, importance of mobility as able    Education Comments  No comments found.

## 2025-01-06 NOTE — CONSULTS
"Nutrition Initial Assessment:   Nutrition Assessment    The patient is a 74 y.o. male who is hospital day #2.  Pt transferred from OSH for expedited work up of suspected RCC w/ mets. Pt also has UTI c/b bacteremia - ID following. Ir and Ortho consulted for L4/5 mets.     PMHx: HTN, GERD, SAMIA, Chronic lymphedema, venous insufficiency, OA, CKD-3(b/l 1.2-1.5)    Reason for Assessment: Provider consult order      Nutrition History:  Food and Nutrient History: Unable to meet with pt as first wound care team in room and at second attempt pt sleeping - did not awake to name. Pt was last seen by inpatient RD at OSH 3 days ago. Pt reported good PO intake, doing 3 meals a day, intentional smaller portion sizes for wt loss; using meals on wheels; no food allergies; wanting to try ONS. Was started on Tamir BID. No meals ordered so far at main campus though pt has been intermittently NPO for procedures.    Anthropometrics:  Start of admission anthropometrics:  Height: 188 cm (6' 2\")  Weight: 125 kg (274 lb 11.2 oz)  BMI (Calculated): 35.4  IBW/kg (Dietitian Calculated): 86.36 kg  Percent of IBW: 138 %     Weight Change %:  Weight History / % Weight Change: No wt hx in EMR. Pt reports intentional wt loss.    Nutrition Focused Physical Exam Findings:  Unable to do NFPE.     Edema:  Edema: +1 trace  Edema Location: BLE  Physical Findings:  Skin:  (venous ulcer @R leg;)    Objective Data:    Last BM Date:  (no BM this admission)    Nutrition Significant Labs:    Results from last 7 days   Lab Units 01/06/25  0756 01/05/25  0759 01/04/25  0514 01/04/25  0514   HEMOGLOBIN g/dL 9.2* 8.8*  --  8.3*   MCV fL 85 86  --  87   GLUCOSE mg/dL 98 92  --  90   POTASSIUM mmol/L 4.6 4.7  --  4.6   SODIUM mmol/L 137 138  --  135*   PHOSPHORUS mg/dL 2.8 2.6  --   --    MAGNESIUM mg/dL 2.34 2.46*   < >  --    CREATININE mg/dL 1.34* 1.51*  --  2.28*   BUN mg/dL 25* 32*  --  46*   ALT U/L  --  17  --   --    AST U/L  --  25  --   --    ALK PHOS U/L  " --  95  --   --     < > = values in this interval not displayed.       Nutrition Specific Medications:  amoxicillin-pot clavulanate, 1 tablet, oral, q12h CHAPITO  polyethylene glycol, 17 g, oral, Daily  sennosides, 1 tablet, oral, Nightly    I/O:   I/O last 2 completed shifts:  In: 150 (1.3 mL/kg) [IV Piggyback:150]  Out: 700 (5.8 mL/kg) [Urine:700 (0.2 mL/kg/hr)]  Weight: 119.7 kg     Dietary Orders (From admission, onward)       Start     Ordered    01/07/25 0001  NPO Diet Except: Other (specify); Additional Details: Clear liquids until 0500. May have clears up to 2 hours prior to procedure if exact time is known.; Effective midnight  Diet effective midnight        Question Answer Comment   Except: Other (specify)    Additional Details Clear liquids until 0500. May have clears up to 2 hours prior to procedure if exact time is known.        01/06/25 1257    01/06/25 1448  Adult diet Regular  Diet effective now        Question:  Diet type  Answer:  Regular    01/06/25 1447    01/04/25 1343  May Participate in Room Service  ( ROOM SERVICE MAY PARTICIPATE)  Once        Question:  .  Answer:  Yes    01/04/25 1342                     Estimated Needs:   Total Energy Estimated Needs (kCal): 2160 kCal  Method for Estimating Needs: 25 kcal/kg IBW    Total Protein Estimated Needs (g): 112.32 g  Method for Estimating Needs: 1.3 g/kg IBW    Method for Estimating Needs: 1mL/kcal or MD recommendations          Nutrition Diagnosis   Malnutrition Diagnosis  Patient has Malnutrition Diagnosis:  (Unable to diagnose at this time given limited information.)    Nutrition Diagnosis  Patient has Nutrition Diagnosis: Yes  Diagnosis Status (1): New  Nutrition Diagnosis 1: Increased nutrient needs  Related to (1): increased metabolic demand  As Evidenced by (1): new cancer dx       Nutrition Interventions/Recommendations   Individualized Nutrition Prescription Provided for : 2150 kcal and 110g protein via oral diet    - Will place order  for:   -- Ensure High Protein (160 kcal, 16g) BID    -- Tamir BID   - Start daily MVI             Nutrition Monitoring and Evaluation   Monitoring and Evaluation Plan: Energy intake  Energy Intake: Estimated energy intake  Criteria: >50% of nutr needs    Monitoring and Evaluation Plan: Weight  Weight: Weight change  Criteria: no wt change                   Time Spent (min): 30 minutes

## 2025-01-06 NOTE — CONSULTS
Wound Care Consult     Visit Date: 1/6/2025      Patient Name: Matt Retana         MRN: [id number]           YOB: 1950     Reason for Consult: assess B/L lower extremities         Wound History: Patient  on day 2 of admission presenting with JAMAL (acute kidney injury) (CMS-HCC).      Pertinent Labs:   Albumin   Date Value Ref Range Status   01/06/2025 3.0 (L) 3.4 - 5.0 g/dL Final       Wound Assessment:  Wound 11/27/24 Laceration Pretibial Distal;Right (Active)   Wound Image   01/06/25 1323   Site Assessment Pink 01/03/25 1401   Ava-Wound Assessment Dry 01/03/25 1401   Shape irregular 01/03/25 1401   Wound Length (cm) 7.5 cm 01/06/25 1331   Wound Width (cm) 0.8 cm 01/06/25 1331   Wound Surface Area (cm^2) 6 cm^2 01/06/25 1331   Dressing Changed Changed 01/03/25 1401   Dressing Status Clean;Dry 01/04/25 2010   Adhesive Closure Strips Intact 01/03/25 1930       Wound 11/27/24 Venous Ulcer Leg Right (Active)   Wound Image   01/06/25 1323   Site Assessment Pink;Red 01/03/25 1402   Shape scattered 01/03/25 1402   Wound Length (cm) 1.5 cm 01/06/25 1332   Wound Width (cm) 2 cm 01/03/25 1402   Wound Surface Area (cm^2) 2 cm^2 01/03/25 1402   Wound Depth (cm) 0.1 cm 01/06/25 1332   Dressing Gauze;ABD 01/03/25 1225   Dressing Changed Changed 01/03/25 1402   Dressing Status Clean;Dry 01/04/25 2010       Wound 11/27/24 Other (comment) Coccyx (Active)   Site Assessment Blanchable erythema 01/04/25 0400   Drainage Description None 01/04/25 0800   Drainage Amount None 01/04/25 0800   Dressing Foam 01/04/25 0800   Dressing Changed Reinforced 01/04/25 0800       Wound 01/03/25 Tibial Dorsal;Left;Proximal (Active)   Wound Image   01/06/25 1313   Shape irregular 01/03/25 1403   Wound Length (cm) 1 cm 01/03/25 1403   Wound Width (cm) 2 cm 01/03/25 1403   Wound Surface Area (cm^2) 2 cm^2 01/03/25 1403   Drainage Description Serosanguineous 01/03/25 1403   Drainage Amount Small 01/03/25 1403   Dressing Changed Changed  01/03/25 1403   Dressing Status Clean;Dry 01/04/25 2010       Wound 01/06/25 Pretibial Left (Active)   Wound Image   01/06/25 1312   Wound Length (cm) 1.5 cm 01/06/25 1332   Wound Width (cm) 1 cm 01/06/25 1332   Wound Surface Area (cm^2) 1.5 cm^2 01/06/25 1332   Wound Depth (cm) 0.1 cm 01/06/25 1332   Wound Volume (cm^3) 0.15 cm^3 01/06/25 1332       Wound Team Summary Assessment: BLE- Chronic venous status ulcers    Gently cleanse B/L LE and pat dry  Apply lotion ( would recommend Eucerin) to intact skin   Cleanse wounds with Vashe   Cover open areas with [employer]ph[employer] wound dressing ointment   Apply ABD over these areas to protect   Wrap with Kerlix   Apply ACE wraps   Change every day and as needed   Elevate heels off bed. Utilize EHOB [place] boots      Wound Team Plan: Please review recommendations      Micaela ALTMAN   1/6/2025  5:35 PM

## 2025-01-06 NOTE — PROGRESS NOTES
"Abdi Retana \"Louise" is a 74 y.o. male on day 2 of admission presenting with JAMAL (acute kidney injury) (CMS-HCC).      Subjective   Transferred from [facility] for expedited work up of suspected R. RCC with L4/5 mets. Also has Proteus Mirabilis UTI c/b bacteremia.    NAEON.     Objective     Last Recorded Vitals  /56 (BP Location: Left arm, Patient Position: Lying)   Pulse 82   Temp 36.1 °C (97 °F) (Temporal)   Resp 16   Wt 120 kg (264 lb)   SpO2 97%   Intake/Output last 3 Shifts:    Intake/Output Summary (Last 24 hours) at 1/6/2025 1551  Last data filed at 1/6/2025 0003  Gross per 24 hour   Intake 150 ml   Output 200 ml   Net -50 ml       Admission Weight  Weight: 120 kg (264 lb) (01/04/25 1900)    Daily Weight  01/04/25 : 120 kg (264 lb)    No current facility-administered medications on file prior to encounter.     Current Outpatient Medications on File Prior to Encounter   Medication Sig Dispense Refill    acetaminophen (Tylenol 8 HOUR) 650 mg ER tablet Take 1 tablet (650 mg) by mouth every 6 hours if needed for mild pain (1 - 3). Do not crush, chew, or split.      acetaminophen (Tylenol) 325 mg tablet Take 2 tablets (650 mg) by mouth 2 times a day.      aspirin 81 mg EC tablet Take 1 tablet (81 mg) by mouth once daily.      bisacodyl (Gentle Laxative, bisacodyl,) 10 mg suppository Insert 1 suppository (10 mg) into the rectum once daily as needed for constipation.      cefepime (Maxipime) IV Infuse 100 mL (2 g) over 0.5 hours into a venous catheter every 12 hours.      famotidine (Pepcid) 20 mg tablet Take 1 tablet (20 mg) by mouth 2 times a day.      ferrous sulfate (FeroSuL) 325 (65 Fe) MG tablet Take 1 tablet (325 mg) by mouth once every 24 hours. 90 tablet 3    losartan (Cozaar) 50 mg tablet TAKE 1 TABLET BY MOUTH ONCE  DAILY 90 tablet 3    magnesium hydroxide (Milk of Magnesia) 400 mg/5 mL suspension Take 30 mL by mouth once daily as needed for constipation (if no BM in 3 consecutive days).      " magnesium oxide 400 mg magnesium capsule Take 1 capsule (400 mg) by mouth once daily. 90 capsule 3    mineral oil enema Insert 133 mL (1 enema) into the rectum once daily as needed for constipation (if no BM in 8 hours after getting suppository).      potassium chloride CR 10 mEq ER tablet TAKE 1 TABLET BY MOUTH ONCE  DAILY DO NOT CRUSH, CHEW, OR  SPLIT 90 tablet 3    torsemide (Demadex) 20 mg tablet TAKE 1 TABLET BY MOUTH ONCE  DAILY 90 tablet 3    traMADol (Ultram) 50 mg tablet Take 1 tablet (50 mg) by mouth every 6 hours if needed for severe pain (7 - 10).      vitamin D3-folic acid 2,500 unit- 1 mg tablet Take 1 mg by mouth once every 24 hours. 90 tablet 3    [DISCONTINUED] clotrimazole-betamethasone (Lotrisone) cream MIX WITH ZINC OXIDE AND APPLY  TOPICALLY TO AFFECTED AREA(S)  TWICE DAILY (Patient not taking: Reported on 1/2/2025) 45 g 0    [DISCONTINUED] zinc oxide 10 % cream To mix with Lotrisone and apply twice daily (Patient not taking: Reported on 1/2/2025) 50 g 0        Physical Exam  HENT:      Head: Normocephalic and atraumatic.      Mouth/Throat:      Mouth: Mucous membranes are moist.   Eyes:      General: No scleral icterus.     Extraocular Movements: Extraocular movements intact.      Pupils: Pupils are equal, round, and reactive to light.   Cardiovascular:      Rate and Rhythm: Normal rate and regular rhythm.      Heart sounds: No murmur heard.  Pulmonary:      Effort: No respiratory distress.      Breath sounds: No wheezing or rales.   Abdominal:      General: There is no distension.      Tenderness: There is no abdominal tenderness. There is no guarding.   Musculoskeletal:      Comments: Legs with dressing on, images on epic show possible stasis dermatitis with ulcers   Skin:     Capillary Refill: Capillary refill takes less than 2 seconds.   Neurological:      General: No focal deficit present.      Mental Status: He is alert and oriented to person, place, and time.      Motor: Weakness  present.      Comments: Richter in b/l LE 3/5, significantly limited by pain   Psychiatric:         Mood and Affect: Mood normal.         Relevant Results    Results for orders placed or performed during the hospital encounter of 01/04/25 (from the past 24 hours)   CBC and Auto Differential   Result Value Ref Range    WBC 6.4 4.4 - 11.3 x10*3/uL    nRBC 0.0 0.0 - 0.0 /100 WBCs    RBC 3.62 (L) 4.50 - 5.90 x10*6/uL    Hemoglobin 9.2 (L) 13.5 - 17.5 g/dL    Hematocrit 30.9 (L) 41.0 - 52.0 %    MCV 85 80 - 100 fL    MCH 25.4 (L) 26.0 - 34.0 pg    MCHC 29.8 (L) 32.0 - 36.0 g/dL    RDW 16.8 (H) 11.5 - 14.5 %    Platelets 217 150 - 450 x10*3/uL    Neutrophils % 71.1 40.0 - 80.0 %    Immature Granulocytes %, Automated 0.3 0.0 - 0.9 %    Lymphocytes % 14.9 13.0 - 44.0 %    Monocytes % 9.1 2.0 - 10.0 %    Eosinophils % 3.3 0.0 - 6.0 %    Basophils % 1.3 0.0 - 2.0 %    Neutrophils Absolute 4.53 1.60 - 5.50 x10*3/uL    Immature Granulocytes Absolute, Automated 0.02 0.00 - 0.50 x10*3/uL    Lymphocytes Absolute 0.95 0.80 - 3.00 x10*3/uL    Monocytes Absolute 0.58 0.05 - 0.80 x10*3/uL    Eosinophils Absolute 0.21 0.00 - 0.40 x10*3/uL    Basophils Absolute 0.08 0.00 - 0.10 x10*3/uL   Magnesium   Result Value Ref Range    Magnesium 2.34 1.60 - 2.40 mg/dL   Renal Function Panel   Result Value Ref Range    Glucose 98 74 - 99 mg/dL    Sodium 137 136 - 145 mmol/L    Potassium 4.6 3.5 - 5.3 mmol/L    Chloride 106 98 - 107 mmol/L    Bicarbonate 24 21 - 32 mmol/L    Anion Gap 12 10 - 20 mmol/L    Urea Nitrogen 25 (H) 6 - 23 mg/dL    Creatinine 1.34 (H) 0.50 - 1.30 mg/dL    eGFR 56 (L) >60 mL/min/1.73m*2    Calcium 9.1 8.6 - 10.6 mg/dL    Phosphorus 2.8 2.5 - 4.9 mg/dL    Albumin 3.0 (L) 3.4 - 5.0 g/dL      CT chest abdomen pelvis wo IV contrast    Result Date: 1/2/2025  CHEST: 1.  History of right renal cell carcinoma. 2. Multiple pulmonary nodules consistent with metastatic disease.   ABDOMEN AND PELVIS: 1.  History upright or renal cell  carcinoma. There is an unchanged necrotic mass involving the upper pole of the right kidney. 2. Bilateral renal cysts. 3. Bilateral nonobstructing intrarenal calculi with staghorn type calculi in the left kidney. There is no renal obstruction. 4. Complete bony destruction of L4 with a soft tissue mass with expansion of the vertebral body. Lytic lesion involving the superior endplate of L5. Findings are consistent with bony metastases.   MACRO: None   Signed by: Lucia Desai 1/2/2025 10:33 AM Dictation workstation:   [id number]    XR chest 1 view    Result Date: 1/2/2025  No pneumonic consolidations are noted. There questionable perihilar nodules which may correspond to the masses noted on the prior CT.     MACRO: None   Signed by: Patrick Arnold 1/2/2025 8:33 AM Dictation workstation:   [id number]      Assessment/Plan      Assessment & Plan  JAMAL (acute kidney injury) (CMS-HCC)    Mr. Abdi Retana is a 73 yo M with PMHx of HTN, GERD, SAMIA, Chronic lymphedema, venous insufficiency, OA, CKD-3(b/l 1.2-1.5), also w/ the following oncologic Hx - no formal Dx at this time. Presenting symptom was b/l LE weakness. Was admitted to [facility] 11/30-12/5 for this after IMG revealed a concerning L4-5 lesion w/ mod-severe cord compression in s/o R menal mass and suspicious lesions in adrenals lungs, and spine. This was c/f undiagnosed metastatic R RCC. Case was discussed w/ both IR and ortho spine. It was deemed that no inpt Bx nor surgery was indicated and pt was DC to SNF w/ plan for IR-guided L4 Bx (scheduled for 12/20; pt missed d/t transport issues w/ SNF). MRI brain and C/T spine w/ contrast was attempted but pt did not tolerate d/t claustrophobia - there was a plan to complete it OP w/ anesthesia. Transferred from [facility] for expedited work up of suspected R. RCC with L4/5 mets. Also has Proteus Mirabilis UTI c/b bacteremia I/s/o bilateral non obstructing renal stones. ID consulted for antibiotic management. IR consulted for  "L4 biopsy and Ortho spine wants brain MRI and full spine MRI.    Update 1/6/25  -ID following, discontinue Cefepime, start Augmentin for 3 weeks  -Biopsy with IR 1/7  -Head MRI and MRI full spine 1/7  -Cr back to baseline  -NSGY consulted-No acute neurosurgical intervention at this time.      P. mirabilis bacteremia 2/2 UTI  -HDS  -associated w/ R renal mass, b/l non-obstructing renal stones  -sensitivities as above  -continue cefepime (renally dosed for CrCl 50, will need continually readjusted)  -seen by uro at [facility], did not have plan for procedure given non-obstructive  -consider ID consult for duration, procedure rec given complex anatomy of stones and renal mass - c/f seeding  -ID following, recommend Augmentin for 3 weeks     C/f undiagnosed metastatic R RCC  -IR consult for possible inpt Bx, plan for Bx of L4-5 lesion  -no erythrocytosis to suggest high EPO  -plan for possible MRI brain, MRI spine w/ contrast (had lumbar done already); pt requesting w/ anesthesia as says \"did not fit\" when took him down for this     L4-5 lesion w/ radiographic evidence cord compression  -pt at baseline 4/5 LE weakness on exam, symmetric, no incontinence or saddle anesthesia  -there have been no plans for procedures per prior evaluations  -IMG as above  -d/w ortho spine, said if going for anesthesia should do the full spine  -consider formal  spine surgery consult if neuro change and/or IMG/Bx completed  -had been seen by NSG 1/3 who thought exam was okay w/o weakness, did not rec an intervention  -On scheduled tylenol and prn oxy/dilaudid     HTN  CAD  -holding home  losartan 50mg  I/s/o recent rise in Cr  -holding home Aspirin while pending biopsy    Lymphedema  Venous insufficiency/venous ulcers  -wound care consulted  -hold home torsemide 20 mg daily for now    -OSH wound recs  BLE- Chronic venous status ulcers   Irrigate with normal saline or wound cleanser, Pat dry.  Apply lotion to intact skin   Cover weeping areas " with Triad Hydrophillic wound dressing ointment   Pad and protect with ABD, Kerlix and paper tape.   Change every day and as needed   Secure with Tubigrip ( Apply from base of toes to 2 finger width below the bend of knee)      Gluteal cleft & buttocks-MASD/Irritant contact dermatitis due to fecal, urinary or dual incontinent.   Cleanse with bath wipes or soap and water. Rinse well and pat dry.   Apply no sting skin barrier (cavilon)   Apply Triad hydrophilic wound dressing ointment to gluteal fold/groin three times a day and as needed.     GERD  -c/w home famotidine 20 mg bid    Disposition  -barriers: clinical improvement  -destination: likely back to SNF ([facility])  -f/u: pending     Diet: cardiac (NPO after MN if possible procedure)  Electrolytes: K >3.5; Mg >2  DVT ppx: SCD (otherwise subcutaneous heparin for JAMAL; momentarily held pending waiting on Bx/procedures)  GI ppx: not indicated  Lines/tubes: PIV  O2: none  ggt: none  Baseline Cr: 1.5  T+S: 1/5  Code status: Full code  Surrogate decision maker: Micaela Retana (sister) [phone]    Ganesh Jones MD  Internal Medicne  PGY-1

## 2025-01-06 NOTE — PROGRESS NOTES
Matt Retana is a 74 y.o. male on day 2 of admission presenting with JAMAL (acute kidney injury) (CMS-[facility]).  ID is following for antibiotic management of complex urinary tract infection, Proteus bacteremia, and antibiotic allergies.    Subjective   Interval History: Patient was given oral dose of amoxicillin clavulanic acid yesterday.  He was seen this morning and reports that he had no itching or other symptoms.  He is otherwise feeling well.            Objective   Range of Vitals (last 24 hours)  Heart Rate:  [69-77]   Temp:  [36.1 °C (97 °F)-36.4 °C (97.5 °F)]   Resp:  [16-17]   BP: (125-155)/(60-76)   SpO2:  [93 %-95 %]   Daily Weight  01/04/25 : 120 kg (264 lb)    Body mass index is 33.9 kg/m².    Physical Exam  On my examination he is in no acute distress.  He has no evidence of any rashes or skin changes over his face trunk or legs.  I was unable to examine his back because he was unable to move or roll over.    Antibiotics  cefepime - 1 gram/50 mL, 2 gram/100 mL    Relevant Results  Labs  Results from last 72 hours   Lab Units 01/05/25  0759 01/04/25  0514   WBC AUTO x10*3/uL 5.9 7.7   HEMOGLOBIN g/dL 8.8* 8.3*   HEMATOCRIT % 28.8* 27.6*   PLATELETS AUTO x10*3/uL 210 174   NEUTROS PCT AUTO % 75.2  --    LYMPHS PCT AUTO % 13.0  --    MONOS PCT AUTO % 7.6  --    EOS PCT AUTO % 3.2  --      Results from last 72 hours   Lab Units 01/05/25  0759 01/04/25  0514   SODIUM mmol/L 138 135*   POTASSIUM mmol/L 4.7 4.6   CHLORIDE mmol/L 106 106   CO2 mmol/L 25 24   BUN mg/dL 32* 46*   CREATININE mg/dL 1.51* 2.28*   GLUCOSE mg/dL 92 90   CALCIUM mg/dL 9.0 8.0*   ANION GAP mmol/L 12 10   EGFR mL/min/1.73m*2 48* 29*   PHOSPHORUS mg/dL 2.6  --      Results from last 72 hours   Lab Units 01/05/25  0759   ALK PHOS U/L 95   BILIRUBIN TOTAL mg/dL 0.2   BILIRUBIN DIRECT mg/dL 0.1   PROTEIN TOTAL g/dL 7.1   ALT U/L 17   AST U/L 25   ALBUMIN g/dL 2.8*     Estimated Creatinine Clearance: 59.1 mL/min (A) (by C-G formula based on SCr of  1.51 mg/dL (H)).  CRP   Date Value Ref Range Status   08/14/2020 11.07 (A) mg/dL Final     Comment:     REF VALUE  < 1.00       Microbiology  Susceptibility data from last 14 days.  Collected Specimen Info Organism Amoxicillin/Clavulanate Ampicillin Ampicillin/Sulbactam Cefazolin Ciprofloxacin Gentamicin Levofloxacin Piperacillin/Tazobactam Tetracycline Trimethoprim/Sulfamethoxazole   01/02/25 Blood culture from Peripheral Venipuncture Proteus mirabilis             01/02/25 Blood culture from Peripheral Venipuncture Proteus mirabilis  S  R  S  S  R  S  R  S  R  R          Assessment/Plan   74-year-old male with complicated urinary tract infection and Proteus bacteremia in the setting.  ID is consulted for antibiotic management.  Patient has a possible history of rash to amoxicillin clavulanic acid, but he was given a test dose yesterday.  He reports no symptoms and I did not see any rashes.  I was unable to examine his back, but the primary team can do this with support of nursing.  It would appear that he is able to tolerate the amoxicillin clavulanic acid, and the allergy he had was most likely to clindamycin.    Recommendations:  1.  Discontinue cefepime.  2.  Initiate amoxicillin clavulanic acid at a dose of 875 over 125 mg twice a day.  3.  Patient should complete a 3-week course of this from the time of his positive cultures.  We are targeting a complex UTI.  Thus his anticipated stop date would be January 23.    ID will no longer be following this patient.  However, please do not hesitate to epic chat me with any questions or concerns.     I spent 25 minutes in the professional and overall care of this patient.      Elana Canseco MD PhD

## 2025-01-06 NOTE — CARE PLAN
Problem: Skin  Goal: Decreased wound size/increased tissue granulation at next dressing change  Outcome: Progressing  Flowsheets (Taken 1/6/2025 0011)  Decreased wound size/increased tissue granulation at next dressing change: Promote sleep for wound healing  Goal: Participates in plan/prevention/treatment measures  Outcome: Progressing  Flowsheets (Taken 1/6/2025 0011)  Participates in plan/prevention/treatment measures: Elevate heels  Goal: Prevent/manage excess moisture  Outcome: Progressing  Flowsheets (Taken 1/6/2025 0011)  Prevent/manage excess moisture: Cleanse incontinence/protect with barrier cream  Goal: Prevent/minimize sheer/friction injuries  Outcome: Progressing  Flowsheets (Taken 1/6/2025 0011)  Prevent/minimize sheer/friction injuries: Use pull sheet  Goal: Promote/optimize nutrition  Outcome: Progressing  Flowsheets (Taken 1/6/2025 0011)  Promote/optimize nutrition: Monitor/record intake including meals  Goal: Promote skin healing  Outcome: Progressing  Flowsheets (Taken 1/6/2025 0011)  Promote skin healing: Assess skin/pad under line(s)/device(s)     Problem: Fall/Injury  Goal: Not fall by end of shift  Outcome: Progressing  Goal: Be free from injury by end of the shift  Outcome: Progressing  Goal: Verbalize understanding of personal risk factors for fall in the hospital  Outcome: Progressing  Goal: Verbalize understanding of risk factor reduction measures to prevent injury from fall in the home  Outcome: Progressing  Goal: Use assistive devices by end of the shift  Outcome: Progressing  Goal: Pace activities to prevent fatigue by end of the shift  Outcome: Progressing     Problem: Pain - Adult  Goal: Verbalizes/displays adequate comfort level or baseline comfort level  Outcome: Progressing     Problem: Safety - Adult  Goal: Free from fall injury  Outcome: Progressing     Problem: Discharge Planning  Goal: Discharge to home or other facility with appropriate resources  Outcome: Progressing      Problem: Chronic Conditions and Co-morbidities  Goal: Patient's chronic conditions and co-morbidity symptoms are monitored and maintained or improved  Outcome: Progressing   The patient's goals for the shift include      The clinical goals for the shift include pt will remain free from falls throughout shift

## 2025-01-07 ENCOUNTER — APPOINTMENT (OUTPATIENT)
Dept: RADIOLOGY | Facility: HOSPITAL | Age: 75
DRG: 456 | End: 2025-01-07
Payer: MEDICARE

## 2025-01-07 ENCOUNTER — APPOINTMENT (OUTPATIENT)
Dept: RADIOLOGY | Facility: HOSPITAL | Age: 75
End: 2025-01-07
Payer: MEDICARE

## 2025-01-07 LAB
ALBUMIN SERPL BCP-MCNC: 2.9 G/DL (ref 3.4–5)
ANION GAP SERPL CALC-SCNC: 13 MMOL/L (ref 10–20)
ATRIAL RATE: 82 BPM
ATRIAL RATE: 84 BPM
BASOPHILS # BLD AUTO: 0.08 X10*3/UL (ref 0–0.1)
BASOPHILS NFR BLD AUTO: 1.1 %
BUN SERPL-MCNC: 23 MG/DL (ref 6–23)
CALCIUM SERPL-MCNC: 9.3 MG/DL (ref 8.6–10.6)
CHLORIDE SERPL-SCNC: 105 MMOL/L (ref 98–107)
CO2 SERPL-SCNC: 25 MMOL/L (ref 21–32)
CREAT SERPL-MCNC: 1.13 MG/DL (ref 0.5–1.3)
EGFRCR SERPLBLD CKD-EPI 2021: 68 ML/MIN/1.73M*2
EOSINOPHIL # BLD AUTO: 0.23 X10*3/UL (ref 0–0.4)
EOSINOPHIL NFR BLD AUTO: 3.1 %
ERYTHROCYTE [DISTWIDTH] IN BLOOD BY AUTOMATED COUNT: 16.7 % (ref 11.5–14.5)
GLUCOSE SERPL-MCNC: 97 MG/DL (ref 74–99)
HCT VFR BLD AUTO: 30.5 % (ref 41–52)
HGB BLD-MCNC: 9.2 G/DL (ref 13.5–17.5)
IMM GRANULOCYTES # BLD AUTO: 0.14 X10*3/UL (ref 0–0.5)
IMM GRANULOCYTES NFR BLD AUTO: 1.9 % (ref 0–0.9)
LYMPHOCYTES # BLD AUTO: 1.12 X10*3/UL (ref 0.8–3)
LYMPHOCYTES NFR BLD AUTO: 15.3 %
MAGNESIUM SERPL-MCNC: 2.11 MG/DL (ref 1.6–2.4)
MCH RBC QN AUTO: 26.1 PG (ref 26–34)
MCHC RBC AUTO-ENTMCNC: 30.2 G/DL (ref 32–36)
MCV RBC AUTO: 86 FL (ref 80–100)
MONOCYTES # BLD AUTO: 0.68 X10*3/UL (ref 0.05–0.8)
MONOCYTES NFR BLD AUTO: 9.3 %
NEUTROPHILS # BLD AUTO: 5.06 X10*3/UL (ref 1.6–5.5)
NEUTROPHILS NFR BLD AUTO: 69.3 %
NRBC BLD-RTO: 0 /100 WBCS (ref 0–0)
P AXIS: -12 DEGREES
P AXIS: -7 DEGREES
P OFFSET: 189 MS
P OFFSET: 192 MS
P ONSET: 140 MS
P ONSET: 141 MS
PHOSPHATE SERPL-MCNC: 3 MG/DL (ref 2.5–4.9)
PLATELET # BLD AUTO: 240 X10*3/UL (ref 150–450)
POTASSIUM SERPL-SCNC: 4.6 MMOL/L (ref 3.5–5.3)
PR INTERVAL: 156 MS
PR INTERVAL: 156 MS
Q ONSET: 218 MS
Q ONSET: 219 MS
QRS COUNT: 13 BEATS
QRS COUNT: 16 BEATS
QRS DURATION: 106 MS
QRS DURATION: 108 MS
QT INTERVAL: 346 MS
QT INTERVAL: 376 MS
QTC CALCULATION(BAZETT): 439 MS
QTC CALCULATION(BAZETT): 446 MS
QTC FREDERICIA: 410 MS
QTC FREDERICIA: 417 MS
R AXIS: 23 DEGREES
R AXIS: 25 DEGREES
RBC # BLD AUTO: 3.53 X10*6/UL (ref 4.5–5.9)
SODIUM SERPL-SCNC: 138 MMOL/L (ref 136–145)
T AXIS: 38 DEGREES
T AXIS: 48 DEGREES
T OFFSET: 392 MS
T OFFSET: 406 MS
VENTRICULAR RATE: 100 BPM
VENTRICULAR RATE: 82 BPM
WBC # BLD AUTO: 7.3 X10*3/UL (ref 4.4–11.3)

## 2025-01-07 PROCEDURE — 36415 COLL VENOUS BLD VENIPUNCTURE: CPT

## 2025-01-07 PROCEDURE — 83735 ASSAY OF MAGNESIUM: CPT

## 2025-01-07 PROCEDURE — 99152 MOD SED SAME PHYS/QHP 5/>YRS: CPT

## 2025-01-07 PROCEDURE — 80069 RENAL FUNCTION PANEL: CPT

## 2025-01-07 PROCEDURE — 2780000003 HC OR 278 NO HCPCS

## 2025-01-07 PROCEDURE — 77012 CT SCAN FOR NEEDLE BIOPSY: CPT

## 2025-01-07 PROCEDURE — 37244 VASC EMBOLIZE/OCCLUDE BLEED: CPT

## 2025-01-07 PROCEDURE — 03VY3DZ RESTRICTION OF UPPER ARTERY WITH INTRALUMINAL DEVICE, PERCUTANEOUS APPROACH: ICD-10-PCS | Performed by: RADIOLOGY

## 2025-01-07 PROCEDURE — 2500000001 HC RX 250 WO HCPCS SELF ADMINISTERED DRUGS (ALT 637 FOR MEDICARE OP)

## 2025-01-07 PROCEDURE — 82947 ASSAY GLUCOSE BLOOD QUANT: CPT

## 2025-01-07 PROCEDURE — 85025 COMPLETE CBC W/AUTO DIFF WBC: CPT

## 2025-01-07 PROCEDURE — 99152 MOD SED SAME PHYS/QHP 5/>YRS: CPT | Performed by: RADIOLOGY

## 2025-01-07 PROCEDURE — 99153 MOD SED SAME PHYS/QHP EA: CPT | Performed by: RADIOLOGY

## 2025-01-07 PROCEDURE — 7100000009 HC PHASE TWO TIME - INITIAL BASE CHARGE

## 2025-01-07 PROCEDURE — 88341 IMHCHEM/IMCYTCHM EA ADD ANTB: CPT | Mod: TC,SUR

## 2025-01-07 PROCEDURE — 07DS3ZX EXTRACTION OF VERTEBRAL BONE MARROW, PERCUTANEOUS APPROACH, DIAGNOSTIC: ICD-10-PCS | Performed by: RADIOLOGY

## 2025-01-07 PROCEDURE — 1170000001 HC PRIVATE ONCOLOGY ROOM DAILY

## 2025-01-07 PROCEDURE — 88307 TISSUE EXAM BY PATHOLOGIST: CPT | Mod: TC,SUR

## 2025-01-07 PROCEDURE — 99232 SBSQ HOSP IP/OBS MODERATE 35: CPT

## 2025-01-07 PROCEDURE — 88342 IMHCHEM/IMCYTCHM 1ST ANTB: CPT | Performed by: PATHOLOGY

## 2025-01-07 PROCEDURE — 2500000004 HC RX 250 GENERAL PHARMACY W/ HCPCS (ALT 636 FOR OP/ED): Performed by: RADIOLOGY

## 2025-01-07 PROCEDURE — 77012 CT SCAN FOR NEEDLE BIOPSY: CPT | Performed by: RADIOLOGY

## 2025-01-07 PROCEDURE — 2720000007 HC OR 272 NO HCPCS

## 2025-01-07 PROCEDURE — 99153 MOD SED SAME PHYS/QHP EA: CPT

## 2025-01-07 PROCEDURE — 7100000010 HC PHASE TWO TIME - EACH INCREMENTAL 1 MINUTE

## 2025-01-07 PROCEDURE — 88341 IMHCHEM/IMCYTCHM EA ADD ANTB: CPT | Performed by: PATHOLOGY

## 2025-01-07 PROCEDURE — 88307 TISSUE EXAM BY PATHOLOGIST: CPT | Performed by: PATHOLOGY

## 2025-01-07 PROCEDURE — 2500000005 HC RX 250 GENERAL PHARMACY W/O HCPCS: Performed by: RADIOLOGY

## 2025-01-07 PROCEDURE — 20225 BONE BIOPSY TROCAR/NDL DEEP: CPT | Performed by: RADIOLOGY

## 2025-01-07 RX ORDER — FENTANYL CITRATE 50 UG/ML
INJECTION, SOLUTION INTRAMUSCULAR; INTRAVENOUS
Status: COMPLETED | OUTPATIENT
Start: 2025-01-07 | End: 2025-01-07

## 2025-01-07 RX ORDER — MIDAZOLAM HYDROCHLORIDE 1 MG/ML
INJECTION INTRAMUSCULAR; INTRAVENOUS
Status: COMPLETED | OUTPATIENT
Start: 2025-01-07 | End: 2025-01-07

## 2025-01-07 RX ADMIN — FAMOTIDINE 20 MG: 20 TABLET ORAL at 20:16

## 2025-01-07 RX ADMIN — MIDAZOLAM HYDROCHLORIDE 1 MG: 1 INJECTION, SOLUTION INTRAMUSCULAR; INTRAVENOUS at 10:46

## 2025-01-07 RX ADMIN — MIDAZOLAM HYDROCHLORIDE 0.5 MG: 1 INJECTION, SOLUTION INTRAMUSCULAR; INTRAVENOUS at 10:54

## 2025-01-07 RX ADMIN — AMOXICILLIN AND CLAVULANATE POTASSIUM 1 TABLET: 875; 125 TABLET, FILM COATED ORAL at 20:16

## 2025-01-07 RX ADMIN — FENTANYL CITRATE 50 MCG: 50 INJECTION, SOLUTION INTRAMUSCULAR; INTRAVENOUS at 10:46

## 2025-01-07 RX ADMIN — FENTANYL CITRATE 50 MCG: 50 INJECTION, SOLUTION INTRAMUSCULAR; INTRAVENOUS at 10:54

## 2025-01-07 RX ADMIN — THROMBIN, TOPICAL (BOVINE) 550 UNITS: KIT at 11:21

## 2025-01-07 RX ADMIN — ACETAMINOPHEN 975 MG: 325 TABLET, FILM COATED ORAL at 20:16

## 2025-01-07 ASSESSMENT — PAIN - FUNCTIONAL ASSESSMENT
PAIN_FUNCTIONAL_ASSESSMENT: 0-10

## 2025-01-07 ASSESSMENT — COGNITIVE AND FUNCTIONAL STATUS - GENERAL
DRESSING REGULAR UPPER BODY CLOTHING: A LOT
TOILETING: TOTAL
PERSONAL GROOMING: A LOT
DRESSING REGULAR LOWER BODY CLOTHING: A LOT
MOVING FROM LYING ON BACK TO SITTING ON SIDE OF FLAT BED WITH BEDRAILS: A LOT
TURNING FROM BACK TO SIDE WHILE IN FLAT BAD: A LOT
PERSONAL GROOMING: A LOT
MOVING FROM LYING ON BACK TO SITTING ON SIDE OF FLAT BED WITH BEDRAILS: A LOT
MOVING TO AND FROM BED TO CHAIR: TOTAL
DRESSING REGULAR LOWER BODY CLOTHING: A LOT
TURNING FROM BACK TO SIDE WHILE IN FLAT BAD: A LOT
MOBILITY SCORE: 8
WALKING IN HOSPITAL ROOM: TOTAL
DAILY ACTIVITIY SCORE: 11
MOVING TO AND FROM BED TO CHAIR: TOTAL
WALKING IN HOSPITAL ROOM: TOTAL
DRESSING REGULAR UPPER BODY CLOTHING: A LOT
HELP NEEDED FOR BATHING: A LOT
EATING MEALS: A LOT
TOILETING: TOTAL
CLIMB 3 TO 5 STEPS WITH RAILING: TOTAL
DAILY ACTIVITIY SCORE: 11
EATING MEALS: A LOT
STANDING UP FROM CHAIR USING ARMS: TOTAL
CLIMB 3 TO 5 STEPS WITH RAILING: TOTAL
HELP NEEDED FOR BATHING: A LOT
STANDING UP FROM CHAIR USING ARMS: TOTAL
MOBILITY SCORE: 8

## 2025-01-07 ASSESSMENT — PAIN SCALES - GENERAL
PAINLEVEL_OUTOF10: 0 - NO PAIN
PAINLEVEL_OUTOF10: 5 - MODERATE PAIN
PAINLEVEL_OUTOF10: 0 - NO PAIN
PAINLEVEL_OUTOF10: 5 - MODERATE PAIN

## 2025-01-07 NOTE — POST-PROCEDURE NOTE
Interventional Radiology Brief Postprocedure Note    Attending: Dr. Romina Barron MD    Assistant: Dr. Dee Schmidt MD    Diagnosis: L4 Lytic Lesion, suspected metastatic disease    Description of procedure: Using CT guidance, a total of 2 passes were made into a L4 marrow replacing lesion using a 18 Gauge BARD needle passed through a 17 gauge coaxial system. Hemostasis was achieved with use of single pass cautery device, GelFoam, two tornado coils, and thrombin.     See radiology report for full details.      Anesthesia:  Local    Complications: None    Estimated Blood Loss:  30 cc's    Medications  As of 01/07/25 1130      aspirin EC tablet 81 mg (mg) Total dose:  Cannot be calculated* Dosing weight:  125   *Administration dose not documented     Date/Time Rate/Dose/Volume Action       01/04/25  1136 *Not included in total Held by provider     01/05/25  0900 *81 mg Missed     01/06/25  0900 *81 mg Missed     01/07/25  0900 *81 mg Missed               cefepime (Maxipime) 1 g in dextrose (iso) IV 50 mL (g) Total dose:  4 g* Dosing weight:  125   *From user-documented volume     Date/Time Rate/Dose/Volume Action       01/04/25  1516 1 g (over 30 min) New Bag      1607 50 mL Stopped     01/05/25  0128 1 g (over 30 min) New Bag      0201  (over 30 min) Stopped      1237 1 g (over 30 min) New Bag      1311  (over 30 min) Stopped      2319 1 g (over 30 min) New Bag     01/06/25  0003 150 mL Stopped      1446 1 g (over 30 min) New Bag      1538  (over 30 min) Stopped               acetaminophen (Tylenol) tablet 975 mg (mg) Total dose:  6,825 mg* Dosing weight:  125   *Administration not included in total     Date/Time Rate/Dose/Volume Action       01/04/25  1400 975 mg Given      2026 975 mg Given     01/05/25  0959 975 mg Given      1530 975 mg Given      2046 975 mg Given     01/06/25  0945 *975 mg Missed      1444 975 mg Given      2059 975 mg Given               oxyCODONE (Roxicodone) immediate release tablet 5 mg (mg)  Total dose:  5 mg Dosing weight:  125      Date/Time Rate/Dose/Volume Action       01/06/25  1459 5 mg Given               ondansetron (Zofran) injection 4 mg (mg) Total dose:  8 mg Dosing weight:  125      Date/Time Rate/Dose/Volume Action       01/04/25  1337 4 mg Given     01/05/25  1015 4 mg Given               heparin (porcine) injection 5,000 Units (Units) Total dose:  Cannot be calculated* Dosing weight:  125   *Administration dose not documented     Date/Time Rate/Dose/Volume Action       01/04/25  1137 *Not included in total Held by provider      1200 *5,000 Units Missed      2000 *5,000 Units Missed     01/05/25  0400 *5,000 Units Missed      1200 *5,000 Units Missed      1700 *Not included in total Unheld by provider      1703 *Not included in total Held by provider      2000 *5,000 Units Missed               polyethylene glycol (Glycolax, Miralax) packet 17 g (g) Total dose:  34 g* Dosing weight:  125   *Administration not included in total     Date/Time Rate/Dose/Volume Action       01/04/25  1439 17 g Given     01/05/25  1000 17 g Given     01/06/25  0946 *17 g Missed     01/07/25  0923 *17 g Missed               sennosides (Senokot) tablet 8.6 mg (mg) Total dose:  3 tablet Dosing weight:  125      Date/Time Rate/Dose/Volume Action       01/04/25 2027 8.6 mg Given     01/05/25 2046 8.6 mg Given     01/06/25 2059 8.6 mg Given               amoxicillin-pot clavulanate (Augmentin) 875-125 mg per tablet 1 tablet (tablet) Total dose:  2 tablet Dosing weight:  120      Date/Time Rate/Dose/Volume Action       01/05/25  1550 1 tablet Given     01/06/25 2059 1 tablet Given               famotidine (Pepcid) tablet 20 mg (mg) Total dose:  20 mg      Date/Time Rate/Dose/Volume Action       01/06/25 2059 20 mg Given               fentaNYL PF (Sublimaze) injection (mcg) Total dose:  100 mcg      Date/Time Rate/Dose/Volume Action       01/07/25  1046 50 mcg Given      1054 50 mcg Given               midazolam  (Versed) injection (mg) Total dose:  1.5 mg      Date/Time Rate/Dose/Volume Action       01/07/25  1046 1 mg Given      1054 0.5 mg Given               thrombin-bovine (JMI) 5,000 unit topical solution (Units) Total dose:  550 Units      Date/Time Rate/Dose/Volume Action       01/07/25  1121 550 Units Given                   See detailed result report with images in PACS.    The patient tolerated the procedure well without incident or complication and is in stable condition.

## 2025-01-07 NOTE — CARE PLAN
The patient's goals for the shift include      The clinical goals for the shift include Patient to remain VSS, HDS, and free from fall or injury      Problem: Skin  Goal: Decreased wound size/increased tissue granulation at next dressing change  Outcome: Progressing  Goal: Participates in plan/prevention/treatment measures  Outcome: Progressing  Goal: Prevent/manage excess moisture  Outcome: Progressing  Goal: Prevent/minimize sheer/friction injuries  Outcome: Progressing  Goal: Promote/optimize nutrition  Outcome: Progressing  Goal: Promote skin healing  Outcome: Progressing  Flowsheets (Taken 1/7/2025 1413)  Promote skin healing: Protective dressings over bony prominences     Problem: Fall/Injury  Goal: Not fall by end of shift  Outcome: Progressing  Goal: Be free from injury by end of the shift  Outcome: Progressing  Goal: Verbalize understanding of personal risk factors for fall in the hospital  Outcome: Progressing  Goal: Verbalize understanding of risk factor reduction measures to prevent injury from fall in the home  Outcome: Progressing  Goal: Use assistive devices by end of the shift  Outcome: Progressing  Goal: Pace activities to prevent fatigue by end of the shift  Outcome: Progressing     Problem: Pain - Adult  Goal: Verbalizes/displays adequate comfort level or baseline comfort level  Outcome: Progressing     Problem: Safety - Adult  Goal: Free from fall injury  Outcome: Progressing     Problem: Discharge Planning  Goal: Discharge to home or other facility with appropriate resources  Outcome: Progressing     Problem: Chronic Conditions and Co-morbidities  Goal: Patient's chronic conditions and co-morbidity symptoms are monitored and maintained or improved  Outcome: Progressing

## 2025-01-07 NOTE — PRE-PROCEDURE NOTE
Interventional Radiology Preprocedure Note    Indication for procedure: The encounter diagnosis was JAMAL (acute kidney injury) (CMS-HCC).    Relevant review of systems:  ROS negative, including chest pain, shortness of breath, abdominal pain, nausea, and vomiting.  + Back pain.     Relevant Labs:   Lab Results   Component Value Date    CREATININE 1.13 01/07/2025    EGFR 68 01/07/2025    INR 1.2 (H) 01/05/2025    PROTIME 13.1 (H) 01/05/2025       Planned Sedation/Anesthesia: Minimal    Airway assessment: normal    Directed physical examination:    A&Ox3, resting comfortably in bed, normal respiratory effort.     Mallampati: II (hard and soft palate, upper portion of tonsils and uvula visible)    ASA Score: ASA 3 - Patient with moderate systemic disease with functional limitations    Benefits, risks and alternatives of procedure and planned sedation have been discussed with the patient and/or their representative. All questions answered and they agree to proceed.

## 2025-01-07 NOTE — PROGRESS NOTES
"Abdi Retana \"Louise" is a 74 y.o. male on day 3 of admission presenting with JAMAL (acute kidney injury) (CMS-HCC).      Subjective   Transferred from [facility] for expedited work up of suspected R. RCC with L4/5 mets. Also has Proteus Mirabilis UTI c/b bacteremia.    NAEON. Overall appears comfortable although he has some backache.    Objective     Last Recorded Vitals  BP (!) 147/47   Pulse 100   Temp 36.6 °C (97.9 °F)   Resp 16   Wt 120 kg (264 lb)   SpO2 99%   Intake/Output last 3 Shifts:    Intake/Output Summary (Last 24 hours) at 1/7/2025 1354  Last data filed at 1/7/2025 0550  Gross per 24 hour   Intake --   Output 550 ml   Net -550 ml       Admission Weight  Weight: 120 kg (264 lb) (01/04/25 1900)    Daily Weight  01/04/25 : 120 kg (264 lb)    No current facility-administered medications on file prior to encounter.     Current Outpatient Medications on File Prior to Encounter   Medication Sig Dispense Refill    acetaminophen (Tylenol 8 HOUR) 650 mg ER tablet Take 1 tablet (650 mg) by mouth every 6 hours if needed for mild pain (1 - 3). Do not crush, chew, or split.      acetaminophen (Tylenol) 325 mg tablet Take 2 tablets (650 mg) by mouth 2 times a day.      aspirin 81 mg EC tablet Take 1 tablet (81 mg) by mouth once daily.      bisacodyl (Gentle Laxative, bisacodyl,) 10 mg suppository Insert 1 suppository (10 mg) into the rectum once daily as needed for constipation.      cefepime (Maxipime) IV Infuse 100 mL (2 g) over 0.5 hours into a venous catheter every 12 hours.      famotidine (Pepcid) 20 mg tablet Take 1 tablet (20 mg) by mouth 2 times a day.      ferrous sulfate (FeroSuL) 325 (65 Fe) MG tablet Take 1 tablet (325 mg) by mouth once every 24 hours. 90 tablet 3    losartan (Cozaar) 50 mg tablet TAKE 1 TABLET BY MOUTH ONCE  DAILY 90 tablet 3    magnesium hydroxide (Milk of Magnesia) 400 mg/5 mL suspension Take 30 mL by mouth once daily as needed for constipation (if no BM in 3 consecutive days).      " magnesium oxide 400 mg magnesium capsule Take 1 capsule (400 mg) by mouth once daily. 90 capsule 3    mineral oil enema Insert 133 mL (1 enema) into the rectum once daily as needed for constipation (if no BM in 8 hours after getting suppository).      potassium chloride CR 10 mEq ER tablet TAKE 1 TABLET BY MOUTH ONCE  DAILY DO NOT CRUSH, CHEW, OR  SPLIT 90 tablet 3    torsemide (Demadex) 20 mg tablet TAKE 1 TABLET BY MOUTH ONCE  DAILY 90 tablet 3    traMADol (Ultram) 50 mg tablet Take 1 tablet (50 mg) by mouth every 6 hours if needed for severe pain (7 - 10).      vitamin D3-folic acid 2,500 unit- 1 mg tablet Take 1 mg by mouth once every 24 hours. 90 tablet 3    [DISCONTINUED] clotrimazole-betamethasone (Lotrisone) cream MIX WITH ZINC OXIDE AND APPLY  TOPICALLY TO AFFECTED AREA(S)  TWICE DAILY (Patient not taking: Reported on 1/2/2025) 45 g 0    [DISCONTINUED] zinc oxide 10 % cream To mix with Lotrisone and apply twice daily (Patient not taking: Reported on 1/2/2025) 50 g 0        Physical Exam  HENT:      Head: Normocephalic and atraumatic.      Mouth/Throat:      Mouth: Mucous membranes are moist.   Eyes:      General: No scleral icterus.     Extraocular Movements: Extraocular movements intact.      Pupils: Pupils are equal, round, and reactive to light.   Cardiovascular:      Rate and Rhythm: Normal rate and regular rhythm.      Heart sounds: No murmur heard.  Pulmonary:      Effort: No respiratory distress.      Breath sounds: No wheezing or rales.   Abdominal:      General: There is no distension.      Tenderness: There is no abdominal tenderness. There is no guarding.   Musculoskeletal:      Comments: Legs with dressing on, images on epic show possible stasis dermatitis with ulcers   Skin:     Capillary Refill: Capillary refill takes less than 2 seconds.   Neurological:      General: No focal deficit present.      Mental Status: He is alert and oriented to person, place, and time.      Motor: Weakness  present.      Comments: Richter in b/l LE 3/5, significantly limited by pain   Psychiatric:         Mood and Affect: Mood normal.         Relevant Results    Results for orders placed or performed during the hospital encounter of 01/04/25 (from the past 24 hours)   CBC and Auto Differential   Result Value Ref Range    WBC 7.3 4.4 - 11.3 x10*3/uL    nRBC 0.0 0.0 - 0.0 /100 WBCs    RBC 3.53 (L) 4.50 - 5.90 x10*6/uL    Hemoglobin 9.2 (L) 13.5 - 17.5 g/dL    Hematocrit 30.5 (L) 41.0 - 52.0 %    MCV 86 80 - 100 fL    MCH 26.1 26.0 - 34.0 pg    MCHC 30.2 (L) 32.0 - 36.0 g/dL    RDW 16.7 (H) 11.5 - 14.5 %    Platelets 240 150 - 450 x10*3/uL    Neutrophils % 69.3 40.0 - 80.0 %    Immature Granulocytes %, Automated 1.9 (H) 0.0 - 0.9 %    Lymphocytes % 15.3 13.0 - 44.0 %    Monocytes % 9.3 2.0 - 10.0 %    Eosinophils % 3.1 0.0 - 6.0 %    Basophils % 1.1 0.0 - 2.0 %    Neutrophils Absolute 5.06 1.60 - 5.50 x10*3/uL    Immature Granulocytes Absolute, Automated 0.14 0.00 - 0.50 x10*3/uL    Lymphocytes Absolute 1.12 0.80 - 3.00 x10*3/uL    Monocytes Absolute 0.68 0.05 - 0.80 x10*3/uL    Eosinophils Absolute 0.23 0.00 - 0.40 x10*3/uL    Basophils Absolute 0.08 0.00 - 0.10 x10*3/uL   Magnesium   Result Value Ref Range    Magnesium 2.11 1.60 - 2.40 mg/dL   Renal Function Panel   Result Value Ref Range    Glucose 97 74 - 99 mg/dL    Sodium 138 136 - 145 mmol/L    Potassium 4.6 3.5 - 5.3 mmol/L    Chloride 105 98 - 107 mmol/L    Bicarbonate 25 21 - 32 mmol/L    Anion Gap 13 10 - 20 mmol/L    Urea Nitrogen 23 6 - 23 mg/dL    Creatinine 1.13 0.50 - 1.30 mg/dL    eGFR 68 >60 mL/min/1.73m*2    Calcium 9.3 8.6 - 10.6 mg/dL    Phosphorus 3.0 2.5 - 4.9 mg/dL    Albumin 2.9 (L) 3.4 - 5.0 g/dL      CT chest abdomen pelvis wo IV contrast    Result Date: 1/2/2025  CHEST: 1.  History of right renal cell carcinoma. 2. Multiple pulmonary nodules consistent with metastatic disease.   ABDOMEN AND PELVIS: 1.  History upright or renal cell carcinoma.  There is an unchanged necrotic mass involving the upper pole of the right kidney. 2. Bilateral renal cysts. 3. Bilateral nonobstructing intrarenal calculi with staghorn type calculi in the left kidney. There is no renal obstruction. 4. Complete bony destruction of L4 with a soft tissue mass with expansion of the vertebral body. Lytic lesion involving the superior endplate of L5. Findings are consistent with bony metastases.   MACRO: None   Signed by: Lucia Desai 1/2/2025 10:33 AM Dictation workstation:   [id number]    XR chest 1 view    Result Date: 1/2/2025  No pneumonic consolidations are noted. There questionable perihilar nodules which may correspond to the masses noted on the prior CT.     MACRO: None   Signed by: Patrick Arnold 1/2/2025 8:33 AM Dictation workstation:   [id number]      Assessment/Plan      Assessment & Plan  JAMAL (acute kidney injury) (CMS-HCC)    Mr. Abdi Retana is a 73 yo M with PMHx of HTN, GERD, SAMIA, Chronic lymphedema, venous insufficiency, OA, CKD-3(b/l 1.2-1.5), also w/ the following oncologic Hx - no formal Dx at this time. Presenting symptom was b/l LE weakness. Was admitted to [facility] 11/30-12/5 for this after IMG revealed a concerning L4-5 lesion w/ mod-severe cord compression in s/o R menal mass and suspicious lesions in adrenals lungs, and spine. This was c/f undiagnosed metastatic R RCC. Case was discussed w/ both IR and ortho spine. It was deemed that no inpt Bx nor surgery was indicated and pt was DC to SNF w/ plan for IR-guided L4 Bx (scheduled for 12/20; pt missed d/t transport issues w/ SNF). MRI brain and C/T spine w/ contrast was attempted but pt did not tolerate d/t claustrophobia - there was a plan to complete it OP w/ anesthesia. Transferred from [facility] for expedited work up of suspected R. RCC with L4/5 mets. Also has Proteus Mirabilis UTI c/b bacteremia I/s/o bilateral non obstructing renal stones. ID consulted for antibiotic management. IR consulted for L4 biopsy  "and Ortho spine wants brain MRI and full spine MRI.    Update 1/7/25  -Had L4 lesion biopsy today  -Head MRI and MRI full spine under anesthesia pending  -NSGY consulted-No acute neurosurgical intervention at this time.      P. mirabilis bacteremia 2/2 UTI  -HDS  -associated w/ R renal mass, b/l non-obstructing renal stones  -sensitivities as above  -continue cefepime (renally dosed for CrCl 50, will need continually readjusted)  -seen by uro at [facility], did not have plan for procedure given non-obstructive  -consider ID consult for duration, procedure rec given complex anatomy of stones and renal mass - c/f seeding  -ID following, recommend Augmentin for 3 weeks(EOT-1/23)     C/f undiagnosed metastatic R RCC  -IR consult for possible inpt Bx, plan for Bx of L4-5 lesion  -no erythrocytosis to suggest high EPO  -plan for possible MRI brain, MRI spine w/ contrast (had lumbar done already); pt requesting w/ anesthesia as says \"did not fit\" when took him down for this     L4-5 lesion w/ radiographic evidence cord compression  -pt at baseline 4/5 LE weakness on exam, symmetric, no incontinence or saddle anesthesia  -there have been no plans for procedures per prior evaluations  -IMG as above  -d/w ortho spine, said if going for anesthesia should do the full spine  -consider formal  spine surgery consult if neuro change and/or IMG/Bx completed  -had been seen by NSG 1/3 who thought exam was okay w/o weakness, did not rec an intervention  -On scheduled tylenol and prn oxy/dilaudid     HTN  CAD  -holding home  losartan 50mg  I/s/o recent rise in Cr  -holding home Aspirin while pending biopsy    Lymphedema  Venous insufficiency/venous ulcers  -wound care consulted  -hold home torsemide 20 mg daily for now    -OSH wound recs  BLE- Chronic venous status ulcers   Irrigate with normal saline or wound cleanser, Pat dry.  Apply lotion to intact skin   Cover weeping areas with Triad Hydrophillic wound dressing ointment   Pad and " protect with ABD, Kerlix and paper tape.   Change every day and as needed   Secure with Tubigrip ( Apply from base of toes to 2 finger width below the bend of knee)      Gluteal cleft & buttocks-MASD/Irritant contact dermatitis due to fecal, urinary or dual incontinent.   Cleanse with bath wipes or soap and water. Rinse well and pat dry.   Apply no sting skin barrier (cavilon)   Apply Triad hydrophilic wound dressing ointment to gluteal fold/groin three times a day and as needed.     GERD  -c/w home famotidine 20 mg bid    Disposition  -barriers: clinical improvement  -destination: likely back to SNF ([facility])  -f/u: pending     Diet: cardiac (NPO after MN if possible procedure)  Electrolytes: K >3.5; Mg >2  DVT ppx: SCD (otherwise subcutaneous heparin for JAMAL; momentarily held pending waiting on Bx/procedures)  GI ppx: not indicated  Lines/tubes: PIV  O2: none  ggt: none  Baseline Cr: 1.5  T+S: 1/5  Code status: Full code  Surrogate decision maker: Micaela Mazin (sister) [phone]    Ganesh Jones MD  Internal Medicne  PGY-1

## 2025-01-07 NOTE — HOSPITAL COURSE
Mr. Abdi Retana is a 75 yo M with PMHx of HTN, GERD, SAMIA, Chronic lymphedema, venous insufficiency, OA, CKD-3(b/l 1.2-1.5), also w/ the following oncologic Hx - no formal Dx at this time. Presenting symptom was b/l LE weakness. Was admitted to [facility] 11/30-12/5 for this after IMG revealed a concerning L4-5 lesion w/ mod-severe cord compression in s/o R menal mass and suspicious lesions in adrenals lungs, and spine. This was c/f undiagnosed metastatic R RCC. Case was discussed w/ both IR and ortho spine. It was deemed that no inpt Bx nor surgery was indicated and pt was DC to SNF w/ plan for IR-guided L4 Bx (scheduled for 12/20; pt missed d/t transport issues w/ SNF). MRI brain and C/T spine w/ contrast was attempted but pt did not tolerate d/t claustrophobia - there was a plan to complete it OP w/ anesthesia. Transferred from [facility] for expedited work up of suspected R. RCC with L4/5 mets after he presented there with bloody urine and on-going backache . Also has Proteus Mirabilis UTI c/b bacteremia I/s/o bilateral non obstructing renal stones. ID consulted for antibiotic management. IR consulted for L4 biopsy and Ortho spine wants brain MRI and full spine MRI. At [facility] where he was admitted from 1/2-1/4, NSGY saw him and didn't think urgent interventions were needed, urology also did not offer any intervention as stones were non obstructive.  ID switched Cefepime to Augmentin-EOT-1/23. He was scheduled for IR L4 biopsy and to get MRI head and full spine as of 1/7.    1/7-biopsy of L4 lesion was done without complications, he continued to wait for his MRI head and Full spine to be completed.  1/9-Head and full MRI done on 1/8, head MRI was c/f  small acute stroke in the right parietal lobe. Neuro-stroke consulted, recommend CTA H&N, and TTE. He was continued on his aspirin and started on atorvastatin with LDL target of below 70. Neurology also recommended therapeutic Lovenox 1 mg/kg q12h when safe from a bleeding a  procedural standpoint. Neurology follow up outpatient required. Ortho spine and Rad-onc were involved. Ortho spine assesses him and felt he would benefit from surgery but wanted repeat Bcs to make sure he is no more bacteremic.  1/10-Repeat Bcs pending, Rad-onc could offer radiation treatment but pending pathology diagnosis.  1/11-blood cultures were negative for bacteria. Per ortho-high risk of bleed if lesion is RCC, would recommend IR embolization of vessels supplying the lesion around the L4 area.  Rad Onc in agreeance of surgery first then radiation afterwards would be best .     1/13:    1/14:        Follow up  -please arrange neurology follow up at time of discharge   -Talk to IR on 1/13 if more embolization to L4 is required prior to surgery. Already had embolization to control bleeding after biopsy on 1/7.  -call path to get prelim path report  -fu ortho and rad-onc recs

## 2025-01-08 ENCOUNTER — APPOINTMENT (OUTPATIENT)
Dept: RADIOLOGY | Facility: HOSPITAL | Age: 75
DRG: 456 | End: 2025-01-08
Payer: MEDICARE

## 2025-01-08 ENCOUNTER — ANESTHESIA (OUTPATIENT)
Dept: RADIOLOGY | Facility: HOSPITAL | Age: 75
End: 2025-01-08
Payer: MEDICARE

## 2025-01-08 ENCOUNTER — ANESTHESIA EVENT (OUTPATIENT)
Dept: RADIOLOGY | Facility: HOSPITAL | Age: 75
End: 2025-01-08
Payer: MEDICARE

## 2025-01-08 LAB
ALBUMIN SERPL BCP-MCNC: 2.9 G/DL (ref 3.4–5)
ANION GAP SERPL CALC-SCNC: 12 MMOL/L (ref 10–20)
BASOPHILS # BLD AUTO: 0.03 X10*3/UL (ref 0–0.1)
BASOPHILS NFR BLD AUTO: 0.3 %
BUN SERPL-MCNC: 23 MG/DL (ref 6–23)
CALCIUM SERPL-MCNC: 9.4 MG/DL (ref 8.6–10.6)
CHLORIDE SERPL-SCNC: 103 MMOL/L (ref 98–107)
CO2 SERPL-SCNC: 24 MMOL/L (ref 21–32)
CREAT SERPL-MCNC: 1.14 MG/DL (ref 0.5–1.3)
EGFRCR SERPLBLD CKD-EPI 2021: 67 ML/MIN/1.73M*2
EOSINOPHIL # BLD AUTO: 0.24 X10*3/UL (ref 0–0.4)
EOSINOPHIL NFR BLD AUTO: 2.5 %
ERYTHROCYTE [DISTWIDTH] IN BLOOD BY AUTOMATED COUNT: 16.7 % (ref 11.5–14.5)
GLUCOSE SERPL-MCNC: 125 MG/DL (ref 74–99)
HCT VFR BLD AUTO: 31 % (ref 41–52)
HGB BLD-MCNC: 9.2 G/DL (ref 13.5–17.5)
IMM GRANULOCYTES # BLD AUTO: 0.05 X10*3/UL (ref 0–0.5)
IMM GRANULOCYTES NFR BLD AUTO: 0.5 % (ref 0–0.9)
LYMPHOCYTES # BLD AUTO: 1.05 X10*3/UL (ref 0.8–3)
LYMPHOCYTES NFR BLD AUTO: 11 %
MAGNESIUM SERPL-MCNC: 2.01 MG/DL (ref 1.6–2.4)
MCH RBC QN AUTO: 25.7 PG (ref 26–34)
MCHC RBC AUTO-ENTMCNC: 29.7 G/DL (ref 32–36)
MCV RBC AUTO: 87 FL (ref 80–100)
MONOCYTES # BLD AUTO: 0.63 X10*3/UL (ref 0.05–0.8)
MONOCYTES NFR BLD AUTO: 6.6 %
NEUTROPHILS # BLD AUTO: 7.51 X10*3/UL (ref 1.6–5.5)
NEUTROPHILS NFR BLD AUTO: 79.1 %
NRBC BLD-RTO: 0 /100 WBCS (ref 0–0)
PHOSPHATE SERPL-MCNC: 3.2 MG/DL (ref 2.5–4.9)
PLATELET # BLD AUTO: 269 X10*3/UL (ref 150–450)
POTASSIUM SERPL-SCNC: 4.4 MMOL/L (ref 3.5–5.3)
RBC # BLD AUTO: 3.58 X10*6/UL (ref 4.5–5.9)
SODIUM SERPL-SCNC: 135 MMOL/L (ref 136–145)
WBC # BLD AUTO: 9.5 X10*3/UL (ref 4.4–11.3)

## 2025-01-08 PROCEDURE — BR39ZZZ MAGNETIC RESONANCE IMAGING (MRI) OF LUMBAR SPINE: ICD-10-PCS

## 2025-01-08 PROCEDURE — 85025 COMPLETE CBC W/AUTO DIFF WBC: CPT

## 2025-01-08 PROCEDURE — 7100000002 HC RECOVERY ROOM TIME - EACH INCREMENTAL 1 MINUTE

## 2025-01-08 PROCEDURE — A72156 CHG MRI, CERV SPINE COMBO: Performed by: NURSE ANESTHETIST, CERTIFIED REGISTERED

## 2025-01-08 PROCEDURE — 72158 MRI LUMBAR SPINE W/O & W/DYE: CPT | Performed by: RADIOLOGY

## 2025-01-08 PROCEDURE — BR37ZZZ MAGNETIC RESONANCE IMAGING (MRI) OF THORACIC SPINE: ICD-10-PCS

## 2025-01-08 PROCEDURE — 3700000002 HC GENERAL ANESTHESIA TIME - EACH INCREMENTAL 1 MINUTE

## 2025-01-08 PROCEDURE — 2500000001 HC RX 250 WO HCPCS SELF ADMINISTERED DRUGS (ALT 637 FOR MEDICARE OP)

## 2025-01-08 PROCEDURE — 2500000004 HC RX 250 GENERAL PHARMACY W/ HCPCS (ALT 636 FOR OP/ED)

## 2025-01-08 PROCEDURE — 97530 THERAPEUTIC ACTIVITIES: CPT | Mod: GP | Performed by: PHYSICAL THERAPIST

## 2025-01-08 PROCEDURE — 99232 SBSQ HOSP IP/OBS MODERATE 35: CPT

## 2025-01-08 PROCEDURE — A9575 INJ GADOTERATE MEGLUMI 0.1ML: HCPCS | Performed by: STUDENT IN AN ORGANIZED HEALTH CARE EDUCATION/TRAINING PROGRAM

## 2025-01-08 PROCEDURE — 80069 RENAL FUNCTION PANEL: CPT

## 2025-01-08 PROCEDURE — 72157 MRI CHEST SPINE W/O & W/DYE: CPT | Performed by: RADIOLOGY

## 2025-01-08 PROCEDURE — BR30ZZZ MAGNETIC RESONANCE IMAGING (MRI) OF CERVICAL SPINE: ICD-10-PCS

## 2025-01-08 PROCEDURE — 36415 COLL VENOUS BLD VENIPUNCTURE: CPT

## 2025-01-08 PROCEDURE — 2550000001 HC RX 255 CONTRASTS: Performed by: STUDENT IN AN ORGANIZED HEALTH CARE EDUCATION/TRAINING PROGRAM

## 2025-01-08 PROCEDURE — 72157 MRI CHEST SPINE W/O & W/DYE: CPT

## 2025-01-08 PROCEDURE — 70553 MRI BRAIN STEM W/O & W/DYE: CPT | Performed by: RADIOLOGY

## 2025-01-08 PROCEDURE — 72158 MRI LUMBAR SPINE W/O & W/DYE: CPT

## 2025-01-08 PROCEDURE — 2500000004 HC RX 250 GENERAL PHARMACY W/ HCPCS (ALT 636 FOR OP/ED): Performed by: NURSE ANESTHETIST, CERTIFIED REGISTERED

## 2025-01-08 PROCEDURE — 83735 ASSAY OF MAGNESIUM: CPT

## 2025-01-08 PROCEDURE — 7100000001 HC RECOVERY ROOM TIME - INITIAL BASE CHARGE

## 2025-01-08 PROCEDURE — 72156 MRI NECK SPINE W/O & W/DYE: CPT

## 2025-01-08 PROCEDURE — 1170000001 HC PRIVATE ONCOLOGY ROOM DAILY

## 2025-01-08 PROCEDURE — 70553 MRI BRAIN STEM W/O & W/DYE: CPT

## 2025-01-08 PROCEDURE — 99100 ANES PT EXTEME AGE<1 YR&>70: CPT | Performed by: ANESTHESIOLOGY

## 2025-01-08 PROCEDURE — A72156 CHG MRI, CERV SPINE COMBO: Performed by: ANESTHESIOLOGY

## 2025-01-08 PROCEDURE — 97110 THERAPEUTIC EXERCISES: CPT | Mod: GP | Performed by: PHYSICAL THERAPIST

## 2025-01-08 PROCEDURE — B030ZZZ MAGNETIC RESONANCE IMAGING (MRI) OF BRAIN: ICD-10-PCS | Performed by: STUDENT IN AN ORGANIZED HEALTH CARE EDUCATION/TRAINING PROGRAM

## 2025-01-08 PROCEDURE — 72156 MRI NECK SPINE W/O & W/DYE: CPT | Performed by: RADIOLOGY

## 2025-01-08 PROCEDURE — 3700000001 HC GENERAL ANESTHESIA TIME - INITIAL BASE CHARGE

## 2025-01-08 RX ORDER — MIDAZOLAM HYDROCHLORIDE 1 MG/ML
INJECTION INTRAMUSCULAR; INTRAVENOUS AS NEEDED
Status: DISCONTINUED | OUTPATIENT
Start: 2025-01-08 | End: 2025-01-08

## 2025-01-08 RX ORDER — LIDOCAINE HCL/PF 100 MG/5ML
SYRINGE (ML) INTRAVENOUS AS NEEDED
Status: DISCONTINUED | OUTPATIENT
Start: 2025-01-08 | End: 2025-01-08

## 2025-01-08 RX ORDER — HYDROMORPHONE HYDROCHLORIDE 1 MG/ML
0.2 INJECTION, SOLUTION INTRAMUSCULAR; INTRAVENOUS; SUBCUTANEOUS EVERY 5 MIN PRN
OUTPATIENT
Start: 2025-01-08

## 2025-01-08 RX ORDER — ONDANSETRON HYDROCHLORIDE 2 MG/ML
INJECTION, SOLUTION INTRAVENOUS AS NEEDED
Status: DISCONTINUED | OUTPATIENT
Start: 2025-01-08 | End: 2025-01-08

## 2025-01-08 RX ORDER — ONDANSETRON HYDROCHLORIDE 2 MG/ML
4 INJECTION, SOLUTION INTRAVENOUS ONCE AS NEEDED
OUTPATIENT
Start: 2025-01-08

## 2025-01-08 RX ORDER — SODIUM CHLORIDE, SODIUM LACTATE, POTASSIUM CHLORIDE, CALCIUM CHLORIDE 600; 310; 30; 20 MG/100ML; MG/100ML; MG/100ML; MG/100ML
100 INJECTION, SOLUTION INTRAVENOUS CONTINUOUS
OUTPATIENT
Start: 2025-01-08 | End: 2025-01-09

## 2025-01-08 RX ORDER — SODIUM CHLORIDE, SODIUM LACTATE, POTASSIUM CHLORIDE, CALCIUM CHLORIDE 600; 310; 30; 20 MG/100ML; MG/100ML; MG/100ML; MG/100ML
INJECTION, SOLUTION INTRAVENOUS CONTINUOUS PRN
Status: DISCONTINUED | OUTPATIENT
Start: 2025-01-08 | End: 2025-01-08

## 2025-01-08 RX ORDER — PHENYLEPHRINE HYDROCHLORIDE 10 MG/ML
INJECTION INTRAVENOUS AS NEEDED
Status: DISCONTINUED | OUTPATIENT
Start: 2025-01-08 | End: 2025-01-08

## 2025-01-08 RX ORDER — FENTANYL CITRATE 50 UG/ML
INJECTION, SOLUTION INTRAMUSCULAR; INTRAVENOUS AS NEEDED
Status: DISCONTINUED | OUTPATIENT
Start: 2025-01-08 | End: 2025-01-08

## 2025-01-08 RX ORDER — OXYCODONE HYDROCHLORIDE 5 MG/1
5 TABLET ORAL EVERY 4 HOURS PRN
OUTPATIENT
Start: 2025-01-08

## 2025-01-08 RX ORDER — PROPOFOL 10 MG/ML
INJECTION, EMULSION INTRAVENOUS AS NEEDED
Status: DISCONTINUED | OUTPATIENT
Start: 2025-01-08 | End: 2025-01-08

## 2025-01-08 RX ORDER — ROCURONIUM BROMIDE 10 MG/ML
INJECTION, SOLUTION INTRAVENOUS AS NEEDED
Status: DISCONTINUED | OUTPATIENT
Start: 2025-01-08 | End: 2025-01-08

## 2025-01-08 RX ORDER — GADOTERATE MEGLUMINE 376.9 MG/ML
20 INJECTION INTRAVENOUS
Status: COMPLETED | OUTPATIENT
Start: 2025-01-08 | End: 2025-01-08

## 2025-01-08 RX ORDER — OXYCODONE HYDROCHLORIDE 10 MG/1
10 TABLET ORAL EVERY 4 HOURS PRN
OUTPATIENT
Start: 2025-01-08

## 2025-01-08 RX ORDER — HYDROMORPHONE HYDROCHLORIDE 1 MG/ML
0.5 INJECTION, SOLUTION INTRAMUSCULAR; INTRAVENOUS; SUBCUTANEOUS EVERY 5 MIN PRN
OUTPATIENT
Start: 2025-01-08

## 2025-01-08 RX ORDER — LIDOCAINE HYDROCHLORIDE 10 MG/ML
0.1 INJECTION, SOLUTION INFILTRATION; PERINEURAL ONCE
OUTPATIENT
Start: 2025-01-08 | End: 2025-01-08

## 2025-01-08 RX ORDER — HEPARIN SODIUM 5000 [USP'U]/ML
5000 INJECTION, SOLUTION INTRAVENOUS; SUBCUTANEOUS EVERY 8 HOURS
Status: COMPLETED | OUTPATIENT
Start: 2025-01-08 | End: 2025-01-15

## 2025-01-08 RX ADMIN — SODIUM CHLORIDE, POTASSIUM CHLORIDE, SODIUM LACTATE AND CALCIUM CHLORIDE: 600; 310; 30; 20 INJECTION, SOLUTION INTRAVENOUS at 12:53

## 2025-01-08 RX ADMIN — ACETAMINOPHEN 975 MG: 325 TABLET, FILM COATED ORAL at 09:21

## 2025-01-08 RX ADMIN — HEPARIN SODIUM 5000 UNITS: 5000 INJECTION INTRAVENOUS; SUBCUTANEOUS at 17:07

## 2025-01-08 RX ADMIN — FAMOTIDINE 20 MG: 20 TABLET ORAL at 21:13

## 2025-01-08 RX ADMIN — PROPOFOL 30 MG: 10 INJECTION, EMULSION INTRAVENOUS at 13:19

## 2025-01-08 RX ADMIN — ACETAMINOPHEN 975 MG: 325 TABLET, FILM COATED ORAL at 17:07

## 2025-01-08 RX ADMIN — FENTANYL CITRATE 50 MCG: 50 INJECTION, SOLUTION INTRAMUSCULAR; INTRAVENOUS at 12:57

## 2025-01-08 RX ADMIN — AMOXICILLIN AND CLAVULANATE POTASSIUM 1 TABLET: 875; 125 TABLET, FILM COATED ORAL at 09:21

## 2025-01-08 RX ADMIN — ROCURONIUM 50 MG: 50 INJECTION, SOLUTION INTRAVENOUS at 12:58

## 2025-01-08 RX ADMIN — LIDOCAINE HYDROCHLORIDE 75 MG: 20 INJECTION, SOLUTION INTRAVENOUS at 12:58

## 2025-01-08 RX ADMIN — PROPOFOL 20 MG: 10 INJECTION, EMULSION INTRAVENOUS at 13:17

## 2025-01-08 RX ADMIN — OXYCODONE HYDROCHLORIDE 5 MG: 5 TABLET ORAL at 06:23

## 2025-01-08 RX ADMIN — AMOXICILLIN AND CLAVULANATE POTASSIUM 1 TABLET: 875; 125 TABLET, FILM COATED ORAL at 21:14

## 2025-01-08 RX ADMIN — PHENYLEPHRINE HYDROCHLORIDE 100 MCG: 10 INJECTION INTRAVENOUS at 13:03

## 2025-01-08 RX ADMIN — MIDAZOLAM HYDROCHLORIDE 1 MG: 1 INJECTION, SOLUTION INTRAMUSCULAR; INTRAVENOUS at 13:12

## 2025-01-08 RX ADMIN — ONDANSETRON 4 MG: 2 INJECTION, SOLUTION INTRAMUSCULAR; INTRAVENOUS at 14:53

## 2025-01-08 RX ADMIN — GADOTERATE MEGLUMINE 20 ML: 376.9 INJECTION INTRAVENOUS at 14:50

## 2025-01-08 RX ADMIN — FAMOTIDINE 20 MG: 20 TABLET ORAL at 09:21

## 2025-01-08 RX ADMIN — ACETAMINOPHEN 975 MG: 325 TABLET, FILM COATED ORAL at 21:13

## 2025-01-08 RX ADMIN — SUGAMMADEX 200 MG: 100 INJECTION, SOLUTION INTRAVENOUS at 15:36

## 2025-01-08 RX ADMIN — PROPOFOL 150 MG: 10 INJECTION, EMULSION INTRAVENOUS at 12:58

## 2025-01-08 RX ADMIN — SENNOSIDES 8.6 MG: 8.6 TABLET, FILM COATED ORAL at 21:13

## 2025-01-08 SDOH — HEALTH STABILITY: MENTAL HEALTH: CURRENT SMOKER: 0

## 2025-01-08 ASSESSMENT — PAIN SCALES - GENERAL
PAINLEVEL_OUTOF10: 5 - MODERATE PAIN
PAINLEVEL_OUTOF10: 0 - NO PAIN
PAINLEVEL_OUTOF10: 8
PAINLEVEL_OUTOF10: 0 - NO PAIN
PAINLEVEL_OUTOF10: 5 - MODERATE PAIN
PAINLEVEL_OUTOF10: 6

## 2025-01-08 ASSESSMENT — PAIN - FUNCTIONAL ASSESSMENT
PAIN_FUNCTIONAL_ASSESSMENT: 0-10

## 2025-01-08 ASSESSMENT — COGNITIVE AND FUNCTIONAL STATUS - GENERAL
PERSONAL GROOMING: A LITTLE
MOVING TO AND FROM BED TO CHAIR: A LITTLE
MOBILITY SCORE: 16
CLIMB 3 TO 5 STEPS WITH RAILING: A LOT
DRESSING REGULAR UPPER BODY CLOTHING: A LITTLE
STANDING UP FROM CHAIR USING ARMS: A LOT
DRESSING REGULAR LOWER BODY CLOTHING: A LOT
WALKING IN HOSPITAL ROOM: A LOT
TURNING FROM BACK TO SIDE WHILE IN FLAT BAD: A LITTLE
HELP NEEDED FOR BATHING: A LOT
TOILETING: A LOT
DAILY ACTIVITIY SCORE: 16

## 2025-01-08 ASSESSMENT — PAIN DESCRIPTION - LOCATION: LOCATION: BACK

## 2025-01-08 NOTE — ANESTHESIA PREPROCEDURE EVALUATION
"Patient: Abdi Retana \"Ray\"    Procedure Information       Anesthesia Start Date/Time: 01/08/25 1253    Scheduled providers: Alta Ogden MD    Procedure: MR LUMBAR SPINE W AND WO CONTRAST    Location: [facility]            Relevant Problems   Cardiac   (+) Peripheral vascular disease, unspecified (CMS-HCC)      GI   (+) Gastro-esophageal reflux disease without esophagitis      /Renal   (+) Renal stones   (+) UTI (urinary tract infection)      Endocrine   (+) Obesity   (+) Obesity, unspecified      Hematology   (+) Anemia   (+) Iron deficiency anemia, unspecified      Musculoskeletal   (+) OA (osteoarthritis) of knee      ID   (+) UTI (urinary tract infection)       Clinical information reviewed:   Tobacco  Allergies   Problems  Med Hx  Surg Hx   Fam Hx  Soc Hx        NPO Detail:  NPO/Void Status  Date of Last Liquid: 01/06/25  Date of Last Solid: 01/06/25         Physical Exam    Airway  Mallampati: IV  TM distance: >3 FB  Neck ROM: full     Cardiovascular   Rhythm: regular     Dental    Pulmonary   Breath sounds clear to auscultation     Abdominal   (+) obese             Anesthesia Plan    History of general anesthesia?: yes  History of complications of general anesthesia?: no    ASA 3     general     The patient is not a current smoker.    intravenous induction   Anesthetic plan and risks discussed with patient.    Plan discussed with CRNA.      "

## 2025-01-08 NOTE — ANESTHESIA PROCEDURE NOTES
Airway  Date/Time: 1/8/2025 1:00 PM  Urgency: elective    Airway not difficult    Staffing  Performed: CRNA   Authorized by: Gaudencio Nobles MD    Performed by: HUSSEIN Garcia-CRNA  Patient location during procedure: OR    Indications and Patient Condition  Indications for airway management: anesthesia  Spontaneous Ventilation: absent  Sedation level: deep  Preoxygenated: yes  Patient position: sniffing  Mask difficulty assessment: 1 - vent by mask    Final Airway Details  Final airway type: endotracheal airway      Successful airway: ETT     Successful intubation technique: video laryngoscopy  Facilitating devices/methods: intubating stylet  Blade size: #3  ETT size (mm): 7.5  Cormack-Lehane Classification: grade IIa - partial view of glottis  Placement verified by: capnometry   Measured from: lips  ETT to lips (cm): 22  Number of attempts at approach: 1

## 2025-01-08 NOTE — PROGRESS NOTES
"Abdi Retana \"Louise" is a 74 y.o. male on day 4 of admission presenting with JAMAL (acute kidney injury) (CMS-HCC).      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 Also has Proteus Mirabilis UTI c/b bacteremia.    TIGRE. Overall appears comfortable although he has some backache. Had his biopsy to L4 yesterday, now pending MRI    Objective     Last Recorded Vitals  /57 (BP Location: Left arm, Patient Position: Lying)   Pulse 78   Temp 36.4 °C (97.5 °F) (Temporal)   Resp 18   Wt 120 kg (264 lb)   SpO2 94%   Intake/Output last 3 Shifts:    Intake/Output Summary (Last 24 hours) at 1/8/2025 1025  Last data filed at 1/8/2025 0839  Gross per 24 hour   Intake --   Output 650 ml   Net -650 ml       Admission Weight  Weight: 120 kg (264 lb) (01/04/25 1900)    Daily Weight  01/04/25 : 120 kg (264 lb)    No current facility-administered medications on file prior to encounter.     Current Outpatient Medications on File Prior to Encounter   Medication Sig Dispense Refill    acetaminophen (Tylenol 8 HOUR) 650 mg ER tablet Take 1 tablet (650 mg) by mouth every 6 hours if needed for mild pain (1 - 3). Do not crush, chew, or split.      acetaminophen (Tylenol) 325 mg tablet Take 2 tablets (650 mg) by mouth 2 times a day.      aspirin 81 mg EC tablet Take 1 tablet (81 mg) by mouth once daily.      bisacodyl (Gentle Laxative, bisacodyl,) 10 mg suppository Insert 1 suppository (10 mg) into the rectum once daily as needed for constipation.      cefepime (Maxipime) IV Infuse 100 mL (2 g) over 0.5 hours into a venous catheter every 12 hours.      famotidine (Pepcid) 20 mg tablet Take 1 tablet (20 mg) by mouth 2 times a day.      ferrous sulfate (FeroSuL) 325 (65 Fe) MG tablet Take 1 tablet (325 mg) by mouth once every 24 hours. 90 tablet 3    losartan (Cozaar) 50 mg tablet TAKE 1 TABLET BY MOUTH ONCE  DAILY 90 tablet 3    magnesium hydroxide (Milk of Magnesia) 400 mg/5 mL suspension " Take 30 mL by mouth once daily as needed for constipation (if no BM in 3 consecutive days).      magnesium oxide 400 mg magnesium capsule Take 1 capsule (400 mg) by mouth once daily. 90 capsule 3    mineral oil enema Insert 133 mL (1 enema) into the rectum once daily as needed for constipation (if no BM in 8 hours after getting suppository).      potassium chloride CR 10 mEq ER tablet TAKE 1 TABLET BY MOUTH ONCE  DAILY DO NOT CRUSH, CHEW, OR  SPLIT 90 tablet 3    torsemide (Demadex) 20 mg tablet TAKE 1 TABLET BY MOUTH ONCE  DAILY 90 tablet 3    traMADol (Ultram) 50 mg tablet Take 1 tablet (50 mg) by mouth every 6 hours if needed for severe pain (7 - 10).      vitamin D3-folic acid 2,500 unit- 1 mg tablet Take 1 mg by mouth once every 24 hours. 90 tablet 3    [DISCONTINUED] clotrimazole-betamethasone (Lotrisone) cream MIX WITH ZINC OXIDE AND APPLY  TOPICALLY TO AFFECTED AREA(S)  TWICE DAILY (Patient not taking: Reported on 1/2/2025) 45 g 0    [DISCONTINUED] zinc oxide 10 % cream To mix with Lotrisone and apply twice daily (Patient not taking: Reported on 1/2/2025) 50 g 0        Physical Exam  HENT:      Head: Normocephalic and atraumatic.      Mouth/Throat:      Mouth: Mucous membranes are moist.   Eyes:      General: No scleral icterus.     Extraocular Movements: Extraocular movements intact.      Pupils: Pupils are equal, round, and reactive to light.   Cardiovascular:      Rate and Rhythm: Normal rate and regular rhythm.      Heart sounds: No murmur heard.  Pulmonary:      Effort: No respiratory distress.      Breath sounds: No wheezing or rales.   Abdominal:      General: There is no distension.      Tenderness: There is no abdominal tenderness. There is no guarding.   Musculoskeletal:      Comments: Legs with dressing on, images on epic show possible stasis dermatitis with ulcers   Skin:     Capillary Refill: Capillary refill takes less than 2 seconds.   Neurological:      General: No focal deficit present.       Mental Status: He is alert and oriented to person, place, and time.      Motor: Weakness present.      Comments: Richter in b/l LE 3/5, significantly limited by pain   Psychiatric:         Mood and Affect: Mood normal.         Relevant Results    Results for orders placed or performed during the hospital encounter of 01/04/25 (from the past 24 hours)   Magnesium   Result Value Ref Range    Magnesium 2.01 1.60 - 2.40 mg/dL   Renal Function Panel   Result Value Ref Range    Glucose 125 (H) 74 - 99 mg/dL    Sodium 135 (L) 136 - 145 mmol/L    Potassium 4.4 3.5 - 5.3 mmol/L    Chloride 103 98 - 107 mmol/L    Bicarbonate 24 21 - 32 mmol/L    Anion Gap 12 10 - 20 mmol/L    Urea Nitrogen 23 6 - 23 mg/dL    Creatinine 1.14 0.50 - 1.30 mg/dL    eGFR 67 >60 mL/min/1.73m*2    Calcium 9.4 8.6 - 10.6 mg/dL    Phosphorus 3.2 2.5 - 4.9 mg/dL    Albumin 2.9 (L) 3.4 - 5.0 g/dL      CT chest abdomen pelvis wo IV contrast    Result Date: 1/2/2025  CHEST: 1.  History of right renal cell carcinoma. 2. Multiple pulmonary nodules consistent with metastatic disease.   ABDOMEN AND PELVIS: 1.  History upright or renal cell carcinoma. There is an unchanged necrotic mass involving the upper pole of the right kidney. 2. Bilateral renal cysts. 3. Bilateral nonobstructing intrarenal calculi with staghorn type calculi in the left kidney. There is no renal obstruction. 4. Complete bony destruction of L4 with a soft tissue mass with expansion of the vertebral body. Lytic lesion involving the superior endplate of L5. Findings are consistent with bony metastases.   MACRO: None   Signed by: Lucia Desai 1/2/2025 10:33 AM Dictation workstation:   [id number]    XR chest 1 view    Result Date: 1/2/2025  No pneumonic consolidations are noted. There questionable perihilar nodules which may correspond to the masses noted on the prior CT.     MACRO: None   Signed by: Patrick Arnold 1/2/2025 8:33 AM Dictation workstation:   [id number]      Assessment/Plan     "  Assessment & Plan  JAMAL (acute kidney injury) (CMS-[facility])    Mr. Abdi Retana is a 75 yo M with PMHx of HTN, GERD, SAMIA, Chronic lymphedema, venous insufficiency, OA, CKD-3(b/l 1.2-1.5), also w/ the following oncologic Hx - no formal Dx at this time. Presenting symptom was b/l LE weakness. Was admitted to [facility] 11/30-12/5 for this after IMG revealed a concerning L4-5 lesion w/ mod-severe cord compression in s/o R menal mass and suspicious lesions in adrenals lungs, and spine. This was c/f undiagnosed metastatic R RCC. Case was discussed w/ both IR and ortho spine. It was deemed that no inpt Bx nor surgery was indicated and pt was DC to SNF w/ plan for IR-guided L4 Bx (scheduled for 12/20; pt missed d/t transport issues w/ SNF). MRI brain and C/T spine w/ contrast was attempted but pt did not tolerate d/t claustrophobia - there was a plan to complete it OP w/ anesthesia. Transferred from [facility] for expedited work up of suspected R. RCC with L4/5 mets. Also has Proteus Mirabilis UTI c/b bacteremia I/s/o bilateral non obstructing renal stones. ID consulted for antibiotic management. IR consulted for L4 biopsy and Ortho spine wants brain MRI and full spine MRI.    Update 1/8/25  -Head MRI and MRI full spine under anesthesia pending  -pending L4 tumor biopsy results      P. mirabilis bacteremia 2/2 UTI  -HDS  -associated w/ R renal mass, b/l non-obstructing renal stones  -sensitivities available  -seen by uro at [facility], did not have plan for procedure given non-obstructive  -ID consulted for duration, procedure rec given complex anatomy of stones and renal mass - c/f seeding  -ID following, recommend Augmentin for 3 weeks(EOT-1/23)     C/f undiagnosed metastatic R RCC  -IR consult for possible inpt Bx, plan for Bx of L4-5 lesion  -no erythrocytosis to suggest high EPO  -plan for possible MRI brain, MRI spine w/ contrast (had lumbar done already); pt requesting w/ anesthesia as says \"did not fit\" when took him down for " this     L4-5 lesion w/ radiographic evidence cord compression  -pt at baseline 4/5 LE weakness on exam, symmetric, no incontinence or saddle anesthesia  -there have been no plans for procedures per prior evaluations  -IMG as above  -d/w ortho spine, said if going for anesthesia should do the full spine  -consider formal  spine surgery consult if neuro change and/or IMG/Bx completed  -had been seen by NSG 1/3 who thought exam was okay w/o weakness, did not rec an intervention  -On scheduled tylenol and prn oxy/dilaudid     HTN  CAD  -holding home  losartan 50mg  I/s/o recent rise in Cr  -c/w home Aspirin     Lymphedema  Venous insufficiency/venous ulcers  -wound care consulted  -hold home torsemide 20 mg daily for now    -OSH wound recs  BLE- Chronic venous status ulcers   Irrigate with normal saline or wound cleanser, Pat dry.  Apply lotion to intact skin   Cover weeping areas with Triad Hydrophillic wound dressing ointment   Pad and protect with ABD, Kerlix and paper tape.   Change every day and as needed   Secure with Tubigrip ( Apply from base of toes to 2 finger width below the bend of knee)      Gluteal cleft & buttocks-MASD/Irritant contact dermatitis due to fecal, urinary or dual incontinent.   Cleanse with bath wipes or soap and water. Rinse well and pat dry.   Apply no sting skin barrier (cavilon)   Apply Triad hydrophilic wound dressing ointment to gluteal fold/groin three times a day and as needed.     GERD  -c/w home famotidine 20 mg bid    Disposition  -barriers: clinical improvement  -destination: likely back to SNF ([facility])  -f/u: pending     Diet: cardiac (NPO after MN if possible procedure)  Electrolytes: K >3.5; Mg >2  DVT ppx: SCD (otherwise subcutaneous heparin for JAMAL; momentarily held pending waiting on Bx/procedures)  GI ppx: not indicated  Lines/tubes: PIV  O2: none  ggt: none  Baseline Cr: 1.5  T+S: 1/5  Code status: Full code  Surrogate decision maker: Micaela Retana (sister)  [phone]    Ganesh Jones MD  Internal Medicne  PGY-1

## 2025-01-08 NOTE — DISCHARGE INSTRUCTIONS
Dear  Mazin,    You were admitted for Urinary tract infection that had spread into your blood stream and also for expedited work up of your right kidney and lumbar vertebra mass. You received antibiotics for your infection and had a biopsy of your lumbar vertebrae mass. You also has an MRI of your head and your whole spine which showed ...      Please take all your medications as prescribed and attend all your future appointments.    Your  care team.      **Please call Kat Hannon RN (at [phone]) or Dr. Yana Atkins (at [phone]) for any          postoperative questions or concerns.

## 2025-01-08 NOTE — PROGRESS NOTES
"Physical Therapy                 Therapy Communication Note    Patient Name: Abdi Retana \"Ray\"  MRN: [id number]  Department: [facility]  Room: [facility]  Today's Date: 1/8/2025     Discipline: Physical Therapy    PT Missed Visit: Yes     Missed Visit Reason: Missed Visit Reason: Other (Comment) (per ELLEN Silverio, pt currently off floor at [facility])    Missed Time: Attempt    Comment: 15:45pm  "

## 2025-01-08 NOTE — CARE PLAN
Problem: Skin  Goal: Decreased wound size/increased tissue granulation at next dressing change  Outcome: Progressing  Flowsheets (Taken 1/6/2025 0011 by Ramirez Rodriguez)  Decreased wound size/increased tissue granulation at next dressing change: Promote sleep for wound healing  Goal: Participates in plan/prevention/treatment measures  Outcome: Progressing  Flowsheets (Taken 1/6/2025 0011 by Ramirez Rodriguez)  Participates in plan/prevention/treatment measures: Elevate heels  Goal: Prevent/manage excess moisture  Outcome: Progressing  Flowsheets (Taken 1/6/2025 0011 by Ramirez Rodriguez)  Prevent/manage excess moisture: Cleanse incontinence/protect with barrier cream  Goal: Prevent/minimize sheer/friction injuries  Outcome: Progressing  Flowsheets (Taken 1/6/2025 1512 by Skyla Kulkarni RN)  Prevent/minimize sheer/friction injuries: Use pull sheet  Goal: Promote/optimize nutrition  Outcome: Progressing  Flowsheets (Taken 1/8/2025 1815)  Promote/optimize nutrition: Monitor/record intake including meals  Goal: Promote skin healing  Outcome: Progressing  Flowsheets (Taken 1/8/2025 1815)  Promote skin healing:   Protective dressings over bony prominences   Assess skin/pad under line(s)/device(s)     Problem: Fall/Injury  Goal: Not fall by end of shift  Outcome: Progressing  Goal: Be free from injury by end of the shift  Outcome: Progressing  Goal: Verbalize understanding of personal risk factors for fall in the hospital  Outcome: Progressing  Goal: Verbalize understanding of risk factor reduction measures to prevent injury from fall in the home  Outcome: Progressing  Goal: Use assistive devices by end of the shift  Outcome: Progressing  Goal: Pace activities to prevent fatigue by end of the shift  Outcome: Progressing     Problem: Pain - Adult  Goal: Verbalizes/displays adequate comfort level or baseline comfort level  Outcome: Progressing     Problem: Safety - Adult  Goal: Free from fall  injury  Outcome: Progressing     Problem: Discharge Planning  Goal: Discharge to home or other facility with appropriate resources  Outcome: Progressing     Problem: Chronic Conditions and Co-morbidities  Goal: Patient's chronic conditions and co-morbidity symptoms are monitored and maintained or improved  Outcome: Progressing    The clinical goals for the shift include Pt will remain HDS and VSS throughout the shift on 1/8 until 1900    Patient has received MRIs to his cervical, thoracic and lumbar spine. He has also had a biopsy done in which he is waiting for results. He has remained stable and has had sufficient urine output as well and had his dressings on his legs changed.

## 2025-01-08 NOTE — PROGRESS NOTES
"Occupational Therapy                 Therapy Communication Note    Patient Name: Abdi Retana \"Matt\"  MRN: [id number]  Department: [facility]  Room: [facility]  Today's Date: 1/8/2025     Discipline: Occupational Therapy    OT Missed Visit: Yes     Missed Visit Reason: Missed Visit Reason: Patient in a medical procedure (Will attempt to f/u with patient as schedule permits.)    Missed Time: Attempt 15:01      01/08/25 at 3:04 PM - Skyla Nieves OT    "

## 2025-01-08 NOTE — ANESTHESIA POSTPROCEDURE EVALUATION
"Patient: Abdi Retana \"Ray\"    Procedure Summary       Date: 01/08/25 Room / Location: [facility]    Anesthesia Start: 1253 Anesthesia Stop: 1552    Procedure: MR LUMBAR SPINE W AND WO CONTRAST Diagnosis: (Likely new RCC w/ likely L4 mets)    Scheduled Providers: Alta Ogden MD Responsible Provider: Gaudencio Nobles MD    Anesthesia Type: general ASA Status: 3            Anesthesia Type: general    Vitals Value Taken Time   /60 01/08/25 1554   Temp 36.9 01/08/25 1554   Pulse 80 01/08/25 1548   Resp 15 01/08/25 1554   SpO2 91 % 01/08/25 1548   Vitals shown include unfiled device data.    Anesthesia Post Evaluation    Patient location during evaluation: bedside  Patient participation: complete - patient participated  Level of consciousness: awake and responsive to verbal stimuli  Pain management: adequate  Airway patency: patent  Cardiovascular status: hemodynamically stable  Respiratory status: nasal cannula  Hydration status: acceptable  Postoperative Nausea and Vomiting: none        No notable events documented.    "

## 2025-01-09 ENCOUNTER — APPOINTMENT (OUTPATIENT)
Dept: RADIOLOGY | Facility: HOSPITAL | Age: 75
DRG: 456 | End: 2025-01-09
Payer: MEDICARE

## 2025-01-09 ENCOUNTER — APPOINTMENT (OUTPATIENT)
Dept: CARDIOLOGY | Facility: HOSPITAL | Age: 75
End: 2025-01-09
Payer: MEDICARE

## 2025-01-09 LAB
ALBUMIN SERPL BCP-MCNC: 2.8 G/DL (ref 3.4–5)
ANION GAP SERPL CALC-SCNC: 8 MMOL/L (ref 10–20)
AORTIC VALVE PEAK VELOCITY: 1.3 M/S
AV PEAK GRADIENT: 7 MMHG
AVA (PEAK VEL): 3.71 CM2
BASOPHILS # BLD AUTO: 0.08 X10*3/UL (ref 0–0.1)
BASOPHILS NFR BLD AUTO: 0.9 %
BUN SERPL-MCNC: 25 MG/DL (ref 6–23)
CALCIUM SERPL-MCNC: 9 MG/DL (ref 8.6–10.6)
CHLORIDE SERPL-SCNC: 104 MMOL/L (ref 98–107)
CHOLEST SERPL-MCNC: 132 MG/DL (ref 0–199)
CHOLESTEROL/HDL RATIO: 5.9
CO2 SERPL-SCNC: 31 MMOL/L (ref 21–32)
CREAT SERPL-MCNC: 1.28 MG/DL (ref 0.5–1.3)
EGFRCR SERPLBLD CKD-EPI 2021: 59 ML/MIN/1.73M*2
EJECTION FRACTION APICAL 4 CHAMBER: 75.7
EJECTION FRACTION: 68 %
EOSINOPHIL # BLD AUTO: 0.37 X10*3/UL (ref 0–0.4)
EOSINOPHIL NFR BLD AUTO: 4.2 %
ERYTHROCYTE [DISTWIDTH] IN BLOOD BY AUTOMATED COUNT: 16.9 % (ref 11.5–14.5)
GLUCOSE SERPL-MCNC: 106 MG/DL (ref 74–99)
HCT VFR BLD AUTO: 29.4 % (ref 41–52)
HDLC SERPL-MCNC: 22.3 MG/DL
HGB BLD-MCNC: 8.5 G/DL (ref 13.5–17.5)
IMM GRANULOCYTES # BLD AUTO: 0.07 X10*3/UL (ref 0–0.5)
IMM GRANULOCYTES NFR BLD AUTO: 0.8 % (ref 0–0.9)
LDLC SERPL CALC-MCNC: 86 MG/DL
LEFT ATRIUM VOLUME AREA LENGTH INDEX BSA: 36.4 ML/M2
LEFT VENTRICLE INTERNAL DIMENSION DIASTOLE: 4.5 CM (ref 3.5–6)
LEFT VENTRICULAR OUTFLOW TRACT DIAMETER: 2.3 CM
LYMPHOCYTES # BLD AUTO: 1.23 X10*3/UL (ref 0.8–3)
LYMPHOCYTES NFR BLD AUTO: 14.1 %
MAGNESIUM SERPL-MCNC: 2.13 MG/DL (ref 1.6–2.4)
MCH RBC QN AUTO: 25.6 PG (ref 26–34)
MCHC RBC AUTO-ENTMCNC: 28.9 G/DL (ref 32–36)
MCV RBC AUTO: 89 FL (ref 80–100)
MITRAL VALVE E/A RATIO: 0.54
MONOCYTES # BLD AUTO: 0.74 X10*3/UL (ref 0.05–0.8)
MONOCYTES NFR BLD AUTO: 8.5 %
NEUTROPHILS # BLD AUTO: 6.25 X10*3/UL (ref 1.6–5.5)
NEUTROPHILS NFR BLD AUTO: 71.5 %
NON HDL CHOLESTEROL: 110 MG/DL (ref 0–149)
NRBC BLD-RTO: 0 /100 WBCS (ref 0–0)
PHOSPHATE SERPL-MCNC: 3.6 MG/DL (ref 2.5–4.9)
PLATELET # BLD AUTO: 254 X10*3/UL (ref 150–450)
POTASSIUM SERPL-SCNC: 4.7 MMOL/L (ref 3.5–5.3)
RBC # BLD AUTO: 3.32 X10*6/UL (ref 4.5–5.9)
RIGHT VENTRICLE FREE WALL PEAK S': 11.1 CM/S
SODIUM SERPL-SCNC: 138 MMOL/L (ref 136–145)
TRIGL SERPL-MCNC: 118 MG/DL (ref 0–149)
VLDL: 24 MG/DL (ref 0–40)
WBC # BLD AUTO: 8.7 X10*3/UL (ref 4.4–11.3)

## 2025-01-09 PROCEDURE — C8929 TTE W OR WO FOL WCON,DOPPLER: HCPCS

## 2025-01-09 PROCEDURE — 2500000001 HC RX 250 WO HCPCS SELF ADMINISTERED DRUGS (ALT 637 FOR MEDICARE OP): Performed by: STUDENT IN AN ORGANIZED HEALTH CARE EDUCATION/TRAINING PROGRAM

## 2025-01-09 PROCEDURE — 83735 ASSAY OF MAGNESIUM: CPT

## 2025-01-09 PROCEDURE — 80061 LIPID PANEL: CPT

## 2025-01-09 PROCEDURE — 80069 RENAL FUNCTION PANEL: CPT

## 2025-01-09 PROCEDURE — 1170000001 HC PRIVATE ONCOLOGY ROOM DAILY

## 2025-01-09 PROCEDURE — 82565 ASSAY OF CREATININE: CPT

## 2025-01-09 PROCEDURE — 99223 1ST HOSP IP/OBS HIGH 75: CPT | Performed by: ORTHOPAEDIC SURGERY

## 2025-01-09 PROCEDURE — 70498 CT ANGIOGRAPHY NECK: CPT | Performed by: RADIOLOGY

## 2025-01-09 PROCEDURE — 99232 SBSQ HOSP IP/OBS MODERATE 35: CPT

## 2025-01-09 PROCEDURE — 97535 SELF CARE MNGMENT TRAINING: CPT | Mod: GO

## 2025-01-09 PROCEDURE — 36415 COLL VENOUS BLD VENIPUNCTURE: CPT

## 2025-01-09 PROCEDURE — 99223 1ST HOSP IP/OBS HIGH 75: CPT | Performed by: STUDENT IN AN ORGANIZED HEALTH CARE EDUCATION/TRAINING PROGRAM

## 2025-01-09 PROCEDURE — 85025 COMPLETE CBC W/AUTO DIFF WBC: CPT

## 2025-01-09 PROCEDURE — 70498 CT ANGIOGRAPHY NECK: CPT

## 2025-01-09 PROCEDURE — 93306 TTE W/DOPPLER COMPLETE: CPT | Performed by: INTERNAL MEDICINE

## 2025-01-09 PROCEDURE — 2500000004 HC RX 250 GENERAL PHARMACY W/ HCPCS (ALT 636 FOR OP/ED)

## 2025-01-09 PROCEDURE — 2500000001 HC RX 250 WO HCPCS SELF ADMINISTERED DRUGS (ALT 637 FOR MEDICARE OP)

## 2025-01-09 PROCEDURE — 97165 OT EVAL LOW COMPLEX 30 MIN: CPT | Mod: GO

## 2025-01-09 PROCEDURE — 2550000001 HC RX 255 CONTRASTS

## 2025-01-09 PROCEDURE — 70496 CT ANGIOGRAPHY HEAD: CPT

## 2025-01-09 PROCEDURE — 70496 CT ANGIOGRAPHY HEAD: CPT | Performed by: RADIOLOGY

## 2025-01-09 PROCEDURE — 97530 THERAPEUTIC ACTIVITIES: CPT | Mod: GP | Performed by: PHYSICAL THERAPIST

## 2025-01-09 RX ORDER — ATORVASTATIN CALCIUM 40 MG/1
40 TABLET, FILM COATED ORAL NIGHTLY
Status: DISCONTINUED | OUTPATIENT
Start: 2025-01-09 | End: 2025-01-24 | Stop reason: HOSPADM

## 2025-01-09 RX ORDER — MULTIVIT-MIN/IRON FUM/FOLIC AC 7.5 MG-4
1 TABLET ORAL DAILY
Status: DISCONTINUED | OUTPATIENT
Start: 2025-01-09 | End: 2025-01-24 | Stop reason: HOSPADM

## 2025-01-09 RX ADMIN — ACETAMINOPHEN 975 MG: 325 TABLET, FILM COATED ORAL at 15:46

## 2025-01-09 RX ADMIN — OXYCODONE HYDROCHLORIDE 10 MG: 10 TABLET ORAL at 15:46

## 2025-01-09 RX ADMIN — FAMOTIDINE 20 MG: 20 TABLET ORAL at 21:26

## 2025-01-09 RX ADMIN — ASPIRIN 81 MG: 81 TABLET, COATED ORAL at 09:36

## 2025-01-09 RX ADMIN — HEPARIN SODIUM 5000 UNITS: 5000 INJECTION INTRAVENOUS; SUBCUTANEOUS at 07:52

## 2025-01-09 RX ADMIN — OXYCODONE HYDROCHLORIDE 10 MG: 10 TABLET ORAL at 21:26

## 2025-01-09 RX ADMIN — ACETAMINOPHEN 975 MG: 325 TABLET, FILM COATED ORAL at 21:26

## 2025-01-09 RX ADMIN — PERFLUTREN 4 ML OF DILUTION: 6.52 INJECTION, SUSPENSION INTRAVENOUS at 15:07

## 2025-01-09 RX ADMIN — FAMOTIDINE 20 MG: 20 TABLET ORAL at 09:36

## 2025-01-09 RX ADMIN — IOHEXOL 90 ML: 350 INJECTION, SOLUTION INTRAVENOUS at 14:32

## 2025-01-09 RX ADMIN — AMOXICILLIN AND CLAVULANATE POTASSIUM 1 TABLET: 875; 125 TABLET, FILM COATED ORAL at 09:37

## 2025-01-09 RX ADMIN — Medication 1 TABLET: at 09:37

## 2025-01-09 RX ADMIN — ACETAMINOPHEN 975 MG: 325 TABLET, FILM COATED ORAL at 09:36

## 2025-01-09 RX ADMIN — HYDROMORPHONE HYDROCHLORIDE 0.3 MG: 1 INJECTION, SOLUTION INTRAMUSCULAR; INTRAVENOUS; SUBCUTANEOUS at 09:38

## 2025-01-09 RX ADMIN — AMOXICILLIN AND CLAVULANATE POTASSIUM 1 TABLET: 875; 125 TABLET, FILM COATED ORAL at 21:26

## 2025-01-09 RX ADMIN — HEPARIN SODIUM 5000 UNITS: 5000 INJECTION INTRAVENOUS; SUBCUTANEOUS at 00:36

## 2025-01-09 RX ADMIN — HEPARIN SODIUM 5000 UNITS: 5000 INJECTION INTRAVENOUS; SUBCUTANEOUS at 15:46

## 2025-01-09 RX ADMIN — ATORVASTATIN CALCIUM 40 MG: 40 TABLET, FILM COATED ORAL at 21:26

## 2025-01-09 RX ADMIN — OXYCODONE HYDROCHLORIDE 10 MG: 10 TABLET ORAL at 00:39

## 2025-01-09 RX ADMIN — OXYCODONE HYDROCHLORIDE 5 MG: 5 TABLET ORAL at 19:18

## 2025-01-09 ASSESSMENT — VISUAL ACUITY
OS: 70
OD: 70

## 2025-01-09 ASSESSMENT — COGNITIVE AND FUNCTIONAL STATUS - GENERAL
WALKING IN HOSPITAL ROOM: A LOT
MOBILITY SCORE: 15
CLIMB 3 TO 5 STEPS WITH RAILING: A LOT
MOBILITY SCORE: 6
MOVING FROM LYING ON BACK TO SITTING ON SIDE OF FLAT BED WITH BEDRAILS: A LITTLE
DAILY ACTIVITIY SCORE: 16
DRESSING REGULAR UPPER BODY CLOTHING: A LITTLE
PERSONAL GROOMING: A LITTLE
TOILETING: A LOT
MOVING TO AND FROM BED TO CHAIR: A LITTLE
TOILETING: TOTAL
WALKING IN HOSPITAL ROOM: A LOT
WALKING IN HOSPITAL ROOM: TOTAL
STANDING UP FROM CHAIR USING ARMS: TOTAL
STANDING UP FROM CHAIR USING ARMS: A LOT
MOVING FROM LYING ON BACK TO SITTING ON SIDE OF FLAT BED WITH BEDRAILS: TOTAL
DRESSING REGULAR UPPER BODY CLOTHING: A LITTLE
TURNING FROM BACK TO SIDE WHILE IN FLAT BAD: A LITTLE
PERSONAL GROOMING: A LITTLE
TOILETING: A LOT
HELP NEEDED FOR BATHING: A LOT
MOBILITY SCORE: 16
CLIMB 3 TO 5 STEPS WITH RAILING: A LOT
CLIMB 3 TO 5 STEPS WITH RAILING: TOTAL
DRESSING REGULAR LOWER BODY CLOTHING: A LOT
DAILY ACTIVITIY SCORE: 12
DRESSING REGULAR LOWER BODY CLOTHING: A LOT
EATING MEALS: A LITTLE
PERSONAL GROOMING: A LITTLE
DRESSING REGULAR UPPER BODY CLOTHING: A LOT
TURNING FROM BACK TO SIDE WHILE IN FLAT BAD: A LITTLE
STANDING UP FROM CHAIR USING ARMS: A LOT
DAILY ACTIVITIY SCORE: 16
DRESSING REGULAR LOWER BODY CLOTHING: TOTAL
MOVING TO AND FROM BED TO CHAIR: A LITTLE
HELP NEEDED FOR BATHING: A LOT
TURNING FROM BACK TO SIDE WHILE IN FLAT BAD: TOTAL
HELP NEEDED FOR BATHING: A LOT
MOVING TO AND FROM BED TO CHAIR: TOTAL

## 2025-01-09 ASSESSMENT — PAIN SCALES - GENERAL
PAINLEVEL_OUTOF10: 6
PAINLEVEL_OUTOF10: 5 - MODERATE PAIN
PAINLEVEL_OUTOF10: 7
PAINLEVEL_OUTOF10: 5 - MODERATE PAIN
PAINLEVEL_OUTOF10: 6

## 2025-01-09 ASSESSMENT — PAIN - FUNCTIONAL ASSESSMENT
PAIN_FUNCTIONAL_ASSESSMENT: 0-10
PAIN_FUNCTIONAL_ASSESSMENT: 0-10
PAIN_FUNCTIONAL_ASSESSMENT: UNABLE TO SELF-REPORT
PAIN_FUNCTIONAL_ASSESSMENT: 0-10
PAIN_FUNCTIONAL_ASSESSMENT: UNABLE TO SELF-REPORT
PAIN_FUNCTIONAL_ASSESSMENT: 0-10
PAIN_FUNCTIONAL_ASSESSMENT: 0-10

## 2025-01-09 ASSESSMENT — ACTIVITIES OF DAILY LIVING (ADL): HOME_MANAGEMENT_TIME_ENTRY: 20

## 2025-01-09 NOTE — PROGRESS NOTES
"Abdi Retana \"Louise" is a 74 y.o. male on day 5 of admission presenting with JAMAL (acute kidney injury) (CMS-HCC).      Subjective   Transferred from [facility] for expedited work up of suspected R. RCC with L4/5 mets. Also has Proteus Mirabilis UTI c/b bacteremia.    NAEON. Overall appears comfortable although he has some backache. Completed MRI and L4 biopsy. At bedside today he expressed his frustration of the uncertainties and the many people who walk into his room daily. Again we updated him on all that is gong on.    Objective     Last Recorded Vitals  /69 (BP Location: Left arm, Patient Position: Lying)   Pulse 76   Temp 36 °C (96.8 °F) (Temporal)   Resp 18   Wt 120 kg (264 lb)   SpO2 94%   Intake/Output last 3 Shifts:    Intake/Output Summary (Last 24 hours) at 1/9/2025 1055  Last data filed at 1/9/2025 0841  Gross per 24 hour   Intake 1240 ml   Output 750 ml   Net 490 ml       Admission Weight  Weight: 120 kg (264 lb) (01/04/25 1900)    Daily Weight  01/04/25 : 120 kg (264 lb)    No current facility-administered medications on file prior to encounter.     Current Outpatient Medications on File Prior to Encounter   Medication Sig Dispense Refill    acetaminophen (Tylenol 8 HOUR) 650 mg ER tablet Take 1 tablet (650 mg) by mouth every 6 hours if needed for mild pain (1 - 3). Do not crush, chew, or split.      acetaminophen (Tylenol) 325 mg tablet Take 2 tablets (650 mg) by mouth 2 times a day.      aspirin 81 mg EC tablet Take 1 tablet (81 mg) by mouth once daily.      bisacodyl (Gentle Laxative, bisacodyl,) 10 mg suppository Insert 1 suppository (10 mg) into the rectum once daily as needed for constipation.      cefepime (Maxipime) IV Infuse 100 mL (2 g) over 0.5 hours into a venous catheter every 12 hours.      famotidine (Pepcid) 20 mg tablet Take 1 tablet (20 mg) by mouth 2 times a day.      ferrous sulfate (FeroSuL) 325 (65 Fe) MG tablet Take 1 tablet (325 mg) by mouth once every 24 hours. 90 tablet 3 "    losartan (Cozaar) 50 mg tablet TAKE 1 TABLET BY MOUTH ONCE  DAILY 90 tablet 3    magnesium hydroxide (Milk of Magnesia) 400 mg/5 mL suspension Take 30 mL by mouth once daily as needed for constipation (if no BM in 3 consecutive days).      magnesium oxide 400 mg magnesium capsule Take 1 capsule (400 mg) by mouth once daily. 90 capsule 3    mineral oil enema Insert 133 mL (1 enema) into the rectum once daily as needed for constipation (if no BM in 8 hours after getting suppository).      potassium chloride CR 10 mEq ER tablet TAKE 1 TABLET BY MOUTH ONCE  DAILY DO NOT CRUSH, CHEW, OR  SPLIT 90 tablet 3    torsemide (Demadex) 20 mg tablet TAKE 1 TABLET BY MOUTH ONCE  DAILY 90 tablet 3    traMADol (Ultram) 50 mg tablet Take 1 tablet (50 mg) by mouth every 6 hours if needed for severe pain (7 - 10).      vitamin D3-folic acid 2,500 unit- 1 mg tablet Take 1 mg by mouth once every 24 hours. 90 tablet 3    [DISCONTINUED] clotrimazole-betamethasone (Lotrisone) cream MIX WITH ZINC OXIDE AND APPLY  TOPICALLY TO AFFECTED AREA(S)  TWICE DAILY (Patient not taking: Reported on 1/2/2025) 45 g 0    [DISCONTINUED] zinc oxide 10 % cream To mix with Lotrisone and apply twice daily (Patient not taking: Reported on 1/2/2025) 50 g 0        Physical Exam  HENT:      Head: Normocephalic and atraumatic.      Mouth/Throat:      Mouth: Mucous membranes are moist.   Eyes:      General: No scleral icterus.     Extraocular Movements: Extraocular movements intact.      Pupils: Pupils are equal, round, and reactive to light.   Cardiovascular:      Rate and Rhythm: Normal rate and regular rhythm.      Heart sounds: No murmur heard.  Pulmonary:      Effort: No respiratory distress.      Breath sounds: No wheezing or rales.   Abdominal:      General: There is no distension.      Tenderness: There is no abdominal tenderness. There is no guarding.   Musculoskeletal:      Comments: Legs with dressing on, images on epic show possible stasis dermatitis  with ulcers   Skin:     Capillary Refill: Capillary refill takes less than 2 seconds.   Neurological:      General: No focal deficit present.      Mental Status: He is alert and oriented to person, place, and time.      Motor: Weakness present.      Comments: Richter in b/l LE 3/5, significantly limited by pain   Psychiatric:         Mood and Affect: Mood normal.         Relevant Results    Results for orders placed or performed during the hospital encounter of 01/04/25 (from the past 24 hours)   CBC and Auto Differential   Result Value Ref Range    WBC 8.7 4.4 - 11.3 x10*3/uL    nRBC 0.0 0.0 - 0.0 /100 WBCs    RBC 3.32 (L) 4.50 - 5.90 x10*6/uL    Hemoglobin 8.5 (L) 13.5 - 17.5 g/dL    Hematocrit 29.4 (L) 41.0 - 52.0 %    MCV 89 80 - 100 fL    MCH 25.6 (L) 26.0 - 34.0 pg    MCHC 28.9 (L) 32.0 - 36.0 g/dL    RDW 16.9 (H) 11.5 - 14.5 %    Platelets 254 150 - 450 x10*3/uL    Neutrophils % 71.5 40.0 - 80.0 %    Immature Granulocytes %, Automated 0.8 0.0 - 0.9 %    Lymphocytes % 14.1 13.0 - 44.0 %    Monocytes % 8.5 2.0 - 10.0 %    Eosinophils % 4.2 0.0 - 6.0 %    Basophils % 0.9 0.0 - 2.0 %    Neutrophils Absolute 6.25 (H) 1.60 - 5.50 x10*3/uL    Immature Granulocytes Absolute, Automated 0.07 0.00 - 0.50 x10*3/uL    Lymphocytes Absolute 1.23 0.80 - 3.00 x10*3/uL    Monocytes Absolute 0.74 0.05 - 0.80 x10*3/uL    Eosinophils Absolute 0.37 0.00 - 0.40 x10*3/uL    Basophils Absolute 0.08 0.00 - 0.10 x10*3/uL   Magnesium   Result Value Ref Range    Magnesium 2.13 1.60 - 2.40 mg/dL   Renal Function Panel   Result Value Ref Range    Glucose 106 (H) 74 - 99 mg/dL    Sodium 138 136 - 145 mmol/L    Potassium 4.7 3.5 - 5.3 mmol/L    Chloride 104 98 - 107 mmol/L    Bicarbonate 31 21 - 32 mmol/L    Anion Gap 8 (L) 10 - 20 mmol/L    Urea Nitrogen 25 (H) 6 - 23 mg/dL    Creatinine 1.28 0.50 - 1.30 mg/dL    eGFR 59 (L) >60 mL/min/1.73m*2    Calcium 9.0 8.6 - 10.6 mg/dL    Phosphorus 3.6 2.5 - 4.9 mg/dL    Albumin 2.8 (L) 3.4 - 5.0 g/dL       CT chest abdomen pelvis wo IV contrast    Result Date: 1/2/2025  CHEST: 1.  History of right renal cell carcinoma. 2. Multiple pulmonary nodules consistent with metastatic disease.   ABDOMEN AND PELVIS: 1.  History upright or renal cell carcinoma. There is an unchanged necrotic mass involving the upper pole of the right kidney. 2. Bilateral renal cysts. 3. Bilateral nonobstructing intrarenal calculi with staghorn type calculi in the left kidney. There is no renal obstruction. 4. Complete bony destruction of L4 with a soft tissue mass with expansion of the vertebral body. Lytic lesion involving the superior endplate of L5. Findings are consistent with bony metastases.   MACRO: None   Signed by: Lucia Desai 1/2/2025 10:33 AM Dictation workstation:   [id number]    XR chest 1 view    Result Date: 1/2/2025  No pneumonic consolidations are noted. There questionable perihilar nodules which may correspond to the masses noted on the prior CT.     MACRO: None   Signed by: Patrick Arnold 1/2/2025 8:33 AM Dictation workstation:   [id number]      MRI Spine 1/8/25  IMPRESSION:  MRI cervical spine:      1. No evidence of metastases.      2. Multilevel degenerative changes of the cervical spine including  moderate spinal canal stenosis at C5-C6 and C6-C7.      MRI thoracic spine:      1. No evidence of osseous metastases.      2. Multilevel degenerative changes of the thoracic spine without  striking spinal canal stenosis at any level.      MRI lumbar spine:      1. Metastasis involving the entirety of the L4 vertebral body,  portions of the posterior elements and transverse processes, with  extension into the L3-L4 and L4-L5 intervertebral disc spaces and  into the inferior endplate of L3 and superior endplate of L5 as well  as into the surrounding prevertebral soft tissues.      2. There is extension of tumor into the ventral and bilateral  epidural space at L4 and there is also tumor located within the  posterior spinal  canal extending from the level of L3 through L5.  Combination of these findings cause marked spinal canal stenosis at  the level of L4. Minimal CSF is seen within the thecal sac at the  level of L4.      3. Tumor extends into the bilateral neural foramina at L4-L5 with  resulting moderate-to-marked left and marked right neural foraminal  narrowing. Tumor also extends into the inferior portions of the  bilateral neural foramina at L3-L4 resulting in moderate neural  foraminal narrowing.      4. There is metastasis located within the posterior and left epidural  space at the level of L5 and S1 which causes partial effacement of  the posterior thecal sac.      5. Multilevel degenerative changes of the lumbar spine as detailed  above.      MRI Head 1/8/25  IMPRESSION:  1.  Small focus of diffusion restriction involving the right parietal  lobe with no associated enhancement on postcontrast imaging, favoring  small acute infarct. No mass effect or hemorrhagic transformation.  2. No abnormal intracranial enhancement to indicate intracranial  metastatic disease.  3. Nonspecific dilatation of the superior ophthalmic veins  bilaterally.    Assessment/Plan      Assessment & Plan  JAMAL (acute kidney injury) (CMS-HCC)    Mr. Abdi Retana is a 75 yo M with PMHx of HTN, GERD, SAMIA, Chronic lymphedema, venous insufficiency, OA, CKD-3(b/l 1.2-1.5), also w/ the following oncologic Hx - no formal Dx at this time. Presenting symptom was b/l LE weakness. Was admitted to [facility] 11/30-12/5 for this after IMG revealed a concerning L4-5 lesion w/ mod-severe cord compression in s/o R menal mass and suspicious lesions in adrenals lungs, and spine. This was c/f undiagnosed metastatic R RCC. Case was discussed w/ both IR and ortho spine. It was deemed that no inpt Bx nor surgery was indicated and pt was DC to SNF w/ plan for IR-guided L4 Bx (scheduled for 12/20; pt missed d/t transport issues w/ SNF). MRI brain and C/T spine w/ contrast was  "attempted but pt did not tolerate d/t claustrophobia - there was a plan to complete it OP w/ anesthesia. Transferred from [facility] for expedited work up of suspected R. RCC with L4/5 mets. Also has Proteus Mirabilis UTI c/b bacteremia I/s/o bilateral non obstructing renal stones. ID consulted for antibiotic management. L4 biopsy done and MRI head and spine completed.    Update 1/9/25  -pending L4 tumor biopsy results  -MRI head c/f small acute stroke in the right parietal lobe. Neuro-stroke consulted, recommend CTA H&N, AND TTE. Pending formal notes from neuro  -Ortho spine and Rad-onc consulted for spine MRI findings        Small Acute Stroke on MRI  -Small focus of diffusion restriction involving the right parietal lobe with no associated enhancement on postcontrast imaging, favoring small acute infarct. No mass effect or hemorrhagic transformation.  -at his neurologic baseline(fluent speech, oriented, moves all limbs,LE-4/5)  -neuro-stoke consulted  -already on aspirin for CAD, not on statin  Plan  -follow up neuro-stroke recs      P. mirabilis bacteremia 2/2 UTI  -HDS  -associated w/ R renal mass, b/l non-obstructing renal stones  -sensitivities available  -seen by uro at [facility], did not have plan for procedure given non-obstructive  -ID consulted for duration, procedure rec given complex anatomy of stones and renal mass - c/f seeding  -ID following, recommend Augmentin for 3 weeks(EOT-1/23)     C/f undiagnosed metastatic R RCC  -IR consult for possible inpt Bx, plan for Bx of L4-5 lesion  -no erythrocytosis to suggest high EPO  -plan for possible MRI brain, MRI spine w/ contrast (had lumbar done already); pt requesting w/ anesthesia as says \"did not fit\" when took him down for this     L4-5 lesion w/ radiographic evidence cord compression  -pt at baseline 4/5 LE weakness on exam, symmetric, no incontinence or saddle anesthesia  -there have been no plans for procedures per prior evaluations  -IMG as above  -d/w " ortho spine, said if going for anesthesia should do the full spine  -consider formal  spine surgery consult if neuro change and/or IMG/Bx completed  -had been seen by NSG 1/3 who thought exam was okay w/o weakness, did not rec an intervention  -On scheduled tylenol and prn oxy/dilaudid     HTN  CAD  -holding home  losartan 50mg  I/s/o recent rise in Cr ( now resolved)  -c/w home Aspirin     Lymphedema  Venous insufficiency/venous ulcers  -wound care consulted  -hold home torsemide 20 mg daily for now    -OSH wound recs  BLE- Chronic venous status ulcers   Irrigate with normal saline or wound cleanser, Pat dry.  Apply lotion to intact skin   Cover weeping areas with Triad Hydrophillic wound dressing ointment   Pad and protect with ABD, Kerlix and paper tape.   Change every day and as needed   Secure with Tubigrip ( Apply from base of toes to 2 finger width below the bend of knee)      Gluteal cleft & buttocks-MASD/Irritant contact dermatitis due to fecal, urinary or dual incontinent.   Cleanse with bath wipes or soap and water. Rinse well and pat dry.   Apply no sting skin barrier (cavilon)   Apply Triad hydrophilic wound dressing ointment to gluteal fold/groin three times a day and as needed.     GERD  -c/w home famotidine 20 mg bid    Disposition  -barriers: clinical improvement  -destination: likely back to SNF ([facility])  -f/u: pending     Diet: cardiac   Electrolytes: K >3.5; Mg >2  DVT ppx: SCD (otherwise subcutaneous heparin for JAMAL; momentarily held pending waiting on Bx/procedures)  GI ppx: not indicated  Lines/tubes: PIV  O2: none  Baseline Cr: 1.5  T+S: 1/5  Code status: Full code  Surrogate decision maker: Micaela Retana (sister) [phone]    Ganesh Jones MD  Internal Medicne  PGY-1

## 2025-01-09 NOTE — PROGRESS NOTES
"Physical Therapy    Physical Therapy Treatment    Patient Name: Abdi Retana \"Matt\"  MRN: [id number]  Department: [facility]  Room: [address] [place] [address]  Today's Date: 1/9/2025  Time Calculation  Start Time: 1032  Stop Time: 1118  Time Calculation (min): 46 min         Assessment/Plan   PT Assessment  PT Assessment Results: Decreased strength, Decreased range of motion, Decreased endurance, Impaired balance, Decreased mobility, Impaired hearing, Decreased cognition, Decreased skin integrity, Obesity, Pain  Rehab Prognosis: Fair  Barriers to Discharge Home: Caregiver assistance, Physical needs  Caregiver Assistance: Patient lives alone and/or does not have reliable caregiver assistance  Physical Needs: Stair navigation into home limited by function/safety, In-home setup navigation limited by function/safety, 24hr mobility assistance needed, 24hr ADL assistance needed  Evaluation/Treatment Tolerance: Patient tolerated treatment well (slightly limited by pain and apprehension)  Medical Staff Made Aware: Yes  Strengths: Attitude of self  Barriers to Participation: Other (Comment) (back pain, weakness/decreased activity tolerance)  End of Session Communication: Bedside nurse, Physician (asked MD if pt would benefit from addressing anxiety)  Assessment Comment: 75 yo male with malignant renal cell carcinoma with L4/L5 lytic lesions, anxiety, anemia, and weakness limiting mobility overall; mobility slightly improved today. Once medically stable, recommend moderate intensity therapy as pt lives alone and has stairs, is weak and has complicated medical issues and is not at baseline. Pt may benefit from anxiety management (d/w team).  End of Session Patient Position: Bed, 3 rail up, Alarm on  PT Plan  Inpatient/Swing Bed or Outpatient: Inpatient  PT Plan  Treatment/Interventions: Bed mobility, Transfer training, Balance training, Gait training, Neuromuscular re-education, Strengthening, Endurance training, Range of motion, Therapeutic " "exercise, Therapeutic activity, Home exercise program, Positioning, Orthotic fitting/training, Postural re-education  PT Plan: Ongoing PT  PT Frequency: 3 times per week  PT Discharge Recommendations: Moderate intensity level of continued care  Equipment Recommended upon Discharge: Other (comment) (unable to determine today)  PT Recommended Transfer Status: Other (comment) (dependent)  PT - OK to Discharge: Yes (PT eval completed & DC recs made)      General Visit Information:   PT  Visit  PT Received On: 01/09/25  Response to Previous Treatment: Patient with no complaints from previous session.  General  Reason for Referral: Admitted 1/2 (transfer to [facility] 1/4) with hematuria from nursing facility; dx: UTI, JAMAL, back pain, malignant renal cell carcinoma Right (L4 lesion, mets to adrenal glands & lung), mets L3-5 & S1 weakness  Past Medical History Relevant to Rehab: CKD, malignant renal cell carcinoma Right (L4 lesion, mets to adrenal glands & lung), HTN, GERD, OA, PVD, anemia, obesity, PVD, CKD, chronic lymphedema, fall  PT Missed Visit:  (no)  Missed Visit Reason:  (.)  Family/Caregiver Present: No  Co-Treatment: OT  Co-Treatment Reason: Pt with low AM-PAC score requiring skilled assist +2 therapists  Prior to Session Communication: Bedside nurse, Physician  Patient Position Received: Bed, 3 rail up, Alarm on  Preferred Learning Style: verbal, kinesthetic  General Comment: Pt alert and engaged, appears very anxious and reports being anxious \"the doctors don't tell me anything.\" Pt did well with explicit informaton prior to any movement, slow anticipated movement. Pt able to sit at edge of bed as noted with assist +2. Pt reports low back pain pre/during/post as noted (RN present at end of sesison and aware)    Subjective   Precautions:  Precautions  Medical Precautions: Fall precautions (Anxious and likes explicit instructions prior to any movement, back pain, anemia, spine (lytic lesions lumbar/sacral spine), " "bilateral leg ulcers)      Objective   Pain:  Pain Assessment  Pain Assessment: 0-10  0-10 (Numeric) Pain Score:  (pain in low back pre/during, not post)  Pain Interventions: Repositioned, Emotional support, Rest, Therapeutic presence, Therapeutic touch, Ambulation/increased activity  Response to Interventions:  (no pain post)  Cognition:  Cognition  Arousal/Alertness: Delayed responses to stimuli  Orientation Level: Oriented X4  Following Commands: Follows one step commands with increased time  Problem Solving: Assistance required to identify errors made  Cognition Comments: appears very anxious, perseverative at times  Problem Solving: Exceptions to WFL  Processing Speed: Delayed  Coordination:     Postural Control:  Postural Control  Postural Control: Impaired  Head Control: incomplete head/neck extension in all positions. tends to hold head flexed off pilllows supine at times \"I'm ready in case anything hurts, in anticipation\"  Posture Comment: sitting: head/neck flexed, rounded shoulders, posterior pelvic tile  Static Sitting Balance  Static Sitting-Balance Support: Feet supported, Bilateral upper extremity supported  Static Sitting-Level of Assistance:  (CG to min A +1, CGA +1 other for safety)  Dynamic Sitting Balance  Dynamic Sitting-Balance Support: Feet supported, Bilateral upper extremity supported  Dynamic Sitting-Level of Assistance:  (CG/min A +1 and CGA +1 other for safety)  Dynamic Sitting-Balance:  (washing up, rocking back and forth to stretch back, assisted putting gown on)  Static Standing Balance  Static Standing-Balance Support:  (unable)    Activity Tolerance:  Activity Tolerance  Endurance: Other (Comment) (slightly limited by pain and apprehension about back pain; did well with rest breaks)  Treatments:  Therapeutic Activity  Therapeutic Activity Performed: Yes  Therapeutic Activity 1: facilitation of logrolling/spine precautions  Therapeutic Activity 2: education/assist coming sidelying " to/from sit  Therapeutic Activity 3: dynamic sitting balance activities as noted    Bed Mobility  Bed Mobility: Yes  Bed Mobility 1  Bed Mobility 1: Rolling right, Rolling left (pt too anxious- refused)  Level of Assistance 1: Maximum verbal cues, Maximum tactile cues, Maximum assistance, +2  Bed Mobility Comments 1: use of rail  Bed Mobility 2  Bed Mobility  2: Side lying right to sit (sit to Right sidelying. used bed to elevate HOB 50 degrees (pt wanted HOB flatter) and use of rail)  Level of Assistance 2: Maximum assistance, Maximum verbal cues, Maximum tactile cues, +2  Bed Mobility 3  Bed Mobility 3: Scooting (sitting)  Level of Assistance 3: +2, Maximum verbal cues, Maximum assistance, Maximum tactile cues (using TAP)    Ambulation/Gait Training  Ambulation/Gait Training Performed: No (pt unable)  Transfers  Transfer: No (pt unable d/t pain)    Stairs  Stairs: No (pt unable)    Other Activity  Other Activity Performed: Yes  Other Activity 1: issued/donned green ankle positioning boots    Outcome Measures:  [facility] Basic Mobility  Turning from your back to your side while in a flat bed without using bedrails: Total  Moving from lying on your back to sitting on the side of a flat bed without using bedrails: Total  Moving to and from bed to chair (including a wheelchair): Total  Standing up from a chair using your arms (e.g. wheelchair or bedside chair): Total  To walk in hospital room: Total  Climbing 3-5 steps with railing: Total  Basic Mobility - Total Score: 6    Education Documentation  Precautions, taught by Bekah Brewer PT at 1/9/2025 11:32 AM.  Learner: Patient  Readiness: Acceptance  Method: Explanation, Demonstration  Response: Needs Reinforcement  Comment: PT purpose/POC, progress, logrolling and coming to/from sit, purpose of green ankle positioning boots    Body Mechanics, taught by Bekah Brewer, PT at 1/9/2025 11:32 AM.  Learner: Patient  Readiness: Acceptance  Method: Explanation,  Demonstration  Response: Needs Reinforcement  Comment: PT purpose/POC, progress, logrolling and coming to/from sit, purpose of green ankle positioning boots    Mobility Training, taught by Bekah Brewer, PT at 1/9/2025 11:32 AM.  Learner: Patient  Readiness: Acceptance  Method: Explanation, Demonstration  Response: Needs Reinforcement  Comment: PT purpose/POC, progress, logrolling and coming to/from sit, purpose of green ankle positioning boots    Education Comments  No comments found.      Encounter Problems       Encounter Problems (Active)       General Goals       Pt will have bilateral LE strength >4 (ankle DF, hip flexion, knee flex/ext) and AROM WFL hip flexion/knee flex & extension and ankle DF bilaterally (Progressing)       Start:  01/06/25    Expected End:  01/20/25            Pt will supine to/from sit via logrolling (HOB elevated 45 degrees, use of rail) with mod A +2 (Progressing)       Start:  01/06/25    Expected End:  01/20/25            Pt will sit EOB with min A +1 >7 minutes with pain <2 (Progressing)       Start:  01/06/25    Expected End:  01/20/25               Mobility       Pt will come sit to/from stand, bed to/from chair with WW with mod A +2 (no knee buckling, pain <2) (Not Progressing)       Start:  01/06/25    Expected End:  01/20/25            Pt will stand static with WW and min/mod A +2 (Not Progressing)       Start:  01/06/25    Expected End:  01/20/25            Pt will ambulate 10' with WW with min/mod A +2 and chair follow for safety (Not Progressing)       Start:  01/06/25    Expected End:  01/20/25               Pain - Adult

## 2025-01-09 NOTE — PROGRESS NOTES
01/09/25 1400   Discharge Planning   Living Arrangements Alone   Support Systems Family members   Type of Residence Private residence   Home or Post Acute Services Post acute facilities (Rehab/SNF/etc)   Type of Post Acute Facility Services Skilled nursing   Expected Discharge Disposition SNF   Does the patient need discharge transport arranged? Yes   RoundTrip coordination needed? Yes   Has discharge transport been arranged? No     Care Transitions Note  1/9/25  LSW attempted to meet with pt for several days, each time coincidentally off the floor. LSW spoke with Micaela, sister, on the phone. Micaela was not quite happy with care received at [facility], wonders if referral can be sent to [facility], LSW sent in [facility]. LSW to speak with Ray and see what his preference is for discharge, whether he wants to return to [facility] [place] or try a new facility. Pt used all of his SNF days when he was at SNF after Thanksgiving, and then he was paying privately. Micaela had appealed a few times and each time it was approved. SNF benefit may reset at beginning of year, LSW to find out.     Micaela voiced frustration that she has not received updates on pt. LSW asked team to speak with Micaela. Pt and Micaela are wondering why he is still having pain/can't walk if his MRI shows nothing concerning. LSW to follow up with pt and Micaela to determine dispo plan.   Alma Goodman, MSW, LSW

## 2025-01-09 NOTE — CONSULTS
"Inpatient consult to Neurology  Consult performed by: Akash Stafford MD  Consult ordered by: Elana Fu MD PhD          History Of Present Illness  Abdi Retana \"Louise" is a 74 y.o. male with PMHx of HTN, GERD, SAMIA, Chronic lymphedema, venous insufficiency, OA, CKD-3(b/l 1.2-1.5), biopsy pending but concern for RCC. Presenting symptom was b/l LE weakness. Was admitted to [facility] 11/30-12/5 for this after IMG revealed a concerning L4-5 lesion w/ mod-severe cord compression in s/o R renal mass and suspicious lesions in adrenals lungs, and spine - neurology is consulted for small area of diffusion restriction on brain MRI acquired 1/8 concerning for stroke.    On discussion with patient, he reports gradual worsening of his leg weakness bilaterally and pain in his back. He denies any lateralized weakness of numbness, or any acute onset of symptoms.    On review of his imaging, there is a small diffusion restricting, nonenhancing lesion in the R corona radiata white matter with significant nonspecific whitematter disease.    Last known well: prior to admission  Had stroke symptoms resolved at time of presentation: Yes    A1c 5.5 11/28/24  LDL 86    Past Medical History  Past Medical History:   Diagnosis Date    CKD (chronic kidney disease)     GERD (gastroesophageal reflux disease)     Hypertension     OA (osteoarthritis)     Personal history of other diseases of the circulatory system     History of hypertension    PVD (peripheral vascular disease) (CMS-[facility])      Surgical History  History reviewed. No pertinent surgical history.  Social History  Social History     Tobacco Use    Smoking status: Former     Types: Cigarettes   Substance Use Topics    Alcohol use: Yes    Drug use: Never     Allergies  Clindamycin and Amoxicillin-pot clavulanate  Home Medications  Medications Prior to Admission   Medication Sig Dispense Refill Last Dose/Taking    acetaminophen (Tylenol 8 HOUR) 650 mg ER tablet Take 1 tablet (650 " mg) by mouth every 6 hours if needed for mild pain (1 - 3). Do not crush, chew, or split.       acetaminophen (Tylenol) 325 mg tablet Take 2 tablets (650 mg) by mouth 2 times a day.       aspirin 81 mg EC tablet Take 1 tablet (81 mg) by mouth once daily.       bisacodyl (Gentle Laxative, bisacodyl,) 10 mg suppository Insert 1 suppository (10 mg) into the rectum once daily as needed for constipation.       cefepime (Maxipime) IV Infuse 100 mL (2 g) over 0.5 hours into a venous catheter every 12 hours.       famotidine (Pepcid) 20 mg tablet Take 1 tablet (20 mg) by mouth 2 times a day.       ferrous sulfate (FeroSuL) 325 (65 Fe) MG tablet Take 1 tablet (325 mg) by mouth once every 24 hours. 90 tablet 3     losartan (Cozaar) 50 mg tablet TAKE 1 TABLET BY MOUTH ONCE  DAILY 90 tablet 3     magnesium hydroxide (Milk of Magnesia) 400 mg/5 mL suspension Take 30 mL by mouth once daily as needed for constipation (if no BM in 3 consecutive days).       magnesium oxide 400 mg magnesium capsule Take 1 capsule (400 mg) by mouth once daily. 90 capsule 3     mineral oil enema Insert 133 mL (1 enema) into the rectum once daily as needed for constipation (if no BM in 8 hours after getting suppository).       potassium chloride CR 10 mEq ER tablet TAKE 1 TABLET BY MOUTH ONCE  DAILY DO NOT CRUSH, CHEW, OR  SPLIT 90 tablet 3     torsemide (Demadex) 20 mg tablet TAKE 1 TABLET BY MOUTH ONCE  DAILY 90 tablet 3     traMADol (Ultram) 50 mg tablet Take 1 tablet (50 mg) by mouth every 6 hours if needed for severe pain (7 - 10).       vitamin D3-folic acid 2,500 unit- 1 mg tablet Take 1 mg by mouth once every 24 hours. 90 tablet 3        Review of Systems  Neurological Exam  Mental Status  Awake, alert and oriented to person, place and time. Speech is normal. Language is fluent with no aphasia. Difficulty spelling words backwards. WORLD-DLORW. Fund of knowledge is appropriate for level of education.    Cranial Nerves  CN II: Right visual  acuity: 20/70. Left visual acuity: 20/70. Right normal visual field. Left normal visual field.  CN III, IV, VI: Extraocular movements intact bilaterally. No nystagmus. Normal saccades. Normal smooth pursuit. Normal lids and orbits bilaterally.   Right pupil: 3 mm.   Left pupil: 3 mm.  CN V: Facial sensation is normal.  CN VII: Full and symmetric facial movement.  CN VIII: Hearing is normal.  CN IX, X: Palate elevates symmetrically  CN XI: Shoulder shrug strength is normal.  CN XII: Tongue midline without atrophy or fasciculations.    Motor  Decreased muscle bulk throughout. Diminshed bulk in bilateral hands and legs.                                               Right                     Left   Shoulder abduction               5                          5  Elbow flexion                         5                          5  Elbow extension                    5                          5  Finger flexion                         5                          5  Hip flexion                              3-                          3-  Hip abduction                         3-                          3-  Knee flexion                           3-                          3-  BLE exam severely limited by pain, barely antigravity in both with effort.    Sensory  Light touch is normal in upper and lower extremities. Pinprick is normal in upper and lower extremities. Temperature is normal in upper and lower extremities. Vibration abnormality: Diminished below knee b/l.     Reflexes                                            Right                      Left  Brachioradialis                    3+                         3+  Biceps                                 3+                         3+  Triceps                                3+                         3+  Patellar                                0                         0  Achilles                                0                         0    Right pathological reflexes:  Suprapatellar absent.  Left pathological reflexes: Suprapatellar absent.    Coordination  Right: Finger-to-nose normal. Rapid alternating movement normal.Left: Finger-to-nose normal. Rapid alternating movement normal.    Gait    Not attempted.    Physical Exam  Eyes:      General: Lids are normal.      Extraocular Movements: EOM normal. No nystagmus.   Neurological:      Deep Tendon Reflexes:      Reflex Scores:       Tricep reflexes are 3+ on the right side and 3+ on the left side.       Bicep reflexes are 3+ on the right side and 3+ on the left side.       Brachioradialis reflexes are 3+ on the right side and 3+ on the left side.       Patellar reflexes are 0 on the right side and 0 on the left side.       Achilles reflexes are 0 on the right side and 0 on the left side.  Psychiatric:         Speech: Speech normal.       NIH Stroke Scale      Interval: Baseline  Time: 5:25 PM  Person Administering Scale: Akash Stafford MD    1a  Level of consciousness: 0=alert; keenly responsive   1b. LOC questions:  0=Performs both tasks correctly   1c. LOC commands: 0=Performs both tasks correctly   2.  Best Gaze: 0=normal   3.  Visual: 0=No visual loss   4. Facial Palsy: 0=Normal symmetric movement   5a.  Motor left arm: 0=No drift, limb holds 90 (or 45) degrees for full 10 seconds   5b.  Motor right arm: 0=No drift, limb holds 90 (or 45) degrees for full 10 seconds   6a. motor left le=Some effort against gravity, limb cannot get to or maintain (if cured) 90 (or 45) degrees, drifts down to bed, but has some effort against gravity   6b  Motor right le=Some effort against gravity, limb cannot get to or maintain (if cured) 90 (or 45) degrees, drifts down to bed, but has some effort against gravity   7. Limb Ataxia: 0=Absent   8.  Sensory: 0=Normal; no sensory loss   9. Best Language:  0=No aphasia, normal   10. Dysarthria: 0=Normal   11. Extinction and Inattention: 0=No abnormality   12. Distal motor function: 0=Normal  "   Total:   4       Last Recorded Vitals  Blood pressure 128/69, pulse 70, temperature 36.1 °C (97 °F), temperature source Temporal, resp. rate 16, height 1.88 m (6' 2\"), weight 120 kg (264 lb), SpO2 93%.        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  74-year-old male with recently found right renal lesion.     COMPARISON: None.   ACCESSION NUMBER(S): [id number]   ORDERING CLINICIAN: NAMRATA ORTIZ   TECHNIQUE: Axial T2, FLAIR, DWI, gradient echo T2 and sagittal and coronal T1 weighted images of brain were acquired. Post contrast T1 weighted images were acquired after administration of 20 ML Dotarem gadolinium based intravenous contrast.   FINDINGS: CSF Spaces: The ventricles, sulci and basal cisterns are concordant with the degree of parenchymal atrophy. No extra-axial fluid collections. No abnormal pachymeningeal enhancement or dural thickening.   Parenchyma: There is a small focus of diffusion restriction in the posteroinferior aspect of the right parietal lobe (series 7, image 59). On T2 and FLAIR weighted imaging the lesion appears hyperintense with no associated enhancement on postcontrast imaging. There is an additional small, slightly DWI hyperintense focus within the corona radiata of the right frontal lobe that may reflect evolving subacute to remote insult.   There are moderate burden of nonspecific periventricular and deep white matter, patchy and confluent T2/FLAIR hyperintensities involving the bilateral cerebral hemispheres, which may represent sequela of chronic microvascular ischemic changes. Remote appearing " bilateral basal ganglia lacunar infarcts. There is no abnormal parenchymal or leptomeningeal enhancement. There are multiple remote appearing microhemorrhages throughout the brain parenchyma with nonspecific distribution. No intracranial mass effect or herniation.   Calvarium: Intact with no abnormally enhancing or marrow replacing calvarial lesions.   Orbital contents: Incidentally noted dilatation of the bilateral superior ophthalmic veins which is nonspecific. The globes are intact. There is mild tortuosity of bilateral optic nerve sheaths.   Paranasal Sinuses and Mastoids: Mild mucosal thickening of the ethmoid air cells. Otherwise the paranasal sinuses are clear. Nasopharyngeal secretions. Mastoid air cells are clear.       1.  Small focus of diffusion restriction involving the right parietal lobe with no associated enhancement on postcontrast imaging, favoring small acute infarct. No mass effect or hemorrhagic transformation. 2. No abnormal intracranial enhancement to indicate intracranial metastatic disease. 3. Nonspecific dilatation of the superior ophthalmic veins bilaterally.     I personally reviewed the images/study and I agree with the findings as stated. This study was interpreted at [place], [place].   MACRO: None   Signed by: Charles Cain 1/9/2025 8:25 AM Dictation workstation:   [id number]   No CT head results found for the past 14 days  No echocardiogram results found for the past 14 days        BNP   Date/Time Value Ref Range Status   11/26/2024 05:06 PM 36 0 - 99 pg/mL Final        I have personally reviewed the following imaging results CT angio head and neck w and wo IV contrast    Result Date: 1/9/2025  Interpreted By:  Jimi Durán, STUDY: CT ANGIO HEAD AND NECK W AND WO IV CONTRAST;  1/9/2025 2:30 pm   INDICATION: Signs/Symptoms: small acute right parietal lobe infarct.   History   COMPARISON: MRI brain yesterday.   ACCESSION NUMBER(S):  [id number]   ORDERING CLINICIAN: MARYA ROBB   TECHNIQUE: Unenhanced CT images of the head were obtained.  Subsequently,  90 ML of Omnipaque 350 was administered intravenously and axial images of the head and neck were acquired.  Coronal, sagittal, and 3-D reconstructions were provided for review.   FINDINGS:   CTA COW: No major vascular occlusion. Severe short-segment stenosis proximal P2 segment left posterior cerebral arteries. Moderate short-segment stenosis P2 segment right posterior cerebral artery. No aneurysms.  No vascular malformations. Patent dural venous sinuses and intracranial deep venous structures.   CTA CAROTID: No aortic aneurysm or dissection. No significant stenoses at the origins of the great vessels from the aortic arch. No significant stenoses of the brachycephalic artery or bilateral subclavian arteries.   No significant stenoses bilateral carotid arterial systems. No significant stenoses bilateral vertebral arterial systems.   NONVASCULAR HEAD: No intracranial mass. No intracranial hemorrhage. No evidence of large evolving cortical infarction. Expansile lucent process posterior right mandible.   NONVASCULAR NECK: No soft tissue mass. No adenopathy. Salivary glands are unremarkable. Thyroid gland unremarkable. Bones intact. Numerous bilateral lung nodules.         1. No intracranial major vascular occlusion. 2. Severe short-segment stenosis proximal P2 segment left posterior cerebral arteries. Moderate short-segment stenosis P2 segment right posterior cerebral artery. 3. No flow-limiting stenosis or significant plaque irregularity in the neck. 4. Expansile lucent process posterior right mandible, metastatic versus odontogenic. 5. Numerous presumed medicine.   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  74-year-old male with recently found right renal lesion.     COMPARISON: None.   ACCESSION NUMBER(S): [id number]   ORDERING CLINICIAN: NAMRATA ORTIZ   TECHNIQUE: Axial T2, FLAIR, DWI, gradient echo T2 and sagittal and coronal T1 weighted images of brain were acquired. Post contrast T1 weighted images were acquired after administration of 20 ML Dotarem gadolinium based intravenous contrast.   FINDINGS: CSF Spaces: The ventricles, sulci and basal cisterns are concordant with the degree of parenchymal atrophy. No extra-axial fluid collections. No abnormal pachymeningeal enhancement or dural thickening.   Parenchyma: There is a small focus of diffusion restriction in the posteroinferior aspect of the right parietal lobe (series 7, image 59). On T2 and FLAIR weighted imaging the lesion appears hyperintense with no associated enhancement on postcontrast imaging. There is an additional small, slightly DWI hyperintense focus within the corona radiata of the right frontal lobe that may reflect evolving subacute to remote insult.   There are moderate burden of nonspecific periventricular and deep white matter, patchy and confluent T2/FLAIR hyperintensities involving the bilateral cerebral hemispheres, which may represent sequela of chronic microvascular ischemic changes. Remote appearing bilateral basal ganglia lacunar infarcts. There is no abnormal parenchymal or leptomeningeal enhancement. There are multiple remote appearing microhemorrhages throughout the brain parenchyma with nonspecific distribution. No intracranial mass effect or herniation.   Calvarium: Intact with no abnormally enhancing or marrow replacing calvarial lesions.   Orbital contents: Incidentally noted dilatation of the bilateral superior ophthalmic veins which is nonspecific. The globes are intact. There is mild tortuosity of bilateral optic nerve sheaths.    Paranasal Sinuses and Mastoids: Mild mucosal thickening of the ethmoid air cells. Otherwise the paranasal sinuses are clear. Nasopharyngeal secretions. Mastoid air cells are clear.       1.  Small focus of diffusion restriction involving the right parietal lobe with no associated enhancement on postcontrast imaging, favoring small acute infarct. No mass effect or hemorrhagic transformation. 2. No abnormal intracranial enhancement to indicate intracranial metastatic disease. 3. Nonspecific dilatation of the superior ophthalmic veins bilaterally.     I personally reviewed the images/study and I agree with the findings as stated. This study was interpreted at [place], [place].   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   COMPARISON: CT chest abdomen pelvis from 01/02/2025, CT thoracic spine from 11/30/2024, CT lumbar spine from 11/30/2024.   ACCESSION NUMBER(S): [id number]; [id number]; [id number]   ORDERING CLINICIAN: NAMRATA ORTIZ   TECHNIQUE: Multiplanar multisequence MR imaging of the entire spine were performed without contrast and following administration of 20 mL of intravenous Dotarem. Sagittal T1, T2, STIR, axial T1 and T2 weighted images of the entire spine were acquired.   FINDINGS: CERVICAL:   ALIGNMENT: Straightening of the spine without spondylolisthesis.   VERTEBRAE: There is a T1 and T2 hyperintense lesion within the C6 vertebral measuring 8 x 9 mm, likely representing benign hemangioma. No suspicious lesions are seen. Vertebral body heights are  maintained.   DISCS: There is mild intervertebral disc height narrowing at C5-C6 and C6-C7. There are chronic degenerative endplate changes at multiple levels including osteophytic spurring.   CORD: There is no intrinsic spinal cord signal abnormality. There is no abnormal enhancement or mass located within the cervical spinal cord or canal.   PARAVERTEBRAL SOFT TISSUES: The visualized paravertebral soft tissues appear within normal limits.   EVALUATION OF INDIVIDUAL LEVELS: C2-3: There is disc osteophyte complex which partially effaces the ventral thecal sac there is no spinal canal stenosis. There is mild left neural foraminal narrowing due to uncovertebral hypertrophy. There is right uncovertebral hypertrophy without neural foraminal narrowing. There is moderate to marked right and moderate left facet osteoarthropathy.   C3-4: There is a central disc protrusion. There is no spinal canal stenosis. There is no narrowing of the right neural foramen. There is moderate left neural foraminal narrowing due to uncovertebral hypertrophy and marked facet osteoarthropathy. There is no striking right-sided facet osteoarthropathy.   C4-5: There is a disc osteophyte complex which partially effaces the ventral thecal sac and posteriorly displaces the posterior longitudinal ligament. There is mild left neural foraminal narrowing due to uncovertebral hypertrophy and marked facet osteoarthropathy. There is no narrowing of the right neural foramen. There is right uncovertebral hypertrophy. There is no facet osteoarthropathy.   C5-6: There is a disc osteophyte complex, eccentric to the right, which causes moderate spinal canal stenosis and narrowing of the right-greater-than-left lateral recesses. There is marked left and moderate right neural foraminal narrowing due to uncovertebral hypertrophy. There is no facet osteoarthropathy.   C6-7: There is a disc osteophyte complex which causes moderate spinal canal stenosis. There is  moderate left and mild right neural foraminal narrowing due to uncovertebral hypertrophy. There is no facet osteoarthropathy.   C7-T1: There is a diffuse disc bulge. There is no spinal canal stenosis or neural foraminal narrowing. There is no facet osteoarthropathy.   THORACIC:   There is kyphosis. There is grade 1 anterolisthesis at T1-T2 and T2-T3. Vertebral body heights are maintained. There is mild intervertebral disc height narrowing at few levels with chronic degenerative endplate changes including osteophytic spurring. Marrow signal is overall within normal limits. No striking evidence to suggest osseous metastases.   The thoracic spinal cord is normal in signal and caliber.   There is no mass or abnormal enhancement within the thoracic spinal cord or canal.   There are small disc protrusions with osteophytic spurring at the levels of T6-T7, T7-T8, and T8-T9 which partially effaces the ventral thecal sac. There is no spinal canal stenosis at any level.   There is ossification of the ligamentum flavum at the levels of T10-T11 and T11-T12 which contributes to partial effacement of the posterior thecal sac. There is moderate bilateral neural foraminal narrowing at T10-T11 primarily due to facet osteoarthropathy/ossification of the ligamentum flavum.   There are small bilateral pleural effusions, right-greater-than-left, with associated passive atelectasis. There are multiple nodules located within the lungs which are most consistent with metastases.     LUMBAR SPINE:   Segmentation: There are 5 non-rib-bearing lumbar vertebra.   There is grade 1 anterolisthesis at L3-L4 and mild retrolisthesis at L5-S1.   There is complete metastatic involvement of the L4 vertebral body and involvement of the bilateral L4 pedicles, portions at the articular processes, and within the transverse processes. There is resulting mild scalloping of the superior endplate of L4. There is extension of tumor into the surrounding  prevertebral soft tissues and extends superiorly to the level of the inferior endplate of L3 with involvement of the endplate, tumor extends inferiorly within the prevertebral soft tissues to the level of L5 with involvement of the superior endplate of L5. Tumor extends into the L3-L4 and L4-L5 intervertebral disc spaces and into the posterior portion of the inferior L3 endplate and within the mid and posterior portions of the superior L5 endplate.   Exact measurements of tumor is somewhat difficult due to multi spatial nature of the mass, noting this, tumor within the soft tissues surrounding the L4 vertebral body measures 5.8 cm AP x 8.7 cm transverse.   Tumor extends into the ventral and bilateral epidural spaces at the level of L4. In addition, there is tumor within the posterior spinal canal extending from the level of L3 through the superior endplate of L5 measuring 6 cm in craniocaudal dimension and 0.7 cm in AP diameter. There is resulting marked spinal canal stenosis. There is minimal CSF seen within the thecal sac at L4.   There is extension of tumor into the bilateral neural foramina at L4-L5 with resulting moderate to marked left and marked right neural foraminal narrowing. Tumor also extends into the inferior portions of the bilateral neural foramina at L3-L4 resulting in moderate neural foraminal narrowing.   There is also tumor located within the left posterior spinal canal at the level of L5 and S1 measuring 1.2 cm AP x 0.9 cm transverse x 5.1 cm craniocaudal which causes mass effect on the posterior left thecal sac.   There is no striking abnormal enhancement located within the conus medullaris or cauda equina nerve roots.   Degenerative change:   At L1-L2, there is a central disc protrusion with inferior migration which along with prominent posterior epidural fat causes marked spinal canal stenosis. There is no neural foraminal narrowing. There is mild-to-moderate bilateral facet osteoarthropathy.    At L2-L3, there is no striking posterior disc contour abnormality. There is no spinal canal stenosis or neural foraminal narrowing. There is moderate bilateral facet osteoarthropathy.   At L3-L4, there is mild spinal canal stenosis due to grade 1 anterolisthesis. There is marked bilateral facet osteoarthropathy.   At L4-L5, there is no striking posterior disc contour abnormality. There is marked bilateral facet osteoarthropathy.   At L5-S1, there is no striking posterior disc contour abnormality. There is no spinal canal stenosis. There is extension of disc and osteophyte into the bilateral neural foramina and along with facet osteoarthropathy causes moderate-to-marked right and moderate left neural foraminal narrowing.   Additional findings were detailed on the prior CT of the abdomen and pelvis report.       MRI cervical spine:   1. No evidence of metastases.   2. Multilevel degenerative changes of the cervical spine including moderate spinal canal stenosis at C5-C6 and C6-C7.   MRI thoracic spine:   1. No evidence of osseous metastases.   2. Multilevel degenerative changes of the thoracic spine without striking spinal canal stenosis at any level.   MRI lumbar spine:   1. Metastasis involving the entirety of the L4 vertebral body, portions of the posterior elements and transverse processes, with extension into the L3-L4 and L4-L5 intervertebral disc spaces and into the inferior endplate of L3 and superior endplate of L5 as well as into the surrounding prevertebral soft tissues.   2. There is extension of tumor into the ventral and bilateral epidural space at L4 and there is also tumor located within the posterior spinal canal extending from the level of L3 through L5. Combination of these findings cause marked spinal canal stenosis at the level of L4. Minimal CSF is seen within the thecal sac at the level of L4.   3. Tumor extends into the bilateral neural foramina at L4-L5 with resulting moderate-to-marked left  and marked right neural foraminal narrowing. Tumor also extends into the inferior portions of the bilateral neural foramina at L3-L4 resulting in moderate neural foraminal narrowing.   4. There is metastasis located within the posterior and left epidural space at the level of L5 and S1 which causes partial effacement of the posterior thecal sac.   5. Multilevel degenerative changes of the lumbar spine as detailed above.   I personally reviewed the images/study and I agree with the findings as stated by Aislinn Hastings MD, PGY-2 this study was interpreted at [place], [place].   MACRO: None   Signed by: Hiro Weiss 1/8/2025 5:35 PM Dictation workstation:   [id number]    MR thoracic spine w and wo IV contrast    Result Date: 1/8/2025  Interpreted By:  Hiro Weiss,  and Venkata Tao STUDY: MR LUMBAR SPINE W AND WO IV CONTRAST; MR CERVICAL SPINE W AND WO IV CONTRAST; MR THORACIC SPINE W AND WO IV CONTRAST performed 1/8/2025 3:40 pm; 1/8/2025 3:39 pm   INDICATION: Signs/Symptoms:Likely new RCC w/ likely L4 mets.   COMPARISON: CT chest abdomen pelvis from 01/02/2025, CT thoracic spine from 11/30/2024, CT lumbar spine from 11/30/2024.   ACCESSION NUMBER(S): [id number]; [id number]; [id number]   ORDERING CLINICIAN: NAMRATA ORTIZ   TECHNIQUE: Multiplanar multisequence MR imaging of the entire spine were performed without contrast and following administration of 20 mL of intravenous Dotarem. Sagittal T1, T2, STIR, axial T1 and T2 weighted images of the entire spine were acquired.   FINDINGS: CERVICAL:   ALIGNMENT: Straightening of the spine without spondylolisthesis.   VERTEBRAE: There is a T1 and T2 hyperintense lesion within the C6 vertebral measuring 8 x 9 mm, likely representing benign hemangioma. No suspicious lesions are seen. Vertebral body heights are maintained.   DISCS: There is mild intervertebral disc height narrowing at C5-C6 and C6-C7. There are chronic  degenerative endplate changes at multiple levels including osteophytic spurring.   CORD: There is no intrinsic spinal cord signal abnormality. There is no abnormal enhancement or mass located within the cervical spinal cord or canal.   PARAVERTEBRAL SOFT TISSUES: The visualized paravertebral soft tissues appear within normal limits.   EVALUATION OF INDIVIDUAL LEVELS: C2-3: There is disc osteophyte complex which partially effaces the ventral thecal sac there is no spinal canal stenosis. There is mild left neural foraminal narrowing due to uncovertebral hypertrophy. There is right uncovertebral hypertrophy without neural foraminal narrowing. There is moderate to marked right and moderate left facet osteoarthropathy.   C3-4: There is a central disc protrusion. There is no spinal canal stenosis. There is no narrowing of the right neural foramen. There is moderate left neural foraminal narrowing due to uncovertebral hypertrophy and marked facet osteoarthropathy. There is no striking right-sided facet osteoarthropathy.   C4-5: There is a disc osteophyte complex which partially effaces the ventral thecal sac and posteriorly displaces the posterior longitudinal ligament. There is mild left neural foraminal narrowing due to uncovertebral hypertrophy and marked facet osteoarthropathy. There is no narrowing of the right neural foramen. There is right uncovertebral hypertrophy. There is no facet osteoarthropathy.   C5-6: There is a disc osteophyte complex, eccentric to the right, which causes moderate spinal canal stenosis and narrowing of the right-greater-than-left lateral recesses. There is marked left and moderate right neural foraminal narrowing due to uncovertebral hypertrophy. There is no facet osteoarthropathy.   C6-7: There is a disc osteophyte complex which causes moderate spinal canal stenosis. There is moderate left and mild right neural foraminal narrowing due to uncovertebral hypertrophy. There is no facet  osteoarthropathy.   C7-T1: There is a diffuse disc bulge. There is no spinal canal stenosis or neural foraminal narrowing. There is no facet osteoarthropathy.   THORACIC:   There is kyphosis. There is grade 1 anterolisthesis at T1-T2 and T2-T3. Vertebral body heights are maintained. There is mild intervertebral disc height narrowing at few levels with chronic degenerative endplate changes including osteophytic spurring. Marrow signal is overall within normal limits. No striking evidence to suggest osseous metastases.   The thoracic spinal cord is normal in signal and caliber.   There is no mass or abnormal enhancement within the thoracic spinal cord or canal.   There are small disc protrusions with osteophytic spurring at the levels of T6-T7, T7-T8, and T8-T9 which partially effaces the ventral thecal sac. There is no spinal canal stenosis at any level.   There is ossification of the ligamentum flavum at the levels of T10-T11 and T11-T12 which contributes to partial effacement of the posterior thecal sac. There is moderate bilateral neural foraminal narrowing at T10-T11 primarily due to facet osteoarthropathy/ossification of the ligamentum flavum.   There are small bilateral pleural effusions, right-greater-than-left, with associated passive atelectasis. There are multiple nodules located within the lungs which are most consistent with metastases.     LUMBAR SPINE:   Segmentation: There are 5 non-rib-bearing lumbar vertebra.   There is grade 1 anterolisthesis at L3-L4 and mild retrolisthesis at L5-S1.   There is complete metastatic involvement of the L4 vertebral body and involvement of the bilateral L4 pedicles, portions at the articular processes, and within the transverse processes. There is resulting mild scalloping of the superior endplate of L4. There is extension of tumor into the surrounding prevertebral soft tissues and extends superiorly to the level of the inferior endplate of L3 with involvement of the  endplate, tumor extends inferiorly within the prevertebral soft tissues to the level of L5 with involvement of the superior endplate of L5. Tumor extends into the L3-L4 and L4-L5 intervertebral disc spaces and into the posterior portion of the inferior L3 endplate and within the mid and posterior portions of the superior L5 endplate.   Exact measurements of tumor is somewhat difficult due to multi spatial nature of the mass, noting this, tumor within the soft tissues surrounding the L4 vertebral body measures 5.8 cm AP x 8.7 cm transverse.   Tumor extends into the ventral and bilateral epidural spaces at the level of L4. In addition, there is tumor within the posterior spinal canal extending from the level of L3 through the superior endplate of L5 measuring 6 cm in craniocaudal dimension and 0.7 cm in AP diameter. There is resulting marked spinal canal stenosis. There is minimal CSF seen within the thecal sac at L4.   There is extension of tumor into the bilateral neural foramina at L4-L5 with resulting moderate to marked left and marked right neural foraminal narrowing. Tumor also extends into the inferior portions of the bilateral neural foramina at L3-L4 resulting in moderate neural foraminal narrowing.   There is also tumor located within the left posterior spinal canal at the level of L5 and S1 measuring 1.2 cm AP x 0.9 cm transverse x 5.1 cm craniocaudal which causes mass effect on the posterior left thecal sac.   There is no striking abnormal enhancement located within the conus medullaris or cauda equina nerve roots.   Degenerative change:   At L1-L2, there is a central disc protrusion with inferior migration which along with prominent posterior epidural fat causes marked spinal canal stenosis. There is no neural foraminal narrowing. There is mild-to-moderate bilateral facet osteoarthropathy.   At L2-L3, there is no striking posterior disc contour abnormality. There is no spinal canal stenosis or neural  foraminal narrowing. There is moderate bilateral facet osteoarthropathy.   At L3-L4, there is mild spinal canal stenosis due to grade 1 anterolisthesis. There is marked bilateral facet osteoarthropathy.   At L4-L5, there is no striking posterior disc contour abnormality. There is marked bilateral facet osteoarthropathy.   At L5-S1, there is no striking posterior disc contour abnormality. There is no spinal canal stenosis. There is extension of disc and osteophyte into the bilateral neural foramina and along with facet osteoarthropathy causes moderate-to-marked right and moderate left neural foraminal narrowing.   Additional findings were detailed on the prior CT of the abdomen and pelvis report.       MRI cervical spine:   1. No evidence of metastases.   2. Multilevel degenerative changes of the cervical spine including moderate spinal canal stenosis at C5-C6 and C6-C7.   MRI thoracic spine:   1. No evidence of osseous metastases.   2. Multilevel degenerative changes of the thoracic spine without striking spinal canal stenosis at any level.   MRI lumbar spine:   1. Metastasis involving the entirety of the L4 vertebral body, portions of the posterior elements and transverse processes, with extension into the L3-L4 and L4-L5 intervertebral disc spaces and into the inferior endplate of L3 and superior endplate of L5 as well as into the surrounding prevertebral soft tissues.   2. There is extension of tumor into the ventral and bilateral epidural space at L4 and there is also tumor located within the posterior spinal canal extending from the level of L3 through L5. Combination of these findings cause marked spinal canal stenosis at the level of L4. Minimal CSF is seen within the thecal sac at the level of L4.   3. Tumor extends into the bilateral neural foramina at L4-L5 with resulting moderate-to-marked left and marked right neural foraminal narrowing. Tumor also extends into the inferior portions of the bilateral  neural foramina at L3-L4 resulting in moderate neural foraminal narrowing.   4. There is metastasis located within the posterior and left epidural space at the level of L5 and S1 which causes partial effacement of the posterior thecal sac.   5. Multilevel degenerative changes of the lumbar spine as detailed above.   I personally reviewed the images/study and I agree with the findings as stated by Aislinn Hastings MD, PGY-2 this study was interpreted at [facility], [place], [place].   MACRO: None   Signed by: Hiro Weiss 1/8/2025 5:35 PM Dictation workstation:   [id number]    MR lumbar spine w and wo IV contrast    Result Date: 1/8/2025  Interpreted By:  Hiro Weiss,  and Venkata Tao STUDY: MR LUMBAR SPINE W AND WO IV CONTRAST; MR CERVICAL SPINE W AND WO IV CONTRAST; MR THORACIC SPINE W AND WO IV CONTRAST performed 1/8/2025 3:40 pm; 1/8/2025 3:39 pm   INDICATION: Signs/Symptoms:Likely new RCC w/ likely L4 mets.   COMPARISON: CT chest abdomen pelvis from 01/02/2025, CT thoracic spine from 11/30/2024, CT lumbar spine from 11/30/2024.   ACCESSION NUMBER(S): [id number]; [id number]; [id number]   ORDERING CLINICIAN: NAMRATA ORTIZ   TECHNIQUE: Multiplanar multisequence MR imaging of the entire spine were performed without contrast and following administration of 20 mL of intravenous Dotarem. Sagittal T1, T2, STIR, axial T1 and T2 weighted images of the entire spine were acquired.   FINDINGS: CERVICAL:   ALIGNMENT: Straightening of the spine without spondylolisthesis.   VERTEBRAE: There is a T1 and T2 hyperintense lesion within the C6 vertebral measuring 8 x 9 mm, likely representing benign hemangioma. No suspicious lesions are seen. Vertebral body heights are maintained.   DISCS: There is mild intervertebral disc height narrowing at C5-C6 and C6-C7. There are chronic degenerative endplate changes at multiple levels including osteophytic spurring.   CORD: There is no intrinsic  spinal cord signal abnormality. There is no abnormal enhancement or mass located within the cervical spinal cord or canal.   PARAVERTEBRAL SOFT TISSUES: The visualized paravertebral soft tissues appear within normal limits.   EVALUATION OF INDIVIDUAL LEVELS: C2-3: There is disc osteophyte complex which partially effaces the ventral thecal sac there is no spinal canal stenosis. There is mild left neural foraminal narrowing due to uncovertebral hypertrophy. There is right uncovertebral hypertrophy without neural foraminal narrowing. There is moderate to marked right and moderate left facet osteoarthropathy.   C3-4: There is a central disc protrusion. There is no spinal canal stenosis. There is no narrowing of the right neural foramen. There is moderate left neural foraminal narrowing due to uncovertebral hypertrophy and marked facet osteoarthropathy. There is no striking right-sided facet osteoarthropathy.   C4-5: There is a disc osteophyte complex which partially effaces the ventral thecal sac and posteriorly displaces the posterior longitudinal ligament. There is mild left neural foraminal narrowing due to uncovertebral hypertrophy and marked facet osteoarthropathy. There is no narrowing of the right neural foramen. There is right uncovertebral hypertrophy. There is no facet osteoarthropathy.   C5-6: There is a disc osteophyte complex, eccentric to the right, which causes moderate spinal canal stenosis and narrowing of the right-greater-than-left lateral recesses. There is marked left and moderate right neural foraminal narrowing due to uncovertebral hypertrophy. There is no facet osteoarthropathy.   C6-7: There is a disc osteophyte complex which causes moderate spinal canal stenosis. There is moderate left and mild right neural foraminal narrowing due to uncovertebral hypertrophy. There is no facet osteoarthropathy.   C7-T1: There is a diffuse disc bulge. There is no spinal canal stenosis or neural foraminal  narrowing. There is no facet osteoarthropathy.   THORACIC:   There is kyphosis. There is grade 1 anterolisthesis at T1-T2 and T2-T3. Vertebral body heights are maintained. There is mild intervertebral disc height narrowing at few levels with chronic degenerative endplate changes including osteophytic spurring. Marrow signal is overall within normal limits. No striking evidence to suggest osseous metastases.   The thoracic spinal cord is normal in signal and caliber.   There is no mass or abnormal enhancement within the thoracic spinal cord or canal.   There are small disc protrusions with osteophytic spurring at the levels of T6-T7, T7-T8, and T8-T9 which partially effaces the ventral thecal sac. There is no spinal canal stenosis at any level.   There is ossification of the ligamentum flavum at the levels of T10-T11 and T11-T12 which contributes to partial effacement of the posterior thecal sac. There is moderate bilateral neural foraminal narrowing at T10-T11 primarily due to facet osteoarthropathy/ossification of the ligamentum flavum.   There are small bilateral pleural effusions, right-greater-than-left, with associated passive atelectasis. There are multiple nodules located within the lungs which are most consistent with metastases.     LUMBAR SPINE:   Segmentation: There are 5 non-rib-bearing lumbar vertebra.   There is grade 1 anterolisthesis at L3-L4 and mild retrolisthesis at L5-S1.   There is complete metastatic involvement of the L4 vertebral body and involvement of the bilateral L4 pedicles, portions at the articular processes, and within the transverse processes. There is resulting mild scalloping of the superior endplate of L4. There is extension of tumor into the surrounding prevertebral soft tissues and extends superiorly to the level of the inferior endplate of L3 with involvement of the endplate, tumor extends inferiorly within the prevertebral soft tissues to the level of L5 with involvement of  the superior endplate of L5. Tumor extends into the L3-L4 and L4-L5 intervertebral disc spaces and into the posterior portion of the inferior L3 endplate and within the mid and posterior portions of the superior L5 endplate.   Exact measurements of tumor is somewhat difficult due to multi spatial nature of the mass, noting this, tumor within the soft tissues surrounding the L4 vertebral body measures 5.8 cm AP x 8.7 cm transverse.   Tumor extends into the ventral and bilateral epidural spaces at the level of L4. In addition, there is tumor within the posterior spinal canal extending from the level of L3 through the superior endplate of L5 measuring 6 cm in craniocaudal dimension and 0.7 cm in AP diameter. There is resulting marked spinal canal stenosis. There is minimal CSF seen within the thecal sac at L4.   There is extension of tumor into the bilateral neural foramina at L4-L5 with resulting moderate to marked left and marked right neural foraminal narrowing. Tumor also extends into the inferior portions of the bilateral neural foramina at L3-L4 resulting in moderate neural foraminal narrowing.   There is also tumor located within the left posterior spinal canal at the level of L5 and S1 measuring 1.2 cm AP x 0.9 cm transverse x 5.1 cm craniocaudal which causes mass effect on the posterior left thecal sac.   There is no striking abnormal enhancement located within the conus medullaris or cauda equina nerve roots.   Degenerative change:   At L1-L2, there is a central disc protrusion with inferior migration which along with prominent posterior epidural fat causes marked spinal canal stenosis. There is no neural foraminal narrowing. There is mild-to-moderate bilateral facet osteoarthropathy.   At L2-L3, there is no striking posterior disc contour abnormality. There is no spinal canal stenosis or neural foraminal narrowing. There is moderate bilateral facet osteoarthropathy.   At L3-L4, there is mild spinal canal  stenosis due to grade 1 anterolisthesis. There is marked bilateral facet osteoarthropathy.   At L4-L5, there is no striking posterior disc contour abnormality. There is marked bilateral facet osteoarthropathy.   At L5-S1, there is no striking posterior disc contour abnormality. There is no spinal canal stenosis. There is extension of disc and osteophyte into the bilateral neural foramina and along with facet osteoarthropathy causes moderate-to-marked right and moderate left neural foraminal narrowing.   Additional findings were detailed on the prior CT of the abdomen and pelvis report.       MRI cervical spine:   1. No evidence of metastases.   2. Multilevel degenerative changes of the cervical spine including moderate spinal canal stenosis at C5-C6 and C6-C7.   MRI thoracic spine:   1. No evidence of osseous metastases.   2. Multilevel degenerative changes of the thoracic spine without striking spinal canal stenosis at any level.   MRI lumbar spine:   1. Metastasis involving the entirety of the L4 vertebral body, portions of the posterior elements and transverse processes, with extension into the L3-L4 and L4-L5 intervertebral disc spaces and into the inferior endplate of L3 and superior endplate of L5 as well as into the surrounding prevertebral soft tissues.   2. There is extension of tumor into the ventral and bilateral epidural space at L4 and there is also tumor located within the posterior spinal canal extending from the level of L3 through L5. Combination of these findings cause marked spinal canal stenosis at the level of L4. Minimal CSF is seen within the thecal sac at the level of L4.   3. Tumor extends into the bilateral neural foramina at L4-L5 with resulting moderate-to-marked left and marked right neural foraminal narrowing. Tumor also extends into the inferior portions of the bilateral neural foramina at L3-L4 resulting in moderate neural foraminal narrowing.   4. There is metastasis located within  the posterior and left epidural space at the level of L5 and S1 which causes partial effacement of the posterior thecal sac.   5. Multilevel degenerative changes of the lumbar spine as detailed above.   I personally reviewed the images/study and I agree with the findings as stated by Aislinn Hastings MD, PGY-2 this study was interpreted at [facility], [place], [place].   MACRO: None   Signed by: Hiro Weiss 1/8/2025 5:35 PM Dictation workstation:   [id number]    CT guided percutaneous biopsy bone deep    Result Date: 1/7/2025  Interpreted By:  Romina Barron and Meyers Emily STUDY: CT GUIDED PERCUTANEOUS BIOPSY BONE DEEP; IR EMBOLIZATION;  1/7/2025 11:49 am   INDICATION: Signs/Symptoms:C/f new metastatic RCC, plan for L5-5 Bx; Signs/Symptoms:bleeding.   COMPARISON: None.   ACCESSION NUMBER(S): [id number]; [id number]   ORDERING CLINICIAN: NAMRATA ORTIZ   TECHNIQUE: INTERVENTIONALIST(S): Dr. Romina Barron MD   CONSENT: The patient/patient's POA/next of kin was informed of the nature of the proposed procedure. The purposes, alternatives, risks, and benefits were explained and discussed. All questions were answered and consent was obtained.   HISTORY: The history and physical exam pertinent to the procedure were reviewed and no updates were made.   RADIATION EXPOSURE: DLP: 691.8 mGy*cm   SEDATION: Moderate conscious IV sedation services (supervision of administration, induction, and maintenance) were provided by the physician performing the procedure with intravenous fentanyl 100mcg and versed 1.5mg from 10:45 a.m.-11:24 a.m.. The physician was assisted by an independent trained observer, an interventional radiology nurse, in the continuous monitoring of patient level of consciousness and physiologic status.   MEDICATION/CONTRAST: Thrombin   TIME OUT: A time out was performed immediately prior to procedure start with the interventional team, correctly identifying the patient name, date  of birth, medical record number, procedure, anatomy (including marking of site and side), patient position, procedure consent form, relevant laboratory and imaging test results, safety precautions, and procedure-specific equipment needs.   COMPLICATIONS: Mild postprocedural bleeding, with hemostasis subsequently obtained.   FINDINGS: PROCEDURE: The patient was placed in left lateral decubitus position on the interventional CT scanner bed. An initial  image and axial CT images were obtained at the level of the L4-L5 vertebral bodies without IV contrast. Initial axial noncontrast CT images demonstrated similar appearance of the marrow replacing lesion of the level of L4. This lesion was subsequently targeted for biopsy. An optimal percutaneous approach was guided by pre-procedural imaging. The skin site was marked, prepped, and draped in usual sterile fashion. Local anesthesia of the skin and deep tissues was administered with 1% lidocaine. Using CT guidance, the mass lesion at approximately L5 vertebra worse approach using right paravertebral approach. This was performed using 15 gauge coaxial introducer. Via the introducer, a total of 2 core biopsies using 16 gauge cutting needle were performed. A note is made of active arterial bleed from the coaxial introducer. Initial attempt was made to control the bleeding using a single pass cautery device. The bleeding appears to be grossly slow down however did not stop. Gel-Foam pledgets were also utilized to control the bleeding site however was not successful. A total of 2 3-5 mm, 0.035 tornado coils were deployed via the coaxial system. The bleeding appears to be slowing down however did not ceased. Subsequently, a total of 600 units of thrombin were injected via the introducer catheter into the active arterial bleed. Using the single pass cautery device, additional cauterization of the access site was also performed while removing the coaxial system.  Intraprocedural CT images demonstrate percutaneous biopsy of this lesion in progress. A total of 2 core biopsy specimens were obtained. The obtained core samples appeared adequate.   Post procedural imaging demonstrated no significant postprocedural hematoma.   The patient tolerated the procedure well without evidence of immediate complication. The patient was then monitored in the radiology recovery area for approximately 1 hour prior to discharge.       1. Technically successful CT-guided biopsy of marrow replacing L4 vertebral lesion. Samples were sent to surgical pathology for further evaluation. 2. This was complicated by active bleeding, which was subsequently controlled as detailed above.   I personally reviewed the images/study, and I agree with the findings as stated above by resident physician Dr. Dee Schmidt MD. This study was interpreted at [place], [place].   MACRO: None   Signed by: Romina Barron 1/7/2025 8:49 PM Dictation workstation:   [id number]    IR embolization    Result Date: 1/7/2025  Interpreted By:  Romina Barron and Meyers Emily STUDY: CT GUIDED PERCUTANEOUS BIOPSY BONE DEEP; IR EMBOLIZATION;  1/7/2025 11:49 am   INDICATION: Signs/Symptoms:C/f new metastatic RCC, plan for L5-5 Bx; Signs/Symptoms:bleeding.   COMPARISON: None.   ACCESSION NUMBER(S): [id number]; [id number]   ORDERING CLINICIAN: NAMRATA ORTIZ   TECHNIQUE: INTERVENTIONALIST(S): Dr. Romina Barron MD   CONSENT: The patient/patient's POA/next of kin was informed of the nature of the proposed procedure. The purposes, alternatives, risks, and benefits were explained and discussed. All questions were answered and consent was obtained.   HISTORY: The history and physical exam pertinent to the procedure were reviewed and no updates were made.   RADIATION EXPOSURE: DLP: 691.8 mGy*cm   SEDATION: Moderate conscious IV sedation services (supervision of administration, induction, and  maintenance) were provided by the physician performing the procedure with intravenous fentanyl 100mcg and versed 1.5mg from 10:45 a.m.-11:24 a.m.. The physician was assisted by an independent trained observer, an interventional radiology nurse, in the continuous monitoring of patient level of consciousness and physiologic status.   MEDICATION/CONTRAST: Thrombin   TIME OUT: A time out was performed immediately prior to procedure start with the interventional team, correctly identifying the patient name, date of birth, medical record number, procedure, anatomy (including marking of site and side), patient position, procedure consent form, relevant laboratory and imaging test results, safety precautions, and procedure-specific equipment needs.   COMPLICATIONS: Mild postprocedural bleeding, with hemostasis subsequently obtained.   FINDINGS: PROCEDURE: The patient was placed in left lateral decubitus position on the interventional CT scanner bed. An initial  image and axial CT images were obtained at the level of the L4-L5 vertebral bodies without IV contrast. Initial axial noncontrast CT images demonstrated similar appearance of the marrow replacing lesion of the level of L4. This lesion was subsequently targeted for biopsy. An optimal percutaneous approach was guided by pre-procedural imaging. The skin site was marked, prepped, and draped in usual sterile fashion. Local anesthesia of the skin and deep tissues was administered with 1% lidocaine. Using CT guidance, the mass lesion at approximately L5 vertebra worse approach using right paravertebral approach. This was performed using 15 gauge coaxial introducer. Via the introducer, a total of 2 core biopsies using 16 gauge cutting needle were performed. A note is made of active arterial bleed from the coaxial introducer. Initial attempt was made to control the bleeding using a single pass cautery device. The bleeding appears to be grossly slow down however did not  stop. Gel-Foam pledgets were also utilized to control the bleeding site however was not successful. A total of 2 3-5 mm, 0.035 tornado coils were deployed via the coaxial system. The bleeding appears to be slowing down however did not ceased. Subsequently, a total of 600 units of thrombin were injected via the introducer catheter into the active arterial bleed. Using the single pass cautery device, additional cauterization of the access site was also performed while removing the coaxial system. Intraprocedural CT images demonstrate percutaneous biopsy of this lesion in progress. A total of 2 core biopsy specimens were obtained. The obtained core samples appeared adequate.   Post procedural imaging demonstrated no significant postprocedural hematoma.   The patient tolerated the procedure well without evidence of immediate complication. The patient was then monitored in the radiology recovery area for approximately 1 hour prior to discharge.       1. Technically successful CT-guided biopsy of marrow replacing L4 vertebral lesion. Samples were sent to surgical pathology for further evaluation. 2. This was complicated by active bleeding, which was subsequently controlled as detailed above.   I personally reviewed the images/study, and I agree with the findings as stated above by resident physician Dr. Dee Schmidt MD. This study was interpreted at [facility], [place], [place].   MACRO: None   Signed by: Romina Barron 1/7/2025 8:49 PM Dictation workstation:   [id number]    ECG 12 Lead    Result Date: 1/7/2025  Sinus rhythm with frequent Premature ventricular complexes and Premature atrial complexes Otherwise normal ECG When compared with ECG of 04-JAN-2025 12:59, (unconfirmed) Premature ventricular complexes are now Present Confirmed by Matteo King ([id number]) on 1/7/2025 9:46:37 AM    Electrocardiogram, 12-lead PRN ACS symptoms    Result Date: 1/7/2025  Sinus rhythm with Premature atrial  complexes Otherwise normal ECG When compared with ECG of 04-JAN-2025 12:58, (unconfirmed) No significant change was found Confirmed by Matteo King ([id number]) on 1/7/2025 9:44:35 AM    Electrocardiogram, 12-lead PRN ACS symptoms    Result Date: 1/3/2025  Atrial fibrillation with rapid ventricular response Nonspecific ST and T wave abnormality Abnormal ECG When compared with ECG of 02-JAN-2025 05:37, (unconfirmed) Atrial fibrillation has replaced Sinus rhythm ST now depressed in Inferior leads Nonspecific T wave abnormality now evident in Anterolateral leads    CT chest abdomen pelvis wo IV contrast    Result Date: 1/2/2025  Interpreted By:  Lucia Desai, STUDY: CT CHEST ABDOMEN PELVIS WO CONTRAST;  1/2/2025 9:55 am   INDICATION: Signs/Symptoms:Severe JAMAL in setting of malignant neoplasm of right kidney.   COMPARISON: 11/30/2024   ACCESSION NUMBER(S): [id number]   ORDERING CLINICIAN: MARYA MURRAY   TECHNIQUE: CT of the chest, abdomen, and pelvis was performed. Contiguous axial images were obtained at 3 mm slice thickness through the chest, abdomen and pelvis. Coronal and sagittal reconstructions at 3 mm slice thickness were performed. No oral, no intravenous contrast.   FINDINGS: Please note that the study is limited without intravenous contrast.   CHEST:   LUNG/PLEURA/LARGE AIRWAYS: There is no infiltrate or pleural fluid. There are multiple pulmonary nodules consistent with metastatic disease.   VESSELS: The thoracic vessels are unremarkable.   HEART: The heart is unremarkable.   MEDIASTINUM AND MIKEL: There is no hilar or mediastinal adenopathy.   CHEST WALL AND LOWER NECK: The chest wall is unremarkable. There is no abnormality of the lower neck.   ABDOMEN:   LIVER: There is no hepatic mass.   BILE DUCTS: There is no intrahepatic, common hepatic or common bile ductal dilatation.   GALLBLADDER: The gallbladder is unremarkable.   PANCREAS: There is no abnormality of the pancreas.   SPLEEN: The spleen is  unremarkable. There is no splenic mass. There is no splenomegaly   ADRENAL GLANDS: There is a 1.4 cm right adrenal nodule, nonspecific.   KIDNEYS AND URETERS:. The left kidney is small and atrophic with cortical thinning. There are bilateral renal masses. Specifically there is a known necrotic mass in the upper pole of the right kidney, unchanged. This is not as well delineated secondary to lack of intravenous contrast. This measures 7.4 x 6.1 cm. There are bilateral renal cysts. There are bilateral nonobstructing intrarenal calculi. There is a staghorn type calculus in the left kidney.   PELVIS:   BLADDER: The bladder is unremarkable.   REPRODUCTIVE ORGANS: There is no abnormality of the reproductive organs.   BOWEL: There is no bowel wall thickening, dilatation or obstruction. There is a moderate amount of stool throughout the colon particularly in the rectum. There is nonspecific presacral edema.   VESSELS: The abdominal and pelvic vessels are unremarkable.   PERITONEUM/RETROPERITONEUM/LYMPH NODES: There is no retroperitoneal or pelvic adenopathy.  There is no ascites.   ABDOMINAL WALL: The abdominal wall is unremarkable.   BONES AND SOFT TISSUES: There is a lytic lesion involving L4 with soft tissue mass in bony destruction. This was seen on 11/30/2024. There is expansion of the vertebral body. There is a lytic lesion involving the superior endplate of L5 seen previously.   There is no soft tissue abnormality.       CHEST: 1.  History of right renal cell carcinoma. 2. Multiple pulmonary nodules consistent with metastatic disease.   ABDOMEN AND PELVIS: 1.  History upright or renal cell carcinoma. There is an unchanged necrotic mass involving the upper pole of the right kidney. 2. Bilateral renal cysts. 3. Bilateral nonobstructing intrarenal calculi with staghorn type calculi in the left kidney. There is no renal obstruction. 4. Complete bony destruction of L4 with a soft tissue mass with expansion of the  vertebral body. Lytic lesion involving the superior endplate of L5. Findings are consistent with bony metastases.   MACRO: None   Signed by: Lucia Desai 1/2/2025 10:33 AM Dictation workstation:   [id number]    XR chest 1 view    Result Date: 1/2/2025  Interpreted By:  Patrick Arnold, STUDY: XR CHEST 1 VIEW; ;  1/2/2025 8:03 am   INDICATION: Signs/Symptoms:AMS, borderline hypoxia, r/o infiltrate, r/o PTX, r/o pleural effusion.     COMPARISON: 11/26/2024 chest radiograph, 11/30/2024 chest CT   ACCESSION NUMBER(S): [id number]   ORDERING CLINICIAN: MARYA MURRAY   TECHNIQUE: AP erect portable chest 7:40 a.m.   FINDINGS: The lungs are hypoventilated bilaterally similar to the prior radiograph.   There are questionable nodules in the perihilar regions corresponding to the nodules noted on the CT no pneumonic consolidation or pleural effusion is noted.   Cardiac silhouette size is normal.       No pneumonic consolidations are noted. There questionable perihilar nodules which may correspond to the masses noted on the prior CT.     MACRO: None   Signed by: Patrick Arnold 1/2/2025 8:33 AM Dictation workstation:   [id number]  .     Stroke Alert CT/MRI review: n/a     IV Thrombolysis IV Thrombolysis Checklist      IV Thrombolysis Given: No; Thrombolysis contraindication reason: Time from Last Known Well (or stroke onset) is >4.5 hours        Assessment/Plan   Assessment & Plan  JAMAL (acute kidney injury) (CMS-[facility])      Type: Ischemic stroke  Subtype/etiology: small vessel  Vessels involved: PCA territory  Neurological manifestations: none  NIHSS (worst at presentation):  4 - bilateral leg weakness due to cord compression not stroke  Diagnostic evaluation: MRI brain, CTA, echo  Antiplatelet/antithrombotic plan for stroke prevention: aspirin, plavix  VTE prophylaxis: subQ heparin  Vascular Risk Factor modification goals:  Blood pressure goals: avoid hypotension SBP <100 that could worsen cerebral perfusion,   "normotension  Lipid Goals: education on healthy diet and statin therapy to maintain or achieve goal LDL-cholesterol < 70mg  Glucose Goals: early treatment of hyperglycemia to goal glucose 140-180 mg/dl with long-term goal A1c < 7%   Smoking Cessation and Education  Assessment for Rehabilitation needs   Patient and family education on signs and symptoms of stroke, calling 911, healthy strategies for stroke prevention.       Abdi Retana \"Louise" is a 74 y.o. male with PMHx of HTN, GERD, SAMIA, Chronic lymphedema, venous insufficiency, OA, CKD-3(b/l 1.2-1.5), recent biopsy results pending but concern for RCC. Presenting symptom was b/l LE weakness. Was admitted to [facility] 11/30-12/5 for this after IMG revealed a concerning L4-5 lesion w/ mod-severe cord compression in s/o R renal mass and suspicious lesions in adrenals lungs, and spine - neurology is consulted for small area of diffusion restriction on brain MRI acquired 1/8 concerning for stroke.    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 Given this and mild nondisabling stroke, [facility] for 90 days followed by aspirin monotherapy.      Though he does have a malignancy, there is no evidence that these are embolic or hypercoagulable state infarcts and therefore would not anticoagulate at this time unless there were some other indication.  Should that indication be found patient would NOT need to be on dual antiplatelet therapy concurrently with anticoagulation.    Recommendations:  - CTA head and neck was recommended and completed prior to note  - telemetry while inpatient, please monitor for afib  - TTE indeterminate but EF normal and low concern for embolic source    - continue aspirin 81mg daily  - when safe from a bleeding and procedural standpoint, start plavix 75mg for 90 days  - start " atorvastatin 40 daily target LDL <70  - please arrange neurology followup at time of discharge    Akash Stafford MD  PGY4 neurology     Patient examined and discussed with attending Dr Felipe who agrees with the above.    Thank you for the interesting consult, neuro stroke will sign off at this time, but please do not hesitate to re-engage with questions or concerns.

## 2025-01-09 NOTE — PROGRESS NOTES
"Occupational Therapy    Evaluation and Treatment    Patient Name: Abdi Retana \"Matt\"  MRN: [id number]  Today's Date: 1/9/2025  Room: [address] [place], [place] [address]A  Time Calculation  Start Time: 1032  Stop Time: 1119  Time Calculation (min): 47 min    Assessment  IP OT Assessment  End of Session Communication: Bedside nurse, Physician (Pt would benefit from team addressing anxiety, physician made aware via secure chat by PT after end of session.)  End of Session Patient Position: Bed, 3 rail up, Alarm on  Plan:  Inpatient Plan  Treatment Interventions: ADL retraining, Functional transfer training, UE strengthening/ROM, Endurance training, Patient/family training, Equipment evaluation/education, Compensatory technique education, Continued evaluation  OT Frequency: 2 times per week  OT Discharge Recommendations: Moderate intensity level of continued care  Equipment Recommended upon Discharge:  (TBD at next setting.)  OT - OK to Discharge:  (OT Eval completed.)  OT Assessment  OT Assessment Results: Decreased ADL status, Decreased endurance, Decreased functional mobility, Decreased trunk control for functional activities  Barriers to Participation: Comorbidities (c/o pain, decreased activity tolerance)    Subjective   Current Problem:  1. JAMAL (acute kidney injury) (CMS-HCC)        2. Unsteady gait  Transthoracic Echo (TTE) Complete    Transthoracic Echo (TTE) Complete      3. 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 Stroke occurring within last month  Transthoracic Echo (TTE) Complete    Transthoracic Echo (TTE) Complete        General:  Reason for Referral: Admitted 1/2 (transfer to [facility] 1/4) with hematuria from nursing facility; dx: UTI, JAMAL, back pain, malignant renal cell carcinoma Right (L4 lesion, mets to adrenal glands & lung), mets " L3-5 & S1 weakness  Past Medical History Relevant to Rehab: CKD, malignant renal cell carcinoma Right (L4 lesion, mets to adrenal glands & lung), HTN, GERD, OA, PVD, anemia, obesity, PVD, CKD, chronic lymphedema, fall  Co-Treatment: PT  Co-Treatment Reason: Pt with low AM-PAC score requiring skilled assist +2 therapists  Prior to Session Communication: Bedside nurse, Physician  Patient Position Received: Bed, 3 rail up, Alarm on  Family/Caregiver Present: No  General Comment: Pt received in supine, agreeable to therapy with much encouragement. Pt reports he has much anxiety re: anticipated pain and needs to move  slowly and with with explanation during mobility tasks. Pt able to progress to sitting EOB.   Precautions:  Medical Precautions: Fall precautions (Anxious and likes explicit instructions prior to any movement, back pain, anemia, spine (lytic lesions lumbar/sacral spine), bilateral leg ulcers)  Vital Signs:  Vital Signs (Past 2hrs)        Date/Time Vitals Session Patient Position Pulse Resp SpO2 BP MAP (mmHg)    01/09/25 1255 --  --  84  18  96 %  130/63  85                   Pain:  Pain Assessment  Pain Assessment: 0-10  0-10 (Numeric) Pain Score:  (c/o LBP, not rated.)  Pain Interventions: Repositioned  Lines/Tubes/Drains:  External Urinary Catheter (Active)   Number of days: 4         Objective   Cognition:  Arousal/Alertness: Delayed responses to stimuli  Orientation Level: Oriented X4  Following Commands: Follows one step commands with increased time  Cognition Comments: Pt presents with anxiety with attempted mobility, requires much reassurance when moving, perseverative.  Processing Speed: Delayed           Home Living:  Type of Home:  (Per EMR, pt admitted from the [facility]. Prior to that, pt reports he  lived alone at home.)  Lives With: Alone  Home Layout: Multi-level  Home Access: Stairs to enter with rails  Bathroom Shower/Tub: Tub/shower unit  Bathroom Equipment: None  Bathroom  Accessibility: 1st floor; pt washes up at sink (doesn't use shower)   Prior Function:  Level of [place]:  (Prior to SNF, pt reportedly was independent with mobility/transfers, required assist for household tasks.)  Receives Help From: Family  Homemaking Assistance: Needs assistance  Laundry: Total     ADL:  LE Dressing Assistance: Total (Pt limited by decreased activity tolerance, decreased strength, decreased flexibility, c/o pain. Pt will benefit from LB AE training to maximize pt's independence and comfort in ADL tasks, TBE further as pt able to tolerate increased activity.)  Toileting Deficit:  (Anticipate Dependent assist.)  Activity Tolerance:  Endurance: Tolerates 10 - 20 min exercise with multiple rests  Balance:  Static Standing Balance  Static Standing-Level of Assistance:  (CGA/Min assist x1-2 seated on EOB for steadying assist.)  Static Standing-Comment/Number of Minutes: Pt tolerated sitting approximately 15 mintues.  Bed Mobility/Transfers: Bed Mobility/Transfers: Bed Mobility  Bed Mobility: Yes  Bed Mobility 1  Bed Mobility 1: Rolling right, Rolling left  Level of Assistance 1: Maximum verbal cues, Maximum tactile cues, Maximum assistance, +2  Bed Mobility Comments 1: Use of rail, requyired guiding assist to reach for each rail in task.  Bed Mobility 2  Bed Mobility  2: Side lying right to sit  Level of Assistance 2: Maximum assistance, Maximum verbal cues, Maximum tactile cues, +2  Bed Mobility Comments 2: HOB elevated, draw sheet utilized.  Bed Mobility 3  Bed Mobility 3: Scooting (seated EOB)  Level of Assistance 3: +2, Maximum verbal cues, Maximum assistance, Maximum tactile cues  Bed Mobility Comments 3: draw sheet utilized, required steadcying assist.   and Transfers  Transfer: No (Unable 2' pain. Will attempt to assess as pt able to tolerate.)     Vision: Vision - Basic Assessment  Current Vision: No visual deficits   and    Sensation:  Light Touch: No apparent deficits (B/L  UE's)  Strength:  Strength Comments: Grossly 4/5 throughout B/L UE's.     Coordination:  Movements are Fluid and Coordinated: Yes   Hand Function:  Hand Function  Gross Grasp: Functional  Extremities:   RUE   RUE :  (Unable to fully assess as pt reluctant to perform shoulder flexion 2' fear of anticipated back pain. Elbow to distal AROM grossly WFL, will continue to assess.), LUE   LUE:  (Unable to fully assess as pt reluctant to perform shoulder flexion 2' fear of anticipated back pain. Elbow to distal AROM grossly WFL, will continue to assess.),  , and        Outcome Measures: [facility] Daily Activity  Putting on and taking off regular lower body clothing: Total  Bathing (including washing, rinsing, drying): A lot  Putting on and taking off regular upper body clothing: A lot  Toileting, which includes using toilet, bedpan or urinal: Total  Taking care of personal grooming such as brushing teeth: A little  Eating Meals: A little  Daily Activity - Total Score: 12         ,     OT Adult Other Outcome Measures  4AT: 0    Education Documentation  Body Mechanics, taught by Skyla Nieves OT at 1/9/2025  1:03 PM.  Learner: Patient  Readiness: Acceptance  Method: Explanation, Demonstration  Response: Needs Reinforcement    Precautions, taught by Skyla Nieves OT at 1/9/2025  1:03 PM.  Learner: Patient  Readiness: Acceptance  Method: Explanation, Demonstration  Response: Needs Reinforcement    ADL Training, taught by Skyla Nieves OT at 1/9/2025  1:03 PM.  Learner: Patient  Readiness: Acceptance  Method: Explanation, Demonstration  Response: Needs Reinforcement    Education Comments  No comments found.        Goals:   Encounter Problems       Encounter Problems (Active)       ADLs       Patient will perform UB and LB bathing  with minimal assist  level of assistance UE and mod assist LE extended tub bench and long-handled sponge. (Progressing)       Start:  01/09/25    Expected End:  01/23/25            Patient with complete  upper body dressing with contact guard assist level of assistance donning and doffing shirt w/out fasteners with PRN adaptive equipment while supported sitting and edge of bed  (Progressing)       Start:  01/09/25    Expected End:  01/23/25            Patient with complete lower body dressing with moderate assist level of assistance donning and doffing pants without a zipper/fasteners, underwear, and socks with reacher, shoe horn, and sock-aid while supported sitting and edge of bed  (Progressing)       Start:  01/09/25    Expected End:  01/23/25            Patient will complete daily grooming tasks brushing teeth and washing face/hair with modified independent level of assistance and PRN adaptive equipment while supported sitting after setup. (Progressing)       Start:  01/09/25    Expected End:  01/23/25            Patient will complete toileting including hygiene clothing management/hygiene with moderate assist level of assistance and raised toilet seat and/or bedside commode pending pt's progress.  (Progressing)       Start:  01/09/25    Expected End:  01/23/25               BALANCE       Patientt will maintain static sitting balance during ADL task with supervision level of assistance drop down in order to demonstrate decreased risk of falling and improved postural control. (Progressing)       Start:  01/09/25    Expected End:  01/23/25               TRANSFERS       Patient will perform bed mobility minimal assist  level of assistance and bed rails in order to improve safety and independence with mobility (Progressing)       Start:  01/09/25    Expected End:  01/23/25            Patient will complete functional transfer to wheelchair, B/S commode vs. raised toilet, B/S chair with least restrictive device with moderate assist level of assistance. (Progressing)       Start:  01/09/25    Expected End:  01/23/25                   Treatment Completed on Evaluation  Cognitive Skill Development:       Activities of  Daily Living:    Grooming  Grooming Level of Assistance:  (CGA after setup for steadying assist while seated EOB to wash face.)        UE Dressing  UE Dressing Level of Assistance:  (Mod to max assist after setup while seated on EOB. Pt required encouragement to initiate pulling hospital gown sleeves down/up, questionable effot in task, Will continue to assess.)        01/09/25 at 1:10 PM   Skyla Nieves OT   Rehab Office: [phone]

## 2025-01-09 NOTE — CONSULTS
"ORTHOPAEDIC CONSULTATION     Subjective   History Of Present Illness  Abdi Retana \"Matt\" is a 74 y.o. male (HTN, GERD, PVD, cellulitis, former smoker) admitted for tumor of unknown origin work up. Admitted 11/30-12/5 for BLE weakness, L4 lytic lesion. Suspected RCC, also w mets to lungs, adrenals.  Seen by ortho spine, recommended MRI + IR bx. Unable to have done at SNF/outpt, re-presented to [facility], transferred to [facility]. Also with mirabilis bacteremia due to UTI, on augmentin PO. Denies bowel/bladder incontinence, saddle anesthesia, numbness/tingling in legs.    Orthopaedic Problems/Injuries:  L4 metastatic disease    Location: Painful at low back  Duration: Pain has been persistent for months  Severity: 9 /10    NPO: not NPO    Past medical history: per HPI/EMR  Past surgical history: per HPI/EMR  Allergies: NKDA  Medications: per EMR  Social History: quit smoking, occasional drinking, denies IVDU  Family History:  Non-contributory to this patient's acute surgical issue.    Review of Systems: 12 point ROS negative unless stated in HPI    Past Medical History  He has a past medical history of CKD (chronic kidney disease), GERD (gastroesophageal reflux disease), Hypertension, OA (osteoarthritis), Personal history of other diseases of the circulatory system, and PVD (peripheral vascular disease) (CMS-[facility]).    Surgical History  He has no past surgical history on file.     Social History  He reports that he has quit smoking. His smoking use included cigarettes. He does not have any smokeless tobacco history on file. He reports current alcohol use. He reports that he does not use drugs.    Family History  Family History   Problem Relation Name Age of Onset    Dementia Mother      Heart attack Mother      Heart attack Father          Allergies  Clindamycin and Amoxicillin-pot clavulanate    Review of Systems  12 point ROS negative unless stated in HPI          Objective   Physical Exam  General: Lying comfortably in bed, no " "acute distress  HEENT: EOMI, NC/AT  CV: RRR by peripheral pulses  Resp: Normal WOB  MSK: See below. Gross motor in tact.  Neurologic: AOx3  Skin: No rash  Psych: Mood appropriate    Spine Exam:  Tenderness to palpation over lumbar spine  No bruising, swelling, or erythema    Last: Negative    L1: SILT       L2: SILT      Hip flexors 4/5 Left; 4/5 Right  L3: SILT      Knee extension 5/5 Left; 5/5 Right  L4: SILT      Tib Ant. (Dorsiflexion) 5/5 Left; 5/5 Right  L5: SILT      EHL 4/5 Left; 5/5 Right  S1: SILT      Plantar flexion 5/5 Left; 5/5 Right    Babinkski: Intact  4-5 beats of clonus bilaterally    Perianal sensation intact  Appropriate rectal tone      Last Recorded Vitals  Blood pressure 128/69, pulse 70, temperature 36.1 °C (97 °F), temperature source Temporal, resp. rate 16, height 1.88 m (6' 2\"), weight 120 kg (264 lb), SpO2 93%.    Relevant Results  Results for orders placed or performed during the hospital encounter of 01/04/25 (from the past 24 hours)   CBC and Auto Differential   Result Value Ref Range    WBC 8.7 4.4 - 11.3 x10*3/uL    nRBC 0.0 0.0 - 0.0 /100 WBCs    RBC 3.32 (L) 4.50 - 5.90 x10*6/uL    Hemoglobin 8.5 (L) 13.5 - 17.5 g/dL    Hematocrit 29.4 (L) 41.0 - 52.0 %    MCV 89 80 - 100 fL    MCH 25.6 (L) 26.0 - 34.0 pg    MCHC 28.9 (L) 32.0 - 36.0 g/dL    RDW 16.9 (H) 11.5 - 14.5 %    Platelets 254 150 - 450 x10*3/uL    Neutrophils % 71.5 40.0 - 80.0 %    Immature Granulocytes %, Automated 0.8 0.0 - 0.9 %    Lymphocytes % 14.1 13.0 - 44.0 %    Monocytes % 8.5 2.0 - 10.0 %    Eosinophils % 4.2 0.0 - 6.0 %    Basophils % 0.9 0.0 - 2.0 %    Neutrophils Absolute 6.25 (H) 1.60 - 5.50 x10*3/uL    Immature Granulocytes Absolute, Automated 0.07 0.00 - 0.50 x10*3/uL    Lymphocytes Absolute 1.23 0.80 - 3.00 x10*3/uL    Monocytes Absolute 0.74 0.05 - 0.80 x10*3/uL    Eosinophils Absolute 0.37 0.00 - 0.40 x10*3/uL    Basophils Absolute 0.08 0.00 - 0.10 x10*3/uL   Magnesium   Result Value Ref Range    " Magnesium 2.13 1.60 - 2.40 mg/dL   Renal Function Panel   Result Value Ref Range    Glucose 106 (H) 74 - 99 mg/dL    Sodium 138 136 - 145 mmol/L    Potassium 4.7 3.5 - 5.3 mmol/L    Chloride 104 98 - 107 mmol/L    Bicarbonate 31 21 - 32 mmol/L    Anion Gap 8 (L) 10 - 20 mmol/L    Urea Nitrogen 25 (H) 6 - 23 mg/dL    Creatinine 1.28 0.50 - 1.30 mg/dL    eGFR 59 (L) >60 mL/min/1.73m*2    Calcium 9.0 8.6 - 10.6 mg/dL    Phosphorus 3.6 2.5 - 4.9 mg/dL    Albumin 2.8 (L) 3.4 - 5.0 g/dL   Lipid panel   Result Value Ref Range    Cholesterol 132 0 - 199 mg/dL    HDL-Cholesterol 22.3 mg/dL    Cholesterol/HDL Ratio 5.9     LDL Calculated 86 mg/dL    VLDL 24 0 - 40 mg/dL    Triglycerides 118 0 - 149 mg/dL    Non HDL Cholesterol 110 0 - 149 mg/dL   Transthoracic Echo (TTE) Complete   Result Value Ref Range    AV pk dashawn 1.30 m/s    LVOT diam 2.30 cm    MV E/A ratio 0.54     LA vol index A/L 36.4 ml/m2    LV EF 68 %    RV free wall pk S' 11.10 cm/s    LVIDd 4.50 cm    Aortic Valve Area by Continuity of Peak Velocity 3.71 cm2    AV pk grad 7 mmHg    LV A4C EF 75.7        Images:  MRI C/T/L-spine w lesion encompassing entirety of L4 w moderate cord compression, extending to L3/5.          Assessment:  74M (HTN, GERD, PVD, cellulitis, former smoker) admitted for tumor of unknown origin w/u. Admitted 11/30-12/5 for BLE weakness, L4 lytic lesion. Suspected RCC, also w mets to lungs, adrenals.  Seen by ortho spine, rec’d MRI + IR bx. Unable to have done at SNF/outpt, re-presented to [facility], transferred to [facility]. Also w mirabilis bacteremia d/t UTI, on augmentin PO. Denies B/B sx, saddle anesthesia. 4/5 HF BL, 4/5 EHL on L, ow 5/5 motor and SILT. Rectal tone/sens intact. 3-4 beats of clonus BL.We . L4-5 bx results pending. MRI C/T/L-spine w lesion encompassing entirety of L4 w moderate cord compression, extending to L3/5.    Plan:  - No acute orthopaedic surgical intervention  - Recommend repeat blood cx, must be negative prior to  operative intervention  - If biopsy indicates RCC, will need IR embolization of lesion pre operatively  - NPO at midnight in case of operative intervention  - okay for DVT ppx from orthopedic perspective  - Ortho spine to follow    Patient seen within 30 minutes of page.    Consult to be discussed with attending, Dr. Wilbert Perla MD, PGY-2  Orthopaedic Surgery  On-call: [phone]  Epic Chat Preferred     This patient will be followed by the Orthopaedic Spine service. Please page or Epic Chat the corresponding residents below with questions or concerns.     Ortho Spine Service (Epic Chat Preferred)  First call: Mariano Hendrix MD PGY-2  Second call: Lew Abad,  PGY-4    6pm-6am M-F, Holidays, and weekends page Ortho on-call @[phone] with urgent questions/concerns.        I saw and evaluated the patient.  I personally obtained the key and critical portions of the history and physical exam or was physically present for key and critical portions performed by the Resident. I reviewed the documentation and discussed the patient with the Resident.  I agree with the Resident’s medical decision making as documented in the note.    Patient is a 74-year-old male who was admitted for tumor of unknown origin workup.  He was previously admitted for bilateral lower extremity weakness and difficulty with ambulation.  He was found to have L4 lytic lesion with MRI demonstrating epidural mass with severe canal stenosis at the level of L4.  Further imaging also show mass of the adrenals and lungs.  Suspicious for possible renal cell carcinoma.  He had a biopsy on January 7, 2025 and results are still pending.  He was also recently admitted for nausea and was found to have Proteus mirabilis bacteremia due to UTI.    Orthospine was consulted for possible surgical intervention.  Surgery can decompress the spinal canal and hopefully help with his weakness as well as stabilize the lytic lesion with posterior  instrumentation however it would be important to determine what this tumor is.  If this is renal cell carcinoma they tend to bleed a lot during surgery and ideally would like to have it embolized prior to surgery.  Additionally he was bacteremic and we would need him to clear his infection prior to any surgical intervention.    I met with patient however he was very confused and was perseverating on his boots and SCDs.  I called his sister to discuss his findings and our thoughts.  Alternative to surgery would be radiation which radiation oncology has spoke to him already.  I discussed with her what surgery would entail as well as the risk of surgery.  I discussed with her that surgery will not cure his cancer but mainly to decompress the neural elements and stabilize the lytic lesion in the lumbar spine.  He may need radiation even after surgery. I discussed the risks of surgery which includes but not limited to infection at the surgical site or wound healing requiring additional surgery to clean the infection and long term IV antibiotics. There is risk of blood loss requiring blood transfusion. Risk of dural tear requiring repair and possible continue cerebral spinal fluid leakage and positional headaches. Risk of Nerve root injury resulting in temporary or permeant weakness, numbness, or radicular pain. Risk of epidural hematoma and spinal cord compression resulting in weakness in extremity and bowel and bladder disturbances requiring additional surgery to clear out hematoma. Risk of nonunion or hardware failure requiring additional surgery to correct this. Risk of adjacent level disease in the future requiring additional surgery in the future to address this. Risk of needing to remain intubated after surgery for facial swelling. Risk of blindness if need to be in a prone position for surgery. Other complications include skin breakdown or skin blistering from being prone for long hours, developing MI, stroke,  DVT, PE during or after surgery. Risk of death. Risk of complications from anesthesia requiring prolong intubation.     After our discussion patient would like to talk to her brothers as well as oncology team regarding his overall prognosis.  Which I think is appropriate.  We will continue to follow along with his care.    After our discussion, the patient articulated understanding of the plan and felt that all questions had been answered satisfactorily. The patient was pleased with the visit and very appreciative for the care rendered.    Yana Atkins MD    Department of Orthopaedic Surgery  [facility]  [email or web address]

## 2025-01-10 LAB
ALBUMIN SERPL BCP-MCNC: 2.9 G/DL (ref 3.4–5)
ANION GAP SERPL CALC-SCNC: 12 MMOL/L (ref 10–20)
ATRIAL RATE: 127 BPM
BASOPHILS # BLD AUTO: 0.08 X10*3/UL (ref 0–0.1)
BASOPHILS NFR BLD AUTO: 0.9 %
BUN SERPL-MCNC: 23 MG/DL (ref 6–23)
CALCIUM SERPL-MCNC: 9.2 MG/DL (ref 8.6–10.6)
CHLORIDE SERPL-SCNC: 103 MMOL/L (ref 98–107)
CO2 SERPL-SCNC: 28 MMOL/L (ref 21–32)
CREAT SERPL-MCNC: 1.18 MG/DL (ref 0.5–1.3)
EGFRCR SERPLBLD CKD-EPI 2021: 65 ML/MIN/1.73M*2
EOSINOPHIL # BLD AUTO: 0.41 X10*3/UL (ref 0–0.4)
EOSINOPHIL NFR BLD AUTO: 4.6 %
ERYTHROCYTE [DISTWIDTH] IN BLOOD BY AUTOMATED COUNT: 17 % (ref 11.5–14.5)
GLUCOSE SERPL-MCNC: 97 MG/DL (ref 74–99)
HCT VFR BLD AUTO: 28.9 % (ref 41–52)
HGB BLD-MCNC: 8.5 G/DL (ref 13.5–17.5)
IMM GRANULOCYTES # BLD AUTO: 0.06 X10*3/UL (ref 0–0.5)
IMM GRANULOCYTES NFR BLD AUTO: 0.7 % (ref 0–0.9)
LYMPHOCYTES # BLD AUTO: 1.06 X10*3/UL (ref 0.8–3)
LYMPHOCYTES NFR BLD AUTO: 11.8 %
MAGNESIUM SERPL-MCNC: 2.15 MG/DL (ref 1.6–2.4)
MCH RBC QN AUTO: 25.6 PG (ref 26–34)
MCHC RBC AUTO-ENTMCNC: 29.4 G/DL (ref 32–36)
MCV RBC AUTO: 87 FL (ref 80–100)
MONOCYTES # BLD AUTO: 0.62 X10*3/UL (ref 0.05–0.8)
MONOCYTES NFR BLD AUTO: 6.9 %
NEUTROPHILS # BLD AUTO: 6.77 X10*3/UL (ref 1.6–5.5)
NEUTROPHILS NFR BLD AUTO: 75.1 %
NRBC BLD-RTO: 0 /100 WBCS (ref 0–0)
PHOSPHATE SERPL-MCNC: 3.5 MG/DL (ref 2.5–4.9)
PLATELET # BLD AUTO: 269 X10*3/UL (ref 150–450)
POTASSIUM SERPL-SCNC: 5.2 MMOL/L (ref 3.5–5.3)
Q ONSET: 220 MS
QRS COUNT: 19 BEATS
QRS DURATION: 94 MS
QT INTERVAL: 330 MS
QTC CALCULATION(BAZETT): 464 MS
QTC FREDERICIA: 414 MS
R AXIS: 44 DEGREES
RBC # BLD AUTO: 3.32 X10*6/UL (ref 4.5–5.9)
SODIUM SERPL-SCNC: 138 MMOL/L (ref 136–145)
T AXIS: 59 DEGREES
T OFFSET: 385 MS
VENTRICULAR RATE: 119 BPM
WBC # BLD AUTO: 9 X10*3/UL (ref 4.4–11.3)

## 2025-01-10 PROCEDURE — 85025 COMPLETE CBC W/AUTO DIFF WBC: CPT

## 2025-01-10 PROCEDURE — 80069 RENAL FUNCTION PANEL: CPT

## 2025-01-10 PROCEDURE — 83735 ASSAY OF MAGNESIUM: CPT

## 2025-01-10 PROCEDURE — 36415 COLL VENOUS BLD VENIPUNCTURE: CPT

## 2025-01-10 PROCEDURE — 2500000001 HC RX 250 WO HCPCS SELF ADMINISTERED DRUGS (ALT 637 FOR MEDICARE OP)

## 2025-01-10 PROCEDURE — 1170000001 HC PRIVATE ONCOLOGY ROOM DAILY

## 2025-01-10 PROCEDURE — 2500000001 HC RX 250 WO HCPCS SELF ADMINISTERED DRUGS (ALT 637 FOR MEDICARE OP): Performed by: STUDENT IN AN ORGANIZED HEALTH CARE EDUCATION/TRAINING PROGRAM

## 2025-01-10 PROCEDURE — 87075 CULTR BACTERIA EXCEPT BLOOD: CPT | Performed by: STUDENT IN AN ORGANIZED HEALTH CARE EDUCATION/TRAINING PROGRAM

## 2025-01-10 PROCEDURE — 99233 SBSQ HOSP IP/OBS HIGH 50: CPT

## 2025-01-10 PROCEDURE — 2500000004 HC RX 250 GENERAL PHARMACY W/ HCPCS (ALT 636 FOR OP/ED)

## 2025-01-10 PROCEDURE — 99222 1ST HOSP IP/OBS MODERATE 55: CPT | Performed by: PHYSICIAN ASSISTANT

## 2025-01-10 PROCEDURE — 87040 BLOOD CULTURE FOR BACTERIA: CPT | Performed by: STUDENT IN AN ORGANIZED HEALTH CARE EDUCATION/TRAINING PROGRAM

## 2025-01-10 PROCEDURE — 36415 COLL VENOUS BLD VENIPUNCTURE: CPT | Performed by: STUDENT IN AN ORGANIZED HEALTH CARE EDUCATION/TRAINING PROGRAM

## 2025-01-10 RX ADMIN — HEPARIN SODIUM 5000 UNITS: 5000 INJECTION INTRAVENOUS; SUBCUTANEOUS at 00:11

## 2025-01-10 RX ADMIN — ASPIRIN 81 MG: 81 TABLET, COATED ORAL at 09:36

## 2025-01-10 RX ADMIN — ATORVASTATIN CALCIUM 40 MG: 40 TABLET, FILM COATED ORAL at 20:41

## 2025-01-10 RX ADMIN — FAMOTIDINE 20 MG: 20 TABLET ORAL at 09:36

## 2025-01-10 RX ADMIN — OXYCODONE HYDROCHLORIDE 10 MG: 10 TABLET ORAL at 06:14

## 2025-01-10 RX ADMIN — ACETAMINOPHEN 975 MG: 325 TABLET, FILM COATED ORAL at 15:03

## 2025-01-10 RX ADMIN — FAMOTIDINE 20 MG: 20 TABLET ORAL at 20:41

## 2025-01-10 RX ADMIN — OXYCODONE HYDROCHLORIDE 5 MG: 5 TABLET ORAL at 09:36

## 2025-01-10 RX ADMIN — HEPARIN SODIUM 5000 UNITS: 5000 INJECTION INTRAVENOUS; SUBCUTANEOUS at 16:53

## 2025-01-10 RX ADMIN — HEPARIN SODIUM 5000 UNITS: 5000 INJECTION INTRAVENOUS; SUBCUTANEOUS at 07:46

## 2025-01-10 RX ADMIN — ACETAMINOPHEN 975 MG: 325 TABLET, FILM COATED ORAL at 09:36

## 2025-01-10 RX ADMIN — ACETAMINOPHEN 975 MG: 325 TABLET, FILM COATED ORAL at 20:41

## 2025-01-10 RX ADMIN — OXYCODONE HYDROCHLORIDE 5 MG: 5 TABLET ORAL at 15:03

## 2025-01-10 RX ADMIN — SENNOSIDES 8.6 MG: 8.6 TABLET, FILM COATED ORAL at 20:41

## 2025-01-10 RX ADMIN — AMOXICILLIN AND CLAVULANATE POTASSIUM 1 TABLET: 875; 125 TABLET, FILM COATED ORAL at 20:41

## 2025-01-10 RX ADMIN — Medication 1 TABLET: at 09:36

## 2025-01-10 RX ADMIN — AMOXICILLIN AND CLAVULANATE POTASSIUM 1 TABLET: 875; 125 TABLET, FILM COATED ORAL at 09:36

## 2025-01-10 ASSESSMENT — COGNITIVE AND FUNCTIONAL STATUS - GENERAL
CLIMB 3 TO 5 STEPS WITH RAILING: A LOT
MOVING TO AND FROM BED TO CHAIR: A LITTLE
DAILY ACTIVITIY SCORE: 15
STANDING UP FROM CHAIR USING ARMS: A LOT
PERSONAL GROOMING: A LITTLE
WALKING IN HOSPITAL ROOM: A LOT
MOBILITY SCORE: 15
CLIMB 3 TO 5 STEPS WITH RAILING: A LOT
DRESSING REGULAR LOWER BODY CLOTHING: A LOT
TOILETING: A LOT
MOBILITY SCORE: 15
DAILY ACTIVITIY SCORE: 15
STANDING UP FROM CHAIR USING ARMS: A LOT
PERSONAL GROOMING: A LITTLE
DRESSING REGULAR UPPER BODY CLOTHING: A LOT
HELP NEEDED FOR BATHING: A LOT
HELP NEEDED FOR BATHING: A LOT
DRESSING REGULAR UPPER BODY CLOTHING: A LOT
MOVING FROM LYING ON BACK TO SITTING ON SIDE OF FLAT BED WITH BEDRAILS: A LITTLE
TURNING FROM BACK TO SIDE WHILE IN FLAT BAD: A LITTLE
TURNING FROM BACK TO SIDE WHILE IN FLAT BAD: A LITTLE
WALKING IN HOSPITAL ROOM: A LOT
MOVING TO AND FROM BED TO CHAIR: A LITTLE
MOVING FROM LYING ON BACK TO SITTING ON SIDE OF FLAT BED WITH BEDRAILS: A LITTLE
DRESSING REGULAR LOWER BODY CLOTHING: A LOT
TOILETING: A LOT

## 2025-01-10 ASSESSMENT — PAIN SCALES - GENERAL
PAINLEVEL_OUTOF10: 7
PAINLEVEL_OUTOF10: 6
PAINLEVEL_OUTOF10: 7
PAINLEVEL_OUTOF10: 0 - NO PAIN
PAINLEVEL_OUTOF10: 3
PAINLEVEL_OUTOF10: 6

## 2025-01-10 ASSESSMENT — PAIN - FUNCTIONAL ASSESSMENT
PAIN_FUNCTIONAL_ASSESSMENT: 0-10

## 2025-01-10 NOTE — CARE PLAN
Problem: Skin  Goal: Decreased wound size/increased tissue granulation at next dressing change  Outcome: Progressing  Flowsheets (Taken 1/6/2025 0011 by Ramirez Rodriguez)  Decreased wound size/increased tissue granulation at next dressing change: Promote sleep for wound healing  Goal: Participates in plan/prevention/treatment measures  Outcome: Progressing  Flowsheets (Taken 1/6/2025 0011 by Ramirez Rodriguez)  Participates in plan/prevention/treatment measures: Elevate heels  Goal: Prevent/manage excess moisture  Outcome: Progressing  Flowsheets (Taken 1/6/2025 0011 by Ramirez Rodriguez)  Prevent/manage excess moisture: Cleanse incontinence/protect with barrier cream  Goal: Prevent/minimize sheer/friction injuries  Outcome: Progressing  Flowsheets (Taken 1/6/2025 1512 by Skyla Kulkarni RN)  Prevent/minimize sheer/friction injuries: Use pull sheet  Goal: Promote/optimize nutrition  Outcome: Progressing  Flowsheets (Taken 1/8/2025 1815)  Promote/optimize nutrition: Monitor/record intake including meals  Goal: Promote skin healing  Outcome: Progressing  Flowsheets (Taken 1/8/2025 1815)  Promote skin healing:   Protective dressings over bony prominences   Assess skin/pad under line(s)/device(s)     Problem: Fall/Injury  Goal: Not fall by end of shift  Outcome: Progressing  Goal: Be free from injury by end of the shift  Outcome: Progressing  Goal: Verbalize understanding of personal risk factors for fall in the hospital  Outcome: Progressing  Goal: Verbalize understanding of risk factor reduction measures to prevent injury from fall in the home  Outcome: Progressing  Goal: Use assistive devices by end of the shift  Outcome: Progressing  Goal: Pace activities to prevent fatigue by end of the shift  Outcome: Progressing     Problem: Pain - Adult  Goal: Verbalizes/displays adequate comfort level or baseline comfort level  Outcome: Progressing     Problem: Safety - Adult  Goal: Free from fall  injury  Outcome: Progressing     Problem: Discharge Planning  Goal: Discharge to home or other facility with appropriate resources  Outcome: Progressing     Problem: Chronic Conditions and Co-morbidities  Goal: Patient's chronic conditions and co-morbidity symptoms are monitored and maintained or improved  Outcome: Progressing    The clinical goals for the shift include Pt will remain HDS and VSS throughout the shift on 1/9 until 1900    Patient has received a CT of his head and neck along with an echo. He has been seen by ortho, neuro and by his oncology team. He has been getting pain medication for his back pain. He also was able to see PT

## 2025-01-10 NOTE — CARE PLAN
Problem: Skin  Goal: Decreased wound size/increased tissue granulation at next dressing change  Outcome: Progressing  Flowsheets (Taken 1/6/2025 0011 by Ramirez Rodriguez)  Decreased wound size/increased tissue granulation at next dressing change: Promote sleep for wound healing  Goal: Participates in plan/prevention/treatment measures  Outcome: Progressing  Flowsheets (Taken 1/6/2025 0011 by Ramirez Rodriguez)  Participates in plan/prevention/treatment measures: Elevate heels  Goal: Prevent/manage excess moisture  Outcome: Progressing  Flowsheets (Taken 1/6/2025 0011 by Ramirez Rodriguez)  Prevent/manage excess moisture: Cleanse incontinence/protect with barrier cream  Goal: Prevent/minimize sheer/friction injuries  Outcome: Progressing  Flowsheets (Taken 1/6/2025 1512 by Skyla Kulkarni RN)  Prevent/minimize sheer/friction injuries: Use pull sheet  Goal: Promote/optimize nutrition  Outcome: Progressing  Flowsheets (Taken 1/8/2025 1815)  Promote/optimize nutrition: Monitor/record intake including meals  Goal: Promote skin healing  Outcome: Progressing  Flowsheets (Taken 1/8/2025 1815)  Promote skin healing:   Protective dressings over bony prominences   Assess skin/pad under line(s)/device(s)     Problem: Fall/Injury  Goal: Not fall by end of shift  Outcome: Progressing  Goal: Be free from injury by end of the shift  Outcome: Progressing  Goal: Verbalize understanding of personal risk factors for fall in the hospital  Outcome: Progressing  Goal: Verbalize understanding of risk factor reduction measures to prevent injury from fall in the home  Outcome: Progressing  Goal: Use assistive devices by end of the shift  Outcome: Progressing  Goal: Pace activities to prevent fatigue by end of the shift  Outcome: Progressing     Problem: Pain - Adult  Goal: Verbalizes/displays adequate comfort level or baseline comfort level  Outcome: Progressing     Problem: Safety - Adult  Goal: Free from fall  injury  Outcome: Progressing     Problem: Discharge Planning  Goal: Discharge to home or other facility with appropriate resources  Outcome: Progressing     Problem: Chronic Conditions and Co-morbidities  Goal: Patient's chronic conditions and co-morbidity symptoms are monitored and maintained or improved  Outcome: Progressing    The clinical goals for the shift include Patient will remain HDS and VSS throughout the shift on 1/10 until 1900    Patient has been visited by the medical oncology team that told him about radiation vs surgical interventions. He did not have a procedure done today and is going to be visited by the surgical team to go over options. He has received oxycodone for pain and had his wounds redressed on his legs.

## 2025-01-10 NOTE — SIGNIFICANT EVENT
"Post staffing updates:     Abdi Retana \"Louise" is a 74 y.o. male with PMHx of HTN, GERD, SAMIA, Chronic lymphedema, venous insufficiency, OA, CKD-3(b/l 1.2-1.5), recent biopsy results pending but concern for RCC. Presenting symptom was b/l LE weakness. Was admitted to [facility] 11/30-12/5 for this after IMG revealed a concerning L4-5 lesion w/ mod-severe cord compression in s/o R renal mass and suspicious lesions in adrenals lungs, and spine - neurology is consulted for small area of diffusion restriction on brain MRI acquired 1/8 concerning for stroke.  -TTE indeterminate but EF normal and low concern for embolic source. Vessel imaging shows mild intracranial atherosclerosis in the P2 distribution, because this does not correlate with the area of his diffusion restriction would not need palvix per SAMPRISS protocol.     Instead given high index of suspicion for RCC and evidence of hypercoagulability given new area of diffusion restriction would recommend therapeutic anticoagulation.     Recommendations  - continue aspirin 81 mg daily   - when safe from a bleeding an procedural standpoint start therapeutic Lovenox 1 mg/kg Q12H   - Please keep patient on telemetry and monitor daily for afib. If there is evidence of afib may need alternative anticoagulation.    -  continue with atorvastatin 40 daily target LDL <70  - please arrange neurology followup at time of discharge     Please page neurology as below for any further questions.  We will signoff at this time.     Ana Alexis MD  Department of Neurology, PGY-3  Stroke pager  [phone]     "

## 2025-01-10 NOTE — CONSULTS
Radiation Oncology Inpatient Consult    Patient Name:  Abdi Retana  MRN:  [id number]  :  1950    Referring Provider: Elana Fu MD PhD  Primary Care Provider: Cheyenne Arango MD  Care Team: Patient Care Team:  Cheyenne Arango MD as PCP - General (Internal Medicine)  Cheyenne Arango MD as PCP - [employer] PCP  Judy Salas MD as Medical Oncologist (Hematology and Oncology)    Date of Service: 1/10/2025    SUBJECTIVE  History of Present Illness:  This is a 73-year-old male with CKD, chronic mobility issues requiring the use of walker and wheelchair, venous insufficiency with stasis ulcers, obesity, HTN, that presents in November with progressive back pain, further limiting his mobility.  CT imaging showed a large right renal mass with probable pulmonary, left adrenal, and osseous metastases.  The patient was recommended for follow-up biopsy.  He was discharged to SNF.  He presents back to the emergency department on  with progressive back pain and hematuria.  Biopsy was still pending.  He was redirected to [facility] for further management.  MRI spine completed  shows diffuse osseous metastatic disease with a large destructive mass involving L4, with extension of tumor into the epidural space resulting in canal stenosis, as well as neural foraminal involvement of L4-5.  Biopsy was able to be completed on , and is currently pending.  Radiation oncology has been asked to assist with potential care coordination.    Today, the patient is found to be comfortable at rest.  He again endorses progressive lower back pain over recent months.  He is chronically debilitated, requiring the use of a walker and wheelchair for multiple years.  Prior to this, he lived independently.  He has more recently been residing at [facility].      Prior Radiotherapy:  no    Current Systemic Treatment:  no     Presence of Pacemaker or ICD:  no    Past Medical History:    Past Medical History:   Diagnosis Date     CKD (chronic kidney disease)     GERD (gastroesophageal reflux disease)     Hypertension     OA (osteoarthritis)     Personal history of other diseases of the circulatory system     History of hypertension    PVD (peripheral vascular disease) (CMS-HCC)      Duodenal ulcers with previous GI bleed  Colon polyps  Venous insufficiency with stasis ulcers  Obesity  Physical debilitation      Past Surgical History:  History reviewed. No pertinent surgical history.     Family History:  Cancer-related family history is not on file.    Social History:    Social History     Tobacco Use    Smoking status: Former     Types: Cigarettes   Substance Use Topics    Alcohol use: Yes    Drug use: Never       Allergies:    Allergies   Allergen Reactions    Clindamycin Unknown    Amoxicillin-Pot Clavulanate Rash     Possible rash, received multiple antibiotics at this time        Medications:    Current Facility-Administered Medications:     acetaminophen (Tylenol) tablet 975 mg, 975 mg, oral, TID, Anthony Devlin MD, 975 mg at 01/10/25 0936    amoxicillin-pot clavulanate (Augmentin) 875-125 mg per tablet 1 tablet, 1 tablet, oral, q12h CHAPITO, Ganesh Jones MD, 1 tablet at 01/10/25 0936    aspirin EC tablet 81 mg, 81 mg, oral, Daily, Ganesh Jones MD, 81 mg at 01/10/25 0936    atorvastatin (Lipitor) tablet 40 mg, 40 mg, oral, Nightly, Queen ODALYS Aviles MD, 40 mg at 01/09/25 2126    famotidine (Pepcid) tablet 20 mg, 20 mg, oral, BID, Ganesh Jones MD, 20 mg at 01/10/25 0936    heparin (porcine) injection 5,000 Units, 5,000 Units, subcutaneous, q8h, Ganesh Jones MD, 5,000 Units at 01/10/25 0746    HYDROmorphone (Dilaudid) injection 0.3 mg, 0.3 mg, intravenous, q4h PRN, Anthony Devlin MD, 0.3 mg at 01/09/25 0938    multivitamin with minerals 1 tablet, 1 tablet, oral, Daily, Ganesh Jones MD, 1 tablet at 01/10/25 0936    oxyCODONE (Roxicodone) immediate release tablet 10 mg, 10 mg, oral, q4h PRN, Anthony Devlin MD,  "10 mg at 01/10/25 0614    oxyCODONE (Roxicodone) immediate release tablet 5 mg, 5 mg, oral, q4h PRN, Anthony Devlin MD, 5 mg at 01/10/25 0936    polyethylene glycol (Glycolax, Miralax) packet 17 g, 17 g, oral, Daily, Anthony Devlin MD, 17 g at 01/05/25 1000    sennosides (Senokot) tablet 8.6 mg, 1 tablet, oral, Nightly, Anthony Devlin MD, 8.6 mg at 01/08/25 2113      Review of Systems:    Chronic venous insufficiency with stasis ulcers.  Denies chest pain or difficulty with breathing.  No abdominal pain.  No headaches or vision changes, no history of seizures or CVA.  No mention of heart disease or history of MI, no known arrhythmias.    Performance Status:  The Karnofsky performance scale today is 40, Disabled; requires special care and assistance (ECOG equivalent 3).        OBJECTIVE  Physical Exam:  /68 (BP Location: Left arm, Patient Position: Lying)   Pulse 72   Temp 36.2 °C (97.2 °F) (Temporal)   Resp 18   Ht 1.88 m (6' 2\")   Wt 120 kg (264 lb)   SpO2 92%   BMI 33.90 kg/m²      General: awake, alert, resting comfortably, chronically debilitated and deconditioned in appearance, no acute distress, nontoxic  HEENT: pupils equal and round, no scleral icterus  Pulmonary: Breathing comfortably at rest   Cardiac: regular rate  Abdomen: Obese, soft, nondistended, non tender to palpation   EXT: Bilateral lower extremity edema with wound wrappings for stasis wounds secondary to venous insufficiency  Neuro: A&O x 3, cranial nerves II through XII grossly intact, sensation and strength intact -4/5 in the hip flexors bilaterally  Psych: Normal affect       Laboratory Review:         01/10/25 0945  Renal Function Panel  Collected: 01/10/25 0840  Final result  Specimen: Blood, Venous    Glucose 97 mg/dL Urea Nitrogen 23 mg/dL   Sodium 138 mmol/L Creatinine 1.18 mg/dL   Potassium 5.2 mmol/L eGFR 65 mL/min/1.73m*2    Chloride 103 mmol/L Calcium 9.2 mg/dL   Bicarbonate 28 mmol/L Phosphorus 3.5 mg/dL    Anion " Gap 12 mmol/L Albumin 2.9 Low  g/dL          01/10/25 0929  CBC and Auto Differential  Collected: 01/10/25 0840  Final result  Specimen: Blood, Venous    WBC 9.0 x10*3/uL Immature Granulocytes %, Automated 0.7 %    nRBC 0.0 /100 WBCs Lymphocytes % 11.8 %   RBC 3.32 Low  x10*6/uL Monocytes % 6.9 %   Hemoglobin 8.5 Low  g/dL Eosinophils % 4.6 %   Hematocrit 28.9 Low  % Basophils % 0.9 %   MCV 87 fL Neutrophils Absolute 6.77 High  x10*3/uL    MCH 25.6 Low  pg Immature Granulocytes Absolute, Automated 0.06 x10*3/uL   MCHC 29.4 Low  g/dL Lymphocytes Absolute 1.06 x10*3/uL   RDW 17.0 High  % Monocytes Absolute 0.62 x10*3/uL   Platelets 269 x10*3/uL Eosinophils Absolute 0.41 High  x10*3/uL   Neutrophils % 75.1 % Basophils Absolute 0.08 x10*3/uL                Pathology Review:  biopsy results are pending    Imaging:        CT CAP wo contrast  IMPRESSION:  CHEST:  1.  History of right renal cell carcinoma.  2. Multiple pulmonary nodules consistent with metastatic disease.      ABDOMEN AND PELVIS:  1.  History upright or renal cell carcinoma. There is an unchanged  necrotic mass involving the upper pole of the right kidney.  2. Bilateral renal cysts.  3. Bilateral nonobstructing intrarenal calculi with staghorn type  calculi in the left kidney. There is no renal obstruction.  4. Complete bony destruction of L4 with a soft tissue mass with  expansion of the vertebral body. Lytic lesion involving the superior  endplate of L5. Findings are consistent with bony metastases.      MRI spine 1/4  IMPRESSION:  MRI cervical spine:      1. No evidence of metastases.      2. Multilevel degenerative changes of the cervical spine including  moderate spinal canal stenosis at C5-C6 and C6-C7.      MRI thoracic spine:      1. No evidence of osseous metastases.      2. Multilevel degenerative changes of the thoracic spine without  striking spinal canal stenosis at any level.      MRI lumbar spine:      1. Metastasis involving the entirety  of the L4 vertebral body,  portions of the posterior elements and transverse processes, with  extension into the L3-L4 and L4-L5 intervertebral disc spaces and  into the inferior endplate of L3 and superior endplate of L5 as well  as into the surrounding prevertebral soft tissues.      2. There is extension of tumor into the ventral and bilateral  epidural space at L4 and there is also tumor located within the  posterior spinal canal extending from the level of L3 through L5.  Combination of these findings cause marked spinal canal stenosis at  the level of L4. Minimal CSF is seen within the thecal sac at the  level of L4.      3. Tumor extends into the bilateral neural foramina at L4-L5 with  resulting moderate-to-marked left and marked right neural foraminal  narrowing. Tumor also extends into the inferior portions of the  bilateral neural foramina at L3-L4 resulting in moderate neural  foraminal narrowing.      4. There is metastasis located within the posterior and left epidural  space at the level of L5 and S1 which causes partial effacement of  the posterior thecal sac.      5. Multilevel degenerative changes of the lumbar spine as detailed  above.          ASSESSMENT:  74-year-old male with multiple chronic comorbidities, including physical debilitation that presents with progression of lower back pain limiting his mobility.  In November, CT imaging identified a large right renal mass with probable pulmonary, left adrenal and osseous metastases.  Biopsy was eventually obtained on 1/7 and is currently pending.  MRI spine shows a large destructive mass involving L4 with epidural extension and foraminal involvement, without spinal cord compression.  Radiation oncology has been asked to assist with potential care coordination.    PLAN:    Patient was discussed with my attending physician, Dr. Elliott.    Working diagnosis and indications for radiation were discussed with the patient.  Imaging is concerning for  metastatic renal cell carcinoma.  Pathology is pending.  We have tentative plans to consider a course of palliative SBRT to L4.  If RCC, the patient may be also be considered for NRG- / SBRT to right renal mass.  We will continue to follow and assist with care coordination.  We recommend consultation with medical oncology when pathology is available for review.      -Tentative plans for palliative SBRT to L4, pending pathologic diagnosis  -Tentative plans to consider [id number], pending pathologic diagnosis  -Possibly CT SIM early next week    I spent 60 minutes in the professional and overall care of this patient.

## 2025-01-10 NOTE — PROGRESS NOTES
01/09/25 1400   Discharge Planning   Living Arrangements Alone   Support Systems Family members   Type of Residence Private residence   Home or Post Acute Services Post acute facilities (Rehab/SNF/etc)   Type of Post Acute Facility Services Skilled nursing   Expected Discharge Disposition SNF   Does the patient need discharge transport arranged? Yes   RoundTrip coordination needed? Yes   Has discharge transport been arranged? No     Care Transitions Note  1/9/25  LSW attempted to meet with pt for several days, each time coincidentally off the floor. LSW spoke with Micaela, sister, on the phone. Micaela was not quite happy with care received at [facility], wonders if referral can be sent to [place], LSW sent in [facility]. LSW to speak with Ray and see what his preference is for discharge, whether he wants to return to [facility] or try a new facility. Pt used all of his SNF days when he was at SNF after Thanksgiving, and then he was paying privately. Micaela had appealed a few times and each time it was approved. SNF benefit may reset at beginning of year, LSW to find out.     Micaela voiced frustration that she has not received updates on pt. LSW asked team to speak with Micaela. Pt and Micaela are wondering why he is still having pain/can't walk if his MRI shows nothing concerning. LSW to follow up with pt and Micaela to determine dispo plan.   NAOMIE Driscoll, LSW     1/10/25  Met with pt at bedside to introduce role. Pt became anxious that he was being discharged. LSW assured this was NOT the case. LSW explained that I assist with dc planning which can take time, but I have no power to discharge him without medical clearance. Advised nurse and offered to answer any questions pt has if they arise. Will follow up when able. Medical plan is pending at this time. Pt will likely return to SNF, but further discussion is needed re: which facility he wants to go to.   Alma Goodman, NAOMIE, LSW

## 2025-01-10 NOTE — PROGRESS NOTES
"Abdi Retana \"Louise" is a 74 y.o. male on day 6 of admission presenting with JAMAL (acute kidney injury) (CMS-HCC).      Subjective   Transferred from [facility] for expedited work up of suspected R. RCC with L4/5 mets. Also has Proteus Mirabilis UTI c/b bacteremia.    NAEON. Overall appears comfortable although he has some backache.    Objective     Last Recorded Vitals  /74 (BP Location: Right arm, Patient Position: Lying)   Pulse 70   Temp 36.1 °C (97 °F) (Temporal)   Resp 18   Wt 120 kg (264 lb)   SpO2 92%   Intake/Output last 3 Shifts:    Intake/Output Summary (Last 24 hours) at 1/10/2025 1016  Last data filed at 1/10/2025 0929  Gross per 24 hour   Intake 90 ml   Output 995 ml   Net -905 ml       Admission Weight  Weight: 120 kg (264 lb) (01/04/25 1900)    Daily Weight  01/04/25 : 120 kg (264 lb)    No current facility-administered medications on file prior to encounter.     Current Outpatient Medications on File Prior to Encounter   Medication Sig Dispense Refill    acetaminophen (Tylenol 8 HOUR) 650 mg ER tablet Take 1 tablet (650 mg) by mouth every 6 hours if needed for mild pain (1 - 3). Do not crush, chew, or split.      acetaminophen (Tylenol) 325 mg tablet Take 2 tablets (650 mg) by mouth 2 times a day.      aspirin 81 mg EC tablet Take 1 tablet (81 mg) by mouth once daily.      bisacodyl (Gentle Laxative, bisacodyl,) 10 mg suppository Insert 1 suppository (10 mg) into the rectum once daily as needed for constipation.      cefepime (Maxipime) IV Infuse 100 mL (2 g) over 0.5 hours into a venous catheter every 12 hours.      famotidine (Pepcid) 20 mg tablet Take 1 tablet (20 mg) by mouth 2 times a day.      ferrous sulfate (FeroSuL) 325 (65 Fe) MG tablet Take 1 tablet (325 mg) by mouth once every 24 hours. 90 tablet 3    losartan (Cozaar) 50 mg tablet TAKE 1 TABLET BY MOUTH ONCE  DAILY 90 tablet 3    magnesium hydroxide (Milk of Magnesia) 400 mg/5 mL suspension Take 30 mL by mouth once daily as needed " for constipation (if no BM in 3 consecutive days).      magnesium oxide 400 mg magnesium capsule Take 1 capsule (400 mg) by mouth once daily. 90 capsule 3    mineral oil enema Insert 133 mL (1 enema) into the rectum once daily as needed for constipation (if no BM in 8 hours after getting suppository).      potassium chloride CR 10 mEq ER tablet TAKE 1 TABLET BY MOUTH ONCE  DAILY DO NOT CRUSH, CHEW, OR  SPLIT 90 tablet 3    torsemide (Demadex) 20 mg tablet TAKE 1 TABLET BY MOUTH ONCE  DAILY 90 tablet 3    traMADol (Ultram) 50 mg tablet Take 1 tablet (50 mg) by mouth every 6 hours if needed for severe pain (7 - 10).      vitamin D3-folic acid 2,500 unit- 1 mg tablet Take 1 mg by mouth once every 24 hours. 90 tablet 3    [DISCONTINUED] clotrimazole-betamethasone (Lotrisone) cream MIX WITH ZINC OXIDE AND APPLY  TOPICALLY TO AFFECTED AREA(S)  TWICE DAILY (Patient not taking: Reported on 1/2/2025) 45 g 0    [DISCONTINUED] zinc oxide 10 % cream To mix with Lotrisone and apply twice daily (Patient not taking: Reported on 1/2/2025) 50 g 0        Physical Exam  HENT:      Head: Normocephalic and atraumatic.      Mouth/Throat:      Mouth: Mucous membranes are moist.   Eyes:      General: No scleral icterus.     Extraocular Movements: Extraocular movements intact.      Pupils: Pupils are equal, round, and reactive to light.   Cardiovascular:      Rate and Rhythm: Normal rate and regular rhythm.      Heart sounds: No murmur heard.  Pulmonary:      Effort: No respiratory distress.      Breath sounds: No wheezing or rales.   Abdominal:      General: There is no distension.      Tenderness: There is no abdominal tenderness. There is no guarding.   Musculoskeletal:      Comments: Legs with dressing on, images on epic show possible stasis dermatitis with ulcers   Skin:     Capillary Refill: Capillary refill takes less than 2 seconds.   Neurological:      General: No focal deficit present.      Mental Status: He is alert and oriented  to person, place, and time.      Motor: Weakness present.      Comments: Richter in b/l LE 3/5, significantly limited by pain   Psychiatric:         Mood and Affect: Mood normal.       Relevant Results    Results for orders placed or performed during the hospital encounter of 01/04/25 (from the past 24 hours)   Transthoracic Echo (TTE) Complete   Result Value Ref Range    AV pk dashawn 1.30 m/s    LVOT diam 2.30 cm    MV E/A ratio 0.54     LA vol index A/L 36.4 ml/m2    LV EF 68 %    RV free wall pk S' 11.10 cm/s    LVIDd 4.50 cm    Aortic Valve Area by Continuity of Peak Velocity 3.71 cm2    AV pk grad 7 mmHg    LV A4C EF 75.7    CBC and Auto Differential   Result Value Ref Range    WBC 9.0 4.4 - 11.3 x10*3/uL    nRBC 0.0 0.0 - 0.0 /100 WBCs    RBC 3.32 (L) 4.50 - 5.90 x10*6/uL    Hemoglobin 8.5 (L) 13.5 - 17.5 g/dL    Hematocrit 28.9 (L) 41.0 - 52.0 %    MCV 87 80 - 100 fL    MCH 25.6 (L) 26.0 - 34.0 pg    MCHC 29.4 (L) 32.0 - 36.0 g/dL    RDW 17.0 (H) 11.5 - 14.5 %    Platelets 269 150 - 450 x10*3/uL    Neutrophils % 75.1 40.0 - 80.0 %    Immature Granulocytes %, Automated 0.7 0.0 - 0.9 %    Lymphocytes % 11.8 13.0 - 44.0 %    Monocytes % 6.9 2.0 - 10.0 %    Eosinophils % 4.6 0.0 - 6.0 %    Basophils % 0.9 0.0 - 2.0 %    Neutrophils Absolute 6.77 (H) 1.60 - 5.50 x10*3/uL    Immature Granulocytes Absolute, Automated 0.06 0.00 - 0.50 x10*3/uL    Lymphocytes Absolute 1.06 0.80 - 3.00 x10*3/uL    Monocytes Absolute 0.62 0.05 - 0.80 x10*3/uL    Eosinophils Absolute 0.41 (H) 0.00 - 0.40 x10*3/uL    Basophils Absolute 0.08 0.00 - 0.10 x10*3/uL   Magnesium   Result Value Ref Range    Magnesium 2.15 1.60 - 2.40 mg/dL   Renal Function Panel   Result Value Ref Range    Glucose 97 74 - 99 mg/dL    Sodium 138 136 - 145 mmol/L    Potassium 5.2 3.5 - 5.3 mmol/L    Chloride 103 98 - 107 mmol/L    Bicarbonate 28 21 - 32 mmol/L    Anion Gap 12 10 - 20 mmol/L    Urea Nitrogen 23 6 - 23 mg/dL    Creatinine 1.18 0.50 - 1.30 mg/dL    eGFR  65 >60 mL/min/1.73m*2    Calcium 9.2 8.6 - 10.6 mg/dL    Phosphorus 3.5 2.5 - 4.9 mg/dL    Albumin 2.9 (L) 3.4 - 5.0 g/dL      CT chest abdomen pelvis wo IV contrast    Result Date: 1/2/2025  CHEST: 1.  History of right renal cell carcinoma. 2. Multiple pulmonary nodules consistent with metastatic disease.   ABDOMEN AND PELVIS: 1.  History upright or renal cell carcinoma. There is an unchanged necrotic mass involving the upper pole of the right kidney. 2. Bilateral renal cysts. 3. Bilateral nonobstructing intrarenal calculi with staghorn type calculi in the left kidney. There is no renal obstruction. 4. Complete bony destruction of L4 with a soft tissue mass with expansion of the vertebral body. Lytic lesion involving the superior endplate of L5. Findings are consistent with bony metastases.   MACRO: None   Signed by: Lucia Desai 1/2/2025 10:33 AM Dictation workstation:   [id number]    XR chest 1 view    Result Date: 1/2/2025  No pneumonic consolidations are noted. There questionable perihilar nodules which may correspond to the masses noted on the prior CT.     MACRO: None   Signed by: Patrick Arnold 1/2/2025 8:33 AM Dictation workstation:   [id number]      MRI Spine 1/8/25  IMPRESSION:  MRI cervical spine:      1. No evidence of metastases.      2. Multilevel degenerative changes of the cervical spine including  moderate spinal canal stenosis at C5-C6 and C6-C7.      MRI thoracic spine:      1. No evidence of osseous metastases.      2. Multilevel degenerative changes of the thoracic spine without  striking spinal canal stenosis at any level.      MRI lumbar spine:      1. Metastasis involving the entirety of the L4 vertebral body,  portions of the posterior elements and transverse processes, with  extension into the L3-L4 and L4-L5 intervertebral disc spaces and  into the inferior endplate of L3 and superior endplate of L5 as well  as into the surrounding prevertebral soft tissues.      2. There is extension  of tumor into the ventral and bilateral  epidural space at L4 and there is also tumor located within the  posterior spinal canal extending from the level of L3 through L5.  Combination of these findings cause marked spinal canal stenosis at  the level of L4. Minimal CSF is seen within the thecal sac at the  level of L4.      3. Tumor extends into the bilateral neural foramina at L4-L5 with  resulting moderate-to-marked left and marked right neural foraminal  narrowing. Tumor also extends into the inferior portions of the  bilateral neural foramina at L3-L4 resulting in moderate neural  foraminal narrowing.      4. There is metastasis located within the posterior and left epidural  space at the level of L5 and S1 which causes partial effacement of  the posterior thecal sac.      5. Multilevel degenerative changes of the lumbar spine as detailed  above.      MRI Head 1/8/25  IMPRESSION:  1.  Small focus of diffusion restriction involving the right parietal  lobe with no associated enhancement on postcontrast imaging, favoring  small acute infarct. No mass effect or hemorrhagic transformation.  2. No abnormal intracranial enhancement to indicate intracranial  metastatic disease.  3. Nonspecific dilatation of the superior ophthalmic veins  bilaterally.    Assessment/Plan      Assessment & Plan  JAMAL (acute kidney injury) (CMS-HCC)    Mr. Abdi Retana is a 75 yo M with PMHx of HTN, GERD, SAMIA, Chronic lymphedema, venous insufficiency, OA, CKD-3(b/l 1.2-1.5), also w/ the following oncologic Hx - no formal Dx at this time. Presenting symptom was b/l LE weakness. Was admitted to [facility] 11/30-12/5 for this after IMG revealed a concerning L4-5 lesion w/ mod-severe cord compression in s/o R menal mass and suspicious lesions in adrenals lungs, and spine. This was c/f undiagnosed metastatic R RCC. Case was discussed w/ both IR and ortho spine. It was deemed that no inpt Bx nor surgery was indicated and pt was DC to SNF w/ plan for  IR-guided L4 Bx (scheduled for 12/20; pt missed d/t transport issues w/ SNF). MRI brain and C/T spine w/ contrast was attempted but pt did not tolerate d/t claustrophobia - there was a plan to complete it OP w/ anesthesia. Transferred from [facility] for expedited work up of suspected R. RCC with L4/5 mets. Also has Proteus Mirabilis UTI c/b bacteremia I/s/o bilateral non obstructing renal stones. ID consulted for antibiotic management. L4 biopsy done and MRI head and spine completed.      Update 1/10/25  -pending L4 tumor biopsy results(called pathology lab yesterday for prelim report but directed to voice mail)  -neurology recommend therapeutic Lovenox 1 mg/kg q12h when safe from a bleeding a procedural standpoint, telemetry monitoring, c/w aspirin and atorvastatin.  -Ortho spine would want negative Bcs before any procedural interventions -Rad-onc-tentatively planning for radiation therapy pending pathology diagnosis. Possible CT SIM early next week        Small Acute Stroke on MRI  -Small focus of diffusion restriction involving the right parietal lobe with no associated enhancement on postcontrast imaging, favoring small acute infarct. No mass effect or hemorrhagic transformation.  -at his neurologic baseline(fluent speech, oriented, moves all limbs,LE-4/5)  -neuro-stoke consulted  -already on aspirin for CAD, not on statin  Plan  -neuro-stroke following  Recommendations  - continue aspirin 81 mg daily   - when safe from a bleeding an procedural standpoint start therapeutic Lovenox 1 mg/kg Q12H   - Please keep patient on telemetry and monitor daily for afib. If there is evidence of afib may need alternative anticoagulation.    -  continue with atorvastatin 40 daily target LDL <70  - please arrange neurology followup at time of discharge    P. mirabilis bacteremia 2/2 UTI  -HDS  -associated w/ R renal mass, b/l non-obstructing renal stones  -sensitivities available  -seen by uro at [facility], did not have plan for  procedure given non-obstructive  -ID consulted for duration, procedure rec given complex anatomy of stones and renal mass - c/f seeding  -ID following, recommend Augmentin for 3 weeks(EOT-1/23)  -repeat BC(1/10)-pending     C/f undiagnosed metastatic R RCC  L4-5 lesion w/ radiographic evidence cord compression  -CT(11/30)-R renal mass  -pt at baseline 4/5 LE weakness on exam, symmetric, no incontinence or saddle anesthesia  -there have been no plans for procedures per prior evaluations  -IMG as above  -d/w ortho spine, said if going for anesthesia should do the full spine  -consider formal  spine surgery consult if neuro change and/or IMG/Bx completed  -had been seen by NSG 1/3 who thought exam was okay w/o weakness, did not rec an intervention  -On scheduled tylenol and prn oxy/dilaudid  -Outpatient medical oncology follow up with Dr Guerra  -ortho-spine following   Plan:  - No acute orthopaedic surgical intervention  - Recommend repeat blood cx, must be negative prior to operative intervention  - If biopsy indicates RCC, will need IR embolization of lesion pre operatively  - NPO at midnight in case of operative intervention  - okay for DVT ppx from orthopedic perspective  - Ortho spine to follow    HTN  CAD  -holding home  losartan 50mg  I/s/o recent rise in Cr(now resolved) and normotension   -c/w home Aspirin     Lymphedema  Venous insufficiency/venous ulcers  -wound care consulted  -hold home torsemide 20 mg daily for now  -OSH wound recs  BLE- Chronic venous status ulcers   Irrigate with normal saline or wound cleanser, Pat dry.  Apply lotion to intact skin   Cover weeping areas with Triad Hydrophillic wound dressing ointment   Pad and protect with ABD, Kerlix and paper tape.   Change every day and as needed   Secure with Tubigrip ( Apply from base of toes to 2 finger width below the bend of knee)      Gluteal cleft & buttocks-MASD/Irritant contact dermatitis due to fecal, urinary or dual incontinent.    Cleanse with bath wipes or soap and water. Rinse well and pat dry.   Apply no sting skin barrier (cavilon)   Apply Triad hydrophilic wound dressing ointment to gluteal fold/groin three times a day and as needed.     GERD  -c/w home famotidine 20 mg bid    Disposition  -barriers: clinical improvement  -destination: likely back to SNF ([facility])  -f/u: pending     Diet: cardiac   Electrolytes: K >3.5; Mg >2  DVT ppx: SCD (otherwise subcutaneous heparin for JAMAL; momentarily held pending waiting on Bx/procedures)  GI ppx: not indicated  Lines/tubes: PIV  O2: none  Baseline Cr: 1.5  T+S: 1/5  Code status: Full code  Surrogate decision maker: Micaela Retana (sister) [phone]    Ganesh Jones MD  Internal Medicne  PGY-1

## 2025-01-11 LAB
ALBUMIN SERPL BCP-MCNC: 3 G/DL (ref 3.4–5)
ANION GAP SERPL CALC-SCNC: 11 MMOL/L (ref 10–20)
BASOPHILS # BLD AUTO: 0.07 X10*3/UL (ref 0–0.1)
BASOPHILS NFR BLD AUTO: 0.9 %
BUN SERPL-MCNC: 20 MG/DL (ref 6–23)
CALCIUM SERPL-MCNC: 9.1 MG/DL (ref 8.6–10.6)
CHLORIDE SERPL-SCNC: 102 MMOL/L (ref 98–107)
CO2 SERPL-SCNC: 27 MMOL/L (ref 21–32)
CREAT SERPL-MCNC: 1.1 MG/DL (ref 0.5–1.3)
EGFRCR SERPLBLD CKD-EPI 2021: 70 ML/MIN/1.73M*2
EOSINOPHIL # BLD AUTO: 0.3 X10*3/UL (ref 0–0.4)
EOSINOPHIL NFR BLD AUTO: 3.8 %
ERYTHROCYTE [DISTWIDTH] IN BLOOD BY AUTOMATED COUNT: 16.6 % (ref 11.5–14.5)
GLUCOSE SERPL-MCNC: 99 MG/DL (ref 74–99)
HCT VFR BLD AUTO: 30.5 % (ref 41–52)
HGB BLD-MCNC: 9.1 G/DL (ref 13.5–17.5)
IMM GRANULOCYTES # BLD AUTO: 0.05 X10*3/UL (ref 0–0.5)
IMM GRANULOCYTES NFR BLD AUTO: 0.6 % (ref 0–0.9)
LYMPHOCYTES # BLD AUTO: 1.03 X10*3/UL (ref 0.8–3)
LYMPHOCYTES NFR BLD AUTO: 13.1 %
MAGNESIUM SERPL-MCNC: 2.18 MG/DL (ref 1.6–2.4)
MCH RBC QN AUTO: 26.2 PG (ref 26–34)
MCHC RBC AUTO-ENTMCNC: 29.8 G/DL (ref 32–36)
MCV RBC AUTO: 88 FL (ref 80–100)
MONOCYTES # BLD AUTO: 0.55 X10*3/UL (ref 0.05–0.8)
MONOCYTES NFR BLD AUTO: 7 %
NEUTROPHILS # BLD AUTO: 5.89 X10*3/UL (ref 1.6–5.5)
NEUTROPHILS NFR BLD AUTO: 74.6 %
NRBC BLD-RTO: 0 /100 WBCS (ref 0–0)
PHOSPHATE SERPL-MCNC: 3.6 MG/DL (ref 2.5–4.9)
PLATELET # BLD AUTO: 299 X10*3/UL (ref 150–450)
POTASSIUM SERPL-SCNC: 4.8 MMOL/L (ref 3.5–5.3)
RBC # BLD AUTO: 3.47 X10*6/UL (ref 4.5–5.9)
SODIUM SERPL-SCNC: 135 MMOL/L (ref 136–145)
WBC # BLD AUTO: 7.9 X10*3/UL (ref 4.4–11.3)

## 2025-01-11 PROCEDURE — 2500000004 HC RX 250 GENERAL PHARMACY W/ HCPCS (ALT 636 FOR OP/ED)

## 2025-01-11 PROCEDURE — 83735 ASSAY OF MAGNESIUM: CPT

## 2025-01-11 PROCEDURE — 2500000001 HC RX 250 WO HCPCS SELF ADMINISTERED DRUGS (ALT 637 FOR MEDICARE OP)

## 2025-01-11 PROCEDURE — 36415 COLL VENOUS BLD VENIPUNCTURE: CPT

## 2025-01-11 PROCEDURE — 85025 COMPLETE CBC W/AUTO DIFF WBC: CPT

## 2025-01-11 PROCEDURE — 80069 RENAL FUNCTION PANEL: CPT

## 2025-01-11 PROCEDURE — 99233 SBSQ HOSP IP/OBS HIGH 50: CPT

## 2025-01-11 PROCEDURE — 1170000001 HC PRIVATE ONCOLOGY ROOM DAILY

## 2025-01-11 PROCEDURE — 2500000001 HC RX 250 WO HCPCS SELF ADMINISTERED DRUGS (ALT 637 FOR MEDICARE OP): Performed by: STUDENT IN AN ORGANIZED HEALTH CARE EDUCATION/TRAINING PROGRAM

## 2025-01-11 RX ADMIN — FAMOTIDINE 20 MG: 20 TABLET ORAL at 20:48

## 2025-01-11 RX ADMIN — HEPARIN SODIUM 5000 UNITS: 5000 INJECTION INTRAVENOUS; SUBCUTANEOUS at 15:46

## 2025-01-11 RX ADMIN — HEPARIN SODIUM 5000 UNITS: 5000 INJECTION INTRAVENOUS; SUBCUTANEOUS at 00:10

## 2025-01-11 RX ADMIN — OXYCODONE HYDROCHLORIDE 10 MG: 10 TABLET ORAL at 12:30

## 2025-01-11 RX ADMIN — ASPIRIN 81 MG: 81 TABLET, COATED ORAL at 08:44

## 2025-01-11 RX ADMIN — AMOXICILLIN AND CLAVULANATE POTASSIUM 1 TABLET: 875; 125 TABLET, FILM COATED ORAL at 20:48

## 2025-01-11 RX ADMIN — AMOXICILLIN AND CLAVULANATE POTASSIUM 1 TABLET: 875; 125 TABLET, FILM COATED ORAL at 08:44

## 2025-01-11 RX ADMIN — HEPARIN SODIUM 5000 UNITS: 5000 INJECTION INTRAVENOUS; SUBCUTANEOUS at 08:43

## 2025-01-11 RX ADMIN — HEPARIN SODIUM 5000 UNITS: 5000 INJECTION INTRAVENOUS; SUBCUTANEOUS at 23:55

## 2025-01-11 RX ADMIN — SENNOSIDES 8.6 MG: 8.6 TABLET, FILM COATED ORAL at 20:48

## 2025-01-11 RX ADMIN — Medication 1 TABLET: at 08:44

## 2025-01-11 RX ADMIN — ACETAMINOPHEN 975 MG: 325 TABLET, FILM COATED ORAL at 08:44

## 2025-01-11 RX ADMIN — FAMOTIDINE 20 MG: 20 TABLET ORAL at 08:44

## 2025-01-11 RX ADMIN — ACETAMINOPHEN 975 MG: 325 TABLET, FILM COATED ORAL at 20:48

## 2025-01-11 RX ADMIN — OXYCODONE HYDROCHLORIDE 10 MG: 10 TABLET ORAL at 05:23

## 2025-01-11 RX ADMIN — ATORVASTATIN CALCIUM 40 MG: 40 TABLET, FILM COATED ORAL at 20:48

## 2025-01-11 RX ADMIN — ACETAMINOPHEN 975 MG: 325 TABLET, FILM COATED ORAL at 15:46

## 2025-01-11 ASSESSMENT — COGNITIVE AND FUNCTIONAL STATUS - GENERAL
CLIMB 3 TO 5 STEPS WITH RAILING: A LOT
MOBILITY SCORE: 15
PERSONAL GROOMING: A LITTLE
TURNING FROM BACK TO SIDE WHILE IN FLAT BAD: A LITTLE
DRESSING REGULAR UPPER BODY CLOTHING: A LOT
HELP NEEDED FOR BATHING: A LOT
DAILY ACTIVITIY SCORE: 14
MOBILITY SCORE: 15
MOVING FROM LYING ON BACK TO SITTING ON SIDE OF FLAT BED WITH BEDRAILS: A LITTLE
TOILETING: A LOT
CLIMB 3 TO 5 STEPS WITH RAILING: A LOT
STANDING UP FROM CHAIR USING ARMS: A LOT
DAILY ACTIVITIY SCORE: 14
DRESSING REGULAR LOWER BODY CLOTHING: A LOT
PERSONAL GROOMING: A LITTLE
TURNING FROM BACK TO SIDE WHILE IN FLAT BAD: A LITTLE
HELP NEEDED FOR BATHING: A LOT
WALKING IN HOSPITAL ROOM: A LOT
EATING MEALS: A LITTLE
STANDING UP FROM CHAIR USING ARMS: A LOT
DRESSING REGULAR UPPER BODY CLOTHING: A LOT
EATING MEALS: A LITTLE
TOILETING: A LOT
DRESSING REGULAR LOWER BODY CLOTHING: A LOT
WALKING IN HOSPITAL ROOM: A LOT
MOVING FROM LYING ON BACK TO SITTING ON SIDE OF FLAT BED WITH BEDRAILS: A LITTLE
MOVING TO AND FROM BED TO CHAIR: A LITTLE
MOVING TO AND FROM BED TO CHAIR: A LITTLE

## 2025-01-11 ASSESSMENT — PAIN SCALES - GENERAL
PAINLEVEL_OUTOF10: 7
PAINLEVEL_OUTOF10: 4
PAINLEVEL_OUTOF10: 7
PAINLEVEL_OUTOF10: 5 - MODERATE PAIN
PAINLEVEL_OUTOF10: 8

## 2025-01-11 ASSESSMENT — PAIN DESCRIPTION - LOCATION: LOCATION: LEG

## 2025-01-11 ASSESSMENT — PAIN - FUNCTIONAL ASSESSMENT
PAIN_FUNCTIONAL_ASSESSMENT: 0-10

## 2025-01-11 ASSESSMENT — PAIN DESCRIPTION - ORIENTATION: ORIENTATION: RIGHT;LEFT

## 2025-01-11 NOTE — CARE PLAN
The patient's goals for the shift include      The clinical goals for the shift include Pt will remain HDS and safe and free from injury through shift      Problem: Skin  Goal: Decreased wound size/increased tissue granulation at next dressing change  Outcome: Progressing  Flowsheets (Taken 1/11/2025 1654)  Decreased wound size/increased tissue granulation at next dressing change:   Promote sleep for wound healing   Protective dressings over bony prominences  Goal: Participates in plan/prevention/treatment measures  Outcome: Progressing  Flowsheets (Taken 1/11/2025 1654)  Participates in plan/prevention/treatment measures: Elevate heels  Goal: Prevent/manage excess moisture  Outcome: Progressing  Flowsheets (Taken 1/11/2025 1654)  Prevent/manage excess moisture: Cleanse incontinence/protect with barrier cream  Goal: Prevent/minimize sheer/friction injuries  Outcome: Progressing  Flowsheets (Taken 1/11/2025 1654)  Prevent/minimize sheer/friction injuries:   Use pull sheet   HOB 30 degrees or less  Goal: Promote/optimize nutrition  Outcome: Progressing  Flowsheets (Taken 1/11/2025 1654)  Promote/optimize nutrition: Offer water/supplements/favorite foods  Goal: Promote skin healing  Outcome: Progressing  Flowsheets (Taken 1/11/2025 1654)  Promote skin healing:   Protective dressings over bony prominences   Assess skin/pad under line(s)/device(s)     Problem: Fall/Injury  Goal: Not fall by end of shift  Outcome: Progressing  Goal: Be free from injury by end of the shift  Outcome: Progressing  Goal: Verbalize understanding of personal risk factors for fall in the hospital  Outcome: Progressing  Goal: Verbalize understanding of risk factor reduction measures to prevent injury from fall in the home  Outcome: Progressing  Goal: Use assistive devices by end of the shift  Outcome: Progressing  Goal: Pace activities to prevent fatigue by end of the shift  Outcome: Progressing     Problem: Pain - Adult  Goal:  Verbalizes/displays adequate comfort level or baseline comfort level  Outcome: Progressing     Problem: Safety - Adult  Goal: Free from fall injury  Outcome: Progressing     Problem: Discharge Planning  Goal: Discharge to home or other facility with appropriate resources  Outcome: Progressing     Problem: Chronic Conditions and Co-morbidities  Goal: Patient's chronic conditions and co-morbidity symptoms are monitored and maintained or improved  Outcome: Progressing

## 2025-01-11 NOTE — PROGRESS NOTES
"Abdi Retana \"Louise" is a 74 y.o. male on day 7 of admission presenting with JAMAL (acute kidney injury) (CMS-HCC).      Subjective   Transferred from [facility] for expedited work up of suspected R. RCC with L4/5 mets. Also has Proteus Mirabilis UTI c/b bacteremia.    Overnight: NAEO     Patient resting comfortably, expressed no complaints this morning. Denies CP, SOB, Abd pain, Le pain.     Objective     Last Recorded Vitals  /86 (BP Location: Left arm, Patient Position: Lying)   Pulse 75   Temp 36 °C (96.8 °F) (Temporal)   Resp 16   Wt 120 kg (264 lb)   SpO2 95%   Intake/Output last 3 Shifts:    Intake/Output Summary (Last 24 hours) at 1/11/2025 1151  Last data filed at 1/10/2025 1306  Gross per 24 hour   Intake --   Output 75 ml   Net -75 ml       Admission Weight  Weight: 120 kg (264 lb) (01/04/25 1900)    Daily Weight  01/04/25 : 120 kg (264 lb)    No current facility-administered medications on file prior to encounter.     Current Outpatient Medications on File Prior to Encounter   Medication Sig Dispense Refill    acetaminophen (Tylenol 8 HOUR) 650 mg ER tablet Take 1 tablet (650 mg) by mouth every 6 hours if needed for mild pain (1 - 3). Do not crush, chew, or split.      acetaminophen (Tylenol) 325 mg tablet Take 2 tablets (650 mg) by mouth 2 times a day.      aspirin 81 mg EC tablet Take 1 tablet (81 mg) by mouth once daily.      bisacodyl (Gentle Laxative, bisacodyl,) 10 mg suppository Insert 1 suppository (10 mg) into the rectum once daily as needed for constipation.      cefepime (Maxipime) IV Infuse 100 mL (2 g) over 0.5 hours into a venous catheter every 12 hours.      famotidine (Pepcid) 20 mg tablet Take 1 tablet (20 mg) by mouth 2 times a day.      ferrous sulfate (FeroSuL) 325 (65 Fe) MG tablet Take 1 tablet (325 mg) by mouth once every 24 hours. 90 tablet 3    losartan (Cozaar) 50 mg tablet TAKE 1 TABLET BY MOUTH ONCE  DAILY 90 tablet 3    magnesium hydroxide (Milk of Magnesia) 400 mg/5 mL " suspension Take 30 mL by mouth once daily as needed for constipation (if no BM in 3 consecutive days).      magnesium oxide 400 mg magnesium capsule Take 1 capsule (400 mg) by mouth once daily. 90 capsule 3    mineral oil enema Insert 133 mL (1 enema) into the rectum once daily as needed for constipation (if no BM in 8 hours after getting suppository).      potassium chloride CR 10 mEq ER tablet TAKE 1 TABLET BY MOUTH ONCE  DAILY DO NOT CRUSH, CHEW, OR  SPLIT 90 tablet 3    torsemide (Demadex) 20 mg tablet TAKE 1 TABLET BY MOUTH ONCE  DAILY 90 tablet 3    traMADol (Ultram) 50 mg tablet Take 1 tablet (50 mg) by mouth every 6 hours if needed for severe pain (7 - 10).      vitamin D3-folic acid 2,500 unit- 1 mg tablet Take 1 mg by mouth once every 24 hours. 90 tablet 3        Physical Exam  HENT:      Head: Normocephalic and atraumatic.      Mouth/Throat:      Mouth: Mucous membranes are moist.   Eyes:      General: No scleral icterus.     Extraocular Movements: Extraocular movements intact.      Pupils: Pupils are equal, round, and reactive to light.   Cardiovascular:      Rate and Rhythm: Normal rate and regular rhythm.      Heart sounds: No murmur heard.  Pulmonary:      Effort: No respiratory distress.      Breath sounds: No wheezing or rales.   Abdominal:      General: There is no distension.      Tenderness: There is no abdominal tenderness. There is no guarding.   Musculoskeletal:      Comments: Legs with dressing on, images on epic show possible stasis dermatitis with ulcers   Skin:     Capillary Refill: Capillary refill takes less than 2 seconds.   Neurological:      General: No focal deficit present.      Mental Status: He is alert and oriented to person, place, and time.      Motor: Weakness present.      Comments: Richter in b/l LE 3/5, significantly limited by pain   Psychiatric:         Mood and Affect: Mood normal.         Relevant Results    Results for orders placed or performed during the hospital  encounter of 01/04/25 (from the past 24 hours)   CBC and Auto Differential   Result Value Ref Range    WBC 7.9 4.4 - 11.3 x10*3/uL    nRBC 0.0 0.0 - 0.0 /100 WBCs    RBC 3.47 (L) 4.50 - 5.90 x10*6/uL    Hemoglobin 9.1 (L) 13.5 - 17.5 g/dL    Hematocrit 30.5 (L) 41.0 - 52.0 %    MCV 88 80 - 100 fL    MCH 26.2 26.0 - 34.0 pg    MCHC 29.8 (L) 32.0 - 36.0 g/dL    RDW 16.6 (H) 11.5 - 14.5 %    Platelets 299 150 - 450 x10*3/uL    Neutrophils % 74.6 40.0 - 80.0 %    Immature Granulocytes %, Automated 0.6 0.0 - 0.9 %    Lymphocytes % 13.1 13.0 - 44.0 %    Monocytes % 7.0 2.0 - 10.0 %    Eosinophils % 3.8 0.0 - 6.0 %    Basophils % 0.9 0.0 - 2.0 %    Neutrophils Absolute 5.89 (H) 1.60 - 5.50 x10*3/uL    Immature Granulocytes Absolute, Automated 0.05 0.00 - 0.50 x10*3/uL    Lymphocytes Absolute 1.03 0.80 - 3.00 x10*3/uL    Monocytes Absolute 0.55 0.05 - 0.80 x10*3/uL    Eosinophils Absolute 0.30 0.00 - 0.40 x10*3/uL    Basophils Absolute 0.07 0.00 - 0.10 x10*3/uL   Magnesium   Result Value Ref Range    Magnesium 2.18 1.60 - 2.40 mg/dL   Renal Function Panel   Result Value Ref Range    Glucose 99 74 - 99 mg/dL    Sodium 135 (L) 136 - 145 mmol/L    Potassium 4.8 3.5 - 5.3 mmol/L    Chloride 102 98 - 107 mmol/L    Bicarbonate 27 21 - 32 mmol/L    Anion Gap 11 10 - 20 mmol/L    Urea Nitrogen 20 6 - 23 mg/dL    Creatinine 1.10 0.50 - 1.30 mg/dL    eGFR 70 >60 mL/min/1.73m*2    Calcium 9.1 8.6 - 10.6 mg/dL    Phosphorus 3.6 2.5 - 4.9 mg/dL    Albumin 3.0 (L) 3.4 - 5.0 g/dL      CT chest abdomen pelvis wo IV contrast    Result Date: 1/2/2025  CHEST: 1.  History of right renal cell carcinoma. 2. Multiple pulmonary nodules consistent with metastatic disease.   ABDOMEN AND PELVIS: 1.  History upright or renal cell carcinoma. There is an unchanged necrotic mass involving the upper pole of the right kidney. 2. Bilateral renal cysts. 3. Bilateral nonobstructing intrarenal calculi with staghorn type calculi in the left kidney. There is no  renal obstruction. 4. Complete bony destruction of L4 with a soft tissue mass with expansion of the vertebral body. Lytic lesion involving the superior endplate of L5. Findings are consistent with bony metastases.   MACRO: None   Signed by: Lucia Desai 1/2/2025 10:33 AM Dictation workstation:   [id number]    XR chest 1 view    Result Date: 1/2/2025  No pneumonic consolidations are noted. There questionable perihilar nodules which may correspond to the masses noted on the prior CT.     MACRO: None   Signed by: Patrick Arnold 1/2/2025 8:33 AM Dictation workstation:   [id number]      MRI Spine 1/8/25  IMPRESSION:  MRI cervical spine:      1. No evidence of metastases.      2. Multilevel degenerative changes of the cervical spine including  moderate spinal canal stenosis at C5-C6 and C6-C7.      MRI thoracic spine:      1. No evidence of osseous metastases.      2. Multilevel degenerative changes of the thoracic spine without  striking spinal canal stenosis at any level.      MRI lumbar spine:      1. Metastasis involving the entirety of the L4 vertebral body,  portions of the posterior elements and transverse processes, with  extension into the L3-L4 and L4-L5 intervertebral disc spaces and  into the inferior endplate of L3 and superior endplate of L5 as well  as into the surrounding prevertebral soft tissues.      2. There is extension of tumor into the ventral and bilateral  epidural space at L4 and there is also tumor located within the  posterior spinal canal extending from the level of L3 through L5.  Combination of these findings cause marked spinal canal stenosis at  the level of L4. Minimal CSF is seen within the thecal sac at the  level of L4.      3. Tumor extends into the bilateral neural foramina at L4-L5 with  resulting moderate-to-marked left and marked right neural foraminal  narrowing. Tumor also extends into the inferior portions of the  bilateral neural foramina at L3-L4 resulting in moderate  neural  foraminal narrowing.      4. There is metastasis located within the posterior and left epidural  space at the level of L5 and S1 which causes partial effacement of  the posterior thecal sac.      5. Multilevel degenerative changes of the lumbar spine as detailed  above.      MRI Head 1/8/25  IMPRESSION:  1.  Small focus of diffusion restriction involving the right parietal  lobe with no associated enhancement on postcontrast imaging, favoring  small acute infarct. No mass effect or hemorrhagic transformation.  2. No abnormal intracranial enhancement to indicate intracranial  metastatic disease.  3. Nonspecific dilatation of the superior ophthalmic veins  bilaterally.    Assessment/Plan      Assessment & Plan  JAMAL (acute kidney injury) (CMS-HCC)    Mr. Abdi Retana is a 73 yo M with PMHx of HTN, GERD, SAMIA, Chronic lymphedema, venous insufficiency, OA, CKD-3(b/l 1.2-1.5), also w/ the following oncologic Hx - no formal Dx at this time. Presenting symptom was b/l LE weakness. Was admitted to [facility] 11/30-12/5 for this after IMG revealed a concerning L4-5 lesion w/ mod-severe cord compression in s/o R menal mass and suspicious lesions in adrenals lungs, and spine. This was c/f undiagnosed metastatic R RCC. Case was discussed w/ both IR and ortho spine. It was deemed that no inpt Bx nor surgery was indicated and pt was DC to SNF w/ plan for IR-guided L4 Bx (scheduled for 12/20; pt missed d/t transport issues w/ SNF). MRI brain and C/T spine w/ contrast was attempted but pt did not tolerate d/t claustrophobia - there was a plan to complete it OP w/ anesthesia. Transferred from [facility] for expedited work up of suspected R. RCC with L4/5 mets. Also has Proteus Mirabilis UTI c/b bacteremia I/s/o bilateral non obstructing renal stones. ID consulted for antibiotic management. L4 biopsy done and MRI head and spine completed.      Update 1/11/25  -Ortho: high risk of bleed if lesion is RCC, would recommend IR embolization  of vessels supplying the lesion around the L4 area.   -Ortho and Rad Onc in agreeance of surgery first  then radiation afterwards would be best .   -Will need to call Monday for prelim read of biopsy   -on Augmentin- stop date 1/23    Small Acute Stroke on MRI  -Small focus of diffusion restriction involving the right parietal lobe with no associated enhancement on postcontrast imaging, favoring small acute infarct. No mass effect or hemorrhagic transformation.  -at his neurologic baseline(fluent speech, oriented, moves all limbs,LE-4/5)  -neuro-stoke consulted  -already on aspirin for CAD, not on statin  Plan  -neuro-stroke following  Recommendations  - continue aspirin 81 mg daily   - when safe from a bleeding an procedural standpoint start therapeutic Lovenox 1 mg/kg Q12H   - Please keep patient on telemetry and monitor daily for afib. If there is evidence of afib may need alternative anticoagulation.    -  continue with atorvastatin 40 daily target LDL <70  - please arrange neurology followup at time of discharge    P. mirabilis bacteremia 2/2 UTI  -HDS  -associated w/ R renal mass, b/l non-obstructing renal stones  -sensitivities available  -seen by uro at [facility], did not have plan for procedure given non-obstructive  -ID consulted for duration, procedure rec given complex anatomy of stones and renal mass - c/f seeding  -ID following, recommend Augmentin for 3 weeks(EOT-1/23)  -repeat BC(1/10)-pending     C/f undiagnosed metastatic R RCC  L4-5 lesion w/ radiographic evidence cord compression  -CT(11/30)-R renal mass  -pt at baseline 4/5 LE weakness on exam, symmetric, no incontinence or saddle anesthesia  -there have been no plans for procedures per prior evaluations  -IMG as above  -d/w ortho spine, said if going for anesthesia should do the full spine  -consider formal  spine surgery consult if neuro change and/or IMG/Bx completed  -had been seen by NSG 1/3 who thought exam was okay w/o weakness, did not rec  an intervention  -On scheduled tylenol and prn oxy/dilaudid  -Outpatient medical oncology follow up with Dr Guerra  -ortho-spine following   Plan:  - No acute orthopaedic surgical intervention  - Recommend repeat blood cx, must be negative prior to operative intervention  - If biopsy indicates RCC, will need IR embolization of lesion pre operatively  - NPO at midnight in case of operative intervention  - okay for DVT ppx from orthopedic perspective  - Ortho spine to follow    HTN  CAD  -holding home  losartan 50mg  I/s/o recent rise in Cr(now resolved) and normotension   -c/w home Aspirin     Lymphedema  Venous insufficiency/venous ulcers  -wound care consulted  -hold home torsemide 20 mg daily for now  -OSH wound recs  BLE- Chronic venous status ulcers   Irrigate with normal saline or wound cleanser, Pat dry.  Apply lotion to intact skin   Cover weeping areas with Triad Hydrophillic wound dressing ointment   Pad and protect with ABD, Kerlix and paper tape.   Change every day and as needed   Secure with Tubigrip ( Apply from base of toes to 2 finger width below the bend of knee)      Gluteal cleft & buttocks-MASD/Irritant contact dermatitis due to fecal, urinary or dual incontinent.   Cleanse with bath wipes or soap and water. Rinse well and pat dry.   Apply no sting skin barrier (cavilon)   Apply Triad hydrophilic wound dressing ointment to gluteal fold/groin three times a day and as needed.     GERD  -c/w home famotidine 20 mg bid    Disposition  -barriers: clinical improvement  -destination: likely back to SNF ([facility])  -f/u: pending     Diet: cardiac   Electrolytes: K >3.5; Mg >2  DVT ppx: SCD (otherwise subcutaneous heparin for JAMAL; momentarily held pending waiting on Bx/procedures)  GI ppx: not indicated  Lines/tubes: PIV  O2: none  Baseline Cr: 1.5  T+S: 1/5  Code status: Full code  Surrogate decision maker: Mciaela Retana (sister) [phone]    Marie Schultz MD  Internal  Medicne  PGY-1

## 2025-01-11 NOTE — CARE PLAN
Problem: Skin  Goal: Decreased wound size/increased tissue granulation at next dressing change  1/11/2025 0539 by Dori Kirkland RN  Outcome: Progressing  1/11/2025 0539 by Dori Kirkland RN  Outcome: Progressing  Flowsheets (Taken 1/11/2025 0539)  Decreased wound size/increased tissue granulation at next dressing change: Promote sleep for wound healing  Goal: Participates in plan/prevention/treatment measures  1/11/2025 0539 by Dori Kirkland RN  Outcome: Progressing  Flowsheets (Taken 1/11/2025 0539)  Participates in plan/prevention/treatment measures: Elevate heels  1/11/2025 0539 by Dori Kirkland RN  Outcome: Progressing  Flowsheets (Taken 1/11/2025 0539)  Participates in plan/prevention/treatment measures: Elevate heels  Goal: Prevent/manage excess moisture  1/11/2025 0539 by Dori Kirkland RN  Outcome: Progressing  Flowsheets (Taken 1/6/2025 0011 by Ramirez Rodriguez)  Prevent/manage excess moisture: Cleanse incontinence/protect with barrier cream  1/11/2025 0539 by Dori Kirkland RN  Outcome: Progressing  Goal: Prevent/minimize sheer/friction injuries  1/11/2025 0539 by Dori Kirkladn RN  Outcome: Progressing  Flowsheets (Taken 1/6/2025 1512 by Skyla Kulkarni RN)  Prevent/minimize sheer/friction injuries: Use pull sheet  1/11/2025 0539 by Dori Kirkland RN  Outcome: Progressing  Goal: Promote/optimize nutrition  1/11/2025 0539 by Dori Kirkland RN  Outcome: Progressing  Flowsheets (Taken 1/8/2025 1815 by Marie Thomas, ELLEN)  Promote/optimize nutrition: Monitor/record intake including meals  1/11/2025 0539 by Dori Kirkland RN  Outcome: Progressing  Goal: Promote skin healing  1/11/2025 0539 by Dori Kirkland RN  Outcome: Progressing  Flowsheets (Taken 1/8/2025 1815 by Marie Thomas, ELLEN)  Promote skin healing:   Protective dressings over bony prominences   Assess skin/pad under line(s)/device(s)  1/11/2025 0539 by  Dori Kirkland RN  Outcome: Progressing     Problem: Fall/Injury  Goal: Not fall by end of shift  1/11/2025 0539 by Dori Kirkland RN  Outcome: Progressing  1/11/2025 0539 by Dori Kirkland RN  Outcome: Progressing  Goal: Be free from injury by end of the shift  1/11/2025 0539 by Dori Kirkland RN  Outcome: Progressing  1/11/2025 0539 by Dori Kirkland RN  Outcome: Progressing  Goal: Verbalize understanding of personal risk factors for fall in the hospital  1/11/2025 0539 by Dori Kirkland RN  Outcome: Progressing  1/11/2025 0539 by Dori Kirkland RN  Outcome: Progressing  Goal: Verbalize understanding of risk factor reduction measures to prevent injury from fall in the home  1/11/2025 0539 by Dori Kirkland RN  Outcome: Progressing  1/11/2025 0539 by Dori Kirkland RN  Outcome: Progressing  Goal: Use assistive devices by end of the shift  1/11/2025 0539 by Dori Kirkland RN  Outcome: Progressing  1/11/2025 0539 by Dori Kirkland RN  Outcome: Progressing  Goal: Pace activities to prevent fatigue by end of the shift  1/11/2025 0539 by Dori Kirkland RN  Outcome: Progressing  1/11/2025 0539 by Dori Kirkland RN  Outcome: Progressing     Problem: Pain - Adult  Goal: Verbalizes/displays adequate comfort level or baseline comfort level  1/11/2025 0539 by Dori Kirkland RN  Outcome: Progressing  1/11/2025 0539 by Dori Kirkland RN  Outcome: Progressing     Problem: Safety - Adult  Goal: Free from fall injury  1/11/2025 0539 by Dori Kirkland RN  Outcome: Progressing  1/11/2025 0539 by Dori Kirkland RN  Outcome: Progressing     Problem: Discharge Planning  Goal: Discharge to home or other facility with appropriate resources  1/11/2025 0539 by Dori Kirkland RN  Outcome: Progressing  1/11/2025 0539 by Dori G Guercio, RN  Outcome: Progressing     Problem: Chronic Conditions and Co-morbidities  Goal: Patient's  chronic conditions and co-morbidity symptoms are monitored and maintained or improved  1/11/2025 0539 by Dori Kirkland RN  Outcome: Progressing  1/11/2025 0539 by Dori Kirkland RN  Outcome: Progressing   The patient's goals for the shift include      The clinical goals for the shift include Pt will remain safe and free from injury throughout shift on 1/10/25 at 0700    Over the shift, the patient did make progress towards the following goals. The pt remained safe and free from injury during shift. Pt participated in plan of care and was able to adequately verbalize needs and comfort level during the night. Pt complained of pain throughout the night and received pain meds for comfort. Pt is comfortable and safe in room.

## 2025-01-12 VITALS
TEMPERATURE: 97.7 F | DIASTOLIC BLOOD PRESSURE: 75 MMHG | RESPIRATION RATE: 18 BRPM | OXYGEN SATURATION: 94 % | HEIGHT: 74 IN | SYSTOLIC BLOOD PRESSURE: 152 MMHG | WEIGHT: 264 LBS | BODY MASS INDEX: 33.88 KG/M2 | HEART RATE: 73 BPM

## 2025-01-12 LAB
ALBUMIN SERPL BCP-MCNC: 2.9 G/DL (ref 3.4–5)
ANION GAP SERPL CALC-SCNC: 12 MMOL/L (ref 10–20)
BACTERIA BLD CULT: NORMAL
BASOPHILS # BLD AUTO: 0.07 X10*3/UL (ref 0–0.1)
BASOPHILS NFR BLD AUTO: 0.9 %
BUN SERPL-MCNC: 20 MG/DL (ref 6–23)
CALCIUM SERPL-MCNC: 9.4 MG/DL (ref 8.6–10.6)
CHLORIDE SERPL-SCNC: 101 MMOL/L (ref 98–107)
CO2 SERPL-SCNC: 28 MMOL/L (ref 21–32)
CREAT SERPL-MCNC: 1.24 MG/DL (ref 0.5–1.3)
EGFRCR SERPLBLD CKD-EPI 2021: 61 ML/MIN/1.73M*2
EOSINOPHIL # BLD AUTO: 0.23 X10*3/UL (ref 0–0.4)
EOSINOPHIL NFR BLD AUTO: 3 %
ERYTHROCYTE [DISTWIDTH] IN BLOOD BY AUTOMATED COUNT: 16.7 % (ref 11.5–14.5)
GLUCOSE SERPL-MCNC: 88 MG/DL (ref 74–99)
HCT VFR BLD AUTO: 31.4 % (ref 41–52)
HGB BLD-MCNC: 9 G/DL (ref 13.5–17.5)
IMM GRANULOCYTES # BLD AUTO: 0.1 X10*3/UL (ref 0–0.5)
IMM GRANULOCYTES NFR BLD AUTO: 1.3 % (ref 0–0.9)
LYMPHOCYTES # BLD AUTO: 1.09 X10*3/UL (ref 0.8–3)
LYMPHOCYTES NFR BLD AUTO: 14 %
MAGNESIUM SERPL-MCNC: 2.2 MG/DL (ref 1.6–2.4)
MCH RBC QN AUTO: 25.6 PG (ref 26–34)
MCHC RBC AUTO-ENTMCNC: 28.7 G/DL (ref 32–36)
MCV RBC AUTO: 89 FL (ref 80–100)
MONOCYTES # BLD AUTO: 0.63 X10*3/UL (ref 0.05–0.8)
MONOCYTES NFR BLD AUTO: 8.1 %
NEUTROPHILS # BLD AUTO: 5.66 X10*3/UL (ref 1.6–5.5)
NEUTROPHILS NFR BLD AUTO: 72.7 %
NRBC BLD-RTO: 0 /100 WBCS (ref 0–0)
PHOSPHATE SERPL-MCNC: 3.8 MG/DL (ref 2.5–4.9)
PLATELET # BLD AUTO: 279 X10*3/UL (ref 150–450)
POTASSIUM SERPL-SCNC: 4.5 MMOL/L (ref 3.5–5.3)
RBC # BLD AUTO: 3.52 X10*6/UL (ref 4.5–5.9)
SODIUM SERPL-SCNC: 136 MMOL/L (ref 136–145)
WBC # BLD AUTO: 7.8 X10*3/UL (ref 4.4–11.3)

## 2025-01-12 PROCEDURE — 2500000004 HC RX 250 GENERAL PHARMACY W/ HCPCS (ALT 636 FOR OP/ED)

## 2025-01-12 PROCEDURE — 2500000001 HC RX 250 WO HCPCS SELF ADMINISTERED DRUGS (ALT 637 FOR MEDICARE OP)

## 2025-01-12 PROCEDURE — 36415 COLL VENOUS BLD VENIPUNCTURE: CPT

## 2025-01-12 PROCEDURE — 1170000001 HC PRIVATE ONCOLOGY ROOM DAILY

## 2025-01-12 PROCEDURE — 2500000001 HC RX 250 WO HCPCS SELF ADMINISTERED DRUGS (ALT 637 FOR MEDICARE OP): Performed by: STUDENT IN AN ORGANIZED HEALTH CARE EDUCATION/TRAINING PROGRAM

## 2025-01-12 PROCEDURE — 83735 ASSAY OF MAGNESIUM: CPT

## 2025-01-12 PROCEDURE — 85025 COMPLETE CBC W/AUTO DIFF WBC: CPT

## 2025-01-12 PROCEDURE — 80069 RENAL FUNCTION PANEL: CPT

## 2025-01-12 PROCEDURE — 99231 SBSQ HOSP IP/OBS SF/LOW 25: CPT

## 2025-01-12 RX ADMIN — HEPARIN SODIUM 5000 UNITS: 5000 INJECTION INTRAVENOUS; SUBCUTANEOUS at 08:28

## 2025-01-12 RX ADMIN — FAMOTIDINE 20 MG: 20 TABLET ORAL at 20:44

## 2025-01-12 RX ADMIN — ASPIRIN 81 MG: 81 TABLET, COATED ORAL at 08:28

## 2025-01-12 RX ADMIN — SENNOSIDES 8.6 MG: 8.6 TABLET, FILM COATED ORAL at 20:44

## 2025-01-12 RX ADMIN — AMOXICILLIN AND CLAVULANATE POTASSIUM 1 TABLET: 875; 125 TABLET, FILM COATED ORAL at 20:44

## 2025-01-12 RX ADMIN — ACETAMINOPHEN 975 MG: 325 TABLET, FILM COATED ORAL at 20:44

## 2025-01-12 RX ADMIN — FAMOTIDINE 20 MG: 20 TABLET ORAL at 08:27

## 2025-01-12 RX ADMIN — Medication 1 TABLET: at 08:27

## 2025-01-12 RX ADMIN — AMOXICILLIN AND CLAVULANATE POTASSIUM 1 TABLET: 875; 125 TABLET, FILM COATED ORAL at 08:27

## 2025-01-12 RX ADMIN — ACETAMINOPHEN 975 MG: 325 TABLET, FILM COATED ORAL at 08:27

## 2025-01-12 RX ADMIN — OXYCODONE HYDROCHLORIDE 10 MG: 10 TABLET ORAL at 01:18

## 2025-01-12 RX ADMIN — OXYCODONE HYDROCHLORIDE 10 MG: 10 TABLET ORAL at 08:27

## 2025-01-12 RX ADMIN — POLYETHYLENE GLYCOL 3350 17 G: 17 POWDER, FOR SOLUTION ORAL at 08:28

## 2025-01-12 RX ADMIN — HYDROMORPHONE HYDROCHLORIDE 0.3 MG: 1 INJECTION, SOLUTION INTRAMUSCULAR; INTRAVENOUS; SUBCUTANEOUS at 02:35

## 2025-01-12 RX ADMIN — HEPARIN SODIUM 5000 UNITS: 5000 INJECTION INTRAVENOUS; SUBCUTANEOUS at 16:09

## 2025-01-12 RX ADMIN — OXYCODONE HYDROCHLORIDE 10 MG: 10 TABLET ORAL at 20:44

## 2025-01-12 RX ADMIN — ACETAMINOPHEN 975 MG: 325 TABLET, FILM COATED ORAL at 16:08

## 2025-01-12 RX ADMIN — OXYCODONE HYDROCHLORIDE 10 MG: 10 TABLET ORAL at 16:08

## 2025-01-12 RX ADMIN — ATORVASTATIN CALCIUM 40 MG: 40 TABLET, FILM COATED ORAL at 20:44

## 2025-01-12 ASSESSMENT — COGNITIVE AND FUNCTIONAL STATUS - GENERAL
MOVING FROM LYING ON BACK TO SITTING ON SIDE OF FLAT BED WITH BEDRAILS: A LITTLE
DRESSING REGULAR UPPER BODY CLOTHING: A LOT
MOBILITY SCORE: 15
DRESSING REGULAR LOWER BODY CLOTHING: A LOT
STANDING UP FROM CHAIR USING ARMS: A LOT
HELP NEEDED FOR BATHING: A LOT
DAILY ACTIVITIY SCORE: 14
DRESSING REGULAR LOWER BODY CLOTHING: A LOT
MOVING TO AND FROM BED TO CHAIR: A LITTLE
WALKING IN HOSPITAL ROOM: A LOT
DRESSING REGULAR UPPER BODY CLOTHING: A LOT
PERSONAL GROOMING: A LITTLE
STANDING UP FROM CHAIR USING ARMS: A LOT
MOVING TO AND FROM BED TO CHAIR: A LITTLE
MOBILITY SCORE: 15
CLIMB 3 TO 5 STEPS WITH RAILING: A LOT
WALKING IN HOSPITAL ROOM: A LOT
TURNING FROM BACK TO SIDE WHILE IN FLAT BAD: A LITTLE
TOILETING: A LOT
DAILY ACTIVITIY SCORE: 14
HELP NEEDED FOR BATHING: A LOT
CLIMB 3 TO 5 STEPS WITH RAILING: A LOT
TURNING FROM BACK TO SIDE WHILE IN FLAT BAD: A LITTLE
EATING MEALS: A LITTLE
TOILETING: A LOT
PERSONAL GROOMING: A LITTLE
EATING MEALS: A LITTLE
MOVING FROM LYING ON BACK TO SITTING ON SIDE OF FLAT BED WITH BEDRAILS: A LITTLE

## 2025-01-12 ASSESSMENT — PAIN SCALES - GENERAL
PAINLEVEL_OUTOF10: 7
PAINLEVEL_OUTOF10: 7
PAINLEVEL_OUTOF10: 0 - NO PAIN
PAINLEVEL_OUTOF10: 7
PAINLEVEL_OUTOF10: 6
PAINLEVEL_OUTOF10: 7

## 2025-01-12 ASSESSMENT — PAIN - FUNCTIONAL ASSESSMENT
PAIN_FUNCTIONAL_ASSESSMENT: 0-10

## 2025-01-12 NOTE — PROGRESS NOTES
"Abdi Retana \"Louise" is a 74 y.o. male on day 8 of admission presenting with JAMAL (acute kidney injury) (CMS-HCC).      Subjective   Transferred from [facility] for expedited work up of suspected R. RCC with L4/5 mets. Also has Proteus Mirabilis UTI c/b bacteremia.    Overnight: NAEO     Patient resting comfortably, expressed feeling tired. Denied any F/C/N/V but admitted to some backache.     Objective     Last Recorded Vitals  /71 (BP Location: Left arm, Patient Position: Lying)   Pulse 81   Temp 36 °C (96.8 °F) (Temporal)   Resp 18   Wt 120 kg (264 lb)   SpO2 98%   Intake/Output last 3 Shifts:    Intake/Output Summary (Last 24 hours) at 1/12/2025 1202  Last data filed at 1/12/2025 0924  Gross per 24 hour   Intake --   Output 1600 ml   Net -1600 ml       Admission Weight  Weight: 120 kg (264 lb) (01/04/25 1900)    Daily Weight  01/04/25 : 120 kg (264 lb)    No current facility-administered medications on file prior to encounter.     Current Outpatient Medications on File Prior to Encounter   Medication Sig Dispense Refill    acetaminophen (Tylenol 8 HOUR) 650 mg ER tablet Take 1 tablet (650 mg) by mouth every 6 hours if needed for mild pain (1 - 3). Do not crush, chew, or split.      acetaminophen (Tylenol) 325 mg tablet Take 2 tablets (650 mg) by mouth 2 times a day.      aspirin 81 mg EC tablet Take 1 tablet (81 mg) by mouth once daily.      bisacodyl (Gentle Laxative, bisacodyl,) 10 mg suppository Insert 1 suppository (10 mg) into the rectum once daily as needed for constipation.      cefepime (Maxipime) IV Infuse 100 mL (2 g) over 0.5 hours into a venous catheter every 12 hours.      famotidine (Pepcid) 20 mg tablet Take 1 tablet (20 mg) by mouth 2 times a day.      ferrous sulfate (FeroSuL) 325 (65 Fe) MG tablet Take 1 tablet (325 mg) by mouth once every 24 hours. 90 tablet 3    losartan (Cozaar) 50 mg tablet TAKE 1 TABLET BY MOUTH ONCE  DAILY 90 tablet 3    magnesium hydroxide (Milk of Magnesia) 400 mg/5 " mL suspension Take 30 mL by mouth once daily as needed for constipation (if no BM in 3 consecutive days).      magnesium oxide 400 mg magnesium capsule Take 1 capsule (400 mg) by mouth once daily. 90 capsule 3    mineral oil enema Insert 133 mL (1 enema) into the rectum once daily as needed for constipation (if no BM in 8 hours after getting suppository).      potassium chloride CR 10 mEq ER tablet TAKE 1 TABLET BY MOUTH ONCE  DAILY DO NOT CRUSH, CHEW, OR  SPLIT 90 tablet 3    torsemide (Demadex) 20 mg tablet TAKE 1 TABLET BY MOUTH ONCE  DAILY 90 tablet 3    traMADol (Ultram) 50 mg tablet Take 1 tablet (50 mg) by mouth every 6 hours if needed for severe pain (7 - 10).      vitamin D3-folic acid 2,500 unit- 1 mg tablet Take 1 mg by mouth once every 24 hours. 90 tablet 3        Physical Exam  HENT:      Head: Normocephalic and atraumatic.      Mouth/Throat:      Mouth: Mucous membranes are moist.   Eyes:      General: No scleral icterus.     Extraocular Movements: Extraocular movements intact.      Pupils: Pupils are equal, round, and reactive to light.   Cardiovascular:      Rate and Rhythm: Normal rate and regular rhythm.      Heart sounds: No murmur heard.  Pulmonary:      Effort: No respiratory distress.      Breath sounds: No wheezing or rales.   Abdominal:      General: There is no distension.      Tenderness: There is no abdominal tenderness. There is no guarding.   Musculoskeletal:      Comments: Legs with dressing on, images on epic show possible stasis dermatitis with ulcers   Skin:     Capillary Refill: Capillary refill takes less than 2 seconds.   Neurological:      General: No focal deficit present.      Mental Status: He is alert and oriented to person, place, and time.      Motor: Weakness present.      Comments: Richter in b/l LE 3/5, significantly limited by pain   Psychiatric:         Mood and Affect: Mood normal.         Relevant Results    Results for orders placed or performed during the hospital  encounter of 01/04/25 (from the past 24 hours)   CBC and Auto Differential   Result Value Ref Range    WBC 7.8 4.4 - 11.3 x10*3/uL    nRBC 0.0 0.0 - 0.0 /100 WBCs    RBC 3.52 (L) 4.50 - 5.90 x10*6/uL    Hemoglobin 9.0 (L) 13.5 - 17.5 g/dL    Hematocrit 31.4 (L) 41.0 - 52.0 %    MCV 89 80 - 100 fL    MCH 25.6 (L) 26.0 - 34.0 pg    MCHC 28.7 (L) 32.0 - 36.0 g/dL    RDW 16.7 (H) 11.5 - 14.5 %    Platelets 279 150 - 450 x10*3/uL    Neutrophils % 72.7 40.0 - 80.0 %    Immature Granulocytes %, Automated 1.3 (H) 0.0 - 0.9 %    Lymphocytes % 14.0 13.0 - 44.0 %    Monocytes % 8.1 2.0 - 10.0 %    Eosinophils % 3.0 0.0 - 6.0 %    Basophils % 0.9 0.0 - 2.0 %    Neutrophils Absolute 5.66 (H) 1.60 - 5.50 x10*3/uL    Immature Granulocytes Absolute, Automated 0.10 0.00 - 0.50 x10*3/uL    Lymphocytes Absolute 1.09 0.80 - 3.00 x10*3/uL    Monocytes Absolute 0.63 0.05 - 0.80 x10*3/uL    Eosinophils Absolute 0.23 0.00 - 0.40 x10*3/uL    Basophils Absolute 0.07 0.00 - 0.10 x10*3/uL   Magnesium   Result Value Ref Range    Magnesium 2.20 1.60 - 2.40 mg/dL   Renal Function Panel   Result Value Ref Range    Glucose 88 74 - 99 mg/dL    Sodium 136 136 - 145 mmol/L    Potassium 4.5 3.5 - 5.3 mmol/L    Chloride 101 98 - 107 mmol/L    Bicarbonate 28 21 - 32 mmol/L    Anion Gap 12 10 - 20 mmol/L    Urea Nitrogen 20 6 - 23 mg/dL    Creatinine 1.24 0.50 - 1.30 mg/dL    eGFR 61 >60 mL/min/1.73m*2    Calcium 9.4 8.6 - 10.6 mg/dL    Phosphorus 3.8 2.5 - 4.9 mg/dL    Albumin 2.9 (L) 3.4 - 5.0 g/dL      CT chest abdomen pelvis wo IV contrast    Result Date: 1/2/2025  CHEST: 1.  History of right renal cell carcinoma. 2. Multiple pulmonary nodules consistent with metastatic disease.   ABDOMEN AND PELVIS: 1.  History upright or renal cell carcinoma. There is an unchanged necrotic mass involving the upper pole of the right kidney. 2. Bilateral renal cysts. 3. Bilateral nonobstructing intrarenal calculi with staghorn type calculi in the left kidney. There is  no renal obstruction. 4. Complete bony destruction of L4 with a soft tissue mass with expansion of the vertebral body. Lytic lesion involving the superior endplate of L5. Findings are consistent with bony metastases.   MACRO: None   Signed by: Lucia Desai 1/2/2025 10:33 AM Dictation workstation:   [id number]    XR chest 1 view    Result Date: 1/2/2025  No pneumonic consolidations are noted. There questionable perihilar nodules which may correspond to the masses noted on the prior CT.     MACRO: None   Signed by: Patrick Arnold 1/2/2025 8:33 AM Dictation workstation:   [id number]      MRI Spine 1/8/25  IMPRESSION:  MRI cervical spine:      1. No evidence of metastases.      2. Multilevel degenerative changes of the cervical spine including  moderate spinal canal stenosis at C5-C6 and C6-C7.      MRI thoracic spine:      1. No evidence of osseous metastases.      2. Multilevel degenerative changes of the thoracic spine without  striking spinal canal stenosis at any level.      MRI lumbar spine:      1. Metastasis involving the entirety of the L4 vertebral body,  portions of the posterior elements and transverse processes, with  extension into the L3-L4 and L4-L5 intervertebral disc spaces and  into the inferior endplate of L3 and superior endplate of L5 as well  as into the surrounding prevertebral soft tissues.      2. There is extension of tumor into the ventral and bilateral  epidural space at L4 and there is also tumor located within the  posterior spinal canal extending from the level of L3 through L5.  Combination of these findings cause marked spinal canal stenosis at  the level of L4. Minimal CSF is seen within the thecal sac at the  level of L4.      3. Tumor extends into the bilateral neural foramina at L4-L5 with  resulting moderate-to-marked left and marked right neural foraminal  narrowing. Tumor also extends into the inferior portions of the  bilateral neural foramina at L3-L4 resulting in moderate  neural  foraminal narrowing.      4. There is metastasis located within the posterior and left epidural  space at the level of L5 and S1 which causes partial effacement of  the posterior thecal sac.      5. Multilevel degenerative changes of the lumbar spine as detailed  above.      MRI Head 1/8/25  IMPRESSION:  1.  Small focus of diffusion restriction involving the right parietal  lobe with no associated enhancement on postcontrast imaging, favoring  small acute infarct. No mass effect or hemorrhagic transformation.  2. No abnormal intracranial enhancement to indicate intracranial  metastatic disease.  3. Nonspecific dilatation of the superior ophthalmic veins  bilaterally.    Assessment/Plan      Assessment & Plan  JAMAL (acute kidney injury) (CMS-HCC)    Mr. Abdi Retana is a 73 yo M with PMHx of HTN, GERD, SAMIA, Chronic lymphedema, venous insufficiency, OA, CKD-3(b/l 1.2-1.5), also w/ the following oncologic Hx - no formal Dx at this time. Presenting symptom was b/l LE weakness. Was admitted to [facility] 11/30-12/5 for this after IMG revealed a concerning L4-5 lesion w/ mod-severe cord compression in s/o R menal mass and suspicious lesions in adrenals lungs, and spine. This was c/f undiagnosed metastatic R RCC. Case was discussed w/ both IR and ortho spine. It was deemed that no inpt Bx nor surgery was indicated and pt was DC to SNF w/ plan for IR-guided L4 Bx (scheduled for 12/20; pt missed d/t transport issues w/ SNF). MRI brain and C/T spine w/ contrast was attempted but pt did not tolerate d/t claustrophobia - there was a plan to complete it OP w/ anesthesia. Transferred from [facility] for expedited work up of suspected R. RCC with L4/5 mets. Also has Proteus Mirabilis UTI c/b bacteremia I/s/o bilateral non obstructing renal stones. ID consulted for antibiotic management. L4 biopsy done and MRI head and spine completed. Ortho spine planning for surgery and Rad-onc planning for radiation after path report is  back.      Update 1/12/25  -Ortho and rad onc on board  -Will need to call Monday for prelim read of biopsy to aid surgical and radiation planning  -Touch base with IR if he would need further embolization to L4 lesion as he bled after his biopsy requiring embolization to control bleeding.  -on Augmentin for UTI- stop date 1/23    Small Acute Stroke on MRI  -Small focus of diffusion restriction involving the right parietal lobe with no associated enhancement on postcontrast imaging, favoring small acute infarct. No mass effect or hemorrhagic transformation.  -at his neurologic baseline(fluent speech, oriented, moves all limbs,LE-4/5)  -neuro-stoke consulted  -already on aspirin for CAD, not on statin  Plan  -neuro-stroke following  Recommendations  - continue aspirin 81 mg daily   - when safe from a bleeding an procedural standpoint start therapeutic Lovenox 1 mg/kg Q12H   - Please keep patient on telemetry and monitor daily for afib. If there is evidence of afib may need alternative anticoagulation.    -  continue with atorvastatin 40 daily target LDL <70  - please arrange neurology followup at time of discharge    P. mirabilis bacteremia 2/2 UTI  -HDS  -associated w/ R renal mass, b/l non-obstructing renal stones  -sensitivities available  -seen by uro at [facility], did not have plan for procedure given non-obstructive  -ID consulted for duration, procedure rec given complex anatomy of stones and renal mass - c/f seeding  -ID following, recommend Augmentin for 3 weeks(EOT-1/23)  -repeat BC(1/10)-NGTD     C/f undiagnosed metastatic R RCC  L4-5 lesion w/ radiographic evidence cord compression  -CT(11/30)-R renal mass  -pt at baseline 4/5 LE weakness on exam, symmetric, no incontinence or saddle anesthesia  -there have been no plans for procedures per prior evaluations  -IMG as above  -d/w ortho spine, said if going for anesthesia should do the full spine  -consider formal  spine surgery consult if neuro change and/or  IMG/Bx completed  -had been seen by NSG 1/3 who thought exam was okay w/o weakness, did not rec an intervention  -On scheduled tylenol and prn oxy/dilaudid  -Outpatient medical oncology follow up with Dr Guerra  -ortho-spine following   Plan:  - No acute orthopaedic surgical intervention  - Recommend repeat blood cx, must be negative prior to operative intervention  - If biopsy indicates RCC, will need IR embolization of lesion pre operatively  - NPO at midnight in case of operative intervention  - okay for DVT ppx from orthopedic perspective  - Ortho spine to follow    HTN  CAD  -holding home  losartan 50mg  I/s/o recent rise in Cr(now resolved) and normotension   -c/w home Aspirin     Lymphedema  Venous insufficiency/venous ulcers  -wound care consulted  -hold home torsemide 20 mg daily for now  -OSH wound recs  BLE- Chronic venous status ulcers   Irrigate with normal saline or wound cleanser, Pat dry.  Apply lotion to intact skin   Cover weeping areas with Triad Hydrophillic wound dressing ointment   Pad and protect with ABD, Kerlix and paper tape.   Change every day and as needed   Secure with Tubigrip ( Apply from base of toes to 2 finger width below the bend of knee)      Gluteal cleft & buttocks-MASD/Irritant contact dermatitis due to fecal, urinary or dual incontinent.   Cleanse with bath wipes or soap and water. Rinse well and pat dry.   Apply no sting skin barrier (cavilon)   Apply Triad hydrophilic wound dressing ointment to gluteal fold/groin three times a day and as needed.     GERD  -c/w home famotidine 20 mg bid    Disposition  -barriers: clinical improvement  -destination: likely back to SNF ([facility])  -f/u: pending     Diet: cardiac   Electrolytes: K >3.5; Mg >2  DVT ppx: SCD/Heparin  GI ppx: on home famotidine  Lines/tubes: PIV  O2: none  Baseline Cr: 1.5  T+S: 1/5  Code status: Full code  Surrogate decision maker: Micaela Rainy (sister) [phone]    Ganesh Jones ,  MD  [facility]ne  PGY-1

## 2025-01-12 NOTE — CARE PLAN
Problem: Skin  Goal: Decreased wound size/increased tissue granulation at next dressing change  Outcome: Progressing  Flowsheets (Taken 1/12/2025 0543)  Decreased wound size/increased tissue granulation at next dressing change:   Promote sleep for wound healing   Protective dressings over bony prominences  Goal: Participates in plan/prevention/treatment measures  Outcome: Progressing  Flowsheets (Taken 1/12/2025 0543)  Participates in plan/prevention/treatment measures: Elevate heels  Goal: Prevent/manage excess moisture  Outcome: Progressing  Flowsheets (Taken 1/12/2025 0543)  Prevent/manage excess moisture: Cleanse incontinence/protect with barrier cream  Goal: Prevent/minimize sheer/friction injuries  Outcome: Progressing  Flowsheets (Taken 1/12/2025 0543)  Prevent/minimize sheer/friction injuries:   HOB 30 degrees or less   Turn/reposition every 2 hours/use positioning/transfer devices   Use pull sheet  Goal: Promote/optimize nutrition  Outcome: Progressing  Flowsheets (Taken 1/12/2025 0543)  Promote/optimize nutrition:   Consume > 50% meals/supplements   Offer water/supplements/favorite foods  Goal: Promote skin healing  Outcome: Progressing  Flowsheets (Taken 1/12/2025 0543)  Promote skin healing:   Protective dressings over bony prominences   Turn/reposition every 2 hours/use positioning/transfer devices     Problem: Fall/Injury  Goal: Not fall by end of shift  Outcome: Progressing  Goal: Be free from injury by end of the shift  Outcome: Progressing  Goal: Verbalize understanding of personal risk factors for fall in the hospital  Outcome: Progressing  Goal: Verbalize understanding of risk factor reduction measures to prevent injury from fall in the home  Outcome: Progressing  Goal: Use assistive devices by end of the shift  Outcome: Progressing  Goal: Pace activities to prevent fatigue by end of the shift  Outcome: Progressing     Problem: Pain - Adult  Goal: Verbalizes/displays adequate comfort level or  Compression Application   Below Knee    NAME:  Chris Martinez  YOB: 1926  MEDICAL RECORD NUMBER:  [id number]  DATE:  4/23/2019       Applied moisturizing agent to dry skin as needed. Appied primary and secondary dressing as ordered     Applied  Unna Boot wrap from toes to knee overlapping each time. Applied cast padding and coban from toes to below the knee. Instructed patient/caregiver to keep dressing dry and intact. DO NOT REMOVE DRESSING. Instructed pt/family/caregiver to report excessive draining, loose bandage, wet dressing, severe pain or tingling in toes. Applied Compression dressing below the knee to Right lower leg(s)      Compression wrap(s) were applied per  Guidelines.      Electronically signed by Dio Saini RN on 4/23/2019 at 2:36 PM baseline comfort level  Outcome: Progressing     Problem: Safety - Adult  Goal: Free from fall injury  Outcome: Progressing     Problem: Discharge Planning  Goal: Discharge to home or other facility with appropriate resources  Outcome: Progressing     Problem: Chronic Conditions and Co-morbidities  Goal: Patient's chronic conditions and co-morbidity symptoms are monitored and maintained or improved  Outcome: Progressing   The patient's goals for the shift include      The clinical goals for the shift include Pt will remain safe and free from injury throughout shift on 1/11/25 at 0700    Over the shift, the patient did make progress towards the following goals. The pt remained safe and free from injury throughout the shift and had met comfort needs. Pt stated pain of 7/10 during shift and was given pain medications to keep comfortable. Pt is now resting comfortably in room.      100 4/23/2019  1:34 PM   Wound Volume (cm^3) 0 cm^3 4/23/2019  1:34 PM   Wound Healing % 100 4/23/2019  1:34 PM   Wound Assessment Painful;Pink 4/23/2019  1:34 PM   Drainage Amount Moderate 4/23/2019  1:34 PM   Drainage Description Aurelia Ortiz; Mccormick 4/23/2019  1:34 PM   Odor None 4/23/2019  1:34 PM   Mckayla-wound Assessment Excoriated;Pink 4/23/2019  1:34 PM   [place]%Wound Bed 100 4/23/2019  1:34 PM   Red%Wound Bed 0 4/23/2019  1:34 PM   Yellow%Wound Bed 0 4/23/2019  1:34 PM   Black%Wound Bed 0 4/23/2019  1:34 PM   Purple%Wound Bed 0 4/23/2019  1:34 PM   Other%Wound Bed 0 4/23/2019  1:34 PM   Number of days: 14           Plan:       Discharge Treatment  xeroform padding and pink unna boot Rt leg.   Left leg to wear stockings Rt in 1 week        Written Patient Discharge Instructions Given            Electronically signed by Boogie Manzo MD on 4/23/2019 at 2:00 PM

## 2025-01-13 LAB
ALBUMIN SERPL BCP-MCNC: 2.9 G/DL (ref 3.4–5)
ANION GAP SERPL CALC-SCNC: 11 MMOL/L (ref 10–20)
BASOPHILS # BLD AUTO: 0.09 X10*3/UL (ref 0–0.1)
BASOPHILS NFR BLD AUTO: 1 %
BUN SERPL-MCNC: 20 MG/DL (ref 6–23)
CALCIUM SERPL-MCNC: 9.3 MG/DL (ref 8.6–10.6)
CHLORIDE SERPL-SCNC: 100 MMOL/L (ref 98–107)
CO2 SERPL-SCNC: 28 MMOL/L (ref 21–32)
CREAT SERPL-MCNC: 1.15 MG/DL (ref 0.5–1.3)
EGFRCR SERPLBLD CKD-EPI 2021: 67 ML/MIN/1.73M*2
EOSINOPHIL # BLD AUTO: 0.28 X10*3/UL (ref 0–0.4)
EOSINOPHIL NFR BLD AUTO: 3.2 %
ERYTHROCYTE [DISTWIDTH] IN BLOOD BY AUTOMATED COUNT: 16.6 % (ref 11.5–14.5)
GLUCOSE SERPL-MCNC: 86 MG/DL (ref 74–99)
HCT VFR BLD AUTO: 30.1 % (ref 41–52)
HGB BLD-MCNC: 8.7 G/DL (ref 13.5–17.5)
IMM GRANULOCYTES # BLD AUTO: 0.04 X10*3/UL (ref 0–0.5)
IMM GRANULOCYTES NFR BLD AUTO: 0.5 % (ref 0–0.9)
LAB AP ASR DISCLAIMER: NORMAL
LABORATORY COMMENT REPORT: NORMAL
LYMPHOCYTES # BLD AUTO: 1.49 X10*3/UL (ref 0.8–3)
LYMPHOCYTES NFR BLD AUTO: 17.1 %
MAGNESIUM SERPL-MCNC: 2.19 MG/DL (ref 1.6–2.4)
MCH RBC QN AUTO: 26 PG (ref 26–34)
MCHC RBC AUTO-ENTMCNC: 28.9 G/DL (ref 32–36)
MCV RBC AUTO: 90 FL (ref 80–100)
MONOCYTES # BLD AUTO: 0.73 X10*3/UL (ref 0.05–0.8)
MONOCYTES NFR BLD AUTO: 8.4 %
NEUTROPHILS # BLD AUTO: 6.1 X10*3/UL (ref 1.6–5.5)
NEUTROPHILS NFR BLD AUTO: 69.8 %
NRBC BLD-RTO: 0 /100 WBCS (ref 0–0)
PATH REPORT.COMMENTS IMP SPEC: NORMAL
PATH REPORT.FINAL DX SPEC: NORMAL
PATH REPORT.GROSS SPEC: NORMAL
PATH REPORT.RELEVANT HX SPEC: NORMAL
PATH REPORT.TOTAL CANCER: NORMAL
PHOSPHATE SERPL-MCNC: 3.9 MG/DL (ref 2.5–4.9)
PLATELET # BLD AUTO: 310 X10*3/UL (ref 150–450)
POTASSIUM SERPL-SCNC: 4.5 MMOL/L (ref 3.5–5.3)
RBC # BLD AUTO: 3.34 X10*6/UL (ref 4.5–5.9)
SODIUM SERPL-SCNC: 134 MMOL/L (ref 136–145)
WBC # BLD AUTO: 8.7 X10*3/UL (ref 4.4–11.3)

## 2025-01-13 PROCEDURE — 80069 RENAL FUNCTION PANEL: CPT

## 2025-01-13 PROCEDURE — 97110 THERAPEUTIC EXERCISES: CPT | Mod: GP

## 2025-01-13 PROCEDURE — 2500000001 HC RX 250 WO HCPCS SELF ADMINISTERED DRUGS (ALT 637 FOR MEDICARE OP)

## 2025-01-13 PROCEDURE — 2500000004 HC RX 250 GENERAL PHARMACY W/ HCPCS (ALT 636 FOR OP/ED)

## 2025-01-13 PROCEDURE — 1170000001 HC PRIVATE ONCOLOGY ROOM DAILY

## 2025-01-13 PROCEDURE — 99232 SBSQ HOSP IP/OBS MODERATE 35: CPT

## 2025-01-13 PROCEDURE — 85025 COMPLETE CBC W/AUTO DIFF WBC: CPT

## 2025-01-13 PROCEDURE — 36415 COLL VENOUS BLD VENIPUNCTURE: CPT

## 2025-01-13 PROCEDURE — 2500000001 HC RX 250 WO HCPCS SELF ADMINISTERED DRUGS (ALT 637 FOR MEDICARE OP): Performed by: STUDENT IN AN ORGANIZED HEALTH CARE EDUCATION/TRAINING PROGRAM

## 2025-01-13 PROCEDURE — 97530 THERAPEUTIC ACTIVITIES: CPT | Mod: GP

## 2025-01-13 PROCEDURE — 83735 ASSAY OF MAGNESIUM: CPT

## 2025-01-13 RX ADMIN — OXYCODONE HYDROCHLORIDE 5 MG: 5 TABLET ORAL at 17:07

## 2025-01-13 RX ADMIN — SENNOSIDES 8.6 MG: 8.6 TABLET, FILM COATED ORAL at 21:33

## 2025-01-13 RX ADMIN — HEPARIN SODIUM 5000 UNITS: 5000 INJECTION INTRAVENOUS; SUBCUTANEOUS at 00:01

## 2025-01-13 RX ADMIN — ACETAMINOPHEN 975 MG: 325 TABLET, FILM COATED ORAL at 08:45

## 2025-01-13 RX ADMIN — AMOXICILLIN AND CLAVULANATE POTASSIUM 1 TABLET: 875; 125 TABLET, FILM COATED ORAL at 21:34

## 2025-01-13 RX ADMIN — Medication 1 TABLET: at 08:45

## 2025-01-13 RX ADMIN — ACETAMINOPHEN 975 MG: 325 TABLET, FILM COATED ORAL at 14:08

## 2025-01-13 RX ADMIN — FAMOTIDINE 20 MG: 20 TABLET ORAL at 08:44

## 2025-01-13 RX ADMIN — OXYCODONE HYDROCHLORIDE 10 MG: 10 TABLET ORAL at 14:08

## 2025-01-13 RX ADMIN — FAMOTIDINE 20 MG: 20 TABLET ORAL at 21:34

## 2025-01-13 RX ADMIN — HEPARIN SODIUM 5000 UNITS: 5000 INJECTION INTRAVENOUS; SUBCUTANEOUS at 08:45

## 2025-01-13 RX ADMIN — OXYCODONE HYDROCHLORIDE 5 MG: 5 TABLET ORAL at 08:45

## 2025-01-13 RX ADMIN — ACETAMINOPHEN 975 MG: 325 TABLET, FILM COATED ORAL at 21:33

## 2025-01-13 RX ADMIN — ASPIRIN 81 MG: 81 TABLET, COATED ORAL at 08:45

## 2025-01-13 RX ADMIN — ATORVASTATIN CALCIUM 40 MG: 40 TABLET, FILM COATED ORAL at 21:33

## 2025-01-13 RX ADMIN — HEPARIN SODIUM 5000 UNITS: 5000 INJECTION INTRAVENOUS; SUBCUTANEOUS at 17:08

## 2025-01-13 RX ADMIN — AMOXICILLIN AND CLAVULANATE POTASSIUM 1 TABLET: 875; 125 TABLET, FILM COATED ORAL at 08:45

## 2025-01-13 ASSESSMENT — PAIN SCALES - GENERAL
PAINLEVEL_OUTOF10: 6
PAINLEVEL_OUTOF10: 7
PAINLEVEL_OUTOF10: 5 - MODERATE PAIN
PAINLEVEL_OUTOF10: 0 - NO PAIN
PAINLEVEL_OUTOF10: 3
PAINLEVEL_OUTOF10: 6

## 2025-01-13 ASSESSMENT — COGNITIVE AND FUNCTIONAL STATUS - GENERAL
MOBILITY SCORE: 15
PERSONAL GROOMING: A LITTLE
CLIMB 3 TO 5 STEPS WITH RAILING: A LOT
MOVING FROM LYING ON BACK TO SITTING ON SIDE OF FLAT BED WITH BEDRAILS: A LITTLE
DRESSING REGULAR UPPER BODY CLOTHING: A LOT
TOILETING: A LOT
EATING MEALS: A LITTLE
MOVING TO AND FROM BED TO CHAIR: A LITTLE
STANDING UP FROM CHAIR USING ARMS: A LOT
HELP NEEDED FOR BATHING: A LOT
TURNING FROM BACK TO SIDE WHILE IN FLAT BAD: A LITTLE
WALKING IN HOSPITAL ROOM: A LOT
DAILY ACTIVITIY SCORE: 14
DRESSING REGULAR LOWER BODY CLOTHING: A LOT

## 2025-01-13 ASSESSMENT — PAIN - FUNCTIONAL ASSESSMENT
PAIN_FUNCTIONAL_ASSESSMENT: 0-10

## 2025-01-13 NOTE — PROGRESS NOTES
"Abdi Retana \"Louise" is a 74 y.o. male on day 9 of admission presenting with JAMAL (acute kidney injury) (CMS-HCC).      Subjective   Transferred from [facility] for expedited work up of suspected R. RCC with L4/5 mets. Also has Proteus Mirabilis UTI c/b bacteremia.    Overnight: NAEO   Patient resting comfortably, expressed feeling tired. Denied any F/C/N/V but admitted to some backache. Preliminary path confirmed metastatic RCC. Ortho planning for surgery on Friday. Pending discussion with patient about prognosis and available treatment tomorrow.     Objective     Last Recorded Vitals  /63 (BP Location: Left arm, Patient Position: Lying)   Pulse 73   Temp 36.6 °C (97.9 °F) (Temporal)   Resp 18   Wt 120 kg (264 lb)   SpO2 97%   Intake/Output last 3 Shifts:    Intake/Output Summary (Last 24 hours) at 1/13/2025 1801  Last data filed at 1/13/2025 1658  Gross per 24 hour   Intake --   Output 600 ml   Net -600 ml       Admission Weight  Weight: 120 kg (264 lb) (01/04/25 1900)    Daily Weight  01/04/25 : 120 kg (264 lb)    No current facility-administered medications on file prior to encounter.     Current Outpatient Medications on File Prior to Encounter   Medication Sig Dispense Refill    acetaminophen (Tylenol 8 HOUR) 650 mg ER tablet Take 1 tablet (650 mg) by mouth every 6 hours if needed for mild pain (1 - 3). Do not crush, chew, or split.      acetaminophen (Tylenol) 325 mg tablet Take 2 tablets (650 mg) by mouth 2 times a day.      aspirin 81 mg EC tablet Take 1 tablet (81 mg) by mouth once daily.      bisacodyl (Gentle Laxative, bisacodyl,) 10 mg suppository Insert 1 suppository (10 mg) into the rectum once daily as needed for constipation.      cefepime (Maxipime) IV Infuse 100 mL (2 g) over 0.5 hours into a venous catheter every 12 hours.      famotidine (Pepcid) 20 mg tablet Take 1 tablet (20 mg) by mouth 2 times a day.      ferrous sulfate (FeroSuL) 325 (65 Fe) MG tablet Take 1 tablet (325 mg) by mouth once " every 24 hours. 90 tablet 3    losartan (Cozaar) 50 mg tablet TAKE 1 TABLET BY MOUTH ONCE  DAILY 90 tablet 3    magnesium hydroxide (Milk of Magnesia) 400 mg/5 mL suspension Take 30 mL by mouth once daily as needed for constipation (if no BM in 3 consecutive days).      magnesium oxide 400 mg magnesium capsule Take 1 capsule (400 mg) by mouth once daily. 90 capsule 3    mineral oil enema Insert 133 mL (1 enema) into the rectum once daily as needed for constipation (if no BM in 8 hours after getting suppository).      potassium chloride CR 10 mEq ER tablet TAKE 1 TABLET BY MOUTH ONCE  DAILY DO NOT CRUSH, CHEW, OR  SPLIT 90 tablet 3    torsemide (Demadex) 20 mg tablet TAKE 1 TABLET BY MOUTH ONCE  DAILY 90 tablet 3    traMADol (Ultram) 50 mg tablet Take 1 tablet (50 mg) by mouth every 6 hours if needed for severe pain (7 - 10).      vitamin D3-folic acid 2,500 unit- 1 mg tablet Take 1 mg by mouth once every 24 hours. 90 tablet 3        Physical Exam  HENT:      Head: Normocephalic and atraumatic.      Mouth/Throat:      Mouth: Mucous membranes are moist.   Eyes:      General: No scleral icterus.     Extraocular Movements: Extraocular movements intact.      Pupils: Pupils are equal, round, and reactive to light.   Cardiovascular:      Rate and Rhythm: Normal rate and regular rhythm.      Heart sounds: No murmur heard.  Pulmonary:      Effort: No respiratory distress.      Breath sounds: No wheezing or rales.   Abdominal:      General: There is no distension.      Tenderness: There is no abdominal tenderness. There is no guarding.   Musculoskeletal:      Comments: Legs with dressing on, images on epic show possible stasis dermatitis with ulcers   Skin:     Capillary Refill: Capillary refill takes less than 2 seconds.   Neurological:      General: No focal deficit present.      Mental Status: He is alert and oriented to person, place, and time.      Motor: Weakness present.      Comments: Richter in b/l LE 3/5,  significantly limited by pain   Psychiatric:         Mood and Affect: Mood normal.         Relevant Results    Results for orders placed or performed during the hospital encounter of 01/04/25 (from the past 24 hours)   CBC and Auto Differential   Result Value Ref Range    WBC 8.7 4.4 - 11.3 x10*3/uL    nRBC 0.0 0.0 - 0.0 /100 WBCs    RBC 3.34 (L) 4.50 - 5.90 x10*6/uL    Hemoglobin 8.7 (L) 13.5 - 17.5 g/dL    Hematocrit 30.1 (L) 41.0 - 52.0 %    MCV 90 80 - 100 fL    MCH 26.0 26.0 - 34.0 pg    MCHC 28.9 (L) 32.0 - 36.0 g/dL    RDW 16.6 (H) 11.5 - 14.5 %    Platelets 310 150 - 450 x10*3/uL    Neutrophils % 69.8 40.0 - 80.0 %    Immature Granulocytes %, Automated 0.5 0.0 - 0.9 %    Lymphocytes % 17.1 13.0 - 44.0 %    Monocytes % 8.4 2.0 - 10.0 %    Eosinophils % 3.2 0.0 - 6.0 %    Basophils % 1.0 0.0 - 2.0 %    Neutrophils Absolute 6.10 (H) 1.60 - 5.50 x10*3/uL    Immature Granulocytes Absolute, Automated 0.04 0.00 - 0.50 x10*3/uL    Lymphocytes Absolute 1.49 0.80 - 3.00 x10*3/uL    Monocytes Absolute 0.73 0.05 - 0.80 x10*3/uL    Eosinophils Absolute 0.28 0.00 - 0.40 x10*3/uL    Basophils Absolute 0.09 0.00 - 0.10 x10*3/uL   Magnesium   Result Value Ref Range    Magnesium 2.19 1.60 - 2.40 mg/dL   Renal Function Panel   Result Value Ref Range    Glucose 86 74 - 99 mg/dL    Sodium 134 (L) 136 - 145 mmol/L    Potassium 4.5 3.5 - 5.3 mmol/L    Chloride 100 98 - 107 mmol/L    Bicarbonate 28 21 - 32 mmol/L    Anion Gap 11 10 - 20 mmol/L    Urea Nitrogen 20 6 - 23 mg/dL    Creatinine 1.15 0.50 - 1.30 mg/dL    eGFR 67 >60 mL/min/1.73m*2    Calcium 9.3 8.6 - 10.6 mg/dL    Phosphorus 3.9 2.5 - 4.9 mg/dL    Albumin 2.9 (L) 3.4 - 5.0 g/dL      CT chest abdomen pelvis wo IV contrast    Result Date: 1/2/2025  CHEST: 1.  History of right renal cell carcinoma. 2. Multiple pulmonary nodules consistent with metastatic disease.   ABDOMEN AND PELVIS: 1.  History upright or renal cell carcinoma. There is an unchanged necrotic mass  involving the upper pole of the right kidney. 2. Bilateral renal cysts. 3. Bilateral nonobstructing intrarenal calculi with staghorn type calculi in the left kidney. There is no renal obstruction. 4. Complete bony destruction of L4 with a soft tissue mass with expansion of the vertebral body. Lytic lesion involving the superior endplate of L5. Findings are consistent with bony metastases.   MACRO: None   Signed by: Lucia Desai 1/2/2025 10:33 AM Dictation workstation:   [id number]    XR chest 1 view    Result Date: 1/2/2025  No pneumonic consolidations are noted. There questionable perihilar nodules which may correspond to the masses noted on the prior CT.     MACRO: None   Signed by: Patrick Arnold 1/2/2025 8:33 AM Dictation workstation:   [id number]      MRI Spine 1/8/25  IMPRESSION:  MRI cervical spine:      1. No evidence of metastases.      2. Multilevel degenerative changes of the cervical spine including  moderate spinal canal stenosis at C5-C6 and C6-C7.      MRI thoracic spine:      1. No evidence of osseous metastases.      2. Multilevel degenerative changes of the thoracic spine without  striking spinal canal stenosis at any level.      MRI lumbar spine:      1. Metastasis involving the entirety of the L4 vertebral body,  portions of the posterior elements and transverse processes, with  extension into the L3-L4 and L4-L5 intervertebral disc spaces and  into the inferior endplate of L3 and superior endplate of L5 as well  as into the surrounding prevertebral soft tissues.      2. There is extension of tumor into the ventral and bilateral  epidural space at L4 and there is also tumor located within the  posterior spinal canal extending from the level of L3 through L5.  Combination of these findings cause marked spinal canal stenosis at  the level of L4. Minimal CSF is seen within the thecal sac at the  level of L4.      3. Tumor extends into the bilateral neural foramina at L4-L5 with  resulting  moderate-to-marked left and marked right neural foraminal  narrowing. Tumor also extends into the inferior portions of the  bilateral neural foramina at L3-L4 resulting in moderate neural  foraminal narrowing.      4. There is metastasis located within the posterior and left epidural  space at the level of L5 and S1 which causes partial effacement of  the posterior thecal sac.      5. Multilevel degenerative changes of the lumbar spine as detailed  above.      MRI Head 1/8/25  IMPRESSION:  1.  Small focus of diffusion restriction involving the right parietal  lobe with no associated enhancement on postcontrast imaging, favoring  small acute infarct. No mass effect or hemorrhagic transformation.  2. No abnormal intracranial enhancement to indicate intracranial  metastatic disease.  3. Nonspecific dilatation of the superior ophthalmic veins  bilaterally.    Assessment/Plan      Assessment & Plan  JAMAL (acute kidney injury) (CMS-HCC)    Mr. Abdi Retana is a 75 yo M with PMHx of HTN, GERD, SAMIA, Chronic lymphedema, venous insufficiency, OA, CKD-3(b/l 1.2-1.5), also w/ the following oncologic Hx - no formal Dx at this time. Presenting symptom was b/l LE weakness. Was admitted to [facility] 11/30-12/5 for this after IMG revealed a concerning L4-5 lesion w/ mod-severe cord compression in s/o R menal mass and suspicious lesions in adrenals lungs, and spine. This was c/f undiagnosed metastatic R RCC. Case was discussed w/ both IR and ortho spine. It was deemed that no inpt Bx nor surgery was indicated and pt was DC to SNF w/ plan for IR-guided L4 Bx (scheduled for 12/20; pt missed d/t transport issues w/ SNF). MRI brain and C/T spine w/ contrast was attempted but pt did not tolerate d/t claustrophobia - there was a plan to complete it OP w/ anesthesia. Transferred from [facility] for expedited work up of suspected R. RCC with L4/5 mets. Also has Proteus Mirabilis UTI c/b bacteremia I/s/o bilateral non obstructing renal stones. ID  consulted for antibiotic management. L4 biopsy done and MRI head and spine completed. Ortho spine planning for surgery on Friday, 1/17 and Rad-onc planning for radiation after path report is back. Path report confirmed metastatic RCC yet to discuss with family about available treatments (Surgical, radiation and medical therapy).       Update 1/13/25    Today: Preliminary path confirmed metastatic RCC. Ortho planning for surgery on Friday. Pending discussion with patient about prognosis and available treatment tomorrow.   -Ortho and rad onc on board  -Touched base with IR for L4 spinal artery embolization. IR referred us to [facility] fellow Dr. Ventura who has been contacted. Yet to get back to us on future plans with embolization.   -C/w Augmentin for UTI- stop date 1/23    Small Acute Stroke on MRI  -Small focus of diffusion restriction involving the right parietal lobe with no associated enhancement on postcontrast imaging, favoring small acute infarct. No mass effect or hemorrhagic transformation.  -at his neurologic baseline(fluent speech, oriented, moves all limbs,LE-4/5)  -neuro-stoke consulted  -already on aspirin for CAD, not on statin  Plan  -neuro-stroke following  Recommendations  - continue aspirin 81 mg daily   - when safe from a bleeding an procedural standpoint start therapeutic Lovenox 1 mg/kg Q12H   - Please keep patient on telemetry and monitor daily for afib. If there is evidence of afib may need alternative anticoagulation.    -  continue with atorvastatin 40 daily target LDL <70  - please arrange neurology followup at time of discharge    P. mirabilis bacteremia 2/2 UTI  -HDS  -associated w/ R renal mass, b/l non-obstructing renal stones  -sensitivities available  -seen by uro at [facility], did not have plan for procedure given non-obstructive  -ID consulted for duration, procedure rec given complex anatomy of stones and renal mass - c/f seeding  -ID following, recommend Augmentin for 3  weeks(EOT-1/23)  -repeat BC(1/10)-NGTD     C/f undiagnosed metastatic R RCC  L4-5 lesion w/ radiographic evidence cord compression  -CT(11/30)-R renal mass  -pt at baseline 4/5 LE weakness on exam, symmetric, no incontinence or saddle anesthesia  -there have been no plans for procedures per prior evaluations  -IMG as above  -d/w ortho spine, said if going for anesthesia should do the full spine  -consider formal  spine surgery consult if neuro change and/or IMG/Bx completed  -had been seen by NSG 1/3 who thought exam was okay w/o weakness, did not rec an intervention  -On scheduled tylenol and prn oxy/dilaudid  -Outpatient medical oncology follow up with Dr Guerra  -ortho-spine following   Plan:  - No acute orthopaedic surgical intervention  - Recommend repeat blood cx, must be negative prior to operative intervention  - If biopsy indicates RCC, will need IR embolization of lesion pre operatively  - NPO at midnight in case of operative intervention  - okay for DVT ppx from orthopedic perspective  - Ortho spine to follow    HTN  CAD  -holding home  losartan 50mg  I/s/o recent rise in Cr(now resolved) and normotension   -c/w home Aspirin     Lymphedema  Venous insufficiency/venous ulcers  -wound care consulted  -hold home torsemide 20 mg daily for now  -OSH wound recs  BLE- Chronic venous status ulcers   Irrigate with normal saline or wound cleanser, Pat dry.  Apply lotion to intact skin   Cover weeping areas with Triad Hydrophillic wound dressing ointment   Pad and protect with ABD, Kerlix and paper tape.   Change every day and as needed   Secure with Tubigrip ( Apply from base of toes to 2 finger width below the bend of knee)      Gluteal cleft & buttocks-MASD/Irritant contact dermatitis due to fecal, urinary or dual incontinent.   Cleanse with bath wipes or soap and water. Rinse well and pat dry.   Apply no sting skin barrier (cavilon)   Apply Triad hydrophilic wound dressing ointment to gluteal fold/groin three  times a day and as needed.     GERD  -c/w home famotidine 20 mg bid    Disposition  -barriers: clinical improvement  -destination: likely back to SNF ([facility])  -f/u: pending     Diet: cardiac   Electrolytes: K >3.5; Mg >2  DVT ppx: SCD/Heparin  GI ppx: on home famotidine  Lines/tubes: PIV  O2: none  Baseline Cr: 1.5  T+S: 1/5  Code status: Full code  Surrogate decision maker: Micaela Retana (sister) [phone]    Salomon Silva MD  Internal Medicne  PGY-1

## 2025-01-13 NOTE — PROGRESS NOTES
"Nutrition Follow Up Assessment:   Nutrition Assessment    The patient is a 74 y.o. male who is hospital day #9.  Pt initially admitted for expedited w/u of suspected RCC w/ L4/5 mets.   Since last RD visit:  - L4 lesions bx on 01/07  - MRI done 01/08. Shoed small acute stroke and cord compression @[facility]- neurology, rad onc, and ortho consulted.   -- Ortho: surgery pending pathologic results. May need IR embolization of vessels prior to surgery.    -- rad onc: tentative palliative rad pending pathologic dx - following surgery.     PMHx: HTN, GERD, SAMIA, Chronic lymphedema, venous insufficiency, OA, CKD-3(b/l 1.2-1.5)    Nutrition History:  Food and Nutrient History: Review of healthtouch - only 2 meals ordered in the past week. During visit today pt received tray of tater tots with ketchup. Pt reports he has not been eating anything. Last meal was a couple of weeks ago. Reports issue with taste - things do not taste good so he has been limitin what he eats/drinks. Reports doing OJ throughout the day and 2 cartons of chocolate milk a day. Has cut back on chocolate milk as it does not taste the best. Taste changes started with meds. Also endorses nausea which is now better controlled. After some discussion pt thinks food is tasting more metallic. Unsure if pt has tried ONS in the past.  Vitamin/Herbal Supplement Use: at home pt reports doing vitamin D, C, and B - reports no intake recently.      Anthropometrics:  Start of admission anthropometrics:  Height: 188 cm (6' 2\")  Weight: 120 kg (264 lb)  BMI (Calculated): 33.88  IBW/kg (Dietitian Calculated): 86.36 kg  Percent of IBW: 138 %       Weight Change %:  Weight History / % Weight Change: no new wt to assess. Pt reports being unsure of UBW and thinks he has been loosing wt. Last time he was measured at [facility]? he was 264#. This is lower than his baseline and he thinks he may have lost some more. Initially sounds like pt was also trying to loose wt intentionally but " "later on attributes it to decreased PO intake 2/2 dysguesia and nausea.    Nutrition Focused Physical Exam Findings:    Subcutaneous Fat Loss:   Orbital Fat Pads: Severe (dark circles, hollowing and loose skin)  Buccal Fat Pads: Severe (hollow, sunken and narrow face)  Triceps: Defer  Muscle Wasting:  Temporalis: Mild-Moderate (slight depression)  Pectoralis (Clavicular Region): Mild-Moderate (some protrusion of clavicle)  Deltoid/Trapezius: Mild-Moderate (slight protrusion of acromion process)  Quadriceps: Well nourished (well developed, well rounded)  Gastrocnemius: Defer  Edema:  Edema Location: Non pitting @RLE  Physical Findings:  Mouth:  (\"beefy\" tongue)  Skin:  (Chronic venouls ulcers @BLE, laceration @R pretibial)    Objective Data:    Last BM Date: 01/09/25    Nutrition Significant Labs:    Results from last 7 days   Lab Units 01/13/25  0710 01/12/25  0757 01/11/25  0813   HEMOGLOBIN g/dL 8.7* 9.0* 9.1*   MCV fL 90 89 88   GLUCOSE mg/dL 86 88 99   POTASSIUM mmol/L 4.5 4.5 4.8   SODIUM mmol/L 134* 136 135*   PHOSPHORUS mg/dL 3.9 3.8 3.6   MAGNESIUM mg/dL 2.19 2.20 2.18   CREATININE mg/dL 1.15 1.24 1.10   BUN mg/dL 20 20 20       Nutrition Specific Medications:  amoxicillin-pot clavulanate, 1 tablet, oral, q12h CHAPITO  atorvastatin, 40 mg, oral, Nightly  famotidine, 20 mg, oral, BID  multivitamin with minerals, 1 tablet, oral, Daily  polyethylene glycol, 17 g, oral, Daily  sennosides, 1 tablet, oral, Nightly    I/O:   I/O last 2 completed shifts:  In: - (0 mL/kg)   Out: 925 (7.7 mL/kg) [Urine:925 (0.3 mL/kg/hr)]  Weight: 119.7 kg     Dietary Orders (From admission, onward)       Start     Ordered    01/11/25 0858  Adult diet Regular  Diet effective now        Question:  Diet type  Answer:  Regular    01/11/25 0857    01/04/25 1343  May Participate in Room Service  ( ROOM SERVICE MAY PARTICIPATE)  Once        Question:  .  Answer:  Yes    01/04/25 1342                     Estimated Needs:   Total Energy " Estimated Needs (kCal): 2160 kCal  Method for Estimating Needs: 25 kcal/kg IBW    Total Protein Estimated Needs (g): 112.32 g  Method for Estimating Needs: 1.3 g/kg IBW    Method for Estimating Needs: 1mL/kcal or MD recommendations          Nutrition Diagnosis   Malnutrition Diagnosis  Patient has Malnutrition Diagnosis: Yes  Diagnosis Status: New  Malnutrition Diagnosis: Severe malnutrition related to chronic disease or condition  As Evidenced by: PO intake meeting <75% of nutrient needs for >/= 1 month; severe subcutaneous fat loss; moderate muscle wasting.  Additional Assessment Information: significant wt loss is possible but given no nutr hx in EMR unable to objectively assess.    Nutrition Diagnosis  Patient has Nutrition Diagnosis: Yes  Diagnosis Status (1): Ongoing  Nutrition Diagnosis 1: Increased nutrient needs  Related to (1): increased metabolic demand  As Evidenced by (1): new cancer dx       Nutrition Interventions/Recommendations    Individualized Nutrition Prescription Provided for : 2150 kcal and 110g protein via oral diet    - Will add order for Ensure Plus (350 kcal, 13g pro) x1/d   --- if desired, pt aware he can order more.   - If medically appropriate, consider starting appetite stimulant  - Obtain updated weight     Nutrition Education: MNT related to dysgeusia + importance of adequate nutrient intake.        Nutrition Monitoring and Evaluation   Monitoring and Evaluation Plan: Energy intake  Energy Intake: Estimated energy intake  Criteria: >50% of nutr needs    Monitoring and Evaluation Plan: Weight  Weight: Weight change  Criteria: no wt change                   Time Spent (min): 60 minutes

## 2025-01-13 NOTE — CARE PLAN
Problem: Skin  Goal: Decreased wound size/increased tissue granulation at next dressing change  Outcome: Progressing  Flowsheets (Taken 1/12/2025 2147)  Decreased wound size/increased tissue granulation at next dressing change: Promote sleep for wound healing  Goal: Participates in plan/prevention/treatment measures  Outcome: Progressing  Flowsheets (Taken 1/12/2025 2147)  Participates in plan/prevention/treatment measures: Elevate heels  Goal: Prevent/manage excess moisture  Outcome: Progressing  Flowsheets (Taken 1/12/2025 2147)  Prevent/manage excess moisture: Cleanse incontinence/protect with barrier cream  Goal: Prevent/minimize sheer/friction injuries  Outcome: Progressing  Flowsheets (Taken 1/12/2025 2147)  Prevent/minimize sheer/friction injuries: Turn/reposition every 2 hours/use positioning/transfer devices  Goal: Promote/optimize nutrition  Outcome: Progressing  Flowsheets (Taken 1/12/2025 2147)  Promote/optimize nutrition: Offer water/supplements/favorite foods  Goal: Promote skin healing  Outcome: Progressing  Flowsheets (Taken 1/12/2025 2147)  Promote skin healing: Turn/reposition every 2 hours/use positioning/transfer devices     Problem: Fall/Injury  Goal: Not fall by end of shift  Outcome: Progressing  Goal: Be free from injury by end of the shift  Outcome: Progressing  Goal: Verbalize understanding of personal risk factors for fall in the hospital  Outcome: Progressing  Goal: Verbalize understanding of risk factor reduction measures to prevent injury from fall in the home  Outcome: Progressing  Goal: Use assistive devices by end of the shift  Outcome: Progressing  Goal: Pace activities to prevent fatigue by end of the shift  Outcome: Progressing     Problem: Pain - Adult  Goal: Verbalizes/displays adequate comfort level or baseline comfort level  Outcome: Progressing     Problem: Safety - Adult  Goal: Free from fall injury  Outcome: Progressing     Problem: Discharge Planning  Goal: Discharge to  home or other facility with appropriate resources  Outcome: Progressing     Problem: Chronic Conditions and Co-morbidities  Goal: Patient's chronic conditions and co-morbidity symptoms are monitored and maintained or improved  Outcome: Progressing     The clinical goals for the shift include Pt will be HDS and VSS throughout shift.

## 2025-01-13 NOTE — PROGRESS NOTES
"Physical Therapy    Physical Therapy Treatment    Patient Name: Abdi Retana \"Matt\"  MRN: [id number]  Department: [facility]  Room: [address] [place] [address]A  Today's Date: 1/13/2025  Time Calculation  Start Time: 1113  Stop Time: 1143  Time Calculation (min): 30 min         Assessment/Plan   PT Assessment  PT Assessment Results: Decreased strength, Decreased range of motion, Decreased endurance, Impaired balance, Decreased mobility, Impaired hearing, Decreased cognition, Decreased skin integrity, Obesity, Pain  Barriers to Discharge Home: Caregiver assistance, Physical needs  Caregiver Assistance: Patient lives alone and/or does not have reliable caregiver assistance  Physical Needs: Stair navigation into home limited by function/safety, In-home setup navigation limited by function/safety, 24hr mobility assistance needed, 24hr ADL assistance needed  End of Session Communication: Bedside nurse, Physician (MD in room during session - d/w MD pt anxiety & dec sleeping)  Assessment Comment: Pt radha session fair this date - willing to participate in ther exs in bed & repositioning.  Remains limited by dec strength, balance, endurance, & inc pain limiting all functional mobility.  Would benefit from continued therapy to address these deficits and improve safety and indep  End of Session Patient Position: Bed, 3 rail up, Alarm on  PT Plan  Inpatient/Swing Bed or Outpatient: Inpatient  PT Plan  Treatment/Interventions: Bed mobility, Transfer training, Balance training, Gait training, Neuromuscular re-education, Strengthening, Endurance training, Range of motion, Therapeutic exercise, Therapeutic activity, Home exercise program, Positioning, Orthotic fitting/training, Postural re-education  PT Plan: Ongoing PT  PT Frequency: 3 times per week  PT Discharge Recommendations: Moderate intensity level of continued care  Equipment Recommended upon Discharge: Other (comment) (unable to determine today)  PT Recommended Transfer Status: Total assist  PT - " OK to Discharge: Yes (PT eval completed & DC recs made)      General Visit Information:   PT  Visit  PT Received On: 01/13/25  Response to Previous Treatment: Patient with no complaints from previous session.  General  Reason for Referral: Admitted 1/2 (transfer to [facility] 1/4) with hematuria from nursing facility; dx: UTI, JAMAL, back pain, malignant renal cell carcinoma Right (L4 lesion, mets to adrenal glands & lung), mets L3-5 & S1 weakness  Past Medical History Relevant to Rehab: CKD, malignant renal cell carcinoma Right (L4 lesion, mets to adrenal glands & lung), HTN, GERD, OA, PVD, anemia, obesity, PVD, CKD, chronic lymphedema, fall  Family/Caregiver Present: No  Prior to Session Communication: Bedside nurse  Patient Position Received: Bed, 3 rail up, Alarm on  Preferred Learning Style: verbal, kinesthetic  General Comment: Pt supine upon arrival, states did not sleep well overnight.  Agreeable to therapy in bed with much encouragement.    Subjective   Precautions:  Precautions  Medical Precautions: Fall precautions    Objective   Pain:  Pain Assessment  Pain Assessment: 0-10  0-10 (Numeric) Pain Score: 5 - Moderate pain  Pain Type: Acute pain  Pain Location: Back  Pain Interventions: Repositioned  Response to Interventions: Resting quietly  Cognition:  Cognition  Following Commands: Follows one step commands with increased time  Cognition Comments: delayed processing, expressing anxiety with movement & perseverates on anxieties related to dx and tx  Processing Speed: Delayed  Coordination:  Movements are Fluid and Coordinated: Yes    Activity Tolerance:  Activity Tolerance  Endurance: Tolerates 10 - 20 min exercise with multiple rests  Treatments:  Therapeutic Exercise  Therapeutic Exercise Performed: Yes  Therapeutic Exercise Activity 1: BLE x10- AP, HS with maxA in lmtd ROM, QS with max tactile cueing  Therapeutic Exercise Activity 2: BUE x10- finger flex/ext, bicep curls, shoulder elevation to ~90    Bed  Mobility  Bed Mobility: Yes  Bed Mobility 1  Bed Mobility 1: Rolling right  Level of Assistance 1: Dependent, Maximum verbal cues, Maximum tactile cues  Bed Mobility Comments 1: to reposition off back, supported with pillow; pt refusing further bed mobility/transfer to EOB    Ambulation/Gait Training  Ambulation/Gait Training Performed: No  Transfers  Transfer: No    Stairs  Stairs: No    Outcome Measures:     Education Documentation  Home Exercise Program, taught by Judy Durant PT at 1/13/2025  1:59 PM.  Learner: Patient  Readiness: Acceptance  Method: Explanation  Response: Needs Reinforcement  Comment: PT poc, progression of mobility, importance of mobility, logrolling    Precautions, taught by Judy Durant PT at 1/13/2025  1:59 PM.  Learner: Patient  Readiness: Acceptance  Method: Explanation  Response: Needs Reinforcement  Comment: PT poc, progression of mobility, importance of mobility, logrolling    Body Mechanics, taught by Judy Durant PT at 1/13/2025  1:59 PM.  Learner: Patient  Readiness: Acceptance  Method: Explanation  Response: Needs Reinforcement  Comment: PT poc, progression of mobility, importance of mobility, logrolling    Mobility Training, taught by Judy Durant PT at 1/13/2025  1:59 PM.  Learner: Patient  Readiness: Acceptance  Method: Explanation  Response: Needs Reinforcement  Comment: PT poc, progression of mobility, importance of mobility, logrolling    Education Comments  No comments found.      OP EDUCATION:       Encounter Problems       Encounter Problems (Active)       General Goals       Pt will have bilateral LE strength >4 (ankle DF, hip flexion, knee flex/ext) and AROM WFL hip flexion/knee flex & extension and ankle DF bilaterally (Progressing)       Start:  01/06/25    Expected End:  01/20/25            Pt will supine to/from sit via logrolling (HOB elevated 45 degrees, use of rail) with mod A +2 (Progressing)       Start:  01/06/25    Expected End:   01/20/25            Pt will sit EOB with min A +1 >7 minutes with pain <2 (Not Progressing)       Start:  01/06/25    Expected End:  01/20/25               Mobility       Pt will come sit to/from stand, bed to/from chair with WW with mod A +2 (no knee buckling, pain <2) (Not Progressing)       Start:  01/06/25    Expected End:  01/20/25            Pt will stand static with WW and min/mod A +2 (Not Progressing)       Start:  01/06/25    Expected End:  01/20/25            Pt will ambulate 10' with WW with min/mod A +2 and chair follow for safety (Not Progressing)       Start:  01/06/25    Expected End:  01/20/25               Pain - Adult              01/13/25 at 2:01 PM - Judy Durant, PT

## 2025-01-14 LAB
ALBUMIN SERPL BCP-MCNC: 3.2 G/DL (ref 3.4–5)
ANION GAP SERPL CALC-SCNC: 15 MMOL/L (ref 10–20)
BACTERIA BLD CULT: NORMAL
BASOPHILS # BLD AUTO: 0.09 X10*3/UL (ref 0–0.1)
BASOPHILS NFR BLD AUTO: 1 %
BUN SERPL-MCNC: 22 MG/DL (ref 6–23)
CALCIUM SERPL-MCNC: 9.8 MG/DL (ref 8.6–10.6)
CHLORIDE SERPL-SCNC: 98 MMOL/L (ref 98–107)
CO2 SERPL-SCNC: 27 MMOL/L (ref 21–32)
CREAT SERPL-MCNC: 1.15 MG/DL (ref 0.5–1.3)
EGFRCR SERPLBLD CKD-EPI 2021: 67 ML/MIN/1.73M*2
EOSINOPHIL # BLD AUTO: 0.21 X10*3/UL (ref 0–0.4)
EOSINOPHIL NFR BLD AUTO: 2.4 %
ERYTHROCYTE [DISTWIDTH] IN BLOOD BY AUTOMATED COUNT: 17.1 % (ref 11.5–14.5)
GLUCOSE SERPL-MCNC: 92 MG/DL (ref 74–99)
HCT VFR BLD AUTO: 33.2 % (ref 41–52)
HGB BLD-MCNC: 9.5 G/DL (ref 13.5–17.5)
IMM GRANULOCYTES # BLD AUTO: 0.05 X10*3/UL (ref 0–0.5)
IMM GRANULOCYTES NFR BLD AUTO: 0.6 % (ref 0–0.9)
LYMPHOCYTES # BLD AUTO: 1.37 X10*3/UL (ref 0.8–3)
LYMPHOCYTES NFR BLD AUTO: 15.5 %
MAGNESIUM SERPL-MCNC: 2.31 MG/DL (ref 1.6–2.4)
MCH RBC QN AUTO: 25.3 PG (ref 26–34)
MCHC RBC AUTO-ENTMCNC: 28.6 G/DL (ref 32–36)
MCV RBC AUTO: 89 FL (ref 80–100)
MONOCYTES # BLD AUTO: 0.63 X10*3/UL (ref 0.05–0.8)
MONOCYTES NFR BLD AUTO: 7.1 %
NEUTROPHILS # BLD AUTO: 6.49 X10*3/UL (ref 1.6–5.5)
NEUTROPHILS NFR BLD AUTO: 73.4 %
NRBC BLD-RTO: 0 /100 WBCS (ref 0–0)
PHOSPHATE SERPL-MCNC: 3.5 MG/DL (ref 2.5–4.9)
PLATELET # BLD AUTO: 337 X10*3/UL (ref 150–450)
POTASSIUM SERPL-SCNC: 4.9 MMOL/L (ref 3.5–5.3)
RBC # BLD AUTO: 3.75 X10*6/UL (ref 4.5–5.9)
SODIUM SERPL-SCNC: 135 MMOL/L (ref 136–145)
WBC # BLD AUTO: 8.8 X10*3/UL (ref 4.4–11.3)

## 2025-01-14 PROCEDURE — 2500000001 HC RX 250 WO HCPCS SELF ADMINISTERED DRUGS (ALT 637 FOR MEDICARE OP): Performed by: STUDENT IN AN ORGANIZED HEALTH CARE EDUCATION/TRAINING PROGRAM

## 2025-01-14 PROCEDURE — 2500000004 HC RX 250 GENERAL PHARMACY W/ HCPCS (ALT 636 FOR OP/ED)

## 2025-01-14 PROCEDURE — 83735 ASSAY OF MAGNESIUM: CPT

## 2025-01-14 PROCEDURE — 36415 COLL VENOUS BLD VENIPUNCTURE: CPT

## 2025-01-14 PROCEDURE — 85025 COMPLETE CBC W/AUTO DIFF WBC: CPT

## 2025-01-14 PROCEDURE — 1170000001 HC PRIVATE ONCOLOGY ROOM DAILY

## 2025-01-14 PROCEDURE — 2500000001 HC RX 250 WO HCPCS SELF ADMINISTERED DRUGS (ALT 637 FOR MEDICARE OP)

## 2025-01-14 PROCEDURE — 99232 SBSQ HOSP IP/OBS MODERATE 35: CPT

## 2025-01-14 PROCEDURE — 80069 RENAL FUNCTION PANEL: CPT

## 2025-01-14 RX ADMIN — OXYCODONE HYDROCHLORIDE 5 MG: 5 TABLET ORAL at 01:58

## 2025-01-14 RX ADMIN — AMOXICILLIN AND CLAVULANATE POTASSIUM 1 TABLET: 875; 125 TABLET, FILM COATED ORAL at 20:27

## 2025-01-14 RX ADMIN — POLYETHYLENE GLYCOL 3350 17 G: 17 POWDER, FOR SOLUTION ORAL at 08:25

## 2025-01-14 RX ADMIN — HEPARIN SODIUM 5000 UNITS: 5000 INJECTION INTRAVENOUS; SUBCUTANEOUS at 08:25

## 2025-01-14 RX ADMIN — AMOXICILLIN AND CLAVULANATE POTASSIUM 1 TABLET: 875; 125 TABLET, FILM COATED ORAL at 08:25

## 2025-01-14 RX ADMIN — ACETAMINOPHEN 975 MG: 325 TABLET, FILM COATED ORAL at 08:25

## 2025-01-14 RX ADMIN — SENNOSIDES 8.6 MG: 8.6 TABLET, FILM COATED ORAL at 20:26

## 2025-01-14 RX ADMIN — HEPARIN SODIUM 5000 UNITS: 5000 INJECTION INTRAVENOUS; SUBCUTANEOUS at 16:18

## 2025-01-14 RX ADMIN — FAMOTIDINE 20 MG: 20 TABLET ORAL at 08:25

## 2025-01-14 RX ADMIN — FAMOTIDINE 20 MG: 20 TABLET ORAL at 20:27

## 2025-01-14 RX ADMIN — ATORVASTATIN CALCIUM 40 MG: 40 TABLET, FILM COATED ORAL at 20:26

## 2025-01-14 RX ADMIN — OXYCODONE HYDROCHLORIDE 5 MG: 5 TABLET ORAL at 20:31

## 2025-01-14 RX ADMIN — ASPIRIN 81 MG: 81 TABLET, COATED ORAL at 08:25

## 2025-01-14 RX ADMIN — Medication 1 TABLET: at 08:25

## 2025-01-14 RX ADMIN — OXYCODONE HYDROCHLORIDE 5 MG: 5 TABLET ORAL at 14:09

## 2025-01-14 RX ADMIN — OXYCODONE HYDROCHLORIDE 5 MG: 5 TABLET ORAL at 08:39

## 2025-01-14 RX ADMIN — ACETAMINOPHEN 975 MG: 325 TABLET, FILM COATED ORAL at 16:18

## 2025-01-14 RX ADMIN — HEPARIN SODIUM 5000 UNITS: 5000 INJECTION INTRAVENOUS; SUBCUTANEOUS at 00:07

## 2025-01-14 RX ADMIN — ACETAMINOPHEN 975 MG: 325 TABLET, FILM COATED ORAL at 20:26

## 2025-01-14 ASSESSMENT — PAIN SCALES - GENERAL
PAINLEVEL_OUTOF10: 5 - MODERATE PAIN
PAINLEVEL_OUTOF10: 3
PAINLEVEL_OUTOF10: 6
PAINLEVEL_OUTOF10: 5 - MODERATE PAIN
PAINLEVEL_OUTOF10: 6
PAINLEVEL_OUTOF10: 2

## 2025-01-14 ASSESSMENT — COGNITIVE AND FUNCTIONAL STATUS - GENERAL
CLIMB 3 TO 5 STEPS WITH RAILING: TOTAL
PERSONAL GROOMING: A LOT
DRESSING REGULAR LOWER BODY CLOTHING: A LOT
WALKING IN HOSPITAL ROOM: TOTAL
EATING MEALS: A LITTLE
PERSONAL GROOMING: A LOT
CLIMB 3 TO 5 STEPS WITH RAILING: TOTAL
DRESSING REGULAR UPPER BODY CLOTHING: A LOT
TOILETING: A LOT
STANDING UP FROM CHAIR USING ARMS: TOTAL
MOBILITY SCORE: 9
DRESSING REGULAR LOWER BODY CLOTHING: A LOT
TURNING FROM BACK TO SIDE WHILE IN FLAT BAD: A LOT
DRESSING REGULAR UPPER BODY CLOTHING: A LOT
MOVING TO AND FROM BED TO CHAIR: A LOT
MOVING FROM LYING ON BACK TO SITTING ON SIDE OF FLAT BED WITH BEDRAILS: A LOT
TURNING FROM BACK TO SIDE WHILE IN FLAT BAD: A LOT
DAILY ACTIVITIY SCORE: 13
HELP NEEDED FOR BATHING: A LOT
TOILETING: A LOT
STANDING UP FROM CHAIR USING ARMS: TOTAL
EATING MEALS: A LITTLE
MOBILITY SCORE: 9
WALKING IN HOSPITAL ROOM: TOTAL
HELP NEEDED FOR BATHING: A LOT
DAILY ACTIVITIY SCORE: 13
MOVING FROM LYING ON BACK TO SITTING ON SIDE OF FLAT BED WITH BEDRAILS: A LOT
MOVING TO AND FROM BED TO CHAIR: A LOT

## 2025-01-14 ASSESSMENT — PAIN - FUNCTIONAL ASSESSMENT
PAIN_FUNCTIONAL_ASSESSMENT: 0-10
PAIN_FUNCTIONAL_ASSESSMENT: UNABLE TO SELF-REPORT

## 2025-01-14 NOTE — CARE PLAN
Problem: Skin  Goal: Decreased wound size/increased tissue granulation at next dressing change  Outcome: Progressing  Flowsheets (Taken 1/14/2025 0550)  Decreased wound size/increased tissue granulation at next dressing change: Promote sleep for wound healing  Goal: Participates in plan/prevention/treatment measures  Outcome: Progressing  Flowsheets (Taken 1/12/2025 2147 by Roxanne Harris RN)  Participates in plan/prevention/treatment measures: Elevate heels  Goal: Prevent/manage excess moisture  Outcome: Progressing  Flowsheets (Taken 1/14/2025 0550)  Prevent/manage excess moisture: Cleanse incontinence/protect with barrier cream  Goal: Prevent/minimize sheer/friction injuries  Outcome: Progressing  Flowsheets (Taken 1/14/2025 0550)  Prevent/minimize sheer/friction injuries: Turn/reposition every 2 hours/use positioning/transfer devices  Goal: Promote/optimize nutrition  Outcome: Progressing  Flowsheets (Taken 1/14/2025 0550)  Promote/optimize nutrition: Offer water/supplements/favorite foods  Goal: Promote skin healing  Outcome: Progressing  Flowsheets (Taken 1/14/2025 0550)  Promote skin healing: Turn/reposition every 2 hours/use positioning/transfer devices     Problem: Fall/Injury  Goal: Not fall by end of shift  Outcome: Progressing  Goal: Be free from injury by end of the shift  Outcome: Progressing  Goal: Verbalize understanding of personal risk factors for fall in the hospital  Outcome: Progressing  Goal: Verbalize understanding of risk factor reduction measures to prevent injury from fall in the home  Outcome: Progressing  Goal: Use assistive devices by end of the shift  Outcome: Progressing  Goal: Pace activities to prevent fatigue by end of the shift  Outcome: Progressing     Problem: Pain - Adult  Goal: Verbalizes/displays adequate comfort level or baseline comfort level  Outcome: Progressing     Problem: Safety - Adult  Goal: Free from fall injury  Outcome: Progressing     Problem: Discharge  Planning  Goal: Discharge to home or other facility with appropriate resources  Outcome: Progressing     Problem: Chronic Conditions and Co-morbidities  Goal: Patient's chronic conditions and co-morbidity symptoms are monitored and maintained or improved  Outcome: Progressing   The patient's goals for the shift include      The clinical goals for the shift include Pt will remain safe and free from injury throughout shift on 1/13/25 at 0700    Over the shift, the patient complained of back pain and was given 5mg of oxy to relieve the pain. Pt slept most of the night and remained free from injury. Pt HDS and VSS.

## 2025-01-14 NOTE — CARE PLAN
The patient's goals for the shift include      The clinical goals for the shift include Pt will remain HDS and safe and free from injury through shift 1/14/25 @ 1900      Problem: Skin  Goal: Decreased wound size/increased tissue granulation at next dressing change  Outcome: Progressing  Goal: Participates in plan/prevention/treatment measures  Outcome: Progressing  Goal: Prevent/manage excess moisture  Outcome: Progressing  Goal: Prevent/minimize sheer/friction injuries  Outcome: Progressing  Goal: Promote/optimize nutrition  Outcome: Progressing  Goal: Promote skin healing  Outcome: Progressing     Problem: Fall/Injury  Goal: Not fall by end of shift  Outcome: Progressing  Goal: Be free from injury by end of the shift  Outcome: Progressing  Goal: Verbalize understanding of personal risk factors for fall in the hospital  Outcome: Progressing  Goal: Verbalize understanding of risk factor reduction measures to prevent injury from fall in the home  Outcome: Progressing  Goal: Use assistive devices by end of the shift  Outcome: Progressing  Goal: Pace activities to prevent fatigue by end of the shift  Outcome: Progressing     Problem: Pain - Adult  Goal: Verbalizes/displays adequate comfort level or baseline comfort level  Outcome: Progressing     Problem: Safety - Adult  Goal: Free from fall injury  Outcome: Progressing     Problem: Discharge Planning  Goal: Discharge to home or other facility with appropriate resources  Outcome: Progressing     Problem: Chronic Conditions and Co-morbidities  Goal: Patient's chronic conditions and co-morbidity symptoms are monitored and maintained or improved  Outcome: Progressing

## 2025-01-14 NOTE — PROGRESS NOTES
"Abdi Acharya" is a 74 y.o. male on day 10 of admission presenting with JAMAL (acute kidney injury) (CMS-[facility]).      Subjective   Overnight: NAEO   Denies any CP/SOB?Palpitations, N/V/D. Will discuss with family about treatment plan for patient.     Objective     Last Recorded Vitals  /57 (BP Location: Left arm, Patient Position: Lying)   Pulse 70   Temp 36.4 °C (97.5 °F) (Temporal)   Resp 16   Wt 120 kg (264 lb)   SpO2 96%   Intake/Output last 3 Shifts:    Intake/Output Summary (Last 24 hours) at 1/14/2025 1857  Last data filed at 1/14/2025 1625  Gross per 24 hour   Intake 450 ml   Output 550 ml   Net -100 ml       Admission Weight  Weight: 120 kg (264 lb) (01/04/25 1900)    Daily Weight  01/04/25 : 120 kg (264 lb)    No current facility-administered medications on file prior to encounter.     Current Outpatient Medications on File Prior to Encounter   Medication Sig Dispense Refill    acetaminophen (Tylenol 8 HOUR) 650 mg ER tablet Take 1 tablet (650 mg) by mouth every 6 hours if needed for mild pain (1 - 3). Do not crush, chew, or split.      acetaminophen (Tylenol) 325 mg tablet Take 2 tablets (650 mg) by mouth 2 times a day.      aspirin 81 mg EC tablet Take 1 tablet (81 mg) by mouth once daily.      bisacodyl (Gentle Laxative, bisacodyl,) 10 mg suppository Insert 1 suppository (10 mg) into the rectum once daily as needed for constipation.      cefepime (Maxipime) IV Infuse 100 mL (2 g) over 0.5 hours into a venous catheter every 12 hours.      famotidine (Pepcid) 20 mg tablet Take 1 tablet (20 mg) by mouth 2 times a day.      ferrous sulfate (FeroSuL) 325 (65 Fe) MG tablet Take 1 tablet (325 mg) by mouth once every 24 hours. 90 tablet 3    losartan (Cozaar) 50 mg tablet TAKE 1 TABLET BY MOUTH ONCE  DAILY 90 tablet 3    magnesium hydroxide (Milk of Magnesia) 400 mg/5 mL suspension Take 30 mL by mouth once daily as needed for constipation (if no BM in 3 consecutive days).      magnesium oxide 400 mg " magnesium capsule Take 1 capsule (400 mg) by mouth once daily. 90 capsule 3    mineral oil enema Insert 133 mL (1 enema) into the rectum once daily as needed for constipation (if no BM in 8 hours after getting suppository).      potassium chloride CR 10 mEq ER tablet TAKE 1 TABLET BY MOUTH ONCE  DAILY DO NOT CRUSH, CHEW, OR  SPLIT 90 tablet 3    torsemide (Demadex) 20 mg tablet TAKE 1 TABLET BY MOUTH ONCE  DAILY 90 tablet 3    traMADol (Ultram) 50 mg tablet Take 1 tablet (50 mg) by mouth every 6 hours if needed for severe pain (7 - 10).      vitamin D3-folic acid 2,500 unit- 1 mg tablet Take 1 mg by mouth once every 24 hours. 90 tablet 3        Physical Exam  HENT:      Head: Normocephalic and atraumatic.      Mouth/Throat:      Mouth: Mucous membranes are moist.   Eyes:      General: No scleral icterus.     Extraocular Movements: Extraocular movements intact.      Pupils: Pupils are equal, round, and reactive to light.   Cardiovascular:      Rate and Rhythm: Normal rate and regular rhythm.      Heart sounds: No murmur heard.  Pulmonary:      Effort: No respiratory distress.      Breath sounds: No wheezing or rales.   Abdominal:      General: There is no distension.      Tenderness: There is no abdominal tenderness. There is no guarding.   Musculoskeletal:      Comments: Legs with dressing on, images on epic show possible stasis dermatitis with ulcers   Skin:     Capillary Refill: Capillary refill takes less than 2 seconds.   Neurological:      General: No focal deficit present.      Mental Status: He is alert and oriented to person, place, and time.      Motor: Weakness present.      Comments: Richter in b/l LE 3/5, significantly limited by pain   Psychiatric:         Mood and Affect: Mood normal.         Relevant Results    Results for orders placed or performed during the hospital encounter of 01/04/25 (from the past 24 hours)   CBC and Auto Differential   Result Value Ref Range    WBC 8.8 4.4 - 11.3 x10*3/uL     nRBC 0.0 0.0 - 0.0 /100 WBCs    RBC 3.75 (L) 4.50 - 5.90 x10*6/uL    Hemoglobin 9.5 (L) 13.5 - 17.5 g/dL    Hematocrit 33.2 (L) 41.0 - 52.0 %    MCV 89 80 - 100 fL    MCH 25.3 (L) 26.0 - 34.0 pg    MCHC 28.6 (L) 32.0 - 36.0 g/dL    RDW 17.1 (H) 11.5 - 14.5 %    Platelets 337 150 - 450 x10*3/uL    Neutrophils % 73.4 40.0 - 80.0 %    Immature Granulocytes %, Automated 0.6 0.0 - 0.9 %    Lymphocytes % 15.5 13.0 - 44.0 %    Monocytes % 7.1 2.0 - 10.0 %    Eosinophils % 2.4 0.0 - 6.0 %    Basophils % 1.0 0.0 - 2.0 %    Neutrophils Absolute 6.49 (H) 1.60 - 5.50 x10*3/uL    Immature Granulocytes Absolute, Automated 0.05 0.00 - 0.50 x10*3/uL    Lymphocytes Absolute 1.37 0.80 - 3.00 x10*3/uL    Monocytes Absolute 0.63 0.05 - 0.80 x10*3/uL    Eosinophils Absolute 0.21 0.00 - 0.40 x10*3/uL    Basophils Absolute 0.09 0.00 - 0.10 x10*3/uL   Magnesium   Result Value Ref Range    Magnesium 2.31 1.60 - 2.40 mg/dL   Renal Function Panel   Result Value Ref Range    Glucose 92 74 - 99 mg/dL    Sodium 135 (L) 136 - 145 mmol/L    Potassium 4.9 3.5 - 5.3 mmol/L    Chloride 98 98 - 107 mmol/L    Bicarbonate 27 21 - 32 mmol/L    Anion Gap 15 10 - 20 mmol/L    Urea Nitrogen 22 6 - 23 mg/dL    Creatinine 1.15 0.50 - 1.30 mg/dL    eGFR 67 >60 mL/min/1.73m*2    Calcium 9.8 8.6 - 10.6 mg/dL    Phosphorus 3.5 2.5 - 4.9 mg/dL    Albumin 3.2 (L) 3.4 - 5.0 g/dL      CT chest abdomen pelvis wo IV contrast    Result Date: 1/2/2025  CHEST: 1.  History of right renal cell carcinoma. 2. Multiple pulmonary nodules consistent with metastatic disease.   ABDOMEN AND PELVIS: 1.  History upright or renal cell carcinoma. There is an unchanged necrotic mass involving the upper pole of the right kidney. 2. Bilateral renal cysts. 3. Bilateral nonobstructing intrarenal calculi with staghorn type calculi in the left kidney. There is no renal obstruction. 4. Complete bony destruction of L4 with a soft tissue mass with expansion of the vertebral body. Lytic lesion  involving the superior endplate of L5. Findings are consistent with bony metastases.   MACRO: None   Signed by: Lucia Desai 1/2/2025 10:33 AM Dictation workstation:   [id number]    XR chest 1 view    Result Date: 1/2/2025  No pneumonic consolidations are noted. There questionable perihilar nodules which may correspond to the masses noted on the prior CT.     MACRO: None   Signed by: Patrick Arnold 1/2/2025 8:33 AM Dictation workstation:   [id number]      MRI Spine 1/8/25  IMPRESSION:  MRI cervical spine:      1. No evidence of metastases.      2. Multilevel degenerative changes of the cervical spine including  moderate spinal canal stenosis at C5-C6 and C6-C7.      MRI thoracic spine:      1. No evidence of osseous metastases.      2. Multilevel degenerative changes of the thoracic spine without  striking spinal canal stenosis at any level.      MRI lumbar spine:      1. Metastasis involving the entirety of the L4 vertebral body,  portions of the posterior elements and transverse processes, with  extension into the L3-L4 and L4-L5 intervertebral disc spaces and  into the inferior endplate of L3 and superior endplate of L5 as well  as into the surrounding prevertebral soft tissues.      2. There is extension of tumor into the ventral and bilateral  epidural space at L4 and there is also tumor located within the  posterior spinal canal extending from the level of L3 through L5.  Combination of these findings cause marked spinal canal stenosis at  the level of L4. Minimal CSF is seen within the thecal sac at the  level of L4.      3. Tumor extends into the bilateral neural foramina at L4-L5 with  resulting moderate-to-marked left and marked right neural foraminal  narrowing. Tumor also extends into the inferior portions of the  bilateral neural foramina at L3-L4 resulting in moderate neural  foraminal narrowing.      4. There is metastasis located within the posterior and left epidural  space at the level of L5 and  S1 which causes partial effacement of  the posterior thecal sac.      5. Multilevel degenerative changes of the lumbar spine as detailed  above.      MRI Head 1/8/25  IMPRESSION:  1.  Small focus of diffusion restriction involving the right parietal  lobe with no associated enhancement on postcontrast imaging, favoring  small acute infarct. No mass effect or hemorrhagic transformation.  2. No abnormal intracranial enhancement to indicate intracranial  metastatic disease.  3. Nonspecific dilatation of the superior ophthalmic veins  bilaterally.    Assessment/Plan      Assessment & Plan  JAMAL (acute kidney injury) (CMS-HCC)    Mr. Abdi Retana is a 73 yo M with PMHx of HTN, GERD, SAMIA, Chronic lymphedema, venous insufficiency, OA, CKD-3(b/l 1.2-1.5), also w/ the following oncologic Hx - no formal Dx at this time. Presenting symptom was b/l LE weakness. Was admitted to [facility] 11/30[facility]12/5 for this after IMG revealed a concerning L4-5 lesion w/ mod-severe cord compression in s/o R menal mass and suspicious lesions in adrenals lungs, and spine. This was c/f undiagnosed metastatic R RCC. Case was discussed w/ both IR and ortho spine. It was deemed that no inpt Bx nor surgery was indicated and pt was DC to SNF w/ plan for IR-guided L4 Bx (scheduled for 12/20; pt missed d/t transport issues w/ SNF). MRI brain and C/T spine w/ contrast was attempted but pt did not tolerate d/t claustrophobia - there was a plan to complete it OP w/ anesthesia. Transferred from [facility] for expedited work up of suspected R. RCC with L4/5 mets. Also has Proteus Mirabilis UTI c/b bacteremia I/s/o bilateral non obstructing renal stones. ID consulted for antibiotic management. L4 biopsy done and MRI head and spine completed. Ortho spine planning for surgery on Friday, 1/17 and Rad-onc planning for radiation after path report is back. Path report confirmed metastatic RCC yet to discuss with family about available treatments (Surgical, radiation and  medical therapy).       Update 1/14/25    Today: Preliminary path confirmed metastatic RCC. Ortho planning for surgery on Friday. Pending discussion with patient about prognosis and available treatment tomorrow.   -Ortho and rad onc on board  -Touched base with IR for L4 spinal artery embolization. IR referred us to [facility] fellow Dr. Ventura who has been contacted. Yet to get back to us on future plans with embolization.   -C/w Augmentin for UTI- stop date 1/23    Small Acute Stroke on MRI  -Small focus of diffusion restriction involving the right parietal lobe with no associated enhancement on postcontrast imaging, favoring small acute infarct. No mass effect or hemorrhagic transformation.  -at his neurologic baseline(fluent speech, oriented, moves all limbs,LE-4/5)  -neuro-stoke consulted  -already on aspirin for CAD, not on statin  Plan  -neuro-stroke following  Recommendations  - continue aspirin 81 mg daily   - when safe from a bleeding an procedural standpoint start therapeutic Lovenox 1 mg/kg Q12H   - Please keep patient on telemetry and monitor daily for afib. If there is evidence of afib may need alternative anticoagulation.    -  continue with atorvastatin 40 daily target LDL <70  - please arrange neurology followup at time of discharge    P. mirabilis bacteremia 2/2 UTI  -HDS  -associated w/ R renal mass, b/l non-obstructing renal stones  -sensitivities available  -seen by uro at [facility], did not have plan for procedure given non-obstructive  -ID consulted for duration, procedure rec given complex anatomy of stones and renal mass - c/f seeding  -ID following, recommend Augmentin for 3 weeks(EOT-1/23)  -repeat BC(1/10)-NGTD     C/f undiagnosed metastatic R RCC  L4-5 lesion w/ radiographic evidence cord compression  -CT(11/30)-R renal mass  -pt at baseline 4/5 LE weakness on exam, symmetric, no incontinence or saddle anesthesia  -there have been no plans for procedures per prior evaluations  -IMG as above  -d/w  ortho spine, said if going for anesthesia should do the full spine  -consider formal  spine surgery consult if neuro change and/or IMG/Bx completed  -had been seen by NSG 1/3 who thought exam was okay w/o weakness, did not rec an intervention  -On scheduled tylenol and prn oxy/dilaudid  -Outpatient medical oncology follow up with Dr Guerra  -ortho-spine following   Plan:  - No acute orthopaedic surgical intervention  - Recommend repeat blood cx, must be negative prior to operative intervention  - If biopsy indicates RCC, will need IR embolization of lesion pre operatively  - NPO at midnight in case of operative intervention  - okay for DVT ppx from orthopedic perspective  - Ortho spine to follow    HTN  CAD  -holding home  losartan 50mg  I/s/o recent rise in Cr(now resolved) and normotension   -c/w home Aspirin     Lymphedema  Venous insufficiency/venous ulcers  -wound care consulted  -hold home torsemide 20 mg daily for now  -OSH wound recs  BLE- Chronic venous status ulcers   Irrigate with normal saline or wound cleanser, Pat dry.  Apply lotion to intact skin   Cover weeping areas with Triad Hydrophillic wound dressing ointment   Pad and protect with ABD, Kerlix and paper tape.   Change every day and as needed   Secure with Tubigrip ( Apply from base of toes to 2 finger width below the bend of knee)      Gluteal cleft & buttocks-MASD/Irritant contact dermatitis due to fecal, urinary or dual incontinent.   Cleanse with bath wipes or soap and water. Rinse well and pat dry.   Apply no sting skin barrier (cavilon)   Apply Triad hydrophilic wound dressing ointment to gluteal fold/groin three times a day and as needed.     GERD  -c/w home famotidine 20 mg bid    Disposition  -barriers: clinical improvement  -destination: likely back to SNF ([facility] [place])  -f/u: pending     Diet: cardiac   Electrolytes: K >3.5; Mg >2  DVT ppx: SCD/Heparin  GI ppx: on home famotidine  Lines/tubes: PIV  O2: none  Baseline Cr:  1.5  T+S: 1/5  Code status: Full code  Surrogate decision maker: Micaela Retana (sister) [phone]    Salomon Silva MD  Internal Medicne  PGY-1

## 2025-01-15 LAB
ABO GROUP (TYPE) IN BLOOD: NORMAL
ALBUMIN SERPL BCP-MCNC: 3.1 G/DL (ref 3.4–5)
ANION GAP SERPL CALC-SCNC: 14 MMOL/L (ref 10–20)
ANTIBODY SCREEN: NORMAL
BASOPHILS # BLD AUTO: 0.12 X10*3/UL (ref 0–0.1)
BASOPHILS NFR BLD AUTO: 1.3 %
BUN SERPL-MCNC: 22 MG/DL (ref 6–23)
CALCIUM SERPL-MCNC: 9.3 MG/DL (ref 8.6–10.6)
CHLORIDE SERPL-SCNC: 101 MMOL/L (ref 98–107)
CO2 SERPL-SCNC: 25 MMOL/L (ref 21–32)
CREAT SERPL-MCNC: 1.16 MG/DL (ref 0.5–1.3)
EGFRCR SERPLBLD CKD-EPI 2021: 66 ML/MIN/1.73M*2
EOSINOPHIL # BLD AUTO: 0.28 X10*3/UL (ref 0–0.4)
EOSINOPHIL NFR BLD AUTO: 3.1 %
ERYTHROCYTE [DISTWIDTH] IN BLOOD BY AUTOMATED COUNT: 17.2 % (ref 11.5–14.5)
GLUCOSE SERPL-MCNC: 78 MG/DL (ref 74–99)
HCT VFR BLD AUTO: 36.4 % (ref 41–52)
HGB BLD-MCNC: 9.9 G/DL (ref 13.5–17.5)
IMM GRANULOCYTES # BLD AUTO: 0.04 X10*3/UL (ref 0–0.5)
IMM GRANULOCYTES NFR BLD AUTO: 0.4 % (ref 0–0.9)
LYMPHOCYTES # BLD AUTO: 1.5 X10*3/UL (ref 0.8–3)
LYMPHOCYTES NFR BLD AUTO: 16.4 %
MAGNESIUM SERPL-MCNC: 2.43 MG/DL (ref 1.6–2.4)
MCH RBC QN AUTO: 26 PG (ref 26–34)
MCHC RBC AUTO-ENTMCNC: 27.2 G/DL (ref 32–36)
MCV RBC AUTO: 96 FL (ref 80–100)
MONOCYTES # BLD AUTO: 0.81 X10*3/UL (ref 0.05–0.8)
MONOCYTES NFR BLD AUTO: 8.9 %
NEUTROPHILS # BLD AUTO: 6.38 X10*3/UL (ref 1.6–5.5)
NEUTROPHILS NFR BLD AUTO: 69.9 %
NRBC BLD-RTO: 0 /100 WBCS (ref 0–0)
PHOSPHATE SERPL-MCNC: 3.6 MG/DL (ref 2.5–4.9)
PLATELET # BLD AUTO: 294 X10*3/UL (ref 150–450)
POTASSIUM SERPL-SCNC: 4.7 MMOL/L (ref 3.5–5.3)
RBC # BLD AUTO: 3.81 X10*6/UL (ref 4.5–5.9)
RH FACTOR (ANTIGEN D): NORMAL
SODIUM SERPL-SCNC: 135 MMOL/L (ref 136–145)
WBC # BLD AUTO: 9.1 X10*3/UL (ref 4.4–11.3)

## 2025-01-15 PROCEDURE — 83735 ASSAY OF MAGNESIUM: CPT

## 2025-01-15 PROCEDURE — 2500000004 HC RX 250 GENERAL PHARMACY W/ HCPCS (ALT 636 FOR OP/ED)

## 2025-01-15 PROCEDURE — 36415 COLL VENOUS BLD VENIPUNCTURE: CPT

## 2025-01-15 PROCEDURE — 80069 RENAL FUNCTION PANEL: CPT

## 2025-01-15 PROCEDURE — 2500000001 HC RX 250 WO HCPCS SELF ADMINISTERED DRUGS (ALT 637 FOR MEDICARE OP): Performed by: STUDENT IN AN ORGANIZED HEALTH CARE EDUCATION/TRAINING PROGRAM

## 2025-01-15 PROCEDURE — 99233 SBSQ HOSP IP/OBS HIGH 50: CPT

## 2025-01-15 PROCEDURE — 2500000001 HC RX 250 WO HCPCS SELF ADMINISTERED DRUGS (ALT 637 FOR MEDICARE OP)

## 2025-01-15 PROCEDURE — 86923 COMPATIBILITY TEST ELECTRIC: CPT

## 2025-01-15 PROCEDURE — 86850 RBC ANTIBODY SCREEN: CPT

## 2025-01-15 PROCEDURE — 99497 ADVNCD CARE PLAN 30 MIN: CPT

## 2025-01-15 PROCEDURE — 86901 BLOOD TYPING SEROLOGIC RH(D): CPT

## 2025-01-15 PROCEDURE — 1170000001 HC PRIVATE ONCOLOGY ROOM DAILY

## 2025-01-15 PROCEDURE — 85025 COMPLETE CBC W/AUTO DIFF WBC: CPT

## 2025-01-15 PROCEDURE — 97530 THERAPEUTIC ACTIVITIES: CPT | Mod: GP | Performed by: PHYSICAL THERAPIST

## 2025-01-15 RX ADMIN — ASPIRIN 81 MG: 81 TABLET, COATED ORAL at 09:53

## 2025-01-15 RX ADMIN — ATORVASTATIN CALCIUM 40 MG: 40 TABLET, FILM COATED ORAL at 20:15

## 2025-01-15 RX ADMIN — POLYETHYLENE GLYCOL 3350 17 G: 17 POWDER, FOR SOLUTION ORAL at 09:52

## 2025-01-15 RX ADMIN — HEPARIN SODIUM 5000 UNITS: 5000 INJECTION INTRAVENOUS; SUBCUTANEOUS at 00:36

## 2025-01-15 RX ADMIN — OXYCODONE HYDROCHLORIDE 5 MG: 5 TABLET ORAL at 02:29

## 2025-01-15 RX ADMIN — ACETAMINOPHEN 975 MG: 325 TABLET, FILM COATED ORAL at 16:04

## 2025-01-15 RX ADMIN — SENNOSIDES 8.6 MG: 8.6 TABLET, FILM COATED ORAL at 20:15

## 2025-01-15 RX ADMIN — FAMOTIDINE 20 MG: 20 TABLET ORAL at 09:53

## 2025-01-15 RX ADMIN — AMOXICILLIN AND CLAVULANATE POTASSIUM 1 TABLET: 875; 125 TABLET, FILM COATED ORAL at 09:52

## 2025-01-15 RX ADMIN — AMOXICILLIN AND CLAVULANATE POTASSIUM 1 TABLET: 875; 125 TABLET, FILM COATED ORAL at 20:16

## 2025-01-15 RX ADMIN — OXYCODONE HYDROCHLORIDE 5 MG: 5 TABLET ORAL at 21:52

## 2025-01-15 RX ADMIN — ACETAMINOPHEN 975 MG: 325 TABLET, FILM COATED ORAL at 09:52

## 2025-01-15 RX ADMIN — OXYCODONE HYDROCHLORIDE 5 MG: 5 TABLET ORAL at 12:27

## 2025-01-15 RX ADMIN — HEPARIN SODIUM 5000 UNITS: 5000 INJECTION INTRAVENOUS; SUBCUTANEOUS at 09:52

## 2025-01-15 RX ADMIN — FAMOTIDINE 20 MG: 20 TABLET ORAL at 20:15

## 2025-01-15 RX ADMIN — HEPARIN SODIUM 5000 UNITS: 5000 INJECTION INTRAVENOUS; SUBCUTANEOUS at 16:05

## 2025-01-15 RX ADMIN — Medication 1 TABLET: at 09:52

## 2025-01-15 RX ADMIN — ACETAMINOPHEN 975 MG: 325 TABLET, FILM COATED ORAL at 20:15

## 2025-01-15 ASSESSMENT — COGNITIVE AND FUNCTIONAL STATUS - GENERAL
DRESSING REGULAR UPPER BODY CLOTHING: A LOT
DRESSING REGULAR UPPER BODY CLOTHING: A LOT
MOBILITY SCORE: 9
TURNING FROM BACK TO SIDE WHILE IN FLAT BAD: A LOT
EATING MEALS: A LITTLE
MOVING FROM LYING ON BACK TO SITTING ON SIDE OF FLAT BED WITH BEDRAILS: A LOT
MOVING TO AND FROM BED TO CHAIR: A LOT
TURNING FROM BACK TO SIDE WHILE IN FLAT BAD: A LOT
STANDING UP FROM CHAIR USING ARMS: TOTAL
DAILY ACTIVITIY SCORE: 13
DRESSING REGULAR LOWER BODY CLOTHING: A LOT
HELP NEEDED FOR BATHING: A LOT
MOVING TO AND FROM BED TO CHAIR: TOTAL
DAILY ACTIVITIY SCORE: 13
DRESSING REGULAR LOWER BODY CLOTHING: A LOT
CLIMB 3 TO 5 STEPS WITH RAILING: TOTAL
MOVING FROM LYING ON BACK TO SITTING ON SIDE OF FLAT BED WITH BEDRAILS: A LOT
WALKING IN HOSPITAL ROOM: TOTAL
TOILETING: A LOT
STANDING UP FROM CHAIR USING ARMS: TOTAL
CLIMB 3 TO 5 STEPS WITH RAILING: TOTAL
TOILETING: A LOT
WALKING IN HOSPITAL ROOM: TOTAL
WALKING IN HOSPITAL ROOM: TOTAL
PERSONAL GROOMING: A LOT
HELP NEEDED FOR BATHING: A LOT
MOBILITY SCORE: 7
TURNING FROM BACK TO SIDE WHILE IN FLAT BAD: TOTAL
MOVING TO AND FROM BED TO CHAIR: A LOT
EATING MEALS: A LITTLE
CLIMB 3 TO 5 STEPS WITH RAILING: TOTAL
STANDING UP FROM CHAIR USING ARMS: TOTAL
MOVING FROM LYING ON BACK TO SITTING ON SIDE OF FLAT BED WITH BEDRAILS: A LOT
MOBILITY SCORE: 9
PERSONAL GROOMING: A LOT

## 2025-01-15 ASSESSMENT — PAIN DESCRIPTION - DESCRIPTORS: DESCRIPTORS: ACHING

## 2025-01-15 ASSESSMENT — PAIN SCALES - GENERAL
PAINLEVEL_OUTOF10: 0 - NO PAIN
PAINLEVEL_OUTOF10: 6
PAINLEVEL_OUTOF10: 4
PAINLEVEL_OUTOF10: 6
PAINLEVEL_OUTOF10: 6

## 2025-01-15 ASSESSMENT — PAIN DESCRIPTION - ORIENTATION: ORIENTATION: MID;LOWER

## 2025-01-15 ASSESSMENT — PAIN - FUNCTIONAL ASSESSMENT
PAIN_FUNCTIONAL_ASSESSMENT: 0-10

## 2025-01-15 ASSESSMENT — PAIN DESCRIPTION - LOCATION: LOCATION: BACK

## 2025-01-15 NOTE — CARE PLAN
The patient's goals for the shift include      The clinical goals for the shift include Pt will remain HDS and safe and free from falls through shift 1/15/25      Problem: Skin  Goal: Decreased wound size/increased tissue granulation at next dressing change  Outcome: Progressing  Flowsheets (Taken 1/15/2025 1820)  Decreased wound size/increased tissue granulation at next dressing change:   Promote sleep for wound healing   Protective dressings over bony prominences  Goal: Participates in plan/prevention/treatment measures  Outcome: Progressing  Flowsheets (Taken 1/15/2025 1820)  Participates in plan/prevention/treatment measures: Elevate heels  Goal: Prevent/manage excess moisture  Outcome: Progressing  Flowsheets (Taken 1/15/2025 1820)  Prevent/manage excess moisture: Cleanse incontinence/protect with barrier cream  Goal: Prevent/minimize sheer/friction injuries  Outcome: Progressing  Flowsheets (Taken 1/15/2025 1820)  Prevent/minimize sheer/friction injuries:   Use pull sheet   Turn/reposition every 2 hours/use positioning/transfer devices  Goal: Promote/optimize nutrition  Outcome: Progressing  Flowsheets (Taken 1/15/2025 1820)  Promote/optimize nutrition: Offer water/supplements/favorite foods  Goal: Promote skin healing  Outcome: Progressing  Flowsheets (Taken 1/15/2025 1820)  Promote skin healing: Turn/reposition every 2 hours/use positioning/transfer devices     Problem: Fall/Injury  Goal: Not fall by end of shift  Outcome: Progressing  Goal: Be free from injury by end of the shift  Outcome: Progressing  Goal: Verbalize understanding of personal risk factors for fall in the hospital  Outcome: Progressing  Goal: Verbalize understanding of risk factor reduction measures to prevent injury from fall in the home  Outcome: Progressing  Goal: Use assistive devices by end of the shift  Outcome: Progressing  Goal: Pace activities to prevent fatigue by end of the shift  Outcome: Progressing     Problem: Pain -  Adult  Goal: Verbalizes/displays adequate comfort level or baseline comfort level  Outcome: Progressing     Problem: Safety - Adult  Goal: Free from fall injury  Outcome: Progressing     Problem: Discharge Planning  Goal: Discharge to home or other facility with appropriate resources  Outcome: Progressing     Problem: Chronic Conditions and Co-morbidities  Goal: Patient's chronic conditions and co-morbidity symptoms are monitored and maintained or improved  Outcome: Progressing

## 2025-01-15 NOTE — PROGRESS NOTES
"Abdi Retana \"Louise" is a 74 y.o. male on day 11 of admission presenting with JAMAL (acute kidney injury) (CMS-HCC).      Subjective   Overnight: NAEO   Denies any CP/SOB?Palpitations, N/V/D. Will discuss with family about treatment plan for patient. Had extensive discussion with patient and three siblings and finally came to the agreement that pt will be going to proceed with spine surgery and embolization of the spinal artery. Will keep pt NPO and prep for the embolization tomorrow and surgery with ortho spine on Friday.        Objective     Last Recorded Vitals  /67 (BP Location: Left arm, Patient Position: Lying)   Pulse 84   Temp 36.8 °C (98.2 °F) (Temporal)   Resp 16   Wt 120 kg (264 lb)   SpO2 92%   Intake/Output last 3 Shifts:    Intake/Output Summary (Last 24 hours) at 1/15/2025 1446  Last data filed at 1/14/2025 1625  Gross per 24 hour   Intake --   Output 500 ml   Net -500 ml       Admission Weight  Weight: 120 kg (264 lb) (01/04/25 1900)    Daily Weight  01/04/25 : 120 kg (264 lb)    No current facility-administered medications on file prior to encounter.     Current Outpatient Medications on File Prior to Encounter   Medication Sig Dispense Refill    acetaminophen (Tylenol 8 HOUR) 650 mg ER tablet Take 1 tablet (650 mg) by mouth every 6 hours if needed for mild pain (1 - 3). Do not crush, chew, or split.      acetaminophen (Tylenol) 325 mg tablet Take 2 tablets (650 mg) by mouth 2 times a day.      aspirin 81 mg EC tablet Take 1 tablet (81 mg) by mouth once daily.      bisacodyl (Gentle Laxative, bisacodyl,) 10 mg suppository Insert 1 suppository (10 mg) into the rectum once daily as needed for constipation.      cefepime (Maxipime) IV Infuse 100 mL (2 g) over 0.5 hours into a venous catheter every 12 hours.      famotidine (Pepcid) 20 mg tablet Take 1 tablet (20 mg) by mouth 2 times a day.      ferrous sulfate (FeroSuL) 325 (65 Fe) MG tablet Take 1 tablet (325 mg) by mouth once every 24 hours. " 90 tablet 3    losartan (Cozaar) 50 mg tablet TAKE 1 TABLET BY MOUTH ONCE  DAILY 90 tablet 3    magnesium hydroxide (Milk of Magnesia) 400 mg/5 mL suspension Take 30 mL by mouth once daily as needed for constipation (if no BM in 3 consecutive days).      magnesium oxide 400 mg magnesium capsule Take 1 capsule (400 mg) by mouth once daily. 90 capsule 3    mineral oil enema Insert 133 mL (1 enema) into the rectum once daily as needed for constipation (if no BM in 8 hours after getting suppository).      potassium chloride CR 10 mEq ER tablet TAKE 1 TABLET BY MOUTH ONCE  DAILY DO NOT CRUSH, CHEW, OR  SPLIT 90 tablet 3    torsemide (Demadex) 20 mg tablet TAKE 1 TABLET BY MOUTH ONCE  DAILY 90 tablet 3    traMADol (Ultram) 50 mg tablet Take 1 tablet (50 mg) by mouth every 6 hours if needed for severe pain (7 - 10).      vitamin D3-folic acid 2,500 unit- 1 mg tablet Take 1 mg by mouth once every 24 hours. 90 tablet 3        Physical Exam  HENT:      Head: Normocephalic and atraumatic.      Mouth/Throat:      Mouth: Mucous membranes are moist.   Eyes:      General: No scleral icterus.     Extraocular Movements: Extraocular movements intact.      Pupils: Pupils are equal, round, and reactive to light.   Cardiovascular:      Rate and Rhythm: Normal rate and regular rhythm.      Heart sounds: No murmur heard.  Pulmonary:      Effort: No respiratory distress.      Breath sounds: No wheezing or rales.   Abdominal:      General: There is no distension.      Tenderness: There is no abdominal tenderness. There is no guarding.   Musculoskeletal:      Comments: Legs with dressing on, images on epic show possible stasis dermatitis with ulcers   Skin:     Capillary Refill: Capillary refill takes less than 2 seconds.   Neurological:      General: No focal deficit present.      Mental Status: He is alert and oriented to person, place, and time.      Motor: Weakness present.      Comments: Richter in b/l LE 3/5, significantly limited by  pain   Psychiatric:         Mood and Affect: Mood normal.         Relevant Results    Results for orders placed or performed during the hospital encounter of 01/04/25 (from the past 24 hours)   CBC and Auto Differential   Result Value Ref Range    WBC 9.1 4.4 - 11.3 x10*3/uL    nRBC 0.0 0.0 - 0.0 /100 WBCs    RBC 3.81 (L) 4.50 - 5.90 x10*6/uL    Hemoglobin 9.9 (L) 13.5 - 17.5 g/dL    Hematocrit 36.4 (L) 41.0 - 52.0 %    MCV 96 80 - 100 fL    MCH 26.0 26.0 - 34.0 pg    MCHC 27.2 (L) 32.0 - 36.0 g/dL    RDW 17.2 (H) 11.5 - 14.5 %    Platelets 294 150 - 450 x10*3/uL    Neutrophils % 69.9 40.0 - 80.0 %    Immature Granulocytes %, Automated 0.4 0.0 - 0.9 %    Lymphocytes % 16.4 13.0 - 44.0 %    Monocytes % 8.9 2.0 - 10.0 %    Eosinophils % 3.1 0.0 - 6.0 %    Basophils % 1.3 0.0 - 2.0 %    Neutrophils Absolute 6.38 (H) 1.60 - 5.50 x10*3/uL    Immature Granulocytes Absolute, Automated 0.04 0.00 - 0.50 x10*3/uL    Lymphocytes Absolute 1.50 0.80 - 3.00 x10*3/uL    Monocytes Absolute 0.81 (H) 0.05 - 0.80 x10*3/uL    Eosinophils Absolute 0.28 0.00 - 0.40 x10*3/uL    Basophils Absolute 0.12 (H) 0.00 - 0.10 x10*3/uL   Magnesium   Result Value Ref Range    Magnesium 2.43 (H) 1.60 - 2.40 mg/dL   Renal Function Panel   Result Value Ref Range    Glucose 78 74 - 99 mg/dL    Sodium 135 (L) 136 - 145 mmol/L    Potassium 4.7 3.5 - 5.3 mmol/L    Chloride 101 98 - 107 mmol/L    Bicarbonate 25 21 - 32 mmol/L    Anion Gap 14 10 - 20 mmol/L    Urea Nitrogen 22 6 - 23 mg/dL    Creatinine 1.16 0.50 - 1.30 mg/dL    eGFR 66 >60 mL/min/1.73m*2    Calcium 9.3 8.6 - 10.6 mg/dL    Phosphorus 3.6 2.5 - 4.9 mg/dL    Albumin 3.1 (L) 3.4 - 5.0 g/dL      CT chest abdomen pelvis wo IV contrast    Result Date: 1/2/2025  CHEST: 1.  History of right renal cell carcinoma. 2. Multiple pulmonary nodules consistent with metastatic disease.   ABDOMEN AND PELVIS: 1.  History upright or renal cell carcinoma. There is an unchanged necrotic mass involving the upper  pole of the right kidney. 2. Bilateral renal cysts. 3. Bilateral nonobstructing intrarenal calculi with staghorn type calculi in the left kidney. There is no renal obstruction. 4. Complete bony destruction of L4 with a soft tissue mass with expansion of the vertebral body. Lytic lesion involving the superior endplate of L5. Findings are consistent with bony metastases.   MACRO: None   Signed by: Lucia Desai 1/2/2025 10:33 AM Dictation workstation:   [id number]    XR chest 1 view    Result Date: 1/2/2025  No pneumonic consolidations are noted. There questionable perihilar nodules which may correspond to the masses noted on the prior CT.     MACRO: None   Signed by: Patrick Arnold 1/2/2025 8:33 AM Dictation workstation:   [id number]      MRI Spine 1/8/25  IMPRESSION:  MRI cervical spine:      1. No evidence of metastases.      2. Multilevel degenerative changes of the cervical spine including  moderate spinal canal stenosis at C5-C6 and C6-C7.      MRI thoracic spine:      1. No evidence of osseous metastases.      2. Multilevel degenerative changes of the thoracic spine without  striking spinal canal stenosis at any level.      MRI lumbar spine:      1. Metastasis involving the entirety of the L4 vertebral body,  portions of the posterior elements and transverse processes, with  extension into the L3-L4 and L4-L5 intervertebral disc spaces and  into the inferior endplate of L3 and superior endplate of L5 as well  as into the surrounding prevertebral soft tissues.      2. There is extension of tumor into the ventral and bilateral  epidural space at L4 and there is also tumor located within the  posterior spinal canal extending from the level of L3 through L5.  Combination of these findings cause marked spinal canal stenosis at  the level of L4. Minimal CSF is seen within the thecal sac at the  level of L4.      3. Tumor extends into the bilateral neural foramina at L4-L5 with  resulting moderate-to-marked left and  marked right neural foraminal  narrowing. Tumor also extends into the inferior portions of the  bilateral neural foramina at L3-L4 resulting in moderate neural  foraminal narrowing.      4. There is metastasis located within the posterior and left epidural  space at the level of L5 and S1 which causes partial effacement of  the posterior thecal sac.      5. Multilevel degenerative changes of the lumbar spine as detailed  above.      MRI Head 1/8/25  IMPRESSION:  1.  Small focus of diffusion restriction involving the right parietal  lobe with no associated enhancement on postcontrast imaging, favoring  small acute infarct. No mass effect or hemorrhagic transformation.  2. No abnormal intracranial enhancement to indicate intracranial  metastatic disease.  3. Nonspecific dilatation of the superior ophthalmic veins  bilaterally.    Assessment/Plan      Assessment & Plan  JAMAL (acute kidney injury) (CMS-HCC)    Lesion of lumbar spine    Mr. Abdi Retana is a 75 yo M with PMHx of HTN, GERD, SAMIA, Chronic lymphedema, venous insufficiency, OA, CKD-3(b/l 1.2-1.5), also w/ the following oncologic Hx - no formal Dx at this time. Presenting symptom was b/l LE weakness. Was admitted to [facility] 11/30-12/5 for this after IMG revealed a concerning L4-5 lesion w/ mod-severe cord compression in s/o R menal mass and suspicious lesions in adrenals lungs, and spine. This was c/f undiagnosed metastatic R RCC. Case was discussed w/ both IR and ortho spine. It was deemed that no inpt Bx nor surgery was indicated and pt was DC to SNF w/ plan for IR-guided L4 Bx (scheduled for 12/20; pt missed d/t transport issues w/ SNF). MRI brain and C/T spine w/ contrast was attempted but pt did not tolerate d/t claustrophobia - there was a plan to complete it OP w/ anesthesia. Transferred from [facility] for expedited work up of suspected R. RCC with L4/5 mets. Also has Proteus Mirabilis UTI c/b bacteremia I/s/o bilateral non obstructing renal stones. ID  consulted for antibiotic management. L4 biopsy done and MRI head and spine completed. Ortho spine planning for surgery on Friday, 1/17 and Rad-onc planning for radiation after path report is back. Path report confirmed metastatic RCC yet to discuss with family about available treatments (Surgical, radiation and medical therapy).       Update 1/15/25    Today:   Had extensive discussion with patient and three siblings and finally came to the agreement that pt will be going to proceed with spine surgery and embolization of the spinal artery. Will keep pt NPO and prep for the embolization tomorrow and surgery with ortho spine on Friday.  C/w Augmentin for UTI- stop date 1/23  NPO at [facility]   Will hold AC prior to the embolization procedure     Small Acute Stroke on MRI  -Small focus of diffusion restriction involving the right parietal lobe with no associated enhancement on postcontrast imaging, favoring small acute infarct. No mass effect or hemorrhagic transformation.  -at his neurologic baseline(fluent speech, oriented, moves all limbs,LE-4/5)  -neuro-stoke consulted  -already on aspirin for CAD, not on statin  Plan  -neuro-stroke following  Recommendations  - continue aspirin 81 mg daily   - when safe from a bleeding an procedural standpoint start therapeutic Lovenox 1 mg/kg Q12H   - Please keep patient on telemetry and monitor daily for afib. If there is evidence of afib may need alternative anticoagulation.    -  continue with atorvastatin 40 daily target LDL <70  - please arrange neurology followup at time of discharge    P. mirabilis bacteremia 2/2 UTI  -HDS  -associated w/ R renal mass, b/l non-obstructing renal stones  -sensitivities available  -seen by uro at [facility], did not have plan for procedure given non-obstructive  -ID consulted for duration, procedure rec given complex anatomy of stones and renal mass - c/f seeding  -ID following, recommend Augmentin for 3 weeks(EOT-1/23)  -repeat BC(1/10)-NGTD     C/f  undiagnosed metastatic R RCC  L4-5 lesion w/ radiographic evidence cord compression  -CT(11/30)-R renal mass  -pt at baseline 4/5 LE weakness on exam, symmetric, no incontinence or saddle anesthesia  -there have been no plans for procedures per prior evaluations  -IMG as above  -d/w ortho spine, said if going for anesthesia should do the full spine  -consider formal  spine surgery consult if neuro change and/or IMG/Bx completed  -had been seen by NSG 1/3 who thought exam was okay w/o weakness, did not rec an intervention  -On scheduled tylenol and prn oxy/dilaudid  -Outpatient medical oncology follow up with Dr Guerra  -ortho-spine following   Plan:  - No acute orthopaedic surgical intervention  - Recommend repeat blood cx, must be negative prior to operative intervention  - If biopsy indicates RCC, will need IR embolization of lesion pre operatively  - NPO at midnight in case of operative intervention  - okay for DVT ppx from orthopedic perspective  - Ortho spine to follow    HTN  CAD  -holding home  losartan 50mg  I/s/o recent rise in Cr(now resolved) and normotension   -c/w home Aspirin     Lymphedema  Venous insufficiency/venous ulcers  -wound care consulted  -hold home torsemide 20 mg daily for now  -OSH wound recs  BLE- Chronic venous status ulcers   Irrigate with normal saline or wound cleanser, Pat dry.  Apply lotion to intact skin   Cover weeping areas with Triad Hydrophillic wound dressing ointment   Pad and protect with ABD, Kerlix and paper tape.   Change every day and as needed   Secure with Tubigrip ( Apply from base of toes to 2 finger width below the bend of knee)      Gluteal cleft & buttocks-MASD/Irritant contact dermatitis due to fecal, urinary or dual incontinent.   Cleanse with bath wipes or soap and water. Rinse well and pat dry.   Apply no sting skin barrier (cavilon)   Apply Triad hydrophilic wound dressing ointment to gluteal fold/groin three times a day and as needed.     GERD  -c/w home  famotidine 20 mg bid    Disposition  -barriers: clinical improvement  -destination: likely back to SNF ([facility])  -f/u: pending     Diet: cardiac   Electrolytes: K >3.5; Mg >2  DVT ppx: SCD/Heparin  GI ppx: on home famotidine  Lines/tubes: PIV  O2: none  Baseline Cr: 1.5  T+S: 1/5  Code status: Full code  Surrogate decision maker: Micaela Retana (sister) [phone]    Salomon Silva MD  Internal Medicne  PGY-1

## 2025-01-15 NOTE — CARE PLAN
On 1/15 the orthopedic spine team along with the medical/oncology team had a lengthy discussion with the patient and his 3 siblings.  Options to move forward secondary to the lumbar tumor included orthopedic intervention with lumbar decompression along with spinal fusion, radiation to the lumbar spine without surgery, and also no intervention and moved to hospice care.  The oncology team explained in great detail of the options from their standpoint with radiation and systemic therapy.  They believe that with surgical intervention was the quickest way to help decrease the tumor burden and help with pain control and functional mobility.  The risk and benefits of the surgery were explained in great detail to the patient and his family members.  After discussing all the options the patient and his family have decided to move forward with orthopedic spinal intervention.    We also discussed that the biopsy was positive for renal cell carcinoma that was metastatic in nature.  Renal cell carcinoma is highly vascular and increases the risk of bleeding with a tumor debulking and spinal surgery.  We expressed to them that with interventional radiology with embolization of the tumor will help decrease the risk of intraoperative bleeding and postoperative complications.  They were in agreements with this plan and would like to move forward.    Plan  Plan is for surgical intervention in the forms of lumbar tumor debulking, L3-L4 and L4-L5 laminectomies with posterior spinal fusion with instrumentation with Dr. Atkins on 1/17  Primary team orthopedics to help coordinate interventional embolization of the tumor on Thursday 1/16  Please update patient type and screen and labs  Please document medical optimization for surgery  N.p.o. at midnight on 1/17  There is any other questions regarding the patient's care please reach out to the orthopedic spine team residents and we will further assist you    Discussed with   Wilbert Abad DO  Orthopedic Surgery, PGY4    Patient will be followed by the Orthopaedic Spine Team:  1st call:  Mariano Hendrix MD  2nd call:  Lew Abad DO  3rd call:  Anthony Mary, Fellow  Available via [employer]

## 2025-01-15 NOTE — PROGRESS NOTES
"Abdi Retana \"Louise" is a 74 y.o. male on day 11 of admission presenting with JAMAL (acute kidney injury) (CMS-HCC).    Subjective   Patient evaluated at bedside this morning.  No acute events overnight.  Denies any current pain.  Denies any chest pain, shortness of breath, nausea, vomiting.  Plans for discussion with primary team regarding prognosis/treatment options to take place today.       Objective     Physical Exam    General: Lying comfortably in bed, no acute distress, seems intermittently confused  HEENT: EOMI, NC/AT  CV: RRR by peripheral pulses  Resp: Normal WOB  MSK: See below. Gross motor in tact.  Neurologic: AOx3  Skin: No rash  Psych: Mood appropriate     L1: SILT       L2: SILT      Hip flexors 4/5 Left; 4/5 Right  L3: SILT      Knee extension 5/5 Left; 5/5 Right  L4: SILT      Tib Ant. (Dorsiflexion) 5/5 Left; 5/5 Right  L5: SILT      EHL 5/5 Left; 5/5 Right  S1: SILT      Plantar flexion 5/5 Left; 5/5 Right    Last Recorded Vitals  Blood pressure 154/81, pulse 75, temperature 36 °C (96.8 °F), temperature source Temporal, resp. rate 16, height 1.88 m (6' 2\"), weight 120 kg (264 lb), SpO2 93%.  Intake/Output last 3 Shifts:  I/O last 3 completed shifts:  In: 450 (3.8 mL/kg) [P.O.:450]  Out: 800 (6.7 mL/kg) [Urine:800 (0.2 mL/kg/hr)]  Weight: 119.7 kg     Relevant Results      Scheduled medications  acetaminophen, 975 mg, oral, TID  amoxicillin-pot clavulanate, 1 tablet, oral, q12h CHAPITO  aspirin, 81 mg, oral, Daily  atorvastatin, 40 mg, oral, Nightly  famotidine, 20 mg, oral, BID  heparin (porcine), 5,000 Units, subcutaneous, q8h  multivitamin with minerals, 1 tablet, oral, Daily  polyethylene glycol, 17 g, oral, Daily  sennosides, 1 tablet, oral, Nightly      Continuous medications     PRN medications  PRN medications: HYDROmorphone, oxyCODONE, oxyCODONE  Results for orders placed or performed during the hospital encounter of 01/04/25 (from the past 24 hours)   CBC and Auto Differential   Result Value " Ref Range    WBC 8.8 4.4 - 11.3 x10*3/uL    nRBC 0.0 0.0 - 0.0 /100 WBCs    RBC 3.75 (L) 4.50 - 5.90 x10*6/uL    Hemoglobin 9.5 (L) 13.5 - 17.5 g/dL    Hematocrit 33.2 (L) 41.0 - 52.0 %    MCV 89 80 - 100 fL    MCH 25.3 (L) 26.0 - 34.0 pg    MCHC 28.6 (L) 32.0 - 36.0 g/dL    RDW 17.1 (H) 11.5 - 14.5 %    Platelets 337 150 - 450 x10*3/uL    Neutrophils % 73.4 40.0 - 80.0 %    Immature Granulocytes %, Automated 0.6 0.0 - 0.9 %    Lymphocytes % 15.5 13.0 - 44.0 %    Monocytes % 7.1 2.0 - 10.0 %    Eosinophils % 2.4 0.0 - 6.0 %    Basophils % 1.0 0.0 - 2.0 %    Neutrophils Absolute 6.49 (H) 1.60 - 5.50 x10*3/uL    Immature Granulocytes Absolute, Automated 0.05 0.00 - 0.50 x10*3/uL    Lymphocytes Absolute 1.37 0.80 - 3.00 x10*3/uL    Monocytes Absolute 0.63 0.05 - 0.80 x10*3/uL    Eosinophils Absolute 0.21 0.00 - 0.40 x10*3/uL    Basophils Absolute 0.09 0.00 - 0.10 x10*3/uL   Magnesium   Result Value Ref Range    Magnesium 2.31 1.60 - 2.40 mg/dL   Renal Function Panel   Result Value Ref Range    Glucose 92 74 - 99 mg/dL    Sodium 135 (L) 136 - 145 mmol/L    Potassium 4.9 3.5 - 5.3 mmol/L    Chloride 98 98 - 107 mmol/L    Bicarbonate 27 21 - 32 mmol/L    Anion Gap 15 10 - 20 mmol/L    Urea Nitrogen 22 6 - 23 mg/dL    Creatinine 1.15 0.50 - 1.30 mg/dL    eGFR 67 >60 mL/min/1.73m*2    Calcium 9.8 8.6 - 10.6 mg/dL    Phosphorus 3.5 2.5 - 4.9 mg/dL    Albumin 3.2 (L) 3.4 - 5.0 g/dL                   Assessment/Plan   Assessment & Plan  JAMAL (acute kidney injury) (CMS-HCC)    Patient is a 74M who was admitted for BLE weakness and an L4 lytic lesion.  L4-L5 biopsy was performed and results were positive for RCC. Dr Atkins discussed with the patient and sister about decompression of the lumbar spine with tumor debulking.  Patient is currently in discussion with his sister and oncology staff regarding his prognosis along with treatment options    If family decides to move forward with the surgery, the plan will be this coming  Thursday, 1/16/25 for IR embolization of the area of the lumbar spine to help decrease the risk of bleeding as RCC is known to be vascular and place at a higher risk of bleeding.  The orthopedic spine team is also planning surgery for 1/17/25 if the decision is made to move forward with the surgery.         Plan:  Oncology primary  No head of bed restrictions  Regular diet  If patient/family move forward with treatment:   - Make NPO at [facility] for IR embolization   - Obtain updated T+S today  Pain control with multimodality  DVT ppx per primary  Cho PP  Possible plan for IR embolization on 1/16 if patient decides to move forward with the surgery with surgery on 1/17    Discussed with Dr. Wilbert Hendrix MD  Orthopaedic Surgery, PGY-2    Patient will be followed by the Orthopaedic Spine Team:  1st call:  Mariano Hendrix MD  2nd call:  Lew Abad DO  3rd call:  Anthony Mary, Fellow  Available via EpicChat        I saw and evaluated the patient.  I personally obtained the key and critical portions of the history and physical exam or was physically present for key and critical portions performed by the Resident. I reviewed the documentation and discussed the patient with the Resident.  I agree with the Resident’s medical decision making as documented in the note.

## 2025-01-15 NOTE — PROGRESS NOTES
"Physical Therapy    Physical Therapy Treatment    Patient Name: Abdi Retana \"Matt\"  MRN: [id number]  Department: [facility]  Room: [address] [place] [address]  Today's Date: 1/15/2025  Time Calculation  Start Time: 1215  Stop Time: 1253  Time Calculation (min): 38 min         Assessment/Plan   PT Assessment  PT Assessment Results: Decreased strength, Decreased range of motion, Decreased endurance, Impaired balance, Decreased mobility, Impaired hearing, Decreased cognition, Decreased skin integrity, Obesity, Pain  Rehab Prognosis: Good  Barriers to Discharge Home: Caregiver assistance, Physical needs  Caregiver Assistance: Patient lives alone and/or does not have reliable caregiver assistance  Physical Needs: Stair navigation into home limited by function/safety, In-home setup navigation limited by function/safety, 24hr mobility assistance needed, 24hr ADL assistance needed  Evaluation/Treatment Tolerance: Patient tolerated treatment well  Medical Staff Made Aware: Yes  Strengths: Ability to acquire knowledge, Attitude of self, Coping skills, Support of Caregivers  Barriers to Participation: Other (Comment) (none)  End of Session Communication: Bedside nurse (present assisting)  Assessment Comment: 73 yo male with malignant renal cell carcinoma with L4/L5 lytic lesions, anxiety, anemia, and weakness limiting mobility overall; mobility slightly improved today; pt with improved mobility today as noted, still requires encouragement and assist +1. Pt pending spinal fusion. Once medically stable, recommend moderate intensity therapy as pt lives alone and has stairs, is weak and has complicated medical issues and is not at baseline. Pt may benefit from anxiety management (d/w team last visit).  End of Session Patient Position: Bed, 3 rail up, Alarm on (sidelying a little bit on Left)  PT Plan  Inpatient/Swing Bed or Outpatient: Inpatient  PT Plan  Treatment/Interventions: Bed mobility, Transfer training, Gait training, Balance training, " "Neuromuscular re-education, Strengthening, Endurance training, Therapeutic exercise, Therapeutic activity, Home exercise program, Positioning, Postural re-education  PT Plan: Ongoing PT  PT Frequency: 3 times per week  PT Discharge Recommendations: Moderate intensity level of continued care  Equipment Recommended upon Discharge: Other (comment) (unable to determine today)  PT Recommended Transfer Status: Other (comment) (not yet able)  PT - OK to Discharge: Yes (PT eval completed & DC recs made; will need re-evaluation post-op)      General Visit Information:   PT  Visit  PT Received On: 01/15/25  Response to Previous Treatment: Patient with no complaints from previous session.  General  Reason for Referral: Admitted 1/2 (transfer to [facility] 1/4) with hematuria from nursing facility; dx: UTI, JAMAL, back pain, malignant renal cell carcinoma Right (L4-5 lesion with cord compression, mets to adrenal glands & lung), mets L3-5 & S1 weakness, small acute Right parietal lobe stroke; planned ortho surgery 1/17 decompression of the lumbar spine with tumor debulking  Past Medical History Relevant to Rehab: CKD, malignant renal cell carcinoma Right (L4 lesion, mets to adrenal glands & lung), HTN, GERD, OA, PVD, anemia, obesity, PVD, CKD, chronic lymphedema, fall  PT Missed Visit:  (no)  Missed Visit Reason:  (.)  Family/Caregiver Present: Yes  Caregiver Feedback: Sister Micaela present at start of session, then left.  Co-Treatment:  (N/A)  Co-Treatment Reason: .  Prior to Session Communication: Bedside nurse  Patient Position Received: Bed, 3 rail up, Alarm off, not on at start of session  Preferred Learning Style: kinesthetic, verbal  General Comment: Pt supine alert and engaged, reports he didn't like the ankle positioning boots \"so I hid them.\" Pt reports back pain as noted. Pt able to roll and sit up with assist +2, improved from last visit but unable to come to stand.    Subjective   Precautions:  Precautions  Medical " "Precautions: Fall precautions (Anxious and likes explicit instructions prior to any movement, back pain, anemia, spine (lytic lesions lumbar/sacral spine), bilateral leg ulcers)    Vital Signs (Past 2hrs)        Date/Time Vitals Session Patient Position Pulse Resp SpO2 BP MAP (mmHg)    01/15/25 1341 --  --  84  16  92 %  121/67  85                   Vital Signs Comment: at EOB, no dizziness, mild dyspnea     Objective   Pain:  Pain Assessment  Pain Assessment: 0-10  0-10 (Numeric) Pain Score:  (6/10 pre, 10/10 during movement, post \"coming down\" in low back)  Pain Interventions: Repositioned, Ambulation/increased activity, Emotional support, Therapeutic presence, Therapeutic touch  Response to Interventions:  (appears comfortable supine resting at end of session)  Cognition:  Cognition  Arousal/Alertness: Delayed responses to stimuli  Orientation Level: Oriented X4  Following Commands: Follows one step commands with increased time  Problem Solving: Assistance required to identify errors made  Problem Solving: Exceptions to WFL  Processing Speed: Delayed  Coordination:     Postural Control:  Postural Control  Postural Control: Impaired  Posture Comment: sitting: head/neck flexed, rounded shoulders, posterior pelvic tile  Static Sitting Balance  Static Sitting-Balance Support: Feet supported, Bilateral upper extremity supported  Static Sitting-Level of Assistance:  (CG to min A +1, CGA +1 other for safety)  Dynamic Sitting Balance  Dynamic Sitting-Balance Support: Feet supported, Bilateral upper extremity supported  Dynamic Sitting-Level of Assistance:  (min A +1 and CGA +1 other (RN assisting) for safety)  Dynamic Sitting-Balance:  (scoot to EOB to put feet on floor, rocking back and forth to stretch back)  Static Standing Balance  Static Standing-Balance Support:  (unable with attempt to come to stand with assist +2)    Activity Tolerance:  Activity Tolerance  Endurance: Other (Comment), Tolerates 30+ min exercise " without fatigue (slightly limited by pain and apprehension about back pain; did well with rest breaks)  Treatments:  Therapeutic Activity  Therapeutic Activity Performed: Yes  Therapeutic Activity 1: facilitation of rolling (to come to/from sit, to change bedding and clean pt up a little)  Therapeutic Activity 2: sidelying to/from sit training as noted (difficulty when laying back down- unable to come into sidelying but comes onto his back instead)  Therapeutic Activity 3: sitting balance:  static with deep breathing, moving legs and attempting to scoot up towards head of bed in sitting (unable), attempt to come to stand with assist +2 (unable)    Bed Mobility  Bed Mobility: Yes  Bed Mobility 1  Bed Mobility 1: Rolling right, Rolling left  Level of Assistance 1: +2, Moderate assistance, Moderate verbal cues, Moderate tactile cues  Bed Mobility Comments 1: use of rail, chux, some hand-over-hand  Bed Mobility 2  Bed Mobility  2: Side lying right to sit (sit to Right sidelying. used bed to elevate HOB 50 degrees (pt wanted HOB flatter) and use of rail)  Level of Assistance 2: Maximum assistance, Maximum verbal cues, Maximum tactile cues, +2 (mod/max A +2)  Bed Mobility 3  Bed Mobility 3: Scooting (sitting)  Level of Assistance 3: +2, Minimum assistance, Moderate verbal cues, Moderate tactile cues (using TAP, min A +1 and CGA +1 other for safety)    Ambulation/Gait Training  Ambulation/Gait Training Performed: No (pt unable)  Transfers  Transfer: Yes (pt unable d/t pain)  Transfer 1  Transfer From 1: Sit to, Stand to  Transfer to 1: Sit, Stand  Technique 1: Sit to stand, Stand to sit  Transfer Device 1:  (no device (lgca-eo-etre A +2_)  Transfer Level of Assistance 1: Dependent, Maximum verbal cues, Maximum tactile cues, +2 (pt unable)  Trials/Comments 1: bed ht raised, arm-in-arm on each side A+2 but pt unable to offload buttox to start to come to stand at all    Stairs  Stairs: No (pt unable)    Other Activity  Other  Activity Performed: Yes  Other Activity 1: rolling to clean pt, change and reposition Chux/bedding    Outcome Measures:  [facility] Basic Mobility  Turning from your back to your side while in a flat bed without using bedrails: A lot  Moving from lying on your back to sitting on the side of a flat bed without using bedrails: Total  Moving to and from bed to chair (including a wheelchair): Total  Standing up from a chair using your arms (e.g. wheelchair or bedside chair): Total  To walk in hospital room: Total  Climbing 3-5 steps with railing: Total  Basic Mobility - Total Score: 7    Education Documentation  No documentation found.  Education Comments  No comments found.      Encounter Problems       Encounter Problems (Active)       General Goals       Pt will have bilateral LE strength >4 (ankle DF, hip flexion, knee flex/ext) and AROM WFL hip flexion/knee flex & extension and ankle DF bilaterally (Progressing)       Start:  01/06/25    Expected End:  01/20/25            Pt will supine to/from sit via logrolling (HOB elevated 45 degrees, use of rail) with mod A +2 (Progressing)       Start:  01/06/25    Expected End:  01/20/25            Pt will sit EOB with min A +1 >7 minutes with pain <2 (Progressing)       Start:  01/06/25    Expected End:  01/20/25               Mobility       Pt will come sit to/from stand, bed to/from chair with WW with mod A +2 (no knee buckling, pain <2) (Not Progressing)       Start:  01/06/25    Expected End:  01/20/25            Pt will stand static with WW and min/mod A +2 (Not Progressing)       Start:  01/06/25    Expected End:  01/20/25            Pt will ambulate 10' with WW with min/mod A +2 and chair follow for safety (Not Progressing)       Start:  01/06/25    Expected End:  01/20/25               Pain - Adult

## 2025-01-16 ENCOUNTER — APPOINTMENT (OUTPATIENT)
Dept: RADIOLOGY | Facility: HOSPITAL | Age: 75
DRG: 456 | End: 2025-01-16
Payer: MEDICARE

## 2025-01-16 ENCOUNTER — ANESTHESIA (OUTPATIENT)
Dept: RADIOLOGY | Facility: HOSPITAL | Age: 75
End: 2025-01-16
Payer: MEDICARE

## 2025-01-16 ENCOUNTER — ANESTHESIA EVENT (OUTPATIENT)
Dept: RADIOLOGY | Facility: HOSPITAL | Age: 75
End: 2025-01-16
Payer: MEDICARE

## 2025-01-16 ENCOUNTER — ANESTHESIA EVENT (OUTPATIENT)
Dept: OPERATING ROOM | Facility: HOSPITAL | Age: 75
End: 2025-01-16
Payer: MEDICARE

## 2025-01-16 LAB
ALBUMIN SERPL BCP-MCNC: 2.9 G/DL (ref 3.4–5)
ANION GAP SERPL CALC-SCNC: 12 MMOL/L (ref 10–20)
BASOPHILS # BLD AUTO: 0.1 X10*3/UL (ref 0–0.1)
BASOPHILS NFR BLD AUTO: 1.1 %
BUN SERPL-MCNC: 20 MG/DL (ref 6–23)
CALCIUM SERPL-MCNC: 9.2 MG/DL (ref 8.6–10.6)
CHLORIDE SERPL-SCNC: 101 MMOL/L (ref 98–107)
CO2 SERPL-SCNC: 29 MMOL/L (ref 21–32)
CREAT SERPL-MCNC: 1.23 MG/DL (ref 0.5–1.3)
EGFRCR SERPLBLD CKD-EPI 2021: 62 ML/MIN/1.73M*2
EOSINOPHIL # BLD AUTO: 0.24 X10*3/UL (ref 0–0.4)
EOSINOPHIL NFR BLD AUTO: 2.6 %
ERYTHROCYTE [DISTWIDTH] IN BLOOD BY AUTOMATED COUNT: 17.2 % (ref 11.5–14.5)
GLUCOSE SERPL-MCNC: 78 MG/DL (ref 74–99)
HCT VFR BLD AUTO: 32.2 % (ref 41–52)
HGB BLD-MCNC: 9.2 G/DL (ref 13.5–17.5)
IMM GRANULOCYTES # BLD AUTO: 0.03 X10*3/UL (ref 0–0.5)
IMM GRANULOCYTES NFR BLD AUTO: 0.3 % (ref 0–0.9)
LYMPHOCYTES # BLD AUTO: 1.54 X10*3/UL (ref 0.8–3)
LYMPHOCYTES NFR BLD AUTO: 16.5 %
MAGNESIUM SERPL-MCNC: 2.25 MG/DL (ref 1.6–2.4)
MCH RBC QN AUTO: 25.5 PG (ref 26–34)
MCHC RBC AUTO-ENTMCNC: 28.6 G/DL (ref 32–36)
MCV RBC AUTO: 89 FL (ref 80–100)
MONOCYTES # BLD AUTO: 0.93 X10*3/UL (ref 0.05–0.8)
MONOCYTES NFR BLD AUTO: 10 %
NEUTROPHILS # BLD AUTO: 6.5 X10*3/UL (ref 1.6–5.5)
NEUTROPHILS NFR BLD AUTO: 69.5 %
NRBC BLD-RTO: 0 /100 WBCS (ref 0–0)
PHOSPHATE SERPL-MCNC: 3.4 MG/DL (ref 2.5–4.9)
PLATELET # BLD AUTO: 338 X10*3/UL (ref 150–450)
POTASSIUM SERPL-SCNC: 4.4 MMOL/L (ref 3.5–5.3)
RBC # BLD AUTO: 3.61 X10*6/UL (ref 4.5–5.9)
SODIUM SERPL-SCNC: 138 MMOL/L (ref 136–145)
WBC # BLD AUTO: 9.3 X10*3/UL (ref 4.4–11.3)

## 2025-01-16 PROCEDURE — 2720000007 HC OR 272 NO HCPCS: Performed by: NEUROLOGICAL SURGERY

## 2025-01-16 PROCEDURE — 85025 COMPLETE CBC W/AUTO DIFF WBC: CPT

## 2025-01-16 PROCEDURE — 75705 ARTERY X-RAYS SPINE: CPT | Performed by: NEUROLOGICAL SURGERY

## 2025-01-16 PROCEDURE — 7100000010 HC PHASE TWO TIME - EACH INCREMENTAL 1 MINUTE: Performed by: NEUROLOGICAL SURGERY

## 2025-01-16 PROCEDURE — 3700000002 HC GENERAL ANESTHESIA TIME - EACH INCREMENTAL 1 MINUTE: Performed by: NEUROLOGICAL SURGERY

## 2025-01-16 PROCEDURE — 1170000001 HC PRIVATE ONCOLOGY ROOM DAILY

## 2025-01-16 PROCEDURE — 2500000004 HC RX 250 GENERAL PHARMACY W/ HCPCS (ALT 636 FOR OP/ED): Performed by: ANESTHESIOLOGIST ASSISTANT

## 2025-01-16 PROCEDURE — 37243 VASC EMBOLIZE/OCCLUDE ORGAN: CPT

## 2025-01-16 PROCEDURE — 2500000001 HC RX 250 WO HCPCS SELF ADMINISTERED DRUGS (ALT 637 FOR MEDICARE OP)

## 2025-01-16 PROCEDURE — C1769 GUIDE WIRE: HCPCS | Performed by: NEUROLOGICAL SURGERY

## 2025-01-16 PROCEDURE — 36415 COLL VENOUS BLD VENIPUNCTURE: CPT

## 2025-01-16 PROCEDURE — 36245 INS CATH ABD/L-EXT ART 1ST: CPT

## 2025-01-16 PROCEDURE — 99231 SBSQ HOSP IP/OBS SF/LOW 25: CPT

## 2025-01-16 PROCEDURE — 75898 FOLLOW-UP ANGIOGRAPHY: CPT | Performed by: NEUROLOGICAL SURGERY

## 2025-01-16 PROCEDURE — 80069 RENAL FUNCTION PANEL: CPT

## 2025-01-16 PROCEDURE — 37243 VASC EMBOLIZE/OCCLUDE ORGAN: CPT | Performed by: NEUROLOGICAL SURGERY

## 2025-01-16 PROCEDURE — 83735 ASSAY OF MAGNESIUM: CPT

## 2025-01-16 PROCEDURE — 75894 X-RAYS TRANSCATH THERAPY: CPT | Performed by: NEUROLOGICAL SURGERY

## 2025-01-16 PROCEDURE — 03VY3DZ RESTRICTION OF UPPER ARTERY WITH INTRALUMINAL DEVICE, PERCUTANEOUS APPROACH: ICD-10-PCS | Performed by: STUDENT IN AN ORGANIZED HEALTH CARE EDUCATION/TRAINING PROGRAM

## 2025-01-16 PROCEDURE — C1760 CLOSURE DEV, VASC: HCPCS | Performed by: NEUROLOGICAL SURGERY

## 2025-01-16 PROCEDURE — 7100000009 HC PHASE TWO TIME - INITIAL BASE CHARGE: Performed by: NEUROLOGICAL SURGERY

## 2025-01-16 PROCEDURE — 3700000001 HC GENERAL ANESTHESIA TIME - INITIAL BASE CHARGE: Performed by: NEUROLOGICAL SURGERY

## 2025-01-16 PROCEDURE — 2550000001 HC RX 255 CONTRASTS: Performed by: NEUROLOGICAL SURGERY

## 2025-01-16 PROCEDURE — 2500000004 HC RX 250 GENERAL PHARMACY W/ HCPCS (ALT 636 FOR OP/ED)

## 2025-01-16 PROCEDURE — 2780000003 HC OR 278 NO HCPCS: Performed by: NEUROLOGICAL SURGERY

## 2025-01-16 PROCEDURE — 61626 TCAT PERM OCCLS/EMBOL NONCNS: CPT | Performed by: NEUROLOGICAL SURGERY

## 2025-01-16 PROCEDURE — C1887 CATHETER, GUIDING: HCPCS | Performed by: NEUROLOGICAL SURGERY

## 2025-01-16 PROCEDURE — 2500000001 HC RX 250 WO HCPCS SELF ADMINISTERED DRUGS (ALT 637 FOR MEDICARE OP): Performed by: STUDENT IN AN ORGANIZED HEALTH CARE EDUCATION/TRAINING PROGRAM

## 2025-01-16 RX ORDER — PROPOFOL 10 MG/ML
INJECTION, EMULSION INTRAVENOUS CONTINUOUS PRN
Status: DISCONTINUED | OUTPATIENT
Start: 2025-01-16 | End: 2025-01-16

## 2025-01-16 RX ORDER — MIDAZOLAM HYDROCHLORIDE 1 MG/ML
INJECTION, SOLUTION INTRAMUSCULAR; INTRAVENOUS AS NEEDED
Status: DISCONTINUED | OUTPATIENT
Start: 2025-01-16 | End: 2025-01-16

## 2025-01-16 RX ORDER — FENTANYL CITRATE 50 UG/ML
INJECTION, SOLUTION INTRAMUSCULAR; INTRAVENOUS AS NEEDED
Status: DISCONTINUED | OUTPATIENT
Start: 2025-01-16 | End: 2025-01-16

## 2025-01-16 RX ADMIN — ACETAMINOPHEN 975 MG: 325 TABLET, FILM COATED ORAL at 16:08

## 2025-01-16 RX ADMIN — SODIUM CHLORIDE, SODIUM LACTATE, POTASSIUM CHLORIDE, AND CALCIUM CHLORIDE: 600; 310; 30; 20 INJECTION, SOLUTION INTRAVENOUS at 12:28

## 2025-01-16 RX ADMIN — FAMOTIDINE 20 MG: 20 TABLET ORAL at 08:47

## 2025-01-16 RX ADMIN — ACETAMINOPHEN 975 MG: 325 TABLET, FILM COATED ORAL at 08:46

## 2025-01-16 RX ADMIN — SENNOSIDES 8.6 MG: 8.6 TABLET, FILM COATED ORAL at 20:27

## 2025-01-16 RX ADMIN — ATORVASTATIN CALCIUM 40 MG: 40 TABLET, FILM COATED ORAL at 20:27

## 2025-01-16 RX ADMIN — PROPOFOL 10 MG: 10 INJECTION, EMULSION INTRAVENOUS at 13:49

## 2025-01-16 RX ADMIN — FENTANYL CITRATE 25 MCG: 50 INJECTION, SOLUTION INTRAMUSCULAR; INTRAVENOUS at 13:13

## 2025-01-16 RX ADMIN — ASPIRIN 81 MG: 81 TABLET, COATED ORAL at 08:46

## 2025-01-16 RX ADMIN — FAMOTIDINE 20 MG: 20 TABLET ORAL at 20:27

## 2025-01-16 RX ADMIN — AMOXICILLIN AND CLAVULANATE POTASSIUM 1 TABLET: 875; 125 TABLET, FILM COATED ORAL at 20:28

## 2025-01-16 RX ADMIN — OXYCODONE HYDROCHLORIDE 5 MG: 5 TABLET ORAL at 20:29

## 2025-01-16 RX ADMIN — PROPOFOL 30 MCG/KG/MIN: 10 INJECTION, EMULSION INTRAVENOUS at 12:30

## 2025-01-16 RX ADMIN — FENTANYL CITRATE 25 MCG: 50 INJECTION, SOLUTION INTRAMUSCULAR; INTRAVENOUS at 12:35

## 2025-01-16 RX ADMIN — FENTANYL CITRATE 25 MCG: 50 INJECTION, SOLUTION INTRAMUSCULAR; INTRAVENOUS at 13:44

## 2025-01-16 RX ADMIN — POLYETHYLENE GLYCOL 3350 17 G: 17 POWDER, FOR SOLUTION ORAL at 08:46

## 2025-01-16 RX ADMIN — PROPOFOL 10 MG: 10 INJECTION, EMULSION INTRAVENOUS at 13:28

## 2025-01-16 RX ADMIN — AMOXICILLIN AND CLAVULANATE POTASSIUM 1 TABLET: 875; 125 TABLET, FILM COATED ORAL at 08:47

## 2025-01-16 RX ADMIN — FENTANYL CITRATE 25 MCG: 50 INJECTION, SOLUTION INTRAMUSCULAR; INTRAVENOUS at 12:34

## 2025-01-16 RX ADMIN — IOHEXOL 100 ML: 350 INJECTION, SOLUTION INTRAVENOUS at 13:50

## 2025-01-16 RX ADMIN — PROPOFOL 10 MG: 10 INJECTION, EMULSION INTRAVENOUS at 13:37

## 2025-01-16 RX ADMIN — PROPOFOL 20 MG: 10 INJECTION, EMULSION INTRAVENOUS at 12:35

## 2025-01-16 RX ADMIN — ACETAMINOPHEN 975 MG: 325 TABLET, FILM COATED ORAL at 20:27

## 2025-01-16 RX ADMIN — Medication 1 TABLET: at 08:47

## 2025-01-16 RX ADMIN — MIDAZOLAM 2 MG: 1 INJECTION INTRAMUSCULAR; INTRAVENOUS at 12:28

## 2025-01-16 ASSESSMENT — PAIN - FUNCTIONAL ASSESSMENT
PAIN_FUNCTIONAL_ASSESSMENT: 0-10

## 2025-01-16 ASSESSMENT — COGNITIVE AND FUNCTIONAL STATUS - GENERAL
TOILETING: TOTAL
DRESSING REGULAR LOWER BODY CLOTHING: A LOT
DRESSING REGULAR UPPER BODY CLOTHING: A LOT
EATING MEALS: A LITTLE
STANDING UP FROM CHAIR USING ARMS: TOTAL
WALKING IN HOSPITAL ROOM: TOTAL
MOBILITY SCORE: 9
MOVING FROM LYING ON BACK TO SITTING ON SIDE OF FLAT BED WITH BEDRAILS: A LOT
MOVING TO AND FROM BED TO CHAIR: A LOT
TOILETING: A LOT
CLIMB 3 TO 5 STEPS WITH RAILING: TOTAL
DRESSING REGULAR LOWER BODY CLOTHING: A LOT
DAILY ACTIVITIY SCORE: 14
DRESSING REGULAR UPPER BODY CLOTHING: A LOT
DAILY ACTIVITIY SCORE: 13
HELP NEEDED FOR BATHING: A LOT
HELP NEEDED FOR BATHING: A LOT
PERSONAL GROOMING: A LITTLE
TURNING FROM BACK TO SIDE WHILE IN FLAT BAD: A LOT
PERSONAL GROOMING: A LITTLE
EATING MEALS: A LITTLE

## 2025-01-16 ASSESSMENT — PAIN SCALES - GENERAL
PAINLEVEL_OUTOF10: 0 - NO PAIN
PAINLEVEL_OUTOF10: 6
PAINLEVEL_OUTOF10: 0 - NO PAIN
PAINLEVEL_OUTOF10: 0 - NO PAIN
PAINLEVEL_OUTOF10: 6
PAIN_LEVEL: 2

## 2025-01-16 ASSESSMENT — PAIN DESCRIPTION - LOCATION
LOCATION: BACK
LOCATION: BACK

## 2025-01-16 NOTE — PROGRESS NOTES
"Abdi Retana \"Louise" is a 74 y.o. male on day 12 of admission presenting with JAMAL (acute kidney injury) (CMS-HCC).    Subjective   Patient evaluated at bedside this morning.  No acute events overnight.  Denies any current pain.  Denies any chest pain, shortness of breath, nausea, vomiting.  After thorough discussion regarding treatment options yesterday, patient and family would like to move forward with surgical intervention which will require IR embolization before OR tomorrow.     Objective     Physical Exam    General: Lying comfortably in bed, no acute distress, seems intermittently confused  HEENT: EOMI, NC/AT  CV: RRR by peripheral pulses  Resp: Normal WOB  MSK: See below. Gross motor in tact.  Neurologic: AOx3  Skin: Venous stasis appearing skin changes on bilateral lower extremities  Psych: Mood appropriate     L1: SILT       L2: SILT      Hip flexors 4/5 Left; 4/5 Right  L3: SILT      Knee extension 5/5 Left; 5/5 Right  L4: SILT      Tib Ant. (Dorsiflexion) 5/5 Left; 5/5 Right  L5: SILT      EHL 5/5 Left; 5/5 Right  S1: SILT      Plantar flexion 5/5 Left; 5/5 Right    Last Recorded Vitals  Blood pressure 126/71, pulse 68, temperature 36 °C (96.8 °F), temperature source Temporal, resp. rate 16, height 1.88 m (6' 2\"), weight 120 kg (264 lb), SpO2 94%.  Intake/Output last 3 Shifts:  I/O last 3 completed shifts:  In: - (0 mL/kg)   Out: 500 (4.2 mL/kg) [Urine:500 (0.1 mL/kg/hr)]  Weight: 119.7 kg     Relevant Results      Scheduled medications  acetaminophen, 975 mg, oral, TID  amoxicillin-pot clavulanate, 1 tablet, oral, q12h CHAPITO  aspirin, 81 mg, oral, Daily  atorvastatin, 40 mg, oral, Nightly  famotidine, 20 mg, oral, BID  multivitamin with minerals, 1 tablet, oral, Daily  polyethylene glycol, 17 g, oral, Daily  sennosides, 1 tablet, oral, Nightly      Continuous medications     PRN medications  PRN medications: HYDROmorphone, oxyCODONE, oxyCODONE  Results for orders placed or performed during the hospital " encounter of 01/04/25 (from the past 24 hours)   CBC and Auto Differential   Result Value Ref Range    WBC 9.1 4.4 - 11.3 x10*3/uL    nRBC 0.0 0.0 - 0.0 /100 WBCs    RBC 3.81 (L) 4.50 - 5.90 x10*6/uL    Hemoglobin 9.9 (L) 13.5 - 17.5 g/dL    Hematocrit 36.4 (L) 41.0 - 52.0 %    MCV 96 80 - 100 fL    MCH 26.0 26.0 - 34.0 pg    MCHC 27.2 (L) 32.0 - 36.0 g/dL    RDW 17.2 (H) 11.5 - 14.5 %    Platelets 294 150 - 450 x10*3/uL    Neutrophils % 69.9 40.0 - 80.0 %    Immature Granulocytes %, Automated 0.4 0.0 - 0.9 %    Lymphocytes % 16.4 13.0 - 44.0 %    Monocytes % 8.9 2.0 - 10.0 %    Eosinophils % 3.1 0.0 - 6.0 %    Basophils % 1.3 0.0 - 2.0 %    Neutrophils Absolute 6.38 (H) 1.60 - 5.50 x10*3/uL    Immature Granulocytes Absolute, Automated 0.04 0.00 - 0.50 x10*3/uL    Lymphocytes Absolute 1.50 0.80 - 3.00 x10*3/uL    Monocytes Absolute 0.81 (H) 0.05 - 0.80 x10*3/uL    Eosinophils Absolute 0.28 0.00 - 0.40 x10*3/uL    Basophils Absolute 0.12 (H) 0.00 - 0.10 x10*3/uL   Magnesium   Result Value Ref Range    Magnesium 2.43 (H) 1.60 - 2.40 mg/dL   Renal Function Panel   Result Value Ref Range    Glucose 78 74 - 99 mg/dL    Sodium 135 (L) 136 - 145 mmol/L    Potassium 4.7 3.5 - 5.3 mmol/L    Chloride 101 98 - 107 mmol/L    Bicarbonate 25 21 - 32 mmol/L    Anion Gap 14 10 - 20 mmol/L    Urea Nitrogen 22 6 - 23 mg/dL    Creatinine 1.16 0.50 - 1.30 mg/dL    eGFR 66 >60 mL/min/1.73m*2    Calcium 9.3 8.6 - 10.6 mg/dL    Phosphorus 3.6 2.5 - 4.9 mg/dL    Albumin 3.1 (L) 3.4 - 5.0 g/dL   Type and screen   Result Value Ref Range    ABO TYPE A     Rh TYPE POS     ANTIBODY SCREEN NEG                    Assessment/Plan   Assessment & Plan  JAMAL (acute kidney injury) (CMS-HCC)    Lesion of lumbar spine    Patient is a 74M who was admitted for BLE weakness and an L4 lytic lesion.  L4-L5 biopsy was performed and results were positive for RCC. Dr Atkins discussed with the patient and sister about decompression of the lumbar spine with tumor  debulking.    Plan:  - Oncology primary  - No head of bed restrictions  - C/p lumbar tumor debulking, L3-L4 and L4-L5 laminectomies with L2-S1 PSIF with Dr. Atkins on 1/17    - 2u pRBCs and 2u plts will be on hold for OR  - IR embolization to take place today (1/16)  - Regular diet  - NPO for IR embolization 1/16, then NPO at [facility] for OR 1/17  - Pain control with multimodality  - Please hold DVT ppx  - Cho PP    Discussed with Dr. Wiblert Hendrix MD  Orthopaedic Surgery, PGY-2    Patient will be followed by the Orthopaedic Spine Team:  1st call:  Mariano Hendrix MD  2nd call:  Lew Abad DO  3rd call:  Anthony Mary, Fellow  Available via [employer]

## 2025-01-16 NOTE — PROCEDURES
Pre-Procedure Verification and Time Out:  Procedure Location: procedure area  HUDDLE - Pre-procedure Verification:  completed  TIME OUT - Final Verification:  completed immediately prior to procedure start  DEBRIEF: completed    Complications:  None; patient tolerated the procedure well.     Disposition: rPCU  Condition: stable  Specimens Collected: No specimens collected    General Information:   Anesthesia/ sedation: Anesthesia  Indication(s)/Pre - Procedure Diagnoses: lumbar mass  Post-Procedure Diagnosis: same  Procedure Name: Diagnostic Cerebral Angiogram, tumor embolization  Procedure performed by: Dr. Shena Rodriguez  Assistant(s): Teresa Ventura  Estimated Blood Loss (mL): 10  Specimen: no  Informed Consent: consent obtained and in chart     Access: 5 Fr Sheath in R CFA  Closure: Mynx  Vessels Injected: R L3, L L3, R L4, L L4, R common femoral arteries  Findings: Minimal tumor blush from bilateral L3 segmental arteries, significant tumor blush from bilateral L4 segmental arteries, s/p bilateral L4 particle embolization with significant decrease in tumor blush post-embo. Full report to follow in PACS dictation

## 2025-01-16 NOTE — ANESTHESIA PREPROCEDURE EVALUATION
"Patient: Abdi Retana \"Ray\"    Procedure Information       Date/Time: 01/16/25 1200    Scheduled providers: Rinku Ferguson MD    Procedure: IR INTERVENTION NEURO EMBOLIZATION    Location: [facility]            Relevant Problems   Cardiac   (+) Peripheral vascular disease, unspecified (CMS-HCC)      GI   (+) Gastro-esophageal reflux disease without esophagitis      /Renal   (+) Renal stones   (+) UTI (urinary tract infection)      Endocrine   (+) Obesity   (+) Obesity, unspecified      Hematology   (+) Anemia   (+) Iron deficiency anemia, unspecified      Musculoskeletal   (+) OA (osteoarthritis) of knee      ID   (+) UTI (urinary tract infection)       Clinical information reviewed:                   NPO Detail:  No data recorded     PHYSICAL EXAM    Anesthesia Plan    History of general anesthesia?: yes  History of complications of general anesthesia?: no    ASA 3     general     intravenous induction       "

## 2025-01-16 NOTE — CARE PLAN
The patient's goals for the shift include      The clinical goals for the shift include patient will remain safe, HDS and free from injury until EOS.

## 2025-01-16 NOTE — ANESTHESIA POSTPROCEDURE EVALUATION
"Patient: Abid Retana \"Ray\"    Procedure Summary       Date: 01/16/25 Room / Location: [facility]    Anesthesia Start: 1226 Anesthesia Stop: 1410    Procedure: IR INTERVENTION NEURO EMBOLIZATION Diagnosis: (spine tumor embo)    Scheduled Providers: Rinku Ferguson MD Responsible Provider: Akash Moody MD    Anesthesia Type: general ASA Status: 3            Anesthesia Type: general    Vitals Value Taken Time   /55 01/16/25 1500   Temp 36.6 °C (97.9 °F) 01/16/25 1408   Pulse 62 01/16/25 1500   Resp 17 01/16/25 1500   SpO2 100 % 01/16/25 1500       Anesthesia Post Evaluation    Patient location during evaluation: PACU  Patient participation: complete - patient participated  Level of consciousness: awake  Pain score: 2  Pain management: adequate  Airway patency: patent  Cardiovascular status: acceptable  Respiratory status: acceptable  Hydration status: acceptable  Postoperative Nausea and Vomiting: none        No notable events documented.    "

## 2025-01-16 NOTE — PROGRESS NOTES
"Abdi Retana \"Louise" is a 74 y.o. male on day 12 of admission presenting with Lesion of lumbar spine.      Subjective   Overnight: NAEO   Denies any CP/SOB?Palpitations, N/V/D. S/p L3/L4 spinal artery emobolization with no complications. Will keep pt NPO for surgery with Ortho spine on 1/17.     Objective     Last Recorded Vitals  /72   Pulse 63   Temp 36.3 °C (97.3 °F) (Temporal)   Resp 17   Wt 120 kg (264 lb)   SpO2 97%   Intake/Output last 3 Shifts:    Intake/Output Summary (Last 24 hours) at 1/16/2025 1622  Last data filed at 1/16/2025 1359  Gross per 24 hour   Intake 590 ml   Output 325 ml   Net 265 ml       Admission Weight  Weight: 120 kg (264 lb) (01/04/25 1900)    Daily Weight  01/04/25 : 120 kg (264 lb)    No current facility-administered medications on file prior to encounter.     Current Outpatient Medications on File Prior to Encounter   Medication Sig Dispense Refill    acetaminophen (Tylenol 8 HOUR) 650 mg ER tablet Take 1 tablet (650 mg) by mouth every 6 hours if needed for mild pain (1 - 3). Do not crush, chew, or split.      acetaminophen (Tylenol) 325 mg tablet Take 2 tablets (650 mg) by mouth 2 times a day.      aspirin 81 mg EC tablet Take 1 tablet (81 mg) by mouth once daily.      bisacodyl (Gentle Laxative, bisacodyl,) 10 mg suppository Insert 1 suppository (10 mg) into the rectum once daily as needed for constipation.      cefepime (Maxipime) IV Infuse 100 mL (2 g) over 0.5 hours into a venous catheter every 12 hours.      famotidine (Pepcid) 20 mg tablet Take 1 tablet (20 mg) by mouth 2 times a day.      ferrous sulfate (FeroSuL) 325 (65 Fe) MG tablet Take 1 tablet (325 mg) by mouth once every 24 hours. 90 tablet 3    losartan (Cozaar) 50 mg tablet TAKE 1 TABLET BY MOUTH ONCE  DAILY 90 tablet 3    magnesium hydroxide (Milk of Magnesia) 400 mg/5 mL suspension Take 30 mL by mouth once daily as needed for constipation (if no BM in 3 consecutive days).      magnesium oxide 400 mg " magnesium capsule Take 1 capsule (400 mg) by mouth once daily. 90 capsule 3    mineral oil enema Insert 133 mL (1 enema) into the rectum once daily as needed for constipation (if no BM in 8 hours after getting suppository).      potassium chloride CR 10 mEq ER tablet TAKE 1 TABLET BY MOUTH ONCE  DAILY DO NOT CRUSH, CHEW, OR  SPLIT 90 tablet 3    torsemide (Demadex) 20 mg tablet TAKE 1 TABLET BY MOUTH ONCE  DAILY 90 tablet 3    traMADol (Ultram) 50 mg tablet Take 1 tablet (50 mg) by mouth every 6 hours if needed for severe pain (7 - 10).      vitamin D3-folic acid 2,500 unit- 1 mg tablet Take 1 mg by mouth once every 24 hours. 90 tablet 3        Physical Exam  HENT:      Head: Normocephalic and atraumatic.      Mouth/Throat:      Mouth: Mucous membranes are moist.   Eyes:      General: No scleral icterus.     Extraocular Movements: Extraocular movements intact.      Pupils: Pupils are equal, round, and reactive to light.   Cardiovascular:      Rate and Rhythm: Normal rate and regular rhythm.      Heart sounds: No murmur heard.  Pulmonary:      Effort: No respiratory distress.      Breath sounds: No wheezing or rales.   Abdominal:      General: There is no distension.      Tenderness: There is no abdominal tenderness. There is no guarding.   Musculoskeletal:      Comments: Legs with dressing on, images on epic show possible stasis dermatitis with ulcers   Skin:     Capillary Refill: Capillary refill takes less than 2 seconds.   Neurological:      General: No focal deficit present.      Mental Status: He is alert and oriented to person, place, and time.      Motor: Weakness present.      Comments: Richter in b/l LE 3/5, significantly limited by pain   Psychiatric:         Mood and Affect: Mood normal.         Relevant Results    Results for orders placed or performed during the hospital encounter of 01/04/25 (from the past 24 hours)   CBC and Auto Differential   Result Value Ref Range    WBC 9.3 4.4 - 11.3 x10*3/uL     nRBC 0.0 0.0 - 0.0 /100 WBCs    RBC 3.61 (L) 4.50 - 5.90 x10*6/uL    Hemoglobin 9.2 (L) 13.5 - 17.5 g/dL    Hematocrit 32.2 (L) 41.0 - 52.0 %    MCV 89 80 - 100 fL    MCH 25.5 (L) 26.0 - 34.0 pg    MCHC 28.6 (L) 32.0 - 36.0 g/dL    RDW 17.2 (H) 11.5 - 14.5 %    Platelets 338 150 - 450 x10*3/uL    Neutrophils % 69.5 40.0 - 80.0 %    Immature Granulocytes %, Automated 0.3 0.0 - 0.9 %    Lymphocytes % 16.5 13.0 - 44.0 %    Monocytes % 10.0 2.0 - 10.0 %    Eosinophils % 2.6 0.0 - 6.0 %    Basophils % 1.1 0.0 - 2.0 %    Neutrophils Absolute 6.50 (H) 1.60 - 5.50 x10*3/uL    Immature Granulocytes Absolute, Automated 0.03 0.00 - 0.50 x10*3/uL    Lymphocytes Absolute 1.54 0.80 - 3.00 x10*3/uL    Monocytes Absolute 0.93 (H) 0.05 - 0.80 x10*3/uL    Eosinophils Absolute 0.24 0.00 - 0.40 x10*3/uL    Basophils Absolute 0.10 0.00 - 0.10 x10*3/uL   Magnesium   Result Value Ref Range    Magnesium 2.25 1.60 - 2.40 mg/dL   Renal Function Panel   Result Value Ref Range    Glucose 78 74 - 99 mg/dL    Sodium 138 136 - 145 mmol/L    Potassium 4.4 3.5 - 5.3 mmol/L    Chloride 101 98 - 107 mmol/L    Bicarbonate 29 21 - 32 mmol/L    Anion Gap 12 10 - 20 mmol/L    Urea Nitrogen 20 6 - 23 mg/dL    Creatinine 1.23 0.50 - 1.30 mg/dL    eGFR 62 >60 mL/min/1.73m*2    Calcium 9.2 8.6 - 10.6 mg/dL    Phosphorus 3.4 2.5 - 4.9 mg/dL    Albumin 2.9 (L) 3.4 - 5.0 g/dL      CT chest abdomen pelvis wo IV contrast    Result Date: 1/2/2025  CHEST: 1.  History of right renal cell carcinoma. 2. Multiple pulmonary nodules consistent with metastatic disease.   ABDOMEN AND PELVIS: 1.  History upright or renal cell carcinoma. There is an unchanged necrotic mass involving the upper pole of the right kidney. 2. Bilateral renal cysts. 3. Bilateral nonobstructing intrarenal calculi with staghorn type calculi in the left kidney. There is no renal obstruction. 4. Complete bony destruction of L4 with a soft tissue mass with expansion of the vertebral body. Lytic lesion  involving the superior endplate of L5. Findings are consistent with bony metastases.   MACRO: None   Signed by: Lucia Desai 1/2/2025 10:33 AM Dictation workstation:   [id number]    XR chest 1 view    Result Date: 1/2/2025  No pneumonic consolidations are noted. There questionable perihilar nodules which may correspond to the masses noted on the prior CT.     MACRO: None   Signed by: Patrick Arnold 1/2/2025 8:33 AM Dictation workstation:   [id number]      MRI Spine 1/8/25  IMPRESSION:  MRI cervical spine:      1. No evidence of metastases.      2. Multilevel degenerative changes of the cervical spine including  moderate spinal canal stenosis at C5-C6 and C6-C7.      MRI thoracic spine:      1. No evidence of osseous metastases.      2. Multilevel degenerative changes of the thoracic spine without  striking spinal canal stenosis at any level.      MRI lumbar spine:      1. Metastasis involving the entirety of the L4 vertebral body,  portions of the posterior elements and transverse processes, with  extension into the L3-L4 and L4-L5 intervertebral disc spaces and  into the inferior endplate of L3 and superior endplate of L5 as well  as into the surrounding prevertebral soft tissues.      2. There is extension of tumor into the ventral and bilateral  epidural space at L4 and there is also tumor located within the  posterior spinal canal extending from the level of L3 through L5.  Combination of these findings cause marked spinal canal stenosis at  the level of L4. Minimal CSF is seen within the thecal sac at the  level of L4.      3. Tumor extends into the bilateral neural foramina at L4-L5 with  resulting moderate-to-marked left and marked right neural foraminal  narrowing. Tumor also extends into the inferior portions of the  bilateral neural foramina at L3-L4 resulting in moderate neural  foraminal narrowing.      4. There is metastasis located within the posterior and left epidural  space at the level of L5 and  S1 which causes partial effacement of  the posterior thecal sac.      5. Multilevel degenerative changes of the lumbar spine as detailed  above.      MRI Head 1/8/25  IMPRESSION:  1.  Small focus of diffusion restriction involving the right parietal  lobe with no associated enhancement on postcontrast imaging, favoring  small acute infarct. No mass effect or hemorrhagic transformation.  2. No abnormal intracranial enhancement to indicate intracranial  metastatic disease.  3. Nonspecific dilatation of the superior ophthalmic veins  bilaterally.    Assessment/Plan      Assessment & Plan  JAMAL (acute kidney injury) (CMS-HCC)    Lesion of lumbar spine    Mr. Abdi Retana is a 73 yo M with PMHx of HTN, GERD, SAMIA, Chronic lymphedema, venous insufficiency, OA, CKD-3(b/l 1.2-1.5), also w/ the following oncologic Hx - no formal Dx at this time. Presenting symptom was b/l LE weakness. Was admitted to [facility] 11/30-12/5 for this after IMG revealed a concerning L4-5 lesion w/ mod-severe cord compression in s/o R menal mass and suspicious lesions in adrenals lungs, and spine. This was c/f undiagnosed metastatic R RCC. Case was discussed w/ both IR and ortho spine. It was deemed that no inpt Bx nor surgery was indicated and pt was DC to SNF w/ plan for IR-guided L4 Bx (scheduled for 12/20; pt missed d/t transport issues w/ SNF). MRI brain and C/T spine w/ contrast was attempted but pt did not tolerate d/t claustrophobia - there was a plan to complete it OP w/ anesthesia. Transferred from [facility] for expedited work up of suspected R. RCC with L4/5 mets. Also has Proteus Mirabilis UTI c/b bacteremia I/s/o bilateral non obstructing renal stones. ID consulted for antibiotic management. L4 biopsy done and MRI head and spine completed. Ortho spine planning for surgery on Friday, 1/17 and Rad-onc planning for radiation after path report is back. Path report confirmed metastatic RCC. S/p spinal artery embolization with Neurosurgery.  Scheduled for surgery tomorrow for lumbar tumor debulking.     Update 1/16/25  Today:   S/p Embolization with neurosurgery; please refer to their notes for any findings.   NPO at [facility] for C/p lumbar tumor debulking, L3-L4 and L4-L5 laminectomies with L2-S1 PSIF with Dr. Atkins on 1/17   Will hold AC prior to procedure     Small Acute Stroke on MRI  -Small focus of diffusion restriction involving the right parietal lobe with no associated enhancement on postcontrast imaging, favoring small acute infarct. No mass effect or hemorrhagic transformation.  -at his neurologic baseline(fluent speech, oriented, moves all limbs,LE-4/5)  -neuro-stoke consulted  -already on aspirin for CAD, not on statin  Plan  -neuro-stroke following  Recommendations  - continue aspirin 81 mg daily   - when safe from a bleeding an procedural standpoint start therapeutic Lovenox 1 mg/kg Q12H   - Please keep patient on telemetry and monitor daily for afib. If there is evidence of afib may need alternative anticoagulation.    -  continue with atorvastatin 40 daily target LDL <70  - please arrange neurology followup at time of discharge    P. mirabilis bacteremia 2/2 UTI  -HDS  -associated w/ R renal mass, b/l non-obstructing renal stones  -sensitivities available  -seen by uro at [facility] did not have plan for procedure given non-obstructive  -ID consulted for duration, procedure rec given complex anatomy of stones and renal mass - c/f seeding  -ID following, recommend Augmentin for 3 weeks(EOT-1/23)  -repeat BC(1/10)-NGTD     C/f undiagnosed metastatic R RCC  L4-5 lesion w/ radiographic evidence cord compression  -CT(11/30)-R renal mass  -pt at baseline 4/5 LE weakness on exam, symmetric, no incontinence or saddle anesthesia  -there have been no plans for procedures per prior evaluations  -IMG as above  -d/w ortho spine, said if going for anesthesia should do the full spine  -consider formal  spine surgery consult if neuro change and/or IMG/Bx  completed  -had been seen by NSG 1/3 who thought exam was okay w/o weakness, did not rec an intervention  -On scheduled tylenol and prn oxy/dilaudid  -Outpatient medical oncology follow up with Dr Guerra  -ortho-spine following   Plan:  - No acute orthopaedic surgical intervention  - Recommend repeat blood cx, must be negative prior to operative intervention  - If biopsy indicates RCC, will need IR embolization of lesion pre operatively  - NPO at midnight in case of operative intervention  - okay for DVT ppx from orthopedic perspective  - Ortho spine to follow    HTN  CAD  -holding home  losartan 50mg  I/s/o recent rise in Cr(now resolved) and normotension   -c/w home Aspirin     Lymphedema  Venous insufficiency/venous ulcers  -wound care consulted  -hold home torsemide 20 mg daily for now  -OSH wound recs  BLE- Chronic venous status ulcers   Irrigate with normal saline or wound cleanser, Pat dry.  Apply lotion to intact skin   Cover weeping areas with Triad Hydrophillic wound dressing ointment   Pad and protect with ABD, Kerlix and paper tape.   Change every day and as needed   Secure with Tubigrip ( Apply from base of toes to 2 finger width below the bend of knee)      Gluteal cleft & buttocks-MASD/Irritant contact dermatitis due to fecal, urinary or dual incontinent.   Cleanse with bath wipes or soap and water. Rinse well and pat dry.   Apply no sting skin barrier (cavilon)   Apply Triad hydrophilic wound dressing ointment to gluteal fold/groin three times a day and as needed.     GERD  -c/w home famotidine 20 mg bid    Disposition  -barriers: clinical improvement  -destination: likely back to SNF ([facility])  -f/u: pending     Diet: cardiac   Electrolytes: K >3.5; Mg >2  DVT ppx: SCD/hold AC for surgery tomorrow   GI ppx: on home famotidine  Lines/tubes: PIV  O2: none  Baseline Cr: 1.5  T+S: 1/5  Code status: Full code  Surrogate decision maker: Micaela Retana (sister) [phone]    Salomon Silva  MD  [facility] Medicne  PGY-1

## 2025-01-16 NOTE — ANESTHESIA PREPROCEDURE EVALUATION
"Patient: Abdi Retana \"Ray\"    Procedure Information       Date/Time: 01/17/25 0645    Procedure: FUSION, SPINE, LUMBAR, POSTERIOR APPROACH (Spine Lumbar) - Plan for L2-S1 posterior spinal instrumented fusion with L3-L4 and L4-L5 laminectomy decompression with tumor debulking with use of allograft    Location: [facility] OR 03 / [facility] [place]    Surgeons: Yana Atkins MD        Matt Retana is a 74 y.o. male with a past medical history of recently found right renal mass, GERD, anemia, obesity, PVD, CKD recently admitted in November 2024 as well as December 2024 for weakness, patient was also found to have a lesion evolving L4 vertebral body, mets to adrenal glands,  lung as well, was discharged to skilled nursing facility from there with plans to follow-up with heme-onc as well as to get an IR guided biopsy of the L4 lesion and outpatient MRIs of the brain and spine with and without contrast.      Relevant Problems   Cardiac   (+) Peripheral vascular disease, unspecified (CMS-HCC)      GI   (+) Gastro-esophageal reflux disease without esophagitis      /Renal   (+) Renal stones   (+) UTI (urinary tract infection)      Endocrine   (+) Obesity   (+) Obesity, unspecified      Hematology   (+) Anemia   (+) Iron deficiency anemia, unspecified      Musculoskeletal   (+) OA (osteoarthritis) of knee      ID   (+) UTI (urinary tract infection)      Musculoskeletal and Injuries   (+) Lesion of lumbar spine     Labs:   Latest Reference Range & Units 01/16/25 07:44   GLUCOSE 74 - 99 mg/dL 78   SODIUM 136 - 145 mmol/L 138   POTASSIUM 3.5 - 5.3 mmol/L 4.4   CHLORIDE 98 - 107 mmol/L 101   Bicarbonate 21 - 32 mmol/L 29   Anion Gap 10 - 20 mmol/L 12   Blood Urea Nitrogen 6 - 23 mg/dL 20   Creatinine 0.50 - 1.30 mg/dL 1.23   EGFR >60 mL/min/1.73m*2 62   Calcium 8.6 - 10.6 mg/dL 9.2   PHOSPHORUS 2.5 - 4.9 mg/dL 3.4   Albumin 3.4 - 5.0 g/dL 2.9 (L)   MAGNESIUM 1.60 - 2.40 mg/dL 2.25   WBC 4.4 - 11.3 x10*3/uL 9.3   nRBC 0.0 - " 0.0 /100 WBCs 0.0   RBC 4.50 - 5.90 x10*6/uL 3.61 (L)   HEMOGLOBIN 13.5 - 17.5 g/dL 9.2 (L)   HEMATOCRIT 41.0 - 52.0 % 32.2 (L)   MCV 80 - 100 fL 89   MCH 26.0 - 34.0 pg 25.5 (L)   MCHC 32.0 - 36.0 g/dL 28.6 (L)   RED CELL DISTRIBUTION WIDTH 11.5 - 14.5 % 17.2 (H)   Platelets 150 - 450 x10*3/uL 338   Neutrophils % 40.0 - 80.0 % 69.5   Immature Granulocytes %, Automated 0.0 - 0.9 % 0.3   Lymphocytes % 13.0 - 44.0 % 16.5   Monocytes % 2.0 - 10.0 % 10.0   Eosinophils % 0.0 - 6.0 % 2.6   Basophils % 0.0 - 2.0 % 1.1   Neutrophils Absolute 1.60 - 5.50 x10*3/uL 6.50 (H)   Immature Granulocytes Absolute, Automated 0.00 - 0.50 x10*3/uL 0.03   Lymphocytes Absolute 0.80 - 3.00 x10*3/uL 1.54   Monocytes Absolute 0.05 - 0.80 x10*3/uL 0.93 (H)   Eosinophils Absolute 0.00 - 0.40 x10*3/uL 0.24   Basophils Absolute 0.00 - 0.10 x10*3/uL 0.10   (L): Data is abnormally low  (H): Data is abnormally high    Cardiac:   1. Left ventricular ejection fraction is normal, by visual estimate at 65-70%.   2. Left ventricular diastolic filling is indeterminate.   3. There is normal right ventricular global systolic function.   4. The left atrium is mildly dilated.     Prior airway:   Successful intubation technique: video laryngoscopy  Facilitating devices/methods: intubating stylet  Blade size: #3  ETT size (mm): 7.5  Cormack-Lehane Classification: grade IIa - partial view of glottis  Placement verified by: capnometry   Measured from: lips  ETT to lips (cm): 22  Number of attempts at approach: 1     Pt denies complications with Anesthesia, not a current smoker  Clinical information reviewed:   Tobacco  Allergies   Problems  Med Hx  Surg Hx   Fam Hx  Soc Hx        NPO Detail:  NPO/Void Status  Date of Last Liquid: 01/15/25  Date of Last Solid: 01/15/25         Physical Exam    Airway  Mallampati: IV  TM distance: <3 FB     Cardiovascular   Rhythm: regular  Rate: normal     Dental        Pulmonary   Breath sounds clear to auscultation      Abdominal   (+) obese           Anesthesia Plan    History of general anesthesia?: yes  History of complications of general anesthesia?: no    ASA 3     general     The patient is not a current smoker.    Trial extubation is planned.  Anesthetic plan and risks discussed with patient.  Use of blood products discussed with patient who consented to blood products.    Plan discussed with attending.    Attending Anesthesiologist Note  Above information reviewed including relevant HPI, PMHx, PSHx, anesthesia history, labs, and imaging.      Relevant Problems   Cardiac   (+) Peripheral vascular disease, unspecified (CMS-HCC)      GI   (+) Gastro-esophageal reflux disease without esophagitis      /Renal   (+) Renal stones   (+) UTI (urinary tract infection)      Endocrine   (+) Obesity   (+) Obesity, unspecified      Hematology   (+) Anemia   (+) Iron deficiency anemia, unspecified      Musculoskeletal   (+) OA (osteoarthritis) of knee      ID   (+) UTI (urinary tract infection)      Musculoskeletal and Injuries   (+) Lesion of lumbar spine        Medication Documentation Review Audit       Reviewed by Amelia Velasquez (Technician) on 01/02/25 at 1523      Medication Order Taking? Sig Documenting Provider Last Dose Status   acetaminophen (Tylenol 8 HOUR) 650 mg ER tablet [id number]  Take 1 tablet (650 mg) by mouth every 6 hours if needed for mild pain (1 - 3). Do not crush, chew, or split. Historical Provider, MD  Active   acetaminophen (Tylenol) 325 mg tablet [id number]  Take 2 tablets (650 mg) by mouth 2 times a day. Historical Provider, MD  Active   aspirin 81 mg EC tablet [id number]  Take 1 tablet (81 mg) by mouth once daily. Historical Provider, MD  Active   bisacodyl (Gentle Laxative, bisacodyl,) 10 mg suppository [id number]  Insert 1 suppository (10 mg) into the rectum once daily as needed for constipation. Historical Provider, MD  Active   clotrimazole-betamethasone (Lotrisone) cream [id number] No MIX WITH ZINC  "OXIDE AND APPLY  TOPICALLY TO AFFECTED AREA(S)  TWICE DAILY   Patient not taking: Reported on 1/2/2025    Cheyenne Arango MD Not Taking Active   famotidine (Pepcid) 20 mg tablet [id number]  Take 1 tablet (20 mg) by mouth 2 times a day. Monalisa Sargent MD  Active   ferrous sulfate (FeroSuL) 325 (65 Fe) MG tablet [id number]  Take 1 tablet (325 mg) by mouth once every 24 hours. Cheyenne Arango MD  Active   losartan (Cozaar) 50 mg tablet [id number]  TAKE 1 TABLET BY MOUTH ONCE  DAILY Cheyenne Arango MD  Active   magnesium hydroxide (Milk of Magnesia) 400 mg/5 mL suspension [id number]  Take 30 mL by mouth once daily as needed for constipation (if no BM in 3 consecutive days). Monalisa Sargent MD  Active   magnesium oxide 400 mg magnesium capsule [id number]  Take 1 capsule (400 mg) by mouth once daily. Cheyenne Arango MD  Active   mineral oil enema [id number]  Insert 133 mL (1 enema) into the rectum once daily as needed for constipation (if no BM in 8 hours after getting suppository). Historical MD Rosetta  Active   potassium chloride CR 10 mEq ER tablet [id number]  TAKE 1 TABLET BY MOUTH ONCE  DAILY DO NOT CRUSH, CHEW, OR  SPLIT Cheyenne Arango MD  Active   torsemide (Demadex) 20 mg tablet [id number]  TAKE 1 TABLET BY MOUTH ONCE  DAILY Cheyenne Arango MD  Active   traMADol (Ultram) 50 mg tablet [id number]  Take 1 tablet (50 mg) by mouth every 6 hours if needed for severe pain (7 - 10). Historical Provider, MD  Active   vitamin D3-folic acid 2,500 unit- 1 mg tablet [id number]  Take 1 mg by mouth once every 24 hours.   Patient taking differently: Take 1 tablet by mouth once every 24 hours.    Cheyenne Arango MD  Active   zinc oxide 10 % cream [id number] No To mix with Lotrisone and apply twice daily   Patient not taking: Reported on 1/2/2025    Cheyenne Arango MD Not Taking Active                    Visit Vitals  /67   Pulse 71   Temp 36.2 °C (97.2 °F) (Temporal)   Resp 16   Ht 1.88 m (6' 2\")   Wt 120 kg (264 lb 8.8 oz) " "  SpO2 96%   BMI 33.97 kg/m²   Smoking Status Former   BSA 2.5 m²            1/16/2025     3:00 PM 1/16/2025     3:55 PM 1/16/2025     5:01 PM 1/16/2025     8:18 PM 1/16/2025    11:52 PM 1/17/2025     4:06 AM 1/17/2025     5:48 AM   Vitals   Systolic 122 112 123 118 145 128 152   Diastolic 55 72 68 74 87 72 67   BP Location   Left arm Left arm Left arm Right arm    Heart Rate 62 63 69 74 72 74 71   Temp  36.3 °C (97.3 °F) 36.8 °C (98.2 °F) 36.7 °C (98.1 °F) 36.7 °C (98.1 °F) 36.9 °C (98.4 °F) 36.2 °C (97.2 °F)   Resp 17 17 16 16 16 16 16   Height       1.88 m (6' 2\")   Weight (lb)       264.55   BMI       33.97 kg/m2   BSA (m2)       2.5 m2        Recent Labs:    Chemistry    Lab Results   Component Value Date/Time     01/16/2025 0744    K 4.4 01/16/2025 0744     01/16/2025 0744    CO2 29 01/16/2025 0744    BUN 20 01/16/2025 0744    CREATININE 1.23 01/16/2025 0744    Lab Results   Component Value Date/Time    CALCIUM 9.2 01/16/2025 0744    ALKPHOS 95 01/05/2025 0759    AST 25 01/05/2025 0759    ALT 17 01/05/2025 0759    BILITOT 0.2 01/05/2025 0759          Lab Results   Component Value Date/Time    WBC 9.3 01/16/2025 0744    HGB 9.2 (L) 01/16/2025 0744    HCT 32.2 (L) 01/16/2025 0744     01/16/2025 0744     Lab Results   Component Value Date/Time    PROTIME 13.1 (H) 01/05/2025 0759    INR 1.2 (H) 01/05/2025 0759       Electrocardiogram:  Encounter Date: 01/04/25   Electrocardiogram, 12-lead PRN ACS symptoms   Result Value    Ventricular Rate 82    Atrial Rate 82    AL Interval 156    QRS Duration 108    QT Interval 376    QTC Calculation(Bazett) 439    P Axis -12    R Axis 25    T Axis 48    QRS Count 13    Q Onset 218    P Onset 140    P Offset 189    T Offset 406    QTC Fredericia 417    Narrative    Sinus rhythm with Premature atrial complexes  Otherwise normal ECG  When compared with ECG of 04-JAN-2025 12:58, (unconfirmed)  No significant change was found  Confirmed by Matteo King ([id number]) on " 1/7/2025 9:44:35 AM       Echocardiogram:  Results for orders placed during the hospital encounter of 01/04/25    Transthoracic Echo (TTE) Complete    Narrative  [facility], [address] [place] [address]  Tel [phone] and Fax [phone]    TRANSTHORACIC ECHOCARDIOGRAM REPORT      Patient Name:       COLLETTE JEROME        Reading Physician:    [id number] Vasquez MD  Study Date:         1/9/2025            Ordering Provider:    [id number] MARYA ROBB  MRN/PID:            [id number]            Fellow:               [id number] Alexandro Gates MD  Accession#:         [id number]        Nurse:                Eladio Phoenix RN  Date of Birth/Age:  1950 / 74      Sonographer:          CONSUELO Medina RDCS  Gender assigned at                     Additional Staff:  Birth:  Height:             187.96 cm           Admit Date:           1/3/2025  Weight:             119.75 kg           Admission Status:     Inpatient -  Routine  BSA / BMI:          2.45 m2 / 33.90     Encounter#:           [id number]  kg/m2  Blood Pressure:     130/63 mmHg         Department Location:  [facility]  Non Invasive    Study Type:    TRANSTHORACIC ECHO (TTE) COMPLETE  Diagnosis/ICD: Cerebral Infarction, unspecified-I63.9  Indication:    Stroke  CPT Code:      Echo Complete w Full Doppler-22401    Patient History:  Pertinent History: JAMAL, GERD, CKD, PVD, metastatic R RCC, bacteremia, anemia,.    Study Detail: The following Echo studies were performed: 2D, M-Mode, Doppler and  color flow. Technically challenging study due to patient lying in  supine position and body habitus. Definity used as a contrast  agent for endocardial border definition and agitated saline used  as a contrast agent for intraseptal flow evaluation. Total  contrast used for this procedure was 3 mL via IV push.      PHYSICIAN INTERPRETATION:  Left Ventricle: Left ventricular ejection fraction is  normal, by visual estimate at 65-70%. There are no regional left ventricular wall motion abnormalities. The left ventricular cavity size is normal. There is mildly increased septal and mildly increased posterior left ventricular wall thickness. There is left ventricular concentric remodeling. Left ventricular diastolic filling is indeterminate.  Left Atrium: The left atrium is mildly dilated. A bubble study using agitated saline was performed. Bubble study is negative.  Right Ventricle: The right ventricle is normal in size. There is normal right ventricular global systolic function.  Right Atrium: The right atrium was not well visualized.  Aortic Valve: The aortic valve is trileaflet. There is minimal aortic valve cusp calcification. There is no evidence of aortic valve regurgitation. The peak instantaneous gradient of the aortic valve is 7 mmHg.  Mitral Valve: The mitral valve was not well visualized. There is mild mitral annular calcification. There is trace mitral valve regurgitation.  Tricuspid Valve: The tricuspid valve was not well visualized. There is trace tricuspid regurgitation. Reported right ventricular systolic pressure may be underestimated due to incomplete or suboptimal Doppler envelope.  Pulmonic Valve: The pulmonic valve is not well visualized. There is physiologic pulmonic valve regurgitation.  Pericardium: Trivial pericardial effusion.  Aorta: The aortic root is abnormal. There is mild dilatation of the ascending aorta. There is mild dilatation of the aortic root.  Systemic Veins: The inferior vena cava was not well visualized.  In comparison to the previous echocardiogram(s): There are no prior studies on this patient for comparison purposes.      CONCLUSIONS:  1. Poorly visualized anatomical structures due to suboptimal image quality.  2. Left ventricular ejection fraction is normal, by visual estimate at 65-70%.  3. Left ventricular diastolic filling is indeterminate.  4. There is normal  right ventricular global systolic function.  5. The left atrium is mildly dilated.    QUANTITATIVE DATA SUMMARY:    2D MEASUREMENTS:          Normal Ranges:  Ao Root d:       3.90 cm  (2.0-3.7cm)  LAs:             3.70 cm  (2.7-4.0cm)  IVSd:            1.20 cm  (0.6-1.1cm)  LVPWd:           1.10 cm  (0.6-1.1cm)  LVIDd:           4.50 cm  (3.9-5.9cm)  LVIDs:           3.60 cm  LV Mass Index:   76 g/m2  LVEDV Index:     56 ml/m2  LV % FS          20.0 %      LA VOLUME:                    Normal Ranges:  LA Vol A4C:        81.5 ml    (22+/-6mL/m2)  LA Vol A2C:        97.2 ml  LA Vol BP:         89.1 ml  LA Vol Index A4C:  33.3ml/m2  LA Vol Index A2C:  39.8 ml/m2  LA Vol Index BP:   36.4 ml/m2  LA Area A4C:       25.2 cm2  LA Area A2C:       27.5 cm2  LA Major Axis A4C: 6.6 cm  LA Major Axis A2C: 6.6 cm  LA Volume Index:   36.4 ml/m2      AORTA MEASUREMENTS:         Normal Ranges:  Asc Ao, d:          3.50 cm (2.1-3.4cm)      LV SYSTOLIC FUNCTION BY 2D PLANIMETRY (MOD):  Normal Ranges:  EF-A4C View:    76 % (>=55%)  EF-A2C View:    67 %  EF-Biplane:     72 %  EF-Visual:      68 %  LV EF Reported: 68 %      LV DIASTOLIC FUNCTION:             Normal Ranges:  MV Peak E:             0.44 m/s    (0.7-1.2 m/s)  MV Peak A:             0.81 m/s    (0.42-0.7 m/s)  E/A Ratio:             0.54        (1.0-2.2)  MV e'                  0.099 m/s   (>8.0)  MV lateral e'          0.10 m/s  MV medial e'           0.10 m/s  MV A Dur:              137.00 msec  E/e' Ratio:            4.44        (<8.0)      AORTIC VALVE:           Normal Ranges:  AoV Vmax:      1.30 m/s (<=1.7m/s)  AoV Peak P.8 mmHg (<20mmHg)  LVOT Max Donnie:  1.16 m/s (<=1.1m/s)  LVOT VTI:      22.10 cm  LVOT Diameter: 2.30 cm  (1.8-2.4cm)  AoV Area,Vmax: 3.71 cm2 (2.5-4.5cm2)      RIGHT VENTRICLE:  RV s' 0.11 m/s      PULMONIC VALVE:          Normal Ranges:  PV Max Donnie:     0.9 m/s  (0.6-0.9m/s)  PV Max PG:      3.3 mmHg      [id number] Paul Vasquez MD  Electronically  signed on 1/9/2025 at 5:11:09 PM        ** Final **      Anesthesia History: no issues  Anesthesia Plan: geta, arterial line    I discussed the anesthesia plan with the patient and/or family and reviewed the risks, benefits and alternatives. Agree to proceed.

## 2025-01-16 NOTE — CARE PLAN
The patient's goals for the shift include  Manage pain and explain process for upcoming procedures     The clinical goals for the shift include patient will remain safe, HDS and free from injury until EOS.       Problem: Skin  Goal: Decreased wound size/increased tissue granulation at next dressing change  Outcome: Progressing  Flowsheets (Taken 1/15/2025 2110)  Decreased wound size/increased tissue granulation at next dressing change:   Promote sleep for wound healing   Protective dressings over bony prominences   Utilize specialty bed per algorithm  Goal: Participates in plan/prevention/treatment measures  Outcome: Progressing  Flowsheets (Taken 1/15/2025 2110)  Participates in plan/prevention/treatment measures:   Discuss with provider PT/OT consult   Elevate heels   Increase activity/out of bed for meals  Goal: Prevent/manage excess moisture  Outcome: Progressing  Flowsheets (Taken 1/15/2025 2110)  Prevent/manage excess moisture:   Cleanse incontinence/protect with barrier cream   Follow provider orders for dressing changes   Moisturize dry skin   Monitor for/manage infection if present   Use wicking fabric (obtain order)  Goal: Prevent/minimize sheer/friction injuries  Outcome: Progressing  Flowsheets (Taken 1/15/2025 2110)  Prevent/minimize sheer/friction injuries:   Complete micro-shifts as needed if patient unable. Adjust patient position to relieve pressure points, not a full turn   HOB 30 degrees or less   Increase activity/out of bed for meals   Turn/reposition every 2 hours/use positioning/transfer devices   Use pull sheet   Utilize specialty bed per algorithm  Goal: Promote/optimize nutrition  Outcome: Progressing  Flowsheets (Taken 1/15/2025 2110)  Promote/optimize nutrition:   Assist with feeding   Consume > 50% meals/supplements   Discuss with provider if NPO > 2 days   Monitor/record intake including meals   Offer water/supplements/favorite foods   Reassess MST if dietician not consulted  Goal:  Promote skin healing  Outcome: Progressing  Flowsheets (Taken 1/15/2025 2110)  Promote skin healing:   Assess skin/pad under line(s)/device(s)   Ensure correct size (line/device) and apply per  instructions   Protective dressings over bony prominences   Rotate device position/do not position patient on device   Turn/reposition every 2 hours/use positioning/transfer devices     Problem: Fall/Injury  Goal: Not fall by end of shift  Outcome: Progressing  Goal: Be free from injury by end of the shift  Outcome: Progressing  Goal: Verbalize understanding of personal risk factors for fall in the hospital  Outcome: Progressing  Goal: Verbalize understanding of risk factor reduction measures to prevent injury from fall in the home  Outcome: Progressing  Goal: Use assistive devices by end of the shift  Outcome: Progressing  Goal: Pace activities to prevent fatigue by end of the shift  Outcome: Progressing     Problem: Pain - Adult  Goal: Verbalizes/displays adequate comfort level or baseline comfort level  Outcome: Progressing     Problem: Safety - Adult  Goal: Free from fall injury  Outcome: Progressing     Problem: Discharge Planning  Goal: Discharge to home or other facility with appropriate resources  Outcome: Progressing     Problem: Chronic Conditions and Co-morbidities  Goal: Patient's chronic conditions and co-morbidity symptoms are monitored and maintained or improved  Outcome: Progressing

## 2025-01-16 NOTE — PROGRESS NOTES
01/16/25 1100   Discharge Planning   Living Arrangements Alone   Support Systems Family members   Type of Residence Private residence   Home or Post Acute Services Post acute facilities (Rehab/SNF/etc)   Type of Post Acute Facility Services Skilled nursing   Expected Discharge Disposition SNF     SW following to assist with discharge planning. Pt recommended for SNF and referrals previously sent.  Anticipate pt to be ready for discharge next week.  [facility]  SW to continue to follow and assist with discharge planning.  Elizabeth Gunsalus LISW-S  Care Transitions Supervisor

## 2025-01-16 NOTE — PRE-PROCEDURE NOTE
Pre-Procedure H&P     Provider Assessment:  Diagnosis/Reason for Procedure: Lumbar mass  Procedure: Diagnostic Cerebral Angiogram, tumor embolization  Medications Reviewed:   yes   Prophylatic Antibiotics Needed:   no    Neuro status: A&Ox3, moving all extremities  Mouth Opening OK: yes   Neck Flexibility OK: yes   Sedation Plan: Deep sedation   COVID-19 Risk Consent:  Surgeon has reviewed key risks related to the risk of hola COVID-19 and if they contract COVID-19 what the risks are.

## 2025-01-17 ENCOUNTER — APPOINTMENT (OUTPATIENT)
Dept: RADIOLOGY | Facility: HOSPITAL | Age: 75
DRG: 456 | End: 2025-01-17
Payer: MEDICARE

## 2025-01-17 ENCOUNTER — ANESTHESIA (OUTPATIENT)
Dept: OPERATING ROOM | Facility: HOSPITAL | Age: 75
End: 2025-01-17
Payer: MEDICARE

## 2025-01-17 LAB
ANION GAP BLDA CALCULATED.4IONS-SCNC: 11 MMO/L (ref 10–25)
ANION GAP BLDA CALCULATED.4IONS-SCNC: 7 MMO/L (ref 10–25)
ANION GAP BLDA CALCULATED.4IONS-SCNC: 8 MMO/L (ref 10–25)
BASE EXCESS BLDA CALC-SCNC: 0.8 MMOL/L (ref -2–3)
BASE EXCESS BLDA CALC-SCNC: 1.7 MMOL/L (ref -2–3)
BASE EXCESS BLDA CALC-SCNC: 1.9 MMOL/L (ref -2–3)
BLOOD EXPIRATION DATE: NORMAL
BLOOD EXPIRATION DATE: NORMAL
BODY TEMPERATURE: 37 DEGREES CELSIUS
CA-I BLDA-SCNC: 1.15 MMOL/L (ref 1.1–1.33)
CA-I BLDA-SCNC: 1.17 MMOL/L (ref 1.1–1.33)
CA-I BLDA-SCNC: 1.22 MMOL/L (ref 1.1–1.33)
CHLORIDE BLDA-SCNC: 106 MMOL/L (ref 98–107)
DISPENSE STATUS: NORMAL
DISPENSE STATUS: NORMAL
GLUCOSE BLDA-MCNC: 106 MG/DL (ref 74–99)
GLUCOSE BLDA-MCNC: 129 MG/DL (ref 74–99)
GLUCOSE BLDA-MCNC: 182 MG/DL (ref 74–99)
HCO3 BLDA-SCNC: 25.3 MMOL/L (ref 22–26)
HCO3 BLDA-SCNC: 25.7 MMOL/L (ref 22–26)
HCO3 BLDA-SCNC: 26.6 MMOL/L (ref 22–26)
HCT VFR BLD EST: 26 % (ref 41–52)
HCT VFR BLD EST: 26 % (ref 41–52)
HCT VFR BLD EST: 27 % (ref 41–52)
HGB BLDA-MCNC: 8.6 G/DL (ref 13.5–17.5)
HGB BLDA-MCNC: 8.8 G/DL (ref 13.5–17.5)
HGB BLDA-MCNC: 9 G/DL (ref 13.5–17.5)
INHALED O2 CONCENTRATION: 50 %
INHALED O2 CONCENTRATION: 50 %
INHALED O2 CONCENTRATION: 60 %
LACTATE BLDA-SCNC: 0.9 MMOL/L (ref 0.4–2)
LACTATE BLDA-SCNC: 0.9 MMOL/L (ref 0.4–2)
LACTATE BLDA-SCNC: 1.4 MMOL/L (ref 0.4–2)
OXYHGB MFR BLDA: 97.3 % (ref 94–98)
OXYHGB MFR BLDA: 97.4 % (ref 94–98)
OXYHGB MFR BLDA: 98.2 % (ref 94–98)
PCO2 BLDA: 37 MM HG (ref 38–42)
PCO2 BLDA: 39 MM HG (ref 38–42)
PCO2 BLDA: 41 MM HG (ref 38–42)
PH BLDA: 7.42 PH (ref 7.38–7.42)
PH BLDA: 7.42 PH (ref 7.38–7.42)
PH BLDA: 7.45 PH (ref 7.38–7.42)
PO2 BLDA: 189 MM HG (ref 85–95)
PO2 BLDA: 192 MM HG (ref 85–95)
PO2 BLDA: 235 MM HG (ref 85–95)
POTASSIUM BLDA-SCNC: 4.4 MMOL/L (ref 3.5–5.3)
POTASSIUM BLDA-SCNC: 4.9 MMOL/L (ref 3.5–5.3)
POTASSIUM BLDA-SCNC: 5.1 MMOL/L (ref 3.5–5.3)
PRODUCT BLOOD TYPE: 8400
PRODUCT BLOOD TYPE: 8400
PRODUCT CODE: NORMAL
PRODUCT CODE: NORMAL
SAO2 % BLDA: 100 % (ref 94–100)
SAO2 % BLDA: 100 % (ref 94–100)
SAO2 % BLDA: 99 % (ref 94–100)
SODIUM BLDA-SCNC: 135 MMOL/L (ref 136–145)
SODIUM BLDA-SCNC: 135 MMOL/L (ref 136–145)
SODIUM BLDA-SCNC: 137 MMOL/L (ref 136–145)
UNIT ABO: NORMAL
UNIT ABO: NORMAL
UNIT NUMBER: NORMAL
UNIT NUMBER: NORMAL
UNIT RH: NORMAL
UNIT RH: NORMAL
UNIT VOLUME: 295
UNIT VOLUME: 317

## 2025-01-17 PROCEDURE — 3600000017 HC OR TIME - EACH INCREMENTAL 1 MINUTE - PROCEDURE LEVEL SIX: Performed by: ORTHOPAEDIC SURGERY

## 2025-01-17 PROCEDURE — 88311 DECALCIFY TISSUE: CPT | Performed by: PATHOLOGY

## 2025-01-17 PROCEDURE — 2500000001 HC RX 250 WO HCPCS SELF ADMINISTERED DRUGS (ALT 637 FOR MEDICARE OP)

## 2025-01-17 PROCEDURE — 63047 LAM FACETEC & FORAMOT LUMBAR: CPT | Performed by: ORTHOPAEDIC SURGERY

## 2025-01-17 PROCEDURE — 20251 BIOPSY VRT BDY OPEN LMBR/CRV: CPT | Performed by: ORTHOPAEDIC SURGERY

## 2025-01-17 PROCEDURE — 0SG1071 FUSION OF 2 OR MORE LUMBAR VERTEBRAL JOINTS WITH AUTOLOGOUS TISSUE SUBSTITUTE, POSTERIOR APPROACH, POSTERIOR COLUMN, OPEN APPROACH: ICD-10-PCS | Performed by: ORTHOPAEDIC SURGERY

## 2025-01-17 PROCEDURE — 2500000004 HC RX 250 GENERAL PHARMACY W/ HCPCS (ALT 636 FOR OP/ED)

## 2025-01-17 PROCEDURE — C1713 ANCHOR/SCREW BN/BN,TIS/BN: HCPCS | Performed by: ORTHOPAEDIC SURGERY

## 2025-01-17 PROCEDURE — 88307 TISSUE EXAM BY PATHOLOGIST: CPT | Performed by: PATHOLOGY

## 2025-01-17 PROCEDURE — 2500000004 HC RX 250 GENERAL PHARMACY W/ HCPCS (ALT 636 FOR OP/ED): Performed by: ORTHOPAEDIC SURGERY

## 2025-01-17 PROCEDURE — C1762 CONN TISS, HUMAN(INC FASCIA): HCPCS | Performed by: ORTHOPAEDIC SURGERY

## 2025-01-17 PROCEDURE — 00NY0ZZ RELEASE LUMBAR SPINAL CORD, OPEN APPROACH: ICD-10-PCS | Performed by: ORTHOPAEDIC SURGERY

## 2025-01-17 PROCEDURE — 36430 TRANSFUSION BLD/BLD COMPNT: CPT | Mod: GC

## 2025-01-17 PROCEDURE — 7100000002 HC RECOVERY ROOM TIME - EACH INCREMENTAL 1 MINUTE: Performed by: ORTHOPAEDIC SURGERY

## 2025-01-17 PROCEDURE — 3700000002 HC GENERAL ANESTHESIA TIME - EACH INCREMENTAL 1 MINUTE: Performed by: ORTHOPAEDIC SURGERY

## 2025-01-17 PROCEDURE — 2500000005 HC RX 250 GENERAL PHARMACY W/O HCPCS

## 2025-01-17 PROCEDURE — P9017 PLASMA 1 DONOR FRZ W/IN 8 HR: HCPCS

## 2025-01-17 PROCEDURE — P9040 RBC LEUKOREDUCED IRRADIATED: HCPCS

## 2025-01-17 PROCEDURE — 7100000024 HC EXTENDED STAY RECOVERY PER MINUTE- PACU: Performed by: ORTHOPAEDIC SURGERY

## 2025-01-17 PROCEDURE — 00BT0ZZ EXCISION OF SPINAL MENINGES, OPEN APPROACH: ICD-10-PCS | Performed by: ORTHOPAEDIC SURGERY

## 2025-01-17 PROCEDURE — 97605 NEG PRS WND THER DME<=50SQCM: CPT | Performed by: ORTHOPAEDIC SURGERY

## 2025-01-17 PROCEDURE — 22842 INSERT SPINE FIXATION DEVICE: CPT | Performed by: ORTHOPAEDIC SURGERY

## 2025-01-17 PROCEDURE — 22614 ARTHRD PST TQ 1NTRSPC EA ADD: CPT | Performed by: ORTHOPAEDIC SURGERY

## 2025-01-17 PROCEDURE — 63048 LAM FACETEC &FORAMOT EA ADDL: CPT | Performed by: ORTHOPAEDIC SURGERY

## 2025-01-17 PROCEDURE — 84132 ASSAY OF SERUM POTASSIUM: CPT

## 2025-01-17 PROCEDURE — 3600000018 HC OR TIME - INITIAL BASE CHARGE - PROCEDURE LEVEL SIX: Performed by: ORTHOPAEDIC SURGERY

## 2025-01-17 PROCEDURE — 72020 X-RAY EXAM OF SPINE 1 VIEW: CPT

## 2025-01-17 PROCEDURE — 2720000007 HC OR 272 NO HCPCS: Performed by: ORTHOPAEDIC SURGERY

## 2025-01-17 PROCEDURE — 7100000001 HC RECOVERY ROOM TIME - INITIAL BASE CHARGE: Performed by: ORTHOPAEDIC SURGERY

## 2025-01-17 PROCEDURE — 01NB0ZZ RELEASE LUMBAR NERVE, OPEN APPROACH: ICD-10-PCS | Performed by: ORTHOPAEDIC SURGERY

## 2025-01-17 PROCEDURE — 88307 TISSUE EXAM BY PATHOLOGIST: CPT | Mod: TC,SUR | Performed by: ORTHOPAEDIC SURGERY

## 2025-01-17 PROCEDURE — P9045 ALBUMIN (HUMAN), 5%, 250 ML: HCPCS | Mod: JZ,TB

## 2025-01-17 PROCEDURE — 2500000005 HC RX 250 GENERAL PHARMACY W/O HCPCS: Performed by: ORTHOPAEDIC SURGERY

## 2025-01-17 PROCEDURE — 99232 SBSQ HOSP IP/OBS MODERATE 35: CPT

## 2025-01-17 PROCEDURE — C1889 IMPLANT/INSERT DEVICE, NOC: HCPCS | Performed by: ORTHOPAEDIC SURGERY

## 2025-01-17 PROCEDURE — 0SG3071 FUSION OF LUMBOSACRAL JOINT WITH AUTOLOGOUS TISSUE SUBSTITUTE, POSTERIOR APPROACH, POSTERIOR COLUMN, OPEN APPROACH: ICD-10-PCS | Performed by: ORTHOPAEDIC SURGERY

## 2025-01-17 PROCEDURE — 3700000001 HC GENERAL ANESTHESIA TIME - INITIAL BASE CHARGE: Performed by: ORTHOPAEDIC SURGERY

## 2025-01-17 PROCEDURE — 20930 SP BONE ALGRFT MORSEL ADD-ON: CPT | Performed by: ORTHOPAEDIC SURGERY

## 2025-01-17 PROCEDURE — 22612 ARTHRD PST TQ 1NTRSPC LUMBAR: CPT | Performed by: ORTHOPAEDIC SURGERY

## 2025-01-17 PROCEDURE — 1170000001 HC PRIVATE ONCOLOGY ROOM DAILY

## 2025-01-17 PROCEDURE — 2780000003 HC OR 278 NO HCPCS: Performed by: ORTHOPAEDIC SURGERY

## 2025-01-17 PROCEDURE — 88311 DECALCIFY TISSUE: CPT | Mod: TC,SUR | Performed by: ORTHOPAEDIC SURGERY

## 2025-01-17 DEVICE — DBM T43110 10CC GRAFTON PUTTY
Type: IMPLANTABLE DEVICE | Site: BACK | Status: FUNCTIONAL
Brand: GRAFTON®AND GRAFTON PLUS®DEMINERALIZED BONE MATRIX (DBM)

## 2025-01-17 DEVICE — SCREW, SOLERA 5.5/6.0 ATS, 7.5 X 50: Type: IMPLANTABLE DEVICE | Site: SPINE LUMBAR | Status: FUNCTIONAL

## 2025-01-17 DEVICE — IMPLANTABLE DEVICE: Type: IMPLANTABLE DEVICE | Site: SPINE LUMBAR | Status: FUNCTIONAL

## 2025-01-17 DEVICE — SCREW, SOLERA 5.5/6.0 ATS, 7.5 X 55: Type: IMPLANTABLE DEVICE | Site: SPINE LUMBAR | Status: FUNCTIONAL

## 2025-01-17 DEVICE — SET SCREW, 5.5, TI NS BRK OFF: Type: IMPLANTABLE DEVICE | Site: SPINE LUMBAR | Status: FUNCTIONAL

## 2025-01-17 DEVICE — BONE, CHIP, CANCELLOUS, FREEZE DRIED, ASEPTIC, 1.7-10 MM, 60 CC: Type: IMPLANTABLE DEVICE | Site: BACK | Status: FUNCTIONAL

## 2025-01-17 RX ORDER — BUPIVACAINE HCL/EPINEPHRINE 0.25-.0005
VIAL (ML) INJECTION AS NEEDED
Status: DISCONTINUED | OUTPATIENT
Start: 2025-01-17 | End: 2025-01-17 | Stop reason: HOSPADM

## 2025-01-17 RX ORDER — MEPERIDINE HYDROCHLORIDE 25 MG/ML
12.5 INJECTION INTRAMUSCULAR; INTRAVENOUS; SUBCUTANEOUS EVERY 10 MIN PRN
Status: DISCONTINUED | OUTPATIENT
Start: 2025-01-17 | End: 2025-01-17 | Stop reason: HOSPADM

## 2025-01-17 RX ORDER — MIDAZOLAM HYDROCHLORIDE 1 MG/ML
INJECTION INTRAMUSCULAR; INTRAVENOUS AS NEEDED
Status: DISCONTINUED | OUTPATIENT
Start: 2025-01-17 | End: 2025-01-17

## 2025-01-17 RX ORDER — HYDRALAZINE HYDROCHLORIDE 20 MG/ML
5 INJECTION INTRAMUSCULAR; INTRAVENOUS EVERY 30 MIN PRN
Status: DISCONTINUED | OUTPATIENT
Start: 2025-01-17 | End: 2025-01-17 | Stop reason: HOSPADM

## 2025-01-17 RX ORDER — PROPOFOL 10 MG/ML
INJECTION, EMULSION INTRAVENOUS AS NEEDED
Status: DISCONTINUED | OUTPATIENT
Start: 2025-01-17 | End: 2025-01-17

## 2025-01-17 RX ORDER — PHENYLEPHRINE HCL IN 0.9% NACL 0.4MG/10ML
SYRINGE (ML) INTRAVENOUS AS NEEDED
Status: DISCONTINUED | OUTPATIENT
Start: 2025-01-17 | End: 2025-01-17

## 2025-01-17 RX ORDER — SODIUM CHLORIDE 0.9 G/100ML
INJECTION, SOLUTION IRRIGATION AS NEEDED
Status: DISCONTINUED | OUTPATIENT
Start: 2025-01-17 | End: 2025-01-17 | Stop reason: HOSPADM

## 2025-01-17 RX ORDER — ONDANSETRON HYDROCHLORIDE 2 MG/ML
4 INJECTION, SOLUTION INTRAVENOUS ONCE AS NEEDED
Status: DISCONTINUED | OUTPATIENT
Start: 2025-01-17 | End: 2025-01-17 | Stop reason: HOSPADM

## 2025-01-17 RX ORDER — VANCOMYCIN HYDROCHLORIDE 1 G/20ML
INJECTION, POWDER, LYOPHILIZED, FOR SOLUTION INTRAVENOUS AS NEEDED
Status: DISCONTINUED | OUTPATIENT
Start: 2025-01-17 | End: 2025-01-17 | Stop reason: HOSPADM

## 2025-01-17 RX ORDER — SODIUM CHLORIDE, SODIUM LACTATE, POTASSIUM CHLORIDE, CALCIUM CHLORIDE 600; 310; 30; 20 MG/100ML; MG/100ML; MG/100ML; MG/100ML
INJECTION, SOLUTION INTRAVENOUS CONTINUOUS PRN
Status: DISCONTINUED | OUTPATIENT
Start: 2025-01-17 | End: 2025-01-17

## 2025-01-17 RX ORDER — KETOROLAC TROMETHAMINE 30 MG/ML
INJECTION, SOLUTION INTRAMUSCULAR; INTRAVENOUS AS NEEDED
Status: DISCONTINUED | OUTPATIENT
Start: 2025-01-17 | End: 2025-01-17

## 2025-01-17 RX ORDER — METHADONE IN SOD CHLOR,ISO-OSM 10 MG/ML
SYRINGE (ML) INTRAVENOUS AS NEEDED
Status: DISCONTINUED | OUTPATIENT
Start: 2025-01-17 | End: 2025-01-17

## 2025-01-17 RX ORDER — LABETALOL HYDROCHLORIDE 5 MG/ML
5 INJECTION, SOLUTION INTRAVENOUS ONCE AS NEEDED
Status: DISCONTINUED | OUTPATIENT
Start: 2025-01-17 | End: 2025-01-17 | Stop reason: HOSPADM

## 2025-01-17 RX ORDER — ONDANSETRON HYDROCHLORIDE 2 MG/ML
INJECTION, SOLUTION INTRAVENOUS AS NEEDED
Status: DISCONTINUED | OUTPATIENT
Start: 2025-01-17 | End: 2025-01-17

## 2025-01-17 RX ORDER — CEFAZOLIN 1 G/1
INJECTION, POWDER, FOR SOLUTION INTRAVENOUS AS NEEDED
Status: DISCONTINUED | OUTPATIENT
Start: 2025-01-17 | End: 2025-01-17

## 2025-01-17 RX ORDER — HYDROMORPHONE HYDROCHLORIDE 0.2 MG/ML
0.2 INJECTION INTRAMUSCULAR; INTRAVENOUS; SUBCUTANEOUS EVERY 5 MIN PRN
Status: DISCONTINUED | OUTPATIENT
Start: 2025-01-17 | End: 2025-01-17 | Stop reason: HOSPADM

## 2025-01-17 RX ORDER — KETAMINE HYDROCHLORIDE 10 MG/ML
INJECTION, SOLUTION INTRAMUSCULAR; INTRAVENOUS AS NEEDED
Status: DISCONTINUED | OUTPATIENT
Start: 2025-01-17 | End: 2025-01-17

## 2025-01-17 RX ORDER — FENTANYL CITRATE 50 UG/ML
INJECTION, SOLUTION INTRAMUSCULAR; INTRAVENOUS AS NEEDED
Status: DISCONTINUED | OUTPATIENT
Start: 2025-01-17 | End: 2025-01-17

## 2025-01-17 RX ORDER — PHENYLEPHRINE 10 MG/250 ML(40 MCG/ML)IN 0.9 % SOD.CHLORIDE INTRAVENOUS
CONTINUOUS PRN
Status: DISCONTINUED | OUTPATIENT
Start: 2025-01-17 | End: 2025-01-17

## 2025-01-17 RX ORDER — LIDOCAINE HYDROCHLORIDE 20 MG/ML
INJECTION, SOLUTION INFILTRATION; PERINEURAL AS NEEDED
Status: DISCONTINUED | OUTPATIENT
Start: 2025-01-17 | End: 2025-01-17

## 2025-01-17 RX ORDER — HYDROMORPHONE HYDROCHLORIDE 0.2 MG/ML
0.1 INJECTION INTRAMUSCULAR; INTRAVENOUS; SUBCUTANEOUS EVERY 5 MIN PRN
Status: DISCONTINUED | OUTPATIENT
Start: 2025-01-17 | End: 2025-01-17 | Stop reason: HOSPADM

## 2025-01-17 RX ORDER — ROCURONIUM BROMIDE 10 MG/ML
INJECTION, SOLUTION INTRAVENOUS AS NEEDED
Status: DISCONTINUED | OUTPATIENT
Start: 2025-01-17 | End: 2025-01-17

## 2025-01-17 RX ORDER — DEXAMETHASONE SODIUM PHOSPHATE 10 MG/ML
INJECTION INTRAMUSCULAR; INTRAVENOUS AS NEEDED
Status: DISCONTINUED | OUTPATIENT
Start: 2025-01-17 | End: 2025-01-17

## 2025-01-17 RX ORDER — ALBUTEROL SULFATE 0.83 MG/ML
2.5 SOLUTION RESPIRATORY (INHALATION) ONCE AS NEEDED
Status: DISCONTINUED | OUTPATIENT
Start: 2025-01-17 | End: 2025-01-17 | Stop reason: HOSPADM

## 2025-01-17 RX ORDER — ALBUMIN HUMAN 50 G/1000ML
SOLUTION INTRAVENOUS AS NEEDED
Status: DISCONTINUED | OUTPATIENT
Start: 2025-01-17 | End: 2025-01-17

## 2025-01-17 RX ORDER — TRANEXAMIC ACID 100 MG/ML
INJECTION, SOLUTION INTRAVENOUS AS NEEDED
Status: DISCONTINUED | OUTPATIENT
Start: 2025-01-17 | End: 2025-01-17

## 2025-01-17 RX ADMIN — Medication 20 MG: at 11:16

## 2025-01-17 RX ADMIN — Medication 10 MG: at 12:08

## 2025-01-17 RX ADMIN — TRANEXAMIC ACID 1000 MG: 100 INJECTION INTRAVENOUS at 08:14

## 2025-01-17 RX ADMIN — PROPOFOL 30 MCG/KG/MIN: 10 INJECTION, EMULSION INTRAVENOUS at 08:11

## 2025-01-17 RX ADMIN — KETOROLAC TROMETHAMINE 30 MG: 30 INJECTION, SOLUTION INTRAMUSCULAR; INTRAVENOUS at 12:46

## 2025-01-17 RX ADMIN — ROCURONIUM 30 MG: 50 INJECTION, SOLUTION INTRAVENOUS at 12:17

## 2025-01-17 RX ADMIN — SUGAMMADEX 200 MG: 100 INJECTION, SOLUTION INTRAVENOUS at 12:46

## 2025-01-17 RX ADMIN — CEFAZOLIN 3 G: 1 INJECTION, POWDER, FOR SOLUTION INTRAMUSCULAR; INTRAVENOUS at 08:10

## 2025-01-17 RX ADMIN — Medication 30 MG: at 08:01

## 2025-01-17 RX ADMIN — FAMOTIDINE 20 MG: 20 TABLET ORAL at 20:42

## 2025-01-17 RX ADMIN — PROPOFOL 100 MG: 10 INJECTION, EMULSION INTRAVENOUS at 08:01

## 2025-01-17 RX ADMIN — CEFAZOLIN 3 G: 1 INJECTION, POWDER, FOR SOLUTION INTRAMUSCULAR; INTRAVENOUS at 12:10

## 2025-01-17 RX ADMIN — Medication 120 MCG: at 11:40

## 2025-01-17 RX ADMIN — Medication 10 MG: at 08:01

## 2025-01-17 RX ADMIN — ROCURONIUM 100 MG: 50 INJECTION, SOLUTION INTRAVENOUS at 08:01

## 2025-01-17 RX ADMIN — ACETAMINOPHEN 975 MG: 325 TABLET, FILM COATED ORAL at 20:42

## 2025-01-17 RX ADMIN — AMOXICILLIN AND CLAVULANATE POTASSIUM 1 TABLET: 875; 125 TABLET, FILM COATED ORAL at 20:44

## 2025-01-17 RX ADMIN — OXYCODONE HYDROCHLORIDE 10 MG: 10 TABLET ORAL at 17:59

## 2025-01-17 RX ADMIN — ONDANSETRON 4 MG: 2 INJECTION, SOLUTION INTRAMUSCULAR; INTRAVENOUS at 13:04

## 2025-01-17 RX ADMIN — Medication 80 MCG: at 08:01

## 2025-01-17 RX ADMIN — HYDROMORPHONE HYDROCHLORIDE 0.2 MG: 0.2 INJECTION, SOLUTION INTRAMUSCULAR; INTRAVENOUS; SUBCUTANEOUS at 14:25

## 2025-01-17 RX ADMIN — Medication 120 MCG: at 11:01

## 2025-01-17 RX ADMIN — SODIUM CHLORIDE, POTASSIUM CHLORIDE, SODIUM LACTATE AND CALCIUM CHLORIDE: 600; 310; 30; 20 INJECTION, SOLUTION INTRAVENOUS at 07:55

## 2025-01-17 RX ADMIN — LIDOCAINE HYDROCHLORIDE 100 MG: 20 INJECTION, SOLUTION INFILTRATION; PERINEURAL at 08:01

## 2025-01-17 RX ADMIN — ALBUMIN HUMAN 250 ML: 0.05 INJECTION, SOLUTION INTRAVENOUS at 10:32

## 2025-01-17 RX ADMIN — ATORVASTATIN CALCIUM 40 MG: 40 TABLET, FILM COATED ORAL at 20:43

## 2025-01-17 RX ADMIN — MIDAZOLAM HYDROCHLORIDE 1 MG: 1 INJECTION, SOLUTION INTRAMUSCULAR; INTRAVENOUS at 07:54

## 2025-01-17 RX ADMIN — DEXAMETHASONE SODIUM PHOSPHATE 10 MG: 4 INJECTION, SOLUTION INTRA-ARTICULAR; INTRALESIONAL; INTRAMUSCULAR; INTRAVENOUS; SOFT TISSUE at 20:42

## 2025-01-17 RX ADMIN — Medication 120 MCG: at 11:47

## 2025-01-17 RX ADMIN — Medication 80 MCG: at 08:07

## 2025-01-17 RX ADMIN — Medication 20 MG: at 10:20

## 2025-01-17 RX ADMIN — FENTANYL CITRATE 50 MCG: 50 INJECTION, SOLUTION INTRAMUSCULAR; INTRAVENOUS at 08:01

## 2025-01-17 RX ADMIN — FENTANYL CITRATE 50 MCG: 50 INJECTION, SOLUTION INTRAMUSCULAR; INTRAVENOUS at 08:52

## 2025-01-17 RX ADMIN — PHENYLEPHRINE-NACL IV SOLUTION 10 MG/250ML-0.9% 0.5 MCG/KG/MIN: 10-0.9/25 SOLUTION at 08:11

## 2025-01-17 RX ADMIN — Medication 8 L/MIN: at 13:35

## 2025-01-17 RX ADMIN — Medication 20 MG: at 09:21

## 2025-01-17 RX ADMIN — SENNOSIDES 8.6 MG: 8.6 TABLET, FILM COATED ORAL at 20:42

## 2025-01-17 RX ADMIN — DEXAMETHASONE SODIUM PHOSPHATE 10 MG: 10 INJECTION INTRAMUSCULAR; INTRAVENOUS at 08:01

## 2025-01-17 RX ADMIN — Medication 160 MCG: at 08:15

## 2025-01-17 SDOH — HEALTH STABILITY: MENTAL HEALTH: CURRENT SMOKER: 0

## 2025-01-17 ASSESSMENT — COGNITIVE AND FUNCTIONAL STATUS - GENERAL
TOILETING: A LOT
PERSONAL GROOMING: A LITTLE
WALKING IN HOSPITAL ROOM: A LOT
DAILY ACTIVITIY SCORE: 16
HELP NEEDED FOR BATHING: A LOT
TURNING FROM BACK TO SIDE WHILE IN FLAT BAD: A LOT
MOBILITY SCORE: 14
DRESSING REGULAR LOWER BODY CLOTHING: A LITTLE
MOVING TO AND FROM BED TO CHAIR: A LOT
CLIMB 3 TO 5 STEPS WITH RAILING: A LOT
STANDING UP FROM CHAIR USING ARMS: A LOT
DRESSING REGULAR UPPER BODY CLOTHING: A LOT

## 2025-01-17 ASSESSMENT — PAIN SCALES - GENERAL
PAINLEVEL_OUTOF10: 7
PAINLEVEL_OUTOF10: 7
PAINLEVEL_OUTOF10: 2
PAINLEVEL_OUTOF10: 5 - MODERATE PAIN
PAINLEVEL_OUTOF10: 0 - NO PAIN
PAINLEVEL_OUTOF10: 2
PAINLEVEL_OUTOF10: 7
PAINLEVEL_OUTOF10: 0 - NO PAIN
PAINLEVEL_OUTOF10: 5 - MODERATE PAIN
PAINLEVEL_OUTOF10: 2
PAINLEVEL_OUTOF10: 0 - NO PAIN
PAINLEVEL_OUTOF10: 3
PAINLEVEL_OUTOF10: 0 - NO PAIN
PAINLEVEL_OUTOF10: 2
PAINLEVEL_OUTOF10: 2

## 2025-01-17 ASSESSMENT — COLUMBIA-SUICIDE SEVERITY RATING SCALE - C-SSRS
6. HAVE YOU EVER DONE ANYTHING, STARTED TO DO ANYTHING, OR PREPARED TO DO ANYTHING TO END YOUR LIFE?: NO
2. HAVE YOU ACTUALLY HAD ANY THOUGHTS OF KILLING YOURSELF?: NO
1. IN THE PAST MONTH, HAVE YOU WISHED YOU WERE DEAD OR WISHED YOU COULD GO TO SLEEP AND NOT WAKE UP?: NO

## 2025-01-17 ASSESSMENT — PAIN DESCRIPTION - LOCATION: LOCATION: BACK

## 2025-01-17 NOTE — DOCUMENTATION CLARIFICATION NOTE
"    PATIENT:               COLLETTE JEROME  ACCT #:                  [id number]  MRN:                       [id number]  :                       1950  ADMIT DATE:       2025 10:14 AM  DISCH DATE:  RESPONDING PROVIDER #:        [id number]          PROVIDER RESPONSE TEXT:    I agree with dietician diagnosis of Severe Malnutrition on 25    CDI QUERY TEXT:    Clarification        Instruction:    Based on your assessment of the patient and the clinical information, please provide the requested documentation by clicking on the appropriate radio button and enter any additional information if prompted.    Question: Please further clarify this patient nutritional status as    When answering this query, please exercise your independent professional judgment. The fact that a question is being asked, does not imply that any particular answer is desired or expected.    The patient's clinical indicators include:  Clinical Information: 73 yo M with newly diagnosis metastatic RCC with noted decreased PO intake 2/2 dysgeusia and nausea per RDN.    Clinical Indicators:   at 1526 RDN Consult:  \"-only 2 meals ordered in the past week. During visit today pt received tray of tater tots with ketchup. Pt reports he has not been eating anything. Last meal was a couple of weeks ago. Reports issue with taste - things do not taste good so he has been limitin what he eats/drinks. Reports doing OJ throughout the day and 2 cartons of chocolate milk a day. Has cut back on chocolate milk as it does not taste the best. Taste changes started with meds. Also endorses nausea which is now better controlled  -BMI: 33.88  -Malnutrition Diagnosis: Severe malnutrition related to chronic disease or condition  As Evidenced by: PO intake meeting <75% of nutrient needs for >/= 1 month; severe subcutaneous fat loss; moderate muscle wasting.  Additional Assessment Information: significant wt loss is possible but given no nutr hx in EMR unable to " "objectively assess\"      Treatment:  -RDN consult  -Ensure Plus daily    Risk Factors:  -newly diagnosis metastatic RCC  -decreased PO intake 2/2 dysgeusia and nausea  Options provided:  -- I agree with dietician diagnosis of Severe Malnutrition on 1/13/25  -- Other - I will add my own diagnosis  -- Refer to Clinical Documentation Reviewer    Query created by: Jackelin Putnam on 1/17/2025 1:40 PM      Electronically signed by:  NIKOS JOSEPH MD 1/17/2025 4:57 PM          "

## 2025-01-17 NOTE — OP NOTE
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  L3-4 and L4-5 laminectomies, facetectomies, and foraminotomies for the purpose of decompression and direct visualization of the thecal sac and neural elements and removal of epidural mass  2.  L2-S1 posterior instrumented fusion with Medtronic Solera Screws and rods  3.  L2-S1 posterolateral arthrodesis  4.  Open biopsy of L4 bone and epidural mass  5. Application of allograft bone chips and  [place] allograft putty.      Surgeons      * Yana Atkins - Primary    Resident/Fellow/Other Assistant:  Surgeons and Role:     * Lew Abad DO - Resident - Assisting        Anthony Mary MD Fellow- Orthopaedic Spine    Staff:   Circulator: Rinku  Scrub Person: Afsaneh Englishub Person: Joanie Jacome Person: Yana  Circulator: Yana    Anesthesia Staff: Anesthesiologist: Eric Simpson DO; Chadd Mccarthy MD  Anesthesia Resident: Fariba Kimbrough MD  Frontline Breaker: SIL Loco    Procedure Summary  Anesthesia: General  ASA: III  Estimated Blood Loss: 2180mL  Intra-op Medications:   Administrations occurring from 0645 to 1115 on 25:   Medication Name Total Dose   thrombin-recombinant (Recothrom) 5,000 unit topical solution 10,000 Units   gelatin absorbable (Gelfoam) 100 sponge 1 each   BUPivacaine-EPINEPHrine (Marcaine w/EPI) 0.25 %-1:200,000 injection 10 mL   vancomycin (Vancocin) vial for injection 1 g "   polymyxin B 1,000,000 Units in sodium chloride 0.9 % 1,000 mL irrigation 1,000 mL   sodium chloride 0.9 % irrigation solution 1,000 mL   acetaminophen (Tylenol) tablet 975 mg Cannot be calculated   amoxicillin-pot clavulanate (Augmentin) 875-125 mg per tablet 1 tablet Cannot be calculated   aspirin EC tablet 81 mg Cannot be calculated   famotidine (Pepcid) tablet 20 mg Cannot be calculated   polyethylene glycol (Glycolax, Miralax) packet 17 g Cannot be calculated   albumin human bottle 5% 250 mL   ceFAZolin (Ancef) vial 1 g 3 g   dexAMETHasone (Decadron) 10 mg/mL 10 mg   fentaNYL (Sublimaze) injection 50 mcg/mL 100 mcg   ketamine (Ketalar) 10 mg/mL injection 70 mg   LR infusion Cannot be calculated   lidocaine (Xylocaine) injection 2 % 100 mg   methadone (Dolophine) injection 10 mg   midazolam PF (Versed) injection 1 mg/mL 1 mg   phenylephrine (Tomasz-Synephrine) 10 mg in sodium chloride 0.9% 250 mL (0.04 mg/mL) infusion (premix) 7.58 mg   phenylephrine 40 mcg/mL syringe 10 mL 440 mcg   propofol (Diprivan) injection 10 mg/mL 762.4 mg   rocuronium (ZeMuron) 50 mg/5 mL injection 100 mg   tranexamic acid injection 100 mg/mL 1,000 mg              Anesthesia Record               Intraprocedure I/O Totals          Intake    PLASMA 300.00 mL    PRBC 350.00 mL    Ketamine 0.00 mL    The total shown is the total volume documented since Anesthesia Start was filed.    Tranexamic Acid 0.00 mL    The total shown is the total volume documented since Anesthesia Start was filed.    Phenylephrine Drip 0.00 mL    The total shown is the total volume documented since Anesthesia Start was filed.    Total Intake 650 mL       Output    Urine 170 mL    Total Output 170 mL       Net    Net Volume 480 mL          Specimen:   ID Type Source Tests Collected by Time   1 : L4 MASS Tissue SPINE SURGICAL PATHOLOGY EXAM Yana Atkins MD 1/17/2025 1109   2 : L4 BONE Tissue SPINE SURGICAL PATHOLOGY EXAM Yana Atkins MD 1/17/2025 1103                  Drains and/or Catheters:   Closed/Suction Drain 1 Posterior Back 15 Fr. (Active)   Site Description Healing 01/17/25 1430   Dressing Status Clean;Dry;Occlusive 01/17/25 1430   Drainage Appearance Bright red 01/17/25 1430   Status To bulb suction 01/17/25 1430   Output (mL) 50 mL 01/17/25 1430       Closed/Suction Drain 2 Posterior Back 15 Fr. (Active)   Site Description Healing 01/17/25 1430   Dressing Status Clean;Dry;Occlusive 01/17/25 1430   Drainage Appearance Bright red 01/17/25 1430   Status To bulb suction 01/17/25 1430   Output (mL) 5 mL 01/17/25 1430       Urethral Catheter Coude 16 Fr. (Active)   Site Assessment Clean;Skin intact 01/17/25 1430   Output (mL) 30 mL 01/17/25 1430       [REMOVED] Urethral Catheter Double-lumen 16 Fr. (Removed)   Site Assessment Other (Comment) 01/04/25 1958   Collection Container Standard drainage bag 01/04/25 1100   Securement Method Securing device (Describe) 01/04/25 1100   Reason for Continuing Urinary Catheterization management of gross hematuria with blood clots in the urine 01/04/25 1100   Output (mL) 550 mL 01/04/25 1533       [REMOVED] External Urinary Catheter Male (Removed)   External Catheter Status Changed 12/04/24 1220   Securement Method Securing device (Describe) 12/03/24 1122   Output (mL) 400 mL 12/05/24 0200       [REMOVED] External Urinary Catheter Male (Removed)   External Catheter Status In place 01/02/25 0546   Collection Container Other (Comment) 01/02/25 0546   Securement Method Tape 01/02/25 0546       [REMOVED] External Urinary Catheter (Removed)   Output (mL) 350 mL 01/17/25 0406       Tourniquet Times: n/a        Implants:  Implants       Type Name Action Serial No.      Spinal Hardware PUTTY, IRAIDA  10CC - [id number] - [id number] Implanted [id number]     Spinal Hardware PUTTY, IRAIDA  10CC - [id number] - [id number] Implanted [id number]     Graft BONE, CHIP, CANCELLOUS, FREEZE DRIED, ASEPTIC, 1.7-10 MM, 60 CC - [id number] -  [id number] Implanted [id number]     Screw SET SCREW, 5.5, TI NS BRK OFF - [id number] Implanted      Spinal Hardware SCREW, SOLERA 5.5/6.0 ATS, 7.5 X 55 - [id number] Implanted       6.5 X 40MM SCREW Implanted      Spinal Hardware SCREW, SOLERA 5.5/6.0 ATS, 7.5 X 50 - [id number] Implanted      Spinal Hardware WALTER, 5.5 X 120 CVD TI SOLERA - [id number] Implanted               Findings: Lytic lesion of L4 and L5 vertebral body.  Severe canal stenosis between L3-L4 L4-L5 secondary to combination of degenerative changes and hypertrophic facet and ligamentum flavum as well as epidural mass    Indications: Abdi Retana \"Louise" is an 74 y.o. male with recent diagnosis of renal cell carcinoma who has a lytic lesion in the L4 vertebrae with epidural extension causing severe canal stenosis with progressive weakness in lower extremity difficulty with ambulation.  A long discussion was had with the patient and his family regarding options in terms of management of his tumor.  Patient had elected to proceed with surgery to decompress the neural elements and stabilize the lytic vertebrae to prevent collapse after radiation treatment.  He understands that the surgery is not going to cure his cancer. I discussed the risks of surgery which includes but not limited to infection at the surgical site or wound healing requiring additional surgery to clean the infection and long term IV antibiotics. There is risk of blood loss requiring blood transfusion. Risk of dural tear requiring repair and possible continue cerebral spinal fluid leakage and positional headaches. Risk of Nerve root injury resulting in temporary or permeant weakness, numbness, or radicular pain such as a nerve root palsy. Risk of epidural hematoma and spinal cord compression resulting in weakness in extremity and bowel and bladder disturbances requiring additional surgery to clear out hematoma. Risk of nonunion or hardware failure requiring additional surgery to " correct this. Risk of adjacent level disease in the future requiring additional surgery in the future to address this. Risk of needing to remain intubated after surgery for facial swelling. Risk of dysphagia or difficulty swallowing requiring feeding tube breifly. Risk of blindness if need to be in a prone position for surgery. Other complications include skin breakdown or skin blistering from being prone for long hours, developing MI, stroke, DVT, PE during or after surgery. Risk of death. Risk of complications from anesthesia requiring prolong intubation. Risk of hola coronavirus and the complications associated with that while in the hospital.  The patient concurred with the proposed plan, giving informed consent.  The site of surgery was properly noted/marked if necessary per policy. The patient has been actively warmed in preoperative area. Preoperative antibiotics have been ordered and given within 1 hours of incision. Venous thrombosis prophylaxis are not indicated.    Procedure Details: The patient was seen in the preoperative holding area and was identified by name, medical record number, and date of birth.  I reviewed the consent form as well as answered any final questions as well as expectations postoperatively.  The patient was then brought back to the operating room.  A huddle was performed where we identified the patient's name, medical record number, date of birth, and procedure as well as antibiotics. Once all parties are in agreement, The patient was then administered general anesthesia and was intubated without any difficulties.  Intravenous antibiotics in the form of Ancef was administered for surgical prophylaxis. Cho catheter was placed. Sequential compressive devices were applied to bilateral lower extremities.  The patient was then transferred onto the prone position onto the Sin frame in full spine precautions.  We ensured that all bony prominences were carefully padded. The  patient's lumbosacral spine was then carefully prepped and draped in the usual sterile fashion.     I performed a pre-incision pause to verify administration of antibiotics as well as the patient's identification and procedure to perform.  Once everyone  was in agreement, we proceeded with surgery.  A midline longitudinal incision was made along L2-S1 . I performed a subperiosteal exposure to the level of the L2 transverse process down to the sacral ala.  I confirmed the appropriate level using plain films.     Navigation O-arm was then brought to the room.  CT scan was obtained.  Levels were verified.  I then proceeded to place pedicle screws bilaterally at L 2, L3, L5 and S1.  L4 was skipped due to the amount of lytic erosion.  On CT scan obtained from the O-arm and was also found that patient's L5 vertebrae was also pretty eroded compared to his CT scan at admission.  We were able to still put pedicle screws in L5 but shorter length.  Pedicle screws were placed by first establishing a starting point at the junction of the superior facet and the transverse process and then cannulating the pedicles with the use of pedicle finders.  I then used a ball-tip probe to feel circumferential bone in order to minimize the likelihood of a dangerous bony breach.  The screws of appropriate length and diameter were placed based on the intraoperative as well as the preoperative measurements.  Screws were placed under navigation.  This was especially helpful at L5 and S1     I then proceeded with our laminectomy decompression.  I started with localizing the L 3 and L4 spinous process.  Using a rongeur, I removed the spinous process. I then used an up-angled curette to find an interface underneath the L for lamina distally.  I then used a rongeur to thin out the lamina so that I can remove the lamina with Kerrison.  I then used Kerrison rongeurs to remove the lamina in piecemeal.  I carefully used a Christiano to identify the plane  between the ligamentum flavum and the dural sac. I encounter thick hypertrophic ligamentum around L 4 5 and 3 4. Complete laminectomy was performed at L 3 4 and L4 5.  Once the dura was exposed there was small hematoma overlying the dura possibly from previous biopsy tract.  Epidural mass was then collected and sent off for pathology.  Along with the bones from the laminectomy were also sent for pathology.  In order to decompress the lateral recess, I further resected the bone from medial to lateral towards the medial aspect of the pedicles.  Kerrison were also used to undercut the facets along the dorsal aspect of the exiting nerve roots and to decompress the foramen. I was able to easily pass a [place] elevator along the dorsal aspect of the exiting nerve roots within the foramen.  At this point there was a lot of bleeding we had about 2 L of bleeding.  Hemostasis was obtained with Floseal and thrombin and cauterization.  Bone wax was used to stop bleeding along the bony edges.     I then proceeded with completion of our posterior instrumentation.  I contoured 2 rods into the desired lumbosacral alignment. I secured them sequentially to the L2-S1 screws.  I assured that all connector screws and rods were tightened.       The wound was then thoroughly irrigated with normal saline containing antibiotics.  I proceeded with our L2-S1 posterior arthrodesis.  The posterior lateral elements between L2-S1 was thoroughly decorticated with the use of a Midas-Danie.  Bone graft mixture consisting of allograft bone chips and [place] allograft putty was mixed and applied over the decorticated posterior bony elements.  I then applied 1 g of vancomycin powder into the wound.     Two Hemovac drain was placed. Hemostasis was secured.  A layered closure was then performed over a drain.  I closed the fascia with 0-Vicryl followed by subdermal 2-0 Vicryl and then the skin with2-0 nylon followed by Dermabond. A sterile nonadherent  dressing was applied.  The patient was gently rolled to supine position onto his hospital bed.  Patient was awoken from anesthesia and extubated without any difficulty.  The patient was then transferred out of the operating room to the postoperative holding area in stable condition.     All of the sponge and needle counts were correct at the completion of the procedure.  There were no immediate complications noted.  I was present for the entire case.     POSTOPERATIVE PLAN:  The patient will receive 24 hours of antibiotics postoperatively. Patient will work with Physical Therapy and Occupational Therapy during inpatient stay.  Patient was instructed to avoid pushing, pulling or lifting more than 10 pounds for the first 3 months.  I will see him back in clinic in 3 weeks for wound check.       Complications:  None; patient tolerated the procedure well.    Disposition: PACU - hemodynamically stable.  Condition: stable           Attending Attestation: I was present and scrubbed for the entire procedure.    Yana Atkins  Phone Number: [phone]

## 2025-01-17 NOTE — ANESTHESIA PROCEDURE NOTES
Airway  Date/Time: 1/17/2025 8:04 AM  Urgency: elective    Airway not difficult    Staffing  Performed: resident   Authorized by: rEic Simpson DO    Performed by: Fariba Kimbrough MD  Patient location during procedure: OR    Indications and Patient Condition  Indications for airway management: anesthesia and airway protection  Spontaneous ventilation: present  Sedation level: deep  Preoxygenated: yes  Patient position: sniffing  Mask difficulty assessment: 2 - vent by mask + OA or adjuvant +/- NMBA    Final Airway Details  Final airway type: endotracheal airway      Successful airway: ETT  Cuffed: yes   Successful intubation technique: video laryngoscopy  Facilitating devices/methods: intubating stylet  Endotracheal tube insertion site: oral  ETT size (mm): 7.5  Cormack-Lehane Classification: grade IIa - partial view of glottis  Placement verified by: chest auscultation and capnometry   Measured from: lips  ETT to lips (cm): 24  Number of attempts at approach: 1

## 2025-01-17 NOTE — ANESTHESIA POSTPROCEDURE EVALUATION
"Patient: Abdi Retana \"Ray\"    Procedure Summary       Date: 01/17/25 Room / Location: [facility] OR 03 / [facility] OR    Anesthesia Start: 0755 Anesthesia Stop: 1341    Procedure: FUSION, SPINE, LUMBAR, POSTERIOR APPROACH (Spine Lumbar) Diagnosis:       Lesion of lumbar spine      (Lesion of lumbar spine [M89.9])    Surgeons: Yana Atkins MD Responsible Provider: Chadd Mccarthy MD    Anesthesia Type: general ASA Status: 3            Anesthesia Type: general    Vitals Value Taken Time   /63 01/17/25 1335   Temp 36.5 °C (97.7 °F) 01/17/25 1335   Pulse 110 01/17/25 1337   Resp 28 01/17/25 1337   SpO2 100 % 01/17/25 1337   Vitals shown include unfiled device data.    Anesthesia Post Evaluation    Patient location during evaluation: PACU  Patient participation: complete - patient participated  Level of consciousness: awake  Pain management: adequate  Multimodal analgesia pain management approach  Airway patency: patent  Two or more strategies used to mitigate risk of obstructive sleep apnea  Cardiovascular status: blood pressure returned to baseline  Respiratory status: acceptable  Hydration status: acceptable  Postoperative Nausea and Vomiting: none    There were no known notable events for this encounter.    "

## 2025-01-17 NOTE — CARE PLAN
Patient is a 74-year-old male who is status post L3/L4 and L4/L5 laminectomy with tumor debulking the lower lumbar spine with fusion from L2-S1 with Dr. Atkins on 1/17/25    Plan  Oncology primary  Weightbearing as tolerated of the lower and upper extremity  No head of bed restrictions  DVT prophylaxis per primary.  Can start 48 hours post surgery 1/19  Ancef x 24 hours  Recommend Decadron 10 mg for 3 doses  Drain x 2.  Continue to record Q8 output for the left and right  Cho per primary  7-day Prevena placed  Pain control with multimodality  Resume normal diet  Resume home medications  Encourage oral fluid intake  PT/OT consult.    Plan discussed with Dr. Wilbert Abad DO  Orthopedic Surgery, PGY4    Patient will be followed by the Orthopaedic Spine Team:  1st call:  Mariano Hendrix MD  2nd call:  Lew Abad DO  3rd call:  Anthony Mary, Fellow  Available via [employer]

## 2025-01-17 NOTE — ANESTHESIA PROCEDURE NOTES
Arterial Line:    Date/Time: 1/17/2025 8:12 AM    Staffing  Performed: resident   Authorized by: Eric Simpson DO    Performed by: Fariba Kimbrough MD    An arterial line was placed. Procedure performed using ultrasound guidance.in the OR for the following indication(s): continuous blood pressure monitoring and blood sampling needed.    A 20 gauge (size), 1 and 3/4 inch (length), Angiocath (type) catheter was placed into the Right radial artery, secured by Tegadeprema   Seldinger technique used.  Events:  patient tolerated procedure well with no complications.

## 2025-01-17 NOTE — H&P
[facility] Department of Orthopaedic Surgery   Surgical History & Physical <30 Days    Reason for Surgery: Lumbar metastatic disease  Planned Procedure: lumbar tumor debulking, L3-L4 and L4-L5 laminectomies with L2-S1 PSIF     History & Physical Reviewed:  I have reviewed the History and Physical within 30 days. Relevant findings and updates are noted below:  No significant changes.    Home medications were reviewed with significant updates noted below:  No significant changes.    ERAS patient?: No    COVID-19 Risk Consent:   Surgeon has reviewed the key risks related to hola COVID-19 and subsequent sequelae.     01/17/25 at 5:43 AM - Mariano Hendrix MD

## 2025-01-17 NOTE — DISCHARGE INSTR - AVS FIRST PAGE
Potential Spine Complications  -You are breathing faster than normal.  -Fever of 100.4 F or higher.  -Chills  -Urinating less than 4 times per day.  -Acting very sleepy and difficult to awaken.  -Vomiting and not able to eat or drink for 12 hours  -3 or more loose, watery bowel movements in 24 hours (Diarrhea)  -Concerns over the appearance of your incision.    Return immediately to the ER:  -Persistent bilateral leg pain and or numbness >24 hours.  -Perineal numbness/sensory loss  -Urinary retention >12 hours  -Loss of bowel/ bladder control

## 2025-01-17 NOTE — PROGRESS NOTES
"Abdi Retana \"Louise" is a 74 y.o. male on day 13 of admission presenting with Lesion of lumbar spine.    Subjective   Patient evaluated at bedside this morning.  IR embolization yesterday.  Denies any current pain.  Denies any chest pain, shortness of breath, nausea, vomiting.      Objective     Physical Exam    General: Lying comfortably in bed, no acute distress, seems intermittently confused  HEENT: EOMI, NC/AT  CV: RRR by peripheral pulses  Resp: Normal WOB  MSK: See below. Gross motor in tact.  Neurologic: AOx3  Skin: Venous stasis appearing skin changes on bilateral lower extremities  Psych: Mood appropriate     L1: SILT       L2: SILT      Hip flexors 4/5 Left; 4/5 Right  L3: SILT      Knee extension 5/5 Left; 5/5 Right  L4: SILT      Tib Ant. (Dorsiflexion) 5/5 Left; 5/5 Right  L5: SILT      EHL 5/5 Left; 5/5 Right  S1: SILT      Plantar flexion 5/5 Left; 5/5 Right    Last Recorded Vitals  Blood pressure 128/72, pulse 74, temperature 36.9 °C (98.4 °F), temperature source Temporal, resp. rate 16, height 1.88 m (6' 2\"), weight 120 kg (264 lb), SpO2 94%.  Intake/Output last 3 Shifts:  I/O last 3 completed shifts:  In: 590 (4.9 mL/kg) [P.O.:240; IV Piggyback:350]  Out: 325 (2.7 mL/kg) [Urine:325 (0.1 mL/kg/hr)]  Weight: 119.7 kg     Relevant Results      Scheduled medications  [Transfer Hold] acetaminophen, 975 mg, oral, TID  [Transfer Hold] amoxicillin-pot clavulanate, 1 tablet, oral, q12h CHAPITO  [Held by provider] aspirin, 81 mg, oral, Daily  [Transfer Hold] atorvastatin, 40 mg, oral, Nightly  [Transfer Hold] famotidine, 20 mg, oral, BID  multivitamin with minerals, 1 tablet, oral, Daily  [Transfer Hold] polyethylene glycol, 17 g, oral, Daily  [Transfer Hold] sennosides, 1 tablet, oral, Nightly      Continuous medications     PRN medications  PRN medications: [Transfer Hold] HYDROmorphone, [Transfer Hold] oxyCODONE, [Transfer Hold] oxyCODONE  Results for orders placed or performed during the hospital encounter of " 01/04/25 (from the past 24 hours)   CBC and Auto Differential   Result Value Ref Range    WBC 9.3 4.4 - 11.3 x10*3/uL    nRBC 0.0 0.0 - 0.0 /100 WBCs    RBC 3.61 (L) 4.50 - 5.90 x10*6/uL    Hemoglobin 9.2 (L) 13.5 - 17.5 g/dL    Hematocrit 32.2 (L) 41.0 - 52.0 %    MCV 89 80 - 100 fL    MCH 25.5 (L) 26.0 - 34.0 pg    MCHC 28.6 (L) 32.0 - 36.0 g/dL    RDW 17.2 (H) 11.5 - 14.5 %    Platelets 338 150 - 450 x10*3/uL    Neutrophils % 69.5 40.0 - 80.0 %    Immature Granulocytes %, Automated 0.3 0.0 - 0.9 %    Lymphocytes % 16.5 13.0 - 44.0 %    Monocytes % 10.0 2.0 - 10.0 %    Eosinophils % 2.6 0.0 - 6.0 %    Basophils % 1.1 0.0 - 2.0 %    Neutrophils Absolute 6.50 (H) 1.60 - 5.50 x10*3/uL    Immature Granulocytes Absolute, Automated 0.03 0.00 - 0.50 x10*3/uL    Lymphocytes Absolute 1.54 0.80 - 3.00 x10*3/uL    Monocytes Absolute 0.93 (H) 0.05 - 0.80 x10*3/uL    Eosinophils Absolute 0.24 0.00 - 0.40 x10*3/uL    Basophils Absolute 0.10 0.00 - 0.10 x10*3/uL   Magnesium   Result Value Ref Range    Magnesium 2.25 1.60 - 2.40 mg/dL   Renal Function Panel   Result Value Ref Range    Glucose 78 74 - 99 mg/dL    Sodium 138 136 - 145 mmol/L    Potassium 4.4 3.5 - 5.3 mmol/L    Chloride 101 98 - 107 mmol/L    Bicarbonate 29 21 - 32 mmol/L    Anion Gap 12 10 - 20 mmol/L    Urea Nitrogen 20 6 - 23 mg/dL    Creatinine 1.23 0.50 - 1.30 mg/dL    eGFR 62 >60 mL/min/1.73m*2    Calcium 9.2 8.6 - 10.6 mg/dL    Phosphorus 3.4 2.5 - 4.9 mg/dL    Albumin 2.9 (L) 3.4 - 5.0 g/dL                   Assessment/Plan   Assessment & Plan  JAMAL (acute kidney injury) (CMS-HCC)    Lesion of lumbar spine    Patient is a 74M who was admitted for BLE weakness and an L4 lytic lesion.  L4-L5 biopsy was performed and results were positive for RCC. Dr Atkins discussed with the patient and sister about decompression of the lumbar spine with tumor debulking.    Plan:  - Oncology primary  - No head of bed restrictions  - C/p lumbar tumor debulking, L3-L4 and L4-L5  laminectomies with L2-S1 PSIF with Dr. Atkins on 1/17    - 2u pRBCs and 2u plts will be on hold for OR  - IR embolization to take place today (1/16)  - Regular diet  - NPO for OR 1/17  - Pain control with multimodality  - Please hold DVT ppx  - Cho PP  - Remainder plan pending OR    Discussed with Dr. Wilbert Hendrix MD  Orthopaedic Surgery, PGY-2    Patient will be followed by the Orthopaedic Spine Team:  1st call:  Mariano Hendrix MD  2nd call:  Lew Abad DO  3rd call:  Anthony Mary, Fellow  Available via [employer]      I saw and evaluated the patient.  I personally obtained the key and critical portions of the history and physical exam or was physically present for key and critical portions performed by the Resident. I reviewed the documentation and discussed the patient with the Resident.  I agree with the Resident’s medical decision making as documented in the note.

## 2025-01-17 NOTE — DISCHARGE INSTR - OTHER ORDERS
Wound Care  -Wound site: Back (Lumbar Spine)  -Wound Type: Surgical Incision  -Change the dressing daily and continue until the incision is no longer draining.          -Once the incision  has been dry for 48 hours, you may leave the dressing off and the site open to air.  -Cover the wound with an abdominal pad and secure it with paper tape around the edges.  -If there are sutures in your incision, they will be removed at your postoperative follow-up appointment.  -No Lotions or Creams on or around the incision site.  -No tub soaks.  -You may use heat or ice to your incision sites and or back as         needed for comfort.      **No NSAIDS (Advil, Ibuprofen, Alevel, Etc.) for 3 months, they may interfere with the healing of the fusion.

## 2025-01-17 NOTE — PROGRESS NOTES
"Abdi Retana \"Louise" is a 74 y.o. male on day 13 of admission presenting with Lesion of lumbar spine.      Subjective   Overnight: NAEO   Patient was taken to the OR when I went to his room this AM. Now s/p lumbar tumor debunking and spine decompression. No complications reported and patient tolerated surgery well.     Objective     Last Recorded Vitals  /61 (BP Location: Left arm, Patient Position: Lying)   Pulse 97   Temp 36.2 °C (97.2 °F) (Temporal)   Resp 20   Wt 120 kg (264 lb 8.8 oz)   SpO2 100%   Intake/Output last 3 Shifts:    Intake/Output Summary (Last 24 hours) at 1/17/2025 1803  Last data filed at 1/17/2025 1630  Gross per 24 hour   Intake 1180 ml   Output 720 ml   Net 460 ml       Admission Weight  Weight: 120 kg (264 lb) (01/04/25 1900)    Daily Weight  01/17/25 : 120 kg (264 lb 8.8 oz)    No current facility-administered medications on file prior to encounter.     Current Outpatient Medications on File Prior to Encounter   Medication Sig Dispense Refill    acetaminophen (Tylenol 8 HOUR) 650 mg ER tablet Take 1 tablet (650 mg) by mouth every 6 hours if needed for mild pain (1 - 3). Do not crush, chew, or split.      acetaminophen (Tylenol) 325 mg tablet Take 2 tablets (650 mg) by mouth 2 times a day.      aspirin 81 mg EC tablet Take 1 tablet (81 mg) by mouth once daily.      bisacodyl (Gentle Laxative, bisacodyl,) 10 mg suppository Insert 1 suppository (10 mg) into the rectum once daily as needed for constipation.      cefepime (Maxipime) IV Infuse 100 mL (2 g) over 0.5 hours into a venous catheter every 12 hours.      famotidine (Pepcid) 20 mg tablet Take 1 tablet (20 mg) by mouth 2 times a day.      ferrous sulfate (FeroSuL) 325 (65 Fe) MG tablet Take 1 tablet (325 mg) by mouth once every 24 hours. 90 tablet 3    losartan (Cozaar) 50 mg tablet TAKE 1 TABLET BY MOUTH ONCE  DAILY 90 tablet 3    magnesium hydroxide (Milk of Magnesia) 400 mg/5 mL suspension Take 30 mL by mouth once daily as " needed for constipation (if no BM in 3 consecutive days).      magnesium oxide 400 mg magnesium capsule Take 1 capsule (400 mg) by mouth once daily. 90 capsule 3    mineral oil enema Insert 133 mL (1 enema) into the rectum once daily as needed for constipation (if no BM in 8 hours after getting suppository).      potassium chloride CR 10 mEq ER tablet TAKE 1 TABLET BY MOUTH ONCE  DAILY DO NOT CRUSH, CHEW, OR  SPLIT 90 tablet 3    torsemide (Demadex) 20 mg tablet TAKE 1 TABLET BY MOUTH ONCE  DAILY 90 tablet 3    traMADol (Ultram) 50 mg tablet Take 1 tablet (50 mg) by mouth every 6 hours if needed for severe pain (7 - 10).      vitamin D3-folic acid 2,500 unit- 1 mg tablet Take 1 mg by mouth once every 24 hours. 90 tablet 3        Physical Exam  HENT:      Head: Normocephalic and atraumatic.      Mouth/Throat:      Mouth: Mucous membranes are moist.   Eyes:      General: No scleral icterus.     Extraocular Movements: Extraocular movements intact.      Pupils: Pupils are equal, round, and reactive to light.   Cardiovascular:      Rate and Rhythm: Normal rate and regular rhythm.      Heart sounds: No murmur heard.  Pulmonary:      Effort: No respiratory distress.      Breath sounds: No wheezing or rales.   Abdominal:      General: There is no distension.      Tenderness: There is no abdominal tenderness. There is no guarding.   Musculoskeletal:      Comments: Legs with dressing on, images on epic show possible stasis dermatitis with ulcers   Skin:     Capillary Refill: Capillary refill takes less than 2 seconds.   Neurological:      General: No focal deficit present.      Mental Status: He is alert and oriented to person, place, and time.      Motor: Weakness present.      Comments: Richter in b/l LE 3/5, significantly limited by pain   Psychiatric:         Mood and Affect: Mood normal.         Relevant Results    Results for orders placed or performed during the hospital encounter of 01/04/25 (from the past 24 hours)    Prepare RBC: 2 Units   Result Value Ref Range    PRODUCT CODE L4480Q68     Unit Number [id number]     Unit ABO A     Unit RH POS     XM INTEP COMP     Dispense Status XM     Blood Expiration Date 1/29/2025 11:59:00 PM EST     PRODUCT BLOOD TYPE 6200     UNIT VOLUME 350     PRODUCT CODE T6893S27     Unit Number [id number]     Unit ABO A     Unit RH POS     XM INTEP COMP     Dispense Status XM     Blood Expiration Date 1/30/2025 11:59:00 PM EST     PRODUCT BLOOD TYPE 6200     UNIT VOLUME 350    Prepare Plasma: 2 Units   Result Value Ref Range    PRODUCT CODE C0095V10     Unit Number [id number]     Unit ABO AB     Unit RH POS     Dispense Status RE     Blood Expiration Date 1/21/2025  7:44:00 AM EST     PRODUCT BLOOD TYPE 8400     UNIT VOLUME 295     PRODUCT CODE V6154X17     Unit Number [id number]     Unit ABO AB     Unit RH POS     Dispense Status TR     Blood Expiration Date 1/21/2025  7:44:00 AM EST     PRODUCT BLOOD TYPE 8400     UNIT VOLUME 317    BLOOD GAS ARTERIAL FULL PANEL   Result Value Ref Range    POCT pH, Arterial 7.45 (H) 7.38 - 7.42 pH    POCT pCO2, Arterial 37 (L) 38 - 42 mm Hg    POCT pO2, Arterial 192 (H) 85 - 95 mm Hg    POCT SO2, Arterial 99 94 - 100 %    POCT Oxy Hemoglobin, Arterial 97.3 94.0 - 98.0 %    POCT Hematocrit Calculated, Arterial 27.0 (L) 41.0 - 52.0 %    POCT Sodium, Arterial 135 (L) 136 - 145 mmol/L    POCT Potassium, Arterial 4.4 3.5 - 5.3 mmol/L    POCT Chloride, Arterial 106 98 - 107 mmol/L    POCT Ionized Calcium, Arterial 1.22 1.10 - 1.33 mmol/L    POCT Glucose, Arterial 106 (H) 74 - 99 mg/dL    POCT Lactate, Arterial 0.9 0.4 - 2.0 mmol/L    POCT Base Excess, Arterial 1.7 -2.0 - 3.0 mmol/L    POCT HCO3 Calculated, Arterial 25.7 22.0 - 26.0 mmol/L    POCT Hemoglobin, Arterial 9.0 (L) 13.5 - 17.5 g/dL    POCT Anion Gap, Arterial 8 (L) 10 - 25 mmo/L    Patient Temperature 37.0 degrees Celsius    FiO2 50 %   BLOOD GAS ARTERIAL FULL PANEL   Result Value Ref  Range    POCT pH, Arterial 7.42 7.38 - 7.42 pH    POCT pCO2, Arterial 41 38 - 42 mm Hg    POCT pO2, Arterial 189 (H) 85 - 95 mm Hg    POCT SO2, Arterial 100 94 - 100 %    POCT Oxy Hemoglobin, Arterial 97.4 94.0 - 98.0 %    POCT Hematocrit Calculated, Arterial 26.0 (L) 41.0 - 52.0 %    POCT Sodium, Arterial 135 (L) 136 - 145 mmol/L    POCT Potassium, Arterial 4.9 3.5 - 5.3 mmol/L    POCT Chloride, Arterial 106 98 - 107 mmol/L    POCT Ionized Calcium, Arterial 1.17 1.10 - 1.33 mmol/L    POCT Glucose, Arterial 129 (H) 74 - 99 mg/dL    POCT Lactate, Arterial 0.9 0.4 - 2.0 mmol/L    POCT Base Excess, Arterial 1.9 -2.0 - 3.0 mmol/L    POCT HCO3 Calculated, Arterial 26.6 (H) 22.0 - 26.0 mmol/L    POCT Hemoglobin, Arterial 8.8 (L) 13.5 - 17.5 g/dL    POCT Anion Gap, Arterial 7 (L) 10 - 25 mmo/L    Patient Temperature 37.0 degrees Celsius    FiO2 50 %   Blood Gas Arterial Full Panel Unsolicited   Result Value Ref Range    POCT pH, Arterial 7.42 7.38 - 7.42 pH    POCT pCO2, Arterial 39 38 - 42 mm Hg    POCT pO2, Arterial 235 (H) 85 - 95 mm Hg    POCT SO2, Arterial 100 94 - 100 %    POCT Oxy Hemoglobin, Arterial 98.2 (H) 94.0 - 98.0 %    POCT Hematocrit Calculated, Arterial 26.0 (L) 41.0 - 52.0 %    POCT Sodium, Arterial 137 136 - 145 mmol/L    POCT Potassium, Arterial 5.1 3.5 - 5.3 mmol/L    POCT Chloride, Arterial 106 98 - 107 mmol/L    POCT Ionized Calcium, Arterial 1.15 1.10 - 1.33 mmol/L    POCT Glucose, Arterial 182 (H) 74 - 99 mg/dL    POCT Lactate, Arterial 1.4 0.4 - 2.0 mmol/L    POCT Base Excess, Arterial 0.8 -2.0 - 3.0 mmol/L    POCT HCO3 Calculated, Arterial 25.3 22.0 - 26.0 mmol/L    POCT Hemoglobin, Arterial 8.6 (L) 13.5 - 17.5 g/dL    POCT Anion Gap, Arterial 11 10 - 25 mmo/L    Patient Temperature 37.0 degrees Celsius    FiO2 60 %     *Note: Due to a large number of results and/or encounters for the requested time period, some results have not been displayed. A complete set of results can be found in Results  Review.      CT chest abdomen pelvis wo IV contrast    Result Date: 1/2/2025  CHEST: 1.  History of right renal cell carcinoma. 2. Multiple pulmonary nodules consistent with metastatic disease.   ABDOMEN AND PELVIS: 1.  History upright or renal cell carcinoma. There is an unchanged necrotic mass involving the upper pole of the right kidney. 2. Bilateral renal cysts. 3. Bilateral nonobstructing intrarenal calculi with staghorn type calculi in the left kidney. There is no renal obstruction. 4. Complete bony destruction of L4 with a soft tissue mass with expansion of the vertebral body. Lytic lesion involving the superior endplate of L5. Findings are consistent with bony metastases.   MACRO: None   Signed by: Lucia Desai 1/2/2025 10:33 AM Dictation workstation:   [id number]    XR chest 1 view    Result Date: 1/2/2025  No pneumonic consolidations are noted. There questionable perihilar nodules which may correspond to the masses noted on the prior CT.     MACRO: None   Signed by: Patrick Arnold 1/2/2025 8:33 AM Dictation workstation:   [id number]      MRI Spine 1/8/25  IMPRESSION:  MRI cervical spine:      1. No evidence of metastases.      2. Multilevel degenerative changes of the cervical spine including  moderate spinal canal stenosis at C5-C6 and C6-C7.      MRI thoracic spine:      1. No evidence of osseous metastases.      2. Multilevel degenerative changes of the thoracic spine without  striking spinal canal stenosis at any level.      MRI lumbar spine:      1. Metastasis involving the entirety of the L4 vertebral body,  portions of the posterior elements and transverse processes, with  extension into the L3-L4 and L4-L5 intervertebral disc spaces and  into the inferior endplate of L3 and superior endplate of L5 as well  as into the surrounding prevertebral soft tissues.      2. There is extension of tumor into the ventral and bilateral  epidural space at L4 and there is also tumor located within  the  posterior spinal canal extending from the level of L3 through L5.  Combination of these findings cause marked spinal canal stenosis at  the level of L4. Minimal CSF is seen within the thecal sac at the  level of L4.      3. Tumor extends into the bilateral neural foramina at L4-L5 with  resulting moderate-to-marked left and marked right neural foraminal  narrowing. Tumor also extends into the inferior portions of the  bilateral neural foramina at L3-L4 resulting in moderate neural  foraminal narrowing.      4. There is metastasis located within the posterior and left epidural  space at the level of L5 and S1 which causes partial effacement of  the posterior thecal sac.      5. Multilevel degenerative changes of the lumbar spine as detailed  above.      MRI Head 1/8/25  IMPRESSION:  1.  Small focus of diffusion restriction involving the right parietal  lobe with no associated enhancement on postcontrast imaging, favoring  small acute infarct. No mass effect or hemorrhagic transformation.  2. No abnormal intracranial enhancement to indicate intracranial  metastatic disease.  3. Nonspecific dilatation of the superior ophthalmic veins  bilaterally.    Assessment/Plan      Assessment & Plan  JAMAL (acute kidney injury) (CMS-HCC)    Lesion of lumbar spine    Mr. Abdi Retana is a 75 yo M with PMHx of HTN, GERD, SAMIA, Chronic lymphedema, venous insufficiency, OA, CKD-3(b/l 1.2-1.5), also w/ the following oncologic Hx - no formal Dx at this time. Presenting symptom was b/l LE weakness. Was admitted to [facility] 11/30-12/5 for this after IMG revealed a concerning L4-5 lesion w/ mod-severe cord compression in s/o R menal mass and suspicious lesions in adrenals lungs, and spine. This was c/f undiagnosed metastatic R RCC. Case was discussed w/ both IR and ortho spine. It was deemed that no inpt Bx nor surgery was indicated and pt was DC to SNF w/ plan for IR-guided L4 Bx (scheduled for 12/20; pt missed d/t transport issues w/  SNF). MRI brain and C/T spine w/ contrast was attempted but pt did not tolerate d/t claustrophobia - there was a plan to complete it OP w/ anesthesia. Transferred from [facility] for expedited work up of suspected R. RCC with L4/5 mets. Also has Proteus Mirabilis UTI c/b bacteremia I/s/o bilateral non obstructing renal stones. ID consulted for antibiotic management. L4 biopsy done and MRI head and spine completed. Ortho spine planning for surgery on Friday, 1/17 and Rad-onc planning for radiation after path report is back. Path report confirmed metastatic RCC. S/p spinal artery embolization with Neurosurgery. S/p lumbar tumor debulking. Orthspine recommends     Update 1/17/25    Today:   S/p lumbar tumor debulking, L3-L4 and L4-L5 laminectomies with L2-S1 PSIF with Dr. Atkins on 1/17   Will hold AC or Aspirin for 48 hrs per Ortho recs and can resume afterward 1/19.   Giving patient 10mg IV decadron TID for 24 hours   Continue with PT/OT   Encourage PO intake     Ortho spine recs:   Oncology primary  Weightbearing as tolerated of the lower and upper extremity  No head of bed restrictions  DVT prophylaxis per primary.  Can start 48 hours post surgery 1/19  Ancef x 24 hours  Recommend Decadron 10 mg for 3 doses  Drain x 2.  Continue to record Q8 output for the left and right  Cho per primary  7-day Prevena placed  Pain control with multimodality  Resume normal diet  Resume home medications  Encourage oral fluid intake  PT/OT consult.  Small Acute Stroke on MRI  -Small focus of diffusion restriction involving the right parietal lobe with no associated enhancement on postcontrast imaging, favoring small acute infarct. No mass effect or hemorrhagic transformation.  -at his neurologic baseline(fluent speech, oriented, moves all limbs,LE-4/5)  -neuro-stoke consulted  -already on aspirin for CAD, not on statin  Plan  -neuro-stroke following  Recommendations  - Hold aspirin 81 mg daily for 48hrs post-surgery to prevent risk of  bleeding  - when safe from a bleeding an procedural standpoint start therapeutic Lovenox 1 mg/kg Q12H   - Please keep patient on telemetry and monitor daily for afib. If there is evidence of afib may need alternative anticoagulation.    -  continue with atorvastatin 40 daily target LDL <70  - please arrange neurology followup at time of discharge    P. mirabilis bacteremia 2/2 UTI  -HDS  -associated w/ R renal mass, b/l non-obstructing renal stones  -sensitivities available  -seen by uro at [facility], did not have plan for procedure given non-obstructive  -ID consulted for duration, procedure rec given complex anatomy of stones and renal mass - c/f seeding  -ID following, recommend Augmentin for 3 weeks(EOT-1/23)  -repeat BC(1/10)-NGTD     C/f undiagnosed metastatic R RCC  L4-5 lesion w/ radiographic evidence cord compression  -CT(11/30)-R renal mass  -pt at baseline 4/5 LE weakness on exam, symmetric, no incontinence or saddle anesthesia  -there have been no plans for procedures per prior evaluations  -IMG as above  -d/w ortho spine, said if going for anesthesia should do the full spine  -consider formal  spine surgery consult if neuro change and/or IMG/Bx completed  -had been seen by NSG 1/3 who thought exam was okay w/o weakness, did not rec an intervention  -On scheduled tylenol and prn oxy/dilaudid  -Outpatient medical oncology follow up with Dr Guerra  -ortho-spine following   Plan:  - No acute orthopaedic surgical intervention  - Recommend repeat blood cx, must be negative prior to operative intervention  - If biopsy indicates RCC, will need IR embolization of lesion pre operatively  - NPO at midnight in case of operative intervention  - okay for DVT ppx from orthopedic perspective  - Ortho spine to follow    HTN  CAD  -holding home  losartan 50mg  I/s/o recent rise in Cr(now resolved) and normotension   -c/w home Aspirin     Lymphedema  Venous insufficiency/venous ulcers  -wound care consulted  -hold home  torsemide 20 mg daily for now  -OSH wound recs  BLE- Chronic venous status ulcers   Irrigate with normal saline or wound cleanser, Pat dry.  Apply lotion to intact skin   Cover weeping areas with Triad Hydrophillic wound dressing ointment   Pad and protect with ABD, Kerlix and paper tape.   Change every day and as needed   Secure with Tubigrip ( Apply from base of toes to 2 finger width below the bend of knee)      Gluteal cleft & buttocks-MASD/Irritant contact dermatitis due to fecal, urinary or dual incontinent.   Cleanse with bath wipes or soap and water. Rinse well and pat dry.   Apply no sting skin barrier (cavilon)   Apply Triad hydrophilic wound dressing ointment to gluteal fold/groin three times a day and as needed.     GERD  -c/w home famotidine 20 mg bid    Disposition  -barriers: clinical improvement  -destination: likely back to SNF ([facility])  -f/u: pending     Diet: cardiac   Electrolytes: K >3.5; Mg >2  DVT ppx: SCD/hold AC for surgery tomorrow   GI ppx: on home famotidine  Lines/tubes: PIV  O2: none  Baseline Cr: 1.5  T+S: 1/5  Code status: Full code  Surrogate decision maker: Micaela Retana (sister) [phone]    Salomon Silva MD  Internal Medicne  PGY-1

## 2025-01-17 NOTE — ANESTHESIA PROCEDURE NOTES
Peripheral IV  Date/Time: 1/17/2025 8:09 AM      Placement  Needle size: 16 G  Laterality: left  Location: hand  Site prep: alcohol  Technique: anatomical landmarks  Attempts: 1

## 2025-01-18 ENCOUNTER — APPOINTMENT (OUTPATIENT)
Dept: HEMATOLOGY/ONCOLOGY | Facility: HOSPITAL | Age: 75
DRG: 456 | End: 2025-01-18
Payer: MEDICARE

## 2025-01-18 LAB
ABO GROUP (TYPE) IN BLOOD: NORMAL
ALBUMIN SERPL BCP-MCNC: 2.6 G/DL (ref 3.4–5)
ALBUMIN SERPL BCP-MCNC: 3 G/DL (ref 3.4–5)
ANION GAP SERPL CALC-SCNC: 12 MMOL/L (ref 10–20)
ANION GAP SERPL CALC-SCNC: 15 MMOL/L (ref 10–20)
ANTIBODY SCREEN: NORMAL
BASOPHILS # BLD AUTO: 0.02 X10*3/UL (ref 0–0.1)
BASOPHILS # BLD AUTO: 0.03 X10*3/UL (ref 0–0.1)
BASOPHILS NFR BLD AUTO: 0.1 %
BASOPHILS NFR BLD AUTO: 0.2 %
BLOOD EXPIRATION DATE: NORMAL
BUN SERPL-MCNC: 32 MG/DL (ref 6–23)
BUN SERPL-MCNC: 33 MG/DL (ref 6–23)
CALCIUM SERPL-MCNC: 8.5 MG/DL (ref 8.6–10.6)
CALCIUM SERPL-MCNC: 8.8 MG/DL (ref 8.6–10.6)
CHLORIDE SERPL-SCNC: 101 MMOL/L (ref 98–107)
CHLORIDE SERPL-SCNC: 104 MMOL/L (ref 98–107)
CO2 SERPL-SCNC: 26 MMOL/L (ref 21–32)
CO2 SERPL-SCNC: 26 MMOL/L (ref 21–32)
CREAT SERPL-MCNC: 1.91 MG/DL (ref 0.5–1.3)
CREAT SERPL-MCNC: 2.03 MG/DL (ref 0.5–1.3)
DISPENSE STATUS: NORMAL
EGFRCR SERPLBLD CKD-EPI 2021: 34 ML/MIN/1.73M*2
EGFRCR SERPLBLD CKD-EPI 2021: 36 ML/MIN/1.73M*2
EOSINOPHIL # BLD AUTO: 0 X10*3/UL (ref 0–0.4)
EOSINOPHIL # BLD AUTO: 0 X10*3/UL (ref 0–0.4)
EOSINOPHIL NFR BLD AUTO: 0 %
EOSINOPHIL NFR BLD AUTO: 0 %
ERYTHROCYTE [DISTWIDTH] IN BLOOD BY AUTOMATED COUNT: 17 % (ref 11.5–14.5)
ERYTHROCYTE [DISTWIDTH] IN BLOOD BY AUTOMATED COUNT: 17.4 % (ref 11.5–14.5)
GLUCOSE SERPL-MCNC: 136 MG/DL (ref 74–99)
GLUCOSE SERPL-MCNC: 141 MG/DL (ref 74–99)
HCT VFR BLD AUTO: 22.8 % (ref 41–52)
HCT VFR BLD AUTO: 25.8 % (ref 41–52)
HGB BLD-MCNC: 6.9 G/DL (ref 13.5–17.5)
HGB BLD-MCNC: 7.7 G/DL (ref 13.5–17.5)
IMM GRANULOCYTES # BLD AUTO: 0.15 X10*3/UL (ref 0–0.5)
IMM GRANULOCYTES # BLD AUTO: 0.32 X10*3/UL (ref 0–0.5)
IMM GRANULOCYTES NFR BLD AUTO: 0.8 % (ref 0–0.9)
IMM GRANULOCYTES NFR BLD AUTO: 1.7 % (ref 0–0.9)
LYMPHOCYTES # BLD AUTO: 0.61 X10*3/UL (ref 0.8–3)
LYMPHOCYTES # BLD AUTO: 0.93 X10*3/UL (ref 0.8–3)
LYMPHOCYTES NFR BLD AUTO: 3.2 %
LYMPHOCYTES NFR BLD AUTO: 5 %
MAGNESIUM SERPL-MCNC: 2.25 MG/DL (ref 1.6–2.4)
MCH RBC QN AUTO: 27 PG (ref 26–34)
MCH RBC QN AUTO: 27.2 PG (ref 26–34)
MCHC RBC AUTO-ENTMCNC: 29.8 G/DL (ref 32–36)
MCHC RBC AUTO-ENTMCNC: 30.3 G/DL (ref 32–36)
MCV RBC AUTO: 90 FL (ref 80–100)
MCV RBC AUTO: 91 FL (ref 80–100)
MONOCYTES # BLD AUTO: 0.55 X10*3/UL (ref 0.05–0.8)
MONOCYTES # BLD AUTO: 0.63 X10*3/UL (ref 0.05–0.8)
MONOCYTES NFR BLD AUTO: 2.8 %
MONOCYTES NFR BLD AUTO: 3.4 %
NEUTROPHILS # BLD AUTO: 16.54 X10*3/UL (ref 1.6–5.5)
NEUTROPHILS # BLD AUTO: 17.98 X10*3/UL (ref 1.6–5.5)
NEUTROPHILS NFR BLD AUTO: 89.7 %
NEUTROPHILS NFR BLD AUTO: 93.1 %
NRBC BLD-RTO: 0 /100 WBCS (ref 0–0)
NRBC BLD-RTO: 0 /100 WBCS (ref 0–0)
OVALOCYTES BLD QL SMEAR: NORMAL
PHOSPHATE SERPL-MCNC: 2.8 MG/DL (ref 2.5–4.9)
PHOSPHATE SERPL-MCNC: 4.5 MG/DL (ref 2.5–4.9)
PLATELET # BLD AUTO: 306 X10*3/UL (ref 150–450)
PLATELET # BLD AUTO: 312 X10*3/UL (ref 150–450)
POLYCHROMASIA BLD QL SMEAR: NORMAL
POTASSIUM SERPL-SCNC: 5.5 MMOL/L (ref 3.5–5.3)
POTASSIUM SERPL-SCNC: 6.4 MMOL/L (ref 3.5–5.3)
PRODUCT BLOOD TYPE: 6200
PRODUCT CODE: NORMAL
RBC # BLD AUTO: 2.54 X10*6/UL (ref 4.5–5.9)
RBC # BLD AUTO: 2.85 X10*6/UL (ref 4.5–5.9)
RBC MORPH BLD: NORMAL
RH FACTOR (ANTIGEN D): NORMAL
SODIUM SERPL-SCNC: 136 MMOL/L (ref 136–145)
SODIUM SERPL-SCNC: 136 MMOL/L (ref 136–145)
UNIT ABO: NORMAL
UNIT NUMBER: NORMAL
UNIT RH: NORMAL
UNIT VOLUME: 350
WBC # BLD AUTO: 18.5 X10*3/UL (ref 4.4–11.3)
WBC # BLD AUTO: 19.3 X10*3/UL (ref 4.4–11.3)
XM INTEP: NORMAL

## 2025-01-18 PROCEDURE — 80069 RENAL FUNCTION PANEL: CPT

## 2025-01-18 PROCEDURE — 2500000001 HC RX 250 WO HCPCS SELF ADMINISTERED DRUGS (ALT 637 FOR MEDICARE OP)

## 2025-01-18 PROCEDURE — 2500000004 HC RX 250 GENERAL PHARMACY W/ HCPCS (ALT 636 FOR OP/ED)

## 2025-01-18 PROCEDURE — 86923 COMPATIBILITY TEST ELECTRIC: CPT

## 2025-01-18 PROCEDURE — 2500000005 HC RX 250 GENERAL PHARMACY W/O HCPCS

## 2025-01-18 PROCEDURE — 1170000001 HC PRIVATE ONCOLOGY ROOM DAILY

## 2025-01-18 PROCEDURE — 93010 ELECTROCARDIOGRAM REPORT: CPT | Performed by: INTERNAL MEDICINE

## 2025-01-18 PROCEDURE — P9040 RBC LEUKOREDUCED IRRADIATED: HCPCS

## 2025-01-18 PROCEDURE — 36415 COLL VENOUS BLD VENIPUNCTURE: CPT

## 2025-01-18 PROCEDURE — 85025 COMPLETE CBC W/AUTO DIFF WBC: CPT

## 2025-01-18 PROCEDURE — 86901 BLOOD TYPING SEROLOGIC RH(D): CPT

## 2025-01-18 PROCEDURE — 2500000002 HC RX 250 W HCPCS SELF ADMINISTERED DRUGS (ALT 637 FOR MEDICARE OP, ALT 636 FOR OP/ED)

## 2025-01-18 PROCEDURE — 83735 ASSAY OF MAGNESIUM: CPT

## 2025-01-18 PROCEDURE — 36430 TRANSFUSION BLD/BLD COMPNT: CPT

## 2025-01-18 PROCEDURE — 2500000004 HC RX 250 GENERAL PHARMACY W/ HCPCS (ALT 636 FOR OP/ED): Mod: JZ,TB

## 2025-01-18 PROCEDURE — 93005 ELECTROCARDIOGRAM TRACING: CPT

## 2025-01-18 PROCEDURE — 86850 RBC ANTIBODY SCREEN: CPT

## 2025-01-18 PROCEDURE — 99232 SBSQ HOSP IP/OBS MODERATE 35: CPT

## 2025-01-18 RX ORDER — DEXTROSE MONOHYDRATE 100 MG/ML
50 INJECTION, SOLUTION INTRAVENOUS CONTINUOUS
Status: DISPENSED | OUTPATIENT
Start: 2025-01-18 | End: 2025-01-18

## 2025-01-18 RX ORDER — CALCIUM GLUCONATE 20 MG/ML
2 INJECTION, SOLUTION INTRAVENOUS ONCE
Status: COMPLETED | OUTPATIENT
Start: 2025-01-18 | End: 2025-01-18

## 2025-01-18 RX ORDER — POLYETHYLENE GLYCOL 3350 17 G/17G
17 POWDER, FOR SOLUTION ORAL 2 TIMES DAILY
Status: DISCONTINUED | OUTPATIENT
Start: 2025-01-18 | End: 2025-01-24 | Stop reason: HOSPADM

## 2025-01-18 RX ORDER — SENNOSIDES 8.6 MG/1
2 TABLET ORAL NIGHTLY
Status: DISCONTINUED | OUTPATIENT
Start: 2025-01-18 | End: 2025-01-24 | Stop reason: HOSPADM

## 2025-01-18 RX ORDER — DEXTROSE 50 % IN WATER (D50W) INTRAVENOUS SYRINGE
25 ONCE
Status: COMPLETED | OUTPATIENT
Start: 2025-01-18 | End: 2025-01-18

## 2025-01-18 RX ADMIN — ACETAMINOPHEN 975 MG: 325 TABLET, FILM COATED ORAL at 15:49

## 2025-01-18 RX ADMIN — SODIUM ZIRCONIUM CYCLOSILICATE 10 G: 10 POWDER, FOR SUSPENSION ORAL at 12:31

## 2025-01-18 RX ADMIN — CALCIUM GLUCONATE 2 G: 20 INJECTION, SOLUTION INTRAVENOUS at 12:41

## 2025-01-18 RX ADMIN — AMOXICILLIN AND CLAVULANATE POTASSIUM 1 TABLET: 875; 125 TABLET, FILM COATED ORAL at 10:05

## 2025-01-18 RX ADMIN — POLYETHYLENE GLYCOL 3350 17 G: 17 POWDER, FOR SOLUTION ORAL at 09:58

## 2025-01-18 RX ADMIN — DEXAMETHASONE SODIUM PHOSPHATE 10 MG: 4 INJECTION, SOLUTION INTRA-ARTICULAR; INTRALESIONAL; INTRAMUSCULAR; INTRAVENOUS; SOFT TISSUE at 15:48

## 2025-01-18 RX ADMIN — ACETAMINOPHEN 975 MG: 325 TABLET, FILM COATED ORAL at 21:01

## 2025-01-18 RX ADMIN — ATORVASTATIN CALCIUM 40 MG: 40 TABLET, FILM COATED ORAL at 21:01

## 2025-01-18 RX ADMIN — ACETAMINOPHEN 975 MG: 325 TABLET, FILM COATED ORAL at 09:58

## 2025-01-18 RX ADMIN — AMOXICILLIN AND CLAVULANATE POTASSIUM 1 TABLET: 875; 125 TABLET, FILM COATED ORAL at 21:01

## 2025-01-18 RX ADMIN — FAMOTIDINE 20 MG: 20 TABLET ORAL at 21:01

## 2025-01-18 RX ADMIN — SODIUM ZIRCONIUM CYCLOSILICATE 10 G: 10 POWDER, FOR SUSPENSION ORAL at 21:03

## 2025-01-18 RX ADMIN — INSULIN HUMAN 10 UNITS: 100 INJECTION, SOLUTION PARENTERAL at 12:32

## 2025-01-18 RX ADMIN — DEXTROSE MONOHYDRATE 50 ML/HR: 100 INJECTION, SOLUTION INTRAVENOUS at 12:46

## 2025-01-18 RX ADMIN — POLYETHYLENE GLYCOL 3350 17 G: 17 POWDER, FOR SOLUTION ORAL at 21:02

## 2025-01-18 RX ADMIN — DEXTROSE MONOHYDRATE 25 G: 25 INJECTION, SOLUTION INTRAVENOUS at 12:33

## 2025-01-18 RX ADMIN — STANDARDIZED SENNA CONCENTRATE 17.2 MG: 8.6 TABLET ORAL at 21:02

## 2025-01-18 RX ADMIN — FAMOTIDINE 20 MG: 20 TABLET ORAL at 09:58

## 2025-01-18 RX ADMIN — Medication 1 TABLET: at 09:58

## 2025-01-18 RX ADMIN — DEXAMETHASONE SODIUM PHOSPHATE 10 MG: 4 INJECTION, SOLUTION INTRA-ARTICULAR; INTRALESIONAL; INTRAMUSCULAR; INTRAVENOUS; SOFT TISSUE at 09:58

## 2025-01-18 ASSESSMENT — PAIN SCALES - GENERAL
PAINLEVEL_OUTOF10: 6
PAINLEVEL_OUTOF10: 7

## 2025-01-18 ASSESSMENT — PAIN DESCRIPTION - LOCATION: LOCATION: BACK

## 2025-01-18 NOTE — NURSING NOTE
Pt refused dressing changes to legs at this time. Pt educated on the importance. Will continue to monitor

## 2025-01-18 NOTE — CARE PLAN
The patient's goals for the shift include  Pain Management     The clinical goals for the shift include Pt to remain HDS this shift     Over the shift, the patient did make progress toward the following goals.       Problem: Skin  Goal: Decreased wound size/increased tissue granulation at next dressing change  Outcome: Progressing  Flowsheets (Taken 1/18/2025 0033)  Decreased wound size/increased tissue granulation at next dressing change: Promote sleep for wound healing  Goal: Participates in plan/prevention/treatment measures  Outcome: Progressing  Flowsheets (Taken 1/18/2025 0033)  Participates in plan/prevention/treatment measures: Elevate heels  Goal: Prevent/manage excess moisture  Outcome: Progressing  Flowsheets (Taken 1/18/2025 0033)  Prevent/manage excess moisture: Moisturize dry skin  Goal: Prevent/minimize sheer/friction injuries  Outcome: Progressing  Flowsheets (Taken 1/18/2025 0033)  Prevent/minimize sheer/friction injuries: Use pull sheet  Goal: Promote/optimize nutrition  Outcome: Progressing  Flowsheets (Taken 1/18/2025 0033)  Promote/optimize nutrition: Monitor/record intake including meals  Goal: Promote skin healing  Outcome: Progressing  Flowsheets (Taken 1/18/2025 0033)  Promote skin healing: Protective dressings over bony prominences     Problem: Fall/Injury  Goal: Not fall by end of shift  Outcome: Progressing  Goal: Be free from injury by end of the shift  Outcome: Progressing  Goal: Verbalize understanding of personal risk factors for fall in the hospital  Outcome: Progressing  Goal: Verbalize understanding of risk factor reduction measures to prevent injury from fall in the home  Outcome: Progressing  Goal: Use assistive devices by end of the shift  Outcome: Progressing  Goal: Pace activities to prevent fatigue by end of the shift  Outcome: Progressing     Problem: Pain - Adult  Goal: Verbalizes/displays adequate comfort level or baseline comfort level  Outcome: Progressing     Problem:  Safety - Adult  Goal: Free from fall injury  Outcome: Progressing     Problem: Discharge Planning  Goal: Discharge to home or other facility with appropriate resources  Outcome: Progressing     Problem: Chronic Conditions and Co-morbidities  Goal: Patient's chronic conditions and co-morbidity symptoms are monitored and maintained or improved  Outcome: Progressing

## 2025-01-18 NOTE — PROGRESS NOTES
"Physical Therapy                 Therapy Communication Note    Patient Name: Abdi Retana \"Matt\"  MRN: [id number]  Department: [facility]  Room: [address] [place], [place] [address]  Today's Date: 1/18/2025     Discipline: Physical Therapy    PT Missed Visit: Yes     Missed Visit Reason: Missed Visit Reason: Patient in a medical procedure (Pt with potassium of 6.4. Will re attempt as appropriate.)    Missed Time: Attempt    Comment:  "

## 2025-01-18 NOTE — PROGRESS NOTES
"Abdi Retana \"Louise" is a 74 y.o. male on day 14 of admission presenting with Lesion of lumbar spine.    Subjective   Patient evaluated at bedside this morning.  States pain is well controlled. Denies N/T down the lower extremities. No SOB or chest pain.     Objective     Physical Exam    General: Lying comfortably in bed, no acute distress, seems intermittently confused  HEENT: EOMI, NC/AT  CV: RRR by peripheral pulses  Resp: Normal WOB  MSK: See below. Gross motor in tact.  Neurologic: AOx3  Skin: Venous stasis appearing skin changes on bilateral lower extremities  Psych: Mood appropriate     L1: SILT       L2: SILT      Hip flexors 4/5 Left; 4/5 Right  L3: SILT      Knee extension 5/5 Left; 5/5 Right  L4: SILT      Tib Ant. (Dorsiflexion) 5/5 Left; 5/5 Right  L5: SILT      EHL 5/5 Left; 5/5 Right  S1: SILT      Plantar flexion 5/5 Left; 5/5 Right    Drain x 2 intact with bloody output  Prevena on with no leaks     Last Recorded Vitals  Blood pressure 135/69, pulse 66, temperature 36.5 °C (97.7 °F), temperature source Temporal, resp. rate 18, height 1.88 m (6' 2\"), weight 120 kg (264 lb 8.8 oz), SpO2 96%.  Intake/Output last 3 Shifts:  I/O last 3 completed shifts:  In: 1530 (12.8 mL/kg) [P.O.:30; I.V.:500 (4.2 mL/kg); Blood:650; IV Piggyback:350]  Out: 720 (6 mL/kg) [Urine:635 (0.1 mL/kg/hr); Drains:85]  Weight: 120 kg     Relevant Results      Scheduled medications  acetaminophen, 975 mg, oral, TID  amoxicillin-pot clavulanate, 1 tablet, oral, q12h CHAPITO  [Held by provider] aspirin, 81 mg, oral, Daily  atorvastatin, 40 mg, oral, Nightly  dexAMETHasone, 10 mg, intravenous, TID  famotidine, 20 mg, oral, BID  multivitamin with minerals, 1 tablet, oral, Daily  polyethylene glycol, 17 g, oral, Daily  sennosides, 1 tablet, oral, Nightly      Continuous medications     PRN medications  PRN medications: eucerin, HYDROmorphone, oxyCODONE, oxyCODONE  Results for orders placed or performed during the hospital encounter of " 01/04/25 (from the past 24 hours)   Prepare RBC: 2 Units   Result Value Ref Range    PRODUCT CODE Q9184R52     Unit Number [id number]     Unit ABO A     Unit RH POS     XM INTEP COMP     Dispense Status XM     Blood Expiration Date 1/29/2025 11:59:00 PM EST     PRODUCT BLOOD TYPE 6200     UNIT VOLUME 350     PRODUCT CODE V2400L35     Unit Number [id number]     Unit ABO A     Unit RH POS     XM INTEP COMP     Dispense Status XM     Blood Expiration Date 1/30/2025 11:59:00 PM EST     PRODUCT BLOOD TYPE 6200     UNIT VOLUME 350    Prepare Plasma: 2 Units   Result Value Ref Range    PRODUCT CODE M1838C15     Unit Number [id number]     Unit ABO AB     Unit RH POS     Dispense Status RE     Blood Expiration Date 1/21/2025  7:44:00 AM EST     PRODUCT BLOOD TYPE 8400     UNIT VOLUME 295     PRODUCT CODE M1248E02     Unit Number [id number]     Unit ABO AB     Unit RH POS     Dispense Status TR     Blood Expiration Date 1/21/2025  7:44:00 AM EST     PRODUCT BLOOD TYPE 8400     UNIT VOLUME 317    BLOOD GAS ARTERIAL FULL PANEL   Result Value Ref Range    POCT pH, Arterial 7.45 (H) 7.38 - 7.42 pH    POCT pCO2, Arterial 37 (L) 38 - 42 mm Hg    POCT pO2, Arterial 192 (H) 85 - 95 mm Hg    POCT SO2, Arterial 99 94 - 100 %    POCT Oxy Hemoglobin, Arterial 97.3 94.0 - 98.0 %    POCT Hematocrit Calculated, Arterial 27.0 (L) 41.0 - 52.0 %    POCT Sodium, Arterial 135 (L) 136 - 145 mmol/L    POCT Potassium, Arterial 4.4 3.5 - 5.3 mmol/L    POCT Chloride, Arterial 106 98 - 107 mmol/L    POCT Ionized Calcium, Arterial 1.22 1.10 - 1.33 mmol/L    POCT Glucose, Arterial 106 (H) 74 - 99 mg/dL    POCT Lactate, Arterial 0.9 0.4 - 2.0 mmol/L    POCT Base Excess, Arterial 1.7 -2.0 - 3.0 mmol/L    POCT HCO3 Calculated, Arterial 25.7 22.0 - 26.0 mmol/L    POCT Hemoglobin, Arterial 9.0 (L) 13.5 - 17.5 g/dL    POCT Anion Gap, Arterial 8 (L) 10 - 25 mmo/L    Patient Temperature 37.0 degrees Celsius    FiO2 50 %   BLOOD GAS ARTERIAL  FULL PANEL   Result Value Ref Range    POCT pH, Arterial 7.42 7.38 - 7.42 pH    POCT pCO2, Arterial 41 38 - 42 mm Hg    POCT pO2, Arterial 189 (H) 85 - 95 mm Hg    POCT SO2, Arterial 100 94 - 100 %    POCT Oxy Hemoglobin, Arterial 97.4 94.0 - 98.0 %    POCT Hematocrit Calculated, Arterial 26.0 (L) 41.0 - 52.0 %    POCT Sodium, Arterial 135 (L) 136 - 145 mmol/L    POCT Potassium, Arterial 4.9 3.5 - 5.3 mmol/L    POCT Chloride, Arterial 106 98 - 107 mmol/L    POCT Ionized Calcium, Arterial 1.17 1.10 - 1.33 mmol/L    POCT Glucose, Arterial 129 (H) 74 - 99 mg/dL    POCT Lactate, Arterial 0.9 0.4 - 2.0 mmol/L    POCT Base Excess, Arterial 1.9 -2.0 - 3.0 mmol/L    POCT HCO3 Calculated, Arterial 26.6 (H) 22.0 - 26.0 mmol/L    POCT Hemoglobin, Arterial 8.8 (L) 13.5 - 17.5 g/dL    POCT Anion Gap, Arterial 7 (L) 10 - 25 mmo/L    Patient Temperature 37.0 degrees Celsius    FiO2 50 %   Blood Gas Arterial Full Panel Unsolicited   Result Value Ref Range    POCT pH, Arterial 7.42 7.38 - 7.42 pH    POCT pCO2, Arterial 39 38 - 42 mm Hg    POCT pO2, Arterial 235 (H) 85 - 95 mm Hg    POCT SO2, Arterial 100 94 - 100 %    POCT Oxy Hemoglobin, Arterial 98.2 (H) 94.0 - 98.0 %    POCT Hematocrit Calculated, Arterial 26.0 (L) 41.0 - 52.0 %    POCT Sodium, Arterial 137 136 - 145 mmol/L    POCT Potassium, Arterial 5.1 3.5 - 5.3 mmol/L    POCT Chloride, Arterial 106 98 - 107 mmol/L    POCT Ionized Calcium, Arterial 1.15 1.10 - 1.33 mmol/L    POCT Glucose, Arterial 182 (H) 74 - 99 mg/dL    POCT Lactate, Arterial 1.4 0.4 - 2.0 mmol/L    POCT Base Excess, Arterial 0.8 -2.0 - 3.0 mmol/L    POCT HCO3 Calculated, Arterial 25.3 22.0 - 26.0 mmol/L    POCT Hemoglobin, Arterial 8.6 (L) 13.5 - 17.5 g/dL    POCT Anion Gap, Arterial 11 10 - 25 mmo/L    Patient Temperature 37.0 degrees Celsius    FiO2 60 %     *Note: Due to a large number of results and/or encounters for the requested time period, some results have not been displayed. A complete set of  results can be found in Results Review.                   Assessment/Plan   Assessment & Plan  JAMAL (acute kidney injury) (CMS-HCC)    Lesion of lumbar spine    Patient is a 74M who was admitted for BLE weakness and an L4 lytic lesion.  L4-L5 biopsy was performed and results were positive for RCC. Dr Atkins discussed with the patient and sister about decompression of the lumbar spine with tumor debulking.    Patient is now status post L3/L4 and L4/L5 laminectomy with tumor debulking the lower lumbar spine with fusion from L2-S1 with Dr. Atkins on 1/17/25     Plan  Oncology primary  Weightbearing as tolerated of the lower and upper extremity  No head of bed restrictions  DVT prophylaxis per primary.  Can start 48 hours post surgery 1/19  Ancef x 24 hours  Recommend Decadron 10 mg for 3 doses  Drain x 2.  Continue to record Q8 output for the left and right (Continue drains)  Cho per primary  14-day Prevena placed  Pain control with multimodality  Intra op biopsy obtained   Resume normal diet  Resume home medications  Encourage oral fluid intake  PT/OT consult.     Plan discussed with Dr. Wilbert Abad DO  Orthopedic Surgery, PGY4     Patient will be followed by the Orthopaedic Spine Team:  1st call:  Mariano Hendrix MD  2nd call:  Lew Abad DO  3rd call:  Anthony Mary, Fellow  Available via [employer]

## 2025-01-19 VITALS
WEIGHT: 264.55 LBS | HEART RATE: 74 BPM | OXYGEN SATURATION: 96 % | TEMPERATURE: 97.7 F | HEIGHT: 74 IN | SYSTOLIC BLOOD PRESSURE: 134 MMHG | BODY MASS INDEX: 33.95 KG/M2 | DIASTOLIC BLOOD PRESSURE: 70 MMHG | RESPIRATION RATE: 18 BRPM

## 2025-01-19 LAB
ALBUMIN SERPL BCP-MCNC: 2.7 G/DL (ref 3.4–5)
ANION GAP SERPL CALC-SCNC: 9 MMOL/L (ref 10–20)
APPEARANCE UR: ABNORMAL
BASOPHILS # BLD MANUAL: 0 X10*3/UL (ref 0–0.1)
BASOPHILS NFR BLD MANUAL: 0 %
BILIRUB UR STRIP.AUTO-MCNC: NEGATIVE MG/DL
BUN SERPL-MCNC: 39 MG/DL (ref 6–23)
BURR CELLS BLD QL SMEAR: ABNORMAL
CALCIUM SERPL-MCNC: 8.3 MG/DL (ref 8.6–10.6)
CHLORIDE SERPL-SCNC: 102 MMOL/L (ref 98–107)
CO2 SERPL-SCNC: 29 MMOL/L (ref 21–32)
COLOR UR: YELLOW
CREAT SERPL-MCNC: 1.91 MG/DL (ref 0.5–1.3)
EGFRCR SERPLBLD CKD-EPI 2021: 36 ML/MIN/1.73M*2
EOSINOPHIL # BLD MANUAL: 0 X10*3/UL (ref 0–0.4)
EOSINOPHIL NFR BLD MANUAL: 0 %
ERYTHROCYTE [DISTWIDTH] IN BLOOD BY AUTOMATED COUNT: 17.5 % (ref 11.5–14.5)
GLUCOSE SERPL-MCNC: 115 MG/DL (ref 74–99)
GLUCOSE UR STRIP.AUTO-MCNC: NORMAL MG/DL
HCT VFR BLD AUTO: 25 % (ref 41–52)
HGB BLD-MCNC: 7.4 G/DL (ref 13.5–17.5)
IMM GRANULOCYTES # BLD AUTO: 0.18 X10*3/UL (ref 0–0.5)
IMM GRANULOCYTES NFR BLD AUTO: 0.9 % (ref 0–0.9)
KETONES UR STRIP.AUTO-MCNC: NEGATIVE MG/DL
LEUKOCYTE ESTERASE UR QL STRIP.AUTO: ABNORMAL
LYMPHOCYTES # BLD MANUAL: 0.67 X10*3/UL (ref 0.8–3)
LYMPHOCYTES NFR BLD MANUAL: 3.5 %
MAGNESIUM SERPL-MCNC: 2.35 MG/DL (ref 1.6–2.4)
MCH RBC QN AUTO: 26.3 PG (ref 26–34)
MCHC RBC AUTO-ENTMCNC: 29.6 G/DL (ref 32–36)
MCV RBC AUTO: 89 FL (ref 80–100)
MONOCYTES # BLD MANUAL: 0.17 X10*3/UL (ref 0.05–0.8)
MONOCYTES NFR BLD MANUAL: 0.9 %
MUCOUS THREADS #/AREA URNS AUTO: ABNORMAL /LPF
NEUTS SEG # BLD MANUAL: 18.16 X10*3/UL (ref 1.6–5)
NEUTS SEG NFR BLD MANUAL: 95.6 %
NITRITE UR QL STRIP.AUTO: NEGATIVE
NRBC BLD-RTO: 0 /100 WBCS (ref 0–0)
OVALOCYTES BLD QL SMEAR: ABNORMAL
PH UR STRIP.AUTO: 6 [PH]
PHOSPHATE SERPL-MCNC: 3.9 MG/DL (ref 2.5–4.9)
PLATELET # BLD AUTO: 325 X10*3/UL (ref 150–450)
POLYCHROMASIA BLD QL SMEAR: ABNORMAL
POTASSIUM SERPL-SCNC: 4.9 MMOL/L (ref 3.5–5.3)
PROT UR STRIP.AUTO-MCNC: ABNORMAL MG/DL
RBC # BLD AUTO: 2.81 X10*6/UL (ref 4.5–5.9)
RBC # UR STRIP.AUTO: ABNORMAL /UL
RBC #/AREA URNS AUTO: ABNORMAL /HPF
RBC MORPH BLD: ABNORMAL
SODIUM SERPL-SCNC: 135 MMOL/L (ref 136–145)
SP GR UR STRIP.AUTO: 1.03
SQUAMOUS #/AREA URNS AUTO: ABNORMAL /HPF
TOTAL CELLS COUNTED BLD: 115
UROBILINOGEN UR STRIP.AUTO-MCNC: NORMAL MG/DL
WBC # BLD AUTO: 19 X10*3/UL (ref 4.4–11.3)
WBC #/AREA URNS AUTO: >50 /HPF

## 2025-01-19 PROCEDURE — 81001 URINALYSIS AUTO W/SCOPE: CPT

## 2025-01-19 PROCEDURE — 2500000004 HC RX 250 GENERAL PHARMACY W/ HCPCS (ALT 636 FOR OP/ED)

## 2025-01-19 PROCEDURE — 2500000002 HC RX 250 W HCPCS SELF ADMINISTERED DRUGS (ALT 637 FOR MEDICARE OP, ALT 636 FOR OP/ED)

## 2025-01-19 PROCEDURE — 2500000001 HC RX 250 WO HCPCS SELF ADMINISTERED DRUGS (ALT 637 FOR MEDICARE OP)

## 2025-01-19 PROCEDURE — 83735 ASSAY OF MAGNESIUM: CPT

## 2025-01-19 PROCEDURE — 80069 RENAL FUNCTION PANEL: CPT

## 2025-01-19 PROCEDURE — 85027 COMPLETE CBC AUTOMATED: CPT

## 2025-01-19 PROCEDURE — 97164 PT RE-EVAL EST PLAN CARE: CPT | Mod: GP

## 2025-01-19 PROCEDURE — 36415 COLL VENOUS BLD VENIPUNCTURE: CPT

## 2025-01-19 PROCEDURE — 87086 URINE CULTURE/COLONY COUNT: CPT

## 2025-01-19 PROCEDURE — 99232 SBSQ HOSP IP/OBS MODERATE 35: CPT

## 2025-01-19 PROCEDURE — 97110 THERAPEUTIC EXERCISES: CPT | Mod: GP

## 2025-01-19 PROCEDURE — 85007 BL SMEAR W/DIFF WBC COUNT: CPT

## 2025-01-19 PROCEDURE — 97530 THERAPEUTIC ACTIVITIES: CPT | Mod: GP

## 2025-01-19 PROCEDURE — 1170000001 HC PRIVATE ONCOLOGY ROOM DAILY

## 2025-01-19 RX ADMIN — ACETAMINOPHEN 975 MG: 325 TABLET, FILM COATED ORAL at 15:21

## 2025-01-19 RX ADMIN — SODIUM ZIRCONIUM CYCLOSILICATE 10 G: 10 POWDER, FOR SUSPENSION ORAL at 20:39

## 2025-01-19 RX ADMIN — ATORVASTATIN CALCIUM 40 MG: 40 TABLET, FILM COATED ORAL at 20:39

## 2025-01-19 RX ADMIN — ACETAMINOPHEN 975 MG: 325 TABLET, FILM COATED ORAL at 09:38

## 2025-01-19 RX ADMIN — FAMOTIDINE 20 MG: 20 TABLET ORAL at 20:40

## 2025-01-19 RX ADMIN — ACETAMINOPHEN 975 MG: 325 TABLET, FILM COATED ORAL at 20:39

## 2025-01-19 RX ADMIN — Medication 1 TABLET: at 09:38

## 2025-01-19 RX ADMIN — AMOXICILLIN AND CLAVULANATE POTASSIUM 1 TABLET: 875; 125 TABLET, FILM COATED ORAL at 09:38

## 2025-01-19 RX ADMIN — FAMOTIDINE 20 MG: 20 TABLET ORAL at 09:38

## 2025-01-19 RX ADMIN — SODIUM ZIRCONIUM CYCLOSILICATE 10 G: 10 POWDER, FOR SUSPENSION ORAL at 06:01

## 2025-01-19 RX ADMIN — AMOXICILLIN AND CLAVULANATE POTASSIUM 1 TABLET: 875; 125 TABLET, FILM COATED ORAL at 20:41

## 2025-01-19 RX ADMIN — POLYETHYLENE GLYCOL 3350 17 G: 17 POWDER, FOR SOLUTION ORAL at 09:38

## 2025-01-19 ASSESSMENT — COGNITIVE AND FUNCTIONAL STATUS - GENERAL
TURNING FROM BACK TO SIDE WHILE IN FLAT BAD: A LOT
DAILY ACTIVITIY SCORE: 15
EATING MEALS: A LITTLE
STANDING UP FROM CHAIR USING ARMS: TOTAL
MOVING TO AND FROM BED TO CHAIR: A LOT
CLIMB 3 TO 5 STEPS WITH RAILING: A LOT
MOVING FROM LYING ON BACK TO SITTING ON SIDE OF FLAT BED WITH BEDRAILS: TOTAL
DRESSING REGULAR LOWER BODY CLOTHING: A LITTLE
TURNING FROM BACK TO SIDE WHILE IN FLAT BAD: TOTAL
TOILETING: A LOT
MOVING FROM LYING ON BACK TO SITTING ON SIDE OF FLAT BED WITH BEDRAILS: A LITTLE
MOBILITY SCORE: 14
STANDING UP FROM CHAIR USING ARMS: A LOT
MOVING TO AND FROM BED TO CHAIR: A LOT
DAILY ACTIVITIY SCORE: 15
CLIMB 3 TO 5 STEPS WITH RAILING: TOTAL
HELP NEEDED FOR BATHING: A LOT
PERSONAL GROOMING: A LITTLE
TOILETING: A LOT
WALKING IN HOSPITAL ROOM: A LOT
EATING MEALS: A LITTLE
DRESSING REGULAR UPPER BODY CLOTHING: A LOT
STANDING UP FROM CHAIR USING ARMS: A LOT
PERSONAL GROOMING: A LITTLE
WALKING IN HOSPITAL ROOM: TOTAL
HELP NEEDED FOR BATHING: A LOT
WALKING IN HOSPITAL ROOM: A LOT
MOBILITY SCORE: 13
MOBILITY SCORE: 6
DRESSING REGULAR LOWER BODY CLOTHING: A LITTLE
DRESSING REGULAR UPPER BODY CLOTHING: A LOT
TURNING FROM BACK TO SIDE WHILE IN FLAT BAD: A LITTLE
CLIMB 3 TO 5 STEPS WITH RAILING: A LOT
MOVING FROM LYING ON BACK TO SITTING ON SIDE OF FLAT BED WITH BEDRAILS: A LITTLE
MOVING TO AND FROM BED TO CHAIR: TOTAL

## 2025-01-19 ASSESSMENT — PAIN DESCRIPTION - LOCATION: LOCATION: BACK

## 2025-01-19 ASSESSMENT — PAIN - FUNCTIONAL ASSESSMENT: PAIN_FUNCTIONAL_ASSESSMENT: 0-10

## 2025-01-19 ASSESSMENT — PAIN SCALES - GENERAL
PAINLEVEL_OUTOF10: 7
PAINLEVEL_OUTOF10: 7

## 2025-01-19 ASSESSMENT — ACTIVITIES OF DAILY LIVING (ADL): ADL_ASSISTANCE: INDEPENDENT

## 2025-01-19 NOTE — PROGRESS NOTES
Physical Therapy    Physical Therapy Re-Evaluation & Treatment    Patient Name: Matt Retana  MRN: [id number]  Department: [facility]  Room: [facility]  Today's Date: 1/19/2025   Time Calculation  Start Time: 1133  Stop Time: 1226  Time Calculation (min): 53 min    Assessment/Plan   PT Assessment  PT Assessment Results: Decreased strength, Decreased range of motion, Decreased endurance, Impaired balance, Decreased mobility, Pain, Orthopedic restrictions, Decreased safety awareness  Rehab Prognosis: Fair  Barriers to Discharge Home: Physical needs  Physical Needs: Ambulating household distances limited by function/safety  Evaluation/Treatment Tolerance: Patient limited by fatigue  Medical Staff Made Aware: Yes  Strengths: Attitude of self  Barriers to Participation: Comorbidities  End of Session Communication: Bedside nurse  Assessment Comment: 74 year old now REVAL S/p lumbar tumor debulking, L3-L4 and L4-L5 laminectomies with L2-S1 PSIF with Dr. Atkins on 1/17/25. Pt presents with decreased strength, endurance and balance impacting functional mobility. Pt would benefit from continued PT while in hospital to improve functional mobility and return to PLOF.  End of Session Patient Position: Bed, 3 rail up, Alarm on   IP OR SWING BED PT PLAN  Inpatient or Swing Bed: Inpatient  PT Plan  Treatment/Interventions: Bed mobility, Transfer training, Gait training, Balance training, Neuromuscular re-education, Strengthening, Endurance training, Therapeutic exercise, Therapeutic activity, Home exercise program, Range of motion, Positioning, Postural re-education  PT Plan: Ongoing PT  PT Frequency: 3 times per week  PT Discharge Recommendations: Moderate intensity level of continued care  Equipment Recommended upon Discharge: Other (comment) (unable to determine today)  PT Recommended Transfer Status: Total assist  PT - OK to Discharge: Yes (PT re-eval complete and Dc rec made)      Subjective     General Visit  Information:  General  Reason for Referral: 74 year old now REVAL  S/p lumbar tumor debulking, L3-L4 and L4-L5 laminectomies with L2-S1 PSIF with Dr. Atkins on 1/17  Past Medical History Relevant to Rehab: HTN, GERD, SAMIA, Chronic lymphedema, venous insufficiency, OA, CKD-3(b/l 1.2-1.5), also w/ the following oncologic Hx - no formal Dx at this time.  Prior to Session Communication: Bedside nurse  Patient Position Received: Bed, 3 rail up, Alarm on  General Comment: Pt supine in bed upon entry to room. Pt cooperative and willing to work with PT. Noted x2 hemovac.  Home Living:  Home Living  Type of Home: House  Lives With: Alone  Home Adaptive Equipment: Walker rolling or standard, Cane, Wheelchair-manual  Home Layout: Multi-level  Home Access: Stairs to enter with rails  Entrance Stairs-Rails:  (1)  Entrance Stairs-Number of Steps: 3+3  Bathroom Shower/Tub: Tub/shower unit  Bathroom Equipment: None  Prior Level of Function:  Prior Function Per Pt/Caregiver Report  Level of [place]: Independent with ADLs and functional transfers, Independent with homemaking with ambulation  ADL Assistance: Independent  Homemaking Assistance: Needs assistance  Prior Function Comments: pt admitted from SNF, stated that he was working on standing but unsure when he last stood  Precautions:  Precautions  Medical Precautions: Fall precautions  Post-Surgical Precautions: Spinal precautions    Objective   Pain:  Pain Assessment  Pain Assessment: 0-10  0-10 (Numeric) Pain Score: 7  Cognition:  Cognition  Arousal/Alertness: Delayed responses to stimuli  Orientation Level: Disoriented X4    General Assessments:  Activity Tolerance  Endurance: Decreased tolerance for upright activites    Sensation  Light Touch: No apparent deficits    Functional Assessments:  Bed Mobility  Bed Mobility: Yes  Bed Mobility 1  Bed Mobility 1: Rolling right, Rolling left  Level of Assistance 1: Maximum assistance, Maximum verbal cues, Maximum tactile cues  Bed  Mobility Comments 1: x3 assist B directions  Bed Mobility 2  Bed Mobility  2: Scooting (boost)  Level of Assistance 2: Dependent    Transfers  Transfer: No (no pt declined further mobility this date)    Ambulation/Gait Training  Ambulation/Gait Training Performed: No (no pt declined further mobility this date)  Extremity/Trunk Assessments:  RLE   RLE : Exceptions to WFL (unable to fully extend knee, ankles 4/5, hip and knees grossly 3/5)  LLE   LLE : Exceptions to WFL (unable to fully extend knee, ankles 4/5, hip and knees grossly 3/5)  Treatments:  Therapeutic Exercise  Therapeutic Exercise Performed: Yes  Therapeutic Exercise Activity 1: x10 B AP and quad sets; x10 AAROM SAQ, heel slides and hip abduction    Therapeutic Activity  Therapeutic Activity Performed: Yes  Therapeutic Activity 1: Pt completing additional B rolling trials B to assist in leonie care, with pt found to be soiled. Pt isntructed to bend knee and reach toward bed rail to assist staff. x3 max assist to complete B rolling additional trials.  Outcome Measures:  [facility] Basic Mobility  Turning from your back to your side while in a flat bed without using bedrails: Total  Moving from lying on your back to sitting on the side of a flat bed without using bedrails: Total  Moving to and from bed to chair (including a wheelchair): Total  Standing up from a chair using your arms (e.g. wheelchair or bedside chair): Total  To walk in hospital room: Total  Climbing 3-5 steps with railing: Total  Basic Mobility - Total Score: 6    Encounter Problems       Encounter Problems (Active)       General Goals       Pt will have bilateral LE strength >4 (ankle DF, hip flexion, knee flex/ext) and AROM WFL hip flexion/knee flex & extension and ankle DF bilaterally (Progressing)       Start:  01/06/25    Expected End:  02/02/25            Pt will supine to/from sit via logrolling (HOB elevated 45 degrees, use of rail) with mod A +2 (Progressing)       Start:  01/06/25     Expected End:  02/02/25            Pt will sit EOB with min A +1 >7 minutes with pain <2 (Progressing)       Start:  01/06/25    Expected End:  02/02/25               Mobility       Pt will come sit to/from stand, bed to/from chair with WW with mod A +2 (no knee buckling, pain <2) (Not Progressing)       Start:  01/06/25    Expected End:  02/02/25            Pt will stand static with WW and min/mod A +2 (Not Progressing)       Start:  01/06/25    Expected End:  02/02/25            Pt will ambulate 10' with WW with min/mod A +2 and chair follow for safety (Not Progressing)       Start:  01/06/25    Expected End:  02/02/25               Pain - Adult              Education Documentation  Precautions, taught by Perla Degroot PT at 1/19/2025  3:28 PM.  Learner: Patient  Readiness: Acceptance  Method: Explanation  Response: Verbalizes Understanding, Needs Reinforcement  Comment: spinal precautions, importance of functional mobility    Mobility Training, taught by Perla Degroot PT at 1/19/2025  3:28 PM.  Learner: Patient  Readiness: Acceptance  Method: Explanation  Response: Verbalizes Understanding, Needs Reinforcement  Comment: spinal precautions, importance of functional mobility    Education Comments  No comments found.    Perla Degroot, PT

## 2025-01-19 NOTE — PROGRESS NOTES
"Abdi Retana \"Matt\" is a 74 y.o. male on day 15 of admission presenting with Lesion of lumbar spine.      Subjective   Seen this AM, patient says that his pain is \"unnoticable.\" Much improved post surgery. Denies fevers, chills, nausea, vomiting, chest pain, shortness of breath.     Objective     Last Recorded Vitals  /64 (BP Location: Left arm, Patient Position: Lying)   Pulse 66   Temp 36.5 °C (97.7 °F) (Temporal)   Resp 18   Wt 120 kg (264 lb 8.8 oz)   SpO2 95%   Intake/Output last 3 Shifts:    Intake/Output Summary (Last 24 hours) at 1/19/2025 0703  Last data filed at 1/19/2025 0425  Gross per 24 hour   Intake 354 ml   Output 525 ml   Net -171 ml       Admission Weight  Weight: 120 kg (264 lb) (01/04/25 1900)    Daily Weight  01/17/25 : 120 kg (264 lb 8.8 oz)    No current facility-administered medications on file prior to encounter.     Current Outpatient Medications on File Prior to Encounter   Medication Sig Dispense Refill    acetaminophen (Tylenol 8 HOUR) 650 mg ER tablet Take 1 tablet (650 mg) by mouth every 6 hours if needed for mild pain (1 - 3). Do not crush, chew, or split.      acetaminophen (Tylenol) 325 mg tablet Take 2 tablets (650 mg) by mouth 2 times a day.      aspirin 81 mg EC tablet Take 1 tablet (81 mg) by mouth once daily.      bisacodyl (Gentle Laxative, bisacodyl,) 10 mg suppository Insert 1 suppository (10 mg) into the rectum once daily as needed for constipation.      cefepime (Maxipime) IV Infuse 100 mL (2 g) over 0.5 hours into a venous catheter every 12 hours.      famotidine (Pepcid) 20 mg tablet Take 1 tablet (20 mg) by mouth 2 times a day.      ferrous sulfate (FeroSuL) 325 (65 Fe) MG tablet Take 1 tablet (325 mg) by mouth once every 24 hours. 90 tablet 3    losartan (Cozaar) 50 mg tablet TAKE 1 TABLET BY MOUTH ONCE  DAILY 90 tablet 3    magnesium hydroxide (Milk of Magnesia) 400 mg/5 mL suspension Take 30 mL by mouth once daily as needed for constipation (if no BM in 3 " consecutive days).      magnesium oxide 400 mg magnesium capsule Take 1 capsule (400 mg) by mouth once daily. 90 capsule 3    mineral oil enema Insert 133 mL (1 enema) into the rectum once daily as needed for constipation (if no BM in 8 hours after getting suppository).      potassium chloride CR 10 mEq ER tablet TAKE 1 TABLET BY MOUTH ONCE  DAILY DO NOT CRUSH, CHEW, OR  SPLIT 90 tablet 3    torsemide (Demadex) 20 mg tablet TAKE 1 TABLET BY MOUTH ONCE  DAILY 90 tablet 3    traMADol (Ultram) 50 mg tablet Take 1 tablet (50 mg) by mouth every 6 hours if needed for severe pain (7 - 10).      vitamin D3-folic acid 2,500 unit- 1 mg tablet Take 1 mg by mouth once every 24 hours. 90 tablet 3        Physical Exam  HENT:      Head: Normocephalic and atraumatic.      Mouth/Throat:      Mouth: Mucous membranes are moist.   Eyes:      General: No scleral icterus.     Extraocular Movements: Extraocular movements intact.      Pupils: Pupils are equal, round, and reactive to light.   Cardiovascular:      Rate and Rhythm: Normal rate and regular rhythm.      Heart sounds: No murmur heard.  Pulmonary:      Effort: No respiratory distress.      Breath sounds: No wheezing or rales.   Abdominal:      General: There is no distension.      Tenderness: There is no abdominal tenderness. There is no guarding.   Musculoskeletal:      Comments: Legs with dressing on, images on epic show possible stasis dermatitis with ulcers   Skin:     Capillary Refill: Capillary refill takes less than 2 seconds.   Neurological:      General: No focal deficit present.      Mental Status: He is alert and oriented to person, place, and time.      Motor: Weakness present.      Comments: Richter in b/l LE 3/5, significantly limited by pain   Psychiatric:         Mood and Affect: Mood normal.         Relevant Results    Results for orders placed or performed during the hospital encounter of 01/04/25 (from the past 24 hours)   Magnesium   Result Value Ref Range     Magnesium 2.25 1.60 - 2.40 mg/dL   Renal Function Panel   Result Value Ref Range    Glucose 136 (H) 74 - 99 mg/dL    Sodium 136 136 - 145 mmol/L    Potassium 6.4 (HH) 3.5 - 5.3 mmol/L    Chloride 104 98 - 107 mmol/L    Bicarbonate 26 21 - 32 mmol/L    Anion Gap 12 10 - 20 mmol/L    Urea Nitrogen 32 (H) 6 - 23 mg/dL    Creatinine 1.91 (H) 0.50 - 1.30 mg/dL    eGFR 36 (L) >60 mL/min/1.73m*2    Calcium 8.8 8.6 - 10.6 mg/dL    Phosphorus 4.5 2.5 - 4.9 mg/dL    Albumin 3.0 (L) 3.4 - 5.0 g/dL   CBC and Auto Differential   Result Value Ref Range    WBC 18.5 (H) 4.4 - 11.3 x10*3/uL    nRBC 0.0 0.0 - 0.0 /100 WBCs    RBC 2.54 (L) 4.50 - 5.90 x10*6/uL    Hemoglobin 6.9 (L) 13.5 - 17.5 g/dL    Hematocrit 22.8 (L) 41.0 - 52.0 %    MCV 90 80 - 100 fL    MCH 27.2 26.0 - 34.0 pg    MCHC 30.3 (L) 32.0 - 36.0 g/dL    RDW 17.4 (H) 11.5 - 14.5 %    Platelets 306 150 - 450 x10*3/uL    Neutrophils % 89.7 40.0 - 80.0 %    Immature Granulocytes %, Automated 1.7 (H) 0.0 - 0.9 %    Lymphocytes % 5.0 13.0 - 44.0 %    Monocytes % 3.4 2.0 - 10.0 %    Eosinophils % 0.0 0.0 - 6.0 %    Basophils % 0.2 0.0 - 2.0 %    Neutrophils Absolute 16.54 (H) 1.60 - 5.50 x10*3/uL    Immature Granulocytes Absolute, Automated 0.32 0.00 - 0.50 x10*3/uL    Lymphocytes Absolute 0.93 0.80 - 3.00 x10*3/uL    Monocytes Absolute 0.63 0.05 - 0.80 x10*3/uL    Eosinophils Absolute 0.00 0.00 - 0.40 x10*3/uL    Basophils Absolute 0.03 0.00 - 0.10 x10*3/uL   Type and screen   Result Value Ref Range    ABO TYPE A     Rh TYPE POS     ANTIBODY SCREEN NEG    Morphology   Result Value Ref Range    RBC Morphology See Below     Polychromasia Mild     Ovalocytes Few    Renal function panel   Result Value Ref Range    Glucose 141 (H) 74 - 99 mg/dL    Sodium 136 136 - 145 mmol/L    Potassium 5.5 (H) 3.5 - 5.3 mmol/L    Chloride 101 98 - 107 mmol/L    Bicarbonate 26 21 - 32 mmol/L    Anion Gap 15 10 - 20 mmol/L    Urea Nitrogen 33 (H) 6 - 23 mg/dL    Creatinine 2.03 (H) 0.50 - 1.30  mg/dL    eGFR 34 (L) >60 mL/min/1.73m*2    Calcium 8.5 (L) 8.6 - 10.6 mg/dL    Phosphorus 2.8 2.5 - 4.9 mg/dL    Albumin 2.6 (L) 3.4 - 5.0 g/dL   CBC and Auto Differential   Result Value Ref Range    WBC 19.3 (H) 4.4 - 11.3 x10*3/uL    nRBC 0.0 0.0 - 0.0 /100 WBCs    RBC 2.85 (L) 4.50 - 5.90 x10*6/uL    Hemoglobin 7.7 (L) 13.5 - 17.5 g/dL    Hematocrit 25.8 (L) 41.0 - 52.0 %    MCV 91 80 - 100 fL    MCH 27.0 26.0 - 34.0 pg    MCHC 29.8 (L) 32.0 - 36.0 g/dL    RDW 17.0 (H) 11.5 - 14.5 %    Platelets 312 150 - 450 x10*3/uL    Neutrophils % 93.1 40.0 - 80.0 %    Immature Granulocytes %, Automated 0.8 0.0 - 0.9 %    Lymphocytes % 3.2 13.0 - 44.0 %    Monocytes % 2.8 2.0 - 10.0 %    Eosinophils % 0.0 0.0 - 6.0 %    Basophils % 0.1 0.0 - 2.0 %    Neutrophils Absolute 17.98 (H) 1.60 - 5.50 x10*3/uL    Immature Granulocytes Absolute, Automated 0.15 0.00 - 0.50 x10*3/uL    Lymphocytes Absolute 0.61 (L) 0.80 - 3.00 x10*3/uL    Monocytes Absolute 0.55 0.05 - 0.80 x10*3/uL    Eosinophils Absolute 0.00 0.00 - 0.40 x10*3/uL    Basophils Absolute 0.02 0.00 - 0.10 x10*3/uL      CT chest abdomen pelvis wo IV contrast    Result Date: 1/2/2025  CHEST: 1.  History of right renal cell carcinoma. 2. Multiple pulmonary nodules consistent with metastatic disease.   ABDOMEN AND PELVIS: 1.  History upright or renal cell carcinoma. There is an unchanged necrotic mass involving the upper pole of the right kidney. 2. Bilateral renal cysts. 3. Bilateral nonobstructing intrarenal calculi with staghorn type calculi in the left kidney. There is no renal obstruction. 4. Complete bony destruction of L4 with a soft tissue mass with expansion of the vertebral body. Lytic lesion involving the superior endplate of L5. Findings are consistent with bony metastases.   MACRO: None   Signed by: Lucia Desai 1/2/2025 10:33 AM Dictation workstation:   [id number]    XR chest 1 view    Result Date: 1/2/2025  No pneumonic consolidations are noted. There  questionable perihilar nodules which may correspond to the masses noted on the prior CT.     MACRO: None   Signed by: Patrick Arnold 1/2/2025 8:33 AM Dictation workstation:   [id number]      MRI Spine 1/8/25  IMPRESSION:  MRI cervical spine:      1. No evidence of metastases.      2. Multilevel degenerative changes of the cervical spine including  moderate spinal canal stenosis at C5-C6 and C6-C7.      MRI thoracic spine:      1. No evidence of osseous metastases.      2. Multilevel degenerative changes of the thoracic spine without  striking spinal canal stenosis at any level.      MRI lumbar spine:      1. Metastasis involving the entirety of the L4 vertebral body,  portions of the posterior elements and transverse processes, with  extension into the L3-L4 and L4-L5 intervertebral disc spaces and  into the inferior endplate of L3 and superior endplate of L5 as well  as into the surrounding prevertebral soft tissues.      2. There is extension of tumor into the ventral and bilateral  epidural space at L4 and there is also tumor located within the  posterior spinal canal extending from the level of L3 through L5.  Combination of these findings cause marked spinal canal stenosis at  the level of L4. Minimal CSF is seen within the thecal sac at the  level of L4.      3. Tumor extends into the bilateral neural foramina at L4-L5 with  resulting moderate-to-marked left and marked right neural foraminal  narrowing. Tumor also extends into the inferior portions of the  bilateral neural foramina at L3-L4 resulting in moderate neural  foraminal narrowing.      4. There is metastasis located within the posterior and left epidural  space at the level of L5 and S1 which causes partial effacement of  the posterior thecal sac.      5. Multilevel degenerative changes of the lumbar spine as detailed  above.      MRI Head 1/8/25  IMPRESSION:  1.  Small focus of diffusion restriction involving the right parietal  lobe with no  associated enhancement on postcontrast imaging, favoring  small acute infarct. No mass effect or hemorrhagic transformation.  2. No abnormal intracranial enhancement to indicate intracranial  metastatic disease.  3. Nonspecific dilatation of the superior ophthalmic veins  bilaterally.    Assessment/Plan      Assessment & Plan  JAMAL (acute kidney injury) (CMS-HCC)    Lesion of lumbar spine    Mr. Abdi Retana is a 73 yo M with PMHx of HTN, GERD, SAMIA, Chronic lymphedema, venous insufficiency, OA, CKD-3(b/l 1.2-1.5), also w/ the following oncologic Hx - no formal Dx at this time. Presenting symptom was b/l LE weakness. Was admitted to [facility] 11/30-12/5 for this after IMG revealed a concerning L4-5 lesion w/ mod-severe cord compression in s/o R menal mass and suspicious lesions in adrenals lungs, and spine. This was c/f undiagnosed metastatic R RCC. Case was discussed w/ both IR and ortho spine. It was deemed that no inpt Bx nor surgery was indicated and pt was DC to SNF w/ plan for IR-guided L4 Bx (scheduled for 12/20; pt missed d/t transport issues w/ SNF). MRI brain and C/T spine w/ contrast was attempted but pt did not tolerate d/t claustrophobia - there was a plan to complete it OP w/ anesthesia. Transferred from [facility] for expedited work up of suspected R. RCC with L4/5 mets. Also has Proteus Mirabilis UTI c/b bacteremia I/s/o bilateral non obstructing renal stones. ID consulted for antibiotic management. L4 biopsy done and MRI head and spine completed. Ortho spine planning for surgery on Friday, 1/17 and Rad-onc planning for radiation after path report is back. Path report confirmed metastatic RCC. S/p spinal artery embolization with Neurosurgery. S/p lumbar tumor debulking 1/17/2025.    Update 1/18/25:   S/p lumbar tumor debulking, L3-L4 and L4-L5 laminectomies with L2-S1 PSIF with Dr. Atkins on 1/17   Plan to resume DVT ppx 1/20 if Hb stable   Cr improved today s/p 1 unit PRBCs  Continue with PT/OT   Encourage PO  intake     #Lumbar spinal fracuture w/ cord compression  - S/p tumor debulking 1/17 w/ ortho spine  - s/p dexamethasone POD1, s/p ancef POD1  - Drain/wound management per ortho  - no DVT ppx until 1/19  - s/p 1 unit PRBCs 1/18 for hb 6.9; hb stable 1/19    #Non oliguric JAMAL, stage I  :: post procedural, likely related to blood loss and hemodynamic changes during surgery  - s/p 2 total units PRBCs (1 in OR, 1 post op)  Plan:   - monitor urine output  - Cr peaked 2.03 1/18  - urinalysis  - renally dose meds, avoid nephrotoxins, avoid hemodynamic shifts     Small Acute Stroke on MRI  -Small focus of diffusion restriction involving the right parietal lobe with no associated enhancement on postcontrast imaging, favoring small acute infarct. No mass effect or hemorrhagic transformation.  -at his neurologic baseline(fluent speech, oriented, moves all limbs,LE-4/5)  -neuro-stoke consulted  -already on aspirin for CAD, not on statin  Plan  -neuro-stroke following  Recommendations  - Hold aspirin 81 mg daily for 48hrs post-surgery to prevent risk of bleeding  - when safe from a bleeding an procedural standpoint start therapeutic Lovenox 1 mg/kg Q12H   - Please keep patient on telemetry and monitor daily for afib. If there is evidence of afib may need alternative anticoagulation.    -  continue with atorvastatin 40 daily target LDL <70  - please arrange neurology followup at time of discharge    P. mirabilis bacteremia 2/2 UTI  -HDS  -associated w/ R renal mass, b/l non-obstructing renal stones  -sensitivities available  -seen by uro at [facility], did not have plan for procedure given non-obstructive  -ID consulted for duration, procedure rec given complex anatomy of stones and renal mass - c/f seeding  -ID following, recommend Augmentin for 3 weeks(EOT-1/23)  -repeat BC(1/10)-NGTD     C/f undiagnosed metastatic R RCC  L4-5 lesion w/ radiographic evidence cord compression  -CT(11/30)-R renal mass  -pt at baseline 4/5 LE weakness  on exam, symmetric, no incontinence or saddle anesthesia  -there have been no plans for procedures per prior evaluations  -IMG as above  -d/w ortho spine, said if going for anesthesia should do the full spine  -consider formal  spine surgery consult if neuro change and/or IMG/Bx completed  -had been seen by NSG 1/3 who thought exam was okay w/o weakness, did not rec an intervention  -On scheduled tylenol and prn oxy/dilaudid  -Outpatient medical oncology follow up with Dr Guerra  -ortho-spine following    HTN  CAD  -holding home  losartan 50mg  I/s/o recent rise in Cr(now resolved) and normotension   -c/w home Aspirin     Lymphedema  Venous insufficiency/venous ulcers  -wound care consulted  -hold home torsemide 20 mg daily for now  -OSH wound recs  BLE- Chronic venous status ulcers   Irrigate with normal saline or wound cleanser, Pat dry.  Apply lotion to intact skin   Cover weeping areas with Triad Hydrophillic wound dressing ointment   Pad and protect with ABD, Kerlix and paper tape.   Change every day and as needed   Secure with Tubigrip ( Apply from base of toes to 2 finger width below the bend of knee)      Gluteal cleft & buttocks-MASD/Irritant contact dermatitis due to fecal, urinary or dual incontinent.   Cleanse with bath wipes or soap and water. Rinse well and pat dry.   Apply no sting skin barrier (cavilon)   Apply Triad hydrophilic wound dressing ointment to gluteal fold/groin three times a day and as needed.     GERD  -c/w home famotidine 20 mg bid    Disposition  -barriers: clinical improvement  -destination: likely back to SNF ([facility])  -f/u: pending     Diet: cardiac   Electrolytes: K >3.5; Mg >2  DVT ppx: SCD/hold post op   GI ppx: on home famotidine  Lines/tubes: PIV  O2: none  Baseline Cr: 1.5  Code status: Full code  Surrogate decision maker: Micaela Retana (sister) [phone]    Sumeet Madsen MD  Internal Medicne  PGY-2

## 2025-01-20 ENCOUNTER — APPOINTMENT (OUTPATIENT)
Dept: RADIOLOGY | Facility: HOSPITAL | Age: 75
DRG: 456 | End: 2025-01-20
Payer: MEDICARE

## 2025-01-20 LAB
ALBUMIN SERPL BCP-MCNC: 2.9 G/DL (ref 3.4–5)
ANION GAP SERPL CALC-SCNC: 12 MMOL/L (ref 10–20)
BACTERIA UR CULT: NO GROWTH
BASOPHILS # BLD AUTO: 0.01 X10*3/UL (ref 0–0.1)
BASOPHILS NFR BLD AUTO: 0.1 %
BLOOD EXPIRATION DATE: NORMAL
BUN SERPL-MCNC: 33 MG/DL (ref 6–23)
CALCIUM SERPL-MCNC: 8.1 MG/DL (ref 8.6–10.6)
CHLORIDE SERPL-SCNC: 101 MMOL/L (ref 98–107)
CO2 SERPL-SCNC: 29 MMOL/L (ref 21–32)
CREAT SERPL-MCNC: 1.32 MG/DL (ref 0.5–1.3)
DISPENSE STATUS: NORMAL
EGFRCR SERPLBLD CKD-EPI 2021: 57 ML/MIN/1.73M*2
EOSINOPHIL # BLD AUTO: 0.01 X10*3/UL (ref 0–0.4)
EOSINOPHIL NFR BLD AUTO: 0.1 %
ERYTHROCYTE [DISTWIDTH] IN BLOOD BY AUTOMATED COUNT: 17.7 % (ref 11.5–14.5)
ERYTHROCYTE [DISTWIDTH] IN BLOOD BY AUTOMATED COUNT: 17.8 % (ref 11.5–14.5)
GLUCOSE SERPL-MCNC: 79 MG/DL (ref 74–99)
HCT VFR BLD AUTO: 22.2 % (ref 41–52)
HCT VFR BLD AUTO: 23.7 % (ref 41–52)
HGB BLD-MCNC: 6.9 G/DL (ref 13.5–17.5)
HGB BLD-MCNC: 7.1 G/DL (ref 13.5–17.5)
HOLD SPECIMEN: NORMAL
IMM GRANULOCYTES # BLD AUTO: 0.29 X10*3/UL (ref 0–0.5)
IMM GRANULOCYTES NFR BLD AUTO: 2.1 % (ref 0–0.9)
LYMPHOCYTES # BLD AUTO: 2.05 X10*3/UL (ref 0.8–3)
LYMPHOCYTES NFR BLD AUTO: 14.7 %
MAGNESIUM SERPL-MCNC: 2.25 MG/DL (ref 1.6–2.4)
MCH RBC QN AUTO: 27.1 PG (ref 26–34)
MCH RBC QN AUTO: 27.8 PG (ref 26–34)
MCHC RBC AUTO-ENTMCNC: 30 G/DL (ref 32–36)
MCHC RBC AUTO-ENTMCNC: 31.1 G/DL (ref 32–36)
MCV RBC AUTO: 90 FL (ref 80–100)
MCV RBC AUTO: 91 FL (ref 80–100)
MONOCYTES # BLD AUTO: 1.25 X10*3/UL (ref 0.05–0.8)
MONOCYTES NFR BLD AUTO: 9 %
NEUTROPHILS # BLD AUTO: 10.33 X10*3/UL (ref 1.6–5.5)
NEUTROPHILS NFR BLD AUTO: 74 %
NRBC BLD-RTO: 0.1 /100 WBCS (ref 0–0)
NRBC BLD-RTO: 0.2 /100 WBCS (ref 0–0)
PHOSPHATE SERPL-MCNC: 2.7 MG/DL (ref 2.5–4.9)
PLATELET # BLD AUTO: 295 X10*3/UL (ref 150–450)
PLATELET # BLD AUTO: 316 X10*3/UL (ref 150–450)
POTASSIUM SERPL-SCNC: 4.2 MMOL/L (ref 3.5–5.3)
PRODUCT BLOOD TYPE: 6200
PRODUCT CODE: NORMAL
RBC # BLD AUTO: 2.48 X10*6/UL (ref 4.5–5.9)
RBC # BLD AUTO: 2.62 X10*6/UL (ref 4.5–5.9)
SODIUM SERPL-SCNC: 138 MMOL/L (ref 136–145)
UNIT ABO: NORMAL
UNIT NUMBER: NORMAL
UNIT RH: NORMAL
UNIT VOLUME: 350
WBC # BLD AUTO: 13 X10*3/UL (ref 4.4–11.3)
WBC # BLD AUTO: 13.9 X10*3/UL (ref 4.4–11.3)
XM INTEP: NORMAL

## 2025-01-20 PROCEDURE — 2500000001 HC RX 250 WO HCPCS SELF ADMINISTERED DRUGS (ALT 637 FOR MEDICARE OP)

## 2025-01-20 PROCEDURE — 83735 ASSAY OF MAGNESIUM: CPT

## 2025-01-20 PROCEDURE — 36415 COLL VENOUS BLD VENIPUNCTURE: CPT

## 2025-01-20 PROCEDURE — 72100 X-RAY EXAM L-S SPINE 2/3 VWS: CPT | Performed by: RADIOLOGY

## 2025-01-20 PROCEDURE — 72100 X-RAY EXAM L-S SPINE 2/3 VWS: CPT

## 2025-01-20 PROCEDURE — P9040 RBC LEUKOREDUCED IRRADIATED: HCPCS

## 2025-01-20 PROCEDURE — 80069 RENAL FUNCTION PANEL: CPT

## 2025-01-20 PROCEDURE — 85027 COMPLETE CBC AUTOMATED: CPT

## 2025-01-20 PROCEDURE — 1170000001 HC PRIVATE ONCOLOGY ROOM DAILY

## 2025-01-20 PROCEDURE — 36430 TRANSFUSION BLD/BLD COMPNT: CPT

## 2025-01-20 PROCEDURE — 85025 COMPLETE CBC W/AUTO DIFF WBC: CPT

## 2025-01-20 PROCEDURE — 99232 SBSQ HOSP IP/OBS MODERATE 35: CPT | Performed by: INTERNAL MEDICINE

## 2025-01-20 PROCEDURE — 99232 SBSQ HOSP IP/OBS MODERATE 35: CPT

## 2025-01-20 PROCEDURE — 2500000002 HC RX 250 W HCPCS SELF ADMINISTERED DRUGS (ALT 637 FOR MEDICARE OP, ALT 636 FOR OP/ED)

## 2025-01-20 RX ORDER — ATORVASTATIN CALCIUM 40 MG/1
40 TABLET, FILM COATED ORAL NIGHTLY
Qty: 90 TABLET | Refills: 3 | Status: SHIPPED | OUTPATIENT
Start: 2025-01-20 | End: 2026-01-20

## 2025-01-20 RX ORDER — POLYETHYLENE GLYCOL 3350 17 G/17G
17 POWDER, FOR SOLUTION ORAL 2 TIMES DAILY PRN
Qty: 30 PACKET | Refills: 3 | Status: SHIPPED | OUTPATIENT
Start: 2025-01-20 | End: 2025-03-21

## 2025-01-20 RX ORDER — OXYCODONE HYDROCHLORIDE 5 MG/1
5 TABLET ORAL EVERY 4 HOURS PRN
Qty: 15 TABLET | Refills: 0 | Status: SHIPPED | OUTPATIENT
Start: 2025-01-20 | End: 2025-01-30

## 2025-01-20 RX ORDER — MULTIVIT-MIN/IRON FUM/FOLIC AC 7.5 MG-4
1 TABLET ORAL DAILY
Qty: 90 TABLET | Refills: 3 | Status: SHIPPED | OUTPATIENT
Start: 2025-01-21 | End: 2026-01-21

## 2025-01-20 RX ADMIN — ACETAMINOPHEN 975 MG: 325 TABLET, FILM COATED ORAL at 21:08

## 2025-01-20 RX ADMIN — ATORVASTATIN CALCIUM 40 MG: 40 TABLET, FILM COATED ORAL at 21:08

## 2025-01-20 RX ADMIN — OXYCODONE HYDROCHLORIDE 5 MG: 5 TABLET ORAL at 08:38

## 2025-01-20 RX ADMIN — FAMOTIDINE 20 MG: 20 TABLET ORAL at 08:33

## 2025-01-20 RX ADMIN — SODIUM ZIRCONIUM CYCLOSILICATE 10 G: 10 POWDER, FOR SUSPENSION ORAL at 05:02

## 2025-01-20 RX ADMIN — AMOXICILLIN AND CLAVULANATE POTASSIUM 1 TABLET: 875; 125 TABLET, FILM COATED ORAL at 08:39

## 2025-01-20 RX ADMIN — ACETAMINOPHEN 975 MG: 325 TABLET, FILM COATED ORAL at 08:34

## 2025-01-20 RX ADMIN — Medication: at 15:48

## 2025-01-20 RX ADMIN — FAMOTIDINE 20 MG: 20 TABLET ORAL at 21:08

## 2025-01-20 RX ADMIN — AMOXICILLIN AND CLAVULANATE POTASSIUM 1 TABLET: 875; 125 TABLET, FILM COATED ORAL at 21:08

## 2025-01-20 RX ADMIN — Medication 1 TABLET: at 08:33

## 2025-01-20 RX ADMIN — ACETAMINOPHEN 975 MG: 325 TABLET, FILM COATED ORAL at 15:46

## 2025-01-20 RX ADMIN — STANDARDIZED SENNA CONCENTRATE 17.2 MG: 8.6 TABLET ORAL at 21:08

## 2025-01-20 RX ADMIN — OXYCODONE HYDROCHLORIDE 10 MG: 10 TABLET ORAL at 13:35

## 2025-01-20 ASSESSMENT — PAIN SCALES - GENERAL
PAINLEVEL_OUTOF10: 7
PAINLEVEL_OUTOF10: 6
PAINLEVEL_OUTOF10: 4
PAINLEVEL_OUTOF10: 3
PAINLEVEL_OUTOF10: 7
PAINLEVEL_OUTOF10: 6

## 2025-01-20 ASSESSMENT — COGNITIVE AND FUNCTIONAL STATUS - GENERAL
DAILY ACTIVITIY SCORE: 17
STANDING UP FROM CHAIR USING ARMS: TOTAL
PERSONAL GROOMING: A LITTLE
CLIMB 3 TO 5 STEPS WITH RAILING: TOTAL
STANDING UP FROM CHAIR USING ARMS: TOTAL
DRESSING REGULAR LOWER BODY CLOTHING: A LITTLE
DRESSING REGULAR UPPER BODY CLOTHING: A LITTLE
MOBILITY SCORE: 8
TURNING FROM BACK TO SIDE WHILE IN FLAT BAD: A LOT
DRESSING REGULAR UPPER BODY CLOTHING: A LITTLE
MOVING FROM LYING ON BACK TO SITTING ON SIDE OF FLAT BED WITH BEDRAILS: A LOT
EATING MEALS: A LITTLE
TOILETING: A LOT
MOBILITY SCORE: 8
TOILETING: A LOT
MOVING TO AND FROM BED TO CHAIR: TOTAL
HELP NEEDED FOR BATHING: A LITTLE
WALKING IN HOSPITAL ROOM: TOTAL
EATING MEALS: A LITTLE
MOVING TO AND FROM BED TO CHAIR: TOTAL
PERSONAL GROOMING: A LITTLE
DRESSING REGULAR LOWER BODY CLOTHING: A LITTLE
CLIMB 3 TO 5 STEPS WITH RAILING: TOTAL
HELP NEEDED FOR BATHING: A LITTLE
DAILY ACTIVITIY SCORE: 17
TURNING FROM BACK TO SIDE WHILE IN FLAT BAD: A LOT
MOVING FROM LYING ON BACK TO SITTING ON SIDE OF FLAT BED WITH BEDRAILS: A LOT
WALKING IN HOSPITAL ROOM: TOTAL

## 2025-01-20 ASSESSMENT — PAIN - FUNCTIONAL ASSESSMENT
PAIN_FUNCTIONAL_ASSESSMENT: 0-10

## 2025-01-20 NOTE — PROGRESS NOTES
"Nutrition Follow Up Assessment:   Nutrition Assessment    The patient is a 74 y.o. male who is hospital day #16.  Pt initially admitted for expedited w/u of suspected RCC w/ L4/5 mets.  - L4 lesions bx on 01/07  - MRI done 01/08. Shoed small acute stroke and cord compression @[facility]- neurology, rad onc, and ortho consulted.   - Preliminary path confirmed metastatic RCC.   - s/p spinal artery embolization with neuro IR on 01/16  - s/p L3/L4 and L4/L5 laminectomy with tumor debulking the lower lumbar spine with fusion from L2-S1 on 01/17. Drains now removed.   - ID consulted for UTI       -- rad onc: tentative palliative rad pending pathologic dx - following surgery.     Nutrition History:  Food and Nutrient History: Reviewed health touch - pt ordering ~1 meal per day - small, may be only a snack. Though still low this is improved from last week. Met with pt, Pt reports he had eggs and oatmeal this morning - this is the first time in weeks were he has been able to eat 100% of a meal. Reports that taste is still altered. No longer doing OJ per MD recs as his potassium is high. Has switched to apple juice and dilutes it with water. Yesterday he had two Ensure Plus -- preferred the chocolate flavor.      Anthropometrics:  Start of admission anthropometrics:  Height: 188 cm (6' 2\")  Weight: 120 kg (264 lb)  BMI (Calculated): 33.88    IBW/kg (Dietitian Calculated): 86.36 kg  Percent of IBW: 138 %       Wt Readings from Last 10 Encounters:   01/17/25 120 kg (264 lb 8.8 oz)   01/03/25 125 kg (274 lb 11.2 oz)   11/30/24 122 kg (270 lb)   11/27/24 136 kg (300 lb)     Weight Change %:  Weight History / % Weight Change: 3.6% wt loss x2 weeks - significant  Significant Weight Loss: Yes    Nutrition Focused Physical Exam Findings:  NFPE done 01/13    Edema:  Edema Location: non pitting BLE  Physical Findings:  Skin:  (chronic venous ulcer @BLE; incision @back)    Objective Data:    Last BM Date: 01/20/25    Nutrition Significant " Labs:    Results from last 7 days   Lab Units 01/20/25  0815 01/19/25  0753 01/18/25  1843   HEMOGLOBIN g/dL 7.1* 7.4* 7.7*   MCV fL 91 89 91   GLUCOSE mg/dL 79 115* 141*   POTASSIUM mmol/L 4.2 4.9 5.5*   SODIUM mmol/L 138 135* 136   PHOSPHORUS mg/dL 2.7 3.9 2.8   MAGNESIUM mg/dL 2.25 2.35  --    CREATININE mg/dL 1.32* 1.91* 2.03*   BUN mg/dL 33* 39* 33*       Nutrition Specific Medications:  multivitamin with minerals, 1 tablet, oral, Daily  polyethylene glycol, 17 g, oral, BID  sennosides, 2 tablet, oral, Nightly    I/O:   I/O last 2 completed shifts:  In: - (0 mL/kg)   Out: 980 (8.2 mL/kg) [Urine:825 (0.3 mL/kg/hr); Drains:155]  Weight: 120 kg     Dietary Orders (From admission, onward)       Start     Ordered    01/19/25 1021  Oral nutritional supplements  Until discontinued        Question Answer Comment   Deliver with Breakfast    Deliver with Lunch    Select supplement: Ensure Plus        01/19/25 1021    01/18/25 1312  Adult diet Renal; Potassium Restricted 2 gm (50mEq); 2 - 3 grams Sodium  Diet effective now        Question Answer Comment   Diet type Renal    Potassium restriction: Potassium Restricted 2 gm (50mEq)    Sodium restriction: 2 - 3 grams Sodium        01/18/25 1311    01/13/25 1528  Oral nutritional supplements  Until discontinued        Question Answer Comment   Deliver with Lunch chocolate preferred; allow pt to order more if desired   Select supplement: Ensure Plus        01/13/25 1527    01/04/25 1343  May Participate in Room Service  ( ROOM SERVICE MAY PARTICIPATE)  Once        Question:  .  Answer:  Yes    01/04/25 1342                     Estimated Needs:   Total Energy Estimated Needs (kCal): 2160 kCal  Method for Estimating Needs: 25 kcal/kg IBW    Total Protein Estimated Needs (g): 112.32 g  Method for Estimating Needs: 1.3 g/kg IBW    Method for Estimating Needs: 1mL/kcal or MD recommendations          Nutrition Diagnosis   Malnutrition Diagnosis  Patient has Malnutrition Diagnosis:  Yes  Diagnosis Status: Ongoing  Malnutrition Diagnosis: Severe malnutrition related to chronic disease or condition  As Evidenced by: PO intake meeting <75% of nutrient needs for >/= 1 month; severe subcutaneous fat loss; moderate muscle wasting; significant wt loss of 3.6% x2 weeks - possible wt loss ongoing for longer.    Nutrition Diagnosis  Patient has Nutrition Diagnosis: Yes  Diagnosis Status (1): Ongoing  Nutrition Diagnosis 1: Increased nutrient needs  Related to (1): increased metabolic demand  As Evidenced by (1): new cancer dx; recent surgery       Nutrition Interventions/Recommendations    Individualized Nutrition Prescription Provided for : 2150 kcal and 110g protein via oral diet    - Will continue with Ensure Plus (350 kcal, 13g pro) x1/d    -- Will add order for Ensure Clear  (240 kcal, 8g pro) as pt drinks juice daily   - If medically appropriate, consider starting appetite stimulant  - Recommend keeping pt's diet as regular as PO intake remains low.            Nutrition Monitoring and Evaluation   Monitoring and Evaluation Plan: Energy intake  Energy Intake: Estimated energy intake  Criteria: >50% of nutr needs    Monitoring and Evaluation Plan: Weight  Weight: Weight change  Criteria: no wt change                   Time Spent (min): 60 minutes

## 2025-01-20 NOTE — CARE PLAN
Orthopaedic Surgery Spine Updated Care Plan Note    Upright radiographs reviewed with Dr. Atkins and determined to be satisfactory. Maintained alignment and satisfactory hardware positioning.    Recommendations:  - Oncology primary  - Weightbearing as tolerated of the lower and upper extremity  - No head of bed restrictions  - DVT prophylaxis per primary. Ok to start DVT prophylaxis or anticoagulation  - Cho per primary  - 14-day Prevena placed (will  2025 after which incision may be left open to air)  - Recommend q2h repositioning and off loading his lower back to prevent incisional wound breakdown  - Patient should follow up w/ Dr. Atkins in 2 weeks after discharge for post-operative appointment (patient may call [phone] to schedule).   - Orthopaedic Spine will continue to follow peripherally while in house    Mariano Hendrix MD  Orthopedic Surgery PGY-2  [facility]  Pager: [phone]  Available by Epic Chat    On weekends and after 6PM:  At [facility]: Please reach out to the orthopaedic on-call resident ([phone])  At [facility]: Please reach out to the orthopaedic on-call NELSY or resident (please refer to Qgenda)    While admitted, this patient will be followed by the Ortho Spine Team. Please contact below residents with any questions (available via Epic Chat).     First call: Mariano Hendrix, PGY-2   Second call: Lew Abad, PGY-4  Third call: Anthony Mary, Fellow

## 2025-01-20 NOTE — CARE PLAN
The patient's goals for the shift include      The clinical goals for the shift include pt will remain HDS and VSS throughout shift    Over the shift, the patient  progress toward the above goals goals.

## 2025-01-20 NOTE — CARE PLAN
Problem: Skin  Goal: Decreased wound size/increased tissue granulation at next dressing change  Outcome: Progressing  Flowsheets (Taken 1/20/2025 0304)  Decreased wound size/increased tissue granulation at next dressing change: Promote sleep for wound healing  Goal: Participates in plan/prevention/treatment measures  Outcome: Progressing  Flowsheets (Taken 1/20/2025 0304)  Participates in plan/prevention/treatment measures: Elevate heels  Goal: Prevent/manage excess moisture  Outcome: Progressing  Flowsheets (Taken 1/20/2025 0304)  Prevent/manage excess moisture: Moisturize dry skin  Goal: Prevent/minimize sheer/friction injuries  Outcome: Progressing  Flowsheets (Taken 1/20/2025 0304)  Prevent/minimize sheer/friction injuries: Use pull sheet  Goal: Promote/optimize nutrition  Outcome: Progressing  Flowsheets (Taken 1/20/2025 0304)  Promote/optimize nutrition: Monitor/record intake including meals  Goal: Promote skin healing  Outcome: Progressing  Flowsheets (Taken 1/20/2025 0304)  Promote skin healing: Protective dressings over bony prominences     Problem: Fall/Injury  Goal: Not fall by end of shift  Outcome: Progressing  Goal: Be free from injury by end of the shift  Outcome: Progressing  Goal: Verbalize understanding of personal risk factors for fall in the hospital  Outcome: Progressing  Goal: Verbalize understanding of risk factor reduction measures to prevent injury from fall in the home  Outcome: Progressing  Goal: Use assistive devices by end of the shift  Outcome: Progressing  Goal: Pace activities to prevent fatigue by end of the shift  Outcome: Progressing     Problem: Pain - Adult  Goal: Verbalizes/displays adequate comfort level or baseline comfort level  Outcome: Progressing     Problem: Safety - Adult  Goal: Free from fall injury  Outcome: Progressing     Problem: Discharge Planning  Goal: Discharge to home or other facility with appropriate resources  Outcome: Progressing     Problem: Chronic  Conditions and Co-morbidities  Goal: Patient's chronic conditions and co-morbidity symptoms are monitored and maintained or improved  Outcome: Progressing

## 2025-01-20 NOTE — PROGRESS NOTES
"Abdi Retana \"Matt\" is a 74 y.o. male on day 16 of admission presenting with Lesion of lumbar spine.      Subjective   Seen this AM, patient says that his pain is \"unnoticable.\" Much improved post surgery. Denies fevers, chills, nausea, vomiting, chest pain, shortness of breath.     Objective     Last Recorded Vitals  /67 (BP Location: Right arm, Patient Position: Lying)   Pulse 74   Temp 36.7 °C (98.1 °F) (Temporal)   Resp 18   Wt 120 kg (264 lb 8.8 oz)   SpO2 94%   Intake/Output last 3 Shifts:    Intake/Output Summary (Last 24 hours) at 1/20/2025 1237  Last data filed at 1/20/2025 1236  Gross per 24 hour   Intake --   Output 725 ml   Net -725 ml       Admission Weight  Weight: 120 kg (264 lb) (01/04/25 1900)    Daily Weight  01/17/25 : 120 kg (264 lb 8.8 oz)    No current facility-administered medications on file prior to encounter.     Current Outpatient Medications on File Prior to Encounter   Medication Sig Dispense Refill    acetaminophen (Tylenol 8 HOUR) 650 mg ER tablet Take 1 tablet (650 mg) by mouth every 6 hours if needed for mild pain (1 - 3). Do not crush, chew, or split.      acetaminophen (Tylenol) 325 mg tablet Take 2 tablets (650 mg) by mouth 2 times a day.      aspirin 81 mg EC tablet Take 1 tablet (81 mg) by mouth once daily.      bisacodyl (Gentle Laxative, bisacodyl,) 10 mg suppository Insert 1 suppository (10 mg) into the rectum once daily as needed for constipation.      cefepime (Maxipime) IV Infuse 100 mL (2 g) over 0.5 hours into a venous catheter every 12 hours.      famotidine (Pepcid) 20 mg tablet Take 1 tablet (20 mg) by mouth 2 times a day.      ferrous sulfate (FeroSuL) 325 (65 Fe) MG tablet Take 1 tablet (325 mg) by mouth once every 24 hours. 90 tablet 3    losartan (Cozaar) 50 mg tablet TAKE 1 TABLET BY MOUTH ONCE  DAILY 90 tablet 3    magnesium hydroxide (Milk of Magnesia) 400 mg/5 mL suspension Take 30 mL by mouth once daily as needed for constipation (if no BM in 3 " consecutive days).      magnesium oxide 400 mg magnesium capsule Take 1 capsule (400 mg) by mouth once daily. 90 capsule 3    mineral oil enema Insert 133 mL (1 enema) into the rectum once daily as needed for constipation (if no BM in 8 hours after getting suppository).      potassium chloride CR 10 mEq ER tablet TAKE 1 TABLET BY MOUTH ONCE  DAILY DO NOT CRUSH, CHEW, OR  SPLIT 90 tablet 3    torsemide (Demadex) 20 mg tablet TAKE 1 TABLET BY MOUTH ONCE  DAILY 90 tablet 3    traMADol (Ultram) 50 mg tablet Take 1 tablet (50 mg) by mouth every 6 hours if needed for severe pain (7 - 10).      vitamin D3-folic acid 2,500 unit- 1 mg tablet Take 1 mg by mouth once every 24 hours. 90 tablet 3        Physical Exam  HENT:      Head: Normocephalic and atraumatic.      Mouth/Throat:      Mouth: Mucous membranes are moist.   Eyes:      General: No scleral icterus.     Extraocular Movements: Extraocular movements intact.      Pupils: Pupils are equal, round, and reactive to light.   Cardiovascular:      Rate and Rhythm: Normal rate and regular rhythm.      Heart sounds: No murmur heard.  Pulmonary:      Effort: No respiratory distress.      Breath sounds: No wheezing or rales.   Abdominal:      General: There is no distension.      Tenderness: There is no abdominal tenderness. There is no guarding.   Musculoskeletal:      Comments: Legs with dressing on, images on epic show possible stasis dermatitis with ulcers   Skin:     Capillary Refill: Capillary refill takes less than 2 seconds.   Neurological:      General: No focal deficit present.      Mental Status: He is alert and oriented to person, place, and time.      Motor: Weakness present.      Comments: Richter in b/l LE 3/5, significantly limited by pain   Psychiatric:         Mood and Affect: Mood normal.         Relevant Results    Results for orders placed or performed during the hospital encounter of 01/04/25 (from the past 24 hours)   Urinalysis with Reflex Culture and  Microscopic   Result Value Ref Range    Color, Urine Yellow Light-Yellow, Yellow, Dark-Yellow    Appearance, Urine Turbid (N) Clear    Specific Gravity, Urine 1.026 1.005 - 1.035    pH, Urine 6.0 5.0, 5.5, 6.0, 6.5, 7.0, 7.5, 8.0    Protein, Urine 50 (1+) (A) NEGATIVE, 10 (TRACE), 20 (TRACE) mg/dL    Glucose, Urine Normal Normal mg/dL    Blood, Urine 0.1 (1+) (A) NEGATIVE    Ketones, Urine NEGATIVE NEGATIVE mg/dL    Bilirubin, Urine NEGATIVE NEGATIVE    Urobilinogen, Urine Normal Normal mg/dL    Nitrite, Urine NEGATIVE NEGATIVE    Leukocyte Esterase, Urine 500 Pamela/uL (A) NEGATIVE   Extra Urine Gray Tube   Result Value Ref Range    Extra Tube Hold for add-ons.    Microscopic Only, Urine   Result Value Ref Range    WBC, Urine >50 (A) 1-5, NONE /HPF    RBC, Urine 11-20 (A) NONE, 1-2, 3-5 /HPF    Squamous Epithelial Cells, Urine 1-9 (SPARSE) Reference range not established. /HPF    Mucus, Urine FEW Reference range not established. /LPF   CBC and Auto Differential   Result Value Ref Range    WBC 13.9 (H) 4.4 - 11.3 x10*3/uL    nRBC 0.1 (H) 0.0 - 0.0 /100 WBCs    RBC 2.62 (L) 4.50 - 5.90 x10*6/uL    Hemoglobin 7.1 (L) 13.5 - 17.5 g/dL    Hematocrit 23.7 (L) 41.0 - 52.0 %    MCV 91 80 - 100 fL    MCH 27.1 26.0 - 34.0 pg    MCHC 30.0 (L) 32.0 - 36.0 g/dL    RDW 17.7 (H) 11.5 - 14.5 %    Platelets 316 150 - 450 x10*3/uL    Neutrophils % 74.0 40.0 - 80.0 %    Immature Granulocytes %, Automated 2.1 (H) 0.0 - 0.9 %    Lymphocytes % 14.7 13.0 - 44.0 %    Monocytes % 9.0 2.0 - 10.0 %    Eosinophils % 0.1 0.0 - 6.0 %    Basophils % 0.1 0.0 - 2.0 %    Neutrophils Absolute 10.33 (H) 1.60 - 5.50 x10*3/uL    Immature Granulocytes Absolute, Automated 0.29 0.00 - 0.50 x10*3/uL    Lymphocytes Absolute 2.05 0.80 - 3.00 x10*3/uL    Monocytes Absolute 1.25 (H) 0.05 - 0.80 x10*3/uL    Eosinophils Absolute 0.01 0.00 - 0.40 x10*3/uL    Basophils Absolute 0.01 0.00 - 0.10 x10*3/uL   Magnesium   Result Value Ref Range    Magnesium 2.25 1.60 -  2.40 mg/dL   Renal Function Panel   Result Value Ref Range    Glucose 79 74 - 99 mg/dL    Sodium 138 136 - 145 mmol/L    Potassium 4.2 3.5 - 5.3 mmol/L    Chloride 101 98 - 107 mmol/L    Bicarbonate 29 21 - 32 mmol/L    Anion Gap 12 10 - 20 mmol/L    Urea Nitrogen 33 (H) 6 - 23 mg/dL    Creatinine 1.32 (H) 0.50 - 1.30 mg/dL    eGFR 57 (L) >60 mL/min/1.73m*2    Calcium 8.1 (L) 8.6 - 10.6 mg/dL    Phosphorus 2.7 2.5 - 4.9 mg/dL    Albumin 2.9 (L) 3.4 - 5.0 g/dL      CT chest abdomen pelvis wo IV contrast    Result Date: 1/2/2025  CHEST: 1.  History of right renal cell carcinoma. 2. Multiple pulmonary nodules consistent with metastatic disease.   ABDOMEN AND PELVIS: 1.  History upright or renal cell carcinoma. There is an unchanged necrotic mass involving the upper pole of the right kidney. 2. Bilateral renal cysts. 3. Bilateral nonobstructing intrarenal calculi with staghorn type calculi in the left kidney. There is no renal obstruction. 4. Complete bony destruction of L4 with a soft tissue mass with expansion of the vertebral body. Lytic lesion involving the superior endplate of L5. Findings are consistent with bony metastases.   MACRO: None   Signed by: Lucia Desai 1/2/2025 10:33 AM Dictation workstation:   [id number]    XR chest 1 view    Result Date: 1/2/2025  No pneumonic consolidations are noted. There questionable perihilar nodules which may correspond to the masses noted on the prior CT.     MACRO: None   Signed by: Patrick Arnold 1/2/2025 8:33 AM Dictation workstation:   [id number]      MRI Spine 1/8/25  IMPRESSION:  MRI cervical spine:      1. No evidence of metastases.      2. Multilevel degenerative changes of the cervical spine including  moderate spinal canal stenosis at C5-C6 and C6-C7.      MRI thoracic spine:      1. No evidence of osseous metastases.      2. Multilevel degenerative changes of the thoracic spine without  striking spinal canal stenosis at any level.      MRI lumbar spine:      1.  Metastasis involving the entirety of the L4 vertebral body,  portions of the posterior elements and transverse processes, with  extension into the L3-L4 and L4-L5 intervertebral disc spaces and  into the inferior endplate of L3 and superior endplate of L5 as well  as into the surrounding prevertebral soft tissues.      2. There is extension of tumor into the ventral and bilateral  epidural space at L4 and there is also tumor located within the  posterior spinal canal extending from the level of L3 through L5.  Combination of these findings cause marked spinal canal stenosis at  the level of L4. Minimal CSF is seen within the thecal sac at the  level of L4.      3. Tumor extends into the bilateral neural foramina at L4-L5 with  resulting moderate-to-marked left and marked right neural foraminal  narrowing. Tumor also extends into the inferior portions of the  bilateral neural foramina at L3-L4 resulting in moderate neural  foraminal narrowing.      4. There is metastasis located within the posterior and left epidural  space at the level of L5 and S1 which causes partial effacement of  the posterior thecal sac.      5. Multilevel degenerative changes of the lumbar spine as detailed  above.      MRI Head 1/8/25  IMPRESSION:  1.  Small focus of diffusion restriction involving the right parietal  lobe with no associated enhancement on postcontrast imaging, favoring  small acute infarct. No mass effect or hemorrhagic transformation.  2. No abnormal intracranial enhancement to indicate intracranial  metastatic disease.  3. Nonspecific dilatation of the superior ophthalmic veins  bilaterally.    Assessment/Plan      Assessment & Plan  JAMAL (acute kidney injury) (CMS-HCC)    Lesion of lumbar spine    Mr. Abdi Retana is a 73 yo M with PMHx of HTN, GERD, SAMIA, Chronic lymphedema, venous insufficiency, OA, CKD-3(b/l 1.2-1.5), also w/ the following oncologic Hx - no formal Dx at this time. Presenting symptom was b/l LE weakness.  Was admitted to [facility] 11/30-12/5 for this after IMG revealed a concerning L4-5 lesion w/ mod-severe cord compression in s/o R menal mass and suspicious lesions in adrenals lungs, and spine. This was c/f undiagnosed metastatic R RCC. Case was discussed w/ both IR and ortho spine. It was deemed that no inpt Bx nor surgery was indicated and pt was DC to SNF w/ plan for IR-guided L4 Bx (scheduled for 12/20; pt missed d/t transport issues w/ SNF). MRI brain and C/T spine w/ contrast was attempted but pt did not tolerate d/t claustrophobia - there was a plan to complete it OP w/ anesthesia. Transferred from [facility] for expedited work up of suspected R. RCC with L4/5 mets. Also has Proteus Mirabilis UTI c/b bacteremia I/s/o bilateral non obstructing renal stones. ID consulted for antibiotic management. L4 biopsy done and MRI head and spine completed. Path report confirmed metastatic RCC. S/p spinal artery embolization with Neurosurgery. S/p lumbar tumor debulking 1/17/2025.    Update 1/20/25:     Consulted ID for antibiotics management for UTI. Patient has been on Augmentin and UTI is still not resolved.   S/p lumbar tumor debulking, L3-L4 and L4-L5 laminectomies with L2-S1 PSIF with Dr. Atkins on 1/17   Will consider reauming DVT ppx if Hb stable Hgb 7.1 AM   Continue with PT/OT   Encourage PO intake     #Lumbar spinal fracuture w/ cord compression  - S/p tumor debulking 1/17 w/ ortho spine  - s/p dexamethasone POD1, s/p ancef POD1  - Drain/wound management per ortho  - no DVT ppx until 1/19  - s/p 1 unit PRBCs 1/18 for hb 6.9; hb stable 1/19    #Non oliguric JAMAL, stage I  # UTI   :: post procedural, likely related to blood loss and hemodynamic changes during surgery  :: Pamela/Est 500, WBC urine> 50 and RBC> 11-20  - s/p 2 total units PRBCs (1 in OR, 1 post op)    Plan:   - monitor urine output  - Cr peaked 2.03 1/18; Now downtrending to 1.32  - Consulted ID for abx management   - urinalysis  - renally dose meds, avoid  nephrotoxins, avoid hemodynamic shifts     Small Acute Stroke on MRI  -Small focus of diffusion restriction involving the right parietal lobe with no associated enhancement on postcontrast imaging, favoring small acute infarct. No mass effect or hemorrhagic transformation.  -at his neurologic baseline(fluent speech, oriented, moves all limbs,LE-4/5)  -neuro-stoke consulted  -already on aspirin for CAD, not on statin    Plan  -neuro-stroke following  Recommendations  - Hold aspirin 81 mg daily for 48hrs post-surgery to prevent risk of bleeding  - when safe from a bleeding an procedural standpoint start therapeutic Lovenox 1 mg/kg Q12H   - Please keep patient on telemetry and monitor daily for afib. If there is evidence of afib may need alternative anticoagulation.    -  continue with atorvastatin 40 daily target LDL <70  - please arrange neurology followup at time of discharge    P. mirabilis bacteremia 2/2 UTI  -HDS  -associated w/ R renal mass, b/l non-obstructing renal stones  -sensitivities available  -seen by uro at [facility], did not have plan for procedure given non-obstructive  -ID consulted for duration, procedure rec given complex anatomy of stones and renal mass - c/f seeding  -ID following, recommend Augmentin for 3 weeks(EOT-1/23)  -repeat BC(1/10)-NGTD     C/f undiagnosed metastatic R RCC  L4-5 lesion w/ radiographic evidence cord compression  -CT(11/30)-R renal mass  -pt at baseline 4/5 LE weakness on exam, symmetric, no incontinence or saddle anesthesia  -there have been no plans for procedures per prior evaluations  -IMG as above  -d/w ortho spine, said if going for anesthesia should do the full spine  -consider formal  spine surgery consult if neuro change and/or IMG/Bx completed  -had been seen by NSG 1/3 who thought exam was okay w/o weakness, did not rec an intervention  -On scheduled tylenol and prn oxy/dilaudid  -Outpatient medical oncology follow up with Dr Guerra  -ortho-spine  following    HTN  CAD  -holding home  losartan 50mg  I/s/o recent rise in Cr(now resolved) and normotension   -c/w home Aspirin     Lymphedema  Venous insufficiency/venous ulcers  -wound care consulted  -hold home torsemide 20 mg daily for now  -OSH wound recs  BLE- Chronic venous status ulcers   Irrigate with normal saline or wound cleanser, Pat dry.  Apply lotion to intact skin   Cover weeping areas with Triad Hydrophillic wound dressing ointment   Pad and protect with ABD, Kerlix and paper tape.   Change every day and as needed   Secure with Tubigrip ( Apply from base of toes to 2 finger width below the bend of knee)      Gluteal cleft & buttocks-MASD/Irritant contact dermatitis due to fecal, urinary or dual incontinent.   Cleanse with bath wipes or soap and water. Rinse well and pat dry.   Apply no sting skin barrier (cavilon)   Apply Triad hydrophilic wound dressing ointment to gluteal fold/groin three times a day and as needed.     GERD  -c/w home famotidine 20 mg bid    Disposition  -barriers: clinical improvement  -destination: likely back to SNF ([facility])  -f/u: pending     Diet: cardiac   Electrolytes: K >3.5; Mg >2  DVT ppx: SCD/hold post op   GI ppx: on home famotidine  Lines/tubes: PIV  O2: none  Baseline Cr: 1.5  Code status: Full code  Surrogate decision maker: Micaela Retana (sister) [phone]    Salomon Silva MD  Internal Medicne  PGY-1

## 2025-01-20 NOTE — PROGRESS NOTES
01/16/25 1100   Discharge Planning   Living Arrangements Alone   Support Systems Family members   Type of Residence Private residence   Home or Post Acute Services Post acute facilities (Rehab/SNF/etc)   Type of Post Acute Facility Services Skilled nursing   Expected Discharge Disposition SNF     1/16/25  SW following to assist with discharge planning. Pt recommended for SNF and referrals previously sent.  Anticipate pt to be ready for discharge next week.  Updates sent in [facility].  SW to continue to follow and assist with discharge planning.  Elizabeth Gunsalus LISW-S  Care Transitions Supervisor    1/20/25  SW continuing to follow for discharge planning.  Pt was previously at [place] [facility].  SW met with pt to confirm pt agreeable to return to [facility] when ready.  Pt in agreement.  ID recs pending.  Anticipate pt to be medically ready for discharge mid/end of week.  Updates sent to [facility].  SW to continue to follow and advise of updates.  Elizabeth Gunsalus LISW-S  Care Transitions Supervisor

## 2025-01-20 NOTE — PROGRESS NOTES
"Abdi Retana \"Louise" is a 74 y.o. male on day 16 of admission presenting with Lesion of lumbar spine.    Subjective   Patient evaluated at bedside.  States he did not have a bowel movement.  Denies N/T down the lower extremities.     Objective     Physical Exam    General: Lying comfortably in bed, no acute distress, seems intermittently confused  HEENT: EOMI, NC/AT  CV: RRR by peripheral pulses  Resp: Normal WOB  MSK: See below. Gross motor in tact.  Neurologic: AOx3  Skin: Venous stasis appearing skin changes on bilateral lower extremities  Psych: Mood appropriate     L1: SILT       L2: SILT      Hip flexors 4/5 Left; 4/5 Right  L3: SILT      Knee extension 5/5 Left; 5/5 Right  L4: SILT      Tib Ant. (Dorsiflexion) 5/5 Left; 5/5 Right  L5: SILT      EHL 5/5 Left; 5/5 Right  S1: SILT      Plantar flexion 5/5 Left; 5/5 Right    Drain x 2 intact with bloody output  Prevena on with no leaks     Last Recorded Vitals  Blood pressure 119/63, pulse 67, temperature 36.6 °C (97.9 °F), temperature source Temporal, resp. rate 18, height 1.88 m (6' 2\"), weight 120 kg (264 lb 8.8 oz), SpO2 97%.  Intake/Output last 3 Shifts:  I/O last 3 completed shifts:  In: - (0 mL/kg)   Out: 1030 (8.6 mL/kg) [Urine:825 (0.2 mL/kg/hr); Drains:205]  Weight: 120 kg     Relevant Results      Scheduled medications  acetaminophen, 975 mg, oral, TID  amoxicillin-pot clavulanate, 1 tablet, oral, q12h CHAPITO  [Held by provider] aspirin, 81 mg, oral, Daily  atorvastatin, 40 mg, oral, Nightly  famotidine, 20 mg, oral, BID  multivitamin with minerals, 1 tablet, oral, Daily  polyethylene glycol, 17 g, oral, BID  sennosides, 2 tablet, oral, Nightly  sodium zirconium cyclosilicate, 10 g, oral, q8h      Continuous medications     PRN medications  PRN medications: eucerin, eucerin, HYDROmorphone, oxyCODONE, oxyCODONE  Results for orders placed or performed during the hospital encounter of 01/04/25 (from the past 24 hours)   Urinalysis with Reflex Culture and " Microscopic   Result Value Ref Range    Color, Urine Yellow Light-Yellow, Yellow, Dark-Yellow    Appearance, Urine Turbid (N) Clear    Specific Gravity, Urine 1.026 1.005 - 1.035    pH, Urine 6.0 5.0, 5.5, 6.0, 6.5, 7.0, 7.5, 8.0    Protein, Urine 50 (1+) (A) NEGATIVE, 10 (TRACE), 20 (TRACE) mg/dL    Glucose, Urine Normal Normal mg/dL    Blood, Urine 0.1 (1+) (A) NEGATIVE    Ketones, Urine NEGATIVE NEGATIVE mg/dL    Bilirubin, Urine NEGATIVE NEGATIVE    Urobilinogen, Urine Normal Normal mg/dL    Nitrite, Urine NEGATIVE NEGATIVE    Leukocyte Esterase, Urine 500 Pamela/uL (A) NEGATIVE   Extra Urine Gray Tube   Result Value Ref Range    Extra Tube Hold for add-ons.    Microscopic Only, Urine   Result Value Ref Range    WBC, Urine >50 (A) 1-5, NONE /HPF    RBC, Urine 11-20 (A) NONE, 1-2, 3-5 /HPF    Squamous Epithelial Cells, Urine 1-9 (SPARSE) Reference range not established. /HPF    Mucus, Urine FEW Reference range not established. /LPF   CBC and Auto Differential   Result Value Ref Range    WBC 13.9 (H) 4.4 - 11.3 x10*3/uL    nRBC 0.1 (H) 0.0 - 0.0 /100 WBCs    RBC 2.62 (L) 4.50 - 5.90 x10*6/uL    Hemoglobin 7.1 (L) 13.5 - 17.5 g/dL    Hematocrit 23.7 (L) 41.0 - 52.0 %    MCV 91 80 - 100 fL    MCH 27.1 26.0 - 34.0 pg    MCHC 30.0 (L) 32.0 - 36.0 g/dL    RDW 17.7 (H) 11.5 - 14.5 %    Platelets 316 150 - 450 x10*3/uL    Neutrophils % 74.0 40.0 - 80.0 %    Immature Granulocytes %, Automated 2.1 (H) 0.0 - 0.9 %    Lymphocytes % 14.7 13.0 - 44.0 %    Monocytes % 9.0 2.0 - 10.0 %    Eosinophils % 0.1 0.0 - 6.0 %    Basophils % 0.1 0.0 - 2.0 %    Neutrophils Absolute 10.33 (H) 1.60 - 5.50 x10*3/uL    Immature Granulocytes Absolute, Automated 0.29 0.00 - 0.50 x10*3/uL    Lymphocytes Absolute 2.05 0.80 - 3.00 x10*3/uL    Monocytes Absolute 1.25 (H) 0.05 - 0.80 x10*3/uL    Eosinophils Absolute 0.01 0.00 - 0.40 x10*3/uL    Basophils Absolute 0.01 0.00 - 0.10 x10*3/uL   Magnesium   Result Value Ref Range    Magnesium 2.25 1.60 -  2.40 mg/dL   Renal Function Panel   Result Value Ref Range    Glucose 79 74 - 99 mg/dL    Sodium 138 136 - 145 mmol/L    Potassium 4.2 3.5 - 5.3 mmol/L    Chloride 101 98 - 107 mmol/L    Bicarbonate 29 21 - 32 mmol/L    Anion Gap 12 10 - 20 mmol/L    Urea Nitrogen 33 (H) 6 - 23 mg/dL    Creatinine 1.32 (H) 0.50 - 1.30 mg/dL    eGFR 57 (L) >60 mL/min/1.73m*2    Calcium 8.1 (L) 8.6 - 10.6 mg/dL    Phosphorus 2.7 2.5 - 4.9 mg/dL    Albumin 2.9 (L) 3.4 - 5.0 g/dL                   Assessment/Plan   Assessment & Plan  JAMAL (acute kidney injury) (CMS-HCC)    Lesion of lumbar spine    Patient is a 74M who was admitted for BLE weakness and an L4 lytic lesion.  L4-L5 biopsy was performed and results were positive for RCC. Dr Atkins discussed with the patient and sister about decompression of the lumbar spine with tumor debulking.    Patient is now status post L3/L4 and L4/L5 laminectomy with tumor debulking the lower lumbar spine with fusion from L2-S1 with Dr. Atkins on 1/17/25     Plan  Oncology primary  Weightbearing as tolerated of the lower and upper extremity  No head of bed restrictions  DVT prophylaxis per primary.  Ok to start DVT prophylaxis or anticoagulation  Recommend Decadron 10 mg for 3 doses. Completed   Drain x 2.  Continue to record Q8 output for the left and right (Continue drains).  Plan to remove on 1/20   Cho per primary  14-day Prevena placed. Will plan to replaced on 1/20  Pain control with multimodality  Intra op biopsy obtained   Resume normal diet  Resume home medications  Encourage oral fluid intake  PT/OT consult and rec SNF     Plan discussed with Dr. Wiblert Abad DO  Orthopedic Surgery, PGY4     Patient will be followed by the Orthopaedic Spine Team:  1st call:  Mariano Hendrix MD  2nd call:  Lew Abad DO  3rd call:  Anthony Mary, Fellow  Available via Wh[employer]      I saw and evaluated the patient.  I personally obtained the key and critical portions of the history  and physical exam or was physically present for key and critical portions performed by the Resident. I reviewed the documentation and discussed the patient with the Resident.  I agree with the Resident’s medical decision making as documented in the note.

## 2025-01-20 NOTE — CARE PLAN
The patient's goals for the shift include      The clinical goals for the shift include Pt will remain HDS and safe and free from injury through shift 1/20/25 @ 1900      Problem: Skin  Goal: Decreased wound size/increased tissue granulation at next dressing change  Flowsheets (Taken 1/20/2025 1622)  Decreased wound size/increased tissue granulation at next dressing change: Promote sleep for wound healing  Goal: Participates in plan/prevention/treatment measures  Flowsheets (Taken 1/20/2025 1622)  Participates in plan/prevention/treatment measures: Elevate heels  Goal: Prevent/manage excess moisture  Flowsheets (Taken 1/20/2025 1622)  Prevent/manage excess moisture:   Monitor for/manage infection if present   Cleanse incontinence/protect with barrier cream  Goal: Prevent/minimize sheer/friction injuries  Flowsheets (Taken 1/20/2025 1622)  Prevent/minimize sheer/friction injuries:   Use pull sheet   Turn/reposition every 2 hours/use positioning/transfer devices   HOB 30 degrees or less  Goal: Promote/optimize nutrition  Flowsheets (Taken 1/20/2025 1622)  Promote/optimize nutrition: Offer water/supplements/favorite foods  Goal: Promote skin healing  Flowsheets (Taken 1/20/2025 1622)  Promote skin healing:   Turn/reposition every 2 hours/use positioning/transfer devices   Protective dressings over bony prominences

## 2025-01-20 NOTE — PROGRESS NOTES
Matt Retana is a 74 y.o. male on day 16 of admission presenting with Lesion of lumbar spine.  Inpatient consult to Infectious Diseases  Consult performed by: Kyrie Gee MD  Consult ordered by: Mick Wynn MD         Subjective   Summary (Last seen by ID on 1/6/2025)  This is a 74 y.o. male with PMH of recent diagnosis of RCC with L4/5 metastasis , CKD who presented to OSH with hematuria 1/2.  Patient found to have Proteus in his urine and in his blood, and we are consulted for antibiotic management on 1/5.  His Proteus is susceptible to Augmentin, Unasyn, cefazolin, and Zosyn.  Recommended PO augmentin for 3 weeks (end date 1/23/25) for complicated UTI with proteus bacteremia.      Interval History:   Patient underwent spinal artery embolization with Neurosurgery 1/16  and lumbar tumor debulking and L4-L5 laminectomies with L2-S1 posterior spinal instrumentation/infusion 1/17/2025 by Ortho. Patient was started on dexamethazone for spinal compression. Repeated urinalysis still showed pyuria with positive nitrate with the appearance being cloudy 1/19. ID was consulted for this while currently on Augmentin as prior ID recommendation. No fever or leukocytosis from new or failure treatment of UTI.     On interview, patient felt cold over night, but no objective fever. Denied burning sensation while urinating.    Review of Systems    Objective   Range of Vitals (last 24 hours)  Heart Rate:  [62-81]   Temp:  [36.2 °C (97.2 °F)-36.8 °C (98.2 °F)]   Resp:  [17-18]   BP: (113-146)/(61-74)   SpO2:  [94 %-97 %]   Daily Weight  01/17/25 : 120 kg (264 lb 8.8 oz)    Body mass index is 33.97 kg/m².    Physical Exam  General: alert, able to answer questions  HEENT: no conjunctival injection. anicteric.  CVS: RRR. Normal S1 and S2. No m/r/g.   RESP: ctab no w/r/r, no increased wob  Abd: Soft and lax. ND.   Ext: No swelling of the LE b/l but with dressing/compression  Neuro: Answers questions appropriately.  Integumentary: bl  discoloration around ankles (post cellulitis per patient report)  Antibiotics  amoxicillin-pot clavulanate - 875-125 mg  cefepime - 2 gram/100 mL    Relevant Results  Labs  Results from last 72 hours   Lab Units 01/20/25  0815 01/19/25  0753 01/18/25  1843 01/18/25  1155   WBC AUTO x10*3/uL 13.9* 19.0* 19.3* 18.5*   HEMOGLOBIN g/dL 7.1* 7.4* 7.7* 6.9*   HEMATOCRIT % 23.7* 25.0* 25.8* 22.8*   PLATELETS AUTO x10*3/uL 316 325 312 306   NEUTROS PCT AUTO % 74.0  --  93.1 89.7   LYMPHO PCT MAN %  --  3.5  --   --    LYMPHS PCT AUTO % 14.7  --  3.2 5.0   MONO PCT MAN %  --  0.9  --   --    MONOS PCT AUTO % 9.0  --  2.8 3.4   EOSINO PCT MAN %  --  0.0  --   --    EOS PCT AUTO % 0.1  --  0.0 0.0     Results from last 72 hours   Lab Units 01/20/25  0815 01/19/25  0753 01/18/25  1843   SODIUM mmol/L 138 135* 136   POTASSIUM mmol/L 4.2 4.9 5.5*   CHLORIDE mmol/L 101 102 101   CO2 mmol/L 29 29 26   BUN mg/dL 33* 39* 33*   CREATININE mg/dL 1.32* 1.91* 2.03*   GLUCOSE mg/dL 79 115* 141*   CALCIUM mg/dL 8.1* 8.3* 8.5*   ANION GAP mmol/L 12 9* 15   EGFR mL/min/1.73m*2 57* 36* 34*   PHOSPHORUS mg/dL 2.7 3.9 2.8     Results from last 72 hours   Lab Units 01/20/25  0815 01/19/25  0753 01/18/25  1843   ALBUMIN g/dL 2.9* 2.7* 2.6*     Estimated Creatinine Clearance: 67.6 mL/min (A) (by C-G formula based on SCr of 1.32 mg/dL (H)).  CRP   Date Value Ref Range Status   08/14/2020 11.07 (A) mg/dL Final     Comment:     REF VALUE  < 1.00       Microbiology  1/2 Blood Proteus  1/2 mixed  1/4 Urine negative  1/10 Blood negative   1/19 Urine Pending    Antimicrobial agents   1/2-1/6 Cefepime    1/7- Augmentin    Assessment/Plan  # Possible asymptomatic pyuria/hematuria +- bacteruria    A 73 yo male with recent diagnosis of RCC with L4/5 metastasis developed UTI with Proteu bacteremia on 1/5, currently being treated with PO Augmentin (end date 1/23/25). No clinical manifestations suggestive of UTI. No fever. No new leukocytosis likely from c/f  UTI (current leukocytosis is likely attributed to dexamethazone and post OR).    Pyuria and positive nitrate in UA can be even while appropriate antibiotics treatment. Urine culture was negative on 1/4 after antibiotics, which likely represented it cleared from urine. However, it should look cloudy with pyuria in setting of known RCC.     From ID standpoint, no need further management at this point. Will continue PO augmentin through 1/23 as prior ID recommendation.     Recommendations  -No need further management for clinical findings in urine  -No change of antibiotics  -Follow prior ID recommendations    Discussed with Dr. Villagomez. Please text me via Epic chat if you have any questions or concerns regarding this patient. ID will sign off.    Kyire Gee MD  ID fellow PGY5  Team A   ID pager [phone]

## 2025-01-20 NOTE — CARE PLAN
Orthopaedic Surgery Plan of Care Note    Patient seen at bedside and HV drains removed without complication. Prevena incisional vac exchanged at bedside.    Plan:  - Oncology primary  - Weightbearing as tolerated of the lower and upper extremity  - No head of bed restrictions  - DVT prophylaxis per primary. Ok to start DVT prophylaxis or anticoagulation  - Cho per primary  - 14-day Prevena placed (will  2025 after which incision may be left open to air  - Recommend q2h repositioning and off loading his lower back to prevent incisional wound breakdown  - Ortho spine to follow peripherally for completion of upright XR of the lumbar spine and biopsy results  - Do NOT discharge until completion of upright XR, please epic chat team members below once completed    Dispo pending completion of upright XR    Plan discussed with Dr. Atkins.    Mariano Hendrix MD  Orthopedic Surgery PGY-2  [facility]  Pager: [phone]  Available by Epic Chat    On weekends and after 6PM:  At [facility]: Please reach out to the orthopaedic on-call resident ([phone])  At [facility]: Please reach out to the orthopaedic on-call NELSY or resident (please refer to Qgenda)    While admitted, this patient will be followed by the Ortho Spine Team. Please contact below residents with any questions (available via Epic Chat).     First call: Mariano Hendrix, PGY-2   Second call: Lew Abad, PGY-4  Third call: Anthony Mary, Fellow

## 2025-01-20 NOTE — PROGRESS NOTES
"Abdi Retana \"Louise" is a 74 y.o. male on day 16 of admission presenting with Lesion of lumbar spine.    Subjective   Patient evaluated at bedside this morning.  States he had a bowel movement and feels like the pain is maintaining and not getting any worse.     Objective     Physical Exam    General: Lying comfortably in bed, no acute distress, seems intermittently confused  HEENT: EOMI, NC/AT  CV: RRR by peripheral pulses  Resp: Normal WOB  MSK: See below. Gross motor in tact.  Neurologic: AOx3  Skin: Venous stasis appearing skin changes on bilateral lower extremities  Psych: Mood appropriate     L1: SILT       L2: SILT      Hip flexors 4/5 Left; 4/5 Right  L3: SILT      Knee extension 5/5 Left; 5/5 Right  L4: SILT      Tib Ant. (Dorsiflexion) 5/5 Left; 5/5 Right  L5: SILT      EHL 5/5 Left; 5/5 Right  S1: SILT      Plantar flexion 5/5 Left; 5/5 Right    Drain x 2 intact with bloody output  Prevena on with no leaks     Last Recorded Vitals  Blood pressure 146/74, pulse 62, temperature 36.2 °C (97.2 °F), temperature source Temporal, resp. rate 17, height 1.88 m (6' 2\"), weight 120 kg (264 lb 8.8 oz), SpO2 95%.  Intake/Output last 3 Shifts:  I/O last 3 completed shifts:  In: 354 (3 mL/kg) [Blood:354]  Out: 1120 (9.3 mL/kg) [Urine:950 (0.2 mL/kg/hr); Drains:170]  Weight: 120 kg     Relevant Results      Scheduled medications  acetaminophen, 975 mg, oral, TID  amoxicillin-pot clavulanate, 1 tablet, oral, q12h CHAPITO  [Held by provider] aspirin, 81 mg, oral, Daily  atorvastatin, 40 mg, oral, Nightly  famotidine, 20 mg, oral, BID  multivitamin with minerals, 1 tablet, oral, Daily  polyethylene glycol, 17 g, oral, BID  sennosides, 2 tablet, oral, Nightly  sodium zirconium cyclosilicate, 10 g, oral, q8h      Continuous medications     PRN medications  PRN medications: eucerin, eucerin, HYDROmorphone, oxyCODONE, oxyCODONE  Results for orders placed or performed during the hospital encounter of 01/04/25 (from the past 24 hours) "   CBC and Auto Differential   Result Value Ref Range    WBC 19.0 (H) 4.4 - 11.3 x10*3/uL    nRBC 0.0 0.0 - 0.0 /100 WBCs    RBC 2.81 (L) 4.50 - 5.90 x10*6/uL    Hemoglobin 7.4 (L) 13.5 - 17.5 g/dL    Hematocrit 25.0 (L) 41.0 - 52.0 %    MCV 89 80 - 100 fL    MCH 26.3 26.0 - 34.0 pg    MCHC 29.6 (L) 32.0 - 36.0 g/dL    RDW 17.5 (H) 11.5 - 14.5 %    Platelets 325 150 - 450 x10*3/uL    Immature Granulocytes %, Automated 0.9 0.0 - 0.9 %    Immature Granulocytes Absolute, Automated 0.18 0.00 - 0.50 x10*3/uL   Magnesium   Result Value Ref Range    Magnesium 2.35 1.60 - 2.40 mg/dL   Renal Function Panel   Result Value Ref Range    Glucose 115 (H) 74 - 99 mg/dL    Sodium 135 (L) 136 - 145 mmol/L    Potassium 4.9 3.5 - 5.3 mmol/L    Chloride 102 98 - 107 mmol/L    Bicarbonate 29 21 - 32 mmol/L    Anion Gap 9 (L) 10 - 20 mmol/L    Urea Nitrogen 39 (H) 6 - 23 mg/dL    Creatinine 1.91 (H) 0.50 - 1.30 mg/dL    eGFR 36 (L) >60 mL/min/1.73m*2    Calcium 8.3 (L) 8.6 - 10.6 mg/dL    Phosphorus 3.9 2.5 - 4.9 mg/dL    Albumin 2.7 (L) 3.4 - 5.0 g/dL   Manual Differential   Result Value Ref Range    Neutrophils %, Manual 95.6 40.0 - 80.0 %    Lymphocytes %, Manual 3.5 13.0 - 44.0 %    Monocytes %, Manual 0.9 2.0 - 10.0 %    Eosinophils %, Manual 0.0 0.0 - 6.0 %    Basophils %, Manual 0.0 0.0 - 2.0 %    Seg Neutrophils Absolute, Manual 18.16 (H) 1.60 - 5.00 x10*3/uL    Lymphocytes Absolute, Manual 0.67 (L) 0.80 - 3.00 x10*3/uL    Monocytes Absolute, Manual 0.17 0.05 - 0.80 x10*3/uL    Eosinophils Absolute, Manual 0.00 0.00 - 0.40 x10*3/uL    Basophils Absolute, Manual 0.00 0.00 - 0.10 x10*3/uL    Total Cells Counted 115     RBC Morphology See Below     Polychromasia Mild     Ovalocytes Few     Zee Cells Few    Urinalysis with Reflex Culture and Microscopic   Result Value Ref Range    Color, Urine Yellow Light-Yellow, Yellow, Dark-Yellow    Appearance, Urine Turbid (N) Clear    Specific Gravity, Urine 1.026 1.005 - 1.035    pH, Urine 6.0  5.0, 5.5, 6.0, 6.5, 7.0, 7.5, 8.0    Protein, Urine 50 (1+) (A) NEGATIVE, 10 (TRACE), 20 (TRACE) mg/dL    Glucose, Urine Normal Normal mg/dL    Blood, Urine 0.1 (1+) (A) NEGATIVE    Ketones, Urine NEGATIVE NEGATIVE mg/dL    Bilirubin, Urine NEGATIVE NEGATIVE    Urobilinogen, Urine Normal Normal mg/dL    Nitrite, Urine NEGATIVE NEGATIVE    Leukocyte Esterase, Urine 500 Pamela/uL (A) NEGATIVE   Microscopic Only, Urine   Result Value Ref Range    WBC, Urine >50 (A) 1-5, NONE /HPF    RBC, Urine 11-20 (A) NONE, 1-2, 3-5 /HPF    Squamous Epithelial Cells, Urine 1-9 (SPARSE) Reference range not established. /HPF    Mucus, Urine FEW Reference range not established. /LPF                   Assessment/Plan   Assessment & Plan  JAMAL (acute kidney injury) (CMS-HCC)    Lesion of lumbar spine    Patient is a 74M who was admitted for BLE weakness and an L4 lytic lesion.  L4-L5 biopsy was performed and results were positive for RCC. Dr Atkins discussed with the patient and sister about decompression of the lumbar spine with tumor debulking.    Patient is now status post L3/L4 and L4/L5 laminectomy with tumor debulking the lower lumbar spine with fusion from L2-S1 with Dr. Atkins on 1/17/25     Plan  Oncology primary  Weightbearing as tolerated of the lower and upper extremity  No head of bed restrictions  DVT prophylaxis per primary.  Ok to start DVT prophylaxis or anticoagulation  Ancef x 24 hours/ completed  Recommend Decadron 10 mg for 3 doses. Completed   Drain x 2.  Continue to record Q8 output for the left and right (Continue drains)  Cho per primary  14-day Prevena placed  Pain control with multimodality  Intra op biopsy obtained   Resume normal diet  Resume home medications  Encourage oral fluid intake  PT/OT consult and rec SNF     Plan discussed with Dr. Wilbert Abad, DO  Orthopedic Surgery, PGY4     Patient will be followed by the Orthopaedic Spine Team:  1st call:  Mariano Hendrix MD  2nd call:  Lew  DO Jenniffer  3rd call:  Anthony Mary, Fellow  Available via [employer]    I saw and evaluated the patient.  I personally obtained the key and critical portions of the history and physical exam or was physically present for key and critical portions performed by the Resident. I reviewed the documentation and discussed the patient with the Resident.  I agree with the Resident’s medical decision making as documented in the note.    Discontinue drain, Will change prevena to 14 day dressing. Patient can remove wound vac on 2/3/25. If incision is dry with no drainage, may leave open to air. If there is drainage, continue daily dressing changes with abdominal pads and paper tape    Follow up in 3 weeks for suture removal. Appt will be in EMR

## 2025-01-21 LAB
ABO GROUP (TYPE) IN BLOOD: NORMAL
ALBUMIN SERPL BCP-MCNC: 2.8 G/DL (ref 3.4–5)
ANION GAP SERPL CALC-SCNC: 12 MMOL/L (ref 10–20)
ANTIBODY SCREEN: NORMAL
BASOPHILS # BLD AUTO: 0.08 X10*3/UL (ref 0–0.1)
BASOPHILS NFR BLD AUTO: 0.7 %
BUN SERPL-MCNC: 25 MG/DL (ref 6–23)
CALCIUM SERPL-MCNC: 7.8 MG/DL (ref 8.6–10.6)
CHLORIDE SERPL-SCNC: 102 MMOL/L (ref 98–107)
CO2 SERPL-SCNC: 26 MMOL/L (ref 21–32)
CREAT SERPL-MCNC: 0.98 MG/DL (ref 0.5–1.3)
EGFRCR SERPLBLD CKD-EPI 2021: 81 ML/MIN/1.73M*2
EOSINOPHIL # BLD AUTO: 0.15 X10*3/UL (ref 0–0.4)
EOSINOPHIL NFR BLD AUTO: 1.4 %
ERYTHROCYTE [DISTWIDTH] IN BLOOD BY AUTOMATED COUNT: 17.3 % (ref 11.5–14.5)
GLUCOSE SERPL-MCNC: 82 MG/DL (ref 74–99)
HCT VFR BLD AUTO: 24 % (ref 41–52)
HGB BLD-MCNC: 7.4 G/DL (ref 13.5–17.5)
IMM GRANULOCYTES # BLD AUTO: 0.19 X10*3/UL (ref 0–0.5)
IMM GRANULOCYTES NFR BLD AUTO: 1.8 % (ref 0–0.9)
LYMPHOCYTES # BLD AUTO: 1.79 X10*3/UL (ref 0.8–3)
LYMPHOCYTES NFR BLD AUTO: 16.5 %
MAGNESIUM SERPL-MCNC: 2.26 MG/DL (ref 1.6–2.4)
MCH RBC QN AUTO: 28 PG (ref 26–34)
MCHC RBC AUTO-ENTMCNC: 30.8 G/DL (ref 32–36)
MCV RBC AUTO: 91 FL (ref 80–100)
MONOCYTES # BLD AUTO: 1.01 X10*3/UL (ref 0.05–0.8)
MONOCYTES NFR BLD AUTO: 9.3 %
NEUTROPHILS # BLD AUTO: 7.62 X10*3/UL (ref 1.6–5.5)
NEUTROPHILS NFR BLD AUTO: 70.3 %
NRBC BLD-RTO: 0.4 /100 WBCS (ref 0–0)
PHOSPHATE SERPL-MCNC: 3 MG/DL (ref 2.5–4.9)
PLATELET # BLD AUTO: 300 X10*3/UL (ref 150–450)
POTASSIUM SERPL-SCNC: 3.7 MMOL/L (ref 3.5–5.3)
RBC # BLD AUTO: 2.64 X10*6/UL (ref 4.5–5.9)
RH FACTOR (ANTIGEN D): NORMAL
SODIUM SERPL-SCNC: 136 MMOL/L (ref 136–145)
WBC # BLD AUTO: 10.8 X10*3/UL (ref 4.4–11.3)

## 2025-01-21 PROCEDURE — 36415 COLL VENOUS BLD VENIPUNCTURE: CPT

## 2025-01-21 PROCEDURE — 2500000001 HC RX 250 WO HCPCS SELF ADMINISTERED DRUGS (ALT 637 FOR MEDICARE OP)

## 2025-01-21 PROCEDURE — 99232 SBSQ HOSP IP/OBS MODERATE 35: CPT

## 2025-01-21 PROCEDURE — 85025 COMPLETE CBC W/AUTO DIFF WBC: CPT

## 2025-01-21 PROCEDURE — 83735 ASSAY OF MAGNESIUM: CPT

## 2025-01-21 PROCEDURE — 1170000001 HC PRIVATE ONCOLOGY ROOM DAILY

## 2025-01-21 PROCEDURE — 80069 RENAL FUNCTION PANEL: CPT

## 2025-01-21 PROCEDURE — 86901 BLOOD TYPING SEROLOGIC RH(D): CPT

## 2025-01-21 RX ORDER — CYCLOBENZAPRINE HCL 10 MG
5 TABLET ORAL ONCE
Status: COMPLETED | OUTPATIENT
Start: 2025-01-21 | End: 2025-01-21

## 2025-01-21 RX ORDER — CYCLOBENZAPRINE HCL 10 MG
5 TABLET ORAL 3 TIMES DAILY PRN
Status: DISCONTINUED | OUTPATIENT
Start: 2025-01-21 | End: 2025-01-24 | Stop reason: HOSPADM

## 2025-01-21 RX ADMIN — OXYCODONE HYDROCHLORIDE 5 MG: 5 TABLET ORAL at 09:40

## 2025-01-21 RX ADMIN — FAMOTIDINE 20 MG: 20 TABLET ORAL at 20:38

## 2025-01-21 RX ADMIN — ACETAMINOPHEN 975 MG: 325 TABLET, FILM COATED ORAL at 09:30

## 2025-01-21 RX ADMIN — ATORVASTATIN CALCIUM 40 MG: 40 TABLET, FILM COATED ORAL at 20:38

## 2025-01-21 RX ADMIN — ACETAMINOPHEN 975 MG: 325 TABLET, FILM COATED ORAL at 20:38

## 2025-01-21 RX ADMIN — OXYCODONE HYDROCHLORIDE 10 MG: 10 TABLET ORAL at 20:41

## 2025-01-21 RX ADMIN — FAMOTIDINE 20 MG: 20 TABLET ORAL at 09:30

## 2025-01-21 RX ADMIN — ACETAMINOPHEN 975 MG: 325 TABLET, FILM COATED ORAL at 17:27

## 2025-01-21 RX ADMIN — Medication 1 TABLET: at 09:30

## 2025-01-21 RX ADMIN — CYCLOBENZAPRINE 5 MG: 10 TABLET, FILM COATED ORAL at 11:37

## 2025-01-21 RX ADMIN — AMOXICILLIN AND CLAVULANATE POTASSIUM 1 TABLET: 875; 125 TABLET, FILM COATED ORAL at 20:41

## 2025-01-21 RX ADMIN — AMOXICILLIN AND CLAVULANATE POTASSIUM 1 TABLET: 875; 125 TABLET, FILM COATED ORAL at 09:30

## 2025-01-21 ASSESSMENT — PAIN SCALES - GENERAL
PAINLEVEL_OUTOF10: 6
PAINLEVEL_OUTOF10: 3
PAINLEVEL_OUTOF10: 2
PAINLEVEL_OUTOF10: 7
PAINLEVEL_OUTOF10: 2

## 2025-01-21 ASSESSMENT — PAIN - FUNCTIONAL ASSESSMENT
PAIN_FUNCTIONAL_ASSESSMENT: 0-10

## 2025-01-21 ASSESSMENT — PAIN DESCRIPTION - LOCATION: LOCATION: BACK

## 2025-01-21 ASSESSMENT — COGNITIVE AND FUNCTIONAL STATUS - GENERAL
MOVING TO AND FROM BED TO CHAIR: TOTAL
PERSONAL GROOMING: A LITTLE
DRESSING REGULAR LOWER BODY CLOTHING: A LITTLE
EATING MEALS: A LITTLE
DAILY ACTIVITIY SCORE: 17
DRESSING REGULAR UPPER BODY CLOTHING: A LITTLE
STANDING UP FROM CHAIR USING ARMS: TOTAL
TURNING FROM BACK TO SIDE WHILE IN FLAT BAD: A LOT
MOVING FROM LYING ON BACK TO SITTING ON SIDE OF FLAT BED WITH BEDRAILS: A LOT
HELP NEEDED FOR BATHING: A LITTLE
WALKING IN HOSPITAL ROOM: TOTAL
MOBILITY SCORE: 8
CLIMB 3 TO 5 STEPS WITH RAILING: TOTAL
TOILETING: A LOT

## 2025-01-21 NOTE — PROGRESS NOTES
01/21/25 1100   Discharge Planning   Living Arrangements Alone   Support Systems Family members   Type of Residence Private residence   Number of Stairs to Enter Residence 2   Number of Stairs Within Residence 2   Do you have animals or pets at home? No   Home or Post Acute Services Post acute facilities (Rehab/SNF/etc)   Type of Post Acute Facility Services Skilled nursing   Expected Discharge Disposition SNF   Does the patient need discharge transport arranged? Yes   RoundTrip coordination needed? Yes   Has discharge transport been arranged? No     SW met with pt.  He confirmed plan to return to [facility].  Pt asking about chemo.  SW spoke with attending and pt will be receiving radiation 4 to 6 weeks after surgery.  Attending to speak with pt about plan.  SW requested DSC to initiate precert.  Updated clinicals to be sent to SNF per request. Will follow.  NAYELI Aguiar

## 2025-01-21 NOTE — PROGRESS NOTES
"Abdi Retana \"Louise" is a 74 y.o. male on day 17 of admission presenting with Lesion of lumbar spine.      Subjective   Seen this AM, did report muscle stiffness. But states that pain is much improved post surgery. Denies fevers, chills, nausea, vomiting, chest pain, shortness of breath.     Objective     Last Recorded Vitals  /63 (BP Location: Left arm, Patient Position: Lying)   Pulse 77   Temp 36.4 °C (97.5 °F) (Temporal)   Resp 16   Wt 120 kg (264 lb 8.8 oz)   SpO2 97%   Intake/Output last 3 Shifts:    Intake/Output Summary (Last 24 hours) at 1/21/2025 1122  Last data filed at 1/21/2025 0824  Gross per 24 hour   Intake 308 ml   Output 1150 ml   Net -842 ml       Admission Weight  Weight: 120 kg (264 lb) (01/04/25 1900)    Daily Weight  01/17/25 : 120 kg (264 lb 8.8 oz)    No current facility-administered medications on file prior to encounter.     Current Outpatient Medications on File Prior to Encounter   Medication Sig Dispense Refill    acetaminophen (Tylenol 8 HOUR) 650 mg ER tablet Take 1 tablet (650 mg) by mouth every 6 hours if needed for mild pain (1 - 3). Do not crush, chew, or split.      acetaminophen (Tylenol) 325 mg tablet Take 2 tablets (650 mg) by mouth 2 times a day.      aspirin 81 mg EC tablet Take 1 tablet (81 mg) by mouth once daily.      bisacodyl (Gentle Laxative, bisacodyl,) 10 mg suppository Insert 1 suppository (10 mg) into the rectum once daily as needed for constipation.      cefepime (Maxipime) IV Infuse 100 mL (2 g) over 0.5 hours into a venous catheter every 12 hours.      famotidine (Pepcid) 20 mg tablet Take 1 tablet (20 mg) by mouth 2 times a day.      ferrous sulfate (FeroSuL) 325 (65 Fe) MG tablet Take 1 tablet (325 mg) by mouth once every 24 hours. 90 tablet 3    losartan (Cozaar) 50 mg tablet TAKE 1 TABLET BY MOUTH ONCE  DAILY 90 tablet 3    magnesium hydroxide (Milk of Magnesia) 400 mg/5 mL suspension Take 30 mL by mouth once daily as needed for constipation (if no " BM in 3 consecutive days).      magnesium oxide 400 mg magnesium capsule Take 1 capsule (400 mg) by mouth once daily. 90 capsule 3    mineral oil enema Insert 133 mL (1 enema) into the rectum once daily as needed for constipation (if no BM in 8 hours after getting suppository).      potassium chloride CR 10 mEq ER tablet TAKE 1 TABLET BY MOUTH ONCE  DAILY DO NOT CRUSH, CHEW, OR  SPLIT 90 tablet 3    torsemide (Demadex) 20 mg tablet TAKE 1 TABLET BY MOUTH ONCE  DAILY 90 tablet 3    traMADol (Ultram) 50 mg tablet Take 1 tablet (50 mg) by mouth every 6 hours if needed for severe pain (7 - 10).      vitamin D3-folic acid 2,500 unit- 1 mg tablet Take 1 mg by mouth once every 24 hours. 90 tablet 3        Physical Exam  HENT:      Head: Normocephalic and atraumatic.      Mouth/Throat:      Mouth: Mucous membranes are moist.   Eyes:      General: No scleral icterus.     Extraocular Movements: Extraocular movements intact.      Pupils: Pupils are equal, round, and reactive to light.   Cardiovascular:      Rate and Rhythm: Normal rate and regular rhythm.      Heart sounds: No murmur heard.  Pulmonary:      Effort: No respiratory distress.      Breath sounds: No wheezing or rales.   Abdominal:      General: There is no distension.      Tenderness: There is no abdominal tenderness. There is no guarding.   Musculoskeletal:      Comments: Legs with dressing on, images on epic show possible stasis dermatitis with ulcers   Skin:     Capillary Refill: Capillary refill takes less than 2 seconds.   Neurological:      General: No focal deficit present.      Mental Status: He is alert and oriented to person, place, and time.      Motor: Weakness present.      Comments: Richter in b/l LE 3/5, significantly limited by pain   Psychiatric:         Mood and Affect: Mood normal.         Relevant Results    Results for orders placed or performed during the hospital encounter of 01/04/25 (from the past 24 hours)   CBC   Result Value Ref Range     WBC 13.0 (H) 4.4 - 11.3 x10*3/uL    nRBC 0.2 (H) 0.0 - 0.0 /100 WBCs    RBC 2.48 (L) 4.50 - 5.90 x10*6/uL    Hemoglobin 6.9 (L) 13.5 - 17.5 g/dL    Hematocrit 22.2 (L) 41.0 - 52.0 %    MCV 90 80 - 100 fL    MCH 27.8 26.0 - 34.0 pg    MCHC 31.1 (L) 32.0 - 36.0 g/dL    RDW 17.8 (H) 11.5 - 14.5 %    Platelets 295 150 - 450 x10*3/uL   Prepare RBC: 1 Units   Result Value Ref Range    PRODUCT CODE [id number]     Unit Number [id number]     Unit ABO A     Unit RH POS     XM INTEP COMP     Dispense Status IS     Blood Expiration Date 1/30/2025 11:59:00 PM EST     PRODUCT BLOOD TYPE 6200     UNIT VOLUME 350    CBC and Auto Differential   Result Value Ref Range    WBC 10.8 4.4 - 11.3 x10*3/uL    nRBC 0.4 (H) 0.0 - 0.0 /100 WBCs    RBC 2.64 (L) 4.50 - 5.90 x10*6/uL    Hemoglobin 7.4 (L) 13.5 - 17.5 g/dL    Hematocrit 24.0 (L) 41.0 - 52.0 %    MCV 91 80 - 100 fL    MCH 28.0 26.0 - 34.0 pg    MCHC 30.8 (L) 32.0 - 36.0 g/dL    RDW 17.3 (H) 11.5 - 14.5 %    Platelets 300 150 - 450 x10*3/uL    Neutrophils % 70.3 40.0 - 80.0 %    Immature Granulocytes %, Automated 1.8 (H) 0.0 - 0.9 %    Lymphocytes % 16.5 13.0 - 44.0 %    Monocytes % 9.3 2.0 - 10.0 %    Eosinophils % 1.4 0.0 - 6.0 %    Basophils % 0.7 0.0 - 2.0 %    Neutrophils Absolute 7.62 (H) 1.60 - 5.50 x10*3/uL    Immature Granulocytes Absolute, Automated 0.19 0.00 - 0.50 x10*3/uL    Lymphocytes Absolute 1.79 0.80 - 3.00 x10*3/uL    Monocytes Absolute 1.01 (H) 0.05 - 0.80 x10*3/uL    Eosinophils Absolute 0.15 0.00 - 0.40 x10*3/uL    Basophils Absolute 0.08 0.00 - 0.10 x10*3/uL   Magnesium   Result Value Ref Range    Magnesium 2.26 1.60 - 2.40 mg/dL   Renal Function Panel   Result Value Ref Range    Glucose 82 74 - 99 mg/dL    Sodium 136 136 - 145 mmol/L    Potassium 3.7 3.5 - 5.3 mmol/L    Chloride 102 98 - 107 mmol/L    Bicarbonate 26 21 - 32 mmol/L    Anion Gap 12 10 - 20 mmol/L    Urea Nitrogen 25 (H) 6 - 23 mg/dL    Creatinine 0.98 0.50 - 1.30 mg/dL    eGFR 81 >60  mL/min/1.73m*2    Calcium 7.8 (L) 8.6 - 10.6 mg/dL    Phosphorus 3.0 2.5 - 4.9 mg/dL    Albumin 2.8 (L) 3.4 - 5.0 g/dL      CT chest abdomen pelvis wo IV contrast    Result Date: 1/2/2025  CHEST: 1.  History of right renal cell carcinoma. 2. Multiple pulmonary nodules consistent with metastatic disease.   ABDOMEN AND PELVIS: 1.  History upright or renal cell carcinoma. There is an unchanged necrotic mass involving the upper pole of the right kidney. 2. Bilateral renal cysts. 3. Bilateral nonobstructing intrarenal calculi with staghorn type calculi in the left kidney. There is no renal obstruction. 4. Complete bony destruction of L4 with a soft tissue mass with expansion of the vertebral body. Lytic lesion involving the superior endplate of L5. Findings are consistent with bony metastases.   MACRO: None   Signed by: Lucia Desai 1/2/2025 10:33 AM Dictation workstation:   [id number]    XR chest 1 view    Result Date: 1/2/2025  No pneumonic consolidations are noted. There questionable perihilar nodules which may correspond to the masses noted on the prior CT.     MACRO: None   Signed by: Patrick Arnold 1/2/2025 8:33 AM Dictation workstation:   [id number]      MRI Spine 1/8/25  IMPRESSION:  MRI cervical spine:      1. No evidence of metastases.      2. Multilevel degenerative changes of the cervical spine including  moderate spinal canal stenosis at C5-C6 and C6-C7.      MRI thoracic spine:      1. No evidence of osseous metastases.      2. Multilevel degenerative changes of the thoracic spine without  striking spinal canal stenosis at any level.      MRI lumbar spine:      1. Metastasis involving the entirety of the L4 vertebral body,  portions of the posterior elements and transverse processes, with  extension into the L3-L4 and L4-L5 intervertebral disc spaces and  into the inferior endplate of L3 and superior endplate of L5 as well  as into the surrounding prevertebral soft tissues.      2. There is extension of  tumor into the ventral and bilateral  epidural space at L4 and there is also tumor located within the  posterior spinal canal extending from the level of L3 through L5.  Combination of these findings cause marked spinal canal stenosis at  the level of L4. Minimal CSF is seen within the thecal sac at the  level of L4.      3. Tumor extends into the bilateral neural foramina at L4-L5 with  resulting moderate-to-marked left and marked right neural foraminal  narrowing. Tumor also extends into the inferior portions of the  bilateral neural foramina at L3-L4 resulting in moderate neural  foraminal narrowing.      4. There is metastasis located within the posterior and left epidural  space at the level of L5 and S1 which causes partial effacement of  the posterior thecal sac.      5. Multilevel degenerative changes of the lumbar spine as detailed  above.      MRI Head 1/8/25  IMPRESSION:  1.  Small focus of diffusion restriction involving the right parietal  lobe with no associated enhancement on postcontrast imaging, favoring  small acute infarct. No mass effect or hemorrhagic transformation.  2. No abnormal intracranial enhancement to indicate intracranial  metastatic disease.  3. Nonspecific dilatation of the superior ophthalmic veins  bilaterally.    Assessment/Plan      Assessment & Plan  JAMAL (acute kidney injury) (CMS-HCC)    Lesion of lumbar spine    Mr. Abdi Retana is a 73 yo M with PMHx of HTN, GERD, SAMIA, Chronic lymphedema, venous insufficiency, OA, CKD-3(b/l 1.2-1.5), also w/ the following oncologic Hx - no formal Dx at this time. Presenting symptom was b/l LE weakness. Was [facility] to [facility]30[facility]12/5 for this after IMG revealed a concerning L4-5 lesion w/ mod-severe cord compression in s/o R menal mass and suspicious lesions in adrenals lungs, and spine. This was c/f undiagnosed metastatic R RCC. Case was discussed w/ both IR and ortho spine. It was deemed that no inpt Bx nor surgery was indicated and pt  was DC to SNF w/ plan for IR-guided L4 Bx (scheduled for 12/20; pt missed d/t transport issues w/ SNF). MRI brain and C/T spine w/ contrast was attempted but pt did not tolerate d/t claustrophobia - there was a plan to complete it OP w/ anesthesia. Transferred from [facility] for expedited work up of suspected R. RCC with L4/5 mets. Also has Proteus Mirabilis UTI c/b bacteremia I/s/o bilateral non obstructing renal stones. ID consulted for antibiotic management. L4 biopsy done and MRI head and spine completed. Path report confirmed metastatic RCC. S/p spinal artery embolization with Neurosurgery. S/p lumbar tumor debulking 1/17/2025. Ortho spine is following.     Update 1/21/25:     S/p lumbar tumor debulking, L3-L4 and L4-L5 laminectomies with L2-S1 PSIF with Dr. Atkins on 1/17   Will consider reauming DVT ppx if Hb stable Hgb 7.1 AM   Continue with PT/OT   Encourage PO intake   Flexeril 5mg prn for muscle stiffness    #Lumbar spinal fracuture w/ cord compression  - S/p tumor debulking 1/17 w/ ortho spine  - s/p dexamethasone POD1, s/p ancef POD1  - Drain/wound management per ortho  - no DVT ppx until 1/19  - s/p 1 unit PRBCs 1/18 for hb 6.9; hb stable 1/19    #Non oliguric JAMAL, stage I  # UTI   :: post procedural, likely related to blood loss and hemodynamic changes during surgery  :: Pamela/Est 500, WBC urine> 50 and RBC> 11-20  - s/p 2 total units PRBCs (1 in OR, 1 post op)    Plan:   - monitor urine output  - Cr peaked 2.03 1/18; Now downtrending to 1.32  - Consulted ID for abx management   - urinalysis  - renally dose meds, avoid nephrotoxins, avoid hemodynamic shifts     Small Acute Stroke on MRI  -Small focus of diffusion restriction involving the right parietal lobe with no associated enhancement on postcontrast imaging, favoring small acute infarct. No mass effect or hemorrhagic transformation.  -at his neurologic baseline(fluent speech, oriented, moves all limbs,LE-4/5)  -neuro-stoke consulted  -already on aspirin  for CAD, not on statin    Plan  -neuro-stroke following  Recommendations  - Hold aspirin 81 mg daily for 48hrs post-surgery to prevent risk of bleeding  - when safe from a bleeding an procedural standpoint start therapeutic Lovenox 1 mg/kg Q12H   - Please keep patient on telemetry and monitor daily for afib. If there is evidence of afib may need alternative anticoagulation.    -  continue with atorvastatin 40 daily target LDL <70  - please arrange neurology followup at time of discharge    P. mirabilis bacteremia 2/2 UTI  -HDS  -associated w/ R renal mass, b/l non-obstructing renal stones  -sensitivities available  -seen by uro at [facility], did not have plan for procedure given non-obstructive  -ID consulted for duration, procedure rec given complex anatomy of stones and renal mass - c/f seeding  -ID following, recommend Augmentin for 3 weeks(EOT-1/23)  -repeat BC(1/10)-NGTD     C/f undiagnosed metastatic R RCC  L4-5 lesion w/ radiographic evidence cord compression  -CT(11/30)-R renal mass  -pt at baseline 4/5 LE weakness on exam, symmetric, no incontinence or saddle anesthesia  -there have been no plans for procedures per prior evaluations  -IMG as above  -d/w ortho spine, said if going for anesthesia should do the full spine  -consider formal  spine surgery consult if neuro change and/or IMG/Bx completed  -had been seen by NSG 1/3 who thought exam was okay w/o weakness, did not rec an intervention  -On scheduled tylenol and prn oxy/dilaudid  -Outpatient medical oncology follow up with Dr Guerra  -ortho-spine following    HTN  CAD  -holding home  losartan 50mg  I/s/o recent rise in Cr(now resolved) and normotension   -c/w home Aspirin     Lymphedema  Venous insufficiency/venous ulcers  -wound care consulted  -hold home torsemide 20 mg daily for now  -OSH wound recs  BLE- Chronic venous status ulcers   Irrigate with normal saline or wound cleanser, Pat dry.  Apply lotion to intact skin   Cover weeping areas with Triad  Hydrophillic wound dressing ointment   Pad and protect with ABD, Kerlix and paper tape.   Change every day and as needed   Secure with Tubigrip ( Apply from base of toes to 2 finger width below the bend of knee)      Gluteal cleft & buttocks-MASD/Irritant contact dermatitis due to fecal, urinary or dual incontinent.   Cleanse with bath wipes or soap and water. Rinse well and pat dry.   Apply no sting skin barrier (cavilon)   Apply Triad hydrophilic wound dressing ointment to gluteal fold/groin three times a day and as needed.     GERD  -c/w home famotidine 20 mg bid    Disposition  -barriers: clinical improvement  -destination: likely back to SNF ([facility])  -f/u: pending     Diet: cardiac   Electrolytes: K >3.5; Mg >2  DVT ppx: SCD/hold post op   GI ppx: on home famotidine  Lines/tubes: PIV  O2: none  Baseline Cr: 1.5  Code status: Full code  Surrogate decision maker: Micaela Retana (sister) [phone]    Salomon Silva MD  Internal Medicne  PGY-1

## 2025-01-22 ENCOUNTER — TELEPHONE (OUTPATIENT)
Dept: HEMATOLOGY/ONCOLOGY | Facility: HOSPITAL | Age: 75
End: 2025-01-22
Payer: MEDICARE

## 2025-01-22 LAB
ALBUMIN SERPL BCP-MCNC: 2.6 G/DL (ref 3.4–5)
ANION GAP SERPL CALC-SCNC: 10 MMOL/L (ref 10–20)
BASOPHILS # BLD AUTO: 0.05 X10*3/UL (ref 0–0.1)
BASOPHILS NFR BLD AUTO: 0.5 %
BUN SERPL-MCNC: 17 MG/DL (ref 6–23)
CALCIUM SERPL-MCNC: 8 MG/DL (ref 8.6–10.6)
CHLORIDE SERPL-SCNC: 102 MMOL/L (ref 98–107)
CO2 SERPL-SCNC: 28 MMOL/L (ref 21–32)
CREAT SERPL-MCNC: 0.94 MG/DL (ref 0.5–1.3)
EGFRCR SERPLBLD CKD-EPI 2021: 85 ML/MIN/1.73M*2
EOSINOPHIL # BLD AUTO: 0.3 X10*3/UL (ref 0–0.4)
EOSINOPHIL NFR BLD AUTO: 2.8 %
ERYTHROCYTE [DISTWIDTH] IN BLOOD BY AUTOMATED COUNT: 18.2 % (ref 11.5–14.5)
GLUCOSE SERPL-MCNC: 95 MG/DL (ref 74–99)
HCT VFR BLD AUTO: 22.9 % (ref 41–52)
HGB BLD-MCNC: 7.1 G/DL (ref 13.5–17.5)
IMM GRANULOCYTES # BLD AUTO: 0.14 X10*3/UL (ref 0–0.5)
IMM GRANULOCYTES NFR BLD AUTO: 1.3 % (ref 0–0.9)
LYMPHOCYTES # BLD AUTO: 1.61 X10*3/UL (ref 0.8–3)
LYMPHOCYTES NFR BLD AUTO: 14.8 %
MAGNESIUM SERPL-MCNC: 2.1 MG/DL (ref 1.6–2.4)
MCH RBC QN AUTO: 28.3 PG (ref 26–34)
MCHC RBC AUTO-ENTMCNC: 31 G/DL (ref 32–36)
MCV RBC AUTO: 91 FL (ref 80–100)
MONOCYTES # BLD AUTO: 0.9 X10*3/UL (ref 0.05–0.8)
MONOCYTES NFR BLD AUTO: 8.3 %
NEUTROPHILS # BLD AUTO: 7.9 X10*3/UL (ref 1.6–5.5)
NEUTROPHILS NFR BLD AUTO: 72.3 %
NRBC BLD-RTO: 0 /100 WBCS (ref 0–0)
PHOSPHATE SERPL-MCNC: 2.9 MG/DL (ref 2.5–4.9)
PLATELET # BLD AUTO: 301 X10*3/UL (ref 150–450)
POTASSIUM SERPL-SCNC: 3.9 MMOL/L (ref 3.5–5.3)
RBC # BLD AUTO: 2.51 X10*6/UL (ref 4.5–5.9)
SODIUM SERPL-SCNC: 136 MMOL/L (ref 136–145)
WBC # BLD AUTO: 10.9 X10*3/UL (ref 4.4–11.3)

## 2025-01-22 PROCEDURE — 97530 THERAPEUTIC ACTIVITIES: CPT | Mod: GP | Performed by: PHYSICAL THERAPIST

## 2025-01-22 PROCEDURE — 1170000001 HC PRIVATE ONCOLOGY ROOM DAILY

## 2025-01-22 PROCEDURE — 2500000001 HC RX 250 WO HCPCS SELF ADMINISTERED DRUGS (ALT 637 FOR MEDICARE OP)

## 2025-01-22 PROCEDURE — 2500000004 HC RX 250 GENERAL PHARMACY W/ HCPCS (ALT 636 FOR OP/ED)

## 2025-01-22 PROCEDURE — 80069 RENAL FUNCTION PANEL: CPT

## 2025-01-22 PROCEDURE — 83735 ASSAY OF MAGNESIUM: CPT

## 2025-01-22 PROCEDURE — 97110 THERAPEUTIC EXERCISES: CPT | Mod: GP | Performed by: PHYSICAL THERAPIST

## 2025-01-22 PROCEDURE — 36415 COLL VENOUS BLD VENIPUNCTURE: CPT

## 2025-01-22 PROCEDURE — 99232 SBSQ HOSP IP/OBS MODERATE 35: CPT

## 2025-01-22 PROCEDURE — 85025 COMPLETE CBC W/AUTO DIFF WBC: CPT

## 2025-01-22 RX ORDER — NAPROXEN SODIUM 220 MG/1
81 TABLET, FILM COATED ORAL DAILY
Status: DISCONTINUED | OUTPATIENT
Start: 2025-01-22 | End: 2025-01-24 | Stop reason: HOSPADM

## 2025-01-22 RX ORDER — ENOXAPARIN SODIUM 100 MG/ML
40 INJECTION SUBCUTANEOUS EVERY 24 HOURS
Status: DISCONTINUED | OUTPATIENT
Start: 2025-01-22 | End: 2025-01-23

## 2025-01-22 RX ADMIN — Medication 1 TABLET: at 09:09

## 2025-01-22 RX ADMIN — ACETAMINOPHEN 975 MG: 325 TABLET, FILM COATED ORAL at 18:39

## 2025-01-22 RX ADMIN — OXYCODONE HYDROCHLORIDE 10 MG: 10 TABLET ORAL at 21:44

## 2025-01-22 RX ADMIN — OXYCODONE HYDROCHLORIDE 10 MG: 10 TABLET ORAL at 13:31

## 2025-01-22 RX ADMIN — ATORVASTATIN CALCIUM 40 MG: 40 TABLET, FILM COATED ORAL at 21:44

## 2025-01-22 RX ADMIN — ASPIRIN 81 MG CHEWABLE TABLET 81 MG: 81 TABLET CHEWABLE at 18:39

## 2025-01-22 RX ADMIN — ENOXAPARIN SODIUM 40 MG: 100 INJECTION SUBCUTANEOUS at 18:39

## 2025-01-22 RX ADMIN — AMOXICILLIN AND CLAVULANATE POTASSIUM 1 TABLET: 875; 125 TABLET, FILM COATED ORAL at 21:44

## 2025-01-22 RX ADMIN — FAMOTIDINE 20 MG: 20 TABLET ORAL at 09:09

## 2025-01-22 RX ADMIN — ACETAMINOPHEN 975 MG: 325 TABLET, FILM COATED ORAL at 09:08

## 2025-01-22 RX ADMIN — AMOXICILLIN AND CLAVULANATE POTASSIUM 1 TABLET: 875; 125 TABLET, FILM COATED ORAL at 09:08

## 2025-01-22 RX ADMIN — ACETAMINOPHEN 975 MG: 325 TABLET, FILM COATED ORAL at 21:44

## 2025-01-22 RX ADMIN — FAMOTIDINE 20 MG: 20 TABLET ORAL at 21:44

## 2025-01-22 ASSESSMENT — PAIN - FUNCTIONAL ASSESSMENT
PAIN_FUNCTIONAL_ASSESSMENT: 0-10
PAIN_FUNCTIONAL_ASSESSMENT: UNABLE TO SELF-REPORT
PAIN_FUNCTIONAL_ASSESSMENT: 0-10

## 2025-01-22 ASSESSMENT — COGNITIVE AND FUNCTIONAL STATUS - GENERAL
MOBILITY SCORE: 8
DRESSING REGULAR LOWER BODY CLOTHING: A LITTLE
PERSONAL GROOMING: A LITTLE
MOVING FROM LYING ON BACK TO SITTING ON SIDE OF FLAT BED WITH BEDRAILS: A LOT
MOBILITY SCORE: 8
DRESSING REGULAR UPPER BODY CLOTHING: A LITTLE
DRESSING REGULAR LOWER BODY CLOTHING: A LITTLE
TURNING FROM BACK TO SIDE WHILE IN FLAT BAD: TOTAL
MOVING TO AND FROM BED TO CHAIR: TOTAL
EATING MEALS: A LITTLE
DAILY ACTIVITIY SCORE: 17
CLIMB 3 TO 5 STEPS WITH RAILING: TOTAL
HELP NEEDED FOR BATHING: A LITTLE
MOVING FROM LYING ON BACK TO SITTING ON SIDE OF FLAT BED WITH BEDRAILS: TOTAL
MOVING TO AND FROM BED TO CHAIR: TOTAL
TOILETING: A LOT
DAILY ACTIVITIY SCORE: 17
TURNING FROM BACK TO SIDE WHILE IN FLAT BAD: A LOT
MOBILITY SCORE: 6
STANDING UP FROM CHAIR USING ARMS: TOTAL
TOILETING: A LOT
TURNING FROM BACK TO SIDE WHILE IN FLAT BAD: A LOT
CLIMB 3 TO 5 STEPS WITH RAILING: TOTAL
WALKING IN HOSPITAL ROOM: TOTAL
WALKING IN HOSPITAL ROOM: TOTAL
CLIMB 3 TO 5 STEPS WITH RAILING: TOTAL
MOVING TO AND FROM BED TO CHAIR: TOTAL
MOVING FROM LYING ON BACK TO SITTING ON SIDE OF FLAT BED WITH BEDRAILS: A LOT
STANDING UP FROM CHAIR USING ARMS: TOTAL
EATING MEALS: A LITTLE
PERSONAL GROOMING: A LITTLE
HELP NEEDED FOR BATHING: A LITTLE
STANDING UP FROM CHAIR USING ARMS: TOTAL
DRESSING REGULAR UPPER BODY CLOTHING: A LITTLE
WALKING IN HOSPITAL ROOM: TOTAL

## 2025-01-22 ASSESSMENT — PAIN SCALES - GENERAL
PAINLEVEL_OUTOF10: 7
PAINLEVEL_OUTOF10: 0 - NO PAIN

## 2025-01-22 ASSESSMENT — PAIN SCALES - PAIN ASSESSMENT IN ADVANCED DEMENTIA (PAINAD): TOTALSCORE: MEDICATION (SEE MAR)

## 2025-01-22 NOTE — PROGRESS NOTES
01/22/25 1600   Discharge Planning   Living Arrangements Alone   Support Systems Family members   Type of Residence Private residence   Number of Stairs to Enter Residence 2   Number of Stairs Within Residence 2   Do you have animals or pets at home? No   Home or Post Acute Services Post acute facilities (Rehab/SNF/etc)   Type of Post Acute Facility Services Skilled nursing   Expected Discharge Disposition SNF     SW medically ready for return to [facility].  Precert initiated yesterday 1/21/25 by  [facility] team.  Per  [facility] team: They are offering a peer to peer  Pls call tomorrow 1/23 by 9 a.m. [phone] option 5.  [facility] ID: [id number]. REF #: [id number].  SW advised med team.  SW to continue to follow and advise of updates.  Elizabeth Gunsalus LISW-S  Care Transitions Supervisor

## 2025-01-22 NOTE — PROGRESS NOTES
"Physical Therapy    Physical Therapy Treatment    Patient Name: Abdi Retana \"Matt\"  MRN: [id number]  Department: [facility]  Room: [address] [place] [address]  Today's Date: 1/22/2025  Time Calculation  Start Time: 1320  Stop Time: 1359  Time Calculation (min): 39 min         Assessment/Plan   PT Assessment  PT Assessment Results: Decreased strength, Decreased range of motion, Decreased endurance, Impaired balance, Decreased mobility, Impaired hearing, Decreased cognition, Decreased skin integrity, Obesity, Pain, Decreased coordination, Orthopedic restrictions  Rehab Prognosis: Good  Barriers to Discharge Home: Caregiver assistance, Physical needs  Caregiver Assistance: Patient lives alone and/or does not have reliable caregiver assistance  Physical Needs: Stair navigation into home limited by function/safety, In-home setup navigation limited by function/safety, 24hr mobility assistance needed, 24hr ADL assistance needed  Evaluation/Treatment Tolerance: Patient tolerated treatment well  Medical Staff Made Aware: Yes  Strengths: Attitude of self, Coping skills, Support of Caregivers  Barriers to Participation: Other (Comment) (participated fully)  End of Session Communication: Bedside nurse (present assisting)  Assessment Comment: 75 yo male with malignant renal cell carcinoma with L4/L5 lytic lesions s/p spinal surgery, anxiety, anemia, and weakness limiting mobility overall, waxing/waning affect and at teims emotional/cognitive impairments; mobility slightly improved today;Once medically stable, recommend moderate intensity therapy as pt lives alone and has stairs, is weak and has complicated medical issues, is making gains, and appears very motivated and is not at baseline. Pt may benefit from anxiety management (d/w team after previous visits).  End of Session Patient Position: Alarm on, Bed, 4 rail up (sidelying a little bit on Left; pt wants all 4 rails ups (he feels more secure and likes to hold onto the rails))  PT " "Plan  Inpatient/Swing Bed or Outpatient: Inpatient  PT Plan  Treatment/Interventions: Bed mobility, Transfer training, Balance training, Neuromuscular re-education, Gait training, Strengthening, Endurance training, Range of motion, Therapeutic exercise, Therapeutic activity, Home exercise program, Orthotic fitting/training, Positioning, Postural re-education  PT Plan: Ongoing PT  PT Frequency: 5 times per week  PT Discharge Recommendations: Moderate intensity level of continued care  Equipment Recommended upon Discharge: Other (comment) (unable to determine today)  PT Recommended Transfer Status: Other (comment) (currently dependent)  PT - OK to Discharge: Yes (PT eval completed & DC recs made)      General Visit Information:   PT  Visit  PT Received On: 01/22/25  Response to Previous Treatment: Patient with no complaints from previous session.  General  Reason for Referral: Admitted 1/2 (transfer to 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 Atkins  Past Medical History Relevant to Rehab: CKD, malignant renal cell carcinoma Right (L4 lesion, mets to adrenal glands & lung), HTN, GERD, OA, PVD, anemia, obesity, PVD, CKD, chronic lymphedema, fall  PT Missed Visit:  (no)  Missed Visit Reason:  (.)  Family/Caregiver Present: No  Caregiver Feedback: .  Co-Treatment:  (N/A)  Co-Treatment Reason: .  Prior to Session Communication: Bedside nurse  Patient Position Received: Bed, 4 rail up, Alarm off, not on at start of session  Preferred Learning Style: kinesthetic, verbal  General Comment: Pt supine alert and engaged, kept saying \"I don't know\" to questions and intermittently frustrated but easily redirected. Pt Oriented x 4 and followed " 100% of commands (appears [place]) with repetition. Pt able to sit EOB with significant assist +2. Both LEs stiff and contractured as noted.    Subjective   Precautions:  Precautions  Medical Precautions: Fall precautions, Spinal precautions (anxious and likes explicit instructions prior to any movement, back pain, anemia, spine (post surgical and lytic lesions lumbar/sacral spine), bilateral leg ulcers, bilateral knee flexion contractures (R > L))  Post-Surgical Precautions: Spinal precautions (wound vac, spine post surgical)    Objective   Pain:  Pain Assessment  Pain Assessment: 0-10  0-10 (Numeric) Pain Score:  (unrated, back pain itch pre (T spine) that resolved in supported sitting; post pt kurtis said 15/10 pain in back but then laughed)  Pain Interventions: Ambulation/increased activity, Rest, Therapeutic presence, Therapeutic touch, Repositioned  Response to Interventions:  (pt appears comfortable end of session in Left sidelying)  Cognition:  Cognition  Arousal/Alertness: Delayed responses to stimuli  Orientation Level: Oriented X4  Following Commands: Follows one step commands with repetition  Problem Solving: Assistance required to identify errors made  Cognition Comments: delayed processing at times (also appears [place])  Problem Solving: Exceptions to WFL  Processing Speed: Delayed  Coordination:     Postural Control:  Postural Control  Postural Control: Impaired  Posture Comment: sitting: head/neck flexed, rounded shoulders, posterior pelvic tilt  Static Sitting Balance  Static Sitting-Balance Support: Feet supported, Bilateral upper extremity supported  Static Sitting-Level of Assistance:  (CG to mod A +1 (variable), CGA +1 other for safety)  Dynamic Sitting Balance  Dynamic Sitting-Balance Support: Feet supported, Bilateral upper extremity supported  Dynamic Sitting-Level of Assistance: Maximum assistance (+1 and CGA +1 other for safety)  Dynamic Sitting-Balance:  (scoot to EOB to put feet on floor,  rocking back and forth to stretch back)  Static Standing Balance  Static Standing-Balance Support:  (unable)  Extremity/Trunk Assessments:  PROM RLE (degrees)  RLE PROM Comment: supine: knee flexion >65 degrees, extension -45, ankle DF >5 with knee flexed  Strength RLE  RLE Overall Strength:  (ankle DF/PF >3-, knee flexion >2, knee ext 1)  PROM LLE (degrees)  LLE PROM Comment: supine: knee flexion >65 degrees, extension -35, ankle DF >5 with knee flexed  Strength LLE  LLE Overall Strength:  (ankle DF/PF >3-, knee flexion >2+, knee ext >3- in limited ROM)  Activity Tolerance:  Activity Tolerance  Endurance: Other (Comment), Tolerates 30+ min exercise without fatigue (dyspnea on exertion, did well with rest breaks as needed)  Treatments:  Therapeutic Exercise  Therapeutic Exercise Performed: Yes  Therapeutic Exercise Activity 1: AAROM bilateral LEs supine: ankle pumps, heel slides, knee extension  Therapeutic Exercise Activity 2: AAROM sitting: ankle pumps, LAQs (assisted more on Right LE)    Therapeutic Activity  Therapeutic Activity Performed: Yes  Therapeutic Activity 1: facilitation of rolling  Therapeutic Activity 2: facilitation of coming to/from sit (due to body habitus pt unable to come into sitting well from sidelying)  Therapeutic Activity 3: sitting balance activities    Bed Mobility  Bed Mobility: Yes  Bed Mobility 1  Bed Mobility 1: Rolling right, Rolling left  Level of Assistance 1: +2, Moderate assistance, Moderate verbal cues, Moderate tactile cues, Maximum assistance (mod to max A +2 using Chux, rail)  Bed Mobility 2  Bed Mobility  2: Side lying right to sit (sit to Right sidelying. used bed to elevate HOB 60 degrees (pt wanted HOB flatter) and use of rail and Chux, hard time logrolling due in part to LE contractures and body habitus)  Level of Assistance 2: Maximum assistance, Maximum verbal cues, Maximum tactile cues, +2 (mod/max A +2 using TAP, HOB elevated 60 degrees, in/out on Right side of bed  per pt preference)  Bed Mobility 3  Bed Mobility 3: Scooting (sitting)  Level of Assistance 3: +2, Minimum assistance, Moderate verbal cues, Moderate tactile cues (using TAP in sitting)  Bed Mobility Comments 3: scooting to EOB  Bed Mobility 4  Bed Mobility 4: Scooting (supine)  Level of Assistance 4: Dependent, +2 (using TAP)    Ambulation/Gait Training  Ambulation/Gait Training Performed: No (pt unable)  Transfers  Transfer: No (pt unable d/t pain)  Transfer 1  Transfer Level of Assistance 1:  (pt unable)    Stairs  Stairs: No (pt unable)    Other Activity  Other Activity Performed: Yes  Other Activity 1: assisted RN to clean pt up, scoot up in bed and reposition into Left sidelying using wedges    Outcome Measures:  [facility] Basic Mobility  Turning from your back to your side while in a flat bed without using bedrails: Total  Moving from lying on your back to sitting on the side of a flat bed without using bedrails: Total  Moving to and from bed to chair (including a wheelchair): Total  Standing up from a chair using your arms (e.g. wheelchair or bedside chair): Total  To walk in hospital room: Total  Climbing 3-5 steps with railing: Total  Basic Mobility - Total Score: 6    Education Documentation  Home Exercise Program, taught by Bekah Brewer PT at 1/22/2025  2:02 PM.  Learner: Patient  Readiness: Acceptance  Method: Explanation, Demonstration  Response: Needs Reinforcement, Verbalizes Understanding  Comment: PT purpose, AAROM of legs to increase knee extension, rolling and coming to/from sit, AAROM of legs in sitting, sitting balance and deep breathing, importance of intermittent sidelying positioning    Precautions, taught by Bekah Brewer PT at 1/22/2025  2:02 PM.  Learner: Patient  Readiness: Acceptance  Method: Explanation, Demonstration  Response: Needs Reinforcement, Verbalizes Understanding  Comment: PT purpose, AAROM of legs to increase knee extension, rolling and coming to/from sit, AAROM of  legs in sitting, sitting balance and deep breathing, importance of intermittent sidelying positioning    Body Mechanics, taught by Bekah Brewer, PT at 1/22/2025  2:02 PM.  Learner: Patient  Readiness: Acceptance  Method: Explanation, Demonstration  Response: Needs Reinforcement, Verbalizes Understanding  Comment: PT purpose, AAROM of legs to increase knee extension, rolling and coming to/from sit, AAROM of legs in sitting, sitting balance and deep breathing, importance of intermittent sidelying positioning    Mobility Training, taught by Bekah Brewer PT at 1/22/2025  2:02 PM.  Learner: Patient  Readiness: Acceptance  Method: Explanation, Demonstration  Response: Needs Reinforcement, Verbalizes Understanding  Comment: PT purpose, AAROM of legs to increase knee extension, rolling and coming to/from sit, AAROM of legs in sitting, sitting balance and deep breathing, importance of intermittent sidelying positioning    Education Comments  No comments found.        Encounter Problems       Encounter Problems (Active)       Balance       Pt will come sit to/from stand and bed to/from chair stand pivot with WW and mod A +2 (Not Progressing)       Start:  01/22/25    Expected End:  02/05/25            Pt will stand statically with WW with mod A +1 >5 minutes (Not Progressing)       Start:  01/22/25    Expected End:  02/05/25               General Goals       Pt will have bilateral LE strength >4 (ankle DF, hip flexion, knee flex/ext) and AROM WFL hip flexion/knee flex & extension and ankle DF bilaterally (Not met)       Start:  01/06/25    Expected End:  02/05/25    Resolved:  01/22/25    Updated to: Strength bilateral hip flexors, knee extensors and ankle dorsiflexors >4; AAROM: knee extension >-20 on Right, >-10 on Left, ankle DF to neutral with knee extended bilaterally    Update reason: update         Pt will supine to/from sit via logrolling (HOB elevated 45 degrees, use of rail) with mod A +2 (Progressing)        Start:  01/06/25    Expected End:  02/05/25            Pt will sit EOB with min A +1 >7 minutes with pain <2 (Progressing)       Start:  01/06/25    Expected End:  02/05/25            Strength bilateral hip flexors, knee extensors and ankle dorsiflexors >4; AAROM: knee extension >-20 on Right, >-10 on Left, ankle DF to neutral with knee extended bilaterally (Progressing)       Start:  01/22/25    Expected End:  02/05/25                   Mobility       Pt will come sit to/from stand, bed to/from chair with WW with mod A +2 (no knee buckling, pain <2) (Not met)       Start:  01/06/25    Expected End:  02/05/25    Resolved:  01/22/25    Updated to: Pt will roll either direction using rail with CGA and cues    Update reason: update         Pt will stand static with WW and min/mod A +2 (Not met)       Start:  01/06/25    Expected End:  02/05/25    Resolved:  01/22/25    Updated to: Pt will sit EOB >15 minutes while doing AAROM of LES with CGA, no LOB, stable vitals    Update reason: update         Pt will ambulate 10' with WW with min/mod A +2 and chair follow for safety (Not met)       Start:  01/06/25    Expected End:  02/05/25    Resolved:  01/22/25    Updated to: pt will come sidelying to/from sit HOB elevated 65 degrees, use of rail) with min A +1 from either side    Update reason: update         Pt will roll either direction using rail with CGA and cues (Progressing)       Start:  01/22/25    Expected End:  02/05/25                pt will come sidelying to/from sit HOB elevated 65 degrees, use of rail) with min A +1 from either side (Progressing)       Start:  01/22/25    Expected End:  02/05/25                Pt will sit EOB >15 minutes while doing AAROM of LES with CGA, no LOB, stable vitals (Progressing)       Start:  01/22/25    Expected End:  02/05/25                   Pain - Adult

## 2025-01-22 NOTE — PROGRESS NOTES
"Physical Therapy                 Therapy Communication Note    Patient Name: Abdi Retana \"Matt\"  MRN: [id number]  Department: [facility]  Room: [address] [place], [place] [address]  Today's Date: 1/22/2025     Discipline: Physical Therapy    PT Missed Visit: Yes     Missed Visit Reason: Missed Visit Reason: Patient refused, Other (Comment) (pt would like a little rest right now, just got cleaned up and got settled; asked PT to come back later.)    Missed Time: Attempt    Comment: 12:49pm  "

## 2025-01-23 DIAGNOSIS — N17.9 AKI (ACUTE KIDNEY INJURY) (CMS-HCC): Primary | ICD-10-CM

## 2025-01-23 LAB
ALBUMIN SERPL BCP-MCNC: 2.8 G/DL (ref 3.4–5)
ANION GAP SERPL CALC-SCNC: 10 MMOL/L (ref 10–20)
ATRIAL RATE: 78 BPM
BASOPHILS # BLD AUTO: 0.07 X10*3/UL (ref 0–0.1)
BASOPHILS NFR BLD AUTO: 0.6 %
BUN SERPL-MCNC: 17 MG/DL (ref 6–23)
CALCIUM SERPL-MCNC: 8.5 MG/DL (ref 8.6–10.6)
CHLORIDE SERPL-SCNC: 105 MMOL/L (ref 98–107)
CO2 SERPL-SCNC: 28 MMOL/L (ref 21–32)
CREAT SERPL-MCNC: 0.89 MG/DL (ref 0.5–1.3)
EGFRCR SERPLBLD CKD-EPI 2021: 90 ML/MIN/1.73M*2
EOSINOPHIL # BLD AUTO: 0.36 X10*3/UL (ref 0–0.4)
EOSINOPHIL NFR BLD AUTO: 3 %
ERYTHROCYTE [DISTWIDTH] IN BLOOD BY AUTOMATED COUNT: 18.6 % (ref 11.5–14.5)
FERRITIN SERPL-MCNC: 450 NG/ML (ref 20–300)
FOLATE SERPL-MCNC: 11.9 NG/ML
FOLATE SERPL-MCNC: 16.2 NG/ML
GLUCOSE SERPL-MCNC: 104 MG/DL (ref 74–99)
HAPTOGLOB SERPL NEPH-MCNC: 298 MG/DL (ref 30–200)
HCT VFR BLD AUTO: 24.7 % (ref 41–52)
HGB BLD-MCNC: 7.5 G/DL (ref 13.5–17.5)
HGB RETIC QN: 31 PG (ref 28–38)
IMM GRANULOCYTES # BLD AUTO: 0.15 X10*3/UL (ref 0–0.5)
IMM GRANULOCYTES NFR BLD AUTO: 1.3 % (ref 0–0.9)
IMMATURE RETIC FRACTION: 28.8 %
LDH SERPL L TO P-CCNC: 210 U/L (ref 84–246)
LYMPHOCYTES # BLD AUTO: 1.39 X10*3/UL (ref 0.8–3)
LYMPHOCYTES NFR BLD AUTO: 11.7 %
MAGNESIUM SERPL-MCNC: 2.15 MG/DL (ref 1.6–2.4)
MCH RBC QN AUTO: 28.1 PG (ref 26–34)
MCHC RBC AUTO-ENTMCNC: 30.4 G/DL (ref 32–36)
MCV RBC AUTO: 93 FL (ref 80–100)
MONOCYTES # BLD AUTO: 0.85 X10*3/UL (ref 0.05–0.8)
MONOCYTES NFR BLD AUTO: 7.2 %
NEUTROPHILS # BLD AUTO: 9.06 X10*3/UL (ref 1.6–5.5)
NEUTROPHILS NFR BLD AUTO: 76.2 %
NRBC BLD-RTO: 0 /100 WBCS (ref 0–0)
P AXIS: -14 DEGREES
P OFFSET: 197 MS
P ONSET: 139 MS
PHOSPHATE SERPL-MCNC: 2.6 MG/DL (ref 2.5–4.9)
PLATELET # BLD AUTO: 320 X10*3/UL (ref 150–450)
POTASSIUM SERPL-SCNC: 5 MMOL/L (ref 3.5–5.3)
PR INTERVAL: 158 MS
Q ONSET: 218 MS
QRS COUNT: 13 BEATS
QRS DURATION: 102 MS
QT INTERVAL: 384 MS
QTC CALCULATION(BAZETT): 437 MS
QTC FREDERICIA: 419 MS
R AXIS: 29 DEGREES
RBC # BLD AUTO: 2.67 X10*6/UL (ref 4.5–5.9)
RETICS #: 0.12 X10*6/UL (ref 0.02–0.11)
RETICS/RBC NFR AUTO: 4.7 % (ref 0.5–2)
SODIUM SERPL-SCNC: 138 MMOL/L (ref 136–145)
T AXIS: 55 DEGREES
T OFFSET: 410 MS
TRANSFERRIN SERPL-MCNC: 126 MG/DL (ref 200–360)
VENTRICULAR RATE: 78 BPM
VIT B12 SERPL-MCNC: 353 PG/ML (ref 211–911)
WBC # BLD AUTO: 11.9 X10*3/UL (ref 4.4–11.3)

## 2025-01-23 PROCEDURE — 97110 THERAPEUTIC EXERCISES: CPT | Mod: GO

## 2025-01-23 PROCEDURE — 83921 ORGANIC ACID SINGLE QUANT: CPT

## 2025-01-23 PROCEDURE — 2500000001 HC RX 250 WO HCPCS SELF ADMINISTERED DRUGS (ALT 637 FOR MEDICARE OP)

## 2025-01-23 PROCEDURE — 84100 ASSAY OF PHOSPHORUS: CPT

## 2025-01-23 PROCEDURE — 85045 AUTOMATED RETICULOCYTE COUNT: CPT

## 2025-01-23 PROCEDURE — 36415 COLL VENOUS BLD VENIPUNCTURE: CPT

## 2025-01-23 PROCEDURE — 82746 ASSAY OF FOLIC ACID SERUM: CPT

## 2025-01-23 PROCEDURE — 2500000004 HC RX 250 GENERAL PHARMACY W/ HCPCS (ALT 636 FOR OP/ED)

## 2025-01-23 PROCEDURE — 83615 LACTATE (LD) (LDH) ENZYME: CPT

## 2025-01-23 PROCEDURE — 83010 ASSAY OF HAPTOGLOBIN QUANT: CPT

## 2025-01-23 PROCEDURE — 82607 VITAMIN B-12: CPT

## 2025-01-23 PROCEDURE — 99232 SBSQ HOSP IP/OBS MODERATE 35: CPT

## 2025-01-23 PROCEDURE — 97530 THERAPEUTIC ACTIVITIES: CPT | Mod: GP | Performed by: PHYSICAL THERAPIST

## 2025-01-23 PROCEDURE — 82728 ASSAY OF FERRITIN: CPT

## 2025-01-23 PROCEDURE — 1170000001 HC PRIVATE ONCOLOGY ROOM DAILY

## 2025-01-23 PROCEDURE — 83735 ASSAY OF MAGNESIUM: CPT

## 2025-01-23 PROCEDURE — 85025 COMPLETE CBC W/AUTO DIFF WBC: CPT

## 2025-01-23 PROCEDURE — 97535 SELF CARE MNGMENT TRAINING: CPT | Mod: GO

## 2025-01-23 PROCEDURE — 97110 THERAPEUTIC EXERCISES: CPT | Mod: GP | Performed by: PHYSICAL THERAPIST

## 2025-01-23 PROCEDURE — 84466 ASSAY OF TRANSFERRIN: CPT

## 2025-01-23 RX ORDER — TORSEMIDE 20 MG/1
20 TABLET ORAL DAILY
Status: DISCONTINUED | OUTPATIENT
Start: 2025-01-23 | End: 2025-01-24 | Stop reason: HOSPADM

## 2025-01-23 RX ORDER — ENOXAPARIN SODIUM 150 MG/ML
120 INJECTION SUBCUTANEOUS 2 TIMES DAILY
Status: DISCONTINUED | OUTPATIENT
Start: 2025-01-23 | End: 2025-01-24 | Stop reason: HOSPADM

## 2025-01-23 RX ADMIN — ENOXAPARIN SODIUM 120 MG: 150 INJECTION SUBCUTANEOUS at 21:05

## 2025-01-23 RX ADMIN — ACETAMINOPHEN 975 MG: 325 TABLET, FILM COATED ORAL at 09:26

## 2025-01-23 RX ADMIN — AMOXICILLIN AND CLAVULANATE POTASSIUM 1 TABLET: 875; 125 TABLET, FILM COATED ORAL at 21:05

## 2025-01-23 RX ADMIN — ASPIRIN 81 MG CHEWABLE TABLET 81 MG: 81 TABLET CHEWABLE at 09:26

## 2025-01-23 RX ADMIN — ENOXAPARIN SODIUM 120 MG: 150 INJECTION SUBCUTANEOUS at 14:00

## 2025-01-23 RX ADMIN — ACETAMINOPHEN 975 MG: 325 TABLET, FILM COATED ORAL at 14:00

## 2025-01-23 RX ADMIN — OXYCODONE HYDROCHLORIDE 5 MG: 5 TABLET ORAL at 21:05

## 2025-01-23 RX ADMIN — FAMOTIDINE 20 MG: 20 TABLET ORAL at 21:11

## 2025-01-23 RX ADMIN — ATORVASTATIN CALCIUM 40 MG: 40 TABLET, FILM COATED ORAL at 21:05

## 2025-01-23 RX ADMIN — TORSEMIDE 20 MG: 20 TABLET ORAL at 14:00

## 2025-01-23 RX ADMIN — Medication 1 TABLET: at 09:26

## 2025-01-23 RX ADMIN — AMOXICILLIN AND CLAVULANATE POTASSIUM 1 TABLET: 875; 125 TABLET, FILM COATED ORAL at 09:26

## 2025-01-23 RX ADMIN — FAMOTIDINE 20 MG: 20 TABLET ORAL at 09:26

## 2025-01-23 RX ADMIN — ACETAMINOPHEN 975 MG: 325 TABLET, FILM COATED ORAL at 21:05

## 2025-01-23 ASSESSMENT — COGNITIVE AND FUNCTIONAL STATUS - GENERAL
MOVING FROM LYING ON BACK TO SITTING ON SIDE OF FLAT BED WITH BEDRAILS: TOTAL
MOVING TO AND FROM BED TO CHAIR: TOTAL
PERSONAL GROOMING: A LITTLE
DRESSING REGULAR UPPER BODY CLOTHING: A LITTLE
MOVING FROM LYING ON BACK TO SITTING ON SIDE OF FLAT BED WITH BEDRAILS: A LOT
DRESSING REGULAR LOWER BODY CLOTHING: A LITTLE
TURNING FROM BACK TO SIDE WHILE IN FLAT BAD: TOTAL
DRESSING REGULAR LOWER BODY CLOTHING: TOTAL
HELP NEEDED FOR BATHING: A LOT
EATING MEALS: A LITTLE
MOBILITY SCORE: 8
MOBILITY SCORE: 6
PERSONAL GROOMING: A LITTLE
WALKING IN HOSPITAL ROOM: TOTAL
STANDING UP FROM CHAIR USING ARMS: TOTAL
TOILETING: TOTAL
CLIMB 3 TO 5 STEPS WITH RAILING: TOTAL
DRESSING REGULAR UPPER BODY CLOTHING: A LOT
TURNING FROM BACK TO SIDE WHILE IN FLAT BAD: A LOT
TOILETING: A LOT
MOVING TO AND FROM BED TO CHAIR: TOTAL
WALKING IN HOSPITAL ROOM: TOTAL
DAILY ACTIVITIY SCORE: 17
STANDING UP FROM CHAIR USING ARMS: TOTAL
CLIMB 3 TO 5 STEPS WITH RAILING: TOTAL
HELP NEEDED FOR BATHING: A LITTLE
DAILY ACTIVITIY SCORE: 13

## 2025-01-23 ASSESSMENT — PAIN - FUNCTIONAL ASSESSMENT
PAIN_FUNCTIONAL_ASSESSMENT: 0-10

## 2025-01-23 ASSESSMENT — PAIN SCALES - GENERAL
PAINLEVEL_OUTOF10: 6
PAINLEVEL_OUTOF10: 0 - NO PAIN
PAINLEVEL_OUTOF10: 6

## 2025-01-23 ASSESSMENT — PAIN DESCRIPTION - DESCRIPTORS: DESCRIPTORS: ACHING

## 2025-01-23 ASSESSMENT — ACTIVITIES OF DAILY LIVING (ADL): HOME_MANAGEMENT_TIME_ENTRY: 15

## 2025-01-23 NOTE — CARE PLAN
Problem: Skin  Goal: Decreased wound size/increased tissue granulation at next dressing change  1/23/2025 0610 by Dori Kirkland RN  Outcome: Progressing  Flowsheets (Taken 1/23/2025 0608)  Decreased wound size/increased tissue granulation at next dressing change:   Promote sleep for wound healing   Protective dressings over bony prominences  1/23/2025 0608 by Dori Kirkland RN  Outcome: Progressing  Flowsheets (Taken 1/23/2025 0608)  Decreased wound size/increased tissue granulation at next dressing change:   Promote sleep for wound healing   Protective dressings over bony prominences  Goal: Participates in plan/prevention/treatment measures  1/23/2025 0610 by Dori Kirkland RN  Outcome: Progressing  Flowsheets (Taken 1/23/2025 0608)  Participates in plan/prevention/treatment measures: Elevate heels  1/23/2025 0608 by Dori Kirkland RN  Outcome: Progressing  Flowsheets (Taken 1/23/2025 0608)  Participates in plan/prevention/treatment measures: Elevate heels  Goal: Prevent/manage excess moisture  1/23/2025 0610 by Dori Kirkland RN  Outcome: Progressing  Flowsheets (Taken 1/23/2025 0608)  Prevent/manage excess moisture:   Monitor for/manage infection if present   Cleanse incontinence/protect with barrier cream  1/23/2025 0608 by Dori Kirkland RN  Outcome: Progressing  Flowsheets (Taken 1/23/2025 0608)  Prevent/manage excess moisture:   Monitor for/manage infection if present   Cleanse incontinence/protect with barrier cream  Goal: Prevent/minimize sheer/friction injuries  1/23/2025 0610 by Dori Kirkland RN  Outcome: Progressing  Flowsheets (Taken 1/23/2025 0608)  Prevent/minimize sheer/friction injuries:   Use pull sheet   Turn/reposition every 2 hours/use positioning/transfer devices   HOB 30 degrees or less  1/23/2025 0608 by Dori Kirkland RN  Outcome: Progressing  Flowsheets (Taken 1/23/2025 0608)  Prevent/minimize sheer/friction injuries:   Use pull sheet    Turn/reposition every 2 hours/use positioning/transfer devices   HOB 30 degrees or less  Goal: Promote/optimize nutrition  1/23/2025 0610 by Dori Kirkland RN  Outcome: Progressing  Flowsheets (Taken 1/23/2025 0608)  Promote/optimize nutrition: Offer water/supplements/favorite foods  1/23/2025 0608 by Dori Kirkland RN  Outcome: Progressing  Flowsheets (Taken 1/23/2025 0608)  Promote/optimize nutrition: Offer water/supplements/favorite foods  Goal: Promote skin healing  1/23/2025 0610 by Dori Kirkland RN  Outcome: Progressing  Flowsheets (Taken 1/23/2025 0608)  Promote skin healing:   Turn/reposition every 2 hours/use positioning/transfer devices   Protective dressings over bony prominences  1/23/2025 0608 by Dori Kirkland RN  Outcome: Progressing  Flowsheets (Taken 1/23/2025 0608)  Promote skin healing:   Turn/reposition every 2 hours/use positioning/transfer devices   Protective dressings over bony prominences     Problem: Fall/Injury  Goal: Not fall by end of shift  1/23/2025 0610 by Dori Kirkland RN  Outcome: Progressing  1/23/2025 0608 by Dori Kirkland RN  Outcome: Progressing  Goal: Be free from injury by end of the shift  1/23/2025 0610 by Dori Kirkland RN  Outcome: Progressing  1/23/2025 0608 by Dori Kirkland RN  Outcome: Progressing  Goal: Verbalize understanding of personal risk factors for fall in the hospital  1/23/2025 0610 by Dori Kirkland RN  Outcome: Progressing  1/23/2025 0608 by Dori Kirkland RN  Outcome: Progressing  Goal: Verbalize understanding of risk factor reduction measures to prevent injury from fall in the home  1/23/2025 0610 by Dori Kirkland RN  Outcome: Progressing  1/23/2025 0608 by Dori Kirkland RN  Outcome: Progressing  Goal: Use assistive devices by end of the shift  1/23/2025 0610 by Dori Kirkland RN  Outcome: Progressing  1/23/2025 0608 by Dori G Guercio, RN  Outcome: Progressing  Goal:  Pace activities to prevent fatigue by end of the shift  1/23/2025 0610 by Dori Kirkland RN  Outcome: Progressing  1/23/2025 0608 by Dori Kirkland RN  Outcome: Progressing     Problem: Pain - Adult  Goal: Verbalizes/displays adequate comfort level or baseline comfort level  1/23/2025 0610 by Dori Kirkland RN  Outcome: Progressing  1/23/2025 0608 by Dori Kirkland RN  Outcome: Progressing     Problem: Safety - Adult  Goal: Free from fall injury  1/23/2025 0610 by Dori Kirkland RN  Outcome: Progressing  1/23/2025 0608 by Dori Kirkland RN  Outcome: Progressing     Problem: Discharge Planning  Goal: Discharge to home or other facility with appropriate resources  1/23/2025 0610 by Dori Kirkland RN  Outcome: Progressing  1/23/2025 0608 by Dori Kirkland RN  Outcome: Progressing     Problem: Chronic Conditions and Co-morbidities  Goal: Patient's chronic conditions and co-morbidity symptoms are monitored and maintained or improved  1/23/2025 0610 by Dori Kirkland RN  Outcome: Progressing  1/23/2025 0608 by Dori Kirkland RN  Outcome: Progressing   The patient's goals for the shift include      The clinical goals for the shift include Pt will remain safe and free from injury throughout shift on 1/22/25 at 0700    Over the shift, the patient remained safe and free from injury. Pt encouraged to drink fluids and get rest during the night. Pt turned every two hours to monitor wound vac on back and to keep bony prominences protected. Pt given pain meds at beginning of shift to keep pain level at a tolerable level.

## 2025-01-23 NOTE — PROGRESS NOTES
"Abdi Retana \"Louise" is a 74 y.o. male on day 19 of admission presenting with Lesion of lumbar spine.      Subjective   Patient was seen and examined at the bedside. HE was comfortably sleeping. Denies any complaints. No chest pain, no SOB, palpitation.     Objective     Last Recorded Vitals  /64 (BP Location: Left arm, Patient Position: Lying)   Pulse 82   Temp 36.7 °C (98.1 °F) (Temporal)   Resp 16   Wt 120 kg (264 lb 8.8 oz)   SpO2 95%   Intake/Output last 3 Shifts:    Intake/Output Summary (Last 24 hours) at 1/23/2025 1343  Last data filed at 1/23/2025 1243  Gross per 24 hour   Intake 118 ml   Output 950 ml   Net -832 ml       Admission Weight  Weight: 120 kg (264 lb) (01/04/25 1900)    Daily Weight  01/17/25 : 120 kg (264 lb 8.8 oz)    No current facility-administered medications on file prior to encounter.     Current Outpatient Medications on File Prior to Encounter   Medication Sig Dispense Refill    acetaminophen (Tylenol 8 HOUR) 650 mg ER tablet Take 1 tablet (650 mg) by mouth every 6 hours if needed for mild pain (1 - 3). Do not crush, chew, or split.      acetaminophen (Tylenol) 325 mg tablet Take 2 tablets (650 mg) by mouth 2 times a day.      aspirin 81 mg EC tablet Take 1 tablet (81 mg) by mouth once daily.      bisacodyl (Gentle Laxative, bisacodyl,) 10 mg suppository Insert 1 suppository (10 mg) into the rectum once daily as needed for constipation.      cefepime (Maxipime) IV Infuse 100 mL (2 g) over 0.5 hours into a venous catheter every 12 hours.      famotidine (Pepcid) 20 mg tablet Take 1 tablet (20 mg) by mouth 2 times a day.      ferrous sulfate (FeroSuL) 325 (65 Fe) MG tablet Take 1 tablet (325 mg) by mouth once every 24 hours. 90 tablet 3    losartan (Cozaar) 50 mg tablet TAKE 1 TABLET BY MOUTH ONCE  DAILY 90 tablet 3    magnesium hydroxide (Milk of Magnesia) 400 mg/5 mL suspension Take 30 mL by mouth once daily as needed for constipation (if no BM in 3 consecutive days).      " magnesium oxide 400 mg magnesium capsule Take 1 capsule (400 mg) by mouth once daily. 90 capsule 3    mineral oil enema Insert 133 mL (1 enema) into the rectum once daily as needed for constipation (if no BM in 8 hours after getting suppository).      potassium chloride CR 10 mEq ER tablet TAKE 1 TABLET BY MOUTH ONCE  DAILY DO NOT CRUSH, CHEW, OR  SPLIT 90 tablet 3    torsemide (Demadex) 20 mg tablet TAKE 1 TABLET BY MOUTH ONCE  DAILY 90 tablet 3    traMADol (Ultram) 50 mg tablet Take 1 tablet (50 mg) by mouth every 6 hours if needed for severe pain (7 - 10).      vitamin D3-folic acid 2,500 unit- 1 mg tablet Take 1 mg by mouth once every 24 hours. 90 tablet 3        Physical Exam  HENT:      Head: Normocephalic and atraumatic.      Mouth/Throat:      Mouth: Mucous membranes are moist.   Eyes:      General: No scleral icterus.     Extraocular Movements: Extraocular movements intact.      Pupils: Pupils are equal, round, and reactive to light.   Cardiovascular:      Rate and Rhythm: Normal rate and regular rhythm.      Heart sounds: No murmur heard.  Pulmonary:      Effort: No respiratory distress.      Breath sounds: No wheezing or rales.   Abdominal:      General: There is no distension.      Tenderness: There is no abdominal tenderness. There is no guarding.   Musculoskeletal:      Comments: Legs with dressing on, images on epic show possible stasis dermatitis with ulcers   Skin:     Capillary Refill: Capillary refill takes less than 2 seconds.   Neurological:      General: No focal deficit present.      Mental Status: He is alert and oriented to person, place, and time.      Motor: Weakness present.      Comments: Richter in b/l LE 3/5, significantly limited by pain   Psychiatric:         Mood and Affect: Mood normal.         Relevant Results    Results for orders placed or performed during the hospital encounter of 01/04/25 (from the past 24 hours)   CBC and Auto Differential   Result Value Ref Range    WBC 11.9  (H) 4.4 - 11.3 x10*3/uL    nRBC 0.0 0.0 - 0.0 /100 WBCs    RBC 2.67 (L) 4.50 - 5.90 x10*6/uL    Hemoglobin 7.5 (L) 13.5 - 17.5 g/dL    Hematocrit 24.7 (L) 41.0 - 52.0 %    MCV 93 80 - 100 fL    MCH 28.1 26.0 - 34.0 pg    MCHC 30.4 (L) 32.0 - 36.0 g/dL    RDW 18.6 (H) 11.5 - 14.5 %    Platelets 320 150 - 450 x10*3/uL    Neutrophils % 76.2 40.0 - 80.0 %    Immature Granulocytes %, Automated 1.3 (H) 0.0 - 0.9 %    Lymphocytes % 11.7 13.0 - 44.0 %    Monocytes % 7.2 2.0 - 10.0 %    Eosinophils % 3.0 0.0 - 6.0 %    Basophils % 0.6 0.0 - 2.0 %    Neutrophils Absolute 9.06 (H) 1.60 - 5.50 x10*3/uL    Immature Granulocytes Absolute, Automated 0.15 0.00 - 0.50 x10*3/uL    Lymphocytes Absolute 1.39 0.80 - 3.00 x10*3/uL    Monocytes Absolute 0.85 (H) 0.05 - 0.80 x10*3/uL    Eosinophils Absolute 0.36 0.00 - 0.40 x10*3/uL    Basophils Absolute 0.07 0.00 - 0.10 x10*3/uL   Magnesium   Result Value Ref Range    Magnesium 2.15 1.60 - 2.40 mg/dL   Renal Function Panel   Result Value Ref Range    Glucose 104 (H) 74 - 99 mg/dL    Sodium 138 136 - 145 mmol/L    Potassium 5.0 3.5 - 5.3 mmol/L    Chloride 105 98 - 107 mmol/L    Bicarbonate 28 21 - 32 mmol/L    Anion Gap 10 10 - 20 mmol/L    Urea Nitrogen 17 6 - 23 mg/dL    Creatinine 0.89 0.50 - 1.30 mg/dL    eGFR 90 >60 mL/min/1.73m*2    Calcium 8.5 (L) 8.6 - 10.6 mg/dL    Phosphorus 2.6 2.5 - 4.9 mg/dL    Albumin 2.8 (L) 3.4 - 5.0 g/dL      CT chest abdomen pelvis wo IV contrast    Result Date: 1/2/2025  CHEST: 1.  History of right renal cell carcinoma. 2. Multiple pulmonary nodules consistent with metastatic disease.   ABDOMEN AND PELVIS: 1.  History upright or renal cell carcinoma. There is an unchanged necrotic mass involving the upper pole of the right kidney. 2. Bilateral renal cysts. 3. Bilateral nonobstructing intrarenal calculi with staghorn type calculi in the left kidney. There is no renal obstruction. 4. Complete bony destruction of L4 with a soft tissue mass with expansion  of the vertebral body. Lytic lesion involving the superior endplate of L5. Findings are consistent with bony metastases.   MACRO: None   Signed by: Lucia Desai 1/2/2025 10:33 AM Dictation workstation:   [id number]    XR chest 1 view    Result Date: 1/2/2025  No pneumonic consolidations are noted. There questionable perihilar nodules which may correspond to the masses noted on the prior CT.     MACRO: None   Signed by: Patrick Arnold 1/2/2025 8:33 AM Dictation workstation:   [id number]      MRI Spine 1/8/25  IMPRESSION:  MRI cervical spine:      1. No evidence of metastases.      2. Multilevel degenerative changes of the cervical spine including  moderate spinal canal stenosis at C5-C6 and C6-C7.      MRI thoracic spine:      1. No evidence of osseous metastases.      2. Multilevel degenerative changes of the thoracic spine without  striking spinal canal stenosis at any level.      MRI lumbar spine:      1. Metastasis involving the entirety of the L4 vertebral body,  portions of the posterior elements and transverse processes, with  extension into the L3-L4 and L4-L5 intervertebral disc spaces and  into the inferior endplate of L3 and superior endplate of L5 as well  as into the surrounding prevertebral soft tissues.      2. There is extension of tumor into the ventral and bilateral  epidural space at L4 and there is also tumor located within the  posterior spinal canal extending from the level of L3 through L5.  Combination of these findings cause marked spinal canal stenosis at  the level of L4. Minimal CSF is seen within the thecal sac at the  level of L4.      3. Tumor extends into the bilateral neural foramina at L4-L5 with  resulting moderate-to-marked left and marked right neural foraminal  narrowing. Tumor also extends into the inferior portions of the  bilateral neural foramina at L3-L4 resulting in moderate neural  foraminal narrowing.      4. There is metastasis located within the posterior and left  epidural  space at the level of L5 and S1 which causes partial effacement of  the posterior thecal sac.      5. Multilevel degenerative changes of the lumbar spine as detailed  above.      MRI Head 1/8/25  IMPRESSION:  1.  Small focus of diffusion restriction involving the right parietal  lobe with no associated enhancement on postcontrast imaging, favoring  small acute infarct. No mass effect or hemorrhagic transformation.  2. No abnormal intracranial enhancement to indicate intracranial  metastatic disease.  3. Nonspecific dilatation of the superior ophthalmic veins  bilaterally.    Assessment/Plan      Assessment & Plan  JAMAL (acute kidney injury) (CMS-HCC)    Lesion of lumbar spine    Mr. Abdi Retana is a 75 yo M with PMHx of HTN, GERD, SAMIA, Chronic lymphedema, venous insufficiency, OA, CKD-3(b/l 1.2-1.5), also w/ the following oncologic Hx - no formal Dx at this time. Presenting symptom was b/l LE weakness. Was admitted to [facility] 11/30-12/5 for this after IMG revealed a concerning L4-5 lesion w/ mod-severe cord compression in s/o R menal mass and suspicious lesions in adrenals lungs, and spine. This was c/f undiagnosed metastatic R RCC. Case was discussed w/ both IR and ortho spine. It was deemed that no inpt Bx nor surgery was indicated and pt was DC to SNF w/ plan for IR-guided L4 Bx (scheduled for 12/20; pt missed d/t transport issues w/ SNF). MRI brain and C/T spine w/ contrast was attempted but pt did not tolerate d/t claustrophobia - there was a plan to complete it OP w/ anesthesia. Transferred from [facility] for expedited work up of suspected R. RCC with L4/5 mets. Also has Proteus Mirabilis UTI c/b bacteremia I/s/o bilateral non obstructing renal stones. ID consulted for antibiotic management. L4 biopsy done and MRI head and spine completed. Path report confirmed metastatic RCC. S/p spinal artery embolization with Neurosurgery. S/p lumbar tumor debulking 1/17/2025. Ortho spine is following.     Update  1/23/25:     S/p lumbar tumor debulking, L3-L4 and L4-L5 laminectomies with L2-S1 PSIF with Dr. Atkins on 1/17   Started patient on lovenox for prophylaxis   Continue with PT/OT   Encourage PO intake   Flexeril 5mg prn for muscle stiffness    #Acute on chronic anemia   :: Hgb dropped from   :: s/p 1 unit PRBCs 1/18 for hb 6.9; hb stable 1/19. Now Hgb is 7.5 AM     Plan:   - Follow up on Fe studies, haptoglobin, reticulocyte, LDH     #Non oliguric JAMAL, stage I(Resolved)   # UTI   :: post procedural, likely related to blood loss and hemodynamic changes during surgery  :: Pamela/Est 500, WBC urine> 50 and RBC> 11-20  - s/p 2 total units PRBCs (1 in OR, 1 post op)  :: Cr peaked 2.03 1/18; Now downtrending to 0.96  Plan:   - monitor urine output  - Daily RFP     #Lumbar spinal fracuture w/ cord compression  - S/p tumor debulking 1/17 w/ ortho spine  - s/p dexamethasone POD1, s/p ancef POD1  - Drain/wound management per ortho  - no DVT ppx until 1/19    Small Acute Stroke on MRI  -Small focus of diffusion restriction involving the right parietal lobe with no associated enhancement on postcontrast imaging, favoring small acute infarct. No mass effect or hemorrhagic transformation.  -at his neurologic baseline(fluent speech, oriented, moves all limbs,LE-4/5)  -neuro-stoke consulted  -already on aspirin for CAD, not on statin    Plan  -neuro-stroke following  Recommendations  - Hold aspirin 81 mg daily for 48hrs post-surgery to prevent risk of bleeding  - when safe from a bleeding an procedural standpoint start therapeutic Lovenox 1 mg/kg Q12H   - Please keep patient on telemetry and monitor daily for afib. If there is evidence of afib may need alternative anticoagulation.    -  continue with atorvastatin 40 daily target LDL <70  - please arrange neurology followup at time of discharge    P. mirabilis bacteremia 2/2 UTI  -HDS  -associated w/ R renal mass, b/l non-obstructing renal stones  -sensitivities available  -seen by uro  at [facility], did not have plan for procedure given non-obstructive  -ID consulted for duration, procedure rec given complex anatomy of stones and renal mass - c/f seeding  -ID following, recommend Augmentin for 3 weeks(EOT-1/23)  -repeat BC(1/10)-NGTD     C/f undiagnosed metastatic R RCC  L4-5 lesion w/ radiographic evidence cord compression  -CT(11/30)-R renal mass  -pt at baseline 4/5 LE weakness on exam, symmetric, no incontinence or saddle anesthesia  -there have been no plans for procedures per prior evaluations  -IMG as above  -d/w ortho spine, said if going for anesthesia should do the full spine  -consider formal  spine surgery consult if neuro change and/or IMG/Bx completed  -had been seen by NSG 1/3 who thought exam was okay w/o weakness, did not rec an intervention  -On scheduled tylenol and prn oxy/dilaudid  -Outpatient medical oncology follow up with Dr Guerra  -ortho-spine following    HTN  CAD  -holding home  losartan 50mg  I/s/o recent rise in Cr(now resolved) and normotension   -c/w home Aspirin     Lymphedema  Venous insufficiency/venous ulcers  -wound care consulted  -hold home torsemide 20 mg daily for now  -OSH wound recs  BLE- Chronic venous status ulcers   Irrigate with normal saline or wound cleanser, Pat dry.  Apply lotion to intact skin   Cover weeping areas with Triad Hydrophillic wound dressing ointment   Pad and protect with ABD, Kerlix and paper tape.   Change every day and as needed   Secure with Tubigrip ( Apply from base of toes to 2 finger width below the bend of knee)      Gluteal cleft & buttocks-MASD/Irritant contact dermatitis due to fecal, urinary or dual incontinent.   Cleanse with bath wipes or soap and water. Rinse well and pat dry.   Apply no sting skin barrier (cavilon)   Apply Triad hydrophilic wound dressing ointment to gluteal fold/groin three times a day and as needed.     GERD  -c/w home famotidine 20 mg bid    Disposition  -barriers: clinical improvement  -destination:  likely back to SNF ([facility])  -f/u: pending     Diet: cardiac   Electrolytes: K >3.5; Mg >2  DVT ppx: SCD/hold post op   GI ppx: on home famotidine  Lines/tubes: PIV  O2: none  Baseline Cr: 1.5  Code status: Full code  Surrogate decision maker: Micaela Retana (sister) [phone]    Salomon Silva MD  Internal Medicne  PGY-1

## 2025-01-23 NOTE — PROGRESS NOTES
"Occupational Therapy    Occupational Therapy Treatment    Name: Abdi Retana \"Matt\"  MRN: [id number]  : 1950  Date: 25  Room: [facility]      Time Calculation  Start Time: 1042  Stop Time: 1110  Time Calculation (min): 28 min    Assessment:  OT Assessment: Patient with increased engagement noted with clear expectations shared. Patient demo good understanding of UE HEP and receptive to education regarding post op precautions. Continue MOD intensity OT rec.  Prognosis: Good  Barriers to Discharge Home: Physical needs  Physical Needs: 24hr mobility assistance needed, 24hr ADL assistance needed  Evaluation/Treatment Tolerance: Patient tolerated treatment well  End of Session Communication: Bedside nurse  End of Session Patient Position: Bed, 3 rail up, Alarm on  Plan:  Treatment Interventions: ADL retraining, Functional transfer training, UE strengthening/ROM, Endurance training, Patient/family training, Equipment evaluation/education, Compensatory technique education, Continued evaluation  OT Frequency: 2 times per week  OT Discharge Recommendations: Moderate intensity level of continued care  Equipment Recommended upon Discharge: Other (comment) (unable to determine today)  OT - OK to Discharge:  (OT Eval completed.)    Subjective   General:  OT Last Visit  OT Received On: 25  Prior to Session Communication: Bedside nurse  Patient Position Received: Bed, 3 rail up, Alarm on  Family/Caregiver Present: No  General Comment: Agreeable to OT session; patient requesting no position changes due to recent turning and now comfortable.   Precautions:  Hearing/Visual Limitations: appears WFL  Medical Precautions: Fall precautions, Spinal precautions  Post-Surgical Precautions: Spinal precautions  Precautions Comment: wound vac s/p spine sx  Vitals: Patient denied lightheadedness/dizziness with in bed activity.     Cognition:  Arousal/Alertness: Appropriate responses to stimuli  Orientation Level: Oriented " X4  Following Commands: Follows one step commands without difficulty  Cognition Comments: Appropriate responses this date. No recall regarding post op precautions. Receptive to education provided.  Insight: Mild    Pain Assessment:  Pain Assessment  Pain Assessment: 0-10  0-10 (Numeric) Pain Score: 6  Pain Type: Surgical pain  Pain Location: Back  Pain Descriptors: Aching  Pain Frequency: Constant/continuous  Pain Onset: Ongoing  Pain Interventions: Distraction, Emotional support  Response to Interventions:  (tolerated OT)     Objective     Functional Standing Tolerance:  Functional Mobility  Functional Mobility Performed: No  Bed Mobility/Transfers:   Bed Mobility  Bed Mobility: No (Patient politely requesting to maintain current position with recent turning in bed prior to OT arrival. Wedges on R side and reclined in bed.)  Transfers  Transfer: No     Therapy/Activity:   Therapeutic Exercise  Therapeutic Exercise Performed: Yes  Therapeutic Exercise Activity 1: Faciliation of UE HEP. Shoulder flex/ext, forward reach, and elbow flex x 5 reps per arm. Educated pt on benefit of UE HEP to prevent hospital acquired weakness and improve strength and activity tolerance; pt receptive. Handout provided for increased carryover.        Cognitive Skill Development:  Cognitive Skill Development  Cognitive Skill Development Activity 1: Reviewed orientation and updated white board.  Cognitive Skill Development Activity 2: Educated on post op precautions and handout provided for increased carryover.  Cognitive Skill Development Activity 3: Active listening regarding coping with prolonged hospitalization and acknowledgement of progression with appetitie.    Outcome Measures:  [facility] Daily Activity  Putting on and taking off regular lower body clothing: Total  Bathing (including washing, rinsing, drying): A lot  Putting on and taking off regular upper body clothing: A lot  Toileting, which includes using toilet, bedpan or urinal:  Total  Taking care of personal grooming such as brushing teeth: A little  Eating Meals: None  Daily Activity - Total Score: 13     Education Documentation  Handouts, taught by Jessica English OT at 1/23/2025 11:27 AM.  Learner: Patient  Readiness: Acceptance  Method: Explanation  Response: Verbalizes Understanding, Needs Reinforcement  Comment: post op precautions and UE HEP    Body Mechanics, taught by Jessica English OT at 1/23/2025 11:27 AM.  Learner: Patient  Readiness: Acceptance  Method: Explanation  Response: Verbalizes Understanding, Needs Reinforcement  Comment: post op precautions and UE HEP    Precautions, taught by Jessica English OT at 1/23/2025 11:27 AM.  Learner: Patient  Readiness: Acceptance  Method: Explanation  Response: Verbalizes Understanding, Needs Reinforcement  Comment: post op precautions and UE HEP    Home Exercise Program, taught by Jessica English OT at 1/23/2025 11:27 AM.  Learner: Patient  Readiness: Acceptance  Method: Explanation  Response: Verbalizes Understanding, Needs Reinforcement  Comment: post op precautions and UE HEP    ADL Training, taught by Jessica English OT at 1/23/2025 11:27 AM.  Learner: Patient  Readiness: Acceptance  Method: Explanation  Response: Verbalizes Understanding, Needs Reinforcement  Comment: post op precautions and UE HEP    Education Comments  No comments found.        Goals:  Encounter Problems       Encounter Problems (Active)       ADLs       Patient will perform UB and LB bathing  with minimal assist  level of assistance UE and mod assist LE extended tub bench and long-handled sponge. (Progressing)       Start:  01/09/25    Expected End:  02/06/25            Patient with complete upper body dressing with contact guard assist level of assistance donning and doffing shirt w/out fasteners with PRN adaptive equipment while supported sitting and edge of bed  (Progressing)       Start:  01/09/25    Expected End:  02/06/25            Patient  with complete lower body dressing with moderate assist level of assistance donning and doffing pants without a zipper/fasteners, underwear, and socks with reacher, shoe horn, and sock-aid while supported sitting and edge of bed  (Progressing)       Start:  01/09/25    Expected End:  02/06/25            Patient will complete daily grooming tasks brushing teeth and washing face/hair with modified independent level of assistance and PRN adaptive equipment while supported sitting after setup. (Progressing)       Start:  01/09/25    Expected End:  02/06/25            Patient will complete toileting including hygiene clothing management/hygiene with moderate assist level of assistance and raised toilet seat and/or bedside commode pending pt's progress.  (Progressing)       Start:  01/09/25    Expected End:  02/06/25               BALANCE       Patientt will maintain static sitting balance during ADL task with supervision level of assistance drop down in order to demonstrate decreased risk of falling and improved postural control. (Not Progressing)       Start:  01/09/25    Expected End:  02/06/25               COGNITION/SAFETY       Patient will independently verbalize and demo understanding of 3/3 post op precautions. (Progressing)       Start:  01/23/25    Expected End:  02/06/25               EXERCISE/STRENGTHENING       Patient will demo UE HEP with MOD I.  (Progressing)       Start:  01/23/25    Expected End:  02/06/25               TRANSFERS       Patient will perform bed mobility minimal assist  level of assistance and bed rails in order to improve safety and independence with mobility (Not Progressing)       Start:  01/09/25    Expected End:  02/06/25            Patient will complete functional transfer to wheelchair, B/S commode vs. raised toilet, B/S chair with least restrictive device with moderate assist level of assistance. (Not Progressing)       Start:  01/09/25    Expected End:  02/06/25                      01/23/25 at 11:28 AM   Jessica English, OT   [phone]

## 2025-01-23 NOTE — PROGRESS NOTES
"Physical Therapy    Physical Therapy Treatment    Patient Name: Abdi Retana \"Matt\"  MRN: [id number]  Department: [facility]  Room: [address] [place] [address]  Today's Date: 1/23/2025  Time Calculation  Start Time: 1140  Stop Time: 1220  Time Calculation (min): 40 min         Assessment/Plan   PT Assessment  PT Assessment Results: Decreased strength, Decreased range of motion, Decreased endurance, Impaired balance, Decreased mobility, Impaired hearing, Decreased cognition, Decreased skin integrity, Obesity, Pain, Decreased coordination, Orthopedic restrictions  Rehab Prognosis: Good  Barriers to Discharge Home: Caregiver assistance, Physical needs  Caregiver Assistance: Patient lives alone and/or does not have reliable caregiver assistance  Physical Needs: Stair navigation into home limited by function/safety, In-home setup navigation limited by function/safety, 24hr mobility assistance needed, 24hr ADL assistance needed  End of Session Communication: Bedside nurse (TCC)  Assessment Comment: 75 yo male with malignant renal cell carcinoma with L4/L5 lytic lesions s/p spinal surgery, anxiety, anemia, and weakness limiting mobility overall, waxing/waning affect and at times emotional/cognitive impairments; mobility slightly improved today. Once medically stable, recommend moderate intensity therapy as pt lives alone and has stairs, is weak and has complicated medical issues, is making gains, appears very motivated and is not at baseline. Pt may benefit from anxiety management (d/w team after previous visits).  End of Session Patient Position: Alarm on, Bed, 4 rail up (sidelying a little bit on Right;  pt wants all 4 rails ups (he feels more secure and likes to hold onto the rails))  PT Plan  Inpatient/Swing Bed or Outpatient: Inpatient  PT Plan  Treatment/Interventions: Bed mobility, Transfer training, Balance training, Neuromuscular re-education, Strengthening, Endurance training, Range of motion, Therapeutic exercise, Therapeutic activity, " Home exercise program, Positioning, Postural re-education  PT Plan: Ongoing PT  PT Frequency: 5 times per week  PT Discharge Recommendations: Moderate intensity level of continued care  Equipment Recommended upon Discharge: Other (comment) (unable to determine today)  PT Recommended Transfer Status: Other (comment) (unable/dependent)  PT - OK to Discharge: Yes (PT eval completed & DC recs made)      General Visit Information:   PT  Visit  PT Received On: 01/23/25  Response to Previous Treatment: Patient with no complaints from previous session.  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 Atkins  Past Medical History Relevant to Rehab: CKD, malignant renal cell carcinoma Right (L4 lesion, mets to adrenal glands & lung), HTN, GERD, OA, PVD, anemia, obesity, PVD, CKD, chronic lymphedema, fall  Missed Visit Reason:  (.)  Family/Caregiver Present: No  Caregiver Feedback: .  Co-Treatment:  (N/A)  Co-Treatment Reason: .  Prior to Session Communication: Bedside nurse  Patient Position Received: Bed, 4 rail up, Alarm off, not on at start of session  Preferred Learning Style: kinesthetic, verbal  General Comment: Pt supine alert and engaged, reports back pain as noted. Pt willing to trial to stand wit hA +2 and arm-in-arm but unable today.    Subjective   Precautions:  Precautions  Medical Precautions: Fall precautions, Spinal precautions (anxious and likes explicit instructions prior to any movement, back pain, anemia, spine (post surgical and lytic lesions lumbar/sacral spine), bilateral leg ulcers, bilateral knee flexion contractures (R > L))  Post-Surgical Precautions: Spinal  precautions (wound vac, spine post surgical)      Objective   Pain:  Pain Assessment  Pain Assessment: 0-10  0-10 (Numeric) Pain Score:  (unrated; reports mid thoracic back pain pre/during/post (RN informed))  Pain Interventions: Repositioned, Ambulation/increased activity, Rest, Therapeutic presence, Therapeutic touch, Emotional support  Response to Interventions:  (having some pain as noted in back, appears comfortable in semi Right sidelying)  Cognition:  Cognition  Arousal/Alertness: Delayed responses to stimuli  Orientation Level: Oriented X4  Following Commands: Follows one step commands with increased time  Problem Solving: Assistance required to identify errors made  Cognition Comments: alert and engaged, oriented x 4  Problem Solving: Exceptions to WFL  Insight: Mild  Processing Speed: Delayed  Coordination:  Movements are Fluid and Coordinated: No (in LEs due to pain, apprehension of pain, LE contractures)  Postural Control:  Postural Control  Postural Control: Impaired  Posture Comment: sitting with intermittent assist: head/neck flexed, rounded shoulders, posterior pelvic tilt  Static Sitting Balance  Static Sitting-Balance Support: Feet supported, Bilateral upper extremity supported  Static Sitting-Level of Assistance:  (CG to min A +1 (variable), CGA +1 other for safety)  Dynamic Sitting Balance  Dynamic Sitting-Balance Support: Feet supported, Bilateral upper extremity supported  Dynamic Sitting-Level of Assistance: Maximum assistance (+1 and CGA +1 other for safety)  Dynamic Sitting-Balance:  (scoot to EOB to put feet on floor using TAP sheet; rocking back and forth to stretch back)  Static Standing Balance  Static Standing-Balance Support:  (unable to come to stand today)  Extremity/Trunk Assessments:  PROM RLE (degrees)  RLE PROM Comment: supine approximate AAROM: knee flexion >75 degrees, extension -40, ankle DF >3-5 with knee flexed  Strength RLE  RLE Overall Strength:  (ankle DF 3, ankle PF >3-,  knee flexion >2, knee ext 1)  PROM LLE (degrees)  LLE PROM Comment: supine: knee flexion >75 degrees, extension -35, ankle DF >5 with knee flexed  Strength LLE  LLE Overall Strength:  (ankle DF >3, ankle PF >3-, knee flexion >2+, knee ext >3- in limited ROM)  Activity Tolerance:  Activity Tolerance  Endurance: Other (Comment), Tolerates 30+ min exercise without fatigue (dyspnea on exertion, did well with rest breaks as needed)  Treatments:  Therapeutic Exercise  Therapeutic Exercise Performed: Yes  Therapeutic Exercise Activity 1: AAROM bilateral LEs supine: ankle pumps, heel slides, knee extension  Therapeutic Exercise Activity 2: AAROM in supported sitting: ankle pumps, LAQs (assisted more on Right LE)    Therapeutic Activity  Therapeutic Activity Performed: Yes  Therapeutic Activity 1: facilitation/education of rolling as noted  Therapeutic Activity 2: coming to/from sit and sitting balance with superimposed AAROM of LEs  Therapeutic Activity 3: attempt to come to stand but unable    Bed Mobility  Bed Mobility: Yes  Bed Mobility 1  Bed Mobility 1: Rolling right, Rolling left  Level of Assistance 1: +2, Moderate assistance, Moderate verbal cues, Moderate tactile cues, Maximum assistance (mod to max A +2 using Chux, rail)  Bed Mobility 2  Bed Mobility  2: Side lying right to sit (sit to Right sidelying. used bed to elevate HOB 60 degrees & use of rail and TAP, hard time logrolling due in part to LE contractures and body habitus)  Level of Assistance 2: Maximum assistance, Maximum verbal cues, Maximum tactile cues, +2 (mod/max A +2 using TAP, HOB elevated 60 degrees, in/out on Right side of bed per pt preference)  Bed Mobility 3  Bed Mobility 3: Scooting (in sitting)  Level of Assistance 3: +2, Moderate verbal cues, Moderate tactile cues, Moderate assistance (using TAP in sitting (mod/max A +1 and CGA +1 other for safety))  Bed Mobility 4  Bed Mobility 4: Scooting (supine)  Level of Assistance 4: Dependent, +2  (using TAP)    Ambulation/Gait Training  Ambulation/Gait Training Performed: No (pt unable)  Transfers  Transfer: Yes  Transfer 1  Transfer From 1: Sit to, Stand to  Transfer to 1: Sit, Stand  Technique 1: Sit to stand, Stand to sit  Transfer Device 1:  (none, did arm-in-arm assist +2)  Transfer Level of Assistance 1: Maximum tactile cues, Moderate verbal cues, Maximum assistance (pt unable to come to stand with A +2 and bed height raised)    Stairs  Stairs: No (pt unable)    Other Activity  Other Activity Performed: Yes  Other Activity 1: pt positioned in Right sidelying at end of session    Outcome Measures:  [facility] Basic Mobility  Turning from your back to your side while in a flat bed without using bedrails: Total  Moving from lying on your back to sitting on the side of a flat bed without using bedrails: Total  Moving to and from bed to chair (including a wheelchair): Total  Standing up from a chair using your arms (e.g. wheelchair or bedside chair): Total  To walk in hospital room: Total  Climbing 3-5 steps with railing: Total  Basic Mobility - Total Score: 6    Education Documentation  Home Exercise Program, taught by Bekah Brewer, PT at 1/23/2025  1:54 PM.  Learner: Patient  Readiness: Acceptance  Method: Explanation, Demonstration  Response: Needs Reinforcement, Verbalizes Understanding  Comment: reviewed STEPHEN AAROM, rolling and coming to/from sit, AAROM in supported sitting, progress    Body Mechanics, taught by Bekah Brewer, PT at 1/23/2025  1:54 PM.  Learner: Patient  Readiness: Acceptance  Method: Explanation, Demonstration  Response: Needs Reinforcement, Verbalizes Understanding  Comment: reviewed LE AAROM, rolling and coming to/from sit, AAROM in supported sitting, progress    Mobility Training, taught by Bekah Brewer, PT at 1/23/2025  1:54 PM.  Learner: Patient  Readiness: Acceptance  Method: Explanation, Demonstration  Response: Needs Reinforcement, Verbalizes Understanding  Comment:  reviewed LE AAROM, rolling and coming to/from sit, AAROM in supported sitting, progress    Education Comments  No comments found.      Encounter Problems       Encounter Problems (Active)       Balance       Pt will come sit to/from stand and bed to/from chair stand pivot with WW and mod A +2 (Not Progressing)       Start:  01/22/25    Expected End:  02/05/25            Pt will stand statically with WW with mod A +1 >5 minutes (Not Progressing)       Start:  01/22/25    Expected End:  02/05/25               General Goals       Pt will have bilateral LE strength >4 (ankle DF, hip flexion, knee flex/ext) and AROM WFL hip flexion/knee flex & extension and ankle DF bilaterally (Not met)       Start:  01/06/25    Expected End:  02/05/25    Resolved:  01/22/25    Updated to: Strength bilateral hip flexors, knee extensors and ankle dorsiflexors >4; AAROM: knee extension >-20 on Right, >-10 on Left, ankle DF to neutral with knee extended bilaterally    Update reason: update         Pt will supine to/from sit via logrolling (HOB elevated 45 degrees, use of rail) with mod A +2 (Progressing)       Start:  01/06/25    Expected End:  02/05/25            Pt will sit EOB with min A +1 >7 minutes with pain <2 (Progressing)       Start:  01/06/25    Expected End:  02/05/25            Strength bilateral hip flexors, knee extensors and ankle dorsiflexors >4; AAROM: knee extension >-20 on Right, >-10 on Left, ankle DF to neutral with knee extended bilaterally (Progressing)       Start:  01/22/25    Expected End:  02/05/25                   Mobility       Pt will come sit to/from stand, bed to/from chair with WW with mod A +2 (no knee buckling, pain <2) (Not met)       Start:  01/06/25    Expected End:  02/05/25    Resolved:  01/22/25    Updated to: Pt will roll either direction using rail with CGA and cues    Update reason: update         Pt will stand static with WW and min/mod A +2 (Not met)       Start:  01/06/25    Expected End:   02/05/25    Resolved:  01/22/25    Updated to: Pt will sit EOB >15 minutes while doing AAROM of LES with CGA, no LOB, stable vitals    Update reason: update         Pt will ambulate 10' with WW with min/mod A +2 and chair follow for safety (Not met)       Start:  01/06/25    Expected End:  02/05/25    Resolved:  01/22/25    Updated to: pt will come sidelying to/from sit HOB elevated 65 degrees, use of rail) with min A +1 from either side    Update reason: update         Pt will roll either direction using rail with CGA and cues (Progressing)       Start:  01/22/25    Expected End:  02/05/25                pt will come sidelying to/from sit HOB elevated 65 degrees, use of rail) with min A +1 from either side (Progressing)       Start:  01/22/25    Expected End:  02/05/25                Pt will sit EOB >15 minutes while doing AAROM of LES with CGA, no LOB, stable vitals (Progressing)       Start:  01/22/25    Expected End:  02/05/25                   Pain - Adult

## 2025-01-23 NOTE — PROGRESS NOTES
"Abdi Retana \"Louise" is a 74 y.o. male on day 19 of admission presenting with Lesion of lumbar spine.      Subjective   Seen this AM, did report muscle stiffness. But states that pain is much improved post surgery. Denies fevers, chills, nausea, vomiting, chest pain, shortness of breath.     Objective     Last Recorded Vitals  /64 (BP Location: Left arm, Patient Position: Lying)   Pulse 82   Temp 36.7 °C (98.1 °F) (Temporal)   Resp 16   Wt 120 kg (264 lb 8.8 oz)   SpO2 95%   Intake/Output last 3 Shifts:    Intake/Output Summary (Last 24 hours) at 1/23/2025 1325  Last data filed at 1/23/2025 1243  Gross per 24 hour   Intake 118 ml   Output 950 ml   Net -832 ml       Admission Weight  Weight: 120 kg (264 lb) (01/04/25 1900)    Daily Weight  01/17/25 : 120 kg (264 lb 8.8 oz)    No current facility-administered medications on file prior to encounter.     Current Outpatient Medications on File Prior to Encounter   Medication Sig Dispense Refill    acetaminophen (Tylenol 8 HOUR) 650 mg ER tablet Take 1 tablet (650 mg) by mouth every 6 hours if needed for mild pain (1 - 3). Do not crush, chew, or split.      acetaminophen (Tylenol) 325 mg tablet Take 2 tablets (650 mg) by mouth 2 times a day.      aspirin 81 mg EC tablet Take 1 tablet (81 mg) by mouth once daily.      bisacodyl (Gentle Laxative, bisacodyl,) 10 mg suppository Insert 1 suppository (10 mg) into the rectum once daily as needed for constipation.      cefepime (Maxipime) IV Infuse 100 mL (2 g) over 0.5 hours into a venous catheter every 12 hours.      famotidine (Pepcid) 20 mg tablet Take 1 tablet (20 mg) by mouth 2 times a day.      ferrous sulfate (FeroSuL) 325 (65 Fe) MG tablet Take 1 tablet (325 mg) by mouth once every 24 hours. 90 tablet 3    losartan (Cozaar) 50 mg tablet TAKE 1 TABLET BY MOUTH ONCE  DAILY 90 tablet 3    magnesium hydroxide (Milk of Magnesia) 400 mg/5 mL suspension Take 30 mL by mouth once daily as needed for constipation (if no " BM in 3 consecutive days).      magnesium oxide 400 mg magnesium capsule Take 1 capsule (400 mg) by mouth once daily. 90 capsule 3    mineral oil enema Insert 133 mL (1 enema) into the rectum once daily as needed for constipation (if no BM in 8 hours after getting suppository).      potassium chloride CR 10 mEq ER tablet TAKE 1 TABLET BY MOUTH ONCE  DAILY DO NOT CRUSH, CHEW, OR  SPLIT 90 tablet 3    torsemide (Demadex) 20 mg tablet TAKE 1 TABLET BY MOUTH ONCE  DAILY 90 tablet 3    traMADol (Ultram) 50 mg tablet Take 1 tablet (50 mg) by mouth every 6 hours if needed for severe pain (7 - 10).      vitamin D3-folic acid 2,500 unit- 1 mg tablet Take 1 mg by mouth once every 24 hours. 90 tablet 3        Physical Exam  HENT:      Head: Normocephalic and atraumatic.      Mouth/Throat:      Mouth: Mucous membranes are moist.   Eyes:      General: No scleral icterus.     Extraocular Movements: Extraocular movements intact.      Pupils: Pupils are equal, round, and reactive to light.   Cardiovascular:      Rate and Rhythm: Normal rate and regular rhythm.      Heart sounds: No murmur heard.  Pulmonary:      Effort: No respiratory distress.      Breath sounds: No wheezing or rales.   Abdominal:      General: There is no distension.      Tenderness: There is no abdominal tenderness. There is no guarding.   Musculoskeletal:      Comments: Legs with dressing on, images on epic show possible stasis dermatitis with ulcers   Skin:     Capillary Refill: Capillary refill takes less than 2 seconds.   Neurological:      General: No focal deficit present.      Mental Status: He is alert and oriented to person, place, and time.      Motor: Weakness present.      Comments: Richter in b/l LE 3/5, significantly limited by pain   Psychiatric:         Mood and Affect: Mood normal.         Relevant Results    Results for orders placed or performed during the hospital encounter of 01/04/25 (from the past 24 hours)   CBC and Auto Differential    Result Value Ref Range    WBC 11.9 (H) 4.4 - 11.3 x10*3/uL    nRBC 0.0 0.0 - 0.0 /100 WBCs    RBC 2.67 (L) 4.50 - 5.90 x10*6/uL    Hemoglobin 7.5 (L) 13.5 - 17.5 g/dL    Hematocrit 24.7 (L) 41.0 - 52.0 %    MCV 93 80 - 100 fL    MCH 28.1 26.0 - 34.0 pg    MCHC 30.4 (L) 32.0 - 36.0 g/dL    RDW 18.6 (H) 11.5 - 14.5 %    Platelets 320 150 - 450 x10*3/uL    Neutrophils % 76.2 40.0 - 80.0 %    Immature Granulocytes %, Automated 1.3 (H) 0.0 - 0.9 %    Lymphocytes % 11.7 13.0 - 44.0 %    Monocytes % 7.2 2.0 - 10.0 %    Eosinophils % 3.0 0.0 - 6.0 %    Basophils % 0.6 0.0 - 2.0 %    Neutrophils Absolute 9.06 (H) 1.60 - 5.50 x10*3/uL    Immature Granulocytes Absolute, Automated 0.15 0.00 - 0.50 x10*3/uL    Lymphocytes Absolute 1.39 0.80 - 3.00 x10*3/uL    Monocytes Absolute 0.85 (H) 0.05 - 0.80 x10*3/uL    Eosinophils Absolute 0.36 0.00 - 0.40 x10*3/uL    Basophils Absolute 0.07 0.00 - 0.10 x10*3/uL   Magnesium   Result Value Ref Range    Magnesium 2.15 1.60 - 2.40 mg/dL   Renal Function Panel   Result Value Ref Range    Glucose 104 (H) 74 - 99 mg/dL    Sodium 138 136 - 145 mmol/L    Potassium 5.0 3.5 - 5.3 mmol/L    Chloride 105 98 - 107 mmol/L    Bicarbonate 28 21 - 32 mmol/L    Anion Gap 10 10 - 20 mmol/L    Urea Nitrogen 17 6 - 23 mg/dL    Creatinine 0.89 0.50 - 1.30 mg/dL    eGFR 90 >60 mL/min/1.73m*2    Calcium 8.5 (L) 8.6 - 10.6 mg/dL    Phosphorus 2.6 2.5 - 4.9 mg/dL    Albumin 2.8 (L) 3.4 - 5.0 g/dL      CT chest abdomen pelvis wo IV contrast    Result Date: 1/2/2025  CHEST: 1.  History of right renal cell carcinoma. 2. Multiple pulmonary nodules consistent with metastatic disease.   ABDOMEN AND PELVIS: 1.  History upright or renal cell carcinoma. There is an unchanged necrotic mass involving the upper pole of the right kidney. 2. Bilateral renal cysts. 3. Bilateral nonobstructing intrarenal calculi with staghorn type calculi in the left kidney. There is no renal obstruction. 4. Complete bony destruction of L4  with a soft tissue mass with expansion of the vertebral body. Lytic lesion involving the superior endplate of L5. Findings are consistent with bony metastases.   MACRO: None   Signed by: Lucia Desai 1/2/2025 10:33 AM Dictation workstation:   [id number]    XR chest 1 view    Result Date: 1/2/2025  No pneumonic consolidations are noted. There questionable perihilar nodules which may correspond to the masses noted on the prior CT.     MACRO: None   Signed by: Patrick Arnold 1/2/2025 8:33 AM Dictation workstation:   [id number]      MRI Spine 1/8/25  IMPRESSION:  MRI cervical spine:      1. No evidence of metastases.      2. Multilevel degenerative changes of the cervical spine including  moderate spinal canal stenosis at C5-C6 and C6-C7.      MRI thoracic spine:      1. No evidence of osseous metastases.      2. Multilevel degenerative changes of the thoracic spine without  striking spinal canal stenosis at any level.      MRI lumbar spine:      1. Metastasis involving the entirety of the L4 vertebral body,  portions of the posterior elements and transverse processes, with  extension into the L3-L4 and L4-L5 intervertebral disc spaces and  into the inferior endplate of L3 and superior endplate of L5 as well  as into the surrounding prevertebral soft tissues.      2. There is extension of tumor into the ventral and bilateral  epidural space at L4 and there is also tumor located within the  posterior spinal canal extending from the level of L3 through L5.  Combination of these findings cause marked spinal canal stenosis at  the level of L4. Minimal CSF is seen within the thecal sac at the  level of L4.      3. Tumor extends into the bilateral neural foramina at L4-L5 with  resulting moderate-to-marked left and marked right neural foraminal  narrowing. Tumor also extends into the inferior portions of the  bilateral neural foramina at L3-L4 resulting in moderate neural  foraminal narrowing.      4. There is metastasis  located within the posterior and left epidural  space at the level of L5 and S1 which causes partial effacement of  the posterior thecal sac.      5. Multilevel degenerative changes of the lumbar spine as detailed  above.      MRI Head 1/8/25  IMPRESSION:  1.  Small focus of diffusion restriction involving the right parietal  lobe with no associated enhancement on postcontrast imaging, favoring  small acute infarct. No mass effect or hemorrhagic transformation.  2. No abnormal intracranial enhancement to indicate intracranial  metastatic disease.  3. Nonspecific dilatation of the superior ophthalmic veins  bilaterally.    Assessment/Plan      Assessment & Plan  JAMAL (acute kidney injury) (CMS-HCC)    Lesion of lumbar spine    Mr. Abdi Retana is a 75 yo M with PMHx of HTN, GERD, SAMIA, Chronic lymphedema, venous insufficiency, OA, CKD-3(b/l 1.2-1.5), also w/ the following oncologic Hx - no formal Dx at this time. Presenting symptom was b/l LE weakness. Was admitted to [facility] 11/30-12/5 for this after IMG revealed a concerning L4-5 lesion w/ mod-severe cord compression in s/o R menal mass and suspicious lesions in adrenals lungs, and spine. This was c/f undiagnosed metastatic R RCC. Case was discussed w/ both IR and ortho spine. It was deemed that no inpt Bx nor surgery was indicated and pt was DC to SNF w/ plan for IR-guided L4 Bx (scheduled for 12/20; pt missed d/t transport issues w/ SNF). MRI brain and C/T spine w/ contrast was attempted but pt did not tolerate d/t claustrophobia - there was a plan to complete it OP w/ anesthesia. Transferred from [facility] for expedited work up of suspected R. RCC with L4/5 mets. Also has Proteus Mirabilis UTI c/b bacteremia I/s/o bilateral non obstructing renal stones. ID consulted for antibiotic management. L4 biopsy done and MRI head and spine completed. Path report confirmed metastatic RCC. S/p spinal artery embolization with Neurosurgery. S/p lumbar tumor debulking 1/17/2025.  Ortho spine is following.     Update 1/22/25:     S/p lumbar tumor debulking, L3-L4 and L4-L5 laminectomies with L2-S1 PSIF with Dr. Atkins on 1/17   Will consider reauming DVT ppx if Hb stable Hgb 7.1 AM   Continue with PT/OT   Encourage PO intake   Flexeril 5mg prn for muscle stiffness  Started pt on prophylactic lovenox; and monitoring Hgb level.        #Acute on chronic anemia   :: Hgb dropped from   :: s/p 1 unit PRBCs 1/18 for hb 6.9; hb stable 1/19. Now Hgb is 7.5 AM     Plan:   - Follow up on Fe studies, haptoglobin, reticulocyte, LDH     #Non oliguric JAMAL, stage I(Resolved)   # UTI   :: post procedural, likely related to blood loss and hemodynamic changes during surgery  :: Pamela/Est 500, WBC urine> 50 and RBC> 11-20  - s/p 2 total units PRBCs (1 in OR, 1 post op)  :: Cr peaked 2.03 1/18; Now downtrending to 0.96  Plan:   - monitor urine output  - Daily RFP     #Lumbar spinal fracuture w/ cord compression  - S/p tumor debulking 1/17 w/ ortho spine  - s/p dexamethasone POD1, s/p ancef POD1  - Drain/wound management per ortho  - no DVT ppx until 1/19    Small Acute Stroke on MRI  -Small focus of diffusion restriction involving the right parietal lobe with no associated enhancement on postcontrast imaging, favoring small acute infarct. No mass effect or hemorrhagic transformation.  -at his neurologic baseline(fluent speech, oriented, moves all limbs,LE-4/5)  -neuro-stoke consulted  -already on aspirin for CAD, not on statin    Plan  -neuro-stroke following  Recommendations  - Hold aspirin 81 mg daily for 48hrs post-surgery to prevent risk of bleeding  - when safe from a bleeding an procedural standpoint start therapeutic Lovenox 1 mg/kg Q12H   - Please keep patient on telemetry and monitor daily for afib. If there is evidence of afib may need alternative anticoagulation.    -  continue with atorvastatin 40 daily target LDL <70  - please arrange neurology followup at time of discharge    P. mirabilis bacteremia  2/2 UTI  -HDS  -associated w/ R renal mass, b/l non-obstructing renal stones  -sensitivities available  -seen by uro at [facility], did not have plan for procedure given non-obstructive  -ID consulted for duration, procedure rec given complex anatomy of stones and renal mass - c/f seeding  -ID following, recommend Augmentin for 3 weeks(EOT-1/23)  -repeat BC(1/10)-NGTD     C/f undiagnosed metastatic R RCC  L4-5 lesion w/ radiographic evidence cord compression  -CT(11/30)-R renal mass  -pt at baseline 4/5 LE weakness on exam, symmetric, no incontinence or saddle anesthesia  -there have been no plans for procedures per prior evaluations  -IMG as above  -d/w ortho spine, said if going for anesthesia should do the full spine  -consider formal  spine surgery consult if neuro change and/or IMG/Bx completed  -had been seen by NSG 1/3 who thought exam was okay w/o weakness, did not rec an intervention  -On scheduled tylenol and prn oxy/dilaudid  -Outpatient medical oncology follow up with Dr Guerra  -ortho-spine following    HTN  CAD  -holding home  losartan 50mg  I/s/o recent rise in Cr(now resolved) and normotension   -c/w home Aspirin     Lymphedema  Venous insufficiency/venous ulcers  -wound care consulted  -hold home torsemide 20 mg daily for now  -OSH wound recs  BLE- Chronic venous status ulcers   Irrigate with normal saline or wound cleanser, Pat dry.  Apply lotion to intact skin   Cover weeping areas with Triad Hydrophillic wound dressing ointment   Pad and protect with ABD, Kerlix and paper tape.   Change every day and as needed   Secure with Tubigrip ( Apply from base of toes to 2 finger width below the bend of knee)      Gluteal cleft & buttocks-MASD/Irritant contact dermatitis due to fecal, urinary or dual incontinent.   Cleanse with bath wipes or soap and water. Rinse well and pat dry.   Apply no sting skin barrier (cavilon)   Apply Triad hydrophilic wound dressing ointment to gluteal fold/groin three times a day  and as needed.     GERD  -c/w home famotidine 20 mg bid    Disposition  -barriers: clinical improvement  -destination: likely back to SNF ([facility])  -f/u: pending     Diet: cardiac   Electrolytes: K >3.5; Mg >2  DVT ppx: SCD/hold post op   GI ppx: on home famotidine  Lines/tubes: PIV  O2: none  Baseline Cr: 1.5  Code status: Full code  Surrogate decision maker: Micaela Retana (sister) [phone]    Salomon Silva MD  Internal Medicne  PGY-1

## 2025-01-24 ENCOUNTER — APPOINTMENT (OUTPATIENT)
Dept: RADIATION ONCOLOGY | Facility: HOSPITAL | Age: 75
End: 2025-01-24
Payer: MEDICARE

## 2025-01-24 ENCOUNTER — APPOINTMENT (OUTPATIENT)
Dept: CARDIOLOGY | Facility: HOSPITAL | Age: 75
End: 2025-01-24
Payer: MEDICARE

## 2025-01-24 VITALS
WEIGHT: 264.55 LBS | SYSTOLIC BLOOD PRESSURE: 149 MMHG | RESPIRATION RATE: 18 BRPM | HEART RATE: 77 BPM | OXYGEN SATURATION: 95 % | HEIGHT: 74 IN | DIASTOLIC BLOOD PRESSURE: 61 MMHG | TEMPERATURE: 97.7 F | BODY MASS INDEX: 33.95 KG/M2

## 2025-01-24 DIAGNOSIS — Z79.01 ANTICOAGULATED: Primary | ICD-10-CM

## 2025-01-24 DIAGNOSIS — C79.51 SECONDARY MALIGNANT NEOPLASM OF BONE (MULTI): ICD-10-CM

## 2025-01-24 DIAGNOSIS — M89.9 LESION OF LUMBAR SPINE: ICD-10-CM

## 2025-01-24 DIAGNOSIS — D64.9 ANEMIA, UNSPECIFIED TYPE: ICD-10-CM

## 2025-01-24 LAB
ALBUMIN SERPL BCP-MCNC: 2.7 G/DL (ref 3.4–5)
ANION GAP SERPL CALC-SCNC: 13 MMOL/L (ref 10–20)
BASOPHILS # BLD AUTO: 0.06 X10*3/UL (ref 0–0.1)
BASOPHILS NFR BLD AUTO: 0.5 %
BUN SERPL-MCNC: 20 MG/DL (ref 6–23)
CALCIUM SERPL-MCNC: 8.4 MG/DL (ref 8.6–10.6)
CHLORIDE SERPL-SCNC: 101 MMOL/L (ref 98–107)
CO2 SERPL-SCNC: 27 MMOL/L (ref 21–32)
CREAT SERPL-MCNC: 0.96 MG/DL (ref 0.5–1.3)
EGFRCR SERPLBLD CKD-EPI 2021: 83 ML/MIN/1.73M*2
EOSINOPHIL # BLD AUTO: 0.43 X10*3/UL (ref 0–0.4)
EOSINOPHIL NFR BLD AUTO: 3.7 %
ERYTHROCYTE [DISTWIDTH] IN BLOOD BY AUTOMATED COUNT: 18.6 % (ref 11.5–14.5)
GLUCOSE SERPL-MCNC: 127 MG/DL (ref 74–99)
HCT VFR BLD AUTO: 24.4 % (ref 41–52)
HGB BLD-MCNC: 7.4 G/DL (ref 13.5–17.5)
IMM GRANULOCYTES # BLD AUTO: 0.13 X10*3/UL (ref 0–0.5)
IMM GRANULOCYTES NFR BLD AUTO: 1.1 % (ref 0–0.9)
LYMPHOCYTES # BLD AUTO: 1.27 X10*3/UL (ref 0.8–3)
LYMPHOCYTES NFR BLD AUTO: 10.8 %
MAGNESIUM SERPL-MCNC: 1.89 MG/DL (ref 1.6–2.4)
MCH RBC QN AUTO: 27.5 PG (ref 26–34)
MCHC RBC AUTO-ENTMCNC: 30.3 G/DL (ref 32–36)
MCV RBC AUTO: 91 FL (ref 80–100)
MONOCYTES # BLD AUTO: 0.72 X10*3/UL (ref 0.05–0.8)
MONOCYTES NFR BLD AUTO: 6.1 %
NEUTROPHILS # BLD AUTO: 9.11 X10*3/UL (ref 1.6–5.5)
NEUTROPHILS NFR BLD AUTO: 77.8 %
NRBC BLD-RTO: 0 /100 WBCS (ref 0–0)
PHOSPHATE SERPL-MCNC: 2.4 MG/DL (ref 2.5–4.9)
PLATELET # BLD AUTO: 353 X10*3/UL (ref 150–450)
POTASSIUM SERPL-SCNC: 3.8 MMOL/L (ref 3.5–5.3)
RBC # BLD AUTO: 2.69 X10*6/UL (ref 4.5–5.9)
SODIUM SERPL-SCNC: 137 MMOL/L (ref 136–145)
WBC # BLD AUTO: 11.7 X10*3/UL (ref 4.4–11.3)

## 2025-01-24 PROCEDURE — 2500000001 HC RX 250 WO HCPCS SELF ADMINISTERED DRUGS (ALT 637 FOR MEDICARE OP)

## 2025-01-24 PROCEDURE — 99238 HOSP IP/OBS DSCHRG MGMT 30/<: CPT | Performed by: INTERNAL MEDICINE

## 2025-01-24 PROCEDURE — 2500000004 HC RX 250 GENERAL PHARMACY W/ HCPCS (ALT 636 FOR OP/ED)

## 2025-01-24 PROCEDURE — 83735 ASSAY OF MAGNESIUM: CPT

## 2025-01-24 PROCEDURE — 85025 COMPLETE CBC W/AUTO DIFF WBC: CPT

## 2025-01-24 PROCEDURE — 36415 COLL VENOUS BLD VENIPUNCTURE: CPT

## 2025-01-24 PROCEDURE — 93005 ELECTROCARDIOGRAM TRACING: CPT

## 2025-01-24 PROCEDURE — 80069 RENAL FUNCTION PANEL: CPT

## 2025-01-24 RX ORDER — NAPROXEN SODIUM 220 MG/1
81 TABLET, FILM COATED ORAL DAILY
Qty: 90 TABLET | Refills: 3 | Status: SHIPPED | OUTPATIENT
Start: 2025-01-25 | End: 2026-01-25

## 2025-01-24 RX ORDER — ENOXAPARIN SODIUM 150 MG/ML
120 INJECTION SUBCUTANEOUS 2 TIMES DAILY
Qty: 180 EACH | Refills: 3 | Status: SHIPPED | OUTPATIENT
Start: 2025-01-24 | End: 2026-01-24

## 2025-01-24 RX ORDER — CYCLOBENZAPRINE HCL 5 MG
5 TABLET ORAL 3 TIMES DAILY PRN
Qty: 200 TABLET | Refills: 3 | Status: SHIPPED | OUTPATIENT
Start: 2025-01-24 | End: 2026-01-24

## 2025-01-24 RX ADMIN — OXYCODONE HYDROCHLORIDE 10 MG: 10 TABLET ORAL at 13:12

## 2025-01-24 RX ADMIN — ACETAMINOPHEN 975 MG: 325 TABLET, FILM COATED ORAL at 09:50

## 2025-01-24 RX ADMIN — OXYCODONE HYDROCHLORIDE 5 MG: 5 TABLET ORAL at 09:49

## 2025-01-24 RX ADMIN — TORSEMIDE 20 MG: 20 TABLET ORAL at 09:50

## 2025-01-24 RX ADMIN — ASPIRIN 81 MG CHEWABLE TABLET 81 MG: 81 TABLET CHEWABLE at 09:49

## 2025-01-24 RX ADMIN — Medication 1 TABLET: at 09:49

## 2025-01-24 RX ADMIN — ENOXAPARIN SODIUM 120 MG: 150 INJECTION SUBCUTANEOUS at 09:50

## 2025-01-24 RX ADMIN — FAMOTIDINE 20 MG: 20 TABLET ORAL at 09:49

## 2025-01-24 ASSESSMENT — COGNITIVE AND FUNCTIONAL STATUS - GENERAL
TURNING FROM BACK TO SIDE WHILE IN FLAT BAD: A LOT
DRESSING REGULAR LOWER BODY CLOTHING: A LITTLE
MOBILITY SCORE: 8
HELP NEEDED FOR BATHING: A LITTLE
WALKING IN HOSPITAL ROOM: TOTAL
TOILETING: A LOT
MOVING TO AND FROM BED TO CHAIR: TOTAL
DAILY ACTIVITIY SCORE: 17
MOVING FROM LYING ON BACK TO SITTING ON SIDE OF FLAT BED WITH BEDRAILS: A LOT
PERSONAL GROOMING: A LITTLE
EATING MEALS: A LITTLE
CLIMB 3 TO 5 STEPS WITH RAILING: TOTAL
DRESSING REGULAR UPPER BODY CLOTHING: A LITTLE
STANDING UP FROM CHAIR USING ARMS: TOTAL

## 2025-01-24 ASSESSMENT — PAIN SCALES - GENERAL
PAINLEVEL_OUTOF10: 5 - MODERATE PAIN
PAINLEVEL_OUTOF10: 7
PAINLEVEL_OUTOF10: 6

## 2025-01-24 ASSESSMENT — PAIN - FUNCTIONAL ASSESSMENT
PAIN_FUNCTIONAL_ASSESSMENT: 0-10

## 2025-01-24 NOTE — CARE PLAN
Problem: Skin  Goal: Decreased wound size/increased tissue granulation at next dressing change  Outcome: Progressing  Flowsheets (Taken 1/23/2025 0608 by Dori Kirkland, RN)  Decreased wound size/increased tissue granulation at next dressing change:   Promote sleep for wound healing   Protective dressings over bony prominences  Goal: Participates in plan/prevention/treatment measures  Outcome: Progressing  Flowsheets (Taken 1/23/2025 0608 by Dori Kirkland, RN)  Participates in plan/prevention/treatment measures: Elevate heels  Goal: Prevent/manage excess moisture  Outcome: Progressing  Flowsheets (Taken 1/23/2025 0608 by Dori Kirkland, RN)  Prevent/manage excess moisture:   Monitor for/manage infection if present   Cleanse incontinence/protect with barrier cream  Goal: Prevent/minimize sheer/friction injuries  Outcome: Progressing  Flowsheets (Taken 1/23/2025 0608 by Dori Kirkland, RN)  Prevent/minimize sheer/friction injuries:   Use pull sheet   Turn/reposition every 2 hours/use positioning/transfer devices   HOB 30 degrees or less  Goal: Promote/optimize nutrition  Outcome: Progressing  Flowsheets (Taken 1/23/2025 0608 by Dori Kirkland, ELLEN)  Promote/optimize nutrition: Offer water/supplements/favorite foods  Goal: Promote skin healing  Outcome: Progressing  Flowsheets (Taken 1/23/2025 0608 by Dori Kirkland, RN)  Promote skin healing:   Turn/reposition every 2 hours/use positioning/transfer devices   Protective dressings over bony prominences     Problem: Fall/Injury  Goal: Not fall by end of shift  Outcome: Progressing  Goal: Be free from injury by end of the shift  Outcome: Progressing  Goal: Verbalize understanding of personal risk factors for fall in the hospital  Outcome: Progressing  Goal: Verbalize understanding of risk factor reduction measures to prevent injury from fall in the home  Outcome: Progressing  Goal: Use assistive devices by end of the shift  Outcome:  Progressing  Goal: Pace activities to prevent fatigue by end of the shift  Outcome: Progressing     Problem: Pain - Adult  Goal: Verbalizes/displays adequate comfort level or baseline comfort level  Outcome: Progressing     Problem: Safety - Adult  Goal: Free from fall injury  Outcome: Progressing     Problem: Discharge Planning  Goal: Discharge to home or other facility with appropriate resources  Outcome: Progressing     Problem: Chronic Conditions and Co-morbidities  Goal: Patient's chronic conditions and co-morbidity symptoms are monitored and maintained or improved  Outcome: Progressing       The clinical goals for the shift include Pt will remain safe and injury free throughout the shift on 1/24/25 until 1900    Patient has remained injury free and has received pain medications. He has been refusing turns but has been turned in order to check his bottom. He has been moving both of his extremities and has been alert and oriented. He has received pain medication for his back and has been drinking ensures to help with nutrition.

## 2025-01-24 NOTE — TREATMENT PLAN
----- Message from Jordan Mcguire MD sent at 7/18/2022 10:54 AM CDT -----  Regarding: Reschedule colonoscopy  Hi,    Can we please reschedule Ms. Booth for colonoscopy on a Wednesday (after her hemodialysis on Tuesday). She was scheduled for colonoscopy today but ate solid food yesterday and found to have elevated potassium of 6.4. Will need to reschedule. Thank you     Radiation oncology update: Patient will be seen in follow up in 1-2 weeks, with a myelogram and radiation planning scan scheduled after evaluation for spine SBRT to L3-L5 delivered over 3-5 fractions.     Beverly Moncada MD  , Radiation Oncology

## 2025-01-24 NOTE — PROGRESS NOTES
01/24/25 0900   Discharge Planning   Home or Post Acute Services Post acute facilities (Rehab/SNF/etc)   Type of Post Acute Facility Services Skilled nursing   Expected Discharge Disposition SNF   Does the patient need discharge transport arranged? Yes   RoundTrip coordination needed? Yes   Has discharge transport been arranged? Yes   What day is the transport expected? 01/24/25   What time is the transport expected? 1430     Care Transitions - Discharge Transfer to Facility Note  1/24/2025   Patient will discharge today to: SNF  Facility name: [facility]  Bedside nurse: Marie Thomas RN  Report # given to RN: [phone]   Transport company name: [employer], [phone]   time: 2:30 pm   Per facility and  [employer] Pre-cert Team, pt's auth was obtained after P2P and additional PT/OT evals. Pt is cleared to discharge to SNF. Pt is returning to facility, 7000 already completed in [facility].   Received call from sister Micaela, answered questions about discharge and Snf coverage by insurance.   Alma Goodman, MSW, LSW

## 2025-01-24 NOTE — NURSING NOTE
Discharge Note    Patient was educated on his transfer back to his SNF. His vital signs were stable and his condition was stable. His IV was taken out and he was oriented to the situation. He had all of his belongings packed up and paperwork was gone over with the transfer personnel as well.

## 2025-01-24 NOTE — CARE PLAN
Problem: Skin  Goal: Decreased wound size/increased tissue granulation at next dressing change  1/24/2025 1457 by Marie Thomas RN  Flowsheets (Taken 1/23/2025 0608 by Dori Kirkland RN)  Decreased wound size/increased tissue granulation at next dressing change:   Promote sleep for wound healing   Protective dressings over bony prominences  1/24/2025 1425 by Marie Thomas RN  Outcome: Progressing  Flowsheets (Taken 1/23/2025 0608 by Dori Kirkland RN)  Decreased wound size/increased tissue granulation at next dressing change:   Promote sleep for wound healing   Protective dressings over bony prominences  Goal: Participates in plan/prevention/treatment measures  1/24/2025 1457 by Marie Thomas RN  Flowsheets (Taken 1/23/2025 0608 by Dori Kirkland RN)  Participates in plan/prevention/treatment measures: Elevate heels  1/24/2025 1425 by Marie Thomas RN  Outcome: Progressing  Flowsheets (Taken 1/23/2025 0608 by Dori Kirkland RN)  Participates in plan/prevention/treatment measures: Elevate heels  Goal: Prevent/manage excess moisture  1/24/2025 1457 by Marie Thomas RN  Flowsheets (Taken 1/23/2025 0608 by Dori Kirkland RN)  Prevent/manage excess moisture:   Monitor for/manage infection if present   Cleanse incontinence/protect with barrier cream  1/24/2025 1425 by Marie Thomas RN  Outcome: Progressing  Flowsheets (Taken 1/23/2025 0608 by Dori Kirkland RN)  Prevent/manage excess moisture:   Monitor for/manage infection if present   Cleanse incontinence/protect with barrier cream  Goal: Prevent/minimize sheer/friction injuries  1/24/2025 1457 by Marie Thomas RN  Flowsheets (Taken 1/23/2025 0608 by Dori Kirkland RN)  Prevent/minimize sheer/friction injuries:   Use pull sheet   Turn/reposition every 2 hours/use positioning/transfer devices   HOB 30 degrees or less  1/24/2025 1425 by Marie Thomas  RN  Outcome: Progressing  Flowsheets (Taken 1/23/2025 0608 by Dori Kirkland RN)  Prevent/minimize sheer/friction injuries:   Use pull sheet   Turn/reposition every 2 hours/use positioning/transfer devices   HOB 30 degrees or less  Goal: Promote/optimize nutrition  1/24/2025 1457 by Marie Thomas RN  Flowsheets (Taken 1/24/2025 1457)  Promote/optimize nutrition:   Monitor/record intake including meals   Offer water/supplements/favorite foods   Consume > 50% meals/supplements  1/24/2025 1425 by Marie Thomas RN  Outcome: Progressing  Flowsheets (Taken 1/23/2025 0608 by Dori Kirkland, ELLEN)  Promote/optimize nutrition: Offer water/supplements/favorite foods  Goal: Promote skin healing  1/24/2025 1457 by Marie Thomas RN  Flowsheets (Taken 1/24/2025 1457)  Promote skin healing:   Turn/reposition every 2 hours/use positioning/transfer devices   Assess skin/pad under line(s)/device(s)  1/24/2025 1425 by Marie Thomas RN  Outcome: Progressing  Flowsheets (Taken 1/23/2025 0608 by Dori Kirkland, RN)  Promote skin healing:   Turn/reposition every 2 hours/use positioning/transfer devices   Protective dressings over bony prominences     Problem: Fall/Injury  Goal: Not fall by end of shift  Outcome: Progressing  Goal: Be free from injury by end of the shift  Outcome: Progressing  Goal: Verbalize understanding of personal risk factors for fall in the hospital  Outcome: Progressing  Goal: Verbalize understanding of risk factor reduction measures to prevent injury from fall in the home  Outcome: Progressing  Goal: Use assistive devices by end of the shift  Outcome: Progressing  Goal: Pace activities to prevent fatigue by end of the shift  Outcome: Progressing     Problem: Pain - Adult  Goal: Verbalizes/displays adequate comfort level or baseline comfort level  Outcome: Progressing     Problem: Safety - Adult  Goal: Free from fall injury  Outcome: Progressing     Problem:  Discharge Planning  Goal: Discharge to home or other facility with appropriate resources  Outcome: Progressing     Problem: Chronic Conditions and Co-morbidities  Goal: Patient's chronic conditions and co-morbidity symptoms are monitored and maintained or improved  Outcome: Progressing

## 2025-01-24 NOTE — CARE PLAN
Problem: Skin  Goal: Decreased wound size/increased tissue granulation at next dressing change  Outcome: Progressing  Flowsheets (Taken 1/23/2025 0608)  Decreased wound size/increased tissue granulation at next dressing change:   Promote sleep for wound healing   Protective dressings over bony prominences  Goal: Participates in plan/prevention/treatment measures  Outcome: Progressing  Flowsheets (Taken 1/23/2025 0608)  Participates in plan/prevention/treatment measures: Elevate heels  Goal: Prevent/manage excess moisture  Outcome: Progressing  Flowsheets (Taken 1/23/2025 0608)  Prevent/manage excess moisture:   Monitor for/manage infection if present   Cleanse incontinence/protect with barrier cream  Goal: Prevent/minimize sheer/friction injuries  Outcome: Progressing  Flowsheets (Taken 1/23/2025 0608)  Prevent/minimize sheer/friction injuries:   Use pull sheet   Turn/reposition every 2 hours/use positioning/transfer devices   HOB 30 degrees or less  Goal: Promote/optimize nutrition  Outcome: Progressing  Flowsheets (Taken 1/23/2025 0608)  Promote/optimize nutrition: Offer water/supplements/favorite foods  Goal: Promote skin healing  Outcome: Progressing  Flowsheets (Taken 1/23/2025 0608)  Promote skin healing:   Turn/reposition every 2 hours/use positioning/transfer devices   Protective dressings over bony prominences     Problem: Fall/Injury  Goal: Not fall by end of shift  Outcome: Progressing  Goal: Be free from injury by end of the shift  Outcome: Progressing  Goal: Verbalize understanding of personal risk factors for fall in the hospital  Outcome: Progressing  Goal: Verbalize understanding of risk factor reduction measures to prevent injury from fall in the home  Outcome: Progressing  Goal: Use assistive devices by end of the shift  Outcome: Progressing  Goal: Pace activities to prevent fatigue by end of the shift  Outcome: Progressing     Problem: Pain - Adult  Goal: Verbalizes/displays adequate comfort  level or baseline comfort level  Outcome: Progressing     Problem: Safety - Adult  Goal: Free from fall injury  Outcome: Progressing     Problem: Discharge Planning  Goal: Discharge to home or other facility with appropriate resources  Outcome: Progressing     Problem: Chronic Conditions and Co-morbidities  Goal: Patient's chronic conditions and co-morbidity symptoms are monitored and maintained or improved  Outcome: Progressing   The patient's goals for the shift include      The clinical goals for the shift include Pt will remain safe and free from injury throughout shift on 1/23/25 at 0700    Over the shift, the patient remained safe and free from injury. Wound care on legs done and pt turned for comfort throughout shift. Pt was pleasant during shift and was able to verbalize needs. Plan is to continue with PT/OT and find SNF placement.

## 2025-01-25 NOTE — DISCHARGE SUMMARY
Discharge Diagnosis  Lesion of lumbar spine    Issues Requiring Follow-Up  Continue to follow-up with neurology  Continue follow-up with medical oncology  Continue follow-up with orthopedics and spine surgery  Radiation oncology follow-up  Monitoring for worsening bleeding and symptoms    Test Results Pending At Discharge  Pending Labs       Order Current Status    Methylmalonic acid, serum In process    Surgical Pathology Exam In process            Hospital Course  Patient is a 75 yo M with PMHx of HTN, GERD, SAMIA, Chronic lymphedema, venous insufficiency, OA, CKD-3(b/l 1.2-1.5), also w/ the following oncologic Hx - no formal Dx at this time. Presenting symptom was b/l LE weakness. Was admitted to [facility] 11/30-12/5 for this after IMG revealed a concerning L4-5 lesion w/ mod-severe cord compression in s/o R menal mass and suspicious lesions in adrenals lungs, and spine. This was c/f undiagnosed metastatic R RCC. Case was discussed w/ both IR and ortho spine. It was deemed that no inpt Bx nor surgery was indicated and pt was DC to SNF w/ plan for IR-guided L4 Bx (scheduled for 12/20; pt missed d/t transport issues w/ SNF). MRI brain and C/T spine w/ contrast was attempted but pt did not tolerate d/t claustrophobia - there was a plan to complete it OP w/ anesthesia.     On 1/4, pt was transferred from [facility] for expedited work up of suspected R. RCC with L4/5 mets after he presented there with bloody urine and on-going backache . Also has Proteus Mirabilis UTI c/b bacteremia I/s/o bilateral non obstructing renal stones. ID consulted for antibiotic management. IR consulted for L4 biopsy and Ortho spine wants brain MRI and full spine MRI. At [facility] where he was admitted from 1/2-1/4, NSGY saw him and didn't think urgent interventions were needed, urology also did not offer any intervention as stones were non obstructive.  ID switched Cefepime to Augmentin-EOT-1/23. He was scheduled for IR L4 biopsy and to get MRI head and  full spine as of 1/7.    On, 1/7-biopsy of L4 lesion was done without complications, he continued to wait for his MRI head and Full spine to be completed.  1/9-Head and full MRI done on 1/8, head MRI was c/f  small acute stroke in the right parietal lobe. Neuro-stroke consulted, recommend CTA H&N, and TTE. He was continued on his aspirin and started on atorvastatin with LDL target of below 70. Neurology also recommended therapeutic Lovenox 1 mg/kg q12h when safe from a bleeding a procedural standpoint. Neurology follow up outpatient required. Ortho spine and Rad-onc were involved. Ortho spine assesses him and felt he would benefit from surgery but wanted repeat Bcs to make sure he is no more bacteremic.  1/10-Repeat Bcs pending, Rad-onc could offer radiation treatment but pending pathology diagnosis.  1/11-blood cultures were negative for bacteria. Per ortho-high risk of bleed if lesion is RCC, would recommend IR embolization of vessels supplying the lesion around the L4 area.  Rad Onc in agreeance of surgery first then radiation afterwards would be best.      On 1/15: Had extensive conversation about surgical lumbar debunking and pt and family decided to go with surgery following radiation therapy. 4-6 weeks after surgery . Eventually underwent spinal artery embolization with Neurosurgery and lumbar tumor debulking, L3-L4 and L4-L5 laminectomies with L2-S1 PSIF with Dr. Atkins on 1/17. The surgery was c/b acute blood loss so was given 2 units pRBC. Repeat Hgb post surgery was 6.9 and and was given 1u pRBC. Due to c/f for bleeding, AC was held. Restarted him back on lovenox after Hgb stabilized. Will follow up with Dr. Wynn and Radiation onc post discharge.         Pertinent Physical Exam At Time of Discharge  Physical Exam    See recent progress note    Home Medications     Medication List      START taking these medications     atorvastatin 40 mg tablet; Commonly known as: Lipitor; Take 1 tablet (40   mg) by  mouth once daily at bedtime.   cyclobenzaprine 5 mg tablet; Commonly known as: Flexeril; Take 1 tablet   (5 mg) by mouth 3 times a day as needed for muscle spasms.   enoxaparin 120 mg/0.8 mL syringe; Commonly known as: Lovenox; Inject 0.8   mL (120 mg) under the skin 2 times a day.   eucerin cream; Apply topically if needed for dry skin.   multivitamin with minerals tablet; Take 1 tablet by mouth once daily.   oxyCODONE 5 mg immediate release tablet; Commonly known as: Roxicodone;   Take 1 tablet (5 mg) by mouth every 4 hours if needed for moderate pain (4   - 6) for up to 10 days.   polyethylene glycol 17 gram packet; Commonly known as: Glycolax,   Miralax; Take 17 g by mouth 2 times a day as needed (constipation).     CHANGE how you take these medications     * aspirin 81 mg EC tablet; What changed: Another medication with the   same name was added. Make sure you understand how and when to take each.   * aspirin 81 mg chewable tablet; Chew 1 tablet (81 mg) once daily.;   Start taking on: January 25, 2025; What changed: You were already taking a   medication with the same name, and this prescription was added. Make sure   you understand how and when to take each.  * This list has 2 medication(s) that are the same as other medications   prescribed for you. Read the directions carefully, and ask your doctor or   other care provider to review them with you.     CONTINUE taking these medications     * acetaminophen 650 mg ER tablet; Commonly known as: Tylenol 8 HOUR   * acetaminophen 325 mg tablet; Commonly known as: Tylenol   famotidine 20 mg tablet; Commonly known as: Pepcid   ferrous sulfate (325 mg ferrous sulfate) tablet; Commonly known as:   FeroSuL; Take 1 tablet (325 mg) by mouth once every 24 hours.   Gentle Laxative (bisacodyl) 10 mg suppository; Generic drug: bisacodyl   magnesium hydroxide 400 mg/5 mL suspension; Commonly known as: Milk of   Magnesia   mineral oil enema   potassium chloride CR 10 mEq ER  tablet; Commonly known as: Klor-Con;   TAKE 1 TABLET BY MOUTH ONCE  DAILY DO NOT CRUSH, CHEW, OR  SPLIT   torsemide 20 mg tablet; Commonly known as: Demadex; TAKE 1 TABLET BY   MOUTH ONCE  DAILY   vitamin D3-folic acid 2,500 unit- 1 mg tablet; Take 1 mg by mouth once   every 24 hours.  * This list has 2 medication(s) that are the same as other medications   prescribed for you. Read the directions carefully, and ask your doctor or   other care provider to review them with you.     STOP taking these medications     cefepime IV; Commonly known as: Maxipime   losartan 50 mg tablet; Commonly known as: Cozaar   magnesium oxide 400 mg magnesium capsule   traMADol 50 mg tablet; Commonly known as: Ultram       Outpatient Follow-Up  Future Appointments   Date Time Provider Department [place]   1/28/2025 10:00 AM Mick Wynn MD [id number] [facility]   2/6/2025 11:00 AM Yana Atkins MD [id number] [facility]   2/11/2025 10:30 AM Karsten Brizuela MD [facility] JENNIFER Wynn MD

## 2025-01-26 LAB
ATRIAL RATE: 98 BPM
METHYLMALONATE SERPL-SCNC: 0.19 UMOL/L (ref 0–0.4)
P AXIS: 19 DEGREES
P OFFSET: 206 MS
P ONSET: 137 MS
PR INTERVAL: 162 MS
Q ONSET: 218 MS
QRS COUNT: 16 BEATS
QRS DURATION: 104 MS
QT INTERVAL: 360 MS
QTC CALCULATION(BAZETT): 459 MS
QTC FREDERICIA: 424 MS
R AXIS: 23 DEGREES
T AXIS: 46 DEGREES
T OFFSET: 398 MS
VENTRICULAR RATE: 98 BPM

## 2025-01-27 DIAGNOSIS — C79.31 MALIGNANT NEOPLASM METASTATIC TO BRAIN (MULTI): ICD-10-CM

## 2025-01-28 ENCOUNTER — APPOINTMENT (OUTPATIENT)
Dept: HEMATOLOGY/ONCOLOGY | Facility: CLINIC | Age: 75
DRG: 456 | End: 2025-01-28
Payer: MEDICARE

## 2025-01-28 ENCOUNTER — APPOINTMENT (OUTPATIENT)
Dept: HEMATOLOGY/ONCOLOGY | Facility: HOSPITAL | Age: 75
End: 2025-01-28
Payer: MEDICARE

## 2025-01-30 ENCOUNTER — TELEPHONE (OUTPATIENT)
Dept: UROLOGY | Facility: CLINIC | Age: 75
End: 2025-01-30

## 2025-01-30 LAB
LABORATORY COMMENT REPORT: NORMAL
PATH REPORT.FINAL DX SPEC: NORMAL
PATH REPORT.GROSS SPEC: NORMAL
PATH REPORT.RELEVANT HX SPEC: NORMAL
PATH REPORT.TOTAL CANCER: NORMAL

## 2025-01-31 NOTE — PROGRESS NOTES
"Abdi Retana \"Louise" is a 74 y.o. male on day 10 of admission presenting with JAMAL (acute kidney injury) (CMS-[facility]).    Subjective   Patient examined at bedside.  Pain is controlled.  Denies numbness or tingling down the lower extremities.         Objective     Physical Exam    General: Lying comfortably in bed, no acute distress  HEENT: EOMI, NC/AT  CV: RRR by peripheral pulses  Resp: Normal WOB  MSK: See below. Gross motor in tact.  Neurologic: AOx3  Skin: No rash  Psych: Mood appropriate        Last: Negative     L1: SILT       L2: SILT      Hip flexors 4/5 Left; 4/5 Right  L3: SILT      Knee extension 5/5 Left; 5/5 Right  L4: SILT      Tib Ant. (Dorsiflexion) 5/5 Left; 5/5 Right  L5: SILT      EHL 4/5 Left; 5/5 Right  S1: SILT      Plantar flexion 5/5 Left; 5/5 Right    Last Recorded Vitals  Blood pressure 128/67, pulse 69, temperature 35.5 °C (95.9 °F), temperature source Temporal, resp. rate 18, height 1.88 m (6' 2\"), weight 120 kg (264 lb), SpO2 97%.  Intake/Output last 3 Shifts:  I/O last 3 completed shifts:  In: - (0 mL/kg)   Out: 1175 (9.8 mL/kg) [Urine:1175 (0.3 mL/kg/hr)]  Weight: 119.7 kg     Relevant Results      Scheduled medications  acetaminophen, 975 mg, oral, TID  amoxicillin-pot clavulanate, 1 tablet, oral, q12h CHAPITO  aspirin, 81 mg, oral, Daily  atorvastatin, 40 mg, oral, Nightly  famotidine, 20 mg, oral, BID  heparin (porcine), 5,000 Units, subcutaneous, q8h  multivitamin with minerals, 1 tablet, oral, Daily  polyethylene glycol, 17 g, oral, Daily  sennosides, 1 tablet, oral, Nightly      Continuous medications     PRN medications  PRN medications: HYDROmorphone, oxyCODONE, oxyCODONE  Results for orders placed or performed during the hospital encounter of 01/04/25 (from the past 24 hours)   CBC and Auto Differential   Result Value Ref Range    WBC 8.7 4.4 - 11.3 x10*3/uL    nRBC 0.0 0.0 - 0.0 /100 WBCs    RBC 3.34 (L) 4.50 - 5.90 x10*6/uL    Hemoglobin 8.7 (L) 13.5 - 17.5 g/dL    Hematocrit 30.1 " (L) 41.0 - 52.0 %    MCV 90 80 - 100 fL    MCH 26.0 26.0 - 34.0 pg    MCHC 28.9 (L) 32.0 - 36.0 g/dL    RDW 16.6 (H) 11.5 - 14.5 %    Platelets 310 150 - 450 x10*3/uL    Neutrophils % 69.8 40.0 - 80.0 %    Immature Granulocytes %, Automated 0.5 0.0 - 0.9 %    Lymphocytes % 17.1 13.0 - 44.0 %    Monocytes % 8.4 2.0 - 10.0 %    Eosinophils % 3.2 0.0 - 6.0 %    Basophils % 1.0 0.0 - 2.0 %    Neutrophils Absolute 6.10 (H) 1.60 - 5.50 x10*3/uL    Immature Granulocytes Absolute, Automated 0.04 0.00 - 0.50 x10*3/uL    Lymphocytes Absolute 1.49 0.80 - 3.00 x10*3/uL    Monocytes Absolute 0.73 0.05 - 0.80 x10*3/uL    Eosinophils Absolute 0.28 0.00 - 0.40 x10*3/uL    Basophils Absolute 0.09 0.00 - 0.10 x10*3/uL   Magnesium   Result Value Ref Range    Magnesium 2.19 1.60 - 2.40 mg/dL   Renal Function Panel   Result Value Ref Range    Glucose 86 74 - 99 mg/dL    Sodium 134 (L) 136 - 145 mmol/L    Potassium 4.5 3.5 - 5.3 mmol/L    Chloride 100 98 - 107 mmol/L    Bicarbonate 28 21 - 32 mmol/L    Anion Gap 11 10 - 20 mmol/L    Urea Nitrogen 20 6 - 23 mg/dL    Creatinine 1.15 0.50 - 1.30 mg/dL    eGFR 67 >60 mL/min/1.73m*2    Calcium 9.3 8.6 - 10.6 mg/dL    Phosphorus 3.9 2.5 - 4.9 mg/dL    Albumin 2.9 (L) 3.4 - 5.0 g/dL                   Assessment/Plan   Assessment & Plan  JAMAL (acute kidney injury) (CMS-HCC)    Patient is a 74M who was admitted for BLE weakness and an L4 lytic lesion.  L4-L5 biopsy was performed and results were positive for RCC.  Attending, Dr Atkins discussed with the patient and sister about decompression of the lumbar spine with tumor debulking.  Patient is currently in discussion with his sister and oncology staff regarding his prognosis along with treatment options    If family decides to move forward with the surgery, the plan will be this coming Thursday, 1/16/25 for IR embolization of the area of the lumbar spine to help decrease the risk of bleeding as RCC is known to be vascular and place at a higher  risk of bleeding.  The orthopedic spine team is also planning surgery for 1/17 if the decision is made to move forward with the surgery.         Plan:  Oncology primary  No head of bed restrictions plan significant  Regular diet  Resume home medications  Pain control with multimodality  DVT ppx per primary  Cho PP  Possible plan for IR embolization on 1/16 if patient decides to move forward with the surgery with surgery on 1/17    Discussed with Dr. Atkins    Will update primary team as plan progresses for or against surgery    Lew Abad DO  Orthopedic Surgery, PGY4    Patient will be followed by the Orthopaedic Spine Team:  1st call:  Mariano Hendrix MD  2nd call:  Lew Abad DO  3rd call:  Anthony Mary, Fellow  Available via Teachable               Lew Abad DO    I saw and evaluated the patient.  I personally obtained the key and critical portions of the history and physical exam or was physically present for key and critical portions performed by the Resident. I reviewed the documentation and discussed the patient with the Resident.  I agree with the Resident’s medical decision making as documented in the note.    Surgery would consist of L4 laminectomy decompression to decompress the neural elements as well as instrumented fusion from L2-S1 to stabilize the lytic lesion at L3.       No

## 2025-02-03 ENCOUNTER — APPOINTMENT (OUTPATIENT)
Dept: RADIOLOGY | Facility: HOSPITAL | Age: 75
End: 2025-02-03
Payer: MEDICARE

## 2025-02-05 ENCOUNTER — TELEPHONE (OUTPATIENT)
Dept: RADIATION ONCOLOGY | Facility: HOSPITAL | Age: 75
End: 2025-02-05
Payer: MEDICARE

## 2025-02-05 NOTE — TELEPHONE ENCOUNTER
Called pt to remind of appointment on 2/6/2025  at 12:30 Pt's phone went to voicemail left number if needs to reschedule.      Alyssa Henao MA

## 2025-02-06 ENCOUNTER — APPOINTMENT (OUTPATIENT)
Dept: RADIATION ONCOLOGY | Facility: HOSPITAL | Age: 75
End: 2025-02-06
Payer: MEDICARE

## 2025-02-06 ENCOUNTER — OFFICE VISIT (OUTPATIENT)
Dept: ORTHOPEDIC SURGERY | Facility: HOSPITAL | Age: 75
End: 2025-02-06
Payer: MEDICARE

## 2025-02-06 ENCOUNTER — HOSPITAL ENCOUNTER (INPATIENT)
Facility: HOSPITAL | Age: 75
End: 2025-02-06
Attending: EMERGENCY MEDICINE
Payer: MEDICARE

## 2025-02-06 ENCOUNTER — HOSPITAL ENCOUNTER (OUTPATIENT)
Facility: HOSPITAL | Age: 75
Setting detail: SURGERY ADMIT
End: 2025-02-06
Attending: ORTHOPAEDIC SURGERY | Admitting: ORTHOPAEDIC SURGERY
Payer: MEDICARE

## 2025-02-06 ENCOUNTER — APPOINTMENT (OUTPATIENT)
Dept: RADIOLOGY | Facility: HOSPITAL | Age: 75
DRG: 857 | End: 2025-02-06
Payer: MEDICARE

## 2025-02-06 ENCOUNTER — HOSPITAL ENCOUNTER (OUTPATIENT)
Dept: RADIOLOGY | Facility: HOSPITAL | Age: 75
Discharge: HOME | End: 2025-02-06
Payer: MEDICARE

## 2025-02-06 DIAGNOSIS — M89.9 LESION OF LUMBAR SPINE: ICD-10-CM

## 2025-02-06 DIAGNOSIS — Z51.5 HOSPICE CARE: ICD-10-CM

## 2025-02-06 DIAGNOSIS — N17.9 AKI (ACUTE KIDNEY INJURY) (CMS-HCC): ICD-10-CM

## 2025-02-06 DIAGNOSIS — T81.31XA POSTOPERATIVE WOUND DEHISCENCE, INITIAL ENCOUNTER: ICD-10-CM

## 2025-02-06 DIAGNOSIS — C79.51 METASTATIC CANCER TO SPINE (MULTI): Primary | ICD-10-CM

## 2025-02-06 DIAGNOSIS — Z98.1 S/P LUMBAR SPINAL FUSION: ICD-10-CM

## 2025-02-06 DIAGNOSIS — T81.30XA WOUND DEHISCENCE: Primary | ICD-10-CM

## 2025-02-06 DIAGNOSIS — D64.9 ANEMIA, UNSPECIFIED TYPE: ICD-10-CM

## 2025-02-06 DIAGNOSIS — N28.89 RENAL MASS: ICD-10-CM

## 2025-02-06 LAB
ABO GROUP (TYPE) IN BLOOD: NORMAL
ALBUMIN SERPL BCP-MCNC: 2.9 G/DL (ref 3.4–5)
ALP SERPL-CCNC: 89 U/L (ref 33–136)
ALT SERPL W P-5'-P-CCNC: 10 U/L (ref 10–52)
ANION GAP SERPL CALC-SCNC: 17 MMOL/L (ref 10–20)
ANTIBODY SCREEN: NORMAL
APPEARANCE UR: ABNORMAL
APTT PPP: 34 SECONDS (ref 27–38)
AST SERPL W P-5'-P-CCNC: 24 U/L (ref 9–39)
BASOPHILS # BLD AUTO: 0.14 X10*3/UL (ref 0–0.1)
BASOPHILS NFR BLD AUTO: 1 %
BILIRUB SERPL-MCNC: 0.5 MG/DL (ref 0–1.2)
BILIRUB UR STRIP.AUTO-MCNC: NEGATIVE MG/DL
BUN SERPL-MCNC: 31 MG/DL (ref 6–23)
CALCIUM SERPL-MCNC: 9.1 MG/DL (ref 8.6–10.6)
CHLORIDE SERPL-SCNC: 99 MMOL/L (ref 98–107)
CHLORIDE UR-SCNC: 34 MMOL/L
CHLORIDE/CREATININE (MMOL/G) IN URINE: 33 MMOL/G CREAT (ref 23–275)
CO2 SERPL-SCNC: 28 MMOL/L (ref 21–32)
COLOR UR: YELLOW
CREAT SERPL-MCNC: 1.39 MG/DL (ref 0.5–1.3)
CREAT UR-MCNC: 102.1 MG/DL (ref 20–370)
EGFRCR SERPLBLD CKD-EPI 2021: 53 ML/MIN/1.73M*2
EOSINOPHIL # BLD AUTO: 0.44 X10*3/UL (ref 0–0.4)
EOSINOPHIL NFR BLD AUTO: 3.1 %
ERYTHROCYTE [DISTWIDTH] IN BLOOD BY AUTOMATED COUNT: 17.4 % (ref 11.5–14.5)
GLUCOSE SERPL-MCNC: 118 MG/DL (ref 74–99)
GLUCOSE UR STRIP.AUTO-MCNC: NORMAL MG/DL
HCT VFR BLD AUTO: 24.4 % (ref 41–52)
HGB BLD-MCNC: 7.4 G/DL (ref 13.5–17.5)
HOLD SPECIMEN: NORMAL
HYALINE CASTS #/AREA URNS AUTO: ABNORMAL /LPF
IMM GRANULOCYTES # BLD AUTO: 0.14 X10*3/UL (ref 0–0.5)
IMM GRANULOCYTES NFR BLD AUTO: 1 % (ref 0–0.9)
INR PPP: 1.5 (ref 0.9–1.1)
KETONES UR STRIP.AUTO-MCNC: NEGATIVE MG/DL
LEUKOCYTE ESTERASE UR QL STRIP.AUTO: ABNORMAL
LYMPHOCYTES # BLD AUTO: 1.52 X10*3/UL (ref 0.8–3)
LYMPHOCYTES NFR BLD AUTO: 10.9 %
MCH RBC QN AUTO: 26.6 PG (ref 26–34)
MCHC RBC AUTO-ENTMCNC: 30.3 G/DL (ref 32–36)
MCV RBC AUTO: 88 FL (ref 80–100)
MONOCYTES # BLD AUTO: 0.81 X10*3/UL (ref 0.05–0.8)
MONOCYTES NFR BLD AUTO: 5.8 %
MUCOUS THREADS #/AREA URNS AUTO: ABNORMAL /LPF
NEUTROPHILS # BLD AUTO: 10.94 X10*3/UL (ref 1.6–5.5)
NEUTROPHILS NFR BLD AUTO: 78.2 %
NITRITE UR QL STRIP.AUTO: NEGATIVE
NRBC BLD-RTO: 0 /100 WBCS (ref 0–0)
PH UR STRIP.AUTO: 5.5 [PH]
PLATELET # BLD AUTO: 620 X10*3/UL (ref 150–450)
POTASSIUM SERPL-SCNC: 4.8 MMOL/L (ref 3.5–5.3)
POTASSIUM UR-SCNC: 64 MMOL/L
POTASSIUM/CREAT UR-RTO: 63 MMOL/G CREAT
PROT SERPL-MCNC: 6.9 G/DL (ref 6.4–8.2)
PROT UR STRIP.AUTO-MCNC: ABNORMAL MG/DL
PROTHROMBIN TIME: 17 SECONDS (ref 9.8–12.8)
RBC # BLD AUTO: 2.78 X10*6/UL (ref 4.5–5.9)
RBC # UR STRIP.AUTO: ABNORMAL MG/DL
RBC #/AREA URNS AUTO: >20 /HPF
RH FACTOR (ANTIGEN D): NORMAL
SODIUM SERPL-SCNC: 139 MMOL/L (ref 136–145)
SODIUM UR-SCNC: 23 MMOL/L
SODIUM/CREAT UR-RTO: 23 MMOL/G CREAT
SP GR UR STRIP.AUTO: 1.02
UROBILINOGEN UR STRIP.AUTO-MCNC: NORMAL MG/DL
WBC # BLD AUTO: 14 X10*3/UL (ref 4.4–11.3)
WBC #/AREA URNS AUTO: >50 /HPF

## 2025-02-06 PROCEDURE — 2500000001 HC RX 250 WO HCPCS SELF ADMINISTERED DRUGS (ALT 637 FOR MEDICARE OP)

## 2025-02-06 PROCEDURE — 71045 X-RAY EXAM CHEST 1 VIEW: CPT

## 2025-02-06 PROCEDURE — 2500000004 HC RX 250 GENERAL PHARMACY W/ HCPCS (ALT 636 FOR OP/ED): Mod: TB

## 2025-02-06 PROCEDURE — 99285 EMERGENCY DEPT VISIT HI MDM: CPT | Performed by: EMERGENCY MEDICINE

## 2025-02-06 PROCEDURE — 85610 PROTHROMBIN TIME: CPT | Performed by: PHYSICIAN ASSISTANT

## 2025-02-06 PROCEDURE — 86923 COMPATIBILITY TEST ELECTRIC: CPT

## 2025-02-06 PROCEDURE — 99223 1ST HOSP IP/OBS HIGH 75: CPT

## 2025-02-06 PROCEDURE — 87086 URINE CULTURE/COLONY COUNT: CPT

## 2025-02-06 PROCEDURE — 99223 1ST HOSP IP/OBS HIGH 75: CPT | Performed by: ORTHOPAEDIC SURGERY

## 2025-02-06 PROCEDURE — 96374 THER/PROPH/DIAG INJ IV PUSH: CPT

## 2025-02-06 PROCEDURE — 1170000001 HC PRIVATE ONCOLOGY ROOM DAILY

## 2025-02-06 PROCEDURE — 36415 COLL VENOUS BLD VENIPUNCTURE: CPT | Performed by: PHYSICIAN ASSISTANT

## 2025-02-06 PROCEDURE — 1159F MED LIST DOCD IN RCRD: CPT | Performed by: ORTHOPAEDIC SURGERY

## 2025-02-06 PROCEDURE — 99211 OFF/OP EST MAY X REQ PHY/QHP: CPT | Performed by: ORTHOPAEDIC SURGERY

## 2025-02-06 PROCEDURE — 72100 X-RAY EXAM L-S SPINE 2/3 VWS: CPT

## 2025-02-06 PROCEDURE — 99285 EMERGENCY DEPT VISIT HI MDM: CPT | Performed by: PHYSICIAN ASSISTANT

## 2025-02-06 PROCEDURE — 85025 COMPLETE CBC W/AUTO DIFF WBC: CPT | Performed by: PHYSICIAN ASSISTANT

## 2025-02-06 PROCEDURE — 99223 1ST HOSP IP/OBS HIGH 75: CPT | Performed by: HOSPITALIST

## 2025-02-06 PROCEDURE — 1157F ADVNC CARE PLAN IN RCRD: CPT | Performed by: ORTHOPAEDIC SURGERY

## 2025-02-06 PROCEDURE — 86900 BLOOD TYPING SEROLOGIC ABO: CPT | Performed by: PHYSICIAN ASSISTANT

## 2025-02-06 PROCEDURE — 2500000004 HC RX 250 GENERAL PHARMACY W/ HCPCS (ALT 636 FOR OP/ED): Mod: JZ,TB | Performed by: PHYSICIAN ASSISTANT

## 2025-02-06 PROCEDURE — 1111F DSCHRG MED/CURRENT MED MERGE: CPT | Performed by: ORTHOPAEDIC SURGERY

## 2025-02-06 PROCEDURE — 80053 COMPREHEN METABOLIC PANEL: CPT | Performed by: PHYSICIAN ASSISTANT

## 2025-02-06 PROCEDURE — 71045 X-RAY EXAM CHEST 1 VIEW: CPT | Performed by: RADIOLOGY

## 2025-02-06 PROCEDURE — 86901 BLOOD TYPING SEROLOGIC RH(D): CPT | Performed by: PHYSICIAN ASSISTANT

## 2025-02-06 PROCEDURE — 81001 URINALYSIS AUTO W/SCOPE: CPT

## 2025-02-06 PROCEDURE — 82436 ASSAY OF URINE CHLORIDE: CPT

## 2025-02-06 PROCEDURE — 96376 TX/PRO/DX INJ SAME DRUG ADON: CPT

## 2025-02-06 RX ORDER — TORSEMIDE 20 MG/1
20 TABLET ORAL DAILY
Status: DISPENSED | OUTPATIENT
Start: 2025-02-06

## 2025-02-06 RX ORDER — OXYCODONE HYDROCHLORIDE 10 MG/1
10 TABLET ORAL EVERY 4 HOURS PRN
Status: DISPENSED | OUTPATIENT
Start: 2025-02-06

## 2025-02-06 RX ORDER — ASPIRIN 81 MG/1
81 TABLET ORAL DAILY
Status: DISPENSED | OUTPATIENT
Start: 2025-02-06

## 2025-02-06 RX ORDER — POLYETHYLENE GLYCOL 3350 17 G/17G
17 POWDER, FOR SOLUTION ORAL DAILY
Status: DISPENSED | OUTPATIENT
Start: 2025-02-06

## 2025-02-06 RX ORDER — HYDROMORPHONE HYDROCHLORIDE 1 MG/ML
INJECTION, SOLUTION INTRAMUSCULAR; INTRAVENOUS; SUBCUTANEOUS
Status: COMPLETED
Start: 2025-02-06 | End: 2025-02-06

## 2025-02-06 RX ORDER — OXYCODONE HYDROCHLORIDE 5 MG/1
5 TABLET ORAL EVERY 4 HOURS PRN
Status: ACTIVE | OUTPATIENT
Start: 2025-02-06

## 2025-02-06 RX ORDER — ADHESIVE BANDAGE
30 BANDAGE TOPICAL DAILY PRN
Status: DISPENSED | OUTPATIENT
Start: 2025-02-06

## 2025-02-06 RX ORDER — ENOXAPARIN SODIUM 150 MG/ML
120 INJECTION SUBCUTANEOUS 2 TIMES DAILY
Status: ACTIVE | OUTPATIENT
Start: 2025-02-06

## 2025-02-06 RX ORDER — CYCLOBENZAPRINE HCL 10 MG
5 TABLET ORAL 3 TIMES DAILY PRN
Status: ACTIVE | OUTPATIENT
Start: 2025-02-06

## 2025-02-06 RX ORDER — FAMOTIDINE 20 MG/1
20 TABLET, FILM COATED ORAL 2 TIMES DAILY
Status: DISPENSED | OUTPATIENT
Start: 2025-02-06

## 2025-02-06 RX ORDER — AMOXICILLIN 250 MG
2 CAPSULE ORAL 2 TIMES DAILY
Status: DISPENSED | OUTPATIENT
Start: 2025-02-06

## 2025-02-06 RX ORDER — FERROUS SULFATE 325(65) MG
1 TABLET ORAL EVERY 24 HOURS
Status: DISPENSED | OUTPATIENT
Start: 2025-02-06

## 2025-02-06 RX ORDER — ONDANSETRON 4 MG/1
4 TABLET, FILM COATED ORAL EVERY 8 HOURS PRN
Status: ACTIVE | OUTPATIENT
Start: 2025-02-06

## 2025-02-06 RX ORDER — ATORVASTATIN CALCIUM 40 MG/1
40 TABLET, FILM COATED ORAL NIGHTLY
Status: DISPENSED | OUTPATIENT
Start: 2025-02-06

## 2025-02-06 RX ADMIN — ATORVASTATIN CALCIUM 40 MG: 40 TABLET, FILM COATED ORAL at 20:12

## 2025-02-06 RX ADMIN — HYDROMORPHONE HYDROCHLORIDE 0.5 MG: 1 INJECTION, SOLUTION INTRAMUSCULAR; INTRAVENOUS; SUBCUTANEOUS at 13:04

## 2025-02-06 RX ADMIN — SENNOSIDES AND DOCUSATE SODIUM 2 TABLET: 50; 8.6 TABLET ORAL at 20:12

## 2025-02-06 RX ADMIN — OXYCODONE HYDROCHLORIDE 10 MG: 10 TABLET ORAL at 20:12

## 2025-02-06 RX ADMIN — HYDROMORPHONE HYDROCHLORIDE 0.5 MG: 1 INJECTION, SOLUTION INTRAMUSCULAR; INTRAVENOUS; SUBCUTANEOUS at 12:33

## 2025-02-06 RX ADMIN — FAMOTIDINE 20 MG: 20 TABLET ORAL at 20:12

## 2025-02-06 RX ADMIN — HYDROMORPHONE HYDROCHLORIDE 0.4 MG: 0.5 INJECTION, SOLUTION INTRAMUSCULAR; INTRAVENOUS; SUBCUTANEOUS at 16:50

## 2025-02-06 RX ADMIN — HYDROMORPHONE HYDROCHLORIDE 0.5 MG: 0.5 INJECTION, SOLUTION INTRAMUSCULAR; INTRAVENOUS; SUBCUTANEOUS at 11:58

## 2025-02-06 SDOH — SOCIAL STABILITY: SOCIAL INSECURITY: ARE YOU OR HAVE YOU BEEN THREATENED OR ABUSED PHYSICALLY, EMOTIONALLY, OR SEXUALLY BY ANYONE?: NO

## 2025-02-06 SDOH — SOCIAL STABILITY: SOCIAL INSECURITY: WERE YOU ABLE TO COMPLETE ALL THE BEHAVIORAL HEALTH SCREENINGS?: YES

## 2025-02-06 SDOH — SOCIAL STABILITY: SOCIAL INSECURITY: ARE THERE ANY APPARENT SIGNS OF INJURIES/BEHAVIORS THAT COULD BE RELATED TO ABUSE/NEGLECT?: NO

## 2025-02-06 SDOH — SOCIAL STABILITY: SOCIAL INSECURITY: ABUSE: ADULT

## 2025-02-06 SDOH — SOCIAL STABILITY: SOCIAL INSECURITY: DO YOU FEEL UNSAFE GOING BACK TO THE PLACE WHERE YOU ARE LIVING?: NO

## 2025-02-06 SDOH — SOCIAL STABILITY: SOCIAL INSECURITY: HAVE YOU HAD THOUGHTS OF HARMING ANYONE ELSE?: NO

## 2025-02-06 SDOH — SOCIAL STABILITY: SOCIAL INSECURITY: DOES ANYONE TRY TO KEEP YOU FROM HAVING/CONTACTING OTHER FRIENDS OR DOING THINGS OUTSIDE YOUR HOME?: NO

## 2025-02-06 SDOH — SOCIAL STABILITY: SOCIAL INSECURITY: HAS ANYONE EVER THREATENED TO HURT YOUR FAMILY OR YOUR PETS?: NO

## 2025-02-06 SDOH — SOCIAL STABILITY: SOCIAL INSECURITY: DO YOU FEEL ANYONE HAS EXPLOITED OR TAKEN ADVANTAGE OF YOU FINANCIALLY OR OF YOUR PERSONAL PROPERTY?: NO

## 2025-02-06 SDOH — SOCIAL STABILITY: SOCIAL INSECURITY: HAVE YOU HAD ANY THOUGHTS OF HARMING ANYONE ELSE?: NO

## 2025-02-06 ASSESSMENT — ENCOUNTER SYMPTOMS
MYALGIAS: 1
WOUND: 1
GASTROINTESTINAL NEGATIVE: 1
WEAKNESS: 1
BACK PAIN: 1

## 2025-02-06 ASSESSMENT — COGNITIVE AND FUNCTIONAL STATUS - GENERAL
TOILETING: A LOT
DRESSING REGULAR LOWER BODY CLOTHING: A LOT
MOVING TO AND FROM BED TO CHAIR: TOTAL
HELP NEEDED FOR BATHING: A LOT
PATIENT BASELINE BEDBOUND: NO
EATING MEALS: A LITTLE
DAILY ACTIVITIY SCORE: 13
MOVING FROM LYING ON BACK TO SITTING ON SIDE OF FLAT BED WITH BEDRAILS: A LOT
TURNING FROM BACK TO SIDE WHILE IN FLAT BAD: TOTAL
STANDING UP FROM CHAIR USING ARMS: TOTAL
MOBILITY SCORE: 7
DRESSING REGULAR UPPER BODY CLOTHING: A LOT
PERSONAL GROOMING: A LOT
WALKING IN HOSPITAL ROOM: TOTAL
CLIMB 3 TO 5 STEPS WITH RAILING: TOTAL

## 2025-02-06 ASSESSMENT — LIFESTYLE VARIABLES
AUDIT-C TOTAL SCORE: 0
HAVE PEOPLE ANNOYED YOU BY CRITICIZING YOUR DRINKING: NO
AUDIT-C TOTAL SCORE: 0
EVER HAD A DRINK FIRST THING IN THE MORNING TO STEADY YOUR NERVES TO GET RID OF A HANGOVER: NO
HOW OFTEN DO YOU HAVE 6 OR MORE DRINKS ON ONE OCCASION: NEVER
PRESCIPTION_ABUSE_PAST_12_MONTHS: NO
SUBSTANCE_ABUSE_PAST_12_MONTHS: NO
TOTAL SCORE: 0
HAVE YOU EVER FELT YOU SHOULD CUT DOWN ON YOUR DRINKING: NO
HOW MANY STANDARD DRINKS CONTAINING ALCOHOL DO YOU HAVE ON A TYPICAL DAY: PATIENT DOES NOT DRINK
EVER FELT BAD OR GUILTY ABOUT YOUR DRINKING: NO
HOW OFTEN DO YOU HAVE A DRINK CONTAINING ALCOHOL: NEVER
SKIP TO QUESTIONS 9-10: 1

## 2025-02-06 ASSESSMENT — ACTIVITIES OF DAILY LIVING (ADL)
ASSISTIVE_DEVICE: WHEELCHAIR
JUDGMENT_ADEQUATE_SAFELY_COMPLETE_DAILY_ACTIVITIES: YES
FEEDING YOURSELF: INDEPENDENT
HEARING - LEFT EAR: FUNCTIONAL
TOILETING: NEEDS ASSISTANCE
LACK_OF_TRANSPORTATION: PATIENT DECLINED
GROOMING: NEEDS ASSISTANCE
HEARING - RIGHT EAR: FUNCTIONAL
PATIENT'S MEMORY ADEQUATE TO SAFELY COMPLETE DAILY ACTIVITIES?: YES
WALKS IN HOME: DEPENDENT
BATHING: NEEDS ASSISTANCE
DRESSING YOURSELF: NEEDS ASSISTANCE
ADEQUATE_TO_COMPLETE_ADL: YES

## 2025-02-06 ASSESSMENT — PAIN DESCRIPTION - PROGRESSION: CLINICAL_PROGRESSION: GRADUALLY IMPROVING

## 2025-02-06 ASSESSMENT — PAIN - FUNCTIONAL ASSESSMENT
PAIN_FUNCTIONAL_ASSESSMENT: 0-10
PAIN_FUNCTIONAL_ASSESSMENT: UNABLE TO SELF-REPORT

## 2025-02-06 ASSESSMENT — PAIN SCALES - GENERAL
PAINLEVEL_OUTOF10: 10 - WORST POSSIBLE PAIN
PAINLEVEL_OUTOF10: 10 - WORST POSSIBLE PAIN
PAINLEVEL_OUTOF10: 4
PAINLEVEL_OUTOF10: 3
PAINLEVEL_OUTOF10: 10 - WORST POSSIBLE PAIN
PAINLEVEL_OUTOF10: 10 - WORST POSSIBLE PAIN
PAINLEVEL_OUTOF10: 9
PAINLEVEL_OUTOF10: 10 - WORST POSSIBLE PAIN
PAINLEVEL_OUTOF10: 5 - MODERATE PAIN
PAINLEVEL_OUTOF10: 10 - WORST POSSIBLE PAIN

## 2025-02-06 ASSESSMENT — PATIENT HEALTH QUESTIONNAIRE - PHQ9
SUM OF ALL RESPONSES TO PHQ9 QUESTIONS 1 & 2: 0
2. FEELING DOWN, DEPRESSED OR HOPELESS: NOT AT ALL
1. LITTLE INTEREST OR PLEASURE IN DOING THINGS: NOT AT ALL

## 2025-02-06 NOTE — ED PROVIDER NOTES
"Emergency Department Encounter  [facility] EMERGENCY MEDICINE    Patient: Abdi Retana  MRN: [id number]  : 1950  Date of Evaluation: 2025  ED Provider: Elsy Valero PA-C        History of Present Illness     This is a 73 yo male with HTN, GERD, SAMIA, Chronic lymphedema, venous insufficiency, OA, CKD-3(b/l 1.2-1.5), with recently diagnosed renal cell carcinoma with metastases to his lumbar spine requiring decompression with orthopedics who presents to the ED with concern for wound dehiscence.  He has been staying at a nursing facility for the past 3 weeks since his procedure.  He states that for the past 4 days he has noticed bleeding from the wound.  He had his postoperative visit today and was advised to come to the ED for admission with plan for likely revision of his surgery.  He is endorsing back pain as well as generalized body pain.  He denies any fevers or chills.  He denies any new paresthesias, weakness or areas of decreased sensation.  He denies any episodes of bowel or bladder incontinence.  He is wheelchair-bound at baseline.  Of note patient was also told that his anemia had worsened and his hemoglobin was 6.6 as an outpatient today and he may require a blood transfusion.  Patient has required a blood transfusion previously, most recently during his lumbar spine operation and postoperatively.      History provided by:  Patient, medical records and relative   used: No             Visit Vitals  /58   Pulse 88   Temp 36.4 °C (97.5 °F) (Tympanic)   Resp 18   Ht 1.88 m (6' 2\")   Wt 121 kg (266 lb)   SpO2 97%   BMI 34.15 kg/m²   Smoking Status Former   BSA 2.51 m²          Physical Exam       Triage vitals:  T 36.4 °C (97.5 °F)  HR 88  /77  RR 18  O2 (!) 91 %      Physical Exam     Physical exam:   General: Vitals noted, no distress. Afebrile.  Chronically ill-appearing.  EENT:  Hearing grossly intact. Normal phonation. MMM. Airway patient. " PERRL. EOMI.   Neck: No midline tenderness or paraspinal tenderness. FROM.   Cardiac: Regular, rate, rhythm. Normal S1 and S2.  No murmurs, gallops, rubs.   Pulmonary: Good air exchange. Lungs clear bilaterally. No wheezes, rhonchi, rales. No accessory muscle use.   Abdomen: Soft, nonsurgical. Nontender. No peritoneal signs.   Back: No CVA tenderness.  Large lumbar incision with dehiscence and blood oozing from the wound.  No purulent drainage.  No surrounding erythema or excess warmth.  Diffuse tenderness palpation throughout the lumbar and thoracic spine.  Extremities: No peripheral edema.  Full range of motion. Moves all extremities freely. No tenderness throughout extremities.   Skin: No rash. Warm and Dry.   Neuro: No focal neurologic deficits. CN 2-12 grossly intact. Sensation equal bilaterally.  5 out of 5 strength bilateral upper extremities.  2 out of 5 strength bilateral lower extremities.    Results       Labs Reviewed   CBC WITH AUTO DIFFERENTIAL - Abnormal       Result Value    WBC 14.0 (*)     nRBC 0.0      RBC 2.78 (*)     Hemoglobin 7.4 (*)     Hematocrit 24.4 (*)     MCV 88      MCH 26.6      MCHC 30.3 (*)     RDW 17.4 (*)     Platelets 620 (*)     Neutrophils % 78.2      Immature Granulocytes %, Automated 1.0 (*)     Lymphocytes % 10.9      Monocytes % 5.8      Eosinophils % 3.1      Basophils % 1.0      Neutrophils Absolute 10.94 (*)     Immature Granulocytes Absolute, Automated 0.14      Lymphocytes Absolute 1.52      Monocytes Absolute 0.81 (*)     Eosinophils Absolute 0.44 (*)     Basophils Absolute 0.14 (*)    COMPREHENSIVE METABOLIC PANEL - Abnormal    Glucose 118 (*)     Sodium 139      Potassium 4.8      Chloride 99      Bicarbonate 28      Anion Gap 17      Urea Nitrogen 31 (*)     Creatinine 1.39 (*)     eGFR 53 (*)     Calcium 9.1      Albumin 2.9 (*)     Alkaline Phosphatase 89      Total Protein 6.9      AST 24      Bilirubin, Total 0.5      ALT 10     COAGULATION SCREEN - Abnormal     Protime 17.0 (*)     INR 1.5 (*)     aPTT 34      Narrative:     The APTT is no longer used for monitoring Unfractionated Heparin Therapy. For monitoring Heparin Therapy, use the Heparin Assay.   TYPE AND SCREEN    ABO TYPE A      Rh TYPE POS      ANTIBODY SCREEN NEG     URINALYSIS WITH REFLEX CULTURE AND MICROSCOPIC    Narrative:     The following orders were created for panel order Urinalysis with Reflex Culture and Microscopic.  Procedure                               Abnormality         Status                     ---------                               -----------         ------                     Urinalysis with Reflex C...[[id number]]                                                 Extra Urine Gray Tube[[id number]]                                                         Please view results for these tests on the individual orders.   ELECTROLYTE PANEL, URINE   URINALYSIS WITH REFLEX CULTURE AND MICROSCOPIC   EXTRA URINE GRAY TUBE       XR chest 1 view   Final Result   No acute cardiopulmonary process is evident.        MACRO:   None        Signed by: Conrado Hilliard 2/6/2025 12:14 PM   Dictation workstation:   [id number]            Medical Decision Making & ED Course         ED Course & MDM     Medical Decision Making  This is a 74-year-old male with recently diagnosed renal cell carcinoma who is not currently undergoing chemotherapy/radiation and had recent lumbar surgery for tumor debulking who presents to the ED with wound dehiscence following his surgery.  Vital stable upon arrival to the ED.  On examination patient does appear to be chronically debilitated.  He does have 2 out of 5 strength of bilateral lower extremities, however patient's this is unchanged from normal for him.  5 out of 5 strength bilateral upper extremities.  On examination of his spine he does have a large midline lumbar incision that is dehisced with blood oozing from the wound.  No purulent drainage.  No surrounding erythema or excess  warmth.  Orthospine consulted.  Did not recommend any imaging at this time.  Patient medicated with Dilaudid for pain.  Laboratory studies obtained here in the ED which showed slight elevation in his WBC at 14.0.  Hemoglobin 7.4 so he did not require transfusion here in the ED today.  CMP showed creatinine 1.39 which is consistent with his baseline.  Chest x-ray obtained and showed no acute cardiopulmonary process.  Orthopedics is recommending revision of patient's procedure, likely tomorrow.  These results were discussed with the patient.  He was accepted for admission by medicine with plan for orthopedics to continue following as a consult service.         ED Course as of 02/06/25 1816   Thu Feb 06, 2025   1322 HEMOGLOBIN(!): 7.4 [AW]      ED Course User Index  [AW] Elsy Valero PA-C         Diagnoses as of 02/06/25 1816   Wound dehiscence   Anemia, unspecified type            Independent Result Review and Interpretation: Chest X-Ray as interpreted by me revealed no pneumonia or pneumothorax    The patient was discussed with the following consultants/services: Admission Coordinator who accepted the patient for admission and orthopedics who did not recommend any imaging from the ED and recommended medicine admission with their consultation during admission with likely operative management tomorrow.        Disposition   As a result of their workup, the patient will require admission to the hospital.  The patient was informed of his diagnosis.  The patient was given the opportunity to ask questions and I answered them. The patient agreed to be admitted to the hospital.    Procedures     This was a shared visit with an ED attending.  The patient was seen and discussed with the ED attending    Procedures    Elsy Valero PA-C  Emergency Medicine      Elsy Valero PA-C  02/06/25 1817

## 2025-02-06 NOTE — ED TRIAGE NOTES
Pt presents to ED from outpatient appointment due to concern for poor healing of recent surgical incisions.  Pt has a history of renal cell carcinoma and had surgery to remove a metastatic lesion on his spine one week ago. Pt is in 10/10 pain and appears unwell. Pt is A&O x3/4. Upon arrival pt was rolled to the side so provider could see incision. Pt's sister is at the bedside and is a good historian. Pt has been wheelchair bound for the last 2 years.

## 2025-02-06 NOTE — PROGRESS NOTES
"Abdi Retana \"Louise" is a 74 y.o. male on day 0 of admission presenting with Wound dehiscence.    Assessment/Plan   SW met with Pt and sister at admission via EMS. Sister is Micaela Retana [phone] and provided information for this intake. She states her brother is coming from SNF at [facility] due to infected wound that needs to be coterized likely resulting in IP admission. She stated concerns for facility and desire to change placement. Micaela stated Pt has stage four cancer but has not yet elected to have hospice. Family interested in potentially rehab named [facility] and ones near [facility] where she resides, [id number]. SW agreed to print choice list for sister's residence.  SW contacted admitting team and asked for PT/OT orders.  Met with Pt bedside and sister had left.  Provided phone to Pt as well as written number for his sister. Called Micaela and she agreed to accept choice list via email [email or web address]. List sent via [employer] and left bedside for Pt. SW observed Pt was confused. Call placed on IPAD so he could converse with his sister.   SW to follow for discharge planning.    KARLA De Jesus      "

## 2025-02-06 NOTE — PROGRESS NOTES
Being sent from ortho spine clinic [place] [facility] [place] cf post-op wound infection vs sepsis

## 2025-02-06 NOTE — PROGRESS NOTES
RADIATION ONCOLOGY FOLLOW UP NOTE    Patient Name: Abdi Retana  Provider: Beverly Moncada MD  MRN: [id number]  : 1950  Date: 2025    DIAGNOSIS: metastatic renal cell carcinoma    CURRENT STATUS OF DISEASE: newly diagnosed, treatment naive    PRIOR RADIATION TREATMENT: n/a    INTERVAL SINCE COMPLETION OF TREATMENT: n/a    Interval History:  Patient presents today for follow-up visit and to discuss postoperative spine SBRT.  He was initially admitted in 2024 with a concerning L4-L5 lesion with cord compression and a large right renal mass which was since biopsied and confirmed to be renal cell carcinoma.  The patient proceeded with decompression and laminectomies with Dr. Atkins on 2024.  She followed up with Dr. Atkins earlier today and presents today with me for further discussion of spine SBRT.  He follows up with Dr. Wynn on 2025 to discuss systemic therapy options      Review of Systems:  A complete 14 system review was negative except as noted in the HPI above.    Allergies:   Allergies   Allergen Reactions    Clindamycin Unknown       Medications:   Current Outpatient Medications:     acetaminophen (Tylenol 8 HOUR) 650 mg ER tablet, Take 1 tablet (650 mg) by mouth every 6 hours if needed for mild pain (1 - 3). Do not crush, chew, or split., Disp: , Rfl:     acetaminophen (Tylenol) 325 mg tablet, Take 2 tablets (650 mg) by mouth 2 times a day., Disp: , Rfl:     aspirin 81 mg chewable tablet, Chew 1 tablet (81 mg) once daily., Disp: 90 tablet, Rfl: 3    aspirin 81 mg EC tablet, Take 1 tablet (81 mg) by mouth once daily., Disp: , Rfl:     atorvastatin (Lipitor) 40 mg tablet, Take 1 tablet (40 mg) by mouth once daily at bedtime., Disp: 90 tablet, Rfl: 3    bisacodyl (Gentle Laxative, bisacodyl,) 10 mg suppository, Insert 1 suppository (10 mg) into the rectum once daily as needed for constipation., Disp: , Rfl:     cyclobenzaprine (Flexeril) 5 mg tablet, Take 1 tablet (5 mg) by mouth  3 times a day as needed for muscle spasms., Disp: 200 tablet, Rfl: 3    enoxaparin (Lovenox) 120 mg/0.8 mL syringe, Inject 0.8 mL (120 mg) under the skin 2 times a day., Disp: 180 each, Rfl: 3    eucerin cream, Apply topically if needed for dry skin., Disp: 453 g, Rfl: 3    famotidine (Pepcid) 20 mg tablet, Take 1 tablet (20 mg) by mouth 2 times a day., Disp: , Rfl:     ferrous sulfate (FeroSuL) 325 (65 Fe) MG tablet, Take 1 tablet (325 mg) by mouth once every 24 hours., Disp: 90 tablet, Rfl: 3    magnesium hydroxide (Milk of Magnesia) 400 mg/5 mL suspension, Take 30 mL by mouth once daily as needed for constipation (if no BM in 3 consecutive days)., Disp: , Rfl:     mineral oil enema, Insert 133 mL (1 enema) into the rectum once daily as needed for constipation (if no BM in 8 hours after getting suppository)., Disp: , Rfl:     multivitamin with minerals tablet, Take 1 tablet by mouth once daily., Disp: 90 tablet, Rfl: 3    polyethylene glycol (Glycolax, Miralax) 17 gram packet, Take 17 g by mouth 2 times a day as needed (constipation)., Disp: 30 packet, Rfl: 3    potassium chloride CR 10 mEq ER tablet, TAKE 1 TABLET BY MOUTH ONCE  DAILY DO NOT CRUSH, CHEW, OR  SPLIT, Disp: 90 tablet, Rfl: 3    torsemide (Demadex) 20 mg tablet, TAKE 1 TABLET BY MOUTH ONCE  DAILY, Disp: 90 tablet, Rfl: 3    vitamin D3-folic acid 2,500 unit- 1 mg tablet, Take 1 mg by mouth once every 24 hours., Disp: 90 tablet, Rfl: 3    Physical Exam:   KPS: ***  General: NAD, patient well appearing.  HEENT: Trachea midline  CV: Well perfused  Resp: Breathing is non-labored.   Abdomen: Soft, ND  Extremities: No acute findings. No edema   Skin: color, texture and turgor wnl. No rashes and lesions.  Neuro: AAO x 3, appropriately interactive, normal affect, speech fluent. Cranial nerves II through XII are intact grossly and symmetrically. No focal neurologic deficit. Normal strength and sensation to LT intact bilaterally in the upper and lower  extremities. No dysmetria or dysdiadochokinesia.     Imaging and Diagnostic Studies:   See HPI above    TOXICITY:    Assessment/Plan:    ***   The patient presents without evidence of disease recurrence.     1) RTC in 3 months  2) MRI Brain (W/WO Contrast) in 3 months    All the patient's questions were answered to verbalized satisfaction. We instructed the patient to call with any questions or concerns in the interim.      Beverly Moncada MD     LONGITUDINAL CARE, EXTRA EFFORT/ : The patient will be followed longitudinally by providers (including APPs) in the department of radiation oncology for monitoring treatment effects during and after radiation. Additional effort needed in the setting of his diagnosis and coordination of care with other treating physicians.

## 2025-02-06 NOTE — PROGRESS NOTES
S: Abdi Retana is a 74 y.o. year old male patient with metastatic renal cell carcinoma with mets to spine and lytic lesion at L4 causing severe canal stenosis who is 3 weeks out status post L2-S1 fusion L3-4 and L4-5 laminectomy decompression and tumor debulking on January 17, 2025.  He is here with his sister today.  He is currently at a skilled nursing facility.  Cording to his sister he has been pretty sedentary and bedbound.  States that there were episodes of confusion and sleepiness.  There has been bloody drainage from his incision.  His sister states that they noticed that there have been a drop in hematocrit since he was admitted to the nursing facility.      O:     General: Patient appears well-nourished and well-developed in no acute distress, confused  Psych: Pleasant mood and affect  HEENT: Extraocular muscles intact, pupils equal and round. Sclerae anicteric   Cardio: extremities warm and well perfused  Resp: unlabored symmetric breathing  Skin: Posterior lumbar wound dehiscence with bloody drainage .  Musculoskeletal/Neuro Exam: patient lying in a gurney            Imaging:    I reviewed x-rays of the lumbar spine obtained in clinic today.  AP lateral views.  There is redemonstration of destruction of the L4 vertebral body from metastatic lesion.  There is also lucency of the L5 vertebrae metastatic involvement.          A/P: Abdi Retana is a 74 y.o. year old male patient with metastatic renal cell carcinoma with destruction of L4 and L5 vertebral body who is 3 weeks out status post L2-S1 fusion decompression.  Patient is here for his first postop visit and shows surgical wound dehiscence with bloody drainage.  The patient kind of confused which he has been well at the skilled nursing facility according to his sister.  Concern for possible surgical wound infection.  Patient was sent down to the emergency room for further workup for possible sepsis and possible need for transfusion.  He will need to  be admitted for surgical wound irrigation debridement.  The emergency room was called and informed that he will be coming down to the emergency room.    After our discussion, the patient articulated understanding of the plan and felt that all questions had been answered satisfactorily. The patient was pleased with the visit and very appreciative for the care rendered.    **Please excuse any errors in grammar or translation related to this dictation. Voice recognition software was utilized to prepare this document. **                  Yana Atkins MD    Department of Orthopaedic Surgery  [facility]  [email or web address]

## 2025-02-06 NOTE — H&P
History Of Present Illness  Matt Retana is a 74 y.o. male presenting with wound dehiscience.    Matt Retana is a 74 year old Male PMH RCC with mets bone, lung, and adrenal (s/p L2-S1 fusion decompression x3 weeks ago) who presented 2/6 ED after being sent from his first postop visit d/t surgical wound dehiscence with bloody drainage and further need for poss sepsis and need for transfusion. Pt HDS one exam, alert and oriented x4, reports pain in his back that has been momentarily relieved with IV dilaudid. We discussed awaiting final ortho recs but as of now appears a return to the OR is likely. Pt reports he has upcoming outpatient appts to establish an onc plan with Dr. Wynn and Rad/Onc. We reviewed imaging, labs, and medications.     Denies fever, chills, chest pain, SOB, constipation, diarrhea, edema, headaches, nausea, and vomiting.     ED Course:  - VS: 36.4, HR 88, R 18, 132/71, 97% RA  - Labs significant for anemia Hg 7.4 (baseline ~8-9), WBC 14k, SCr 1.39  - CXR w/o evidence of acute process  - Lumbar spine Xray showed destruction L4, screws for L5 appear superior to level of vertebral body poss outside  - Ortho consulted, awaiting final note but per chart review did not rec further imaging and rec revision of procedure likely tomorrow 2/7   - s/p x3 doses 0.5mg IVP dilaudid     Onc Hx: Primary oncologist- Dr. Wynn   - 1/4/25 Admitted for c/f undiagnosed metastatic R RCC with L4/5 mets after original c/f hematuria and backache, as well as bacteremia i/s/o b/l non-obstructing renal stones   - 1/7 s/p bx L4 lesion, 1/13 confrmed carcinoma involving dense fibroconnective tissue   -1/17 s/p spinal artery embolization with NSGY and lumbar tumor debulkin L3-L5 laminectomiues with L2-S1 PSIF with Dr. Atkins, surgery c/b acute bloos loss requiring RBCs, discharged to SNF to establish Onc and Rad/Onc plan        Past Medical History  He has a past medical history of CKD (chronic kidney disease), GERD  (gastroesophageal reflux disease), Hypertension, OA (osteoarthritis), Personal history of other diseases of the circulatory system, and PVD (peripheral vascular disease) (CMS-[facility]).    Surgical History  He has no past surgical history on file.    Oncology History    No history exists.        Social History  He reports that he has quit smoking. His smoking use included cigarettes. He does not have any smokeless tobacco history on file. He reports current alcohol use. He reports that he does not use drugs.     Allergies  Clindamycin    Review of Systems   Cardiovascular:  Positive for leg swelling.   Gastrointestinal: Negative.    Musculoskeletal:  Positive for back pain, gait problem and myalgias.   Skin:  Positive for wound.   Neurological:  Positive for weakness.        Physical Exam  Constitutional:       Appearance: He is obese.      Comments: Chronic ill appearance   HENT:      Head: Normocephalic.      Right Ear: External ear normal.      Left Ear: External ear normal.      Nose: Nose normal.      Mouth/Throat:      Mouth: Mucous membranes are dry.   Eyes:      Extraocular Movements: Extraocular movements intact.      Conjunctiva/sclera: Conjunctivae normal.      Pupils: Pupils are equal, round, and reactive to light.   Cardiovascular:      Rate and Rhythm: Normal rate.   Pulmonary:      Comments: Diminished throughout  Abdominal:      Palpations: Abdomen is soft.   Musculoskeletal:      Cervical back: Normal range of motion.      Right lower leg: Edema present.      Left lower leg: Edema present.   Skin:     General: Skin is dry.      Findings: Bruising and erythema present.   Neurological:      Mental Status: He is oriented to person, place, and time.      Motor: Weakness present.      Gait: Gait abnormal.   Psychiatric:         Mood and Affect: Mood normal.         Behavior: Behavior normal.          Last Recorded Vitals  Blood pressure 107/58, pulse 88, temperature 36.4 °C (97.5 °F), temperature source  "Tympanic, resp. rate 18, height 1.88 m (6' 2\"), weight 121 kg (266 lb), SpO2 97%.    Relevant Results  .Scheduled medications  [Held by provider] aspirin, 81 mg, oral, Daily  atorvastatin, 40 mg, oral, Nightly  [Held by provider] enoxaparin, 120 mg, subcutaneous, BID  famotidine, 20 mg, oral, BID  ferrous sulfate (325 mg ferrous sulfate), 1 tablet, oral, q24h  polyethylene glycol, 17 g, oral, Daily  sennosides-docusate sodium, 2 tablet, oral, BID  torsemide, 20 mg, oral, Daily      Continuous medications     PRN medications  PRN medications: cyclobenzaprine, HYDROmorphone, magnesium hydroxide, ondansetron, oxyCODONE, oxyCODONE    .  Results for orders placed or performed during the hospital encounter of 02/06/25 (from the past 24 hours)   CBC and Auto Differential   Result Value Ref Range    WBC 14.0 (H) 4.4 - 11.3 x10*3/uL    nRBC 0.0 0.0 - 0.0 /100 WBCs    RBC 2.78 (L) 4.50 - 5.90 x10*6/uL    Hemoglobin 7.4 (L) 13.5 - 17.5 g/dL    Hematocrit 24.4 (L) 41.0 - 52.0 %    MCV 88 80 - 100 fL    MCH 26.6 26.0 - 34.0 pg    MCHC 30.3 (L) 32.0 - 36.0 g/dL    RDW 17.4 (H) 11.5 - 14.5 %    Platelets 620 (H) 150 - 450 x10*3/uL    Neutrophils % 78.2 40.0 - 80.0 %    Immature Granulocytes %, Automated 1.0 (H) 0.0 - 0.9 %    Lymphocytes % 10.9 13.0 - 44.0 %    Monocytes % 5.8 2.0 - 10.0 %    Eosinophils % 3.1 0.0 - 6.0 %    Basophils % 1.0 0.0 - 2.0 %    Neutrophils Absolute 10.94 (H) 1.60 - 5.50 x10*3/uL    Immature Granulocytes Absolute, Automated 0.14 0.00 - 0.50 x10*3/uL    Lymphocytes Absolute 1.52 0.80 - 3.00 x10*3/uL    Monocytes Absolute 0.81 (H) 0.05 - 0.80 x10*3/uL    Eosinophils Absolute 0.44 (H) 0.00 - 0.40 x10*3/uL    Basophils Absolute 0.14 (H) 0.00 - 0.10 x10*3/uL   Comprehensive metabolic panel   Result Value Ref Range    Glucose 118 (H) 74 - 99 mg/dL    Sodium 139 136 - 145 mmol/L    Potassium 4.8 3.5 - 5.3 mmol/L    Chloride 99 98 - 107 mmol/L    Bicarbonate 28 21 - 32 mmol/L    Anion Gap 17 10 - 20 mmol/L    " Urea Nitrogen 31 (H) 6 - 23 mg/dL    Creatinine 1.39 (H) 0.50 - 1.30 mg/dL    eGFR 53 (L) >60 mL/min/1.73m*2    Calcium 9.1 8.6 - 10.6 mg/dL    Albumin 2.9 (L) 3.4 - 5.0 g/dL    Alkaline Phosphatase 89 33 - 136 U/L    Total Protein 6.9 6.4 - 8.2 g/dL    AST 24 9 - 39 U/L    Bilirubin, Total 0.5 0.0 - 1.2 mg/dL    ALT 10 10 - 52 U/L   Type and Screen   Result Value Ref Range    ABO TYPE A     Rh TYPE POS     ANTIBODY SCREEN NEG    Coagulation Screen   Result Value Ref Range    Protime 17.0 (H) 9.8 - 12.8 seconds    INR 1.5 (H) 0.9 - 1.1    aPTT 34 27 - 38 seconds   PST Top   Result Value Ref Range    Extra Tube Hold for add-ons.           Assessment/Plan   Assessment & Plan  Wound dehiscence    Abdi Retana is a 74 year old Male PMH newly dx metastatic RCC (dx Jan 2025, has not started treatment yet), HTN, GERD, SAMIA, chronic lymphedema, venous insufficiency, OA, CKD III (BL~ 1.2-1.5), hx new acute stroke dx 1/8/25 (on BID Lovenox) and recent cord compression L4-L5 lesion (s/p L3-L5 laminectomies with L2-S1 lumbar tumor debulking 1/17 with Ortho surg) who presented to ED 2/6 after being sent from his first postop visit with Ortho for c/f lumbar wound dehiscence. Xray showed pathological fracture L4, shifting of hardware noted. Ortho consulted in ED, formal recs pending however tentative return to OR for revision possible.     # lumbar wound dehiscence s/p 3-L5 laminectomy and L2-S1 fusion decompression  - (1/17-1/24) 1/17 s/p spinal artery embolization with NSGY and lumbar tumor debulkin L3-L5 laminectomiues with L2-S1 PSIF with Dr. Atkins, surgery c/b acute bloos loss requiring RBCs, discharged to SNF to establish Onc and Rad/Onc plan    - 2/6 Returned for first postop visit and found to have dehisced wound, sent by Dr. Atkins to ED   - 2/6 Xray lumbar spine showed re demonstration significant pathologic destruction L4, screws for L5 appear superior to level of vertebral body poss outside of body related to shifting of  hardware   - Ortho consulted on admit, recs pending however per ED notes tentative plan to return to OR for revision 2/7   - Mild leukocytosis noted on admit (14k) baseline ~10-11, low suspicion for infectious process at this time, afebrile, pt denies chills - low threshold for initiation of atbx should pt develop constitutional symptoms   - started on oxycodone 5mg PRN mod pain, 10mg PRN severe pain, and 0.4mg IVP dilaudid q3 PRN breakthrough     # newly dx metastatic R RCC   - Plan to establish with Dr. Wynn 2/12 for outpatient care (has seen him inpatient only)  - Nov 2024 Presented for fall and BLE weakness, Xray lumbar spine showing large lytic lesion L4 c/f neoplasm - was due to get outpatient bx with IR in Dec but was unable d/t facility mixing up transportation and missed appt   - 11/30 CT C/A/P showed large infiltrative R renal mass c/w RCC with mets adrenal glands, osseous lesions, pulm nodules  - 1/7 s/p s/p bx L4 lesion, 1/13 confrmed carcinoma involving dense fibroconnective tissue    - 1/16 s/p IR for tumor embolization from b/l L4 segmental arteries  - 1/17 s/p OR for lumbar tumor debulking L3-L4 and L4-L5 laminectomies with L2-S1 PSIF with Dr. Atkins   - 1/24 Seen by Rad/Onc pt to follow up 1-2 weeks with a myelogram and RT planning scan scheduled after eval for spine SBRT L3-L5 delivered over 3-5 fx --has not started yet     # hx small acute stroke  - 1/8/25 MRI Brain showed c/f small acute stroke in R parietal lobe  - Neuro consulted, rec Lovenox 1mg/kg q12h and outpatient follow up   - held home lovenox I/s/o poss surgery tomorrow 2/7     # hx recent proteus mirabilis complicated UTI and bacteremia   - 1/5 developed UTI with + proteus bacteremia s/p Cefepime and Augmentin PO completed 1/23   - Seen by ID inpatient during previous admission   - 2/6 UA pending collection     # chronic venous insufficiency  # chronic lymphedema   - [facility] podiatry [facility]  - Wound care consulted on admit, recs  pending   - c/w home torsemide 20mg daily   - held home ASA on admit d/t poss return OR     # CKD II  - baseline Scr ~ 1.2-1.5   - CMP daily     # SAMIA  - c/w home ferrous sulfate 325mg daily     # HTN/HLD  - c/w home lipitor 40mg daily     # GERD  - c/w home pepcid daily     # dispo  - Full code, confirmed on admit   - DC SNF pending Ortho recs, PT/OT recs, and pain improvement (sister would like new SNF and does not want him to return to prior d/t unsatisfied with care)  - NOK: sister Hinojosa, [phone]   - FUV 2/11 Urology, 2/12 Dr. Wynn        I spent >60 minutes in the professional and overall care of this patient.    Assessment and plan as above to be discussed tomorrow in AM with attending physician Dr. Emigdio Canchola, APRN-CNP

## 2025-02-06 NOTE — PROGRESS NOTES
Orthopaedic Surgery Consult H&P    HPI:   Orthopaedic Problems/Injuries: Post op bleeding  Other Injuries: none    74M (HTN, GERD, PVD, cellulitis, former smoker) w compressive L4 lesion secondary to RCC metastasis, now s/p L2-S1 PSIF, L3-5 decompression w Dr. Atkins 1/17, presents from clinic w wound bleeding.  He has been nonambulatory since surgery but noticed that he has been having weakness and increased fatigue the past 3 to 4 days.  His sister also noticed that he had been more confused and forgetful.  There was no concern about bleeding until he presented in clinic for follow-up today, where the team noticed that his dressing was saturated.  They recommended he present to the ED.  He denies any paresthesias or bowel or bladder symptoms.    PMH: per above/EMR  PSH: per above/EMR  SocHx:      -  Denies tobacco use      -  Denies EtOH use      -  Denies other drug use  FamHx:  Non-contributory to this patient's acute orthopaedic problem.   Allergies: Reviewed in EMR  Meds: Reviewed in EMR    ROS      - 14 point ROS negative except as above    Physical Exam:  Gen: AOx3, NAD  HEENT: normocephalic atraumatic  Psych: appropriate mood and affect  Resp: nonlabored breathing  Cardiac: Extremities WWP, RRR to peripheral palpation  Neuro: CN 2-12 grossly intact  Skin: no rashes    Spine Exam:    C5: SILT   Deltoid 5/5 Left; 5/5 Right  C6: SILT   Wrist Ext: 5/5 Left; 5/5 Right  C7: SILT   Triceps: 5/5 Left; 5/5 Right  C8: SILT   Finger flexion: 5/5 Left; 5/5 Right  T1: SILT    Interossei: 5/5 Left; 5/5 Right    Bicep Reflex 2+   Bilaterally  Last: Negative    L1: SILT       L2: SILT      Hip flexors 2/5 Left; 2/5 Right  L3: SILT      Knee extension 3/5 Left; 3/5 Right  L4: SILT      Tib Ant. (Dorsiflexion) 5/5 Left; 5/5 Right  L5: SILT      EHL 5/5 Left; 5/5 Right  S1: SILT      Plantarflexion 5/5 Left; 5/5 Right    Patellar reflex: 2+   Bilaterally    Babinkski: Intact  No clonus    A full secondary exam was performed  and all relevant findings discussed and noted above.    Imaging:  X-rays obtained in clinic shows possible progression of his lesion in the L5 vertebral body compared to his MRI 1/8/2025.    Assessment:  Orthopaedic Problems/Injuries: Post op bleeding    74M (HTN, GERD, PVD, cellulitis, former smoker) w compressive L4 lesion secondary to RCC metastasis, now s/p L2-S1 PSIF, L3-5 decompression w Dr. Atkins 1/17, presents from clinic w wound bleeding. 2/5 HF, 3/5 KE, ow 5/5. SILT. No UMN signs. 1-2 cm dehiscence of distal aspect of incision w drainage.     Plan:  -Patient has consented and posted for irrigation and debridement, as well as wound closure 2/7 with Dr. Atkins.  - WB: Bearing as tolerated  - Abx: Perioperative  - Diet: N.p.o. at midnight  - DVT: Per primary  - Please obtain all preop labs (CBC,BMP,EKG, CXR, Coags, Type and Screen)  - Cho: Per primary    - Dispo: Pending operative course    Bill Bravo MD  PGY-1 Orthopaedic Surgery  On-call Resident    This patient was seen within 30 minutes of initial consult. This consult was staffed with the his orthopedic spine attending, Dr. Atkins  _________________________________________________________    This patient will be followed by the Ortho Spine Team while inpatient. See team members and contacts below:     Ortho Spine  First Call: Mariano Hendrix, PGY-2  Second Call: Lew Abad, PGY-4

## 2025-02-07 ENCOUNTER — APPOINTMENT (OUTPATIENT)
Dept: RADIATION ONCOLOGY | Facility: HOSPITAL | Age: 75
End: 2025-02-07
Payer: MEDICARE

## 2025-02-07 ENCOUNTER — ANESTHESIA (OUTPATIENT)
Dept: OPERATING ROOM | Facility: HOSPITAL | Age: 75
End: 2025-02-07
Payer: MEDICARE

## 2025-02-07 ENCOUNTER — APPOINTMENT (OUTPATIENT)
Dept: HEMATOLOGY/ONCOLOGY | Facility: HOSPITAL | Age: 75
DRG: 857 | End: 2025-02-07
Payer: MEDICARE

## 2025-02-07 ENCOUNTER — ANESTHESIA EVENT (OUTPATIENT)
Dept: OPERATING ROOM | Facility: HOSPITAL | Age: 75
End: 2025-02-07
Payer: MEDICARE

## 2025-02-07 ENCOUNTER — APPOINTMENT (OUTPATIENT)
Dept: RADIOLOGY | Facility: HOSPITAL | Age: 75
DRG: 857 | End: 2025-02-07
Payer: MEDICARE

## 2025-02-07 ENCOUNTER — APPOINTMENT (OUTPATIENT)
Dept: RADIOLOGY | Facility: HOSPITAL | Age: 75
End: 2025-02-07
Payer: MEDICARE

## 2025-02-07 PROBLEM — N17.9 ACUTE KIDNEY INJURY (NONTRAUMATIC) (CMS-HCC): Status: ACTIVE | Noted: 2025-02-07

## 2025-02-07 LAB
ABO GROUP (TYPE) IN BLOOD: NORMAL
ALBUMIN SERPL BCP-MCNC: 2.6 G/DL (ref 3.4–5)
ALP SERPL-CCNC: 82 U/L (ref 33–136)
ALT SERPL W P-5'-P-CCNC: 8 U/L (ref 10–52)
ANION GAP BLDA CALCULATED.4IONS-SCNC: 12 MMO/L (ref 10–25)
ANION GAP SERPL CALC-SCNC: 17 MMOL/L (ref 10–20)
ANTIBODY SCREEN: NORMAL
APTT PPP: 31 SECONDS (ref 27–38)
AST SERPL W P-5'-P-CCNC: 25 U/L (ref 9–39)
ATRIAL RATE: 94 BPM
BACTERIA UR CULT: NO GROWTH
BASE EXCESS BLDA CALC-SCNC: -0.7 MMOL/L (ref -2–3)
BASOPHILS # BLD AUTO: 0.14 X10*3/UL (ref 0–0.1)
BASOPHILS NFR BLD AUTO: 1.1 %
BILIRUB SERPL-MCNC: 0.6 MG/DL (ref 0–1.2)
BLOOD EXPIRATION DATE: NORMAL
BLOOD EXPIRATION DATE: NORMAL
BODY TEMPERATURE: 37 DEGREES CELSIUS
BUN SERPL-MCNC: 35 MG/DL (ref 6–23)
CA-I BLDA-SCNC: 1.19 MMOL/L (ref 1.1–1.33)
CALCIUM SERPL-MCNC: 9 MG/DL (ref 8.6–10.6)
CHLORIDE BLDA-SCNC: 104 MMOL/L (ref 98–107)
CHLORIDE SERPL-SCNC: 102 MMOL/L (ref 98–107)
CO2 SERPL-SCNC: 24 MMOL/L (ref 21–32)
CREAT SERPL-MCNC: 1.52 MG/DL (ref 0.5–1.3)
DISPENSE STATUS: NORMAL
DISPENSE STATUS: NORMAL
EGFRCR SERPLBLD CKD-EPI 2021: 48 ML/MIN/1.73M*2
EOSINOPHIL # BLD AUTO: 0.34 X10*3/UL (ref 0–0.4)
EOSINOPHIL NFR BLD AUTO: 2.6 %
ERYTHROCYTE [DISTWIDTH] IN BLOOD BY AUTOMATED COUNT: 17.8 % (ref 11.5–14.5)
GLUCOSE BLDA-MCNC: 136 MG/DL (ref 74–99)
GLUCOSE SERPL-MCNC: 107 MG/DL (ref 74–99)
HCO3 BLDA-SCNC: 24.2 MMOL/L (ref 22–26)
HCT VFR BLD AUTO: 25.5 % (ref 41–52)
HCT VFR BLD EST: 27 % (ref 41–52)
HGB BLD-MCNC: 7.3 G/DL (ref 13.5–17.5)
HGB BLDA-MCNC: 9.1 G/DL (ref 13.5–17.5)
HOLD SPECIMEN: NORMAL
IMM GRANULOCYTES # BLD AUTO: 0.13 X10*3/UL (ref 0–0.5)
IMM GRANULOCYTES NFR BLD AUTO: 1 % (ref 0–0.9)
INHALED O2 CONCENTRATION: 60 %
INR PPP: 1.4 (ref 0.9–1.1)
LACTATE BLDA-SCNC: 1.6 MMOL/L (ref 0.4–2)
LYMPHOCYTES # BLD AUTO: 1.6 X10*3/UL (ref 0.8–3)
LYMPHOCYTES NFR BLD AUTO: 12.4 %
MAGNESIUM SERPL-MCNC: 2.66 MG/DL (ref 1.6–2.4)
MCH RBC QN AUTO: 27.3 PG (ref 26–34)
MCHC RBC AUTO-ENTMCNC: 28.6 G/DL (ref 32–36)
MCV RBC AUTO: 96 FL (ref 80–100)
MONOCYTES # BLD AUTO: 0.86 X10*3/UL (ref 0.05–0.8)
MONOCYTES NFR BLD AUTO: 6.6 %
NEUTROPHILS # BLD AUTO: 9.88 X10*3/UL (ref 1.6–5.5)
NEUTROPHILS NFR BLD AUTO: 76.3 %
NRBC BLD-RTO: 0.2 /100 WBCS (ref 0–0)
OXYHGB MFR BLDA: 96.1 % (ref 94–98)
P AXIS: -13 DEGREES
P OFFSET: 195 MS
P ONSET: 147 MS
PCO2 BLDA: 40 MM HG (ref 38–42)
PH BLDA: 7.39 PH (ref 7.38–7.42)
PLATELET # BLD AUTO: 702 X10*3/UL (ref 150–450)
PO2 BLDA: 230 MM HG (ref 85–95)
POTASSIUM BLDA-SCNC: 5 MMOL/L (ref 3.5–5.3)
POTASSIUM SERPL-SCNC: 4.9 MMOL/L (ref 3.5–5.3)
PR INTERVAL: 142 MS
PRODUCT BLOOD TYPE: 6200
PRODUCT BLOOD TYPE: 6200
PRODUCT CODE: NORMAL
PRODUCT CODE: NORMAL
PROT SERPL-MCNC: 6.2 G/DL (ref 6.4–8.2)
PROTHROMBIN TIME: 16.2 SECONDS (ref 9.8–12.8)
Q ONSET: 218 MS
QRS COUNT: 15 BEATS
QRS DURATION: 102 MS
QT INTERVAL: 386 MS
QTC CALCULATION(BAZETT): 482 MS
QTC FREDERICIA: 448 MS
R AXIS: 15 DEGREES
RBC # BLD AUTO: 2.67 X10*6/UL (ref 4.5–5.9)
RH FACTOR (ANTIGEN D): NORMAL
SAO2 % BLDA: 99 % (ref 94–100)
SODIUM BLDA-SCNC: 135 MMOL/L (ref 136–145)
SODIUM SERPL-SCNC: 138 MMOL/L (ref 136–145)
T AXIS: 43 DEGREES
T OFFSET: 411 MS
UNIT ABO: NORMAL
UNIT ABO: NORMAL
UNIT NUMBER: NORMAL
UNIT NUMBER: NORMAL
UNIT RH: NORMAL
UNIT RH: NORMAL
UNIT VOLUME: 350
UNIT VOLUME: 350
VENTRICULAR RATE: 94 BPM
WBC # BLD AUTO: 13 X10*3/UL (ref 4.4–11.3)
XM INTEP: NORMAL
XM INTEP: NORMAL

## 2025-02-07 PROCEDURE — 82435 ASSAY OF BLOOD CHLORIDE: CPT

## 2025-02-07 PROCEDURE — 2720000007 HC OR 272 NO HCPCS: Performed by: ORTHOPAEDIC SURGERY

## 2025-02-07 PROCEDURE — 71045 X-RAY EXAM CHEST 1 VIEW: CPT | Performed by: RADIOLOGY

## 2025-02-07 PROCEDURE — 99233 SBSQ HOSP IP/OBS HIGH 50: CPT | Performed by: HOSPITALIST

## 2025-02-07 PROCEDURE — 2500000002 HC RX 250 W HCPCS SELF ADMINISTERED DRUGS (ALT 637 FOR MEDICARE OP, ALT 636 FOR OP/ED)

## 2025-02-07 PROCEDURE — 71045 X-RAY EXAM CHEST 1 VIEW: CPT

## 2025-02-07 PROCEDURE — 2500000001 HC RX 250 WO HCPCS SELF ADMINISTERED DRUGS (ALT 637 FOR MEDICARE OP)

## 2025-02-07 PROCEDURE — 2500000004 HC RX 250 GENERAL PHARMACY W/ HCPCS (ALT 636 FOR OP/ED)

## 2025-02-07 PROCEDURE — 1200000003 HC ONCOLOGY  ROOM WITH TELEMETRY DAILY

## 2025-02-07 PROCEDURE — 2780000003 HC OR 278 NO HCPCS: Performed by: ORTHOPAEDIC SURGERY

## 2025-02-07 PROCEDURE — 2500000005 HC RX 250 GENERAL PHARMACY W/O HCPCS: Performed by: ORTHOPAEDIC SURGERY

## 2025-02-07 PROCEDURE — 0JC70ZZ EXTIRPATION OF MATTER FROM BACK SUBCUTANEOUS TISSUE AND FASCIA, OPEN APPROACH: ICD-10-PCS | Performed by: ORTHOPAEDIC SURGERY

## 2025-02-07 PROCEDURE — 85025 COMPLETE CBC W/AUTO DIFF WBC: CPT

## 2025-02-07 PROCEDURE — 3600000007 HC OR TIME - EACH INCREMENTAL 1 MINUTE - PROCEDURE LEVEL TWO: Performed by: ORTHOPAEDIC SURGERY

## 2025-02-07 PROCEDURE — 87077 CULTURE AEROBIC IDENTIFY: CPT | Performed by: ORTHOPAEDIC SURGERY

## 2025-02-07 PROCEDURE — 7100000002 HC RECOVERY ROOM TIME - EACH INCREMENTAL 1 MINUTE: Performed by: ORTHOPAEDIC SURGERY

## 2025-02-07 PROCEDURE — 3700000001 HC GENERAL ANESTHESIA TIME - INITIAL BASE CHARGE: Performed by: ORTHOPAEDIC SURGERY

## 2025-02-07 PROCEDURE — 80053 COMPREHEN METABOLIC PANEL: CPT

## 2025-02-07 PROCEDURE — 3600000002 HC OR TIME - INITIAL BASE CHARGE - PROCEDURE LEVEL TWO: Performed by: ORTHOPAEDIC SURGERY

## 2025-02-07 PROCEDURE — 36430 TRANSFUSION BLD/BLD COMPNT: CPT

## 2025-02-07 PROCEDURE — 30233N1 TRANSFUSION OF NONAUTOLOGOUS RED BLOOD CELLS INTO PERIPHERAL VEIN, PERCUTANEOUS APPROACH: ICD-10-PCS | Performed by: HOSPITALIST

## 2025-02-07 PROCEDURE — 84132 ASSAY OF SERUM POTASSIUM: CPT

## 2025-02-07 PROCEDURE — P9040 RBC LEUKOREDUCED IRRADIATED: HCPCS

## 2025-02-07 PROCEDURE — 2500000004 HC RX 250 GENERAL PHARMACY W/ HCPCS (ALT 636 FOR OP/ED): Performed by: ORTHOPAEDIC SURGERY

## 2025-02-07 PROCEDURE — 83735 ASSAY OF MAGNESIUM: CPT

## 2025-02-07 PROCEDURE — P9045 ALBUMIN (HUMAN), 5%, 250 ML: HCPCS | Mod: JZ,TB

## 2025-02-07 PROCEDURE — 36415 COLL VENOUS BLD VENIPUNCTURE: CPT

## 2025-02-07 PROCEDURE — 93005 ELECTROCARDIOGRAM TRACING: CPT

## 2025-02-07 PROCEDURE — S0109 METHADONE ORAL 5MG: HCPCS

## 2025-02-07 PROCEDURE — 22015 I&D ABSCESS P-SPINE L/S/LS: CPT | Performed by: ORTHOPAEDIC SURGERY

## 2025-02-07 PROCEDURE — 93010 ELECTROCARDIOGRAM REPORT: CPT | Performed by: INTERNAL MEDICINE

## 2025-02-07 PROCEDURE — 87102 FUNGUS ISOLATION CULTURE: CPT | Performed by: ORTHOPAEDIC SURGERY

## 2025-02-07 PROCEDURE — 86901 BLOOD TYPING SEROLOGIC RH(D): CPT

## 2025-02-07 PROCEDURE — 0QB00ZZ EXCISION OF LUMBAR VERTEBRA, OPEN APPROACH: ICD-10-PCS | Performed by: ORTHOPAEDIC SURGERY

## 2025-02-07 PROCEDURE — 7100000001 HC RECOVERY ROOM TIME - INITIAL BASE CHARGE: Performed by: ORTHOPAEDIC SURGERY

## 2025-02-07 PROCEDURE — 3700000002 HC GENERAL ANESTHESIA TIME - EACH INCREMENTAL 1 MINUTE: Performed by: ORTHOPAEDIC SURGERY

## 2025-02-07 PROCEDURE — 85610 PROTHROMBIN TIME: CPT

## 2025-02-07 PROCEDURE — 82436 ASSAY OF URINE CHLORIDE: CPT

## 2025-02-07 RX ORDER — PROPOFOL 10 MG/ML
INJECTION, EMULSION INTRAVENOUS AS NEEDED
Status: DISCONTINUED | OUTPATIENT
Start: 2025-02-07 | End: 2025-02-07

## 2025-02-07 RX ORDER — ALBUMIN HUMAN 50 G/1000ML
SOLUTION INTRAVENOUS AS NEEDED
Status: DISCONTINUED | OUTPATIENT
Start: 2025-02-07 | End: 2025-02-07

## 2025-02-07 RX ORDER — PHENYLEPHRINE 10 MG/250 ML(40 MCG/ML)IN 0.9 % SOD.CHLORIDE INTRAVENOUS
CONTINUOUS PRN
Status: DISCONTINUED | OUTPATIENT
Start: 2025-02-07 | End: 2025-02-07

## 2025-02-07 RX ORDER — MEPERIDINE HYDROCHLORIDE 25 MG/ML
12.5 INJECTION INTRAMUSCULAR; INTRAVENOUS; SUBCUTANEOUS EVERY 10 MIN PRN
Status: DISCONTINUED | OUTPATIENT
Start: 2025-02-07 | End: 2025-02-07 | Stop reason: HOSPADM

## 2025-02-07 RX ORDER — HYDROMORPHONE HYDROCHLORIDE 1 MG/ML
INJECTION, SOLUTION INTRAMUSCULAR; INTRAVENOUS; SUBCUTANEOUS AS NEEDED
Status: DISCONTINUED | OUTPATIENT
Start: 2025-02-07 | End: 2025-02-07

## 2025-02-07 RX ORDER — SODIUM CHLORIDE, SODIUM LACTATE, POTASSIUM CHLORIDE, CALCIUM CHLORIDE 600; 310; 30; 20 MG/100ML; MG/100ML; MG/100ML; MG/100ML
100 INJECTION, SOLUTION INTRAVENOUS CONTINUOUS
Status: DISCONTINUED | OUTPATIENT
Start: 2025-02-07 | End: 2025-02-07 | Stop reason: HOSPADM

## 2025-02-07 RX ORDER — LABETALOL HYDROCHLORIDE 5 MG/ML
5 INJECTION, SOLUTION INTRAVENOUS ONCE AS NEEDED
Status: DISCONTINUED | OUTPATIENT
Start: 2025-02-07 | End: 2025-02-07 | Stop reason: HOSPADM

## 2025-02-07 RX ORDER — FENTANYL CITRATE 50 UG/ML
INJECTION, SOLUTION INTRAMUSCULAR; INTRAVENOUS AS NEEDED
Status: DISCONTINUED | OUTPATIENT
Start: 2025-02-07 | End: 2025-02-07

## 2025-02-07 RX ORDER — VANCOMYCIN HYDROCHLORIDE 1 G/20ML
INJECTION, POWDER, LYOPHILIZED, FOR SOLUTION INTRAVENOUS AS NEEDED
Status: DISCONTINUED | OUTPATIENT
Start: 2025-02-07 | End: 2025-02-07 | Stop reason: HOSPADM

## 2025-02-07 RX ORDER — OXYCODONE HYDROCHLORIDE 5 MG/1
5 TABLET ORAL EVERY 4 HOURS PRN
Status: DISCONTINUED | OUTPATIENT
Start: 2025-02-07 | End: 2025-02-07 | Stop reason: HOSPADM

## 2025-02-07 RX ORDER — VANCOMYCIN HYDROCHLORIDE 1 G/20ML
INJECTION, POWDER, LYOPHILIZED, FOR SOLUTION INTRAVENOUS DAILY PRN
Status: DISPENSED | OUTPATIENT
Start: 2025-02-07

## 2025-02-07 RX ORDER — PETROLATUM 420 MG/G
OINTMENT TOPICAL DAILY
Status: DISPENSED | OUTPATIENT
Start: 2025-02-08

## 2025-02-07 RX ORDER — HYDROMORPHONE HYDROCHLORIDE 0.2 MG/ML
0.2 INJECTION INTRAMUSCULAR; INTRAVENOUS; SUBCUTANEOUS EVERY 5 MIN PRN
Status: DISCONTINUED | OUTPATIENT
Start: 2025-02-07 | End: 2025-02-07 | Stop reason: HOSPADM

## 2025-02-07 RX ORDER — CEFAZOLIN 1 G/1
INJECTION, POWDER, FOR SOLUTION INTRAVENOUS AS NEEDED
Status: DISCONTINUED | OUTPATIENT
Start: 2025-02-07 | End: 2025-02-07

## 2025-02-07 RX ORDER — LIDOCAINE HCL/PF 100 MG/5ML
SYRINGE (ML) INTRAVENOUS AS NEEDED
Status: DISCONTINUED | OUTPATIENT
Start: 2025-02-07 | End: 2025-02-07

## 2025-02-07 RX ORDER — LIDOCAINE HYDROCHLORIDE 10 MG/ML
0.1 INJECTION, SOLUTION INFILTRATION; PERINEURAL ONCE
Status: DISCONTINUED | OUTPATIENT
Start: 2025-02-07 | End: 2025-02-07 | Stop reason: HOSPADM

## 2025-02-07 RX ORDER — MIDAZOLAM HYDROCHLORIDE 1 MG/ML
1 INJECTION INTRAMUSCULAR; INTRAVENOUS ONCE AS NEEDED
Status: DISCONTINUED | OUTPATIENT
Start: 2025-02-07 | End: 2025-02-07 | Stop reason: HOSPADM

## 2025-02-07 RX ORDER — VANCOMYCIN HYDROCHLORIDE 1 G/20ML
INJECTION, POWDER, LYOPHILIZED, FOR SOLUTION INTRAVENOUS DAILY PRN
Status: DISCONTINUED | OUTPATIENT
Start: 2025-02-07 | End: 2025-02-07 | Stop reason: SDUPTHER

## 2025-02-07 RX ORDER — ROCURONIUM BROMIDE 10 MG/ML
INJECTION, SOLUTION INTRAVENOUS AS NEEDED
Status: DISCONTINUED | OUTPATIENT
Start: 2025-02-07 | End: 2025-02-07

## 2025-02-07 RX ORDER — ALBUTEROL SULFATE 0.83 MG/ML
2.5 SOLUTION RESPIRATORY (INHALATION) ONCE AS NEEDED
Status: DISCONTINUED | OUTPATIENT
Start: 2025-02-07 | End: 2025-02-07 | Stop reason: HOSPADM

## 2025-02-07 RX ORDER — ACETAMINOPHEN 325 MG/1
650 TABLET ORAL ONCE
Status: DISCONTINUED | OUTPATIENT
Start: 2025-02-07 | End: 2025-02-07 | Stop reason: HOSPADM

## 2025-02-07 RX ORDER — PHENYLEPHRINE HYDROCHLORIDE 10 MG/ML
INJECTION INTRAVENOUS AS NEEDED
Status: DISCONTINUED | OUTPATIENT
Start: 2025-02-07 | End: 2025-02-07

## 2025-02-07 RX ORDER — VASOPRESSIN 20 [USP'U]/ML
INJECTION, SOLUTION INTRAVENOUS AS NEEDED
Status: DISCONTINUED | OUTPATIENT
Start: 2025-02-07 | End: 2025-02-07

## 2025-02-07 RX ORDER — METHADONE HYDROCHLORIDE 10 MG/1
TABLET ORAL AS NEEDED
Status: DISCONTINUED | OUTPATIENT
Start: 2025-02-07 | End: 2025-02-07

## 2025-02-07 RX ORDER — SODIUM CHLORIDE, SODIUM LACTATE, POTASSIUM CHLORIDE, CALCIUM CHLORIDE 600; 310; 30; 20 MG/100ML; MG/100ML; MG/100ML; MG/100ML
INJECTION, SOLUTION INTRAVENOUS CONTINUOUS PRN
Status: DISCONTINUED | OUTPATIENT
Start: 2025-02-07 | End: 2025-02-07

## 2025-02-07 RX ORDER — ONDANSETRON HYDROCHLORIDE 2 MG/ML
4 INJECTION, SOLUTION INTRAVENOUS ONCE AS NEEDED
Status: DISCONTINUED | OUTPATIENT
Start: 2025-02-07 | End: 2025-02-07 | Stop reason: HOSPADM

## 2025-02-07 RX ORDER — ONDANSETRON HYDROCHLORIDE 2 MG/ML
INJECTION, SOLUTION INTRAVENOUS AS NEEDED
Status: DISCONTINUED | OUTPATIENT
Start: 2025-02-07 | End: 2025-02-07

## 2025-02-07 RX ORDER — VANCOMYCIN 2 GRAM/500 ML IN 0.9 % SODIUM CHLORIDE INTRAVENOUS
2000 ONCE
Status: COMPLETED | OUTPATIENT
Start: 2025-02-07 | End: 2025-02-07

## 2025-02-07 RX ADMIN — SODIUM CHLORIDE, POTASSIUM CHLORIDE, SODIUM LACTATE AND CALCIUM CHLORIDE: 600; 310; 30; 20 INJECTION, SOLUTION INTRAVENOUS at 12:01

## 2025-02-07 RX ADMIN — ALBUMIN HUMAN 250 ML: 0.05 INJECTION, SOLUTION INTRAVENOUS at 12:47

## 2025-02-07 RX ADMIN — OXYCODONE HYDROCHLORIDE 10 MG: 10 TABLET ORAL at 10:22

## 2025-02-07 RX ADMIN — PHENYLEPHRINE-NACL IV SOLUTION 10 MG/250ML-0.9% 0.3 MCG/KG/MIN: 10-0.9/25 SOLUTION at 12:40

## 2025-02-07 RX ADMIN — ROCURONIUM BROMIDE 10 MG: 10 INJECTION INTRAVENOUS at 13:00

## 2025-02-07 RX ADMIN — PIPERACILLIN SODIUM AND TAZOBACTAM SODIUM 3.38 G: 3; .375 INJECTION, SOLUTION INTRAVENOUS at 23:31

## 2025-02-07 RX ADMIN — ALBUMIN HUMAN 250 ML: 0.05 INJECTION, SOLUTION INTRAVENOUS at 13:21

## 2025-02-07 RX ADMIN — FAMOTIDINE 20 MG: 20 TABLET ORAL at 20:53

## 2025-02-07 RX ADMIN — VASOPRESSIN 1 UNITS: 20 INJECTION INTRAVENOUS at 13:06

## 2025-02-07 RX ADMIN — PROPOFOL 50 MG: 10 INJECTION, EMULSION INTRAVENOUS at 14:11

## 2025-02-07 RX ADMIN — PHENYLEPHRINE HYDROCHLORIDE 160 MCG: 10 INJECTION INTRAVENOUS at 12:14

## 2025-02-07 RX ADMIN — ROCURONIUM BROMIDE 50 MG: 10 INJECTION INTRAVENOUS at 12:06

## 2025-02-07 RX ADMIN — METHADONE HYDROCHLORIDE 20 MG: 10 TABLET ORAL at 11:33

## 2025-02-07 RX ADMIN — CEFAZOLIN 3 G: 1 INJECTION, POWDER, FOR SOLUTION INTRAMUSCULAR; INTRAVENOUS at 12:55

## 2025-02-07 RX ADMIN — ONDANSETRON 4 MG: 2 INJECTION, SOLUTION INTRAMUSCULAR; INTRAVENOUS at 13:30

## 2025-02-07 RX ADMIN — PHENYLEPHRINE HYDROCHLORIDE 120 MCG: 10 INJECTION INTRAVENOUS at 12:11

## 2025-02-07 RX ADMIN — SENNOSIDES AND DOCUSATE SODIUM 2 TABLET: 50; 8.6 TABLET ORAL at 09:47

## 2025-02-07 RX ADMIN — FAMOTIDINE 20 MG: 20 TABLET ORAL at 09:47

## 2025-02-07 RX ADMIN — LIDOCAINE HYDROCHLORIDE 30 MG: 20 INJECTION INTRAVENOUS at 12:06

## 2025-02-07 RX ADMIN — ATORVASTATIN CALCIUM 40 MG: 40 TABLET, FILM COATED ORAL at 20:53

## 2025-02-07 RX ADMIN — HYDROMORPHONE HYDROCHLORIDE 0.2 MG: 1 INJECTION, SOLUTION INTRAMUSCULAR; INTRAVENOUS; SUBCUTANEOUS at 15:05

## 2025-02-07 RX ADMIN — VASOPRESSIN 1 UNITS: 20 INJECTION INTRAVENOUS at 12:34

## 2025-02-07 RX ADMIN — DEXAMETHASONE SODIUM PHOSPHATE 6 MG: 4 INJECTION INTRA-ARTICULAR; INTRALESIONAL; INTRAMUSCULAR; INTRAVENOUS; SOFT TISSUE at 12:36

## 2025-02-07 RX ADMIN — PHENYLEPHRINE HYDROCHLORIDE 120 MCG: 10 INJECTION INTRAVENOUS at 13:01

## 2025-02-07 RX ADMIN — FERROUS SULFATE TAB 325 MG (65 MG ELEMENTAL FE) 325 MG: 325 (65 FE) TAB at 09:47

## 2025-02-07 RX ADMIN — PROPOFOL 150 MG: 10 INJECTION, EMULSION INTRAVENOUS at 12:06

## 2025-02-07 RX ADMIN — FENTANYL CITRATE 100 MCG: 50 INJECTION, SOLUTION INTRAMUSCULAR; INTRAVENOUS at 12:06

## 2025-02-07 RX ADMIN — Medication 2000 MG: at 18:44

## 2025-02-07 RX ADMIN — TORSEMIDE 20 MG: 20 TABLET ORAL at 09:47

## 2025-02-07 RX ADMIN — PHENYLEPHRINE HYDROCHLORIDE 160 MCG: 10 INJECTION INTRAVENOUS at 12:38

## 2025-02-07 RX ADMIN — SENNOSIDES AND DOCUSATE SODIUM 2 TABLET: 50; 8.6 TABLET ORAL at 20:53

## 2025-02-07 RX ADMIN — VASOPRESSIN 1 UNITS: 20 INJECTION INTRAVENOUS at 12:25

## 2025-02-07 RX ADMIN — PHENYLEPHRINE HYDROCHLORIDE 160 MCG: 10 INJECTION INTRAVENOUS at 12:44

## 2025-02-07 RX ADMIN — SUGAMMADEX 400 MG: 100 INJECTION, SOLUTION INTRAVENOUS at 14:50

## 2025-02-07 RX ADMIN — PIPERACILLIN SODIUM AND TAZOBACTAM SODIUM 3.38 G: 3; .375 INJECTION, SOLUTION INTRAVENOUS at 17:56

## 2025-02-07 RX ADMIN — VASOPRESSIN 1 UNITS: 20 INJECTION INTRAVENOUS at 12:36

## 2025-02-07 SDOH — HEALTH STABILITY: MENTAL HEALTH: CURRENT SMOKER: 0

## 2025-02-07 ASSESSMENT — COGNITIVE AND FUNCTIONAL STATUS - GENERAL
DRESSING REGULAR LOWER BODY CLOTHING: A LOT
STANDING UP FROM CHAIR USING ARMS: TOTAL
WALKING IN HOSPITAL ROOM: TOTAL
EATING MEALS: A LITTLE
PERSONAL GROOMING: A LOT
PERSONAL GROOMING: A LOT
MOBILITY SCORE: 7
TURNING FROM BACK TO SIDE WHILE IN FLAT BAD: TOTAL
DRESSING REGULAR UPPER BODY CLOTHING: A LOT
TOILETING: A LOT
TOILETING: A LOT
MOVING FROM LYING ON BACK TO SITTING ON SIDE OF FLAT BED WITH BEDRAILS: A LOT
HELP NEEDED FOR BATHING: A LOT
STANDING UP FROM CHAIR USING ARMS: TOTAL
MOBILITY SCORE: 7
MOVING TO AND FROM BED TO CHAIR: TOTAL
TURNING FROM BACK TO SIDE WHILE IN FLAT BAD: TOTAL
HELP NEEDED FOR BATHING: A LOT
CLIMB 3 TO 5 STEPS WITH RAILING: TOTAL
EATING MEALS: A LITTLE
DRESSING REGULAR UPPER BODY CLOTHING: A LOT
MOVING TO AND FROM BED TO CHAIR: TOTAL
DRESSING REGULAR LOWER BODY CLOTHING: A LOT
DAILY ACTIVITIY SCORE: 13
MOVING FROM LYING ON BACK TO SITTING ON SIDE OF FLAT BED WITH BEDRAILS: A LOT
CLIMB 3 TO 5 STEPS WITH RAILING: TOTAL
WALKING IN HOSPITAL ROOM: TOTAL
DAILY ACTIVITIY SCORE: 13

## 2025-02-07 ASSESSMENT — PAIN SCALES - GENERAL
PAINLEVEL_OUTOF10: 0 - NO PAIN
PAINLEVEL_OUTOF10: 8
PAINLEVEL_OUTOF10: 8
PAINLEVEL_OUTOF10: 0 - NO PAIN

## 2025-02-07 ASSESSMENT — PAIN - FUNCTIONAL ASSESSMENT
PAIN_FUNCTIONAL_ASSESSMENT: 0-10

## 2025-02-07 NOTE — DISCHARGE INSTRUCTIONS
**Please call Kat Hannon RN (at [phone]) or Dr. Yana Atkins (at [phone]) for any          Postoperative lumbar spine questions or concerns.

## 2025-02-07 NOTE — CONSULTS
Orthopaedic Surgery Consult H&P    HPI:   Orthopaedic Problems/Injuries: Post op bleeding  Other Injuries: none    74M (HTN, GERD, PVD, cellulitis, former smoker) w compressive L4 lesion secondary to RCC metastasis, now s/p L2-S1 PSIF, L3-5 decompression w Dr. Atkins 1/17, presents from clinic w wound bleeding.  He has been nonambulatory since surgery but noticed that he has been having weakness and increased fatigue the past 3 to 4 days.  His sister also noticed that he had been more confused and forgetful.  There was no concern about bleeding until he presented in clinic for follow-up today, where the team noticed that his dressing was saturated.  They recommended he present to the ED.  He denies any paresthesias or bowel or bladder symptoms.    PMH: per above/EMR  PSH: per above/EMR  SocHx:      -  Denies tobacco use      -  Denies EtOH use      -  Denies other drug use  FamHx:  Non-contributory to this patient's acute orthopaedic problem.   Allergies: Reviewed in EMR  Meds: Reviewed in EMR    ROS      - 14 point ROS negative except as above    Physical Exam:  Gen: AOx3, NAD  HEENT: normocephalic atraumatic  Psych: appropriate mood and affect  Resp: nonlabored breathing  Cardiac: Extremities WWP, RRR to peripheral palpation  Neuro: CN 2-12 grossly intact  Skin: no rashes    Spine Exam:    C5: SILT   Deltoid 5/5 Left; 5/5 Right  C6: SILT   Wrist Ext: 5/5 Left; 5/5 Right  C7: SILT   Triceps: 5/5 Left; 5/5 Right  C8: SILT   Finger flexion: 5/5 Left; 5/5 Right  T1: SILT    Interossei: 5/5 Left; 5/5 Right    Bicep Reflex 2+   Bilaterally  Last: Negative    L1: SILT       L2: SILT      Hip flexors 2/5 Left; 2/5 Right  L3: SILT      Knee extension 3/5 Left; 3/5 Right  L4: SILT      Tib Ant. (Dorsiflexion) 5/5 Left; 5/5 Right  L5: SILT      EHL 5/5 Left; 5/5 Right  S1: SILT      Plantarflexion 5/5 Left; 5/5 Right    Patellar reflex: 2+   Bilaterally    Babinkski: Intact  No clonus    A full secondary exam was performed  and all relevant findings discussed and noted above.    Imaging:  X-rays obtained in clinic shows possible progression of his lesion in the L5 vertebral body compared to his MRI 1/8/2025.    Assessment:  Orthopaedic Problems/Injuries: Wound dehiscence    74M (HTN, GERD, PVD, cellulitis, former smoker) w compressive L4 lesion secondary to RCC metastasis, now s/p L2-S1 PSIF, L3-5 decompression w Dr. Atkins 1/17, presents from clinic w wound bleeding. 2/5 HF, 3/5 KE, ow 5/5. SILT. No UMN signs. 1-2 cm dehiscence of distal aspect of incision w drainage.     Plan:  -Patient has consented and posted for irrigation and debridement, as well as wound closure 2/7 with Dr. Atkins.  - WB: Bearing as tolerated  - Abx: Perioperative  - Diet: N.p.o. at midnight  - DVT: Hold all chemoppx  - Please obtain all preop labs (CBC,BMP,EKG, CXR, Coags, Type and Screen)  - Cho: Per primary    - Dispo: Pending operative course    Bill Bravo MD  PGY-1 Orthopaedic Surgery  On-call Resident    This patient was seen within 30 minutes of initial consult. This consult was staffed with the his orthopedic spine attending, Dr. Atkins  _________________________________________________________    This patient will be followed by the Ortho Spine Team while inpatient. See team members and contacts below:     Ortho Spine  First Call: Mariano Hendrix, PGY-2  Second Call: Lew Abad, PGY-4    I saw and evaluated the patient.  I personally obtained the key and critical portions of the history and physical exam or was physically present for key and critical portions performed by the Resident. I reviewed the documentation and discussed the patient with the Resident.  I agree with the Resident’s medical decision making as documented in the note.    Patient seen in clinic for post op follow up s/p L2-S1 fusion and L3-5 decompression on 1/17. Presents with saturated dressing and dehiscences of wound. Patient sent to ED for infection work up and admitted  for plan for surgical I&D of wound

## 2025-02-07 NOTE — ANESTHESIA PROCEDURE NOTES
Airway  Date/Time: 2/7/2025 12:09 PM  Urgency: elective    Airway not difficult    Staffing  Performed: CRNA   Authorized by: Armani Hernandez MD    Performed by: HUSSEIN Caal-CRNA, ANA  Patient location during procedure: OR    Indications and Patient Condition  Indications for airway management: anesthesia  Spontaneous Ventilation: absent  Sedation level: deep  Preoxygenated: yes  Patient position: sniffing  Mask difficulty assessment: 2 - vent by mask + OA or adjuvant +/- NMBA  Planned trial extubation    Final Airway Details  Final airway type: endotracheal airway      Successful airway: ETT  Cuffed: yes   Successful intubation technique: video laryngoscopy  Facilitating devices/methods: intubating stylet  Endotracheal tube insertion site: oral  Blade: Cristobal  Blade size: #4  ETT size (mm): 7.5  Cormack-Lehane Classification: grade IIa - partial view of glottis  Placement verified by: chest auscultation and capnometry   Placement verification comments: (VL)  Cuff volume (mL): 6  Measured from: lips  ETT to lips (cm): 23  Number of attempts at approach: 1  Ventilation between attempts: none

## 2025-02-07 NOTE — PROGRESS NOTES
"Communication Note    Patient Name: Abdi Retana \"Matt\"  MRN: [id number]  Today's Date: 2/7/2025   Room: [address] [place] [address]A    Discipline: Occupational Therapy      Missed Visit Reason: Patient placed on medical hold (0949: Plan for OR today. Defer OT eval.)      02/07/25 at 9:50 AM   Jessica English OT   Rehab Office: [phone]     "

## 2025-02-07 NOTE — ANESTHESIA POSTPROCEDURE EVALUATION
"Patient: Abdi Retana \"Ray\"    Procedure Summary       Date: 02/07/25 Room / Location: [facility] OR 23 / [facility]    Anesthesia Start: 1201 Anesthesia Stop: 1506    Procedure: DEBRIDEMENT, WOUND, LUMBAR REGION (Spine Lumbar) Diagnosis:       Postoperative wound dehiscence, initial encounter      (Postoperative wound dehiscence, initial encounter [T81.31XA])    Surgeons: Yana Atkins MD Responsible Provider: Armani Hernandez MD    Anesthesia Type: general ASA Status: 3            Anesthesia Type: general    Vitals Value Taken Time   /87 02/07/25 1508   Temp 36.1 °C (97 °F) 02/07/25 1505   Pulse 77 02/07/25 1511   Resp 12 02/07/25 1511   SpO2 100 % 02/07/25 1511   Vitals shown include unfiled device data.    Anesthesia Post Evaluation    Patient location during evaluation: PACU  Patient participation: complete - patient participated  Level of consciousness: awake and alert  Pain management: adequate  Airway patency: patent  Cardiovascular status: acceptable  Respiratory status: acceptable  Hydration status: acceptable  Postoperative Nausea and Vomiting: none        There were no known notable events for this encounter.    "

## 2025-02-07 NOTE — PROGRESS NOTES
"Orthopaedic Surgery Progress Note    Subjective:  No acute events overnight. History fairly limited by patient confusion this morning. States he feels \"beat up\".    Objective:  /57 (BP Location: Left arm, Patient Position: Lying)   Pulse 94   Temp 35.6 °C (96.1 °F) (Temporal)   Resp 18   Ht 1.88 m (6' 2\")   Wt 121 kg (266 lb)   SpO2 96%   BMI 34.15 kg/m²     Gen: arousable, NAD, intermittently confused  Cardiac: RRR to peripheral palpation  Resp: nonlabored on RA  GI: soft, nondistended    MSK:  C5: SILT   Deltoid 5/5 Left; 5/5 Right  C6: SILT   Wrist Ext: 5/5 Left; 5/5 Right  C7: SILT   Triceps: 5/5 Left; 5/5 Right  C8: SILT   Finger flexion: 5/5 Left; 5/5 Right  T1: SILT    Interossei: 5/5 Left; 5/5 Right     L1: SILT       L2: SILT      Hip flexors 2/5 Left; 2/5 Right  L3: SILT      Knee extension 3/5 Left; 3/5 Right  L4: SILT      Tib Ant. (Dorsiflexion) 5/5 Left; 5/5 Right  L5: SILT      EHL 5/5 Left; 5/5 Right  S1: SILT      Plantarflexion 5/5 Left; 5/5 Right    Results for orders placed or performed during the hospital encounter of 02/06/25 (from the past 24 hours)   CBC and Auto Differential   Result Value Ref Range    WBC 14.0 (H) 4.4 - 11.3 x10*3/uL    nRBC 0.0 0.0 - 0.0 /100 WBCs    RBC 2.78 (L) 4.50 - 5.90 x10*6/uL    Hemoglobin 7.4 (L) 13.5 - 17.5 g/dL    Hematocrit 24.4 (L) 41.0 - 52.0 %    MCV 88 80 - 100 fL    MCH 26.6 26.0 - 34.0 pg    MCHC 30.3 (L) 32.0 - 36.0 g/dL    RDW 17.4 (H) 11.5 - 14.5 %    Platelets 620 (H) 150 - 450 x10*3/uL    Neutrophils % 78.2 40.0 - 80.0 %    Immature Granulocytes %, Automated 1.0 (H) 0.0 - 0.9 %    Lymphocytes % 10.9 13.0 - 44.0 %    Monocytes % 5.8 2.0 - 10.0 %    Eosinophils % 3.1 0.0 - 6.0 %    Basophils % 1.0 0.0 - 2.0 %    Neutrophils Absolute 10.94 (H) 1.60 - 5.50 x10*3/uL    Immature Granulocytes Absolute, Automated 0.14 0.00 - 0.50 x10*3/uL    Lymphocytes Absolute 1.52 0.80 - 3.00 x10*3/uL    Monocytes Absolute 0.81 (H) 0.05 - 0.80 x10*3/uL    " Eosinophils Absolute 0.44 (H) 0.00 - 0.40 x10*3/uL    Basophils Absolute 0.14 (H) 0.00 - 0.10 x10*3/uL   Comprehensive metabolic panel   Result Value Ref Range    Glucose 118 (H) 74 - 99 mg/dL    Sodium 139 136 - 145 mmol/L    Potassium 4.8 3.5 - 5.3 mmol/L    Chloride 99 98 - 107 mmol/L    Bicarbonate 28 21 - 32 mmol/L    Anion Gap 17 10 - 20 mmol/L    Urea Nitrogen 31 (H) 6 - 23 mg/dL    Creatinine 1.39 (H) 0.50 - 1.30 mg/dL    eGFR 53 (L) >60 mL/min/1.73m*2    Calcium 9.1 8.6 - 10.6 mg/dL    Albumin 2.9 (L) 3.4 - 5.0 g/dL    Alkaline Phosphatase 89 33 - 136 U/L    Total Protein 6.9 6.4 - 8.2 g/dL    AST 24 9 - 39 U/L    Bilirubin, Total 0.5 0.0 - 1.2 mg/dL    ALT 10 10 - 52 U/L   Type and Screen   Result Value Ref Range    ABO TYPE A     Rh TYPE POS     ANTIBODY SCREEN NEG    Coagulation Screen   Result Value Ref Range    Protime 17.0 (H) 9.8 - 12.8 seconds    INR 1.5 (H) 0.9 - 1.1    aPTT 34 27 - 38 seconds   PST Top   Result Value Ref Range    Extra Tube Hold for add-ons.    Urine electrolytes   Result Value Ref Range    Sodium, Urine Random 23 mmol/L    Sodium/Creatinine Ratio 23 Not established. mmol/g Creat    Potassium, Urine Random 64 mmol/L    Potassium/Creatinine Ratio 63 Not established mmol/g Creat    Chloride, Urine Random 34 mmol/L    Chloride/Creatinine Ratio 33 23 - 275 mmol/g creat    Creatinine, Urine Random 102.1 20.0 - 370.0 mg/dL   Urinalysis with Reflex Culture and Microscopic   Result Value Ref Range    Color, Urine Yellow Light-Yellow, Yellow, Dark-Yellow    Appearance, Urine Turbid (N) Clear    Specific Gravity, Urine 1.018 1.005 - 1.035    pH, Urine 5.5 5.0, 5.5, 6.0, 6.5, 7.0, 7.5, 8.0    Protein, Urine 20 (TRACE) NEGATIVE, 10 (TRACE), 20 (TRACE) mg/dL    Glucose, Urine Normal Normal mg/dL    Blood, Urine 0.1 (1+) (A) NEGATIVE mg/dL    Ketones, Urine NEGATIVE NEGATIVE mg/dL    Bilirubin, Urine NEGATIVE NEGATIVE mg/dL    Urobilinogen, Urine Normal Normal mg/dL    Nitrite, Urine NEGATIVE  NEGATIVE    Leukocyte Esterase, Urine 500 Pamela/uL (A) NEGATIVE   Extra Urine Gray Tube   Result Value Ref Range    Extra Tube Hold for add-ons.    Microscopic Only, Urine   Result Value Ref Range    WBC, Urine >50 (A) 1-5, NONE /HPF    RBC, Urine >20 (A) NONE, 1-2, 3-5 /HPF    Mucus, Urine FEW Reference range not established. /LPF    Hyaline Casts, Urine 2+ (A) NONE /LPF   Comprehensive metabolic panel   Result Value Ref Range    Glucose 107 (H) 74 - 99 mg/dL    Sodium 138 136 - 145 mmol/L    Potassium 4.9 3.5 - 5.3 mmol/L    Chloride 102 98 - 107 mmol/L    Bicarbonate 24 21 - 32 mmol/L    Anion Gap 17 10 - 20 mmol/L    Urea Nitrogen 35 (H) 6 - 23 mg/dL    Creatinine 1.52 (H) 0.50 - 1.30 mg/dL    eGFR 48 (L) >60 mL/min/1.73m*2    Calcium 9.0 8.6 - 10.6 mg/dL    Albumin 2.6 (L) 3.4 - 5.0 g/dL    Alkaline Phosphatase 82 33 - 136 U/L    Total Protein 6.2 (L) 6.4 - 8.2 g/dL    AST 25 9 - 39 U/L    Bilirubin, Total 0.6 0.0 - 1.2 mg/dL    ALT 8 (L) 10 - 52 U/L   Magnesium   Result Value Ref Range    Magnesium 2.66 (H) 1.60 - 2.40 mg/dL   Coagulation Screen   Result Value Ref Range    Protime 16.2 (H) 9.8 - 12.8 seconds    INR 1.4 (H) 0.9 - 1.1    aPTT 31 27 - 38 seconds       XR chest 1 view   Final Result   No acute cardiopulmonary process is evident.        MACRO:   None        Signed by: Conrado Hilliard 2/6/2025 12:14 PM   Dictation workstation:   [id number]      FL less than 1 hour    (Results Pending)       Assessment/Plan:  74M (HTN, GERD, PVD, cellulitis, former smoker) w compressive L4 lesion secondary to RCC metastasis, now s/p L2-S1 PSIF, L3-5 decompression w Dr. Atkins 1/17, presents from clinic w wound bleeding. 2/5 HF, 3/5 KE, ow 5/5. SILT. No UMN signs. 1-2 cm dehiscence of distal aspect of incision w drainage.      - C/p I&D and wound closure of lumbar wound with Dr. Atkins on 2/7/2025  - Clearance pending AM onc staffing  - WB: WBAT  - Abx: Perioperative  - Diet: N.p.o. at midnight  - DVT: Hold all  chemoppx  - Preop labs and imaging completed  - Cho: Per primary    Mariano Hendrix MD  Orthopedic Surgery PGY-2  [facility]  Pager: [phone]  Available by Epic Chat    While admitted, this patient will be followed by the Ortho Spine Team. Please contact below residents with any questions (available via Epic Chat).     First call: Mariano Hendrix, PGY-2   Second call: Lew Abad, PGY-4  Third call: Anthony Mary, Fellow    I saw and evaluated the patient.  I personally obtained the key and critical portions of the history and physical exam or was physically present for key and critical portions performed by the Resident. I reviewed the documentation and discussed the patient with the Resident.  I agree with the Resident’s medical decision making as documented in the note.    Plan for OR today for I&D

## 2025-02-07 NOTE — PROGRESS NOTES
"Physical Therapy                 Therapy Communication Note    Patient Name: Abdi Retana \"Ray\"  MRN: [id number]  Department: [facility]  Room: [address] [place], [place] [address]  Today's Date: 2/7/2025     Discipline: Physical Therapy    PT Missed Visit: Yes     Missed Visit Reason: Missed Visit Reason: Other (Comment) (per ELLEN Carmen, pt pending OR today for spinal wound I &D; will await post-op orders)    Missed Time: Attempt    Comment: 10:13am  "

## 2025-02-07 NOTE — OP NOTE
"DEBRIDEMENT, WOUND, LUMBAR REGION Operative Note     Date: 2025  OR Location: [facility] OR    Name: Abdi Retana \"Matt\", : 1950, Age: 74 y.o., MRN: [id number], Sex: male    Diagnosis  Pre-op Diagnosis      * Postoperative wound dehiscence, initial encounter [T81.31XA] Post-op Diagnosis     * Postoperative wound dehiscence, initial encounter [T81.31XA]     Procedures    1. Open surgical debridement and excision of lumbar skin, fascia, muscle and bone.   2. Application of incisional wound vac    Surgeons      * Yana Atkins - Primary    Resident/Fellow/Other Assistant:  Surgeons and Role:     * Mariano Hendrix MD - Resident - Assisting    Staff:   Circulator: Katharine  Scrub Person: Afsaneh    Anesthesia Staff: Anesthesiologist: Armani Hernandez MD  CRNA: HUSSEIN Caal-CRNA, ANA  Frontline Breaker: SIL Blount    Procedure Summary  Anesthesia: General  ASA: III  Estimated Blood Loss: 50mL  Intra-op Medications:   Administrations occurring from 1051 to 1336 on 25:   Medication Name Total Dose   polymyxin B 500,000 Units in sodium chloride 0.9% 1,000 mL irrigation 1,500,000 Units   atorvastatin (Lipitor) tablet 40 mg Cannot be calculated   cyclobenzaprine (Flexeril) tablet 5 mg Cannot be calculated   famotidine (Pepcid) tablet 20 mg Cannot be calculated   ferrous sulfate (325 mg ferrous sulfate) tablet 325 mg Cannot be calculated   HYDROmorphone (Dilaudid) injection 0.4 mg Cannot be calculated   magnesium hydroxide (Milk of Magnesia) 400 mg/5 mL suspension 30 mL Cannot be calculated   ondansetron (Zofran) tablet 4 mg Cannot be calculated   oxyCODONE (Roxicodone) immediate release tablet 10 mg Cannot be calculated   oxyCODONE (Roxicodone) immediate release tablet 5 mg Cannot be calculated   polyethylene glycol (Glycolax, Miralax) packet 17 g Cannot be calculated   sennosides-docusate sodium (Ava-Colace) 8.6-50 mg per tablet 2 tablet Cannot be calculated   albumin human 5 % 500 " mL   ceFAZolin (Ancef) vial 1 g 3 g   dexAMETHasone (Decadron) injection 4 mg/mL 6 mg   fentaNYL (Sublimaze) injection 50 mcg/mL 100 mcg   lactated Ringer's infusion Cannot be calculated   lidocaine (cardiac) injection 2% prefilled syringe 30 mg   methadone (Dolophine) tablet 20 mg   ondansetron (Zofran) 2 mg/mL injection 4 mg   phenylephrine (Tomasz-Synephrine) 10 mg/250 mL NS (40 mcg/mL) infusion 4.27 mg   phenylephrine (Tomasz-Synephrine) injection 720 mcg   propofol (Diprivan) injection 10 mg/mL 150 mg   rocuronium (ZeMuron) 50 mg/5 mL injection 60 mg   vasopressin (Vasostrict) injection 20 units 4 Units              Anesthesia Record               Intraprocedure I/O Totals          Intake    PRBC 700.00 mL    Phenylephrine Drip 0.00 mL    The total shown is the total volume documented since Anesthesia Start was filed.    lactated Ringer's 1800.00 mL    Total Intake 2500 mL       Output    Est. Blood Loss 50 mL    Total Output 50 mL       Net    Net Volume 2450 mL          Specimen:   ID Type Source Tests Collected by Time   A : LUMBAR WOUND #1 Swab ABSCESS FUNGAL CULTURE/SMEAR, TISSUE/WOUND CULTURE/SMEAR Yana Atkins MD 2/7/2025 1253   B : LUMBAR WOUND #2 Swab ABSCESS FUNGAL CULTURE/SMEAR, TISSUE/WOUND CULTURE/SMEAR Yana Atkins MD 2/7/2025 1254   C : LUMBAR WOUND #3 Swab ABSCESS FUNGAL CULTURE/SMEAR, TISSUE/WOUND CULTURE/SMEAR Yana Atkins MD 2/7/2025 1255                 Drains and/or Catheters:   Closed/Suction Drain 1 Inferior;Midline Back Accordion 15 Fr. (Active)       Closed/Suction Drain 2 Inferior;Midline Back Accordion 15 Fr. (Active)       [REMOVED] Closed/Suction Drain 1 Posterior Back 15 Fr. (Removed)   Site Description Unable to view 01/20/25 0844   Dressing Status Clean;Dry;Occlusive 01/20/25 0844   Drainage Appearance Bright red 01/17/25 1630   Status To bulb suction 01/17/25 1630   Output (mL) 40 mL 01/20/25 0758       [REMOVED] Closed/Suction Drain 2 Posterior Back 15 Fr. (Removed)  "  Site Description Unable to view 01/20/25 0844   Dressing Status Clean;Dry;Occlusive 01/20/25 0844   Drainage Appearance Bright red 01/17/25 1630   Status To bulb suction 01/17/25 1630   Output (mL) 0 mL 01/20/25 0758       [REMOVED] Urethral Catheter Double-lumen 16 Fr. (Removed)   Site Assessment Other (Comment) 01/04/25 1958   Collection Container Standard drainage bag 01/04/25 1100   Securement Method Securing device (Describe) 01/04/25 1100   Reason for Continuing Urinary Catheterization management of gross hematuria with blood clots in the urine 01/04/25 1100   Output (mL) 550 mL 01/04/25 1533       [REMOVED] Urethral Catheter Coude 16 Fr. (Removed)   Site Assessment Clean;Skin intact 01/19/25 0023   Reason for Continuing Urinary Catheterization surgical procedures: urological/gynecological, pelvic oncology, anal, prolonged surgical procedure 01/18/25 2100   Output (mL) 200 mL 01/19/25 1353       [REMOVED] External Urinary Catheter Male (Removed)       [REMOVED] External Urinary Catheter Male (Removed)       [REMOVED] External Urinary Catheter (Removed)   Output (mL) 350 mL 01/17/25 0406       Tourniquet Times: none        Implants: no new implants    Findings: coagulated hematoma within deep surgical wound. Necrosis of muscle , fascia and skin edges.     Indications: Abdi Retana \"Louise" is an 74 y.o. male who is 3 weeks out s/p L2-S1 fusion and L3-5 laminectomy decompression for metastatic lesion of L4 and 5 causing bony destruction and severe stenosis. He returned to his first post op visit with wound dehiscence and drainage. Was sent to the ED and admitted. Discussed with patient and his sister that he would need surgery to clean out his incision due to concern for infection and also to close up the incision. The patient was seen in the preoperative area. The risks, benefits, complications, treatment options, non-operative alternatives, expected recovery and outcomes were discussed with the patient. The " possibilities of reaction to medication, pulmonary aspiration, injury to surrounding structures, bleeding, recurrent infection, the need for additional procedures, failure to diagnose a condition, and creating a complication requiring transfusion or operation were discussed with the patient. The patient concurred with the proposed plan, giving informed consent.  The site of surgery was properly noted/marked if necessary per policy. The patient has been actively warmed in preoperative area. Preoperative antibiotics was held until cultures were taken.  Venous thrombosis prophylaxis are not indicated.    Procedure Details: Patient was identified in preoperative holding area.  He was verified by name medical record number date of birth.  Consent was verified.  Questions were answered.  Patient was brought into the room and a huddle was performed.  Patient was then administered anesthesia and intubated without any difficulty.  Sequential compressive devices were placed on bilateral lower extremity.  Patient was rolled onto prone position onto the Sin frame with full spine precaution with.  We ensured that all bony prominences were carefully padded.  The lumbar spine was then prepped and draped in the usual sterile fashion.  Previous sutures were removed.     Prior to incision a standard timeout procedure was performed.  We have verified the patient's name medical record number and date of birth and procedure to be performed.  All parties were in agreement.  Antibiotics were held until cultures were taken.  A midline  longitudinal incision was made over the prior incision.  Once the skin was incised there was a collection of coagulated hematoma at the distal aspect which I took cultures. There was disruption I the fascia. I opened up the fascia and there was a collection of coagulated hematoma which I removed and took additional cultures.  This was sent for cultures which was called lumbar superficial cultures 1 2 and  3.   Antibiotics in the form of ancef was then administered.  The wound was then irrigated with 9 L of antibiotic solution.  Urban was used to scrape out any loose or necrotic tissue and bone.  rongeur was used to remove necrotic muscle and bone.  I then used a sharp 15 blade to excise the necrotic skin edges and the distal aspect of the incision back to normal looking skin with good bleeding edges.  The wound was then irrigated with 450 mL of Irrisept solution followed by another liter of normal saline.     Hemostasis was then secured around the paraspinal and skin edges and soft tissue.  1 g vancomycin powder was placed in the wound.  Two Hemovac drain was placed deep to the fascia and the fascia was closed with 0 PDS.   Skin was closed with 2-0 Vicryl followed by 2-0 nylon.  Prevena incisional wound VAC was placed over the incision.  Patient tolerated surgery well.  He was transferred to her hospital bed and was extubated without any difficulty.  Patient was transferred to PACU in stable condition.  All needles and sponge counts were correct at completion of the procedure.  There was no immediate complications.  I was present for the entire case.     POST OP CARE: Can be out of bed with physical therapy.  Patient needs q2hour turns to off load the incision. Recommend sitting in chair if able. Broad spectrum antibiotics  until cultures are final.  Follow-up with OR cultures. Hold anticoagulation for 48 hours post op. Keep drains on suction    Complications:  None; patient tolerated the procedure well.    Disposition: PACU - hemodynamically stable.  Condition: stable                 Attending Attestation: I was present and scrubbed in for the entire procedure    Yana Atkins  Phone Number: [phone]

## 2025-02-07 NOTE — ANESTHESIA PREPROCEDURE EVALUATION
"Patient: Abdi Retana \"Ray\"    Procedure Information       Anesthesia Start Date/Time: 02/07/25 1201    Procedure: DEBRIDEMENT, WOUND, LUMBAR REGION (Spine Lumbar)    Location: [facility] OR 23 / [facility] OR    Surgeons: Yana Atkins MD            Relevant Problems   Anesthesia (within normal limits)      Cardiac   (+) Peripheral vascular disease, unspecified (CMS-[facility])      GI   (+) Gastro-esophageal reflux disease without esophagitis      /Renal   (+) Acute kidney injury (nontraumatic) (CMS-[facility])   (+) Renal stones   (+) UTI (urinary tract infection)      Endocrine   (+) Obesity   (+) Obesity, unspecified      Hematology   (+) Anemia   (+) Iron deficiency anemia, unspecified      Musculoskeletal   (+) OA (osteoarthritis) of knee      ID   (+) UTI (urinary tract infection)       Clinical information reviewed:   Tobacco  Allergies    Med Hx  Surg Hx   Fam Hx  Soc Hx        NPO Detail:  NPO/Void Status  Carbohydrate Drink Given Prior to Surgery? : N  Date of Last Liquid: 02/07/25  Time of Last Liquid: 0947 (sip of gingerale with meds)  Date of Last Solid: 02/07/25  Time of Last Solid: 0000  Last Intake Type: Clear fluids         Physical Exam    Airway  Mallampati: III  TM distance: >3 FB  Neck ROM: limited  Comments: Easy airway per chart history   Cardiovascular    Dental        Pulmonary    Abdominal            Anesthesia Plan    History of general anesthesia?: yes  History of complications of general anesthesia?: no    ASA 3     general     The patient is not a current smoker.  Patient did not smoke on day of procedure.    intravenous induction   Postoperative administration of opioids is intended.  Trial extubation is planned.  Anesthetic plan and risks discussed with patient.  Use of blood products discussed with patient who consented to blood products.    Plan discussed with CRNA.    Plan general endotracheal anesthesia with peripheral IV placement, arterial line placement, and ASA " standard monitors.  The possibility of blood product transfusion was also described in detail.  Risks, benefits, alternatives of this plan were described in detail to the patient, who indicated understanding and agreed to proceed.  Possible postop ICU admission.    Armani Hernandez MD

## 2025-02-07 NOTE — CONSULTS
Wound Care Consult     Visit Date: 2/7/2025      Patient Name: Matt Retana         MRN: [id number]           YOB: 1950     Reason for Consult: sacrum, BLEs     Wound History: Pt with known hx of PVD; admitted for revision of lumbar spine incision    Wound Assessment:  Wound 11/27/24 Laceration Pretibial Distal;Right (Active)   Site Assessment Pink;Purple;Red;Painful 02/06/25 2048   Dressing Gauze 02/06/25 2048       Wound 11/27/24 Venous Ulcer Leg Right (Active)   Wound Image     02/07/25 1142   Site Assessment Hyperpigmentation;Yellow-brown (Hemosiderin staining);Dry;Fragile;Pink 02/07/25 1142   Non-staged Wound Description Partial thickness 02/07/25 1142   Margins Poorly defined 02/07/25 1142   Drainage Description Serosanguineous 02/07/25 1142   Drainage Amount Scant 02/07/25 1142   Dressing Foam;Kerlix/rolled gauze 02/07/25 1142   Dressing Changed New 02/07/25 1142   Dressing Status Clean;Dry 02/07/25 1142       Wound 11/27/24 Other (comment) Coccyx (Active)   Site Assessment Pink;Fragile 02/07/25 1145   Wound Length (cm) 3 cm 02/07/25 1145   Wound Width (cm) 1 cm 02/07/25 1145   Wound Surface Area (cm^2) 3 cm^2 02/07/25 1145   Treatments Site care 02/07/25 1145   Drainage Description None 02/07/25 1145   Dressing Silicone border dressing;Hydrophilic 02/07/25 1145   Dressing Changed Reinforced 02/07/25 1145   Dressing Status Dry;Clean 02/07/25 1145       Wound 01/03/25 Tibial Dorsal;Left;Proximal (Active)       Wound 01/06/25 Pretibial Left (Active)   Wound Image     02/07/25 1143   State of Healing Hyperkeratosis 02/07/25 1143   Margins Well-defined edges 02/07/25 1143   Drainage Description None 02/07/25 1143   Drainage Amount None 02/07/25 1143   Dressing Kerlix/rolled gauze;Other (Comment) 02/07/25 1143   Dressing Changed New 02/07/25 1143   Dressing Status Dry;Clean 02/07/25 1143       Wound 01/17/25 Incision Back Lower;Medial (Active)   Site Assessment Painful;Red;Yellow 02/06/25 2049    Ava-Wound Assessment Dry;Clean 02/06/25 2049   Margins Not attached 02/06/25 2049   Closure Dehisced 02/06/25 2049   Sutures/Staple Line Non approximated 02/06/25 2049   Drainage Description Sanguineous 02/06/25 2049   Drainage Amount Small 02/06/25 2049   Dressing Foam 02/06/25 2049   Dressing Changed Reinforced 02/06/25 2049      Sacrum    Assessment/Intervention: Pt seen resting in bed; alert and mostly oriented. BLEs with very obvious venous stasis skin changes including hyperpigmentation/hemosiderin staining and hyperkeratosis. Covered with thin, dry, dead skin plaques. Toenails thickened and discolored; bunions and hammertoes present. Skin gently cleaned to remove loose skin plaques and dead skin buildup between toes. No true, open, draining wounds noted, but several areas of thin, fragile, pink, new skin noted R>L. Legs liberally moisturized with lotion. Once lotion absorbed, a sheet of Mepilex Transfer foam was applied to fragile area on R leg for protection. Both legs then wrapped with dry kerlix toes to knees before SCD sleeves were reapplied. Heels intact. Pt turned to reveal very minor, shallow, stage 2 pressure injury to sacrum. No drainage. Likely combination of pressure, friction and moisture in this mostly bedbound pt. Triad and Mepilex left in place. Discussed with pt the importance of side-lying to offload. Pt left L-side lying using folded pillows with R heel floated. Recs below -->     Wound Team Recs: GENTLY wash BLEs DAILY using warm bath wipes or Vashe wound cleanser on gauze. Apply Hydrophor lotion to moisturize. Cover any open, draining areas with Mepilex Lite or Mepilex Transfer foam. Lightly wrap legs from base of toes to just below the knees with dry kerlix rolls, then reapply SCD sleeves. Float heels. Turn pt q2 hours side-to-side to offload sacrum. Place EHOB waffle mattress under fitted sheet for improved pressure redistribution. Cleanse and redress sacral breakdown daily using skin  prep and Mepilex foam. If frequently incontinent, please use Triad cream instead. Please no cream under foam. Use one or the other.     Provider, please review recs above and sign saved wound care orders accordingly.      Shreya Davila RN, CWON  Wound/Ostomy Nurse  2/7/2025  11:45 AM

## 2025-02-07 NOTE — H&P
[facility] Department of Orthopaedic Surgery   Surgical History & Physical <30 Days    History & Physical Reviewed:  H&P reviewed. The patient was examined and there are no changes to the H&P. Patient electing to proceed with surgery. Patient consented and posted. Relevant findings and updates are noted below:  No significant changes.    Home medications were reviewed with significant updates noted below:  No significant changes.      02/07/25 at 5:37 AM - Handy Price MD

## 2025-02-07 NOTE — PROGRESS NOTES
"Orthopaedic Surgery Progress Note    Subjective: Evaluated in immediate postoperative period. Pain well controlled considering recent surgery. Denies chest pain, shortness of breath, or fevers. Denies any new-onset numbness or tingling in his lower extremities.    Objective:  /69   Pulse 92   Temp 36.1 °C (97 °F) (Skin)   Resp 13   Ht 1.88 m (6' 2\")   Wt 121 kg (266 lb)   SpO2 95%   BMI 34.15 kg/m²     Gen: arousable, NAD, appropriately conversational  Cardiac: RRR to peripheral palpation  Resp: nonlabored on RA  GI: soft, nondistended    MSK:  Spine Exam:  Drains in place holding suction, bloody output  Prevena intact holding suction    L1: SILT       L2: SILT      Hip flexors 2/5 Left; 2/5 Right  L3: SILT      Knee extension unable to reliably assess d/t flexion contractures  L4: SILT      Tib Ant. (Dorsiflexion) 5/5 Left; 5/5 Right  L5: SILT      EHL 5/5 Left; 5/5 Right  S1: SILT      Plantarflexion 5/5 Left; 5/5 Right    Assessment/Plan: 74M (HTN, GERD, PVD, cellulitis, former smoker) w compressive L4 lesion secondary to RCC metastasis s/p L2-S1 PSIF, L3-5 decompression w Dr. Atkins 1/17/2025 c/b surgical wound dehiscence now s/p I&D and closure of lumbar wound w Dr. Atkins on 2/7/2025     Plan:  - Oncology primary  - WBAT BLE, no heavy lifting, bending, or twisting > 10 lbs  - OK for diet for ortho spine standpoint  - Intra-op cultures obtained  - Antibiotics: Vancomycin/Zosyn until intra-op cultures result  - Recommend ID consult  - Maintain HV drain x2, monitor and record output every 8 hours; will remain until 0 output  - Maintain prevena incisional vac  - Hold all anticoagulation for at least 72 hours  - PT/OT  - Ortho spine to follow    Dispo: pending PT/OT, ID recs, and HV drain removal    Mariano Hendrix MD  Orthopaedic Surgery PGY-2  [facility]  Pager: [phone]  Available by Epic Chat    While admitted, this patient will be followed by the Ortho Spine Team. Please contact " below residents with any questions (available via Epic Chat).     First call: Mariano Hendrix, PGY-2   Second call: Lew Abad, PGY-4  Third call: Anthony Mary, Jaden

## 2025-02-07 NOTE — DISCHARGE INSTR - OTHER ORDERS
Wound Care  -Wound site: Back (Lumbar Spine)  -Wound Type: Surgical Incision  -Once the Prevena incisional wound vac is remove, change the dressing daily and continue until the incision is no longer draining.          *Once the incision  has been dry for 48 hours, you may leave the dressing off and the site open to air.  -Cover the wound with an abdominal pad and secure it with paper tape around the edges.  -If there are sutures in your incision, they will be removed around 3 weeks at your postoperative appointment.  -No Lotions or Creams on or around the incision site.  -No tub soaks  -You may use heat or ice to your incision sites and or back as         needed for comfort.

## 2025-02-07 NOTE — DISCHARGE INSTR - AVS FIRST PAGE
Potential Lumbar Spine Complication  -You are breathing faster than normal.  -Fever of 100.4 F or higher.  -Chills  -Urinating less than 4 times per day.  -Acting very sleepy and difficult to awaken.  -Vomiting and not able to eat or drink for 12 hours  -3 or more loose, watery bowel movements in 24 hours (Diarrhea)  -Concerns over the appearance of your incision.    Return immediately to the ER:  -Persistent bilateral leg pain and or numbness >24 hours.  -Perineal numbness/sensory loss  -Urinary retention >12 hours  -Loss of bowel/ bladder control

## 2025-02-07 NOTE — ANESTHESIA PROCEDURE NOTES
Central Venous Line:    Date/Time: 2/7/2025 12:15 PM    A central venous line was placed in the OR for the following indication(s): central venous access and CVP monitoring.  Staffing  Performed: attending   Authorized by: Armani Hernandez MD    Performed by: HUSSEIN Caal-CRNA, ANA    Sterility preparation included the following: provider hand hygiene performed prior to central venous catheter insertion, all 5 sterile barriers used (gloves, gown, cap, mask, large sterile drape) during central venous catheter insertion, antiseptic used during central venous catheter insertion and skin prep agent completely dried prior to procedure.  Medical reason for not performing maximal sterile barrier technique: no  The patient was placed in Trendelenburg position.    Right subclavian vein was prepped.  Size: 7.5 Fr   Length: 15  Number of Lumens: double lumen    This catheter was not an oximetric catheter.    During the procedure, the following specific steps were taken: target vein identified, needle advanced into vein and blood aspirated and guidewire advanced into vein.  Seldinger technique used.  Procedure performed using surface landmarks.    Intravenous verification was obtained by venous blood return and x-ray.      Post insertion care included: all ports aspirated, all ports flushed easily, guidewire removed intact, Biopatch applied, line sutured in place and dressing applied.    During the procedure the patient experienced: patient tolerated procedure well with no complications.

## 2025-02-07 NOTE — CARE PLAN
The clinical goals for the shift include patient will remain HDS and VSS throughout shift 2/7 at 1900      Problem: Pain - Adult  Goal: Verbalizes/displays adequate comfort level or baseline comfort level  Outcome: Progressing     Problem: Safety - Adult  Goal: Free from fall injury  Outcome: Progressing     Problem: Pain  Goal: Turns in bed with improved pain control throughout the shift  Outcome: Progressing     Problem: Pain  Goal: Takes deep breaths with improved pain control throughout the shift  Outcome: Progressing     Problem: Pain  Goal: Performs ADL's with improved pain control throughout shift  Outcome: Progressing     Problem: Skin  Goal: Prevent/minimize sheer/friction injuries  Outcome: Progressing     Problem: Skin  Goal: Promote/optimize nutrition  Outcome: Progressing     Problem: Skin  Goal: Promote skin healing  Outcome: Progressing

## 2025-02-07 NOTE — DISCHARGE INSTR - ACTIVITY
Activity With Spine Precautions  -Activity with assistance.  -Weight bearing as tolerated.  -No pushing, pulling, or lifting objects greater than 10 pounds until follow up appointment.  -No excessive bending, twisting, or heavy house or yard work.  -You may shower once there is no drainage from your incision site for 48 hours.  -You may not drive until your follow up appointment, or while taking narcotic medication.

## 2025-02-07 NOTE — ANESTHESIA PROCEDURE NOTES
Arterial Line:    Date/Time: 2/7/2025 12:23 PM    Staffing  Performed: DENISE   Authorized by: Armani Hernandez MD    Performed by: HUSSEIN Caal-CRNA, [facility]    An arterial line was placed. Procedure performed using surface landmarks.in the OR for the following indication(s): continuous blood pressure monitoring and blood sampling needed.    A 20 gauge (size), 5 cm (length), Angiocath (type) catheter was placed into the Left radial artery, secured by Tegaderm,   Seldinger technique used.  Events:  patient tolerated procedure well with no complications.      Additional notes:  CHG prep, sterile procedure, good pulsatile flow, no complications.

## 2025-02-07 NOTE — PROGRESS NOTES
"Abdi Retana \"Louise" is a 74 y.o. male on day 1 of admission presenting with Wound dehiscence.    Subjective   Pt seen at bedside this AM. Awaiting return to OR this morning vs afternoon. He reports pain continues in his back but just received a dose of oxycodone.     Denies nausea, vomiting, fever, chills, chest pain, abd pain, SOB, diarrhea, and headaches.        Objective     Physical Exam  Vitals reviewed.   Constitutional:       Appearance: He is obese.      Comments: Chronic ill appearance   HENT:      Head: Normocephalic.      Right Ear: External ear normal.      Left Ear: External ear normal.      Nose: Nose normal.      Mouth/Throat:      Mouth: Mucous membranes are dry.   Eyes:      Extraocular Movements: Extraocular movements intact.      Conjunctiva/sclera: Conjunctivae normal.      Pupils: Pupils are equal, round, and reactive to light.   Cardiovascular:      Rate and Rhythm: Normal rate and regular rhythm.   Abdominal:      Palpations: Abdomen is soft.   Genitourinary:     Comments: Draining mae urine, purewick intact  Musculoskeletal:      Cervical back: Normal range of motion and neck supple.      Right lower leg: Edema present.      Left lower leg: Edema present.   Skin:     Findings: Erythema present.      Comments: Lumbar wound dehiscence   Neurological:      Mental Status: He is oriented to person, place, and time.      Motor: Weakness present.   Psychiatric:         Mood and Affect: Mood normal.         Behavior: Behavior normal.         Thought Content: Thought content normal.         Last Recorded Vitals  Blood pressure 125/71, pulse 58, temperature 36.5 °C (97.7 °F), temperature source Temporal, resp. rate 18, height 1.88 m (6' 2\"), weight 121 kg (266 lb), SpO2 97%.  Intake/Output last 3 Shifts:  I/O last 3 completed shifts:  In: 240 (2 mL/kg) [P.O.:240]  Out: 450 (3.7 mL/kg) [Urine:450 (0.1 mL/kg/hr)]  Weight: 120.7 kg     Relevant Results  .Scheduled medications  [Held by provider] aspirin, " 81 mg, oral, Daily  atorvastatin, 40 mg, oral, Nightly  [Held by provider] enoxaparin, 120 mg, subcutaneous, BID  famotidine, 20 mg, oral, BID  ferrous sulfate (325 mg ferrous sulfate), 1 tablet, oral, q24h  polyethylene glycol, 17 g, oral, Daily  sennosides-docusate sodium, 2 tablet, oral, BID  [Held by provider] torsemide, 20 mg, oral, Daily      Continuous medications     PRN medications  PRN medications: cyclobenzaprine, HYDROmorphone, magnesium hydroxide, ondansetron, oxyCODONE, oxyCODONE    .  Results for orders placed or performed during the hospital encounter of 02/06/25 (from the past 24 hours)   CBC and Auto Differential   Result Value Ref Range    WBC 14.0 (H) 4.4 - 11.3 x10*3/uL    nRBC 0.0 0.0 - 0.0 /100 WBCs    RBC 2.78 (L) 4.50 - 5.90 x10*6/uL    Hemoglobin 7.4 (L) 13.5 - 17.5 g/dL    Hematocrit 24.4 (L) 41.0 - 52.0 %    MCV 88 80 - 100 fL    MCH 26.6 26.0 - 34.0 pg    MCHC 30.3 (L) 32.0 - 36.0 g/dL    RDW 17.4 (H) 11.5 - 14.5 %    Platelets 620 (H) 150 - 450 x10*3/uL    Neutrophils % 78.2 40.0 - 80.0 %    Immature Granulocytes %, Automated 1.0 (H) 0.0 - 0.9 %    Lymphocytes % 10.9 13.0 - 44.0 %    Monocytes % 5.8 2.0 - 10.0 %    Eosinophils % 3.1 0.0 - 6.0 %    Basophils % 1.0 0.0 - 2.0 %    Neutrophils Absolute 10.94 (H) 1.60 - 5.50 x10*3/uL    Immature Granulocytes Absolute, Automated 0.14 0.00 - 0.50 x10*3/uL    Lymphocytes Absolute 1.52 0.80 - 3.00 x10*3/uL    Monocytes Absolute 0.81 (H) 0.05 - 0.80 x10*3/uL    Eosinophils Absolute 0.44 (H) 0.00 - 0.40 x10*3/uL    Basophils Absolute 0.14 (H) 0.00 - 0.10 x10*3/uL   Comprehensive metabolic panel   Result Value Ref Range    Glucose 118 (H) 74 - 99 mg/dL    Sodium 139 136 - 145 mmol/L    Potassium 4.8 3.5 - 5.3 mmol/L    Chloride 99 98 - 107 mmol/L    Bicarbonate 28 21 - 32 mmol/L    Anion Gap 17 10 - 20 mmol/L    Urea Nitrogen 31 (H) 6 - 23 mg/dL    Creatinine 1.39 (H) 0.50 - 1.30 mg/dL    eGFR 53 (L) >60 mL/min/1.73m*2    Calcium 9.1 8.6 - 10.6  mg/dL    Albumin 2.9 (L) 3.4 - 5.0 g/dL    Alkaline Phosphatase 89 33 - 136 U/L    Total Protein 6.9 6.4 - 8.2 g/dL    AST 24 9 - 39 U/L    Bilirubin, Total 0.5 0.0 - 1.2 mg/dL    ALT 10 10 - 52 U/L   Type and Screen   Result Value Ref Range    ABO TYPE A     Rh TYPE POS     ANTIBODY SCREEN NEG    Coagulation Screen   Result Value Ref Range    Protime 17.0 (H) 9.8 - 12.8 seconds    INR 1.5 (H) 0.9 - 1.1    aPTT 34 27 - 38 seconds   PST Top   Result Value Ref Range    Extra Tube Hold for add-ons.    Urine electrolytes   Result Value Ref Range    Sodium, Urine Random 23 mmol/L    Sodium/Creatinine Ratio 23 Not established. mmol/g Creat    Potassium, Urine Random 64 mmol/L    Potassium/Creatinine Ratio 63 Not established mmol/g Creat    Chloride, Urine Random 34 mmol/L    Chloride/Creatinine Ratio 33 23 - 275 mmol/g creat    Creatinine, Urine Random 102.1 20.0 - 370.0 mg/dL   Urinalysis with Reflex Culture and Microscopic   Result Value Ref Range    Color, Urine Yellow Light-Yellow, Yellow, Dark-Yellow    Appearance, Urine Turbid (N) Clear    Specific Gravity, Urine 1.018 1.005 - 1.035    pH, Urine 5.5 5.0, 5.5, 6.0, 6.5, 7.0, 7.5, 8.0    Protein, Urine 20 (TRACE) NEGATIVE, 10 (TRACE), 20 (TRACE) mg/dL    Glucose, Urine Normal Normal mg/dL    Blood, Urine 0.1 (1+) (A) NEGATIVE mg/dL    Ketones, Urine NEGATIVE NEGATIVE mg/dL    Bilirubin, Urine NEGATIVE NEGATIVE mg/dL    Urobilinogen, Urine Normal Normal mg/dL    Nitrite, Urine NEGATIVE NEGATIVE    Leukocyte Esterase, Urine 500 Pamela/uL (A) NEGATIVE   Extra Urine Gray Tube   Result Value Ref Range    Extra Tube Hold for add-ons.    Microscopic Only, Urine   Result Value Ref Range    WBC, Urine >50 (A) 1-5, NONE /HPF    RBC, Urine >20 (A) NONE, 1-2, 3-5 /HPF    Mucus, Urine FEW Reference range not established. /LPF    Hyaline Casts, Urine 2+ (A) NONE /LPF   ECG 12 Lead   Result Value Ref Range    Ventricular Rate 94 BPM    Atrial Rate 94 BPM    IA Interval 142 ms    QRS  Duration 102 ms    QT Interval 386 ms    QTC Calculation(Bazett) 482 ms    P Axis -13 degrees    R Axis 15 degrees    T Axis 43 degrees    QRS Count 15 beats    Q Onset 218 ms    P Onset 147 ms    P Offset 195 ms    T Offset 411 ms    QTC Fredericia 448 ms   Comprehensive metabolic panel   Result Value Ref Range    Glucose 107 (H) 74 - 99 mg/dL    Sodium 138 136 - 145 mmol/L    Potassium 4.9 3.5 - 5.3 mmol/L    Chloride 102 98 - 107 mmol/L    Bicarbonate 24 21 - 32 mmol/L    Anion Gap 17 10 - 20 mmol/L    Urea Nitrogen 35 (H) 6 - 23 mg/dL    Creatinine 1.52 (H) 0.50 - 1.30 mg/dL    eGFR 48 (L) >60 mL/min/1.73m*2    Calcium 9.0 8.6 - 10.6 mg/dL    Albumin 2.6 (L) 3.4 - 5.0 g/dL    Alkaline Phosphatase 82 33 - 136 U/L    Total Protein 6.2 (L) 6.4 - 8.2 g/dL    AST 25 9 - 39 U/L    Bilirubin, Total 0.6 0.0 - 1.2 mg/dL    ALT 8 (L) 10 - 52 U/L   Magnesium   Result Value Ref Range    Magnesium 2.66 (H) 1.60 - 2.40 mg/dL   Coagulation Screen   Result Value Ref Range    Protime 16.2 (H) 9.8 - 12.8 seconds    INR 1.4 (H) 0.9 - 1.1    aPTT 31 27 - 38 seconds   Type and screen   Result Value Ref Range    ABO TYPE A     Rh TYPE POS     ANTIBODY SCREEN NEG              Assessment/Plan   Assessment & Plan  Wound dehiscence    Wound dehiscence, surgical    Abdi Retana is a 74 year old Male PMH newly dx metastatic RCC (dx Jan 2025, has not started treatment yet), HTN, GERD, SAMIA, chronic lymphedema, venous insufficiency, OA, CKD III (BL~ 1.2), hx new acute stroke dx 1/8/25 (on BID Lovenox) and recent cord compression L4-L5 lesion (s/p L3-L5 laminectomies with L2-S1 lumbar tumor debulking 1/17 with Ortho surg) who presented to ED 2/6 after being sent from his first postop visit with Ortho for c/f lumbar wound dehiscence. Ortho consulted, plan for 2/7 OR for I&D and wound closure. DC new SNF pending Ortho recs and postop recovery.     Updates 2/7:  - plan for OR with Ortho for I&D and wound closure   - JAMAL on CKD developed, workup  ongoing     # lumbar wound dehiscence s/p 3-L5 laminectomy and L2-S1 fusion decompression  - (1/17-1/24) 1/17 s/p spinal artery embolization with NSGY and lumbar tumor debulkin L3-L5 laminectomiues with L2-S1 PSIF with Dr. Atkins, surgery c/b acute bloos loss requiring RBCs, discharged to SNF to establish Onc and Rad/Onc plan    - 2/6 Returned for first postop visit and found to have dehisced wound, sent by Dr. Atkins to ED   - 2/6 Xray lumbar spine showed re demonstration significant pathologic destruction L4, screws for L5 appear superior to level of vertebral body poss outside of body related to shifting of hardware   - Ortho consulted on admit, plan for return to OR 2/7 for I&D and wound closure with Dr. Atkins   - wound tissue cx pending collection 2/6  - Mild leukocytosis noted on admit (14k) baseline ~10-11, low suspicion for infectious process at this time, afebrile, pt denies chills - low threshold for initiation of atbx should pt develop constitutional symptoms   - (2/5-  ) c/w oxycodone 5mg PRN mod pain, 10mg PRN severe pain, and 0.4mg IVP dilaudid q3 PRN breakthrough -- may need adjustment postop      # newly dx metastatic R RCC   - Plan to establish with Dr. Wynn 2/12 and Dr. Moncada 2/27 (Rad/Onc) for OP care - both emailed 2/7   - Nov 2024 Presented for fall and BLE weakness, Xray lumbar spine showing large lytic lesion L4 c/f neoplasm - was due to get outpatient bx with IR in Dec but was unable d/t facility mixing up transportation and missed appt   - 11/30 CT C/A/P showed large infiltrative R renal mass c/w RCC with mets adrenal glands, osseous lesions, pulm nodules  - 1/7 s/p s/p bx L4 lesion, 1/13 confrmed carcinoma involving dense fibroconnective tissue    - 1/16 s/p IR for tumor embolization from b/l L4 segmental arteries  - 1/17 s/p OR for lumbar tumor debulking L3-L4 and L4-L5 laminectomies with L2-S1 PSIF with Dr. Atkins   - 1/24 Seen by Rad/Onc - pt to follow up 1-2 weeks with a myelogram  and RT planning scan scheduled after eval for spine SBRT L3-L5 delivered over 3-5 fx --has not started yet     # JAMAL on CKD II  - baseline Scr ~ 1.2- -> (2/7) Scr 1.52   - CMP daily   - held home torsemide 2/7  - urine electrolytes pending 2/7       # hx small acute stroke  - 1/8/25 MRI Brain showed c/f small acute stroke in R parietal lobe  - Neuro consulted, rec Lovenox 1mg/kg q12h and outpatient follow up   - held home lovenox I/s/o surgery 2/7, resume after pending clearance by ortho      # hx recent proteus mirabilis complicated UTI and bacteremia   - 1/5 developed UTI with + proteus bacteremia s/p Cefepime and Augmentin PO completed 1/23   - Seen by ID inpatient during previous admission   - 2/6 UA + leuks, WBC, casts - no reports of dysuria, flank pain, fever/chills, Ucx results pending      # chronic venous insufficiency  # chronic lymphedema   - Se[facility] podiatry at [facility]  - Wound care consulted on admit, recs pending   - held home torsemide 20mg daily   - held home ASA on admit d/t poss return OR      # SAMIA  - c/w home ferrous sulfate 325mg daily      # HTN/HLD  - c/w home lipitor 40mg daily      # GERD  - c/w home pepcid daily      # dispo  - Full code, confirmed on admit   - DC new SNF pending Ortho recs, PT/OT recs, and pain improvement   - NOK: Micaela, sister, [phone]   - FUV 2/11 Urology, 2/12 Dr. Tianna PEÑA spent >60 minutes in the professional and overall care of this patient.       Marilou Canchola, APRN-CNP

## 2025-02-07 NOTE — ANESTHESIA PROCEDURE NOTES
Peripheral IV  Date/Time: 2/7/2025 12:22 PM      Placement  Needle size: 18 G  Laterality: right  Location: forearm  Site prep: alcohol  Technique: anatomical landmarks  Attempts: 1

## 2025-02-08 LAB
ALBUMIN SERPL BCP-MCNC: 2.6 G/DL (ref 3.4–5)
ALP SERPL-CCNC: 66 U/L (ref 33–136)
ALT SERPL W P-5'-P-CCNC: 5 U/L (ref 10–52)
ANION GAP SERPL CALC-SCNC: 12 MMOL/L (ref 10–20)
AST SERPL W P-5'-P-CCNC: 18 U/L (ref 9–39)
BASOPHILS # BLD AUTO: 0.04 X10*3/UL (ref 0–0.1)
BASOPHILS NFR BLD AUTO: 0.2 %
BILIRUB SERPL-MCNC: 0.6 MG/DL (ref 0–1.2)
BUN SERPL-MCNC: 36 MG/DL (ref 6–23)
CALCIUM SERPL-MCNC: 8.4 MG/DL (ref 8.6–10.6)
CHLORIDE SERPL-SCNC: 105 MMOL/L (ref 98–107)
CHLORIDE UR-SCNC: 27 MMOL/L
CHLORIDE/CREATININE (MMOL/G) IN URINE: 31 MMOL/G CREAT (ref 23–275)
CO2 SERPL-SCNC: 28 MMOL/L (ref 21–32)
CREAT SERPL-MCNC: 1.53 MG/DL (ref 0.5–1.3)
CREAT UR-MCNC: 87.3 MG/DL (ref 20–370)
EGFRCR SERPLBLD CKD-EPI 2021: 47 ML/MIN/1.73M*2
EOSINOPHIL # BLD AUTO: 0.07 X10*3/UL (ref 0–0.4)
EOSINOPHIL NFR BLD AUTO: 0.4 %
ERYTHROCYTE [DISTWIDTH] IN BLOOD BY AUTOMATED COUNT: 20.2 % (ref 11.5–14.5)
FUNGUS SPEC FUNGUS STN: NORMAL
GLUCOSE SERPL-MCNC: 138 MG/DL (ref 74–99)
HCT VFR BLD AUTO: 22.5 % (ref 41–52)
HGB BLD-MCNC: 6.7 G/DL (ref 13.5–17.5)
HYPOCHROMIA BLD QL SMEAR: NORMAL
IMM GRANULOCYTES # BLD AUTO: 0.13 X10*3/UL (ref 0–0.5)
IMM GRANULOCYTES NFR BLD AUTO: 0.8 % (ref 0–0.9)
LYMPHOCYTES # BLD AUTO: 0.74 X10*3/UL (ref 0.8–3)
LYMPHOCYTES NFR BLD AUTO: 4.5 %
MAGNESIUM SERPL-MCNC: 2.44 MG/DL (ref 1.6–2.4)
MCH RBC QN AUTO: 26.9 PG (ref 26–34)
MCHC RBC AUTO-ENTMCNC: 29.8 G/DL (ref 32–36)
MCV RBC AUTO: 90 FL (ref 80–100)
MONOCYTES # BLD AUTO: 0.67 X10*3/UL (ref 0.05–0.8)
MONOCYTES NFR BLD AUTO: 4 %
NEUTROPHILS # BLD AUTO: 14.9 X10*3/UL (ref 1.6–5.5)
NEUTROPHILS NFR BLD AUTO: 90.1 %
NRBC BLD-RTO: 0 /100 WBCS (ref 0–0)
PLATELET # BLD AUTO: 473 X10*3/UL (ref 150–450)
POTASSIUM SERPL-SCNC: 4.4 MMOL/L (ref 3.5–5.3)
POTASSIUM UR-SCNC: 75 MMOL/L
POTASSIUM/CREAT UR-RTO: 86 MMOL/G CREAT
PROT SERPL-MCNC: 6 G/DL (ref 6.4–8.2)
RBC # BLD AUTO: 2.49 X10*6/UL (ref 4.5–5.9)
RBC MORPH BLD: NORMAL
SODIUM SERPL-SCNC: 141 MMOL/L (ref 136–145)
SODIUM UR-SCNC: 25 MMOL/L
SODIUM/CREAT UR-RTO: 29 MMOL/G CREAT
STOMATOCYTES BLD QL SMEAR: NORMAL
VANCOMYCIN SERPL-MCNC: 11.3 UG/ML (ref 5–20)
WBC # BLD AUTO: 16.6 X10*3/UL (ref 4.4–11.3)

## 2025-02-08 PROCEDURE — 99223 1ST HOSP IP/OBS HIGH 75: CPT | Performed by: INTERNAL MEDICINE

## 2025-02-08 PROCEDURE — 80202 ASSAY OF VANCOMYCIN: CPT

## 2025-02-08 PROCEDURE — 36430 TRANSFUSION BLD/BLD COMPNT: CPT

## 2025-02-08 PROCEDURE — 2500000001 HC RX 250 WO HCPCS SELF ADMINISTERED DRUGS (ALT 637 FOR MEDICARE OP)

## 2025-02-08 PROCEDURE — P9040 RBC LEUKOREDUCED IRRADIATED: HCPCS

## 2025-02-08 PROCEDURE — 1170000001 HC PRIVATE ONCOLOGY ROOM DAILY

## 2025-02-08 PROCEDURE — 85025 COMPLETE CBC W/AUTO DIFF WBC: CPT

## 2025-02-08 PROCEDURE — 2500000004 HC RX 250 GENERAL PHARMACY W/ HCPCS (ALT 636 FOR OP/ED)

## 2025-02-08 PROCEDURE — 99233 SBSQ HOSP IP/OBS HIGH 50: CPT

## 2025-02-08 PROCEDURE — 83735 ASSAY OF MAGNESIUM: CPT

## 2025-02-08 PROCEDURE — 84075 ASSAY ALKALINE PHOSPHATASE: CPT

## 2025-02-08 RX ADMIN — PIPERACILLIN SODIUM AND TAZOBACTAM SODIUM 3.38 G: 3; .375 INJECTION, SOLUTION INTRAVENOUS at 18:07

## 2025-02-08 RX ADMIN — OXYCODONE HYDROCHLORIDE 10 MG: 10 TABLET ORAL at 21:18

## 2025-02-08 RX ADMIN — FERROUS SULFATE TAB 325 MG (65 MG ELEMENTAL FE) 325 MG: 325 (65 FE) TAB at 09:49

## 2025-02-08 RX ADMIN — ATORVASTATIN CALCIUM 40 MG: 40 TABLET, FILM COATED ORAL at 21:15

## 2025-02-08 RX ADMIN — SENNOSIDES AND DOCUSATE SODIUM 2 TABLET: 50; 8.6 TABLET ORAL at 09:49

## 2025-02-08 RX ADMIN — PIPERACILLIN SODIUM AND TAZOBACTAM SODIUM 3.38 G: 3; .375 INJECTION, SOLUTION INTRAVENOUS at 05:29

## 2025-02-08 RX ADMIN — POLYETHYLENE GLYCOL 3350 17 G: 17 POWDER, FOR SOLUTION ORAL at 09:49

## 2025-02-08 RX ADMIN — FAMOTIDINE 20 MG: 20 TABLET ORAL at 21:15

## 2025-02-08 RX ADMIN — OXYCODONE HYDROCHLORIDE 10 MG: 10 TABLET ORAL at 09:51

## 2025-02-08 RX ADMIN — OXYCODONE HYDROCHLORIDE 10 MG: 10 TABLET ORAL at 14:33

## 2025-02-08 RX ADMIN — FAMOTIDINE 20 MG: 20 TABLET ORAL at 09:48

## 2025-02-08 RX ADMIN — PETROLATUM: 420 OINTMENT TOPICAL at 16:20

## 2025-02-08 RX ADMIN — SENNOSIDES AND DOCUSATE SODIUM 2 TABLET: 50; 8.6 TABLET ORAL at 21:15

## 2025-02-08 RX ADMIN — PIPERACILLIN SODIUM AND TAZOBACTAM SODIUM 3.38 G: 3; .375 INJECTION, SOLUTION INTRAVENOUS at 12:33

## 2025-02-08 ASSESSMENT — PAIN SCALES - GENERAL
PAINLEVEL_OUTOF10: 0 - NO PAIN
PAINLEVEL_OUTOF10: 7
PAINLEVEL_OUTOF10: 9
PAINLEVEL_OUTOF10: 8

## 2025-02-08 ASSESSMENT — PAIN - FUNCTIONAL ASSESSMENT
PAIN_FUNCTIONAL_ASSESSMENT: 0-10

## 2025-02-08 NOTE — PROGRESS NOTES
"Abdi Retana \"Matt\" is a 74 y.o. male on day 2 of admission presenting with Wound dehiscence.    Subjective   Patient seen resting comfortably in bed. Had recent woke up and states that he had \"some weird dreams\" overnight. Denies any pain this morning. We discussed plan for 1 unit PRBC today and ID consult given concern for coagulated hematoma noted in the OR. He denies any shortness of breath, chest pain, abdominal pain, N/V/D, F/c.        Objective     Physical Exam  Constitutional:       Appearance: Normal appearance.      Comments: pale   HENT:      Head: Normocephalic.      Mouth/Throat:      Mouth: Mucous membranes are moist.   Eyes:      Pupils: Pupils are equal, round, and reactive to light.   Cardiovascular:      Rate and Rhythm: Normal rate and regular rhythm.      Pulses: Normal pulses.      Heart sounds: Normal heart sounds.   Pulmonary:      Effort: Pulmonary effort is normal.      Breath sounds: Normal breath sounds.   Abdominal:      General: Abdomen is flat. Bowel sounds are normal.      Palpations: Abdomen is soft.   Musculoskeletal:      Cervical back: Normal range of motion and neck supple.      Comments: Drains in place with bloody output    Skin:     General: Skin is warm.   Neurological:      General: No focal deficit present.      Mental Status: He is alert.   Psychiatric:         Mood and Affect: Mood normal.         Last Recorded Vitals  Blood pressure 101/60, pulse 73, temperature 36.4 °C (97.5 °F), temperature source Temporal, resp. rate 16, height 1.88 m (6' 2\"), weight 121 kg (266 lb), SpO2 95%.  Intake/Output last 3 Shifts:  I/O last 3 completed shifts:  In: 4110 (34.1 mL/kg) [P.O.:360; I.V.:1900 (15.7 mL/kg); Blood:700; IV Piggyback:1150]  Out: 1135 (9.4 mL/kg) [Urine:980 (0.2 mL/kg/hr); Drains:105; Blood:50]  Weight: 120.7 kg     Relevant Results               This patient has a central line   Reason for the central line remaining today? Hemodynamic monitoring           "       Assessment/Plan   Assessment & Plan  Wound dehiscence    Wound dehiscence, surgical    Acute kidney injury (nontraumatic) (CMS-HCC)    Abdi Retana is a 74 year old Male PMH newly dx metastatic RCC (dx Jan 2025, has not started treatment yet), HTN, GERD, SAMIA, chronic lymphedema, venous insufficiency, OA, CKD III (BL~ 1.2), hx new acute stroke dx 1/8/25 (on BID Lovenox) and recent cord compression L4-L5 lesion (s/p L3-L5 laminectomies with L2-S1 lumbar tumor debulking 1/17 with Ortho surg) who presented to ED 2/6 after being sent from his first postop visit with Ortho for c/f lumbar wound dehiscence. Ortho consulted, s/p I&D and wound closure with ortho on 2/7. Coagulated hematoma noted by ortho in OR, wound cultures taken, prelim +gram negative bacilli. Initiated on broad spectrum antibiotics vanc + zosyn (2/7-) until would cultures finalized. ID consulted on 2/8 for antibiotics treatment and duration recommendations. DC new SNF pending Ortho recs, ID recs, and postop recovery.      Updates 2/8:  - POD 1 I&D and wound closure with ortho   - Coagulated hematoma noted in OR, intraoperative wound cultures x3 sent, prelim +gram neg bacilli   - Continue broad spectrum vanc + zosyn (2/7-) plan to narrow pending ID recs and intraoperative cultures   - ID consulted today for antibiotic treatment and duration  - Continue to hold home lovenox today per ortho x3 days, okay to resume 2/10    - S/p 1 unit PRBC for hgb 6.7 today   - Pending PT / OT eval      # lumbar wound dehiscence s/p 3-L5 laminectomy and L2-S1 fusion decompression  - (1/17-1/24) 1/17 s/p spinal artery embolization with NSGY and lumbar tumor debulkin L3-L5 laminectomiues with L2-S1 PSIF with Dr. Atkins, surgery c/b acute bloos loss requiring RBCs, discharged to SNF to establish Onc and Rad/Onc plan    - 2/6 Returned for first postop visit and found to have dehisced wound, sent by Dr. Atkins to ED   - 2/6 Xray lumbar spine showed re demonstration  significant pathologic destruction L4, screws for L5 appear superior to level of vertebral body poss outside of body related to shifting of hardware   - Ortho consulted on admit,  s/p I&D and wound closure with Dr. Atkins on 2/7  - Coagulated hematoma noted by ortho in OR  - Intraoperative cultures x3 collected, prelim positive for gram neg bacilli, final cultures pending   - ID consulted 2/8 for antibiotic treatment and duration, recs pending   - Continue with broad spectrum vanc + zosyn (2/7-current) plan to narrow pending ID recs and intraoperative cultures   - (2/5-  ) c/w oxycodone 5mg PRN mod pain, 10mg PRN severe pain, and 0.4mg IVP dilaudid q3 PRN breakthrough   - PT / OT consulted, recs pending      # newly dx metastatic R RCC   - Plan to establish with Dr. Wynn 2/12 and Dr. Moncada 2/27 (Rad/Onc) for OP care - both emailed 2/7   - Nov 2024 Presented for fall and BLE weakness, Xray lumbar spine showing large lytic lesion L4 c/f neoplasm - was due to get outpatient bx with IR in Dec but was unable d/t facility mixing up transportation and missed appt   - 11/30 CT C/A/P showed large infiltrative R renal mass c/w RCC with mets adrenal glands, osseous lesions, pulm nodules  - 1/7 s/p s/p bx L4 lesion, 1/13 confrmed carcinoma involving dense fibroconnective tissue    - 1/16 s/p IR for tumor embolization from b/l L4 segmental arteries  - 1/17 s/p OR for lumbar tumor debulking L3-L4 and L4-L5 laminectomies with L2-S1 PSIF with Dr. Atkins   - 1/24 Seen by Rad/Onc   - Per Dr. 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  - likely in setting of SAMIA, recent OR, and active cancer   - Hgb 6.7 (recent baseline ~7), plan 1 unit PRBC on 2/8   - c/w home ferrous sulfate 325mg daily     # JAMAL on CKD II  - baseline Scr ~ 1.2- -> Scr 1.52 (2/7) --> Scr 1.53 (2/8)   - held home torsemide 2/7  - UA +leuks,  WBC, on broad spectrum antibiotics as above   - FeNa 0.3% pre-renal   - CMP daily      # hx small acute stroke  - 1/8/25 MRI Brain showed c/f small acute stroke in R parietal lobe  - Neuro consulted, rec Lovenox 1mg/kg q12h and outpatient follow up   - held home lovenox I/s/o surgery 2/7, plan to resume 2/10 per ortho      # hx recent proteus mirabilis complicated UTI and bacteremia   - 1/5 developed UTI with + proteus bacteremia s/p Cefepime and Augmentin PO completed 1/23   - Seen by ID inpatient during previous admission   - UA (2/6) + leuks, WBC, casts - no reports of dysuria, flank pain, fever/chills, Ucx results pending      # chronic venous insufficiency  # chronic lymphedema   - [facility]  - Wound care consulted on admit, recs pending   - held home torsemide 20mg daily   - held home ASA on admit d/t poss return OR      # HTN/HLD  - c/w home lipitor 40mg daily      # GERD  - c/w home pepcid daily      # dispo  - Full code, confirmed on admit   - DC new SNF pending Ortho recs, ID recs, and postop recovery.   - NOK: Micaela, sister, [phone]   - FUV 2/11 Urology, 2/12 Dr. Tianna PEÑA spent 60 minutes in the professional and overall care of this patient.    Assessment and plan as above discussed with attending physician, Dr. Emigdio Bethea, PA-C

## 2025-02-08 NOTE — CONSULTS
Inpatient consult to Infectious Diseases  Consult performed by: Ivan Grajeda MD  Consult ordered by: Laine Bethea PA-C      Primary MD: Cheyenne Arango MD    Reason For Consult     Postoperative infected hematoma and subfascial infection.    History Of Present Illness  Chart review:    01/04/2025 and 1/24/2025 Discharge summary excerpt:     Patient is a 75 yo M with PMHx of HTN, GERD, SAMIA, Chronic lymphedema, venous insufficiency, OA, CKD-3(b/l 1.2-1.5). .  . Was admitted to [facility] 11/30-12/5 for . . .  a concerning L4-5 lesion w/ mod-severe cord compression in s/o renal mass and suspicious lesions in adrenals lungs, and spine. . . .      On 1/4/25, pt was transferred from [facility] for expedited work up of suspected R. RCC with L4/5 mets after he presented there with bloody urine and on-going backache . Also has Proteus Mirabilis UTI c/b bacteremia I/s/o bilateral non obstructing renal stones. ID consulted for antibiotic management. IR consulted for L4 biopsy and Ortho spine wants brain MRI and full spine MRI. At [facility] where he was admitted from 1/2-1/4, NSGY saw him and didn't think urgent interventions were needed, urology also did not offer any intervention as stones were non obstructive.  ID switched Cefepime to Augmentin until 1/23/25.      On, 1/7-biopsy of L4 lesion was done without complications, he continued to wait for his MRI head and Full spine to be completed.  1/9-Head and full MRI done on 1/8, head MRI was c/f  small acute stroke in the right parietal lobe. Neuro-stroke consulted, recommend CTA H&N, and TTE. He was continued on his aspirin and started on atorvastatin with LDL target of below 70. Neurology also recommended therapeutic Lovenox 1 mg/kg q12h when safe from a bleeding . . . Ortho spine . . . felt he would benefit from surgery but wanted repeat Bcs to make sure he is no more bacteremic.     1/10-Repeat Bcs pending, Rad-onc could offer radiation treatment but pending pathology  diagnosis.     -blood cultures were negative for bacteria. Per ortho-high risk of bleed if lesion is RCC, would recommend IR embolization of vessels supplying the lesion around the L4 area.  Rad Onc in agreeance of surgery first then radiation afterwards would be best.      On 1/15: Had extensive conversation about surgical lumbar debulking and pt and family decided to go with surgery following radiation therapy. 4-6 weeks after surgery . Eventually underwent spinal artery embolization with Neurosurgery and lumbar tumor debulking, L3-L4 and L4-L5 laminectomies with L2-S1 PSIF with Dr. Atkins on .     2025 ID consult (Furin):     74-year-old gentleman with previous renal cell carcinoma and Proteus bacteremia in the setting of likely urinary tract infection.  He is doing well on cefepime.  ID is asked to provide input into antibiotic selection and duration. This patient should be treated as a complex urinary tract infection.  He would therefore merit a longer course of antibiotics, typically 14 to 21 days.  It would be reasonable to treat him with oral amoxicillin clavulanic acid, but we should first give him a test dose of this drug while he is in the hospital to ensure that he does not develop a rash.  He had no symptoms of anaphylaxis or more severe skin reactions with his previous antibiotics and his risk  for re-challenge is  thus very low.  Therefore, we recommend the followin.  Continue cefepime for now  2.  Please give the patient 1 dose of amoxicillin clavulanic acid today.  Patient should then be monitored for 24 hours to ensure that he does not develop a rash.  If he does not develop a rash in the next 24 hours, cefepime can be discontinued and he can complete a 3-week course of a     Biopsy:  VERTEBRAL BIOPSY:       - CARCINOMA INVOLVING DENSE FIBROCONNECTIVE TISSUE,           IMMUNOREACTIVE FOR PAX8 AND CAM5.2.  Immunohistochemical staining for cytokeratin AE1/AE3 is negative.  " Histopathological and immunohistochemical features are consistent with metastatic renal cell carcinoma.    01/17/2025 Ortho - OP note excerpt:  FUSION, SPINE, LUMBAR, POSTERIOR APPROACH       1.  L3-4 and L4-5 laminectomies, facetectomies, and foraminotomies for the purpose of decompression and direct visualization of the thecal sac and neural elements and removal of epidural mass       2.  L2-S1 posterior instrumented fusion with [employer] Solera Screws and rods       3.  L2-S1 posterolateral arthrodesis       4.  Open biopsy of L4 bone and epidural mass       5. Application of allograft bone chips and  Saline allograft putty.      2/6/2025 orthopedics ([facility]):     Patient sent to emergency department for probable wound dehiscence/bleeding.     02/07/2025  OR note excerpt:     \". . . 3 weeks out s/p L2-S1 fusion and L3-5 laminectomy decompression for metastatic lesion of L4 and 5 causing bony destruction and severe stenosis. He returned to his first post op visit with wound dehiscence and drainage. . . . \"           Procedure Details: \". . . The lumbar spine was then prepped and draped in the usual sterile fashion.  Previous sutures were removed.       \". . . Once the skin was incised there was a collection of coagulated hematoma at the distal aspect which I took cultures. There was disruption in the fascia. I opened up the fascia and there was a collection of coagulated hematoma which I removed and took additional cultures.  This was sent for cultures which was called lumbar superficial cultures 1 2 and 3.   Antibiotics in the form of ancef was then administered.  The wound was then irrigated with 9 L of antibiotic solution.  Urban was used to scrape out any loose or necrotic tissue and bone.  rongeur was used to remove necrotic muscle and bone.  I then used a sharp 15 blade to excise the necrotic skin edges and the distal aspect of the incision back to normal looking skin with good bleeding edges. . . .\"    ID " called regarding postoperative (1/17/2025 surgery) lumbar wound dehiscence (2/6/2025) with deep soft tissue and hardware associated infection.  Patient seen during afternoon rounds and history reviewed.  Patient presented with wound dehiscence and was admitted for soft tissue I&D and exploration.  Patient supine in bed no acute distress  Patient denies fever, chills, rigors, headache, nausea, vomiting or diarrhea.  Patient reports not started biologic therapy or received any radiation therapy at this time      Past Medical History  He has a past medical history of CKD (chronic kidney disease), GERD (gastroesophageal reflux disease), Hypertension, OA (osteoarthritis), Personal history of other diseases of the circulatory system, PVD (peripheral vascular disease) (CMS-HCC), Renal cell carcinoma (Multi), and Wound dehiscence.    Surgical History  He has a past surgical history that includes Lumbar spine surgery.     Social History     Occupational History    Not on file   Tobacco Use    Smoking status: Former     Types: Cigarettes    Smokeless tobacco: Never   Vaping Use    Vaping status: Never Used   Substance and Sexual Activity    Alcohol use: Not Currently    Drug use: Not Currently     Types: Marijuana    Sexual activity: Defer       Family History  Family History   Problem Relation Name Age of Onset    Dementia Mother      Heart attack Mother      Heart attack Father       Allergies  Clindamycin     Immunization History   Administered Date(s) Administered    COVID-19, mRNA, LNP-S, PF, 30 mcg/0.3 mL dose 12/26/2020, 01/16/2021, 10/18/2021, 06/30/2022    Flu vaccine, quadrivalent, high-dose, preservative free, age 65y+ (FLUZONE) 10/06/2023    Influenza, seasonal, injectable 10/02/2020, 10/18/2021    PPD Test 08/21/2020, 08/28/2020    Tdap vaccine, age 7 year and older (BOOSTRIX, ADACEL) 06/29/2018     Medications  Home medications:    Objective  Range of Vitals (last 24 hours)  Heart Rate:  []   Temp:  [36 °C  "(96.8 °F)-36.8 °C (98.2 °F)]   Resp:  [11-20]   BP: (101-151)/(54-79)   SpO2:  [93 %-100 %]   Daily Weight  02/06/25 : 121 kg (266 lb)    Body mass index is 34.15 kg/m².     Physical Exam  A little unclear about some historical details  Reiterated that he just needs \"someone to be in charge of his care and body (paraphrased)\"  No acute distress  Resting supine in bed  Pleasant and interactive  Follows commands  Anterolateral chest clear, distant S1 and S2, I did not hear murmur  Normal handgrip  Moderate abdominal distention but soft.  Bowel sounds present.    BACK:       Unable to examine back       Right and left accordion drains present and contain bloody fluid    Labs  Results from last 72 hours   Lab Units 02/08/25  0526 02/07/25  1046 02/06/25  1201   WBC AUTO x10*3/uL 16.6* 13.0* 14.0*   HEMOGLOBIN g/dL 6.7* 7.3* 7.4*   HEMATOCRIT % 22.5* 25.5* 24.4*   PLATELETS AUTO x10*3/uL 473* 702* 620*   NEUTROS PCT AUTO % 90.1 76.3 78.2   LYMPHS PCT AUTO % 4.5 12.4 10.9   MONOS PCT AUTO % 4.0 6.6 5.8   EOS PCT AUTO % 0.4 2.6 3.1     Results from last 72 hours   Lab Units 02/08/25  0526 02/07/25  0617 02/06/25  1201   SODIUM mmol/L 141 138 139   POTASSIUM mmol/L 4.4 4.9 4.8   CHLORIDE mmol/L 105 102 99   CO2 mmol/L 28 24 28   BUN mg/dL 36* 35* 31*   CREATININE mg/dL 1.53* 1.52* 1.39*   GLUCOSE mg/dL 138* 107* 118*   CALCIUM mg/dL 8.4* 9.0 9.1   ANION GAP mmol/L 12 17 17   EGFR mL/min/1.73m*2 47* 48* 53*     Results from last 72 hours   Lab Units 02/08/25  0526 02/07/25  0617 02/06/25  1201   ALK PHOS U/L 66 82 89   BILIRUBIN TOTAL mg/dL 0.6 0.6 0.5   PROTEIN TOTAL g/dL 6.0* 6.2* 6.9   ALT U/L 5* 8* 10   AST U/L 18 25 24   ALBUMIN g/dL 2.6* 2.6* 2.9*     Estimated Creatinine Clearance: 58.5 mL/min (A) (by C-G formula based on SCr of 1.53 mg/dL (H)).  CRP   Date Value Ref Range Status   08/14/2020 11.07 (A) mg/dL Final     Comment:     REF VALUE  < 1.00       Sedimentation Rate   Date Value Ref Range Status   08/08/2020 79 " "(H) 0 - 20 mm/h Final     No results found for: \"HIV1X2\", \"HIVCONF\", \"IBHAXB1GI\"  No results found for: \"HEPCABINIT\", \"HEPCAB\", \"HCVPCRQUANT\"  Microbiology  Susceptibility data from last 90 days.  Collected Specimen Info Organism Amoxicillin/Clavulanate Ampicillin Ampicillin/Sulbactam Cefazolin Cefepime Ciprofloxacin Gentamicin Levofloxacin Nitrofurantoin Piperacillin/Tazobactam Tetracycline Tobramycin   01/02/25 Blood culture from Peripheral Venipuncture Proteus mirabilis               01/02/25 Blood culture from Peripheral Venipuncture Proteus mirabilis  S  R  S  S   R  S  R   S  R    11/27/24 Urine from Clean Catch/Voided Citrobacter freundii complex  R  R  R  R  S  R  I   I  S   I     Collected Specimen Info Organism Trimethoprim/Sulfamethoxazole   01/02/25 Blood culture from Peripheral Venipuncture Proteus mirabilis    01/02/25 Blood culture from Peripheral Venipuncture Proteus mirabilis  R   11/27/24 Urine from Clean Catch/Voided Citrobacter freundii complex  S       Assessment/Plan      Patient presented with spinal encroachment from newly diagnosed tumor suggestive of metastatic renal cell carcinoma.  Patient underwent debridement, and spinal instrumentation on 1/17/2025.  Did well postoperatively.  Now presenting with several day history of wound dehiscence and cultures pending.  Gram stain of 3 deep cultures (lumbar #1, 2, 3) all show  PMN and gram-negative bacilli.      Assume deep subfascial hardware associated infection.  Does not sound like this encroaches upon epidural space but sufficiently adjacent that prolonged antibiotics planned.  Anticipate 6 to 8 weeks of antibiotics (IV and and then perhaps oral).  Need to discuss carefully with oncology regarding candidacy for XRT and or immunotherapy for carcinoma.  Likely that both these therapies may be delayed several weeks given ongoing infection.  Difficult to say when it would be safe to initiate chemo, radiation or immunotherapy.  This requires a " balance between benefits and risks.  Would assume 2 to 3-week of antibiotics prior treatment of malignancy.      # Deep postoperative subfascial, hardware associated lumbar spine infection            Lumbar #1, #2, #3 cultures pending.  Gram stain show PMN and gram-negative rods.            Suspicious for Pseudomonas aeruginosa but awaiting ID and susceptibility.                # Probable renal cell carcinoma metastatic to L4 (and hence 1/17/25 surgery and spinal instrumentation    # CKD (GFR 47, creatinine 1.5)  # Prior Proteus mirabilis bacteremia and UTI (1/2/2025, treated til 1/232/25)       2/6/2025 urine culture negative    Recommendations:  1.  Continue pip-tazo  2.  Continue vancomycin  3.  Will de-escalate antibiotics pending lumbar cultures  4.  Anticipate 6 to 8 weeks of IV antibiotics followed by prolonged oral antibiotic suppression.              Likely need to continue antibiotics before during and after treatment for RCC which may involve chemotherapy, immunotherapy and XRT.              Anticipate several week antibiotic prior to initiation of any therapy for carcinoma but may need to start earlier.  Need to discuss with oncology and radiation oncology.    Will follow    Ivan Grajeda MD  ID Staff

## 2025-02-08 NOTE — PROGRESS NOTES
"Orthopaedic Surgery Progress Note    Subjective: Evaluated on regular nursing floor. Pain well controlled considering recent surgery. Denies chest pain, shortness of breath, or fevers. Denies any new-onset numbness or tingling in his lower extremities.    Objective:  /66 (BP Location: Right arm, Patient Position: Lying)   Pulse 75   Temp 36.6 °C (97.9 °F) (Temporal)   Resp 16   Ht 1.88 m (6' 2\")   Wt 121 kg (266 lb)   SpO2 96%   BMI 34.15 kg/m²     Gen: arousable, NAD, appropriately conversational  Cardiac: RRR to peripheral palpation  Resp: nonlabored on RA  GI: soft, nondistended    MSK:  Spine Exam:  Drains in place holding suction, bloody output  Prevena intact holding suction    L1: SILT       L2: SILT      Hip flexors 4+/5 Left; 4+/5 Right  L3: SILT      Knee extension unable to reliably assess d/t flexion contractures and effort  L4: SILT      Tib Ant. (Dorsiflexion) 5/5 Left; 5/5 Right  L5: SILT      EHL 5/5 Left; 5/5 Right  S1: SILT      Plantarflexion 5/5 Left; 5/5 Right    Assessment/Plan: 74M (HTN, GERD, PVD, cellulitis, former smoker) w compressive L4 lesion secondary to RCC metastasis s/p L2-S1 PSIF, L3-5 decompression w Dr. Atkins 1/17/2025 c/b surgical wound dehiscence now s/p I&D and closure of lumbar wound w Dr. Atkins on 2/7/2025     Plan:  - Oncology primary  - WBAT BLE, no heavy lifting, bending, or twisting > 10 lbs  - OK for diet for ortho spine standpoint  - Intra-op cultures obtained, prelim gram-negative bacilli  - Antibiotics: Vancomycin/Zosyn until intra-op cultures result  - Recommend ID consult  - Maintain HV drain x2, monitor and record output every 8 hours; will remain until 0 output  - Maintain prevena incisional vac  - Q2 turns  - Hold all anticoagulation for at least 72 hours  - PT/OT  - Ortho spine to follow    Dispo: pending PT/OT, ID recs, and HV drain removal    Mariano Hendrix MD  Orthopaedic Surgery PGY-2  [facility]  Pager: [phone]  Available by " Epic Chat    While admitted, this patient will be followed by the Ortho Spine Team. Please contact below residents with any questions (available via Epic Chat).     First call: Mariano Hendrix, PGY-2   Second call: Lew Abad, PGY-4  Third call: Anthony Mary, Fellow

## 2025-02-08 NOTE — CARE PLAN
The clinical goals for the shift include patient will remain safe and free from injury throughout shift 2/8 at 1900      Problem: Safety - Adult  Goal: Free from fall injury  Outcome: Progressing     Problem: Pain - Adult  Goal: Verbalizes/displays adequate comfort level or baseline comfort level  Outcome: Progressing     Problem: Chronic Conditions and Co-morbidities  Goal: Patient's chronic conditions and co-morbidity symptoms are monitored and maintained or improved  Outcome: Progressing     Problem: Pain  Goal: Turns in bed with improved pain control throughout the shift  Outcome: Progressing     Problem: Pain  Goal: Performs ADL's with improved pain control throughout shift  Outcome: Progressing     Problem: Pain  Goal: Free from opioid side effects throughout the shift  Outcome: Progressing     Problem: Skin  Goal: Promote/optimize nutrition  Outcome: Progressing

## 2025-02-08 NOTE — CARE PLAN
Problem: Pain - Adult  Goal: Verbalizes/displays adequate comfort level or baseline comfort level  Outcome: Progressing     Problem: Safety - Adult  Goal: Free from fall injury  Outcome: Progressing     Problem: Discharge Planning  Goal: Discharge to home or other facility with appropriate resources  Outcome: Progressing     Problem: Chronic Conditions and Co-morbidities  Goal: Patient's chronic conditions and co-morbidity symptoms are monitored and maintained or improved  Outcome: Progressing     Problem: Nutrition  Goal: Nutrient intake appropriate for maintaining nutritional needs  Outcome: Progressing     Problem: Pain  Goal: Takes deep breaths with improved pain control throughout the shift  Outcome: Progressing  Goal: Turns in bed with improved pain control throughout the shift  Outcome: Progressing  Goal: Walks with improved pain control throughout the shift  Outcome: Progressing  Goal: Performs ADL's with improved pain control throughout shift  Outcome: Progressing  Goal: Participates in PT with improved pain control throughout the shift  Outcome: Progressing  Goal: Free from opioid side effects throughout the shift  Outcome: Progressing  Goal: Free from acute confusion related to pain meds throughout the shift  Outcome: Progressing     Problem: Skin  Goal: Decreased wound size/increased tissue granulation at next dressing change  Outcome: Progressing  Flowsheets (Taken 2/8/2025 0643)  Decreased wound size/increased tissue granulation at next dressing change:   Promote sleep for wound healing   Protective dressings over bony prominences  Goal: Participates in plan/prevention/treatment measures  Outcome: Progressing  Flowsheets (Taken 2/8/2025 0643)  Participates in plan/prevention/treatment measures:   Discuss with provider PT/OT consult   Elevate heels  Goal: Prevent/manage excess moisture  Outcome: Progressing  Flowsheets (Taken 2/8/2025 0643)  Prevent/manage excess moisture:   Cleanse  incontinence/protect with barrier cream   Follow provider orders for dressing changes  Goal: Prevent/minimize sheer/friction injuries  Outcome: Progressing  Flowsheets (Taken 2/8/2025 0643)  Prevent/minimize sheer/friction injuries:   Turn/reposition every 2 hours/use positioning/transfer devices   Use pull sheet  Goal: Promote/optimize nutrition  Outcome: Progressing  Flowsheets (Taken 2/8/2025 0643)  Promote/optimize nutrition:   Consume > 50% meals/supplements   Offer water/supplements/favorite foods  Goal: Promote skin healing  Outcome: Progressing  Flowsheets (Taken 2/8/2025 0643)  Promote skin healing:   Assess skin/pad under line(s)/device(s)   Protective dressings over bony prominences   Turn/reposition every 2 hours/use positioning/transfer devices   The patient's goals for the shift include      The clinical goals for the shift include pt will remain safe and free from injury throughout shift on 2/7 to 2/8/25 at 0700    Pt remained safe and free from injury throughout shift. Patient was tired and slept during most of the night. Zosyn given throughout the night and was encouraged to drink and eat. Pt stated he is comfortable.

## 2025-02-09 VITALS
OXYGEN SATURATION: 98 % | HEART RATE: 84 BPM | HEIGHT: 74 IN | BODY MASS INDEX: 34.14 KG/M2 | RESPIRATION RATE: 18 BRPM | SYSTOLIC BLOOD PRESSURE: 134 MMHG | TEMPERATURE: 98.1 F | WEIGHT: 266 LBS | DIASTOLIC BLOOD PRESSURE: 70 MMHG

## 2025-02-09 LAB
ALBUMIN SERPL BCP-MCNC: 2.7 G/DL (ref 3.4–5)
ALP SERPL-CCNC: 72 U/L (ref 33–136)
ALT SERPL W P-5'-P-CCNC: 5 U/L (ref 10–52)
ANION GAP SERPL CALC-SCNC: 14 MMOL/L (ref 10–20)
AST SERPL W P-5'-P-CCNC: 28 U/L (ref 9–39)
B-LACTAMASE ORGANISM ISLT: POSITIVE
BACTERIA SPEC CULT: ABNORMAL
BASOPHILS # BLD AUTO: 0.1 X10*3/UL (ref 0–0.1)
BASOPHILS NFR BLD AUTO: 0.7 %
BILIRUB SERPL-MCNC: 0.7 MG/DL (ref 0–1.2)
BLOOD EXPIRATION DATE: NORMAL
BUN SERPL-MCNC: 31 MG/DL (ref 6–23)
CALCIUM SERPL-MCNC: 8.1 MG/DL (ref 8.6–10.6)
CHLORIDE SERPL-SCNC: 102 MMOL/L (ref 98–107)
CO2 SERPL-SCNC: 27 MMOL/L (ref 21–32)
CREAT SERPL-MCNC: 1.38 MG/DL (ref 0.5–1.3)
DISPENSE STATUS: NORMAL
EGFRCR SERPLBLD CKD-EPI 2021: 54 ML/MIN/1.73M*2
EOSINOPHIL # BLD AUTO: 0.3 X10*3/UL (ref 0–0.4)
EOSINOPHIL NFR BLD AUTO: 2 %
ERYTHROCYTE [DISTWIDTH] IN BLOOD BY AUTOMATED COUNT: 19.1 % (ref 11.5–14.5)
FUNGUS SPEC FUNGUS STN: NORMAL
FUNGUS SPEC FUNGUS STN: NORMAL
GLUCOSE SERPL-MCNC: 118 MG/DL (ref 74–99)
GRAM STN SPEC: ABNORMAL
HCT VFR BLD AUTO: 26 % (ref 41–52)
HGB BLD-MCNC: 7.9 G/DL (ref 13.5–17.5)
IMM GRANULOCYTES # BLD AUTO: 0.21 X10*3/UL (ref 0–0.5)
IMM GRANULOCYTES NFR BLD AUTO: 1.4 % (ref 0–0.9)
LYMPHOCYTES # BLD AUTO: 1.31 X10*3/UL (ref 0.8–3)
LYMPHOCYTES NFR BLD AUTO: 8.6 %
MAGNESIUM SERPL-MCNC: 2.38 MG/DL (ref 1.6–2.4)
MCH RBC QN AUTO: 27.3 PG (ref 26–34)
MCHC RBC AUTO-ENTMCNC: 30.4 G/DL (ref 32–36)
MCV RBC AUTO: 90 FL (ref 80–100)
MONOCYTES # BLD AUTO: 0.87 X10*3/UL (ref 0.05–0.8)
MONOCYTES NFR BLD AUTO: 5.7 %
NEUTROPHILS # BLD AUTO: 12.45 X10*3/UL (ref 1.6–5.5)
NEUTROPHILS NFR BLD AUTO: 81.6 %
NRBC BLD-RTO: 0 /100 WBCS (ref 0–0)
PLATELET # BLD AUTO: 501 X10*3/UL (ref 150–450)
POTASSIUM SERPL-SCNC: 4.2 MMOL/L (ref 3.5–5.3)
PRODUCT BLOOD TYPE: 6200
PRODUCT CODE: NORMAL
PROT SERPL-MCNC: 5.9 G/DL (ref 6.4–8.2)
RBC # BLD AUTO: 2.89 X10*6/UL (ref 4.5–5.9)
SODIUM SERPL-SCNC: 139 MMOL/L (ref 136–145)
UNIT ABO: NORMAL
UNIT NUMBER: NORMAL
UNIT RH: NORMAL
UNIT VOLUME: 350
WBC # BLD AUTO: 15.2 X10*3/UL (ref 4.4–11.3)
XM INTEP: NORMAL

## 2025-02-09 PROCEDURE — 97110 THERAPEUTIC EXERCISES: CPT | Mod: GP

## 2025-02-09 PROCEDURE — 99233 SBSQ HOSP IP/OBS HIGH 50: CPT

## 2025-02-09 PROCEDURE — 2500000001 HC RX 250 WO HCPCS SELF ADMINISTERED DRUGS (ALT 637 FOR MEDICARE OP)

## 2025-02-09 PROCEDURE — 84075 ASSAY ALKALINE PHOSPHATASE: CPT

## 2025-02-09 PROCEDURE — 2500000004 HC RX 250 GENERAL PHARMACY W/ HCPCS (ALT 636 FOR OP/ED)

## 2025-02-09 PROCEDURE — 1170000001 HC PRIVATE ONCOLOGY ROOM DAILY

## 2025-02-09 PROCEDURE — 85025 COMPLETE CBC W/AUTO DIFF WBC: CPT

## 2025-02-09 PROCEDURE — 83735 ASSAY OF MAGNESIUM: CPT

## 2025-02-09 PROCEDURE — 97161 PT EVAL LOW COMPLEX 20 MIN: CPT | Mod: GP

## 2025-02-09 RX ORDER — HYDROXYZINE HYDROCHLORIDE 25 MG/1
25 TABLET, FILM COATED ORAL ONCE
Status: COMPLETED | OUTPATIENT
Start: 2025-02-09 | End: 2025-02-09

## 2025-02-09 RX ORDER — VANCOMYCIN HYDROCHLORIDE 1 G/200ML
1000 INJECTION, SOLUTION INTRAVENOUS ONCE
Status: COMPLETED | OUTPATIENT
Start: 2025-02-09 | End: 2025-02-09

## 2025-02-09 RX ORDER — BISACODYL 10 MG/1
10 SUPPOSITORY RECTAL ONCE
Status: COMPLETED | OUTPATIENT
Start: 2025-02-09 | End: 2025-02-09

## 2025-02-09 RX ADMIN — OXYCODONE HYDROCHLORIDE 10 MG: 10 TABLET ORAL at 08:40

## 2025-02-09 RX ADMIN — PIPERACILLIN SODIUM AND TAZOBACTAM SODIUM 3.38 G: 3; .375 INJECTION, SOLUTION INTRAVENOUS at 23:09

## 2025-02-09 RX ADMIN — SENNOSIDES AND DOCUSATE SODIUM 2 TABLET: 50; 8.6 TABLET ORAL at 08:39

## 2025-02-09 RX ADMIN — FERROUS SULFATE TAB 325 MG (65 MG ELEMENTAL FE) 325 MG: 325 (65 FE) TAB at 08:39

## 2025-02-09 RX ADMIN — HYDROXYZINE HYDROCHLORIDE 25 MG: 25 TABLET, FILM COATED ORAL at 17:31

## 2025-02-09 RX ADMIN — PIPERACILLIN SODIUM AND TAZOBACTAM SODIUM 3.38 G: 3; .375 INJECTION, SOLUTION INTRAVENOUS at 00:02

## 2025-02-09 RX ADMIN — POLYETHYLENE GLYCOL 3350 17 G: 17 POWDER, FOR SOLUTION ORAL at 08:40

## 2025-02-09 RX ADMIN — BISACODYL 10 MG: 10 SUPPOSITORY RECTAL at 17:09

## 2025-02-09 RX ADMIN — SENNOSIDES AND DOCUSATE SODIUM 2 TABLET: 50; 8.6 TABLET ORAL at 20:51

## 2025-02-09 RX ADMIN — PIPERACILLIN SODIUM AND TAZOBACTAM SODIUM 3.38 G: 3; .375 INJECTION, SOLUTION INTRAVENOUS at 06:12

## 2025-02-09 RX ADMIN — FAMOTIDINE 20 MG: 20 TABLET ORAL at 08:47

## 2025-02-09 RX ADMIN — VANCOMYCIN HYDROCHLORIDE 1000 MG: 1 INJECTION, SOLUTION INTRAVENOUS at 02:58

## 2025-02-09 RX ADMIN — FAMOTIDINE 20 MG: 20 TABLET ORAL at 20:51

## 2025-02-09 RX ADMIN — ATORVASTATIN CALCIUM 40 MG: 40 TABLET, FILM COATED ORAL at 20:51

## 2025-02-09 RX ADMIN — PIPERACILLIN SODIUM AND TAZOBACTAM SODIUM 3.38 G: 3; .375 INJECTION, SOLUTION INTRAVENOUS at 17:21

## 2025-02-09 RX ADMIN — PIPERACILLIN SODIUM AND TAZOBACTAM SODIUM 3.38 G: 3; .375 INJECTION, SOLUTION INTRAVENOUS at 12:10

## 2025-02-09 RX ADMIN — PETROLATUM: 420 OINTMENT TOPICAL at 13:34

## 2025-02-09 ASSESSMENT — COGNITIVE AND FUNCTIONAL STATUS - GENERAL
STANDING UP FROM CHAIR USING ARMS: TOTAL
TOILETING: A LOT
MOBILITY SCORE: 7
MOVING TO AND FROM BED TO CHAIR: TOTAL
PERSONAL GROOMING: A LOT
WALKING IN HOSPITAL ROOM: TOTAL
MOVING TO AND FROM BED TO CHAIR: TOTAL
MOVING FROM LYING ON BACK TO SITTING ON SIDE OF FLAT BED WITH BEDRAILS: A LOT
MOBILITY SCORE: 7
EATING MEALS: A LITTLE
DRESSING REGULAR UPPER BODY CLOTHING: A LOT
DRESSING REGULAR LOWER BODY CLOTHING: A LOT
PERSONAL GROOMING: A LOT
WALKING IN HOSPITAL ROOM: TOTAL
EATING MEALS: A LITTLE
HELP NEEDED FOR BATHING: A LOT
PERSONAL GROOMING: A LOT
DRESSING REGULAR LOWER BODY CLOTHING: A LOT
MOVING TO AND FROM BED TO CHAIR: TOTAL
MOBILITY SCORE: 7
EATING MEALS: A LITTLE
HELP NEEDED FOR BATHING: A LOT
TOILETING: A LOT
DRESSING REGULAR UPPER BODY CLOTHING: A LOT
STANDING UP FROM CHAIR USING ARMS: TOTAL
TURNING FROM BACK TO SIDE WHILE IN FLAT BAD: A LOT
DAILY ACTIVITIY SCORE: 13
TURNING FROM BACK TO SIDE WHILE IN FLAT BAD: TOTAL
DRESSING REGULAR UPPER BODY CLOTHING: A LOT
MOVING TO AND FROM BED TO CHAIR: TOTAL
DAILY ACTIVITIY SCORE: 13
DRESSING REGULAR UPPER BODY CLOTHING: A LOT
WALKING IN HOSPITAL ROOM: TOTAL
CLIMB 3 TO 5 STEPS WITH RAILING: TOTAL
MOBILITY SCORE: 8
DRESSING REGULAR LOWER BODY CLOTHING: A LOT
WALKING IN HOSPITAL ROOM: TOTAL
TURNING FROM BACK TO SIDE WHILE IN FLAT BAD: TOTAL
MOVING FROM LYING ON BACK TO SITTING ON SIDE OF FLAT BED WITH BEDRAILS: A LOT
HELP NEEDED FOR BATHING: A LOT
PERSONAL GROOMING: A LOT
TOILETING: A LOT
MOVING FROM LYING ON BACK TO SITTING ON SIDE OF FLAT BED WITH BEDRAILS: A LOT
TOILETING: A LOT
CLIMB 3 TO 5 STEPS WITH RAILING: TOTAL
STANDING UP FROM CHAIR USING ARMS: TOTAL
MOBILITY SCORE: 7
MOVING FROM LYING ON BACK TO SITTING ON SIDE OF FLAT BED WITH BEDRAILS: A LOT
CLIMB 3 TO 5 STEPS WITH RAILING: TOTAL
MOVING FROM LYING ON BACK TO SITTING ON SIDE OF FLAT BED WITH BEDRAILS: A LOT
DAILY ACTIVITIY SCORE: 13
HELP NEEDED FOR BATHING: A LOT
TURNING FROM BACK TO SIDE WHILE IN FLAT BAD: TOTAL
MOVING TO AND FROM BED TO CHAIR: TOTAL
DAILY ACTIVITIY SCORE: 13
CLIMB 3 TO 5 STEPS WITH RAILING: TOTAL
STANDING UP FROM CHAIR USING ARMS: TOTAL
TURNING FROM BACK TO SIDE WHILE IN FLAT BAD: TOTAL
STANDING UP FROM CHAIR USING ARMS: TOTAL
CLIMB 3 TO 5 STEPS WITH RAILING: TOTAL
DRESSING REGULAR LOWER BODY CLOTHING: A LOT
WALKING IN HOSPITAL ROOM: TOTAL
EATING MEALS: A LITTLE

## 2025-02-09 ASSESSMENT — PAIN - FUNCTIONAL ASSESSMENT
PAIN_FUNCTIONAL_ASSESSMENT: 0-10
PAIN_FUNCTIONAL_ASSESSMENT: 0-10

## 2025-02-09 ASSESSMENT — PAIN SCALES - GENERAL
PAINLEVEL_OUTOF10: 8
PAINLEVEL_OUTOF10: 0 - NO PAIN

## 2025-02-09 ASSESSMENT — ACTIVITIES OF DAILY LIVING (ADL): ADL_ASSISTANCE: NEEDS ASSISTANCE

## 2025-02-09 NOTE — PROGRESS NOTES
"Abdi Retana \"Louise" is a 74 y.o. male on day 3 of admission presenting with Wound dehiscence.    Subjective   Seen pt at bedside this AM. Pt denies pain this AM. Pt c/o constipation, stating that he doesn't know when his last BM was. Drains in place, output remains sanguinous/serosanguinous. Discussed plans to continue with antibiotics, and order additional laxitives today to encourage BM. Denies fever/chills, N/V/D, bruising, swelling, SOB, CP, LUND, H/A and vision changes.       Objective     Physical Exam  Constitutional:       Appearance: Normal appearance.      Comments: pale   HENT:      Head: Normocephalic.      Mouth/Throat:      Mouth: Mucous membranes are moist.   Eyes:      Pupils: Pupils are equal, round, and reactive to light.   Cardiovascular:      Rate and Rhythm: Normal rate and regular rhythm.      Pulses: Normal pulses.      Heart sounds: Normal heart sounds.   Pulmonary:      Effort: Pulmonary effort is normal.      Breath sounds: Normal breath sounds.   Abdominal:      General: Abdomen is flat. Bowel sounds are normal.      Palpations: Abdomen is soft.   Musculoskeletal:      Cervical back: Normal range of motion and neck supple.      Comments: Drains in place with bloody output    Skin:     General: Skin is warm.   Neurological:      General: No focal deficit present.      Mental Status: He is alert.   Psychiatric:         Mood and Affect: Mood normal.         Last Recorded Vitals  Blood pressure 137/77, pulse 108, temperature 36.1 °C (97 °F), temperature source Temporal, resp. rate 18, height 1.88 m (6' 2\"), weight 121 kg (266 lb), SpO2 95%.  Intake/Output last 3 Shifts:  I/O last 3 completed shifts:  In: 1307 (10.8 mL/kg) [P.O.:357; IV Piggyback:950]  Out: 1210 (10 mL/kg) [Urine:980 (0.2 mL/kg/hr); Drains:230]  Weight: 120.7 kg     Relevant Results  Scheduled medications  [Held by provider] aspirin, 81 mg, oral, Daily  atorvastatin, 40 mg, oral, Nightly  bisacodyl, 10 mg, rectal, Once  [Held by " provider] enoxaparin, 120 mg, subcutaneous, BID  famotidine, 20 mg, oral, BID  ferrous sulfate (325 mg ferrous sulfate), 1 tablet, oral, q24h  piperacillin-tazobactam, 3.375 g, intravenous, q6h  polyethylene glycol, 17 g, oral, Daily  sennosides-docusate sodium, 2 tablet, oral, BID  [Held by provider] torsemide, 20 mg, oral, Daily  white petrolatum, , Topical, Daily      Continuous medications     PRN medications  PRN medications: cyclobenzaprine, HYDROmorphone, magnesium hydroxide, ondansetron, oxyCODONE, oxyCODONE, vancomycin      This patient has a central line   Reason for the central line remaining today? Hemodynamic monitoring    Assessment/Plan   Assessment & Plan  Wound dehiscence    Wound dehiscence, surgical    Acute kidney injury (nontraumatic) (CMS-HCC)    Abdi Retana is a 74 year old Male PMH newly dx metastatic RCC (dx Jan 2025, has not started treatment yet), HTN, GERD, SAMIA, chronic lymphedema, venous insufficiency, OA, CKD III (BL~ 1.2), hx new acute stroke dx 1/8/25 (on BID Lovenox) and recent cord compression L4-L5 lesion (s/p L3-L5 laminectomies with L2-S1 lumbar tumor debulking 1/17 with Ortho surg) who presented to ED 2/6 after being sent from his first postop visit with Ortho for c/f lumbar wound dehiscence. Ortho consulted, s/p I&D and wound closure with ortho on 2/7. Coagulated hematoma noted by ortho in OR, wound cultures taken, prelim +gram negative bacilli. Initiated on broad spectrum antibiotics vanc + zosyn (2/7-) until would cultures finalized. ID consulted on 2/8 for antibiotics treatment and duration recommendations, rec continue vanc + zosyn (2/7-); plan to deescalate pending final lumber cultures. DC new SNF pending ID and Ortho recs and postop recovery.      Updates 2/9:  - POD 2 I&D and wound closure with ortho   - Intraoperative wound cultures x3, prelim +Proteus and Morganella  - Per ID, plan to continue vanc + zosyn (2/7-), plan to deescalate pending final lumber cultures  -  Continue to hold home lovenox today per ortho x3 days, okay to resume 2/10    - ordered Bisacodyl suppository for constipation  - Pending PT/OT eval      # lumbar wound dehiscence s/p 3-L5 laminectomy and L2-S1 fusion decompression  - (1/17-1/24) 1/17 s/p spinal artery embolization with NSGY and lumbar tumor debulkin L3-L5 laminectomiues with L2-S1 PSIF with Dr. Atkins, surgery c/b acute bloos loss requiring RBCs, discharged to SNF to establish Onc and Rad/Onc plan    - 2/6 Returned for first postop visit and found to have dehisced wound, sent by Dr. Atkins to ED   - 2/6 Xray lumbar spine showed re demonstration significant pathologic destruction L4, screws for L5 appear superior to level of vertebral body poss outside of body related to shifting of hardware   - Ortho consulted on admit, s/p I&D and wound closure with Dr. Atkins on 2/7  - Coagulated hematoma noted by ortho in OR  - Intraoperative cultures x3 collected, prelim +Proteus and Morganella  - ID consulted 2/8 for antibiotic tx and duration, rec continue vanc + zosyn (2/7- ) and plan to deescalate pending final lumber cultures  - c/w oxycodone 5mg PRN mod pain, 10mg PRN severe pain, and 0.4mg IVP dilaudid q3 PRN breakthrough (2/5- )  - PT / OT consulted, recs pending      # newly dx metastatic R RCC   - Plan to establish with Dr. Wynn 2/12 and Dr. Moncada 2/27 (Rad/Onc) for OP care - both emailed 2/7   - Nov 2024 Presented for fall and BLE weakness, Xray lumbar spine showing large lytic lesion L4 c/f neoplasm - was due to get outpatient bx with IR in Dec but was unable d/t facility mixing up transportation and missed appt   - 11/30 CT C/A/P showed large infiltrative R renal mass c/w RCC with mets adrenal glands, osseous lesions, pulm nodules  - 1/7 s/p s/p bx L4 lesion, 1/13 confrmed carcinoma involving dense fibroconnective tissue    - 1/16 s/p IR for tumor embolization from b/l L4 segmental arteries  - 1/17 s/p OR for lumbar tumor debulking L3-L4 and  L4-L5 laminectomies with L2-S1 PSIF with Dr. Atkins   - 1/24 Seen by Rad/Onc   - Per Dr. 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 1.38  - held home torsemide 2/7  - UA +leuks, WBC, on broad spectrum antibiotics as above   - FeNa 0.3% pre-renal   - CMP daily      # hx small acute stroke  - 1/8/25 MRI Brain showed c/f small acute stroke in R parietal lobe  - Neuro consulted, rec Lovenox 1mg/kg q12h and outpatient follow up   - held home Lovenox I/s/o surgery 2/7, plan to resume 2/10 per ortho      # hx recent proteus mirabilis complicated UTI and bacteremia   - 1/5 developed UTI with + Proteus bacteremia s/p Cefepime and Augmentin PO completed 1/23   - Seen by ID inpatient during previous admission   - UA (2/6) + leuks, WBC, casts - no reports of dysuria, flank pain, fever/chills  - urine culture (2/6) negative     # chronic venous insufficiency  # chronic lymphedema   - Sees podiatry at [facility]  - Wound care consulted on admit, recs appreciated  - held home torsemide 20mg daily   - held home ASA on admit d/t poss return OR      # HTN/HLD  - c/w home lipitor 40mg daily      # GERD  - c/w home pepcid daily      # dispo  - Full code, confirmed on admit   - DC new SNF pending Ortho recs, ID recs, and postop recovery  - NOK: Micaela, sister, [phone]   - FUV 2/11 Urology, 2/12 Dr. Mendiratta    I spent 60 minutes in the professional and overall care of this patient.    Assessment and plan as above discussed with attending physician, Dr. Beal.     Judy Alfredo, HUSSEIN-CNP

## 2025-02-09 NOTE — PROGRESS NOTES
Vancomycin Dosing by Pharmacy- FOLLOW UP    Abdi Retana is a 74 y.o. year old male who Pharmacy has been consulted for vancomycin dosing for Surgical Wound Infection. Based on the patient's indication and renal status this patient is being dosed based on a goal trough/random level of 15-20.     Renal function is currently declining, will continue to dose by level.    Last vancomycin dose: 2000 mg given  @1844.    Most recent trough level: 11.3 mcg/mL  @ (~26 hour trough)    Visit Vitals  /64 (BP Location: Right arm, Patient Position: Lying)   Pulse 86   Temp 36.3 °C (97.3 °F) (Temporal)   Resp 18        Lab Results   Component Value Date    CREATININE 1.53 (H) 2025    CREATININE 1.52 (H) 2025    CREATININE 1.39 (H) 2025    CREATININE 0.96 2025        Patient weight is as follows:   Vitals:    25 1222   Weight: 121 kg (266 lb)       Cultures:  Susceptibility data for the encounter in last 14 days.  Collected Specimen Info Organism   25 Swab from ABSCESS Morganella morganii     Proteus mirabilis   25 Swab from ABSCESS Morganella morganii     Proteus mirabilis   25 Swab from ABSCESS Morganella morganii     Proteus mirabilis        I/O last 3 completed shifts:  In: 4157 (34.5 mL/kg) [P.O.:357; I.V.:1900 (15.7 mL/kg); Blood:700; IV Piggyback:1200]  Out: 1150 (9.5 mL/kg) [Urine:930 (0.2 mL/kg/hr); Drains:170; Blood:50]  Weight: 120.7 kg   I/O during current shift:  I/O this shift:  In: 50 [IV Piggyback:50]  Out: 390 [Urine:350; Drains:40]    Temp (24hrs), Av.4 °C (97.5 °F), Min:36 °C (96.8 °F), Max:36.6 °C (97.9 °F)      Assessment/Plan    Below goal random/trough level. Orders placed for 1000 mg x1.  The next level will be obtained on 2/10 at AM draw. May be obtained sooner if clinically indicated.   Will continue to monitor renal function daily while on vancomycin and order serum creatinine at least every 48 hours if not already ordered.  Follow for  continued vancomycin needs, clinical response, and signs/symptoms of toxicity.       Teresa Torres, PharmD

## 2025-02-09 NOTE — PROGRESS NOTES
"Orthopaedic Surgery Progress Note    Subjective: Evaluated on regular nursing floor. Pain well controlled considering recent surgery. Received 1u pRBC for Hgb of 6.7, repeat Hgb was 7.9. Denies any new-onset numbness or tingling in his lower extremities.    OR cultures growing Proteus and Morganella. ID following.    Objective:  /70 (BP Location: Right arm, Patient Position: Lying)   Pulse 88   Temp 36.4 °C (97.5 °F) (Temporal)   Resp 16   Ht 1.88 m (6' 2\")   Wt 121 kg (266 lb)   SpO2 95%   BMI 34.15 kg/m²     Gen: arousable, NAD, appropriately conversational  Cardiac: RRR to peripheral palpation  Resp: nonlabored on RA  GI: soft, nondistended    MSK:  Spine Exam:  Drains in place holding suction, bloody output  Prevena intact holding suction    L1: SILT       L2: SILT      Hip flexors 4+/5 Left; 4+/5 Right  L3: SILT      Knee extension unable to reliably assess d/t flexion contractures and effort  L4: SILT      Tib Ant. (Dorsiflexion) 5/5 Left; 5/5 Right  L5: SILT      EHL 5/5 Left; 5/5 Right  S1: SILT      Plantarflexion 5/5 Left; 5/5 Right    Assessment/Plan: 74M (HTN, GERD, PVD, cellulitis, former smoker) w compressive L4 lesion secondary to RCC metastasis s/p L2-S1 PSIF, L3-5 decompression w Dr. Atkins 1/17/2025 c/b surgical wound dehiscence now s/p I&D and closure of lumbar wound w Dr. Atkins on 2/7/2025     Plan:  - Oncology primary  - WBAT BLE, no heavy lifting, bending, or twisting > 10 lbs  - OK for diet for ortho spine standpoint  - Intra-op cultures obtained (Proteus and Morganella)  - Antibiotics: Vancomycin/Zosyn until intra-op cultures result  - ID consult  - Maintain HV drain x2, monitor and record output every 8 hours; will remain until 0 output  - Maintain prevena incisional vac  - Q2 turns  - Hold all anticoagulation for at least 72 hours postop (until 2/10 PM)  - PT/OT  - Ortho spine to follow    Dispo: pending PT/OT, ID recs, and HV drain removal    Mariano Hendrix MD  Orthopaedic " Surgery PGY-2  [facility]  Pager: [phone]  Available by Epic Chat    While admitted, this patient will be followed by the Ortho Spine Team. Please contact below residents with any questions (available via Epic Chat).     First call: Mariano Hendrix, PGY-2   Second call: Lew Abad, PGY-4  Third call: Anthony Mary, Fellow

## 2025-02-09 NOTE — PROGRESS NOTES
Physical Therapy    Physical Therapy Evaluation & Treatment    Patient Name: Matt Retana  MRN: [id number]  Department: [facility]  Room: [address] [place] [address]  Today's Date: 2/9/2025   Time Calculation  Start Time: 1427  Stop Time: 1459  Time Calculation (min): 32 min    Assessment/Plan   PT Assessment  PT Assessment Results: Decreased strength, Decreased endurance, Impaired balance, Decreased mobility, Decreased safety awareness, Pain, Orthopedic restrictions  Rehab Prognosis: Good  Barriers to Discharge Home: Caregiver assistance, Physical needs  Caregiver Assistance: Caregiver assistance needed per identified barriers - however, level of patient's required assistance exceeds assistance available at home  Physical Needs: 24hr mobility assistance needed, 24hr ADL assistance needed, High falls risk due to function or environment  Evaluation/Treatment Tolerance: Patient limited by pain, Patient limited by fatigue  Medical Staff Made Aware: Yes  Strengths: Attitude of self  Barriers to Participation: Comorbidities  End of Session Communication: Bedside nurse  Assessment Comment: Pt with good participation but limited 2/2 pain and fatigue. Pt required max A for bed mobility. Pt was able to sit EOB initially with mod A and progressing to CGA. Patient would benefit from skilled physical therapy to maximize functional mobility and safety. Pt is appropriate for mod intensity therapy when medically appropriate for DC.  End of Session Patient Position: Bed, 3 rail up, Alarm off, not on at start of session (CB in reach)   IP OR SWING BED PT PLAN  Inpatient or Swing Bed: Inpatient  PT Plan  Treatment/Interventions: Bed mobility, Transfer training, Gait training, Balance training, Strengthening, Endurance training, Therapeutic exercise, Therapeutic activity  PT Plan: Ongoing PT  PT Frequency: 4 times per week  PT Discharge Recommendations: Moderate intensity level of continued care  Equipment Recommended upon Discharge:  (TBD)  PT Recommended  Transfer Status: Assist x2 (to EOB)  PT - OK to Discharge: Yes (meaning pt seen and dc rec made)  RN cleared prior to session     Subjective   General Visit Information:  General  Reason for Referral: re-admitted 2/6 from SNF (direct admit to ED from outpt ortho sx follow-up appointment) with drainage from spine wound, lethargy, confusion; dx: spinal wound dehiscence, destruction of L4 vertebral body from metastatic lesion, L5 metastatic involvement, anemia  Past Medical History Relevant to Rehab: CKD, small acute stroke Right parietal lobe 1/8/25, malignant renal cell carcinoma Right (L4 lesion, mets to adrenal glands & lung), s/p L2-S1 PSIF, L3-5 decompression w Dr. Atkins 1/17, HTN, GERD, OA, PVD, anemia, obesity, PVD, CKD, chronic lymphedema, fall, SAMIA, chronic lymphedema, venous insufficiency, OE wounds, OA  Family/Caregiver Present: No  Prior to Session Communication: Bedside nurse  Patient Position Received: Bed, 3 rail up, Alarm off, not on at start of session  Preferred Learning Style: auditory, verbal  General Comment: pt agreeable to therapy with encouragmenr  Home Living:  Home Living  Type of Home: Skilled Nursing facility  Prior Level of Function:  Prior Function Per Pt/Caregiver Report  ADL Assistance: Needs assistance  Homemaking Assistance: Needs assistance  Ambulatory Assistance: Needs assistance  Prior Function Comments: Pt previously IND but since last admit in [place] pt has required assist for ADLs and mobility. Pt's facility performs homemaking. Pt states he was recieving some zPT at snf. Pt reports they used a [place]o lift [place] [facility] [place] performed w[place] propulsion.  Precautions:  Precautions  Medical Precautions: Fall precautions, Spinal precautions  Post-Surgical Precautions: Spinal precautions    Objective   Pain:  Pain Assessment  Pain Assessment: 0-10  0-10 (Numeric) Pain Score:  (pt did not rate)  Pain Type: Acute pain, Surgical pain  Pain Location: Back  Pain  Interventions: Repositioned, Distraction  Response to Interventions: Decrease in pain  Cognition:  Cognition  Arousal/Alertness: Delayed responses to stimuli  Orientation Level: Disoriented to situation, Disoriented to time  Following Commands: Follows one step commands with repetition (and increased time)    General Assessments:  Activity Tolerance  Endurance: Tolerates 10 - 20 min exercise with multiple rests    Sensation  Light Touch: No apparent deficits    Postural Control  Posture Comment: rounded shoulders and forward head    Static Sitting Balance  Static Sitting-Balance Support: Bilateral upper extremity supported, Feet supported  Static Sitting-Level of Assistance: Contact guard, Minimum assistance, Moderate assistance  Static Sitting-Comment/Number of Minutes: initially mod A but able to progress to CGA    Functional Assessments:  Bed Mobility  Bed Mobility: Yes  Bed Mobility 1  Bed Mobility 1: Supine to sitting, Sitting to supine  Level of Assistance 1: Maximum assistance  Bed Mobility Comments 1: HOB elevated; mod vc for log roll, task, sequencing, and safety  Bed Mobility 2  Bed Mobility  2: Scooting  Level of Assistance 2: Dependent, +2  Bed Mobility Comments 2: boost up in bed    Transfers  Transfer: No    Ambulation/Gait Training  Ambulation/Gait Training Performed: No  Extremity/Trunk Assessments:  RLE   RLE :  (grossly >/=3-/5 at knee, >/=2+ hip, and >/=3 ankle)  LLE   LLE :  (grossly >/=3/5 at knee, >/= 3-/5 at hip, and >/=3+ ankle)  Treatments:  Therapeutic Exercise  Therapeutic Exercise Performed: Yes  Therapeutic Exercise Activity 1: seated LLE active marches, LAQ, AP (not to full ROM) x 15; RLE active asisted marches, LAQ, AP x 15    Therapeutic Activity  Therapeutic Activity Performed: Yes  Therapeutic Activity 1: static/dynamic sitting EOB ~20 min; initially mod A but pt able to progress to CGA; working on upright tolerance and balance. Pt required mod vc to correct retropulsion. Pt able to  perform multiple trials performing static balance with 1 UE support and no UE support while maintaining CGA.  Therapeutic Activity 2: Pt re educated on spinal precautions  Outcome Measures:  [facility] Basic Mobility  Turning from your back to your side while in a flat bed without using bedrails: A lot  Moving from lying on your back to sitting on the side of a flat bed without using bedrails: A lot  Moving to and from bed to chair (including a wheelchair): Total  Standing up from a chair using your arms (e.g. wheelchair or bedside chair): Total  To walk in hospital room: Total  Climbing 3-5 steps with railing: Total  Basic Mobility - Total Score: 8    Encounter Problems       Encounter Problems (Active)       PT Problem       Patient will verbalize and demonstrate Spinal Precautions independently.        Start:  02/09/25    Expected End:  02/24/25            Patient will complete supine to sit and sit to supine Mod Assist        Start:  02/09/25    Expected End:  02/24/25            Patient will perform sit<>stand transfer with LRAD, and Mod Assist x 2        Start:  02/09/25    Expected End:  02/24/25            Pt will demo static/dynamic sitting balance ~ 20 min with 0 LOB ans supervision A       Start:  02/09/25    Expected End:  02/24/25               Pain - Adult              Education Documentation  Home Exercise Program, taught by Mikayla Angela PT at 2/9/2025  4:02 PM.  Learner: Patient  Readiness: Acceptance  Method: Explanation, Demonstration  Response: Verbalizes Understanding, Needs Reinforcement    Precautions, taught by Mikayla Angela PT at 2/9/2025  4:02 PM.  Learner: Patient  Readiness: Acceptance  Method: Explanation, Demonstration  Response: Verbalizes Understanding, Needs Reinforcement    Body Mechanics, taught by Mikayla Angela PT at 2/9/2025  4:02 PM.  Learner: Patient  Readiness: Acceptance  Method: Explanation, Demonstration  Response: Verbalizes Understanding, Needs  Reinforcement    Mobility Training, taught by Mikayla Angela PT at 2/9/2025  4:02 PM.  Learner: Patient  Readiness: Acceptance  Method: Explanation, Demonstration  Response: Verbalizes Understanding, Needs Reinforcement    Education Comments  No comments found.

## 2025-02-10 LAB
ALBUMIN SERPL BCP-MCNC: 2.7 G/DL (ref 3.4–5)
ALP SERPL-CCNC: 68 U/L (ref 33–136)
ALT SERPL W P-5'-P-CCNC: 4 U/L (ref 10–52)
ANION GAP SERPL CALC-SCNC: 12 MMOL/L (ref 10–20)
AST SERPL W P-5'-P-CCNC: 23 U/L (ref 9–39)
B-LACTAMASE ORGANISM ISLT: POSITIVE
BACTERIA SPEC CULT: ABNORMAL
BASOPHILS # BLD AUTO: 0.09 X10*3/UL (ref 0–0.1)
BASOPHILS NFR BLD AUTO: 0.6 %
BILIRUB SERPL-MCNC: 0.9 MG/DL (ref 0–1.2)
BUN SERPL-MCNC: 27 MG/DL (ref 6–23)
CALCIUM SERPL-MCNC: 8.6 MG/DL (ref 8.6–10.6)
CHLORIDE SERPL-SCNC: 103 MMOL/L (ref 98–107)
CO2 SERPL-SCNC: 28 MMOL/L (ref 21–32)
CREAT SERPL-MCNC: 1.29 MG/DL (ref 0.5–1.3)
EGFRCR SERPLBLD CKD-EPI 2021: 58 ML/MIN/1.73M*2
EOSINOPHIL # BLD AUTO: 0.31 X10*3/UL (ref 0–0.4)
EOSINOPHIL NFR BLD AUTO: 2.2 %
ERYTHROCYTE [DISTWIDTH] IN BLOOD BY AUTOMATED COUNT: 18.9 % (ref 11.5–14.5)
GLUCOSE SERPL-MCNC: 104 MG/DL (ref 74–99)
GRAM STN SPEC: ABNORMAL
HCT VFR BLD AUTO: 25.5 % (ref 41–52)
HGB BLD-MCNC: 7.8 G/DL (ref 13.5–17.5)
IMM GRANULOCYTES # BLD AUTO: 0.15 X10*3/UL (ref 0–0.5)
IMM GRANULOCYTES NFR BLD AUTO: 1.1 % (ref 0–0.9)
LYMPHOCYTES # BLD AUTO: 1.29 X10*3/UL (ref 0.8–3)
LYMPHOCYTES NFR BLD AUTO: 9 %
MAGNESIUM SERPL-MCNC: 2.36 MG/DL (ref 1.6–2.4)
MCH RBC QN AUTO: 27.5 PG (ref 26–34)
MCHC RBC AUTO-ENTMCNC: 30.6 G/DL (ref 32–36)
MCV RBC AUTO: 90 FL (ref 80–100)
MONOCYTES # BLD AUTO: 0.97 X10*3/UL (ref 0.05–0.8)
MONOCYTES NFR BLD AUTO: 6.8 %
NEUTROPHILS # BLD AUTO: 11.45 X10*3/UL (ref 1.6–5.5)
NEUTROPHILS NFR BLD AUTO: 80.3 %
NRBC BLD-RTO: 0 /100 WBCS (ref 0–0)
PLATELET # BLD AUTO: 489 X10*3/UL (ref 150–450)
POTASSIUM SERPL-SCNC: 3.6 MMOL/L (ref 3.5–5.3)
PROT SERPL-MCNC: 6.3 G/DL (ref 6.4–8.2)
RBC # BLD AUTO: 2.84 X10*6/UL (ref 4.5–5.9)
SODIUM SERPL-SCNC: 139 MMOL/L (ref 136–145)
VANCOMYCIN SERPL-MCNC: 13.6 UG/ML (ref 5–20)
WBC # BLD AUTO: 14.3 X10*3/UL (ref 4.4–11.3)

## 2025-02-10 PROCEDURE — 2500000004 HC RX 250 GENERAL PHARMACY W/ HCPCS (ALT 636 FOR OP/ED)

## 2025-02-10 PROCEDURE — 99233 SBSQ HOSP IP/OBS HIGH 50: CPT | Performed by: INTERNAL MEDICINE

## 2025-02-10 PROCEDURE — 2500000004 HC RX 250 GENERAL PHARMACY W/ HCPCS (ALT 636 FOR OP/ED): Mod: TB

## 2025-02-10 PROCEDURE — 2500000001 HC RX 250 WO HCPCS SELF ADMINISTERED DRUGS (ALT 637 FOR MEDICARE OP)

## 2025-02-10 PROCEDURE — 99233 SBSQ HOSP IP/OBS HIGH 50: CPT

## 2025-02-10 PROCEDURE — 80202 ASSAY OF VANCOMYCIN: CPT

## 2025-02-10 PROCEDURE — 83735 ASSAY OF MAGNESIUM: CPT

## 2025-02-10 PROCEDURE — 85025 COMPLETE CBC W/AUTO DIFF WBC: CPT

## 2025-02-10 PROCEDURE — 1170000001 HC PRIVATE ONCOLOGY ROOM DAILY

## 2025-02-10 PROCEDURE — 80053 COMPREHEN METABOLIC PANEL: CPT

## 2025-02-10 RX ORDER — VANCOMYCIN HYDROCHLORIDE 1 G/200ML
1000 INJECTION, SOLUTION INTRAVENOUS EVERY 24 HOURS
Status: DISCONTINUED | OUTPATIENT
Start: 2025-02-10 | End: 2025-02-11

## 2025-02-10 RX ORDER — HYDROXYZINE HYDROCHLORIDE 25 MG/1
25 TABLET, FILM COATED ORAL EVERY 8 HOURS PRN
Status: DISCONTINUED | OUTPATIENT
Start: 2025-02-10 | End: 2025-02-15 | Stop reason: HOSPADM

## 2025-02-10 RX ORDER — HYDROXYZINE HYDROCHLORIDE 10 MG/1
10 TABLET, FILM COATED ORAL ONCE
Status: COMPLETED | OUTPATIENT
Start: 2025-02-10 | End: 2025-02-10

## 2025-02-10 RX ORDER — ACETAMINOPHEN 500 MG
5 TABLET ORAL ONCE
Status: COMPLETED | OUTPATIENT
Start: 2025-02-10 | End: 2025-02-10

## 2025-02-10 RX ADMIN — PIPERACILLIN SODIUM AND TAZOBACTAM SODIUM 3.38 G: 3; .375 INJECTION, SOLUTION INTRAVENOUS at 11:40

## 2025-02-10 RX ADMIN — POLYETHYLENE GLYCOL 3350 17 G: 17 POWDER, FOR SOLUTION ORAL at 09:49

## 2025-02-10 RX ADMIN — FERROUS SULFATE TAB 325 MG (65 MG ELEMENTAL FE) 325 MG: 325 (65 FE) TAB at 09:49

## 2025-02-10 RX ADMIN — ATORVASTATIN CALCIUM 40 MG: 40 TABLET, FILM COATED ORAL at 20:39

## 2025-02-10 RX ADMIN — FAMOTIDINE 20 MG: 20 TABLET ORAL at 20:39

## 2025-02-10 RX ADMIN — OXYCODONE HYDROCHLORIDE 10 MG: 10 TABLET ORAL at 04:32

## 2025-02-10 RX ADMIN — HYDROMORPHONE HYDROCHLORIDE 0.4 MG: 0.5 INJECTION, SOLUTION INTRAMUSCULAR; INTRAVENOUS; SUBCUTANEOUS at 12:43

## 2025-02-10 RX ADMIN — HYDROXYZINE HYDROCHLORIDE 25 MG: 25 TABLET, FILM COATED ORAL at 16:50

## 2025-02-10 RX ADMIN — PIPERACILLIN SODIUM AND TAZOBACTAM SODIUM 3.38 G: 3; .375 INJECTION, SOLUTION INTRAVENOUS at 17:48

## 2025-02-10 RX ADMIN — Medication 5 MG: at 20:39

## 2025-02-10 RX ADMIN — PETROLATUM: 420 OINTMENT TOPICAL at 09:49

## 2025-02-10 RX ADMIN — OXYCODONE HYDROCHLORIDE 10 MG: 10 TABLET ORAL at 09:48

## 2025-02-10 RX ADMIN — VANCOMYCIN HYDROCHLORIDE 1000 MG: 1 INJECTION, SOLUTION INTRAVENOUS at 13:41

## 2025-02-10 RX ADMIN — ENOXAPARIN SODIUM 120 MG: 150 INJECTION SUBCUTANEOUS at 09:49

## 2025-02-10 RX ADMIN — HYDROXYZINE HYDROCHLORIDE 10 MG: 10 TABLET ORAL at 20:39

## 2025-02-10 RX ADMIN — ASPIRIN 81 MG: 81 TABLET, COATED ORAL at 09:49

## 2025-02-10 RX ADMIN — SENNOSIDES AND DOCUSATE SODIUM 2 TABLET: 50; 8.6 TABLET ORAL at 09:48

## 2025-02-10 RX ADMIN — FAMOTIDINE 20 MG: 20 TABLET ORAL at 09:48

## 2025-02-10 RX ADMIN — PIPERACILLIN SODIUM AND TAZOBACTAM SODIUM 3.38 G: 3; .375 INJECTION, SOLUTION INTRAVENOUS at 23:03

## 2025-02-10 RX ADMIN — SENNOSIDES AND DOCUSATE SODIUM 2 TABLET: 50; 8.6 TABLET ORAL at 20:39

## 2025-02-10 RX ADMIN — ENOXAPARIN SODIUM 120 MG: 150 INJECTION SUBCUTANEOUS at 20:39

## 2025-02-10 RX ADMIN — PIPERACILLIN SODIUM AND TAZOBACTAM SODIUM 3.38 G: 3; .375 INJECTION, SOLUTION INTRAVENOUS at 04:32

## 2025-02-10 ASSESSMENT — COGNITIVE AND FUNCTIONAL STATUS - GENERAL
TURNING FROM BACK TO SIDE WHILE IN FLAT BAD: TOTAL
MOBILITY SCORE: 7
DRESSING REGULAR UPPER BODY CLOTHING: TOTAL
HELP NEEDED FOR BATHING: TOTAL
MOVING FROM LYING ON BACK TO SITTING ON SIDE OF FLAT BED WITH BEDRAILS: TOTAL
CLIMB 3 TO 5 STEPS WITH RAILING: TOTAL
TOILETING: TOTAL
EATING MEALS: TOTAL
PERSONAL GROOMING: TOTAL
PERSONAL GROOMING: TOTAL
DRESSING REGULAR LOWER BODY CLOTHING: TOTAL
STANDING UP FROM CHAIR USING ARMS: TOTAL
TOILETING: TOTAL
MOVING FROM LYING ON BACK TO SITTING ON SIDE OF FLAT BED WITH BEDRAILS: A LOT
MOVING TO AND FROM BED TO CHAIR: TOTAL
DAILY ACTIVITIY SCORE: 8
STANDING UP FROM CHAIR USING ARMS: TOTAL
CLIMB 3 TO 5 STEPS WITH RAILING: TOTAL
DAILY ACTIVITIY SCORE: 6
MOBILITY SCORE: 6
HELP NEEDED FOR BATHING: TOTAL
DRESSING REGULAR LOWER BODY CLOTHING: TOTAL
WALKING IN HOSPITAL ROOM: TOTAL
MOVING TO AND FROM BED TO CHAIR: TOTAL
EATING MEALS: A LITTLE
DRESSING REGULAR UPPER BODY CLOTHING: TOTAL
WALKING IN HOSPITAL ROOM: TOTAL
TURNING FROM BACK TO SIDE WHILE IN FLAT BAD: TOTAL

## 2025-02-10 ASSESSMENT — PAIN SCALES - GENERAL
PAINLEVEL_OUTOF10: 5 - MODERATE PAIN
PAINLEVEL_OUTOF10: 7
PAINLEVEL_OUTOF10: 7
PAINLEVEL_OUTOF10: 10 - WORST POSSIBLE PAIN

## 2025-02-10 NOTE — PROGRESS NOTES
"Music Therapy Note    Abdi \"Ray\" Mazin was referred by Care Team     Therapy Session  Referral Type: New referral this admission  Visit Type: New visit  Session Start Time: 1415  Session End Time: 1415  Conflict of Service: Asleep     Pre-assessment  Unable to Assess Reason: Outcomes not applicable         Treatment/Interventions  Music Therapy Interventions: Assessment    Post-assessment  Unable to Assess Reason: Patient sleeping  Total Session Time (min): 0 minutes    Narrative  Assessment Detail: Pt sleeping at time of rounding.  Plan: MT was referred by care team. MT attempted to provide music therapy services.  Intervention: No intervention at this time, pt sleeping.  Evaluation: NA  Follow-up: If possible, will follow up with pt at a later time.    Education Documentation  No documentation found.          "

## 2025-02-10 NOTE — PROGRESS NOTES
"Physical Therapy                 Therapy Communication Note    Patient Name: Abdi Retana \"Matt\"  MRN: [id number]  Department: [facility]  Room: [address] [place], [place] [address]  Today's Date: 2/10/2025     Discipline: Physical Therapy    PT Missed Visit: Yes     Missed Visit Reason: Missed Visit Reason: Patient refused (Pt refused endorsing fatigue and 10/10 pain after getting cleaned up. Will re attempt as available.)    Missed Time: Attempt    Comment:  "

## 2025-02-10 NOTE — CARE PLAN
The patient's goals for the shift include      The clinical goals for the shift include pt will remain HDS and VSS through end of shift 2/10/25 @ 1900      Problem: Pain - Adult  Goal: Verbalizes/displays adequate comfort level or baseline comfort level  Outcome: Progressing     Problem: Safety - Adult  Goal: Free from fall injury  Outcome: Progressing     Problem: Discharge Planning  Goal: Discharge to home or other facility with appropriate resources  Outcome: Progressing     Problem: Chronic Conditions and Co-morbidities  Goal: Patient's chronic conditions and co-morbidity symptoms are monitored and maintained or improved  Outcome: Progressing     Problem: Nutrition  Goal: Nutrient intake appropriate for maintaining nutritional needs  Outcome: Progressing

## 2025-02-10 NOTE — PROGRESS NOTES
"Orthopaedic Surgery Progress Note    Subjective: Evaluated on regular nursing floor. Pain well controlled considering recent surgery. Hgb stable yesterday. Denies any new-onset numbness or tingling in his lower extremities.    OR cultures growing Proteus and Morganella. On vanc/zosyn, pending updated ID recs.    Objective:  /71 (BP Location: Left arm, Patient Position: Lying)   Pulse 105   Temp 36.9 °C (98.4 °F) (Temporal)   Resp 18   Ht 1.88 m (6' 2\")   Wt 121 kg (266 lb)   SpO2 95%   BMI 34.15 kg/m²     Gen: arousable, NAD, appropriately conversational  Cardiac: RRR to peripheral palpation  Resp: nonlabored on RA  GI: soft, nondistended    MSK:  Spine Exam:  Drains in place holding suction, bloody output  Prevena intact holding suction    L1: SILT       L2: SILT      Hip flexors 4+/5 Left; 4+/5 Right  L3: SILT      Knee extension unable to reliably assess d/t flexion contractures and effort  L4: SILT      Tib Ant. (Dorsiflexion) 5/5 Left; 5/5 Right  L5: SILT      EHL 5/5 Left; 5/5 Right  S1: SILT      Plantarflexion 5/5 Left; 5/5 Right    Assessment/Plan: 74M (HTN, GERD, PVD, cellulitis, former smoker) w compressive L4 lesion secondary to RCC metastasis s/p L2-S1 PSIF, L3-5 decompression w Dr. Atkins 1/17/2025 c/b surgical wound dehiscence now s/p I&D and closure of lumbar wound w Dr. Atkins on 2/7/2025     Plan:  - Oncology primary  - WBAT BLE, no heavy lifting, bending, or twisting > 10 lbs  - OK for diet for ortho spine standpoint  - Intra-op cultures obtained (Proteus and Morganella)  - Antibiotics: Vancomycin/Zosyn until intra-op cultures result  - ID consult for antibiotic recommendations  - Maintain HV drain x2, monitor and record output every 8 hours; will remain until 0 output  - Maintain prevena incisional vac  - Q2 turns to off load incision  - OK for DVT ppx per primary team  - PT/OT  - Ortho spine to follow    Dispo: pending PT/OT, ID recs, and HV drain removal    Mariano Hendrix, " MD  Orthopaedic Surgery PGY-2  [facility]  Pager: [phone]  Available by Epic Chat    While admitted, this patient will be followed by the Ortho Spine Team. Please contact below residents with any questions (available via Epic Chat).     First call: Mariano Hendrix, PGY-2   Second call: Lew Abad, PGY-4  Third call: Anthony Mary, Fellow    I saw and evaluated the patient.  I personally obtained the key and critical portions of the history and physical exam or was physically present for key and critical portions performed by the Resident. I reviewed the documentation and discussed the patient with the Resident.  I agree with the Resident’s medical decision making as documented in the note.    Ok to restart anticoagulation  Continue to monitor drain output

## 2025-02-10 NOTE — PROGRESS NOTES
"Occupational Therapy                 Therapy Communication Note    Patient Name: Abdi Retana \"Ray\"  MRN: [id number]  Department: [facility]  Room: [address] [place] [address]A  Today's Date: 2/10/2025     Discipline: Occupational Therapy    OT Missed Visit: Yes     Missed Visit Reason: Missed Visit Reason: Patient refused (adamantly refused; reports \"being tossed around all day\" 2/2 needing to be cleaned up, pain 10/10; RN notified)    Missed Time: Attempt    02/10/25 at 12:59 PM   Suzanne Ryder OT   Rehab Office: [phone]     "

## 2025-02-10 NOTE — PROGRESS NOTES
02/10/25 1000   Discharge Planning   Living Arrangements Alone   Support Systems Family members   Type of Residence Skilled nursing facility   Home or Post Acute Services Post acute facilities (Rehab/SNF/etc)   Type of Post Acute Facility Services Skilled nursing   Expected Discharge Disposition SNF   Does the patient need discharge transport arranged? Yes   RoundTrip coordination needed? Yes   Has discharge transport been arranged? No     Care Transitions Note  2/10/25  Spoke with pt's sister and BAO Hinojosa this morning re: SNF choices. She had previously received a list. Reviewed list with Micaela containing facilities within 10 miles of [facility] (her home) with 3+ stars. Sent referrals. Per Micaela, the Ray would like to continue with treatment and will likely need transport to radiation, but the treatment is not yet planned.     Micaela provided the following choices:  [facility]    She would NOT like to go to any [facility] facilities or [facility].  Micaela requests email and phone updates if possible when facilities respond.   LSW to follow.   Alma Goodman, MSW, LSW

## 2025-02-10 NOTE — CARE PLAN
The clinical goals for the shift include pt will remain safe and free from injury throughout shift 2/10/2025 0700        Problem: Pain - Adult  Goal: Verbalizes/displays adequate comfort level or baseline comfort level  Outcome: Progressing     Problem: Safety - Adult  Goal: Free from fall injury  Outcome: Progressing     Problem: Skin  Goal: Promote skin healing  Outcome: Progressing  Flowsheets (Taken 2/10/2025 0421)  Promote skin healing:   Assess skin/pad under line(s)/device(s)   Rotate device position/do not position patient on device     Problem: Pain  Goal: Takes deep breaths with improved pain control throughout the shift  Outcome: Progressing

## 2025-02-10 NOTE — PROGRESS NOTES
"Abdi Retana \"Louise" is a 74 y.o. male on day 4 of admission presenting with Wound dehiscence.    Subjective   Seen pt at bedside this AM. Pt stating that his back pain was increased today. Also stating that he is having a hard time finding a happy place. Discussed next steps in finding a new facility for him so he can continue to move forward. Pt endorsing constipation, discussed adding additional laxatives if necessary today to assist with BM. Denies fever/chills, N/V, bleeding, swelling, CP, SOB, H/A and vision changes.      Objective     Physical Exam  Constitutional:       Appearance: Normal appearance.      Comments: pale   HENT:      Head: Normocephalic.      Mouth/Throat:      Mouth: Mucous membranes are moist.   Eyes:      Pupils: Pupils are equal, round, and reactive to light.   Cardiovascular:      Rate and Rhythm: Normal rate and regular rhythm.      Pulses: Normal pulses.      Heart sounds: Normal heart sounds.   Pulmonary:      Effort: Pulmonary effort is normal.      Breath sounds: Normal breath sounds.   Abdominal:      General: Abdomen is flat. Bowel sounds are normal.      Palpations: Abdomen is soft.   Musculoskeletal:      Cervical back: Normal range of motion and neck supple.      Comments: Drains in place with bloody output    Skin:     General: Skin is warm.   Neurological:      General: No focal deficit present.      Mental Status: He is alert.   Psychiatric:         Mood and Affect: Mood normal.         Last Recorded Vitals  Blood pressure 137/54, pulse 74, temperature 36.1 °C (97 °F), temperature source Temporal, resp. rate 14, height 1.88 m (6' 2\"), weight 121 kg (266 lb), SpO2 92%.  Intake/Output last 3 Shifts:  I/O last 3 completed shifts:  In: 1337 (11.1 mL/kg) [P.O.:937; IV Piggyback:400]  Out: 1010 (8.4 mL/kg) [Urine:900 (0.2 mL/kg/hr); Drains:110]  Weight: 120.7 kg     Relevant Results  Scheduled medications  aspirin, 81 mg, oral, Daily  atorvastatin, 40 mg, oral, Nightly  enoxaparin, " 120 mg, subcutaneous, BID  famotidine, 20 mg, oral, BID  ferrous sulfate (325 mg ferrous sulfate), 1 tablet, oral, q24h  piperacillin-tazobactam, 3.375 g, intravenous, q6h  polyethylene glycol, 17 g, oral, Daily  sennosides-docusate sodium, 2 tablet, oral, BID  [Held by provider] torsemide, 20 mg, oral, Daily  vancomycin, 1,000 mg, intravenous, q24h  white petrolatum, , Topical, Daily      Continuous medications     PRN medications  PRN medications: cyclobenzaprine, HYDROmorphone, hydrOXYzine HCL, magnesium hydroxide, ondansetron, oxyCODONE, oxyCODONE, vancomycin      This patient has a central line   Reason for the central line remaining today? Hemodynamic monitoring    Assessment/Plan   Assessment & Plan  Wound dehiscence    Wound dehiscence, surgical    Acute kidney injury (nontraumatic) (CMS-HCC)    Abdi Retana is a 74 year old Male PMH newly dx metastatic RCC (dx Jan 2025, has not started treatment yet), HTN, GERD, SAMIA, chronic lymphedema, venous insufficiency, OA, CKD III (BL~ 1.2), hx new acute stroke dx 1/8/25 (on BID Lovenox) and recent cord compression L4-L5 lesion (s/p L3-L5 laminectomies with L2-S1 lumbar tumor debulking 1/17 with Ortho surg) who presented to ED 2/6 after being sent from his first postop visit with Ortho for c/f lumbar wound dehiscence. Ortho consulted, s/p I&D and wound closure with ortho on 2/7. Coagulated hematoma noted by ortho in OR, wound cultures taken, prelim +gram negative bacilli. Initiated on broad spectrum antibiotics vanc + zosyn (2/7-) until would cultures finalized. ID consulted on 2/8 for antibiotics treatment and duration recommendations, rec continue vanc + zosyn (2/7-); final antibiotic plan pending final wound cultures. DC new SNF pending ID and Ortho recs and postop recovery.      Updates 2/10:  - POD 3 I&D and wound closure with ortho   - Intraoperative wound cultures x3, final cultures+Proteus and Morganella, awaiting final ID recs for antibiotic continuation  -  resumed home lovenox and ASA today per ortho   - PT/OT rec moderate intensity, LSW provided SNF choices to pt's sister and POA Micaela  - ordered Atarax 25mg Q8 for anxiety  - ordered Music, Art and Pet therapy     # lumbar wound dehiscence s/p 3-L5 laminectomy and L2-S1 fusion decompression  - (1/17-1/24) 1/17 s/p spinal artery embolization with NSGY and lumbar tumor debulkin L3-L5 laminectomiues with L2-S1 PSIF with Dr. Atkins, surgery c/b acute bloos loss requiring RBCs, discharged to SNF to establish Onc and Rad/Onc plan    - 2/6 Returned for first postop visit and found to have dehisced wound, sent by Dr. Atkins to ED   - 2/6 Xray lumbar spine showed re demonstration significant pathologic destruction L4, screws for L5 appear superior to level of vertebral body poss outside of body related to shifting of hardware   - Ortho consulted on admit, s/p I&D and wound closure with Dr. Atkins on 2/7  - Coagulated hematoma noted by ortho in OR  - Intraoperative cultures x3 collected, prelim +Proteus and Morganella  - ID consulted 2/8 for antibiotic tx and duration, rec continue vanc + zosyn (2/7- ) and plan to deescalate pending final lumber cultures  - c/w oxycodone 5mg PRN mod pain, 10mg PRN severe pain, and 0.4mg IVP dilaudid q3 PRN breakthrough (2/5- )  - PT / OT consulted, rec moderate intensity, POA provided with SNF choices (2/10)     # newly dx metastatic R RCC   - Plan to establish with Dr. Wynn 2/12 and Dr. Moncada 2/27 (Rad/Onc) for OP care - both emailed 2/7   - Nov 2024 Presented for fall and BLE weakness, Xray lumbar spine showing large lytic lesion L4 c/f neoplasm - was due to get outpatient bx with IR in Dec but was unable d/t facility mixing up transportation and missed appt   - 11/30 CT C/A/P showed large infiltrative R renal mass c/w RCC with mets adrenal glands, osseous lesions, pulm nodules  - 1/7 s/p s/p bx L4 lesion, 1/13 confrmed carcinoma involving dense fibroconnective tissue    - 1/16 s/p IR  "for tumor embolization from b/l L4 segmental arteries  - 1/17 s/p OR for lumbar tumor debulking L3-L4 and L4-L5 laminectomies with L2-S1 PSIF with Dr. Atkins   - 1/24 Seen by Rad/Onc   - Per Dr. 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 1.38  - held home torsemide 2/7  - UA +leuks, WBC, on broad spectrum antibiotics as above   - FeNa 0.3% pre-renal   - CMP daily     # Altered mood  # Anxiety  - pt expressing hopelessness, anxiety while inpatient s/p wound closure stating he was \"feeling trapped\" that he cannot move around/ambulate like he wants  - start Hydroxyzine 25mg Q8 PRN for anxiety (2/10)  - ordered Music, Pet and Art therapy (2/10)     # hx small acute stroke  - 1/8/25 MRI Brain showed c/f small acute stroke in R parietal lobe  - Neuro consulted, rec Lovenox 1mg/kg q12h and outpatient follow up   - held home Lovenox I/s/o surgery 2/7, plan to resume 2/10 per ortho      # hx recent proteus mirabilis complicated UTI and bacteremia   - 1/5 developed UTI with + Proteus bacteremia s/p Cefepime and Augmentin PO completed 1/23   - Seen by ID inpatient during previous admission   - UA (2/6) + leuks, WBC, casts - no reports of dysuria, flank pain, fever/chills  - urine culture (2/6) negative     # chronic venous insufficiency  # chronic lymphedema   - Sees podiatry at [facility]  - Wound care consulted on admit, recs appreciated  - held home torsemide 20mg daily   - held home ASA on admit d/t poss return OR      # HTN/HLD  - c/w home lipitor 40mg daily      # GERD  - c/w home pepcid daily      # dispo  - Full " code, confirmed on admit   - DC new SNF pending Ortho recs, ID recs, and postop recovery  - NOK: Micaela, , [phone]   - FUV 2/11 Urology, 2/12 Dr. Wynn, Ortho FUV requested    I spent 60 minutes in the professional and overall care of this patient.    Assessment and plan as above discussed with attending physician, Dr. Peraza.     Judy Alfredo, HUSSEIN-CNP

## 2025-02-11 LAB
ALBUMIN SERPL BCP-MCNC: 2.5 G/DL (ref 3.4–5)
ALP SERPL-CCNC: 65 U/L (ref 33–136)
ALT SERPL W P-5'-P-CCNC: 4 U/L (ref 10–52)
ANION GAP SERPL CALC-SCNC: 15 MMOL/L (ref 10–20)
AST SERPL W P-5'-P-CCNC: 24 U/L (ref 9–39)
BASOPHILS # BLD AUTO: 0.11 X10*3/UL (ref 0–0.1)
BASOPHILS NFR BLD AUTO: 0.7 %
BILIRUB SERPL-MCNC: 0.7 MG/DL (ref 0–1.2)
BUN SERPL-MCNC: 31 MG/DL (ref 6–23)
CALCIUM SERPL-MCNC: 8.2 MG/DL (ref 8.6–10.6)
CHLORIDE SERPL-SCNC: 102 MMOL/L (ref 98–107)
CO2 SERPL-SCNC: 28 MMOL/L (ref 21–32)
CREAT SERPL-MCNC: 1.7 MG/DL (ref 0.5–1.3)
EGFRCR SERPLBLD CKD-EPI 2021: 42 ML/MIN/1.73M*2
EOSINOPHIL # BLD AUTO: 0.48 X10*3/UL (ref 0–0.4)
EOSINOPHIL NFR BLD AUTO: 3.1 %
ERYTHROCYTE [DISTWIDTH] IN BLOOD BY AUTOMATED COUNT: 18.8 % (ref 11.5–14.5)
GLUCOSE SERPL-MCNC: 105 MG/DL (ref 74–99)
HCT VFR BLD AUTO: 25.6 % (ref 41–52)
HGB BLD-MCNC: 7.7 G/DL (ref 13.5–17.5)
IMM GRANULOCYTES # BLD AUTO: 0.17 X10*3/UL (ref 0–0.5)
IMM GRANULOCYTES NFR BLD AUTO: 1.1 % (ref 0–0.9)
LYMPHOCYTES # BLD AUTO: 1.26 X10*3/UL (ref 0.8–3)
LYMPHOCYTES NFR BLD AUTO: 8.1 %
MAGNESIUM SERPL-MCNC: 2.57 MG/DL (ref 1.6–2.4)
MCH RBC QN AUTO: 27 PG (ref 26–34)
MCHC RBC AUTO-ENTMCNC: 30.1 G/DL (ref 32–36)
MCV RBC AUTO: 90 FL (ref 80–100)
MONOCYTES # BLD AUTO: 0.88 X10*3/UL (ref 0.05–0.8)
MONOCYTES NFR BLD AUTO: 5.7 %
NEUTROPHILS # BLD AUTO: 12.59 X10*3/UL (ref 1.6–5.5)
NEUTROPHILS NFR BLD AUTO: 81.3 %
NRBC BLD-RTO: 0 /100 WBCS (ref 0–0)
PLATELET # BLD AUTO: 512 X10*3/UL (ref 150–450)
POTASSIUM SERPL-SCNC: 3.5 MMOL/L (ref 3.5–5.3)
PROT SERPL-MCNC: 5.8 G/DL (ref 6.4–8.2)
RBC # BLD AUTO: 2.85 X10*6/UL (ref 4.5–5.9)
SODIUM SERPL-SCNC: 141 MMOL/L (ref 136–145)
VANCOMYCIN SERPL-MCNC: 13.7 UG/ML (ref 5–20)
WBC # BLD AUTO: 15.5 X10*3/UL (ref 4.4–11.3)

## 2025-02-11 PROCEDURE — 2500000004 HC RX 250 GENERAL PHARMACY W/ HCPCS (ALT 636 FOR OP/ED)

## 2025-02-11 PROCEDURE — 2500000001 HC RX 250 WO HCPCS SELF ADMINISTERED DRUGS (ALT 637 FOR MEDICARE OP)

## 2025-02-11 PROCEDURE — 99233 SBSQ HOSP IP/OBS HIGH 50: CPT

## 2025-02-11 PROCEDURE — 80202 ASSAY OF VANCOMYCIN: CPT

## 2025-02-11 PROCEDURE — 83735 ASSAY OF MAGNESIUM: CPT

## 2025-02-11 PROCEDURE — 84075 ASSAY ALKALINE PHOSPHATASE: CPT

## 2025-02-11 PROCEDURE — 85025 COMPLETE CBC W/AUTO DIFF WBC: CPT

## 2025-02-11 PROCEDURE — 1170000001 HC PRIVATE ONCOLOGY ROOM DAILY

## 2025-02-11 RX ORDER — LIDOCAINE HYDROCHLORIDE 10 MG/ML
5 INJECTION, SOLUTION INFILTRATION; PERINEURAL ONCE
Status: DISCONTINUED | OUTPATIENT
Start: 2025-02-11 | End: 2025-02-15 | Stop reason: HOSPADM

## 2025-02-11 RX ADMIN — HYDROXYZINE HYDROCHLORIDE 25 MG: 25 TABLET, FILM COATED ORAL at 21:17

## 2025-02-11 RX ADMIN — OXYCODONE HYDROCHLORIDE 10 MG: 10 TABLET ORAL at 02:40

## 2025-02-11 RX ADMIN — SENNOSIDES AND DOCUSATE SODIUM 2 TABLET: 50; 8.6 TABLET ORAL at 08:46

## 2025-02-11 RX ADMIN — PIPERACILLIN SODIUM AND TAZOBACTAM SODIUM 3.38 G: 3; .375 INJECTION, SOLUTION INTRAVENOUS at 04:44

## 2025-02-11 RX ADMIN — ATORVASTATIN CALCIUM 40 MG: 40 TABLET, FILM COATED ORAL at 21:17

## 2025-02-11 RX ADMIN — FAMOTIDINE 20 MG: 20 TABLET ORAL at 21:17

## 2025-02-11 RX ADMIN — OXYCODONE 5 MG: 5 TABLET ORAL at 14:37

## 2025-02-11 RX ADMIN — ENOXAPARIN SODIUM 120 MG: 150 INJECTION SUBCUTANEOUS at 21:17

## 2025-02-11 RX ADMIN — PIPERACILLIN SODIUM AND TAZOBACTAM SODIUM 3.38 G: 3; .375 INJECTION, SOLUTION INTRAVENOUS at 11:10

## 2025-02-11 RX ADMIN — SENNOSIDES AND DOCUSATE SODIUM 2 TABLET: 50; 8.6 TABLET ORAL at 21:17

## 2025-02-11 RX ADMIN — PIPERACILLIN SODIUM AND TAZOBACTAM SODIUM 3.38 G: 3; .375 INJECTION, SOLUTION INTRAVENOUS at 17:47

## 2025-02-11 RX ADMIN — PIPERACILLIN SODIUM AND TAZOBACTAM SODIUM 3.38 G: 3; .375 INJECTION, SOLUTION INTRAVENOUS at 23:22

## 2025-02-11 RX ADMIN — POLYETHYLENE GLYCOL 3350 17 G: 17 POWDER, FOR SOLUTION ORAL at 08:46

## 2025-02-11 RX ADMIN — ENOXAPARIN SODIUM 120 MG: 150 INJECTION SUBCUTANEOUS at 08:46

## 2025-02-11 RX ADMIN — OXYCODONE HYDROCHLORIDE 10 MG: 10 TABLET ORAL at 10:11

## 2025-02-11 RX ADMIN — PETROLATUM: 420 OINTMENT TOPICAL at 17:07

## 2025-02-11 RX ADMIN — FAMOTIDINE 20 MG: 20 TABLET ORAL at 08:45

## 2025-02-11 RX ADMIN — FERROUS SULFATE TAB 325 MG (65 MG ELEMENTAL FE) 325 MG: 325 (65 FE) TAB at 08:45

## 2025-02-11 RX ADMIN — HYDROXYZINE HYDROCHLORIDE 25 MG: 25 TABLET, FILM COATED ORAL at 08:50

## 2025-02-11 RX ADMIN — SODIUM CHLORIDE, POTASSIUM CHLORIDE, SODIUM LACTATE AND CALCIUM CHLORIDE 500 ML: 600; 310; 30; 20 INJECTION, SOLUTION INTRAVENOUS at 13:36

## 2025-02-11 RX ADMIN — ASPIRIN 81 MG: 81 TABLET, COATED ORAL at 08:46

## 2025-02-11 ASSESSMENT — PAIN - FUNCTIONAL ASSESSMENT
PAIN_FUNCTIONAL_ASSESSMENT: UNABLE TO SELF-REPORT
PAIN_FUNCTIONAL_ASSESSMENT: 0-10
PAIN_FUNCTIONAL_ASSESSMENT: 0-10
PAIN_FUNCTIONAL_ASSESSMENT: UNABLE TO SELF-REPORT
PAIN_FUNCTIONAL_ASSESSMENT: 0-10
PAIN_FUNCTIONAL_ASSESSMENT: 0-10

## 2025-02-11 ASSESSMENT — COGNITIVE AND FUNCTIONAL STATUS - GENERAL
HELP NEEDED FOR BATHING: TOTAL
EATING MEALS: A LITTLE
MOVING TO AND FROM BED TO CHAIR: TOTAL
DRESSING REGULAR UPPER BODY CLOTHING: TOTAL
WALKING IN HOSPITAL ROOM: TOTAL
PERSONAL GROOMING: TOTAL
DAILY ACTIVITIY SCORE: 8
STANDING UP FROM CHAIR USING ARMS: TOTAL
CLIMB 3 TO 5 STEPS WITH RAILING: TOTAL
TOILETING: TOTAL
TURNING FROM BACK TO SIDE WHILE IN FLAT BAD: TOTAL
DRESSING REGULAR LOWER BODY CLOTHING: TOTAL
MOBILITY SCORE: 7
MOVING FROM LYING ON BACK TO SITTING ON SIDE OF FLAT BED WITH BEDRAILS: A LOT

## 2025-02-11 ASSESSMENT — PAIN SCALES - GENERAL
PAINLEVEL_OUTOF10: 8
PAINLEVEL_OUTOF10: 8
PAINLEVEL_OUTOF10: 0 - NO PAIN
PAINLEVEL_OUTOF10: 8
PAINLEVEL_OUTOF10: 6

## 2025-02-11 NOTE — PROGRESS NOTES
"Orthopaedic Surgery Progress Note    Subjective: Evaluated on regular nursing floor. Pain well controlled considering recent surgery. Denies any new-onset numbness or tingling in his lower extremities.    OR cultures growing Proteus and Morganella. ID recommended Zosyn for 6-8 weeks followed by prolonged PO suppression.    Objective:  /61 (BP Location: Left arm, Patient Position: Lying)   Pulse 75   Temp 36.5 °C (97.7 °F) (Temporal)   Resp 18   Ht 1.88 m (6' 2\")   Wt 121 kg (266 lb)   SpO2 96%   BMI 34.15 kg/m²     Gen: arousable, NAD, appropriately conversational  Cardiac: RRR to peripheral palpation  Resp: nonlabored on RA  GI: soft, nondistended    MSK:  Spine Exam:  Drains in place holding suction, SS output  Prevena intact holding suction    L1: SILT       L2: SILT      Hip flexors 4+/5 Left; 4+/5 Right  L3: SILT      Knee extension unable to reliably assess d/t flexion contractures and effort  L4: SILT      Tib Ant. (Dorsiflexion) 5/5 Left; 5/5 Right  L5: SILT      EHL 5/5 Left; 5/5 Right  S1: SILT      Plantarflexion 5/5 Left; 5/5 Right    Assessment/Plan: 74M (HTN, GERD, PVD, cellulitis, former smoker) w compressive L4 lesion secondary to RCC metastasis s/p L2-S1 PSIF, L3-5 decompression w Dr. Atkins 1/17/2025 c/b surgical wound dehiscence now s/p I&D and closure of lumbar wound w Dr. Atkins on 2/7/2025     Plan:  - Oncology primary  - WBAT BLE, no heavy lifting, bending, or twisting > 10 lbs  - OK for diet for ortho spine standpoint  - Intra-op cultures obtained (Proteus and Morganella)  - Antibiotics: Zosyn per ID  - ID consult for antibiotic recommendations - Zosyn IV for 6-8 weeks followed by prolonged PO suppression  - Maintain HV drain x2, monitor and record output every 8 hours; will remain until 0 output  - Maintain prevena incisional vac  - Q2 turns to off load incision  - OK for DVT ppx per primary team  - PT/OT  - Ortho spine to follow    Dispo: pending SNF placement and HV drain " removal    Mariano Hendrix MD  Orthopaedic Surgery PGY-2  [facility]  Pager: [phone]  Available by Epic Chat    While admitted, this patient will be followed by the Ortho Spine Team. Please contact below residents with any questions (available via Epic Chat).     First call: Mariano Hendrix, PGY-2   Second call: Lew Abad, PGY-4  Third call: Anthony Mary, Fellow    I saw and evaluated the patient.  I personally obtained the key and critical portions of the history and physical exam or was physically present for key and critical portions performed by the Resident. I reviewed the documentation and discussed the patient with the Resident.  I agree with the Resident’s medical decision making as documented in the note.

## 2025-02-11 NOTE — PROGRESS NOTES
"Occupational Therapy                 Therapy Communication Note    Patient Name: Abdi Retana \"Ray\"  MRN: [id number]  Department: [facility]  Room: [address] [place], [place] [address]  Today's Date: 2/11/2025     Discipline: Occupational Therapy    OT Missed Visit: Yes     Missed Visit Reason: Patient refused Pt reports increased pain in back, requesting pain meds; initially agreeable towards therapy after pain meds, however, persistent on being left alone this AM d/t pain, wanting to rest, and needing to \"find his happy place\". OT inquires about anxiousness, however, pt dismissive and wanting to be left alone; will reattempt as schedule permits.     Missed Time: Attempt     ---------------  Silke Stubbs (OTR/L, OTD)  Inpatient Occupational Therapist   Rehab Office: [phone]       "

## 2025-02-11 NOTE — PROGRESS NOTES
Vancomycin Dosing by Pharmacy- FOLLOW UP    Abdi Retana is a 74 y.o. year old male who Pharmacy has been consulted for vancomycin dosing for other surgical wound infection . Based on the patient's indication and renal status this patient is being dosed based on a goal AUC of 400-600.     Renal function is currently stable.    Current vancomycin dose: 1000 mg given every 24 hours    Estimated vancomycin AUC on current dose: 544 mg/L.hr     Visit Vitals  /68 (BP Location: Left arm, Patient Position: Lying)   Pulse 60   Temp 37.1 °C (98.8 °F) (Temporal)   Resp 18        Lab Results   Component Value Date    CREATININE 1.70 (H) 2025    CREATININE 1.29 02/10/2025    CREATININE 1.38 (H) 2025    CREATININE 1.53 (H) 2025        Patient weight is as follows:   Vitals:    25 1222   Weight: 121 kg (266 lb)       Cultures:  Susceptibility data for the encounter in last 14 days.  Collected Specimen Info Organism Amoxicillin/Clavulanate Ampicillin Ampicillin/Sulbactam Cefazolin Cefotaxime Ceftriaxone Ciprofloxacin Gentamicin Levofloxacin Piperacillin/Tazobactam Tetracycline Trimethoprim/Sulfamethoxazole   25 Swab from ABSCESS Morganella morganii                 Proteus mirabilis                 Mixed Aerobic and Anaerobic Bacteria                25 Swab from ABSCESS Morganella morganii  R  R  R  R  R  S  R  S  R  S   R     Proteus mirabilis                 Mixed Aerobic and Anaerobic Bacteria                25 Swab from ABSCESS Proteus mirabilis  S  R  S  S    R  S  R  S  R  R     Morganella morganii                 Mixed Aerobic and Anaerobic Bacteria                     I/O last 3 completed shifts:  In: 720 (6 mL/kg) [P.O.:320; IV Piggyback:400]  Out: 741 (6.1 mL/kg) [Urine:651 (0.1 mL/kg/hr); Drains:90]  Weight: 120.7 kg   I/O during current shift:  No intake/output data recorded.    Temp (24hrs), Av.6 °C (97.8 °F), Min:36.1 °C (97 °F), Max:37.1 °C (98.8  °F)      Assessment/Plan    Within goal AUC range. Continue current vancomycin regimen.    This dosing regimen is predicted by [employer] to result in the following pharmacokinetic parameters:  Loading dose: N/A  Regimen: 1000 mg IV every 24 hours.  Start time: 01:41 on 02/12/2025  Exposure target: AUC24 (range)400-600 mg/L.hr   FSB98-19: 436 mg/L.hr  AUC24,ss: 544 mg/L.hr  Probability of AUC24 > 400: 100 %  Ctrough,ss: 18.4 mg/L  Probability of Ctrough,ss > 20: 28 %    The next level will be obtained on 2/13 with AM labs. May be obtained sooner if clinically indicated.   Will continue to monitor renal function daily while on vancomycin and order serum creatinine at least every 48 hours if not already ordered.  Follow for continued vancomycin needs, clinical response, and signs/symptoms of toxicity.       Renetta Sorto, RP

## 2025-02-11 NOTE — PROGRESS NOTES
Vancomycin Dosing by Pharmacy- Cessation of Therapy    Consult to pharmacy for vancomycin dosing has been discontinued by the prescriber, pharmacy will sign off at this time.    Please call pharmacy if there are further questions or re-enter a consult if vancomycin is resumed.     Serenity Lancaster, PharmD

## 2025-02-11 NOTE — PROGRESS NOTES
"Abdi Retana \"Matt\" is a 74 y.o. male on day 5 of admission presenting with Wound dehiscence.    Subjective   Seen pt at bedside this AM. Pt c/o severe pain this morning, located in his back, and states that pain medication does provide some relief. Pt A&Ox3, however remains forgetful and confused at times. He states that he is in a better mood than yesterday, however he is still feeling trapped in his bed. He endorses having a BM yesterday. Sister Micaela GORE at bedside. Discussed plan to continue to search for new facility for Matt. Denies fever/chills, N/V/D/C, bleeding, bruising, swelling, SOB, CP, H/A or vision changes. Denies fever/chills, N/V, bleeding, swelling, CP, SOB, H/A and vision changes.      Objective     Physical Exam  Constitutional:       Appearance: Normal appearance.      Comments: pale   HENT:      Head: Normocephalic.      Mouth/Throat:      Mouth: Mucous membranes are moist.   Eyes:      Pupils: Pupils are equal, round, and reactive to light.   Cardiovascular:      Rate and Rhythm: Normal rate and regular rhythm.      Pulses: Normal pulses.      Heart sounds: Normal heart sounds.   Pulmonary:      Effort: Pulmonary effort is normal.      Breath sounds: Normal breath sounds.   Abdominal:      General: Abdomen is flat. Bowel sounds are normal.      Palpations: Abdomen is soft.   Musculoskeletal:      Cervical back: Normal range of motion and neck supple.      Comments: Drains in place with bloody output    Skin:     General: Skin is warm.   Neurological:      General: No focal deficit present.      Mental Status: He is alert.   Psychiatric:         Mood and Affect: Mood normal.         Last Recorded Vitals  Blood pressure 136/65, pulse 80, temperature 36.8 °C (98.2 °F), temperature source Temporal, resp. rate 18, height 1.88 m (6' 2\"), weight 121 kg (266 lb), SpO2 94%.  Intake/Output last 3 Shifts:  I/O last 3 completed shifts:  In: 720 (6 mL/kg) [P.O.:320; IV Piggyback:400]  Out: 741 (6.1 " mL/kg) [Urine:651 (0.1 mL/kg/hr); Drains:90]  Weight: 120.7 kg     Relevant Results  Scheduled medications  aspirin, 81 mg, oral, Daily  atorvastatin, 40 mg, oral, Nightly  enoxaparin, 120 mg, subcutaneous, BID  famotidine, 20 mg, oral, BID  ferrous sulfate (325 mg ferrous sulfate), 1 tablet, oral, q24h  lactated Ringer's, 500 mL, intravenous, Once  piperacillin-tazobactam, 3.375 g, intravenous, q6h  polyethylene glycol, 17 g, oral, Daily  sennosides-docusate sodium, 2 tablet, oral, BID  [Held by provider] torsemide, 20 mg, oral, Daily  white petrolatum, , Topical, Daily      Continuous medications     PRN medications  PRN medications: cyclobenzaprine, HYDROmorphone, hydrOXYzine HCL, magnesium hydroxide, ondansetron, oxyCODONE, oxyCODONE, vancomycin      This patient has a central line   Reason for the central line remaining today? Hemodynamic monitoring    Assessment/Plan   Assessment & Plan  Wound dehiscence    Wound dehiscence, surgical    Acute kidney injury (nontraumatic) (CMS-HCC)    Abdi Retana is a 74 year old Male PMH newly dx metastatic RCC (dx Jan 2025, has not started treatment yet), HTN, GERD, SAMIA, chronic lymphedema, venous insufficiency, OA, CKD III (BL~ 1.2), hx new acute stroke dx 1/8/25 (on BID Lovenox) and recent cord compression L4-L5 lesion (s/p L3-L5 laminectomies with L2-S1 lumbar tumor debulking 1/17 with Ortho surg) who presented to ED 2/6 after being sent from his first postop visit with Ortho for c/f lumbar wound dehiscence. Ortho consulted, s/p I&D and wound closure with ortho on 2/7. Coagulated hematoma noted by ortho in OR, wound cultures taken, prelim +gram negative bacilli. Initiated on broad spectrum antibiotics vanc + zosyn (2/7-) until would cultures finalized. ID consulted on 2/8 for antibiotics treatment and duration recommendations, rec continue vanc (2/7-2/11) + zosyn (2/7-) until final wound cultures result. Final cultures resulted 2/10 +Proteus and Morganella, ID rec Zosyn  w/ tentative 8-week stop date 4/4, vancomycin discontinued (2/11). DC new SNF pending Ortho recs, postop recovery and facility selection and placement.     Updates 2/11:  - POD 4 I&D and wound closure with ortho   - Intraoperative wound cultures x3, final cultures+Proteus and Morganella, ID rec Zosyn w/ tentative 8-week stop date 4/4  - Vancomycin discontinued 2/11  - Scr. elevated to 1.70 today, 500ml LR soft bolus ordered  - PT/OT rec moderate intensity, LSW provided SNF choices to pt's sister and POA Micaela, sister still in process of choosing facility     # lumbar wound dehiscence s/p 3-L5 laminectomy and L2-S1 fusion decompression  - (1/17-1/24) 1/17 s/p spinal artery embolization with NSGY and lumbar tumor debulkin L3-L5 laminectomiues with L2-S1 PSIF with Dr. Atkins, surgery c/b acute bloos loss requiring RBCs, discharged to SNF to establish Onc and Rad/Onc plan    - 2/6 Returned for first postop visit and found to have dehisced wound, sent by Dr. Atkins to ED   - 2/6 Xray lumbar spine showed re demonstration significant pathologic destruction L4, screws for L5 appear superior to level of vertebral body poss outside of body related to shifting of hardware   - Ortho consulted on admit, s/p I&D and wound closure with Dr. Atkins on 2/7  - Coagulated hematoma noted by ortho in OR  - Intraoperative cultures x3 collected, prelim +Proteus and Morganella  - ID consulted 2/8 for antibiotic tx and duration, rec continue vanc (2/7-2/11) + zosyn (2/7- )  - Final wound cultures +Proteus and Morganella, final ID rec Zosyn w/ tentative 8-week stop date 4/4  - c/w oxycodone 5mg PRN mod pain, 10mg PRN severe pain, and 0.4mg IVP dilaudid q3 PRN breakthrough (2/5- )  - PT / OT consulted, rec moderate intensity, POA provided with SNF choices (2/10)     # newly dx metastatic R RCC   - Plan to establish with Dr. Wynn 2/12 and Dr. Moncada 2/27 (Rad/Onc) for OP care - both emailed 2/7   - Nov 2024 Presented for fall and BLE  "weakness, Xray lumbar spine showing large lytic lesion L4 c/f neoplasm - was due to get outpatient bx with IR in Dec but was unable d/t facility mixing up transportation and missed appt   - 11/30 CT C/A/P showed large infiltrative R renal mass c/w RCC with mets adrenal glands, osseous lesions, pulm nodules  - 1/7 s/p s/p bx L4 lesion, 1/13 confrmed carcinoma involving dense fibroconnective tissue    - 1/16 s/p IR for tumor embolization from b/l L4 segmental arteries  - 1/17 s/p OR for lumbar tumor debulking L3-L4 and L4-L5 laminectomies with L2-S1 PSIF with Dr. Atkins   - 1/24 Seen by Rad/Onc   - Per Dr. 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 1.38 (2/9)--> 1.70 (2/11)  - continue to hold home torsemide 2/7  - UA +leuks, WBC, on broad spectrum antibiotics as above   - FeNa 0.3% pre-renal   - 2/10 resumed home lovenox and ASA per ortho  - s/p 500ml LR soft bolus (2/11)   - CMP daily     # Altered mood  # Anxiety  - pt expressing hopelessness, anxiety while inpatient s/p wound closure stating he was \"feeling trapped\" that he cannot move around/ambulate like he wants  - start Hydroxyzine 25mg Q8 PRN for anxiety (2/10)  - ordered Music, Pet and Art therapy (2/10)     # hx small acute stroke  - 1/8/25 MRI Brain showed c/f small acute stroke in R parietal lobe  - Neuro consulted, rec Lovenox 1mg/kg q12h and outpatient follow up   - held home Lovenox I/s/o surgery 2/7, resumed 2/10 per ortho      # hx recent proteus mirabilis complicated UTI and bacteremia   - 1/5 developed UTI with + Proteus bacteremia s/p Cefepime " and Augmentin PO completed 1/23   - Seen by ID inpatient during previous admission   - UA (2/6) + leuks, WBC, casts - no reports of dysuria, flank pain, fever/chills  - urine culture (2/6) negative     # chronic venous insufficiency  # chronic lymphedema   - Sees podiatry at [facility]  - Wound care consulted on admit, recs appreciated  - held home torsemide 20mg daily   - ASA restarted 2/10      # HTN/HLD  - c/w home lipitor 40mg daily      # GERD  - c/w home pepcid daily      # dispo  - Full code, confirmed on admit   - DC new SNF pending Ortho recs, ID recs, and postop recovery  - NOK: Micaela, sister, [phone]   - FUV 2/11 Urology, 2/12 Dr. Wnyn (requested reschedule), 2/27 w/ Ortho Surg ([facility]), 2/27 w/ Rad Onc, 4/4 w/ ID    I spent 60 minutes in the professional and overall care of this patient.    Assessment and plan as above discussed with attending physician, Dr. Peraza.     Judy Alfredo, HUSSEIN-CNP

## 2025-02-11 NOTE — CARE PLAN
Problem: Pain - Adult  Goal: Verbalizes/displays adequate comfort level or baseline comfort level  Outcome: Progressing     Problem: Safety - Adult  Goal: Free from fall injury  Outcome: Progressing     Problem: Discharge Planning  Goal: Discharge to home or other facility with appropriate resources  Outcome: Progressing     Problem: Chronic Conditions and Co-morbidities  Goal: Patient's chronic conditions and co-morbidity symptoms are monitored and maintained or improved  Outcome: Progressing     Problem: Nutrition  Goal: Nutrient intake appropriate for maintaining nutritional needs  Outcome: Progressing     Problem: Pain  Goal: Takes deep breaths with improved pain control throughout the shift  Outcome: Progressing  Goal: Turns in bed with improved pain control throughout the shift  Outcome: Progressing  Goal: Walks with improved pain control throughout the shift  Outcome: Progressing  Goal: Performs ADL's with improved pain control throughout shift  Outcome: Progressing  Goal: Participates in PT with improved pain control throughout the shift  Outcome: Progressing  Goal: Free from opioid side effects throughout the shift  Outcome: Progressing  Goal: Free from acute confusion related to pain meds throughout the shift  Outcome: Progressing     Problem: Skin  Goal: Decreased wound size/increased tissue granulation at next dressing change  Outcome: Progressing  Flowsheets (Taken 2/10/2025 1811 by Laine Goldstein RN)  Decreased wound size/increased tissue granulation at next dressing change:   Promote sleep for wound healing   Protective dressings over bony prominences  Goal: Participates in plan/prevention/treatment measures  Outcome: Progressing  Flowsheets (Taken 2/10/2025 1811 by Laine Goldstein RN)  Participates in plan/prevention/treatment measures: Increase activity/out of bed for meals  Goal: Prevent/manage excess moisture  Outcome: Progressing  Flowsheets (Taken 2/8/2025 0643)  Prevent/manage excess  moisture:   Cleanse incontinence/protect with barrier cream   Follow provider orders for dressing changes  Goal: Prevent/minimize sheer/friction injuries  Outcome: Progressing  Flowsheets (Taken 2/8/2025 0643)  Prevent/minimize sheer/friction injuries:   Turn/reposition every 2 hours/use positioning/transfer devices   Use pull sheet  Goal: Promote/optimize nutrition  Outcome: Progressing  Flowsheets (Taken 2/8/2025 0643)  Promote/optimize nutrition:   Consume > 50% meals/supplements   Offer water/supplements/favorite foods  Goal: Promote skin healing  Outcome: Progressing  Flowsheets (Taken 2/11/2025 0452 by Kylah Pierce RN)  Promote skin healing:   Assess skin/pad under line(s)/device(s)   Rotate device position/do not position patient on device   The patient's goals for the shift include      The clinical goals for the shift include pt will remain safe and free from injury throughout shift on 2/11/25 at 1900    Over the shift, the patient remained safe and free from injury. Pt had wound care done and received zosyn. Pt refusing turns and educated on importance. Pt is resting comfortably in room.

## 2025-02-11 NOTE — CARE PLAN
The clinical goals for the shift include pt will remain safe and free from injury throughout shift 2/11/2025 0700      Problem: Pain - Adult  Goal: Verbalizes/displays adequate comfort level or baseline comfort level  Outcome: Progressing     Problem: Safety - Adult  Goal: Free from fall injury  Outcome: Progressing     Problem: Pain  Goal: Takes deep breaths with improved pain control throughout the shift  Outcome: Progressing     Problem: Skin  Goal: Promote skin healing  Outcome: Progressing  Flowsheets (Taken 2/11/2025 4201)  Promote skin healing:   Assess skin/pad under line(s)/device(s)   Rotate device position/do not position patient on device

## 2025-02-11 NOTE — PROGRESS NOTES
"Art Therapy Note    Abdi \"Ray\" Mazin     Therapy Session  Referral Type: New referral this admission  Visit Type: New visit  Session Start Time: 1619  Session End Time: 1619  Intervention Delivery: In-person  Conflict of Service: Asleep              Treatment/Interventions  Art Therapy Interventions: Assessment, Education/instruction    Post-assessment  Total Session Time (min): 0 minutes    Narrative  Assessment Detail: At time of visit PT was foudn sleeping and resting comfortably in bed.  ATR will attempt to introduce services another time.    Education Documentation  No documentation found.      "

## 2025-02-11 NOTE — PROGRESS NOTES
"  Subjective   Interval History:      Patient seen during late morning rounds     Patient initially said to me that it was \"is not a good time\" since he was having a lot of pain.       I was able to aspiration a few questions and he denied fever, chills, shortness of breath or chest pain but complains of postop back pain.    Objective   Range of Vitals (last 24 hours)  Heart Rate:  []   Temp:  [36.1 °C (97 °F)-37.2 °C (99 °F)]   Resp:  [14-18]   BP: (114-137)/(54-74)   SpO2:  [92 %-98 %]   Daily Weight  02/06/25 : 121 kg (266 lb)    Body mass index is 34.15 kg/m².    Physical Exam  Limited per patient request  Anterior chest clear  Distant S1 and S2  Postop Hemovac drains present on right and left  Mild abdominal distention but not tender  Unable to roll patient to see back at this time    Antibiotics  piperacillin-tazobactam - 3.375 gram/50 mL  vancomycin - 1 gram/200 mL    Relevant Results  Labs  Results from last 72 hours   Lab Units 02/10/25  0438 02/09/25  0302 02/08/25  0526   WBC AUTO x10*3/uL 14.3* 15.2* 16.6*   HEMOGLOBIN g/dL 7.8* 7.9* 6.7*   HEMATOCRIT % 25.5* 26.0* 22.5*   PLATELETS AUTO x10*3/uL 489* 501* 473*   NEUTROS PCT AUTO % 80.3 81.6 90.1   LYMPHS PCT AUTO % 9.0 8.6 4.5   MONOS PCT AUTO % 6.8 5.7 4.0   EOS PCT AUTO % 2.2 2.0 0.4     Results from last 72 hours   Lab Units 02/10/25  0438 02/09/25  0302 02/08/25  0526   SODIUM mmol/L 139 139 141   POTASSIUM mmol/L 3.6 4.2 4.4   CHLORIDE mmol/L 103 102 105   CO2 mmol/L 28 27 28   BUN mg/dL 27* 31* 36*   CREATININE mg/dL 1.29 1.38* 1.53*   GLUCOSE mg/dL 104* 118* 138*   CALCIUM mg/dL 8.6 8.1* 8.4*   ANION GAP mmol/L 12 14 12   EGFR mL/min/1.73m*2 58* 54* 47*     Results from last 72 hours   Lab Units 02/10/25  0438 02/09/25  0302 02/08/25  0526   ALK PHOS U/L 68 72 66   BILIRUBIN TOTAL mg/dL 0.9 0.7 0.6   PROTEIN TOTAL g/dL 6.3* 5.9* 6.0*   ALT U/L 4* 5* 5*   AST U/L 23 28 18   ALBUMIN g/dL 2.7* 2.7* 2.6*     Estimated Creatinine Clearance: 69.4 " mL/min (by C-G formula based on SCr of 1.29 mg/dL).  CRP   Date Value Ref Range Status   08/14/2020 11.07 (A) mg/dL Final     Comment:     REF VALUE  < 1.00       Microbiology  Susceptibility data from last 14 days.  Collected Specimen Info Organism Amoxicillin/Clavulanate Ampicillin Ampicillin/Sulbactam Cefazolin Cefotaxime Ceftriaxone Ciprofloxacin Gentamicin Levofloxacin Piperacillin/Tazobactam Tetracycline Trimethoprim/Sulfamethoxazole   02/07/25 Swab from ABSCESS Morganella morganii                 Proteus mirabilis                 Mixed Aerobic and Anaerobic Bacteria                02/07/25 Swab from ABSCESS Morganella morganii  R  R  R  R  R  S  R  S  R  S   R     Proteus mirabilis                 Mixed Aerobic and Anaerobic Bacteria                02/07/25 Swab from ABSCESS Proteus mirabilis  S  R  S  S    R  S  R  S  R  R     Morganella morganii                 Mixed Aerobic and Anaerobic Bacteria                  Assessment/Plan     Patient presented with spinal encroachment from newly diagnosed tumor suggestive of metastatic renal cell carcinoma.  Patient underwent debridement, and spinal instrumentation on 1/17/2025.  Did well postoperatively.  Now presenting with several day history of wound dehiscence and cultures pending.  Gram stain of 3 deep cultures (lumbar #1, 2, 3) all show  PMN and gram-negative bacilli.      Assume deep subfascial hardware associated infection.  Does not sound like this encroaches upon epidural space but sufficiently adjacent that prolonged antibiotics planned.  Anticipate 6 to 8 weeks of antibiotics (IV and and then perhaps oral).  Need to discuss carefully with oncology regarding candidacy for XRT and or immunotherapy for carcinoma.  Likely that both these therapies may be delayed several weeks given ongoing infection.  Difficult to say when it would be safe to initiate chemo, radiation or immunotherapy.  This requires a balance between benefits and risks.  Would assume 2 to  3-week of antibiotics prior treatment of malignancy.        # Deep postoperative subfascial, hardware associated lumbar spine infection            Lumbar #1, #2, #3 cultures pending.  Gram stain show PMN and gram-negative rods.           2/10/25 cultures finalized:               Proteus mirabilis: Susceptible to amox-clav, Unasyn, cefazolin, PIP Tazo, BUT resistant to TMP SMX and quinolones   [Same abx profile from 1/2/25 blood Proteus isolate               Morganella Morgagnii: Only susceptible to ceftriaxone, pip-tazo and resistant to cefazolin, TMP-SMX and quinolones                Along with mixed aerobic and anaerobic organisms (largely skin theresa)  NO ORAL AGENT FOR Morganella      # Probable renal cell carcinoma metastatic to L4 (and hence 1/17/25 surgery and spinal instrumentation     # CKD        2/09/25 GFR 47, creatinine 1.5       2/10/25 GFT 58, creatinine 1.29    # Prior Proteus mirabilis bacteremia and UTI (1/2/2025, treated til 1/232/25)       2/6/2025 urine culture negative     Recommendations:  1.  Continue pip-tazo    2.  discontinue vancomycin    3.  Anticipate 6 to 8 weeks of IV antibiotics followed by prolonged oral antibiotic suppression.              Suppression for Proteus straightforward given pan susceptible profile (could give amox-clav, cephalexin, TMP-SMX or Zosyn)            No oral antibiotic will cover Morganella which is generally highly resistant (resistant to TMP SMX, quinolones, BUT susceptible to ceftriaxone and Zosyn            Likely need to continue antibiotics before during and after treatment for RCC which may involve chemotherapy, immunotherapy and XRT.              Anticipate several week antibiotic prior to initiation of any therapy for carcinoma but may need to start earlier.  Need to discuss with oncology and radiation oncology.              To cover cover resistant Morganella will need to extend duration of therapy.      4. For now continue Zosyn with tentative 8 week   stop date planned at  4/4/2025.    Regarding discharge:     -  Dr. Grajeda will arrange for 6 to 8-week follow-up appointment and contact patient for providers.  Appointment should be in encounters tab and should be included in patient's discharge or transfer paperwork.       -  After discharge, obtain weekly CBC plus differential and BMP and FAX results to Dr. Grajeda at [phone]     ID team signing off and will not be following   Call with updates and / or questions    Ivan Grajeda MD  ID Staff    Patient has been scheduled to follow-up in ID clinic with Dr. Grajeda on 4/4/2025 at 10:20 AM in [facility].  Please provide patient with this information and discharge paperwork.

## 2025-02-11 NOTE — PROGRESS NOTES
"Physical Therapy                 Therapy Communication Note    Patient Name: Abdi Retana \"Matt\"  MRN: [id number]  Department: [facility]  Room: [address] [place], [place] [address]  Today's Date: 2/11/2025     Discipline: Physical Therapy    PT Missed Visit: Yes     Missed Visit Reason: Missed Visit Reason: Patient refused, Other (Comment) (pt politely declined, requested pain meds from RN then wanted to be left alone for a while.)    Missed Time: Attempt    Comment: 10:08am  "

## 2025-02-11 NOTE — PROGRESS NOTES
"Physical Therapy                 Therapy Communication Note    Patient Name: Abdi Retana \"Matt\"  MRN: [id number]  Department: [facility]  Room: [address] [place], [place] [address]  Today's Date: 2/11/2025     Discipline: Physical Therapy    PT Missed Visit: Yes     Missed Visit Reason: Missed Visit Reason: Patient refused, Other (Comment) (pt declined PT/RN to roll him, any LE exercises or sit up.)    Missed Time: Attempt    Comment: 13:30pm  "

## 2025-02-12 ENCOUNTER — TELEPHONE (OUTPATIENT)
Dept: ADMISSION | Facility: HOSPITAL | Age: 75
End: 2025-02-12
Payer: MEDICARE

## 2025-02-12 ENCOUNTER — APPOINTMENT (OUTPATIENT)
Dept: HEMATOLOGY/ONCOLOGY | Facility: CLINIC | Age: 75
End: 2025-02-12
Payer: MEDICARE

## 2025-02-12 LAB
ALBUMIN SERPL BCP-MCNC: 2.6 G/DL (ref 3.4–5)
ALP SERPL-CCNC: 65 U/L (ref 33–136)
ALT SERPL W P-5'-P-CCNC: 4 U/L (ref 10–52)
ANION GAP SERPL CALC-SCNC: 17 MMOL/L (ref 10–20)
AST SERPL W P-5'-P-CCNC: 22 U/L (ref 9–39)
BASOPHILS # BLD AUTO: 0.14 X10*3/UL (ref 0–0.1)
BASOPHILS NFR BLD AUTO: 0.8 %
BILIRUB SERPL-MCNC: 0.8 MG/DL (ref 0–1.2)
BUN SERPL-MCNC: 36 MG/DL (ref 6–23)
CALCIUM SERPL-MCNC: 8.4 MG/DL (ref 8.6–10.6)
CHLORIDE SERPL-SCNC: 102 MMOL/L (ref 98–107)
CO2 SERPL-SCNC: 25 MMOL/L (ref 21–32)
CREAT SERPL-MCNC: 2.84 MG/DL (ref 0.5–1.3)
EGFRCR SERPLBLD CKD-EPI 2021: 23 ML/MIN/1.73M*2
EOSINOPHIL # BLD AUTO: 0.54 X10*3/UL (ref 0–0.4)
EOSINOPHIL NFR BLD AUTO: 3.2 %
ERYTHROCYTE [DISTWIDTH] IN BLOOD BY AUTOMATED COUNT: 18.9 % (ref 11.5–14.5)
FUNGUS SPEC CULT: NORMAL
FUNGUS SPEC FUNGUS STN: NORMAL
GLUCOSE SERPL-MCNC: 108 MG/DL (ref 74–99)
HCT VFR BLD AUTO: 26.4 % (ref 41–52)
HGB BLD-MCNC: 7.8 G/DL (ref 13.5–17.5)
IMM GRANULOCYTES # BLD AUTO: 0.14 X10*3/UL (ref 0–0.5)
IMM GRANULOCYTES NFR BLD AUTO: 0.8 % (ref 0–0.9)
LYMPHOCYTES # BLD AUTO: 1.14 X10*3/UL (ref 0.8–3)
LYMPHOCYTES NFR BLD AUTO: 6.7 %
MAGNESIUM SERPL-MCNC: 2.6 MG/DL (ref 1.6–2.4)
MCH RBC QN AUTO: 26.5 PG (ref 26–34)
MCHC RBC AUTO-ENTMCNC: 29.5 G/DL (ref 32–36)
MCV RBC AUTO: 90 FL (ref 80–100)
MONOCYTES # BLD AUTO: 0.93 X10*3/UL (ref 0.05–0.8)
MONOCYTES NFR BLD AUTO: 5.4 %
NEUTROPHILS # BLD AUTO: 14.23 X10*3/UL (ref 1.6–5.5)
NEUTROPHILS NFR BLD AUTO: 83.1 %
NRBC BLD-RTO: 0 /100 WBCS (ref 0–0)
PLATELET # BLD AUTO: 498 X10*3/UL (ref 150–450)
POTASSIUM SERPL-SCNC: 3.3 MMOL/L (ref 3.5–5.3)
PROT SERPL-MCNC: 5.8 G/DL (ref 6.4–8.2)
RBC # BLD AUTO: 2.94 X10*6/UL (ref 4.5–5.9)
SODIUM SERPL-SCNC: 141 MMOL/L (ref 136–145)
WBC # BLD AUTO: 17.1 X10*3/UL (ref 4.4–11.3)

## 2025-02-12 PROCEDURE — 2500000004 HC RX 250 GENERAL PHARMACY W/ HCPCS (ALT 636 FOR OP/ED)

## 2025-02-12 PROCEDURE — 2500000004 HC RX 250 GENERAL PHARMACY W/ HCPCS (ALT 636 FOR OP/ED): Performed by: PHYSICIAN ASSISTANT

## 2025-02-12 PROCEDURE — 1170000001 HC PRIVATE ONCOLOGY ROOM DAILY

## 2025-02-12 PROCEDURE — 2500000001 HC RX 250 WO HCPCS SELF ADMINISTERED DRUGS (ALT 637 FOR MEDICARE OP)

## 2025-02-12 PROCEDURE — 2500000002 HC RX 250 W HCPCS SELF ADMINISTERED DRUGS (ALT 637 FOR MEDICARE OP, ALT 636 FOR OP/ED)

## 2025-02-12 PROCEDURE — 84075 ASSAY ALKALINE PHOSPHATASE: CPT

## 2025-02-12 PROCEDURE — 97530 THERAPEUTIC ACTIVITIES: CPT | Mod: GP | Performed by: PHYSICAL THERAPIST

## 2025-02-12 PROCEDURE — 97166 OT EVAL MOD COMPLEX 45 MIN: CPT | Mod: GO | Performed by: OCCUPATIONAL THERAPIST

## 2025-02-12 PROCEDURE — 83735 ASSAY OF MAGNESIUM: CPT

## 2025-02-12 PROCEDURE — 85025 COMPLETE CBC W/AUTO DIFF WBC: CPT

## 2025-02-12 PROCEDURE — 99233 SBSQ HOSP IP/OBS HIGH 50: CPT | Performed by: PHYSICIAN ASSISTANT

## 2025-02-12 PROCEDURE — 97535 SELF CARE MNGMENT TRAINING: CPT | Mod: GO | Performed by: OCCUPATIONAL THERAPIST

## 2025-02-12 PROCEDURE — 97530 THERAPEUTIC ACTIVITIES: CPT | Mod: GO | Performed by: OCCUPATIONAL THERAPIST

## 2025-02-12 RX ORDER — SODIUM CHLORIDE, SODIUM LACTATE, POTASSIUM CHLORIDE, CALCIUM CHLORIDE 600; 310; 30; 20 MG/100ML; MG/100ML; MG/100ML; MG/100ML
75 INJECTION, SOLUTION INTRAVENOUS CONTINUOUS
Status: DISCONTINUED | OUTPATIENT
Start: 2025-02-12 | End: 2025-02-13

## 2025-02-12 RX ORDER — POTASSIUM CHLORIDE 20 MEQ/1
20 TABLET, EXTENDED RELEASE ORAL ONCE
Status: COMPLETED | OUTPATIENT
Start: 2025-02-12 | End: 2025-02-12

## 2025-02-12 RX ORDER — POTASSIUM CHLORIDE 14.9 MG/ML
20 INJECTION INTRAVENOUS ONCE
Status: COMPLETED | OUTPATIENT
Start: 2025-02-12 | End: 2025-02-12

## 2025-02-12 RX ADMIN — POTASSIUM CHLORIDE 20 MEQ: 1500 TABLET, EXTENDED RELEASE ORAL at 09:32

## 2025-02-12 RX ADMIN — PIPERACILLIN SODIUM AND TAZOBACTAM SODIUM 3.38 G: 3; .375 INJECTION, SOLUTION INTRAVENOUS at 05:01

## 2025-02-12 RX ADMIN — FAMOTIDINE 20 MG: 20 TABLET ORAL at 21:24

## 2025-02-12 RX ADMIN — SENNOSIDES AND DOCUSATE SODIUM 2 TABLET: 50; 8.6 TABLET ORAL at 21:24

## 2025-02-12 RX ADMIN — PIPERACILLIN SODIUM AND TAZOBACTAM SODIUM 3.38 G: 3; .375 INJECTION, SOLUTION INTRAVENOUS at 17:36

## 2025-02-12 RX ADMIN — PIPERACILLIN SODIUM AND TAZOBACTAM SODIUM 3.38 G: 3; .375 INJECTION, SOLUTION INTRAVENOUS at 23:31

## 2025-02-12 RX ADMIN — FERROUS SULFATE TAB 325 MG (65 MG ELEMENTAL FE) 325 MG: 325 (65 FE) TAB at 09:20

## 2025-02-12 RX ADMIN — POTASSIUM CHLORIDE 20 MEQ: 14.9 INJECTION, SOLUTION INTRAVENOUS at 09:22

## 2025-02-12 RX ADMIN — ATORVASTATIN CALCIUM 40 MG: 40 TABLET, FILM COATED ORAL at 21:24

## 2025-02-12 RX ADMIN — ASPIRIN 81 MG: 81 TABLET, COATED ORAL at 09:20

## 2025-02-12 RX ADMIN — POLYETHYLENE GLYCOL 3350 17 G: 17 POWDER, FOR SOLUTION ORAL at 09:20

## 2025-02-12 RX ADMIN — OXYCODONE 5 MG: 5 TABLET ORAL at 15:29

## 2025-02-12 RX ADMIN — HYDROXYZINE HYDROCHLORIDE 25 MG: 25 TABLET, FILM COATED ORAL at 21:24

## 2025-02-12 RX ADMIN — SODIUM CHLORIDE, POTASSIUM CHLORIDE, SODIUM LACTATE AND CALCIUM CHLORIDE 75 ML/HR: 600; 310; 30; 20 INJECTION, SOLUTION INTRAVENOUS at 12:01

## 2025-02-12 RX ADMIN — FAMOTIDINE 20 MG: 20 TABLET ORAL at 09:18

## 2025-02-12 RX ADMIN — PIPERACILLIN SODIUM AND TAZOBACTAM SODIUM 3.38 G: 3; .375 INJECTION, SOLUTION INTRAVENOUS at 11:19

## 2025-02-12 RX ADMIN — ENOXAPARIN SODIUM 120 MG: 150 INJECTION SUBCUTANEOUS at 21:24

## 2025-02-12 RX ADMIN — OXYCODONE HYDROCHLORIDE 10 MG: 10 TABLET ORAL at 09:19

## 2025-02-12 RX ADMIN — SENNOSIDES AND DOCUSATE SODIUM 2 TABLET: 50; 8.6 TABLET ORAL at 09:19

## 2025-02-12 RX ADMIN — ENOXAPARIN SODIUM 120 MG: 150 INJECTION SUBCUTANEOUS at 09:20

## 2025-02-12 RX ADMIN — PETROLATUM: 420 OINTMENT TOPICAL at 16:56

## 2025-02-12 ASSESSMENT — PAIN SCALES - PAIN ASSESSMENT IN ADVANCED DEMENTIA (PAINAD)
NEGVOCALIZATION: REPEATED TROUBLED CALLING OUT, LOUD MOANING/GROANING, CRYING
BODYLANGUAGE: TENSE, DISTRESSED PACING, FIDGETING
FACIALEXPRESSION: SAD, FRIGHTENED, FROWN
PAINAD SCORE: 5
TOTALSCORE: 1
CONSOLABILITY: DISTRACTED OR REASSURED BY VOICE/TOUCH
BODYLANGUAGE: TENSE, DISTRESSED PACING, FIDGETING
TOTALSCORE: REPOSITIONED
FACIALEXPRESSION: SAD, FRIGHTENED, FROWN
CONSOLABILITY: DISTRACTED OR REASSURED BY VOICE/TOUCH
CONSOLABILITY: OCCASIONAL MOAN/GROAN, LOW SPEECH, NEGATIVE/DISAPPROVING QUALITY
CONSOLABILITY: NO NEED TO CONSOLE
FACIALEXPRESSION: SMILING OR INEXPRESSIVE
TOTALSCORE: 7
BREATHING: OCCASIONAL LABORED BREATHING, SHORT PERIOD OF HYPERVENTILATION
BREATHING: NORMAL
BREATHING: NOISY LABORED BREATHING, LONG PERIODS OF HYPERVENTILATION, CHEYNE-STOKES RESPIRATIONS
BODYLANGUAGE: RELAXED
NEGVOCALIZATION: OCCASIONAL MOAN/GROAN, LOW SPEECH, NEGATIVE/DISAPPROVING QUALITY

## 2025-02-12 ASSESSMENT — COGNITIVE AND FUNCTIONAL STATUS - GENERAL
TURNING FROM BACK TO SIDE WHILE IN FLAT BAD: TOTAL
DRESSING REGULAR UPPER BODY CLOTHING: A LOT
EATING MEALS: A LITTLE
WALKING IN HOSPITAL ROOM: TOTAL
CLIMB 3 TO 5 STEPS WITH RAILING: TOTAL
MOBILITY SCORE: 6
DRESSING REGULAR LOWER BODY CLOTHING: TOTAL
MOVING FROM LYING ON BACK TO SITTING ON SIDE OF FLAT BED WITH BEDRAILS: TOTAL
MOVING TO AND FROM BED TO CHAIR: TOTAL
DAILY ACTIVITIY SCORE: 10
HELP NEEDED FOR BATHING: TOTAL
STANDING UP FROM CHAIR USING ARMS: TOTAL
TOILETING: TOTAL
PERSONAL GROOMING: A LOT

## 2025-02-12 ASSESSMENT — PAIN - FUNCTIONAL ASSESSMENT
PAIN_FUNCTIONAL_ASSESSMENT: PAINAD (PAIN ASSESSMENT IN ADVANCED DEMENTIA SCALE)
PAIN_FUNCTIONAL_ASSESSMENT: PAINAD (PAIN ASSESSMENT IN ADVANCED DEMENTIA SCALE)
PAIN_FUNCTIONAL_ASSESSMENT: 0-10
PAIN_FUNCTIONAL_ASSESSMENT: PAINAD (PAIN ASSESSMENT IN ADVANCED DEMENTIA SCALE)
PAIN_FUNCTIONAL_ASSESSMENT: 0-10

## 2025-02-12 ASSESSMENT — PAIN SCALES - GENERAL
PAINLEVEL_OUTOF10: 10 - WORST POSSIBLE PAIN
PAINLEVEL_OUTOF10: 4

## 2025-02-12 ASSESSMENT — ACTIVITIES OF DAILY LIVING (ADL)
BATHING_ASSISTANCE: TOTAL
ADL_ASSISTANCE: NEEDS ASSISTANCE
HOME_MANAGEMENT_TIME_ENTRY: 15

## 2025-02-12 NOTE — CARE PLAN
The clinical goals for the shift include pt will remain safe and free from injury throughout shift 2/12/2025 0700      Problem: Pain - Adult  Goal: Verbalizes/displays adequate comfort level or baseline comfort level  Outcome: Progressing     Problem: Safety - Adult  Goal: Free from fall injury  Outcome: Progressing     Problem: Pain  Goal: Takes deep breaths with improved pain control throughout the shift  Outcome: Progressing     Problem: Pain  Goal: Turns in bed with improved pain control throughout the shift  Outcome: Progressing     Problem: Skin  Goal: Promote skin healing  Outcome: Progressing  Flowsheets (Taken 2/12/2025 8976)  Promote skin healing:   Assess skin/pad under line(s)/device(s)   Rotate device position/do not position patient on device

## 2025-02-12 NOTE — TELEPHONE ENCOUNTER
Matt's sister - Micaela called requesting to speak to Dr. Wynn regarding prognosis. Would like to clarify a few things and would not go into detail for reason for call.  Please call Micaela back at [phone]

## 2025-02-12 NOTE — PROGRESS NOTES
"Physical Therapy    Physical Therapy Treatment    Patient Name: Abdi Retana \"Matt\"  MRN: [id number]  Department: [facility]  Room: [address] [place] [address]  Today's Date: 2/12/2025  Time Calculation  Start Time: 0935  Stop Time: 1023  Time Calculation (min): 48 min         Assessment/Plan   PT Assessment  PT Assessment Results: Decreased strength, Decreased endurance, Impaired balance, Decreased mobility, Decreased safety awareness, Pain, Orthopedic restrictions, Decreased range of motion, Decreased cognition, Impaired judgement, Obesity, Decreased skin integrity  Rehab Prognosis: Fair  Barriers to Discharge Home: Caregiver assistance, Physical needs  Caregiver Assistance: Caregiver assistance needed per identified barriers - however, level of patient's required assistance exceeds assistance available at home  Physical Needs: 24hr mobility assistance needed, 24hr ADL assistance needed, High falls risk due to function or environment  Evaluation/Treatment Tolerance: Other (Comment) (pt limited by anxiety, fear, weakness, pain)  Medical Staff Made Aware: Yes  Strengths: Support of Caregivers  Barriers to Participation: Other (Comment) (confusion, anxiety)  End of Session Communication: Bedside nurse (PA)  Assessment Comment: 73 yo male with spine wound dehiscence, renal cell CA, confusion, weakness, dyspnea, anxiety and weakness overall. Pt limited by weakness, anxiety, fear and pain today; team informed. Recommend, once medically stable, moderate intensity therapy as pt lives alone, needs all 3 therapies, has complex medical and rehab needs and good family support.  End of Session Patient Position: Bed, 3 rail up, Alarm on  PT Plan  Inpatient/Swing Bed or Outpatient: Inpatient  PT Plan  Treatment/Interventions: Bed mobility, Balance training, Neuromuscular re-education, Strengthening, Endurance training, Range of motion, Therapeutic exercise, Therapeutic activity, Home exercise program, Positioning, Postural re-education  PT Plan: " Ongoing PT  PT Frequency: 3 times per week  PT Discharge Recommendations: Moderate intensity level of continued care  Equipment Recommended upon Discharge: Other (comment) (TBD)  PT Recommended Transfer Status: Other (comment) (dependent)  PT - OK to Discharge: Yes (PT eval completed & DC recs made)      General Visit Information:   PT  Visit  PT Received On: 02/12/25  Response to Previous Treatment: Patient with no complaints from previous session.  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 Atkins 1/17, HTN, GERD, OA, PVD, anemia, obesity, PVD, CKD, chronic lymphedema, fall, SAMIA, chronic lymphedema, venous insufficiency, OE wounds, OA  PT Missed Visit:  (no)  Missed Visit Reason:  (.)  Family/Caregiver Present: No  Caregiver Feedback: .  Co-Treatment: OT  Co-Treatment Reason: pt with very low AM-PAC score, requires assist +2 skill therapists for all mobility  Prior to Session Communication: Bedside nurse, PCT/NA/CTA  Patient Position Received: Bed, 3 rail up, Alarm off, not on at start of session  Preferred Learning Style: verbal, kinesthetic  General Comment: Pt alert, fixed gaze, agitated at times, confused, following approximately 50% of commands with repetition and reassurance. pt very anxiuos and confused, perseverative at times (team informed). Pt assisted with rolling and sitting as noted, was incontinent of bowel; PT and OT assisted nursing to clean pt up as he was incontinent of bowel; RN changed Mepilex and is having wound care come check it/change  it.    Subjective   Precautions:  Precautions  Medical Precautions: Spinal precautions, Fall precautions (very confused/anxious/intermittent agitation, WBAT BLE, lumbar/sacral lytic lesions, wound vac, anxious & likes explicit instructions prior to any movement, anemia, bilateral LE ulcers, & knee flexion contractures)  Post-Surgical Precautions:  (spine: no heavy lifting, bending, or twisting > 10 lbs, wound vac)            Objective   Pain:  Pain Assessment  Pain Assessment: PAINAD (Pain Assessment in Advanced Dementia Scale)  Cognition:  Cognition  Overall Cognitive Status: Impaired  Arousal/Alertness: Delayed responses to stimuli  Orientation Level:  (oriented to self, hospital; with  alot of time and repetitive questioning, appears oriented but still very confused)  Following Commands:  (followed approximately 50% of commands with repetition, reassurance)  Safety Judgment: Decreased awareness of need for assistance  Problem Solving: Assistance required to identify errors made  Cognition Comments: anxious, fixed gaze at times, perseverative, fearful and intermittent agitation  Attention: Exceptions to WFL  Memory: Exceptions to WFL  Problem Solving: Exceptions to WFL  Safety/Judgement: Exceptions to WFL  Processing Speed: Delayed  Coordination:     Postural Control:  Postural Control  Postural Control: Impaired  Posture Comment: supported sitting at EOB: forward head, rounded shoulders, posterior pelvic tilt, hip abd & ext rot bilaterally  Static Sitting Balance  Static Sitting-Balance Support: Feet supported, Bilateral upper extremity supported  Static Sitting-Level of Assistance:  (CG to min A x 10 minutes)  Static Sitting-Comment/Number of Minutes: very anxious, holding frame of bed with both hands  Dynamic Sitting Balance  Dynamic Sitting-Balance Support: Feet supported, Bilateral upper extremity supported  Dynamic Sitting-Level of Assistance: Minimum assistance  Dynamic Sitting-Balance:  (AAROM LEs by PT  (OT assisting pt in back with balance))  Dynamic Sitting-Comments: attempted rocking back and forth in sitting (previous activity pt liked) but he was a little to anxious/fearful to do successfully today  Static Standing Balance  Static Standing-Balance Support:  (unable)    Activity Tolerance:  Activity Tolerance  Endurance: Other (Comment) (limited by confusion/anxiety, dyspnea, fear)  Treatments:  Therapeutic Exercise  Therapeutic Exercise Performed: Yes  Therapeutic Exercise Activity 1: AAROM bilateral LEs in supported sitting at EOB: LAQs bilaterally    Therapeutic Activity  Therapeutic Activity Performed: Yes  Therapeutic Activity 1: rolling with cues to use rail to assist  Therapeutic Activity 2: coming to/from sit with cues on how to best assist  Therapeutic Activity 3: sitting balance static with cues to hold head up  Therapeutic Activity 4: dynamic sitting- AAROM of LEs as noted and attempts to have pt try to rock back and forth    Bed Mobility  Bed Mobility: Yes  Bed Mobility 1  Bed Mobility 1: Rolling right, Rolling left  Level of Assistance 1: Dependent, +2  Bed Mobility Comments 1: use of TAP, pt anxious/fearful  Bed Mobility 2  Bed Mobility  2: Supine to sitting, Sitting to supine  Level of Assistance 2: Dependent, +2  Bed Mobility Comments 2: HOB elevated, use of TAP, in/out on Left side of bed  Bed Mobility 3  Bed Mobility 3: Scooting (in sitting)  Level of Assistance 3: Maximum verbal cues, Moderate verbal cues, +2  Bed Mobility Comments 3: using TAP  Bed Mobility 4  Bed Mobility 4: Scooting (supine)  Level of Assistance 4: Dependent, +2  Bed Mobility Comments 4: using TAP    Ambulation/Gait Training  Ambulation/Gait Training Performed: No (unable)  Transfers  Transfer: No (unable)    Stairs  Stairs: No (unable)    Other Activity  Other Activity Performed: Yes  Other Activity 1: assisted rolling to help clean pt up (was incontinent of bowel), put new CHux/TAP and reposition as able    Outcome  Measures:  [facility] Basic Mobility  Turning from your back to your side while in a flat bed without using bedrails: Total  Moving from lying on your back to sitting on the side of a flat bed without using bedrails: Total  Moving to and from bed to chair (including a wheelchair): Total  Standing up from a chair using your arms (e.g. wheelchair or bedside chair): Total  To walk in hospital room: Total  Climbing 3-5 steps with railing: Total  Basic Mobility - Total Score: 6    Education Documentation  Home Exercise Program, taught by Bekah Brewer PT at 2/12/2025 10:35 AM.  Learner: Patient  Readiness: Nonacceptance  Method: Explanation, Demonstration, Teach-back  Response: Needs Reinforcement, No Evidence of Learning  Comment: reoriented pt multiple times, rolling and coming to/from sit, sitting balance, need to clean him up, slow deep breathing    Body Mechanics, taught by Bekah Brewer PT at 2/12/2025 10:35 AM.  Learner: Patient  Readiness: Nonacceptance  Method: Explanation, Demonstration, Teach-back  Response: Needs Reinforcement, No Evidence of Learning  Comment: reoriented pt multiple times, rolling and coming to/from sit, sitting balance, need to clean him up, slow deep breathing    Mobility Training, taught by Bekah Brewer PT at 2/12/2025 10:35 AM.  Learner: Patient  Readiness: Nonacceptance  Method: Explanation, Demonstration, Teach-back  Response: Needs Reinforcement, No Evidence of Learning  Comment: reoriented pt multiple times, rolling and coming to/from sit, sitting balance, need to clean him up, slow deep breathing    Education Comments  No comments found.        Encounter Problems       Encounter Problems (Active)       PT Problem       Pt will be oriented x 4 and follow 100% of simple 1 step commands.  (Not Progressing)       Start:  02/09/25    Expected End:  02/26/25            Pt will state/follow spine precautions 100% of session without additional cues (Not Progressing)       Start:   02/09/25    Expected End:  02/26/25            Pt will roll either direction using rail and come to sit (HOB elevated, use of rail) with min A +2 (Progressing)       Start:  02/09/25    Expected End:  02/26/25            Pt will sit at EOB without UE support while doing LE AAROM >15 min with SBA/CGA +1, no LOB, stable vitals (Progressing)       Start:  02/09/25    Expected End:  02/26/25               Pain - Adult

## 2025-02-12 NOTE — CARE PLAN
Problem: Pain - Adult  Goal: Verbalizes/displays adequate comfort level or baseline comfort level  Outcome: Progressing     Problem: Safety - Adult  Goal: Free from fall injury  Outcome: Progressing     Problem: Discharge Planning  Goal: Discharge to home or other facility with appropriate resources  Outcome: Progressing     Problem: Chronic Conditions and Co-morbidities  Goal: Patient's chronic conditions and co-morbidity symptoms are monitored and maintained or improved  Outcome: Progressing     Problem: Nutrition  Goal: Nutrient intake appropriate for maintaining nutritional needs  Outcome: Progressing     Problem: Pain  Goal: Takes deep breaths with improved pain control throughout the shift  Outcome: Progressing  Goal: Turns in bed with improved pain control throughout the shift  Outcome: Progressing  Goal: Walks with improved pain control throughout the shift  Outcome: Progressing  Goal: Performs ADL's with improved pain control throughout shift  Outcome: Progressing  Goal: Participates in PT with improved pain control throughout the shift  Outcome: Progressing  Goal: Free from opioid side effects throughout the shift  Outcome: Progressing  Goal: Free from acute confusion related to pain meds throughout the shift  Outcome: Progressing     Problem: Skin  Goal: Decreased wound size/increased tissue granulation at next dressing change  Outcome: Progressing  Flowsheets (Taken 2/12/2025 1851)  Decreased wound size/increased tissue granulation at next dressing change:   Promote sleep for wound healing   Protective dressings over bony prominences  Goal: Participates in plan/prevention/treatment measures  Outcome: Progressing  Flowsheets (Taken 2/12/2025 1851)  Participates in plan/prevention/treatment measures:   Discuss with provider PT/OT consult   Elevate heels  Goal: Prevent/manage excess moisture  Outcome: Progressing  Flowsheets (Taken 2/12/2025 1851)  Prevent/manage excess moisture:   Cleanse  incontinence/protect with barrier cream   Follow provider orders for dressing changes   Monitor for/manage infection if present   Moisturize dry skin  Goal: Prevent/minimize sheer/friction injuries  Outcome: Progressing  Flowsheets (Taken 2/12/2025 1851)  Prevent/minimize sheer/friction injuries:   Turn/reposition every 2 hours/use positioning/transfer devices   Use pull sheet  Goal: Promote/optimize nutrition  Outcome: Progressing  Flowsheets (Taken 2/12/2025 1851)  Promote/optimize nutrition:   Consume > 50% meals/supplements   Offer water/supplements/favorite foods   Monitor/record intake including meals  Goal: Promote skin healing  Outcome: Progressing  Flowsheets (Taken 2/12/2025 1851)  Promote skin healing:   Assess skin/pad under line(s)/device(s)   Protective dressings over bony prominences   Turn/reposition every 2 hours/use positioning/transfer devices   The patient's goals for the shift include      The clinical goals for the shift include pt will remain safe and free from injury throughout shift on 2/12/25 at 1900    Pt received zosyn and was turned throughout the day to relieve weight off back and buttocks. Pt encouraged to drink and eat during shift. Plan for pt includes discharge tomorrow 2/13

## 2025-02-12 NOTE — PROGRESS NOTES
"Orthopaedic Surgery Progress Note    Subjective: Evaluated on regular nursing floor. Pain well controlled considering recent surgery. Denies any new-onset numbness or tingling in his lower extremities.    OR cultures growing Proteus and Morganella. ID recommended Zosyn for 6-8 weeks followed by prolonged PO suppression.    Objective:  /69   Pulse 79   Temp 36 °C (96.8 °F) (Temporal)   Resp 18   Ht 1.88 m (6' 2\")   Wt 121 kg (266 lb)   SpO2 100%   BMI 34.15 kg/m²     Gen: arousable, NAD, appropriately conversational  Cardiac: RRR to peripheral palpation  Resp: nonlabored on RA  GI: soft, nondistended    MSK:  Spine Exam:  Drains in place holding suction, SS output  Prevena intact holding suction    L1: SILT       L2: SILT      Hip flexors 4+/5 Left; 4+/5 Right  L3: SILT      Knee extension unable to reliably assess d/t flexion contractures and effort  L4: SILT      Tib Ant. (Dorsiflexion) 5/5 Left; 5/5 Right  L5: SILT      EHL 5/5 Left; 5/5 Right  S1: SILT      Plantarflexion 5/5 Left; 5/5 Right    Assessment/Plan: 74M (HTN, GERD, PVD, cellulitis, former smoker) w compressive L4 lesion secondary to RCC metastasis s/p L2-S1 PSIF, L3-5 decompression w Dr. Atkins 1/17/2025 c/b surgical wound dehiscence now s/p I&D and closure of lumbar wound w Dr. Atkins on 2/7/2025     Plan:  - Oncology primary  - WBAT BLE, no heavy lifting, bending, or twisting > 10 lbs  - OK for diet for ortho spine standpoint  - Intra-op cultures obtained (Proteus and Morganella)  - Antibiotics: Zosyn per ID  - ID consult for antibiotic recommendations - Zosyn IV for 6-8 weeks followed by prolonged PO suppression  - HV drain to be removed and replaced with new 14-day prevena dressing  - Once prevena expires after 14 days, incision should then be dressed with ABD and tape until incision is dry. It may then be left open to air  - Maintain prevena incisional vac  - Continue Q2 turns to off load incision - ensure this is being continued at " discharge facility  - OK for DVT ppx per primary team  - PT/OT  - Ortho spine to follow peripherally while in house  - Patient should follow up w/ Dr. Atkins in 2 weeks after discharge for wound check and suture removal (patient may call [phone] to schedule).     Dispo: pending SNF placement    Mariano Hendrix MD  Orthopaedic Surgery PGY-2  [facility]  Pager: [phone]  Available by Epic Chat    While admitted, this patient will be followed by the Ortho Spine Team. Please contact below residents with any questions (available via Epic Chat).     First call: Mariano Hendrix, PGY-2   Second call: Lew Abad, PGY-4  Third call: Anthony Mary, Fellow    I saw and evaluated the patient.  I personally obtained the key and critical portions of the history and physical exam or was physically present for key and critical portions performed by the Resident. I reviewed the documentation and discussed the patient with the Resident.  I agree with the Resident’s medical decision making as documented in the note.    Will discontinue drain today and change wound vac.   Wound vac to stay on for 14 days. Patient to follow up in clinic in 2 weeks for wound check. Will schedule in EMR  Recommend frequent turns or sitting in chair and out of bed to off load incision

## 2025-02-12 NOTE — PROGRESS NOTES
02/12/25 1400   Discharge Planning   Living Arrangements Alone   Support Systems Family members   Type of Residence Skilled nursing facility   Home or Post Acute Services Post acute facilities (Rehab/SNF/etc)   Type of Post Acute Facility Services Skilled nursing   Expected Discharge Disposition SNF  ([facility])   Does the patient need discharge transport arranged? Yes   RoundTrip coordination needed? Yes   Has discharge transport been arranged? No   Patient Choice   Provider Choice list and CMS website (https://medicare.gov/care-compare#search) for post-acute Quality and Resource Measure Data were provided and reviewed with: Patient;Family     [id number]-LSW spoke with sister/BAO Hinojosa via phone d/t pt's AMS re: SNF placement. Reviewed with pt's sister that none of the SNFs she previously gave can accept and offered to send new SNF list. Sister gave additional choice of [facility]. Referral sent in Careport and they can accept, will have DSC start precert when medically ready. Pt's sister also requested hospice informational mtg in the event that pt does not get stronger for more chemotherapy. Review agencies with her and she requested referrals be sent to [facility]. Referrals sent in Careport, awaiting response. LSW to cont to follow.    1800-Precert started by DSC, ready for DC Thursday/Friday. Pt's sister updated via voicemail.

## 2025-02-12 NOTE — PROGRESS NOTES
"Occupational Therapy    Evaluation/Treatment    Patient Name: Abdi Retana \"Matt\"  MRN: [id number]  Department: [facility]  Room: [address] [place] [address]  Today's Date: 02/12/25  Time Calculation  Start Time: 0935  Stop Time: 1023  Time Calculation (min): 48 min       Assessment:  OT Assessment: Pt presents to OT with increased falls risk, decreased safety and independence with functional transfers and mobility and impaired ADL performance.  Pt will continue to benefit from skilled OT services to address deficits and facilitate safe return to PLOF and home environment.   Prognosis: Fair  Barriers to Discharge Home: Physical needs  Physical Needs: Returning to long-term care/other facility  Evaluation/Treatment Tolerance:  (Limited by anxiety, confusion, agitation)  Medical Staff Made Aware: Yes  End of Session Communication: Bedside nurse, PCT/NA/CTA  End of Session Patient Position: Bed, 3 rail up, Alarm on  OT Assessment Results: Decreased ADL status, Decreased upper extremity strength, Decreased upper extremity range of motion, Decreased safe judgment during ADL, Decreased cognition, Decreased endurance, Decreased functional mobility, Decreased trunk control for functional activities  Prognosis: Fair  Evaluation/Treatment Tolerance:  (Limited by anxiety, confusion, agitation)  Medical Staff Made Aware: Yes  Plan:  Treatment Interventions: ADL retraining, Functional transfer training, UE strengthening/ROM, Endurance training, Cognitive reorientation, Patient/family training, Equipment evaluation/education, Compensatory technique education, Continued evaluation  OT Frequency: 2 times per week  OT Discharge Recommendations: Moderate intensity level of continued care  OT Recommended Transfer Status: Assist of 2, Dependent  OT - OK to Discharge: Yes  Treatment Interventions: ADL retraining, Functional transfer training, UE strengthening/ROM, Endurance training, Cognitive reorientation, Patient/family training, Equipment " evaluation/education, Compensatory technique education, Continued evaluation    Subjective   Current Problem:  1. Wound dehiscence  CBC and Auto Differential    Basic Metabolic Panel      2. Postoperative wound dehiscence, initial encounter  Case Request Operating Room: DEBRIDEMENT, WOUND, LUMBAR REGION    Case Request Operating Room: DEBRIDEMENT, WOUND, LUMBAR REGION    Fungal Culture/Smear    Fungal Culture/Smear    Fungal Culture/Smear    Fungal Culture/Smear    Fungal Culture/Smear    Fungal Culture/Smear    Tissue/Wound Culture/Smear    Tissue/Wound Culture/Smear    Tissue/Wound Culture/Smear    Tissue/Wound Culture/Smear    Tissue/Wound Culture/Smear    Tissue/Wound Culture/Smear    Clinic Appointment Request Follow Up; RACHEL WADE      3. Anemia, unspecified type        4. JAMLA (acute kidney injury) (CMS-[facility])  Monitor intake and output      5. Renal mass  Clinic Appointment Request New Patient; MICHAELA CHENEY        General:   OT Received On: 02/12/25  General  Reason for Referral: compressive L4 lesion secondary to RCC metastasis s/p L2-S1 PSIF, L3-5 decompression w Dr. Wade 1/17/2025 c/b surgical wound dehiscence now s/p I&D and closure of lumbar wound w Dr. Wade on 2/7/2025  Past Medical History Relevant to Rehab: CKD, small acute stroke, Right parietal lobe 1/8/25, malignant renal cell carcinoma, Right (L4 lesion, mets to adrenal glands & lung), HTN, GERD, OA, PVD, anemia, obesity, PVD, CKD, chronic lymphedema, OA  Co-Treatment: PT  Co-Treatment Reason: [facility] 6, requires 2 skilled assist to facilitate safe mobility and functional transitons  Prior to Session Communication: Bedside nurse, PCT/NA/CTA  Patient Position Received: Bed, 3 rail up, Alarm off, not on at start of session  General Comment: Pt met in bed, moderately agitated throughout, difficult to reorient, perseverative however inappropriate in conversation/contradicting self , confused and anxious requiring frequent encouragement  and redirection.   Precautions:  Medical Precautions: Spinal precautions, Fall precautions  Post-Surgical Precautions: Spinal precautions     Date/Time Vitals Session Patient Position Pulse Resp SpO2 BP MAP (mmHg)    02/12/25 1205 --  --  80  18  95 %  120/75  --                 Pain:  Pain Assessment  Pain Assessment:  (pt yelling out in pain, unable to redirect to pain rating; RN present and aware; positioned for comfort)    Objective   Cognition:  Overall Cognitive Status: Impaired  Arousal/Alertness: Delayed responses to stimuli  Orientation Level:  (oriented to self, place and month/year with cues/choices, disoriented to situation; cues to respond to questioning)  Following Commands:  (followed ~50% simple commands with inc time and repetitions)  Safety Judgment: Decreased awareness of need for assistance  Problem Solving: Assistance required to identify errors made  Cognition Comments: anxious, confused, perseverative, agitated and fearful  Attention: Exceptions to WFL  Memory: Exceptions to WFL  Problem Solving: Exceptions to WFL  Safety/Judgement: Exceptions to WFL  Insight: Severe           Home Living:  Home Living Comments: re-admit from SNF, utilizing mayo; A for ADLs  Prior Function:  Level of Rock: Needs assistance with ADLs, Needs assistance with homemaking (from SNF)  Receives Help From:  (staff)  ADL Assistance: Needs assistance  Homemaking Assistance: Needs assistance  Ambulatory Assistance: Needs assistance  Prior Function Comments: per chart, pt was ind prior however admit from SNF requiring A for ADLs and mobility     ADL:  Eating Assistance: Minimal  Eating Deficit: Beverage management  Grooming Assistance: Moderate (anticipated)  Bathing Assistance: Total (anticipated)  UE Dressing Assistance: Maximal (anticipated)  LE Dressing Assistance: Total  LE Dressing Deficit: Don/doff R sock, Don/doff L sock  Toileting Assistance with Device: Total  Toileting Deficit: Perineal hygiene,  Incontinent  Activities of Daily Living: Feeding  Feeding Level of Assistance: Minimum assistance  Feeding Where Assessed: Edge of bed  Feeding Comments: beverage management at EOB       LE Dressing  LE Dressing:  (dep to don/dof B socks at bed level)    Toileting  Toileting Level of Assistance: Dependent  Where Assessed: Bed level  Toileting Comments: 2-3 persons for rolling and total assist for leonie-hygiene  Activity Tolerance:  Endurance: Decreased tolerance for upright activites     Bed Mobility/Transfers: Bed Mobility  Bed Mobility: Yes  Bed Mobility 1  Bed Mobility 1: Rolling right, Rolling left  Level of Assistance 1: Dependent, +2  Bed Mobility 2  Bed Mobility  2: Supine to sitting, Sitting to supine  Level of Assistance 2: Dependent, +2  Bed Mobility Comments 2: use of TAPS, draw assist, constant cues and hand over hand for technique  Bed Mobility 3  Bed Mobility 3: Scooting (positioning and to EOB)  Level of Assistance 3: Dependent, +2  Bed Mobility Comments 3: TAPS    Transfers  Transfer: No (unsafe to progress)        Functional Mobility:  Functional Mobility  Functional Mobility Performed: No  Sitting Balance:  Static Sitting Balance  Static Sitting-Comment/Number of Minutes: moments of CGA- primarily min A with trunk support via posterior  Dynamic Sitting Balance  Dynamic Sitting-Comments: min A with trunk support; heavy BUE support with firm grasp on EOB       Therapy/Activity: Therapeutic Activity  Therapeutic Activity Performed: Yes  Therapeutic Activity 1: pt tolerated sitting EOB ~20 mins with strong grasp on EOB and min A  Therapeutic Activity 2: re-orientation provided throughout session and near constant redirection to session, situation and safety  Therapeutic Activity 3: increased time and effort to faciltiate functional transition to/from EOB  Therapeutic Activity 4: multiple bouts of rolling, side to side in bed, for hygiene and positioning of linens and pt       Vision:Vision - Basic  Assessment  Current Vision: No visual deficits  Sensation:  Light Touch: No apparent deficits  Strength:  Strength Comments: unable to assess 2/2 poor command following and participatiomn    Perception:  Inattention/Neglect: Appears intact  Initiation: Hand over hand to initiate tasks  Motor Planning: Hand over hand to sequence tasks  Perseveration: Perseverates during conversation  Coordination:  Movements are Fluid and Coordinated: No   Hand Function:  Hand Function  Gross Grasp: Functional  Coordination: Functional  Extremities: RUE   RUE :  (unable to assess 2/2 command following and UE resistance) and LUE   LUE:  (unable to assess 2/2 command following and UE resistance)      Outcome Measures: [facility] Daily Activity  Putting on and taking off regular lower body clothing: Total  Bathing (including washing, rinsing, drying): Total  Putting on and taking off regular upper body clothing: A lot  Toileting, which includes using toilet, bedpan or urinal: Total  Taking care of personal grooming such as brushing teeth: A lot  Eating Meals: A little  Daily Activity - Total Score: 10         and OT Adult Other Outcome Measures  4AT: +    Education Documentation  Body Mechanics, taught by Suzanne Ryder OT at 2/12/2025 11:11 AM.  Learner: Patient  Readiness: Acceptance  Method: Explanation  Response: No Evidence of Learning    Precautions, taught by Suzanne Ryder OT at 2/12/2025 11:11 AM.  Learner: Patient  Readiness: Acceptance  Method: Explanation  Response: No Evidence of Learning    ADL Training, taught by Suzanne Ryder OT at 2/12/2025 11:11 AM.  Learner: Patient  Readiness: Acceptance  Method: Explanation  Response: No Evidence of Learning    Education Comments  No comments found.      Goals:  Encounter Problems       Encounter Problems (Active)       ADLs       Patient will complete daily grooming tasks with set-up, stand by assist level of assistance and PRN adaptive equipment while edge of bed .        Start:  02/12/25    Expected End:  03/05/25               BALANCE       Pt will maintain dynamic sitting balance during ADL task with stand by assist level of assistance in order to demonstrate decreased risk of falling and improved postural control.       Start:  02/12/25    Expected End:  03/05/25               COGNITION/SAFETY       Patient will follow 75% Simple Two step commands to allow improved ADL performance.       Start:  02/12/25    Expected End:  03/05/25            Patient will demonstrated orientation x 3 with no cues.       Start:  02/12/25    Expected End:  03/05/25            OT GOAL 1       Start:  02/12/25    Expected End:  03/05/25               EXERCISE/STRENGTHENING       pt will be IND with BUE HEP for increasing functional strength and activity tolerance required for ADL completion       Start:  02/12/25    Expected End:  03/05/25               TRANSFERS       Patient will perform bed mobility moderate assist level of assistance and bed rails in order to improve safety and independence with mobility       Start:  02/12/25    Expected End:  03/05/25 02/12/25 at 12:09 PM   Suzanne Ryder OT   Rehab Office: [phone]

## 2025-02-12 NOTE — PROGRESS NOTES
"Abdi Retana \"Matt\" is a 74 y.o. male on day 6 of admission presenting with Wound dehiscence.    Subjective   Seen this morning at bedside, c/o of his purwick out and wanted to have it replaced, otherwise he had no complains, He is A+OX2 today, with chronically waxing and weaning mental status, states that he makes his own medical decisions, his sister available. Discussed plan to continue to search for new facility for Matt. Denies fever/chills, N/V/D/C, bleeding, bruising, swelling, SOB, CP, H/A or vision changes. Denies fever/chills, N/V, bleeding, swelling, CP, SOB, H/A and vision changes.       Objective     Physical Exam  Constitutional:       Appearance: Normal appearance.      Comments: pale   HENT:      Head: Normocephalic.      Mouth/Throat:      Mouth: Mucous membranes are moist.   Eyes:      Pupils: Pupils are equal, round, and reactive to light.   Cardiovascular:      Rate and Rhythm: Normal rate and regular rhythm.      Pulses: Normal pulses.      Heart sounds: Normal heart sounds.   Pulmonary:      Effort: Pulmonary effort is normal.      Breath sounds: Normal breath sounds.   Abdominal:      General: Abdomen is flat. Bowel sounds are normal.      Palpations: Abdomen is soft.   Musculoskeletal:      Cervical back: Normal range of motion and neck supple.      Comments: Drains in place with bloody output    Skin:     General: Skin is warm.   Neurological:      General: No focal deficit present.      Mental Status: He is alert.   Psychiatric:         Mood and Affect: Mood normal.         Last Recorded Vitals  Blood pressure 120/75, pulse 80, temperature 36.1 °C (97 °F), temperature source Temporal, resp. rate 18, height 1.88 m (6' 2\"), weight 121 kg (266 lb), SpO2 95%.  Intake/Output last 3 Shifts:  I/O last 3 completed shifts:  In: 67945.1 (88.5 mL/kg) [P.O.:420; Blood:9435.4; IV Piggyback:816.7]  Out: 185 (1.5 mL/kg) [Urine:150 (0 mL/kg/hr); Drains:35]  Weight: 120.7 kg     Relevant Results  Scheduled " medications  aspirin, 81 mg, oral, Daily  atorvastatin, 40 mg, oral, Nightly  enoxaparin, 120 mg, subcutaneous, BID  famotidine, 20 mg, oral, BID  ferrous sulfate (325 mg ferrous sulfate), 1 tablet, oral, q24h  lidocaine, 5 mL, infiltration, Once  piperacillin-tazobactam, 3.375 g, intravenous, q6h  polyethylene glycol, 17 g, oral, Daily  sennosides-docusate sodium, 2 tablet, oral, BID  [Held by provider] torsemide, 20 mg, oral, Daily  white petrolatum, , Topical, Daily      Continuous medications  lactated Ringer's, 75 mL/hr, Last Rate: 75 mL/hr (02/12/25 1201)      PRN medications  PRN medications: alteplase, cyclobenzaprine, HYDROmorphone, hydrOXYzine HCL, magnesium hydroxide, ondansetron, oxyCODONE, oxyCODONE      This patient has a central line   Reason for the central line remaining today? Hemodynamic monitoring    Assessment/Plan   Assessment & Plan  Wound dehiscence    Wound dehiscence, surgical    Acute kidney injury (nontraumatic) (CMS-HCC)    Abdi Retana is a 74 year old Male PMH newly dx metastatic RCC (dx Jan 2025, has not started treatment yet), HTN, GERD, SAMIA, chronic lymphedema, venous insufficiency, OA, CKD III (BL~ 1.2), hx new acute stroke dx 1/8/25 (on BID Lovenox) and recent cord compression L4-L5 lesion (s/p L3-L5 laminectomies with L2-S1 lumbar tumor debulking 1/17 with Ortho surg) who presented to ED 2/6 after being sent from his first postop visit with Ortho for c/f lumbar wound dehiscence. Ortho consulted, s/p I&D and wound closure with ortho on 2/7. Coagulated hematoma noted by ortho in OR, wound cultures taken, prelim +gram negative bacilli. Initiated on broad spectrum antibiotics vanc + zosyn (2/7-) until would cultures finalized. ID consulted on 2/8 for antibiotics treatment and duration recommendations, rec continue vanc (2/7-2/11) + zosyn (2/7-) until final wound cultures result. Final cultures resulted 2/10 +Proteus and Morganella, ID rec Zosyn w/ tentative 8-week stop date 4/4,  vancomycin discontinued (2/11). DC to SNF pending placement.      Updates 2/12:  - POD 5 I&D and wound closure with ortho   - Intraoperative wound cultures x3, final cultures+Proteus and Morganella, ID rec Zosyn w/ tentative 8-week stop date 4/4  - Vancomycin discontinued 2/11  - Scr. elevated again to 2.84 today, started on maintenance fluids   - Plan PICC line tomorrow for long term IV abx   - Ortho plan to DC drain today and change wound vac, wound vac should stay in for 14 days with FUV in 2 weeks for wound check   - DC pending SNF placement     # lumbar wound dehiscence s/p 3-L5 laminectomy and L2-S1 fusion decompression  - (1/17-1/24) 1/17 s/p spinal artery embolization with NSGY and lumbar tumor debulkin L3-L5 laminectomiues with L2-S1 PSIF with Dr. Atkins, surgery c/b acute bloos loss requiring RBCs, discharged to SNF to establish Onc and Rad/Onc plan    - 2/6 Returned for first postop visit and found to have dehisced wound, sent by Dr. Atkins to ED   - 2/6 Xray lumbar spine showed re demonstration significant pathologic destruction L4, screws for L5 appear superior to level of vertebral body poss outside of body related to shifting of hardware   - Ortho consulted on admit, s/p I&D and wound closure with Dr. Atkins on 2/7  - Coagulated hematoma noted by ortho in OR  - Intraoperative cultures x3 collected, prelim +Proteus and Morganella  - ID consulted 2/8 for antibiotic tx and duration, rec continue vanc (2/7-2/11) + zosyn (2/7- )  - Final wound cultures +Proteus and Morganella, final ID rec Zosyn w/ tentative 8-week stop date 4/4  - c/w oxycodone 5mg PRN mod pain, 10mg PRN severe pain, and 0.4mg IVP dilaudid q3 PRN breakthrough (2/5- )  - PT / OT consulted, rec moderate intensity, POA provided with SNF choices (2/10)     # newly dx metastatic R RCC   - Plan to establish with Dr. Wynn 2/12 and Dr. Moncada 2/27 (Rad/Onc) for OP care - both emailed 2/7   - Nov 2024 Presented for fall and BLE weakness, Xray  "lumbar spine showing large lytic lesion L4 c/f neoplasm - was due to get outpatient bx with IR in Dec but was unable d/t facility mixing up transportation and missed appt   - 11/30 CT C/A/P showed large infiltrative R renal mass c/w RCC with mets adrenal glands, osseous lesions, pulm nodules  - 1/7 s/p s/p bx L4 lesion, 1/13 confrmed carcinoma involving dense fibroconnective tissue    - 1/16 s/p IR for tumor embolization from b/l L4 segmental arteries  - 1/17 s/p OR for lumbar tumor debulking L3-L4 and L4-L5 laminectomies with L2-S1 PSIF with Dr. Atkins   - 1/24 Seen by Rad/Onc   - Per Dr. 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 1.38 (2/9)--> 1.70 (2/11)-->2.84  - continue to hold home torsemide 2/7  - UA +leuks, WBC, on broad spectrum antibiotics as above   - FeNa 0.3% pre-renal   - 2/10 resumed home lovenox and ASA per ortho  - s/p 500ml LR soft bolus (2/11)   - LR@75mL X1 day (2/12)     # Altered mood  # Anxiety  - pt expressing hopelessness, anxiety while inpatient s/p wound closure stating he was \"feeling trapped\" that he cannot move around/ambulate like he wants  - start Hydroxyzine 25mg Q8 PRN for anxiety (2/10)  - ordered Music, Pet and Art therapy (2/10)     # hx small acute stroke  - 1/8/25 MRI Brain showed c/f small acute stroke in R parietal lobe  - Neuro consulted, rec Lovenox 1mg/kg q12h and outpatient follow up   - held home Lovenox I/s/o surgery 2/7, resumed 2/10 per ortho      # hx recent proteus mirabilis complicated UTI and bacteremia   - 1/5 developed UTI with + Proteus bacteremia s/p " Cefepime and Augmentin PO completed 1/23   - Seen by ID inpatient during previous admission   - UA (2/6) + leuks, WBC, casts - no reports of dysuria, flank pain, fever/chills  - urine culture (2/6) negative     # chronic venous insufficiency  # chronic lymphedema   - [facility]ry [facility]  - Wound care consulted on admit, recs appreciated  - held home torsemide 20mg daily   - ASA restarted 2/10      # HTN/HLD  - c/w home lipitor 40mg daily      # GERD  - c/w home pepcid daily      # dispo  - Full code, confirmed on admit   - DC new SNF pending placement   - NOK: Micaela, sister, [phone]   - FUV 2/11 Urology, 2/26 Dr. Wynn,  2/27 w/ Ortho Surg ([facility]), 2/27 w/ Rad Onc, 4/4 w/ ID    I spent 60 minutes in the professional and overall care of this patient.    Assessment and plan as above discussed with attending physician, Dr. Peraza.     Richi Ruiz PA-C

## 2025-02-12 NOTE — NURSING NOTE
PICC Delay    Spoke with  Richi Ruiz PA-C,  she would like PICC placed tomorrow.  PICC team to follow up tomorrow.

## 2025-02-13 LAB
ALBUMIN SERPL BCP-MCNC: 2.6 G/DL (ref 3.4–5)
ALP SERPL-CCNC: 59 U/L (ref 33–136)
ALT SERPL W P-5'-P-CCNC: 5 U/L (ref 10–52)
ANION GAP SERPL CALC-SCNC: 20 MMOL/L (ref 10–20)
APPEARANCE UR: CLEAR
AST SERPL W P-5'-P-CCNC: 21 U/L (ref 9–39)
BASOPHILS # BLD AUTO: 0.1 X10*3/UL (ref 0–0.1)
BASOPHILS # BLD AUTO: 0.11 X10*3/UL (ref 0–0.1)
BASOPHILS NFR BLD AUTO: 0.6 %
BASOPHILS NFR BLD AUTO: 0.7 %
BILIRUB SERPL-MCNC: 0.7 MG/DL (ref 0–1.2)
BILIRUB UR STRIP.AUTO-MCNC: NEGATIVE MG/DL
BUN SERPL-MCNC: 47 MG/DL (ref 6–23)
CALCIUM SERPL-MCNC: 8.3 MG/DL (ref 8.6–10.6)
CHLORIDE SERPL-SCNC: 103 MMOL/L (ref 98–107)
CHLORIDE UR-SCNC: <15 MMOL/L
CHLORIDE/CREATININE (MMOL/G) IN URINE: NORMAL
CO2 SERPL-SCNC: 24 MMOL/L (ref 21–32)
COLOR UR: YELLOW
CREAT SERPL-MCNC: 3.71 MG/DL (ref 0.5–1.3)
CREAT UR-MCNC: 103.1 MG/DL (ref 20–370)
EGFRCR SERPLBLD CKD-EPI 2021: 16 ML/MIN/1.73M*2
EOSINOPHIL # BLD AUTO: 0.16 X10*3/UL (ref 0–0.4)
EOSINOPHIL # BLD AUTO: 0.28 X10*3/UL (ref 0–0.4)
EOSINOPHIL NFR BLD AUTO: 1.1 %
EOSINOPHIL NFR BLD AUTO: 1.7 %
ERYTHROCYTE [DISTWIDTH] IN BLOOD BY AUTOMATED COUNT: 19.1 % (ref 11.5–14.5)
ERYTHROCYTE [DISTWIDTH] IN BLOOD BY AUTOMATED COUNT: 19.2 % (ref 11.5–14.5)
GLUCOSE SERPL-MCNC: 90 MG/DL (ref 74–99)
GLUCOSE UR STRIP.AUTO-MCNC: NORMAL MG/DL
HCT VFR BLD AUTO: 23.5 % (ref 41–52)
HCT VFR BLD AUTO: 24.7 % (ref 41–52)
HGB BLD-MCNC: 6.9 G/DL (ref 13.5–17.5)
HGB BLD-MCNC: 7.4 G/DL (ref 13.5–17.5)
IMM GRANULOCYTES # BLD AUTO: 0.13 X10*3/UL (ref 0–0.5)
IMM GRANULOCYTES # BLD AUTO: 0.15 X10*3/UL (ref 0–0.5)
IMM GRANULOCYTES NFR BLD AUTO: 0.9 % (ref 0–0.9)
IMM GRANULOCYTES NFR BLD AUTO: 0.9 % (ref 0–0.9)
KETONES UR STRIP.AUTO-MCNC: ABNORMAL MG/DL
LEUKOCYTE ESTERASE UR QL STRIP.AUTO: ABNORMAL
LYMPHOCYTES # BLD AUTO: 0.97 X10*3/UL (ref 0.8–3)
LYMPHOCYTES # BLD AUTO: 1.21 X10*3/UL (ref 0.8–3)
LYMPHOCYTES NFR BLD AUTO: 6.5 %
LYMPHOCYTES NFR BLD AUTO: 7.5 %
MAGNESIUM SERPL-MCNC: 2.77 MG/DL (ref 1.6–2.4)
MCH RBC QN AUTO: 26.7 PG (ref 26–34)
MCH RBC QN AUTO: 27.1 PG (ref 26–34)
MCHC RBC AUTO-ENTMCNC: 29.4 G/DL (ref 32–36)
MCHC RBC AUTO-ENTMCNC: 30 G/DL (ref 32–36)
MCV RBC AUTO: 91 FL (ref 80–100)
MCV RBC AUTO: 91 FL (ref 80–100)
MONOCYTES # BLD AUTO: 0.66 X10*3/UL (ref 0.05–0.8)
MONOCYTES # BLD AUTO: 0.79 X10*3/UL (ref 0.05–0.8)
MONOCYTES NFR BLD AUTO: 4.4 %
MONOCYTES NFR BLD AUTO: 4.9 %
MUCOUS THREADS #/AREA URNS AUTO: ABNORMAL /LPF
NEUTROPHILS # BLD AUTO: 12.9 X10*3/UL (ref 1.6–5.5)
NEUTROPHILS # BLD AUTO: 13.68 X10*3/UL (ref 1.6–5.5)
NEUTROPHILS NFR BLD AUTO: 84.4 %
NEUTROPHILS NFR BLD AUTO: 86.4 %
NITRITE UR QL STRIP.AUTO: NEGATIVE
NRBC BLD-RTO: 0 /100 WBCS (ref 0–0)
NRBC BLD-RTO: 0 /100 WBCS (ref 0–0)
PH UR STRIP.AUTO: 5.5 [PH]
PLATELET # BLD AUTO: 379 X10*3/UL (ref 150–450)
PLATELET # BLD AUTO: 471 X10*3/UL (ref 150–450)
POTASSIUM SERPL-SCNC: 3.7 MMOL/L (ref 3.5–5.3)
POTASSIUM UR-SCNC: 56 MMOL/L
POTASSIUM/CREAT UR-RTO: 54 MMOL/G CREAT
PROT SERPL-MCNC: 5.8 G/DL (ref 6.4–8.2)
PROT UR STRIP.AUTO-MCNC: ABNORMAL MG/DL
RBC # BLD AUTO: 2.58 X10*6/UL (ref 4.5–5.9)
RBC # BLD AUTO: 2.73 X10*6/UL (ref 4.5–5.9)
RBC # UR STRIP.AUTO: NEGATIVE MG/DL
RBC #/AREA URNS AUTO: ABNORMAL /HPF
SODIUM SERPL-SCNC: 143 MMOL/L (ref 136–145)
SODIUM UR-SCNC: 22 MMOL/L
SODIUM/CREAT UR-RTO: 21 MMOL/G CREAT
SP GR UR STRIP.AUTO: 1.02
UROBILINOGEN UR STRIP.AUTO-MCNC: NORMAL MG/DL
WBC # BLD AUTO: 14.9 X10*3/UL (ref 4.4–11.3)
WBC # BLD AUTO: 16.2 X10*3/UL (ref 4.4–11.3)
WBC #/AREA URNS AUTO: ABNORMAL /HPF

## 2025-02-13 PROCEDURE — 86901 BLOOD TYPING SEROLOGIC RH(D): CPT

## 2025-02-13 PROCEDURE — 86923 COMPATIBILITY TEST ELECTRIC: CPT

## 2025-02-13 PROCEDURE — 82436 ASSAY OF URINE CHLORIDE: CPT | Performed by: PHYSICIAN ASSISTANT

## 2025-02-13 PROCEDURE — 83735 ASSAY OF MAGNESIUM: CPT

## 2025-02-13 PROCEDURE — 2500000004 HC RX 250 GENERAL PHARMACY W/ HCPCS (ALT 636 FOR OP/ED): Mod: TB

## 2025-02-13 PROCEDURE — 87086 URINE CULTURE/COLONY COUNT: CPT | Performed by: PHYSICIAN ASSISTANT

## 2025-02-13 PROCEDURE — 81001 URINALYSIS AUTO W/SCOPE: CPT | Performed by: PHYSICIAN ASSISTANT

## 2025-02-13 PROCEDURE — 2500000004 HC RX 250 GENERAL PHARMACY W/ HCPCS (ALT 636 FOR OP/ED)

## 2025-02-13 PROCEDURE — 85025 COMPLETE CBC W/AUTO DIFF WBC: CPT

## 2025-02-13 PROCEDURE — 2500000001 HC RX 250 WO HCPCS SELF ADMINISTERED DRUGS (ALT 637 FOR MEDICARE OP)

## 2025-02-13 PROCEDURE — 99233 SBSQ HOSP IP/OBS HIGH 50: CPT | Performed by: PHYSICIAN ASSISTANT

## 2025-02-13 PROCEDURE — 84075 ASSAY ALKALINE PHOSPHATASE: CPT

## 2025-02-13 PROCEDURE — 2500000004 HC RX 250 GENERAL PHARMACY W/ HCPCS (ALT 636 FOR OP/ED): Performed by: PHYSICIAN ASSISTANT

## 2025-02-13 PROCEDURE — 1170000001 HC PRIVATE ONCOLOGY ROOM DAILY

## 2025-02-13 RX ORDER — HEPARIN SODIUM 5000 [USP'U]/ML
5000 INJECTION, SOLUTION INTRAVENOUS; SUBCUTANEOUS EVERY 8 HOURS
Status: DISCONTINUED | OUTPATIENT
Start: 2025-02-13 | End: 2025-02-15 | Stop reason: HOSPADM

## 2025-02-13 RX ORDER — ENOXAPARIN SODIUM 100 MG/ML
30 INJECTION SUBCUTANEOUS EVERY 24 HOURS
Status: DISCONTINUED | OUTPATIENT
Start: 2025-02-13 | End: 2025-02-13

## 2025-02-13 RX ORDER — SODIUM CHLORIDE 9 MG/ML
75 INJECTION, SOLUTION INTRAVENOUS CONTINUOUS
Status: ACTIVE | OUTPATIENT
Start: 2025-02-13 | End: 2025-02-14

## 2025-02-13 RX ORDER — OXYCODONE HYDROCHLORIDE 5 MG/1
5 TABLET ORAL EVERY 4 HOURS PRN
Status: DISCONTINUED | OUTPATIENT
Start: 2025-02-13 | End: 2025-02-14

## 2025-02-13 RX ORDER — OXYCODONE HYDROCHLORIDE 10 MG/1
10 TABLET ORAL EVERY 4 HOURS PRN
Status: DISCONTINUED | OUTPATIENT
Start: 2025-02-13 | End: 2025-02-14

## 2025-02-13 RX ADMIN — HYDROMORPHONE HYDROCHLORIDE 0.4 MG: 0.5 INJECTION, SOLUTION INTRAMUSCULAR; INTRAVENOUS; SUBCUTANEOUS at 08:30

## 2025-02-13 RX ADMIN — ATORVASTATIN CALCIUM 40 MG: 40 TABLET, FILM COATED ORAL at 20:35

## 2025-02-13 RX ADMIN — HEPARIN SODIUM 5000 UNITS: 5000 INJECTION, SOLUTION INTRAVENOUS; SUBCUTANEOUS at 17:49

## 2025-02-13 RX ADMIN — PIPERACILLIN SODIUM AND TAZOBACTAM SODIUM 3.38 G: 3; .375 INJECTION, SOLUTION INTRAVENOUS at 04:39

## 2025-02-13 RX ADMIN — FERROUS SULFATE TAB 325 MG (65 MG ELEMENTAL FE) 325 MG: 325 (65 FE) TAB at 08:31

## 2025-02-13 RX ADMIN — PIPERACILLIN SODIUM AND TAZOBACTAM SODIUM 3.38 G: 3; .375 INJECTION, SOLUTION INTRAVENOUS at 11:06

## 2025-02-13 RX ADMIN — OXYCODONE HYDROCHLORIDE 10 MG: 10 TABLET ORAL at 05:01

## 2025-02-13 RX ADMIN — SODIUM CHLORIDE 75 ML/HR: 9 INJECTION, SOLUTION INTRAVENOUS at 23:21

## 2025-02-13 RX ADMIN — OXYCODONE 5 MG: 5 TABLET ORAL at 22:44

## 2025-02-13 RX ADMIN — PIPERACILLIN SODIUM AND TAZOBACTAM SODIUM 3.38 G: 3; .375 INJECTION, SOLUTION INTRAVENOUS at 23:07

## 2025-02-13 RX ADMIN — OXYCODONE HYDROCHLORIDE 10 MG: 10 TABLET ORAL at 15:15

## 2025-02-13 RX ADMIN — PIPERACILLIN SODIUM AND TAZOBACTAM SODIUM 3.38 G: 3; .375 INJECTION, SOLUTION INTRAVENOUS at 17:49

## 2025-02-13 RX ADMIN — PETROLATUM: 420 OINTMENT TOPICAL at 08:39

## 2025-02-13 RX ADMIN — SODIUM CHLORIDE 75 ML/HR: 9 INJECTION, SOLUTION INTRAVENOUS at 08:30

## 2025-02-13 ASSESSMENT — PAIN SCALES - PAIN ASSESSMENT IN ADVANCED DEMENTIA (PAINAD)
BODYLANGUAGE: RELAXED
BREATHING: NORMAL
TOTALSCORE: 5
NEGVOCALIZATION: REPEATED TROUBLED CALLING OUT, LOUD MOANING/GROANING, CRYING
BODYLANGUAGE: RELAXED
TOTALSCORE: 7
TOTALSCORE: MEDICATION (SEE MAR)
FACIALEXPRESSION: FACIAL GRIMACING
CONSOLABILITY: UNABLE TO CONSOLE, DISTRACT OR REASSURE
BREATHING: OCCASIONAL LABORED BREATHING, SHORT PERIOD OF HYPERVENTILATION
BREATHING: NORMAL
TOTALSCORE: 0
BREATHING: NORMAL
BREATHING: NORMAL
BODYLANGUAGE: RELAXED
CONSOLABILITY: DISTRACTED OR REASSURED BY VOICE/TOUCH
FACIALEXPRESSION: SMILING OR INEXPRESSIVE
NEGVOCALIZATION: OCCASIONAL MOAN/GROAN, LOW SPEECH, NEGATIVE/DISAPPROVING QUALITY
BODYLANGUAGE: TENSE, DISTRESSED PACING, FIDGETING
TOTALSCORE: 0
TOTALSCORE: REPOSITIONED
CONSOLABILITY: NO NEED TO CONSOLE
CONSOLABILITY: NO NEED TO CONSOLE
TOTALSCORE: MEDICATION (SEE MAR)
FACIALEXPRESSION: SMILING OR INEXPRESSIVE
TOTALSCORE: 0
FACIALEXPRESSION: SAD, FRIGHTENED, FROWN
FACIALEXPRESSION: SMILING OR INEXPRESSIVE
CONSOLABILITY: NO NEED TO CONSOLE
BODYLANGUAGE: TENSE, DISTRESSED PACING, FIDGETING
TOTALSCORE: REPOSITIONED

## 2025-02-13 ASSESSMENT — PAIN - FUNCTIONAL ASSESSMENT
PAIN_FUNCTIONAL_ASSESSMENT: 0-10
PAIN_FUNCTIONAL_ASSESSMENT: UNABLE TO SELF-REPORT
PAIN_FUNCTIONAL_ASSESSMENT: PAINAD (PAIN ASSESSMENT IN ADVANCED DEMENTIA SCALE)
PAIN_FUNCTIONAL_ASSESSMENT: 0-10

## 2025-02-13 ASSESSMENT — COGNITIVE AND FUNCTIONAL STATUS - GENERAL
MOVING TO AND FROM BED TO CHAIR: TOTAL
TOILETING: TOTAL
DAILY ACTIVITIY SCORE: 6
DRESSING REGULAR LOWER BODY CLOTHING: TOTAL
DRESSING REGULAR LOWER BODY CLOTHING: TOTAL
PERSONAL GROOMING: TOTAL
WALKING IN HOSPITAL ROOM: TOTAL
STANDING UP FROM CHAIR USING ARMS: TOTAL
EATING MEALS: TOTAL
MOVING FROM LYING ON BACK TO SITTING ON SIDE OF FLAT BED WITH BEDRAILS: TOTAL
MOBILITY SCORE: 6
STANDING UP FROM CHAIR USING ARMS: TOTAL
MOVING TO AND FROM BED TO CHAIR: TOTAL
MOVING FROM LYING ON BACK TO SITTING ON SIDE OF FLAT BED WITH BEDRAILS: TOTAL
DAILY ACTIVITIY SCORE: 6
HELP NEEDED FOR BATHING: TOTAL
MOBILITY SCORE: 6
EATING MEALS: TOTAL
TURNING FROM BACK TO SIDE WHILE IN FLAT BAD: TOTAL
HELP NEEDED FOR BATHING: TOTAL
CLIMB 3 TO 5 STEPS WITH RAILING: TOTAL
DRESSING REGULAR UPPER BODY CLOTHING: TOTAL
DRESSING REGULAR UPPER BODY CLOTHING: TOTAL
WALKING IN HOSPITAL ROOM: TOTAL
TOILETING: TOTAL
TURNING FROM BACK TO SIDE WHILE IN FLAT BAD: TOTAL
PERSONAL GROOMING: TOTAL
CLIMB 3 TO 5 STEPS WITH RAILING: TOTAL

## 2025-02-13 ASSESSMENT — PAIN DESCRIPTION - ORIENTATION: ORIENTATION: MID;LOWER

## 2025-02-13 ASSESSMENT — PAIN SCALES - GENERAL
PAINLEVEL_OUTOF10: 5 - MODERATE PAIN
PAINLEVEL_OUTOF10: 0 - NO PAIN
PAINLEVEL_OUTOF10: 7
PAINLEVEL_OUTOF10: 8
PAINLEVEL_OUTOF10: 5 - MODERATE PAIN
PAINLEVEL_OUTOF10: 0 - NO PAIN

## 2025-02-13 ASSESSMENT — PAIN DESCRIPTION - LOCATION: LOCATION: BACK

## 2025-02-13 NOTE — PROGRESS NOTES
"Abdi Retana \"Matt\" is a 74 y.o. male on day 7 of admission presenting with Wound dehiscence.    Subjective   Seen this morning at bedside, bleeding at surgical site, saturated the dressing, changed by surgery. Conventional but waxing and weaning mental status. Spoke with his sister/HCP, wants Discussed plan to continue to search for new facility for Matt. Denies fever/chills, N/V/D/C, bleeding, bruising, swelling, SOB, CP, H/A or vision changes. Denies fever/chills, N/V, bleeding, swelling, CP, SOB, H/A and vision changes.       Objective     Physical Exam  Constitutional:       Appearance: Normal appearance.      Comments: pale   HENT:      Head: Normocephalic.      Mouth/Throat:      Mouth: Mucous membranes are moist.   Eyes:      Pupils: Pupils are equal, round, and reactive to light.   Cardiovascular:      Rate and Rhythm: Normal rate and regular rhythm.      Pulses: Normal pulses.      Heart sounds: Normal heart sounds.   Pulmonary:      Effort: Pulmonary effort is normal.      Breath sounds: Normal breath sounds.   Abdominal:      General: Abdomen is flat. Bowel sounds are normal.      Palpations: Abdomen is soft.   Musculoskeletal:      Cervical back: Normal range of motion and neck supple.      Comments: Drains in place with bloody output    Skin:     General: Skin is warm.   Neurological:      General: No focal deficit present.      Mental Status: He is alert.   Psychiatric:         Mood and Affect: Mood normal.         Last Recorded Vitals  Blood pressure 101/66, pulse 68, temperature 36.8 °C (98.2 °F), temperature source Temporal, resp. rate 16, height 1.88 m (6' 2\"), weight 121 kg (266 lb), SpO2 95%.  Intake/Output last 3 Shifts:  I/O last 3 completed shifts:  In: 5731.3 (47.5 mL/kg) [P.O.:200; I.V.:1012.5 (8.4 mL/kg); Blood:4218.8; IV Piggyback:300]  Out: 35 (0.3 mL/kg) [Drains:35]  Weight: 120.7 kg     Relevant Results  Scheduled medications  [Held by provider] aspirin, 81 mg, oral, Daily  atorvastatin, " 40 mg, oral, Nightly  enoxaparin, 30 mg, subcutaneous, q24h  ferrous sulfate (325 mg ferrous sulfate), 1 tablet, oral, q24h  lidocaine, 5 mL, infiltration, Once  piperacillin-tazobactam, 3.375 g, intravenous, q6h  polyethylene glycol, 17 g, oral, Daily  sennosides-docusate sodium, 2 tablet, oral, BID  [Held by provider] torsemide, 20 mg, oral, Daily  white petrolatum, , Topical, Daily      Continuous medications  sodium chloride 0.9%, 75 mL/hr, Last Rate: 75 mL/hr (02/13/25 1514)      PRN medications  PRN medications: alteplase, cyclobenzaprine, HYDROmorphone, hydrOXYzine HCL, magnesium hydroxide, ondansetron, oxyCODONE, oxyCODONE      This patient has a central line   Reason for the central line remaining today? Hemodynamic monitoring    Assessment/Plan   Assessment & Plan  Wound dehiscence    Wound dehiscence, surgical    Acute kidney injury (nontraumatic) (CMS-HCC)    Abdi Retana is a 74 year old Male PMH newly dx metastatic RCC (dx Jan 2025, has not started treatment yet), HTN, GERD, SAMIA, chronic lymphedema, venous insufficiency, OA, CKD III (BL~ 1.2), hx new acute stroke dx 1/8/25 (on BID Lovenox) and recent cord compression L4-L5 lesion (s/p L3-L5 laminectomies with L2-S1 lumbar tumor debulking 1/17 with Ortho surg) who presented to ED 2/6 after being sent from his first postop visit with Ortho for c/f lumbar wound dehiscence. Ortho consulted, s/p I&D and wound closure with ortho on 2/7. Coagulated hematoma noted by ortho in OR, wound cultures taken, prelim +gram negative bacilli. Initiated on broad spectrum antibiotics vanc + zosyn (2/7-) until would cultures finalized. ID consulted on 2/8 for antibiotics treatment and duration recommendations, rec continue vanc (2/7-2/11) + zosyn (2/7-) until final wound cultures result. Final cultures resulted 2/10 +Proteus and Morganella, ID rec Zosyn w/ tentative 8-week stop date 4/4, vancomycin discontinued (2/11). DC to SNF pending placement.      Updates 2/13:  - POD  6 I&D and wound closure with ortho, dressing saturated today, ortho changed dressing    - Intraoperative wound cultures x3, final cultures+Proteus and Morganella, ID rec Zosyn w/ tentative 8-week stop date 4/4  - Scr. elevated again to 3.71 today, started on maintenance fluids, did not get fluids yesterday, was retaining urine, bladder scanned,  Cho placed 1275 out, would explain the JAMAL which seems to be multifactorial.   - Plan PICC line tomorrow for long term IV abx pending hospice decision, OK per renal to placed PICC then subclavia line need to be pulled, keep it in because it was the only access  - Discussed with HCP Micaela (sister) risk vs benefit of continuing Lovenox for recent stroke out weight the benefit of Lovenox given active wound site bleeding and JAMAL and sister opted to stopped Lovenox in favor of DVT ppx.ASA was also stopped. Sister understand that there is the potential to develop an acute clot vs hemorrhage form continuing Lovenox and she chooses to stop Lovenox.   - DC  drain yesterday and change wound vac, wound vac should stay in for 14 days with FUV in 2 weeks for wound check   - DC pending SNF placement +/- signing with hospice. Sister requested Hospice, hospice 2 different company in touch with her at this time. Want him to have natural death, DNI/DNR, No ICU transfer.      # lumbar wound dehiscence s/p 3-L5 laminectomy and L2-S1 fusion decompression  - (1/17-1/24) 1/17 s/p spinal artery embolization with NSGY and lumbar tumor debulkin L3-L5 laminectomiues with L2-S1 PSIF with Dr. Atkins, surgery c/b acute bloos loss requiring RBCs, discharged to SNF to establish Onc and Rad/Onc plan    - 2/6 Returned for first postop visit and found to have dehisced wound, sent by Dr. Atkins to ED   - 2/6 Xray lumbar spine showed re demonstration significant pathologic destruction L4, screws for L5 appear superior to level of vertebral body poss outside of body related to shifting of hardware   - Ortho  consulted on admit, s/p I&D and wound closure with Dr. Atkins on 2/7  - Coagulated hematoma noted by ortho in OR  - Intraoperative cultures x3 collected, prelim +Proteus and Morganella  - ID consulted 2/8 for antibiotic tx and duration, rec continue vanc (2/7-2/11) + zosyn (2/7- )  - Final wound cultures +Proteus and Morganella, final ID rec Zosyn w/ tentative 8-week stop date 4/4  - c/w oxycodone 5mg PRN mod pain, 10mg PRN severe pain, and 0.4mg IVP dilaudid q3 PRN breakthrough (2/5- )  - PT / OT consulted, rec moderate intensity, POA provided with SNF choices (2/10)     # newly dx metastatic R RCC   - Plan to establish with Dr. Wynn 2/12 and Dr. Moncada 2/27 (Rad/Onc) for OP care - both emailed 2/7   - Nov 2024 Presented for fall and BLE weakness, Xray lumbar spine showing large lytic lesion L4 c/f neoplasm - was due to get outpatient bx with IR in Dec but was unable d/t facility mixing up transportation and missed appt   - 11/30 CT C/A/P showed large infiltrative R renal mass c/w RCC with mets adrenal glands, osseous lesions, pulm nodules  - 1/7 s/p s/p bx L4 lesion, 1/13 confrmed carcinoma involving dense fibroconnective tissue    - 1/16 s/p IR for tumor embolization from b/l L4 segmental arteries  - 1/17 s/p OR for lumbar tumor debulking L3-L4 and L4-L5 laminectomies with L2-S1 PSIF with Dr. Atkins   - 1/24 Seen by Rad/Onc   - Per Dr. 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 1.38 (2/9)--> 1.70 (2/11)-->2.84  - continue to hold home torsemide 2/7  - UA +leuks, WBC, on broad spectrum antibiotics as above  "  - FeNa 0.3% pre-renal   - 2/10 resumed home lovenox and ASA per ortho  - s/p 500ml LR soft bolus (2/11)   - LR@75mL X1 day (2/12)     # Altered mood  # Anxiety  - pt expressing hopelessness, anxiety while inpatient s/p wound closure stating he was \"feeling trapped\" that he cannot move around/ambulate like he wants  - start Hydroxyzine 25mg Q8 PRN for anxiety (2/10)  - ordered Music, Pet and Art therapy (2/10)     # hx small acute stroke  - 1/8/25 MRI Brain showed c/f small acute stroke in R parietal lobe  - Neuro consulted, rec Lovenox 1mg/kg q12h and outpatient follow up   - held home Lovenox I/s/o surgery 2/7, resumed 2/10 per ortho      # hx recent proteus mirabilis complicated UTI and bacteremia   - 1/5 developed UTI with + Proteus bacteremia s/p Cefepime and Augmentin PO completed 1/23   - Seen by ID inpatient during previous admission   - UA (2/6) + leuks, WBC, casts - no reports of dysuria, flank pain, fever/chills  - urine culture (2/6) negative     # chronic venous insufficiency  # chronic lymphedema   - Sees podiatry at [facility]  - Wound care consulted on admit, recs appreciated  - held home torsemide 20mg daily   - ASA restarted 2/10      # HTN/HLD  - c/w home lipitor 40mg daily      # GERD  - c/w home pepcid daily      # dispo  - Full code, confirmed on admit   - DC new SNF pending placement   - NOK: Micaela, sister, [phone]   - FUV 2/11 Urology, 2/26 Dr. Wynn,  2/27 w/ Ortho Surg ([facility]), 2/27 w/ Rad Onc, 4/4 w/ ID    I spent 60 minutes in the professional and overall care of this patient.    Assessment and plan as above discussed with attending physician, Dr. Peraza.     Richi Ruiz PA-C  "

## 2025-02-13 NOTE — CARE PLAN
The patient's goals for the shift include      The clinical goals for the shift include pt will remain HDS and VSS through end of shift 2/13/25 @ 1900      Problem: Pain - Adult  Goal: Verbalizes/displays adequate comfort level or baseline comfort level  Outcome: Progressing     Problem: Safety - Adult  Goal: Free from fall injury  Outcome: Progressing     Problem: Discharge Planning  Goal: Discharge to home or other facility with appropriate resources  Outcome: Progressing     Problem: Chronic Conditions and Co-morbidities  Goal: Patient's chronic conditions and co-morbidity symptoms are monitored and maintained or improved  Outcome: Progressing     Problem: Skin  Goal: Decreased wound size/increased tissue granulation at next dressing change  Flowsheets (Taken 2/13/2025 1708)  Decreased wound size/increased tissue granulation at next dressing change:   Promote sleep for wound healing   Protective dressings over bony prominences  Goal: Participates in plan/prevention/treatment measures  Flowsheets (Taken 2/13/2025 1708)  Participates in plan/prevention/treatment measures: Discuss with provider PT/OT consult  Goal: Prevent/manage excess moisture  Flowsheets (Taken 2/13/2025 1708)  Prevent/manage excess moisture:   Cleanse incontinence/protect with barrier cream   Follow provider orders for dressing changes

## 2025-02-13 NOTE — PROGRESS NOTES
"Art Therapy Note    Abdi \"Ray\" Mazin     Therapy Session  Referral Type: New referral this admission  Visit Type: New visit  Session Start Time: 1429  Session End Time: 1433  Intervention Delivery: In-person  Conflict of Service: None  Number of family members present: 2  Family Present for Session: Sibling(s)              Treatment/Interventions  Art Therapy Interventions: Assessment, Education/instruction  Interruption: No  Patient Fell Asleep at End of Session: No    Post-assessment  Total Session Time (min): 4 minutes    Narrative  Assessment Detail: ATR, AT intern and shadowing med student made a visit to Pt.'s room to follow up on a referral made, introduce and assess for AT needs and wants.  Pt lyign in bed resting visiting with his brother and sister.  Pt.'s sibliings welcomed the visit.  Evaluation: ATR provided introductions and shared with Pt and siblings that a referral was made and provided the benefits of AT services while in the hospital.  Pt alert but seemed to have delayed cognitive processing.  Siblings, espceially his sister was very forth coming with Pt.s background as a  and enjoys building things with his hands but has never been into art making.  Pt listened and nodded in agreement, he wanted some time to think about services and is open to another to session to revisit services.  Follow-up: ATR will follow up later in the week with Pt to see if he has made any decisions about wanting AT services.    Education Documentation  No documentation found.      "

## 2025-02-13 NOTE — CARE PLAN
The patient's goals for the shift include      The clinical goals for the shift include patient to remain VSS, HDS, and free from falls and injury      Problem: Pain - Adult  Goal: Verbalizes/displays adequate comfort level or baseline comfort level  Outcome: Progressing     Problem: Safety - Adult  Goal: Free from fall injury  Outcome: Progressing     Problem: Discharge Planning  Goal: Discharge to home or other facility with appropriate resources  Outcome: Progressing     Problem: Chronic Conditions and Co-morbidities  Goal: Patient's chronic conditions and co-morbidity symptoms are monitored and maintained or improved  Outcome: Progressing     Problem: Nutrition  Goal: Nutrient intake appropriate for maintaining nutritional needs  Outcome: Progressing     Problem: Pain  Goal: Takes deep breaths with improved pain control throughout the shift  Outcome: Progressing  Goal: Turns in bed with improved pain control throughout the shift  Outcome: Progressing  Goal: Walks with improved pain control throughout the shift  Outcome: Progressing  Goal: Performs ADL's with improved pain control throughout shift  Outcome: Progressing  Goal: Participates in PT with improved pain control throughout the shift  Outcome: Progressing  Goal: Free from opioid side effects throughout the shift  Outcome: Progressing  Goal: Free from acute confusion related to pain meds throughout the shift  Outcome: Progressing     Problem: Skin  Goal: Decreased wound size/increased tissue granulation at next dressing change  Outcome: Progressing  Goal: Participates in plan/prevention/treatment measures  Outcome: Progressing  Goal: Prevent/manage excess moisture  Outcome: Progressing  Flowsheets (Taken 2/13/2025 0229)  Prevent/manage excess moisture: Monitor for/manage infection if present  Goal: Prevent/minimize sheer/friction injuries  Outcome: Progressing  Goal: Promote/optimize nutrition  Outcome: Progressing  Goal: Promote skin healing  Outcome:  Progressing

## 2025-02-14 VITALS
DIASTOLIC BLOOD PRESSURE: 70 MMHG | RESPIRATION RATE: 18 BRPM | SYSTOLIC BLOOD PRESSURE: 135 MMHG | HEIGHT: 74 IN | OXYGEN SATURATION: 95 % | BODY MASS INDEX: 34.14 KG/M2 | WEIGHT: 266 LBS | TEMPERATURE: 97.9 F | HEART RATE: 89 BPM

## 2025-02-14 LAB
ABO GROUP (TYPE) IN BLOOD: NORMAL
ALBUMIN SERPL BCP-MCNC: 2.3 G/DL (ref 3.4–5)
ALP SERPL-CCNC: 54 U/L (ref 33–136)
ALT SERPL W P-5'-P-CCNC: 6 U/L (ref 10–52)
ANION GAP SERPL CALC-SCNC: 17 MMOL/L (ref 10–20)
ANTIBODY SCREEN: NORMAL
AST SERPL W P-5'-P-CCNC: 25 U/L (ref 9–39)
BACTERIA UR CULT: NO GROWTH
BASOPHILS # BLD AUTO: 0.12 X10*3/UL (ref 0–0.1)
BASOPHILS NFR BLD AUTO: 0.8 %
BILIRUB SERPL-MCNC: 0.6 MG/DL (ref 0–1.2)
BLOOD EXPIRATION DATE: NORMAL
BUN SERPL-MCNC: 45 MG/DL (ref 6–23)
CALCIUM SERPL-MCNC: 7.7 MG/DL (ref 8.6–10.6)
CHLORIDE SERPL-SCNC: 107 MMOL/L (ref 98–107)
CO2 SERPL-SCNC: 23 MMOL/L (ref 21–32)
CREAT SERPL-MCNC: 3.16 MG/DL (ref 0.5–1.3)
DISPENSE STATUS: NORMAL
EGFRCR SERPLBLD CKD-EPI 2021: 20 ML/MIN/1.73M*2
EOSINOPHIL # BLD AUTO: 0.28 X10*3/UL (ref 0–0.4)
EOSINOPHIL NFR BLD AUTO: 1.9 %
ERYTHROCYTE [DISTWIDTH] IN BLOOD BY AUTOMATED COUNT: 19.1 % (ref 11.5–14.5)
GLUCOSE BLD MANUAL STRIP-MCNC: 74 MG/DL (ref 74–99)
GLUCOSE SERPL-MCNC: 68 MG/DL (ref 74–99)
HCT VFR BLD AUTO: 22.7 % (ref 41–52)
HGB BLD-MCNC: 6.5 G/DL (ref 13.5–17.5)
HOLD SPECIMEN: NORMAL
IMM GRANULOCYTES # BLD AUTO: 0.11 X10*3/UL (ref 0–0.5)
IMM GRANULOCYTES NFR BLD AUTO: 0.8 % (ref 0–0.9)
LYMPHOCYTES # BLD AUTO: 1.09 X10*3/UL (ref 0.8–3)
LYMPHOCYTES NFR BLD AUTO: 7.5 %
MAGNESIUM SERPL-MCNC: 2.58 MG/DL (ref 1.6–2.4)
MCH RBC QN AUTO: 26.6 PG (ref 26–34)
MCHC RBC AUTO-ENTMCNC: 28.6 G/DL (ref 32–36)
MCV RBC AUTO: 93 FL (ref 80–100)
MONOCYTES # BLD AUTO: 0.72 X10*3/UL (ref 0.05–0.8)
MONOCYTES NFR BLD AUTO: 5 %
NEUTROPHILS # BLD AUTO: 12.17 X10*3/UL (ref 1.6–5.5)
NEUTROPHILS NFR BLD AUTO: 84 %
NRBC BLD-RTO: 0 /100 WBCS (ref 0–0)
PLATELET # BLD AUTO: 377 X10*3/UL (ref 150–450)
POTASSIUM SERPL-SCNC: 3.4 MMOL/L (ref 3.5–5.3)
PRODUCT BLOOD TYPE: 6200
PRODUCT CODE: NORMAL
PROT SERPL-MCNC: 5.4 G/DL (ref 6.4–8.2)
RBC # BLD AUTO: 2.44 X10*6/UL (ref 4.5–5.9)
RH FACTOR (ANTIGEN D): NORMAL
SODIUM SERPL-SCNC: 144 MMOL/L (ref 136–145)
UNIT ABO: NORMAL
UNIT NUMBER: NORMAL
UNIT RH: NORMAL
UNIT VOLUME: 350
WBC # BLD AUTO: 14.5 X10*3/UL (ref 4.4–11.3)
XM INTEP: NORMAL

## 2025-02-14 PROCEDURE — 1170000001 HC PRIVATE ONCOLOGY ROOM DAILY

## 2025-02-14 PROCEDURE — 80053 COMPREHEN METABOLIC PANEL: CPT

## 2025-02-14 PROCEDURE — 85025 COMPLETE CBC W/AUTO DIFF WBC: CPT

## 2025-02-14 PROCEDURE — 2500000004 HC RX 250 GENERAL PHARMACY W/ HCPCS (ALT 636 FOR OP/ED)

## 2025-02-14 PROCEDURE — P9040 RBC LEUKOREDUCED IRRADIATED: HCPCS

## 2025-02-14 PROCEDURE — 2500000002 HC RX 250 W HCPCS SELF ADMINISTERED DRUGS (ALT 637 FOR MEDICARE OP, ALT 636 FOR OP/ED)

## 2025-02-14 PROCEDURE — 2500000001 HC RX 250 WO HCPCS SELF ADMINISTERED DRUGS (ALT 637 FOR MEDICARE OP)

## 2025-02-14 PROCEDURE — 2500000004 HC RX 250 GENERAL PHARMACY W/ HCPCS (ALT 636 FOR OP/ED): Mod: TB

## 2025-02-14 PROCEDURE — 36430 TRANSFUSION BLD/BLD COMPNT: CPT

## 2025-02-14 PROCEDURE — 83735 ASSAY OF MAGNESIUM: CPT

## 2025-02-14 PROCEDURE — 99233 SBSQ HOSP IP/OBS HIGH 50: CPT

## 2025-02-14 PROCEDURE — 82947 ASSAY GLUCOSE BLOOD QUANT: CPT

## 2025-02-14 RX ORDER — HYDROMORPHONE HCL/0.9% NACL/PF 15 MG/30ML
PATIENT CONTROLLED ANALGESIA SYRINGE INTRAVENOUS CONTINUOUS
Status: DISCONTINUED | OUTPATIENT
Start: 2025-02-14 | End: 2025-02-15

## 2025-02-14 RX ORDER — LORAZEPAM 2 MG/ML
0.5 INJECTION INTRAMUSCULAR EVERY 4 HOURS PRN
Status: DISCONTINUED | OUTPATIENT
Start: 2025-02-14 | End: 2025-02-15 | Stop reason: HOSPADM

## 2025-02-14 RX ORDER — ONDANSETRON HYDROCHLORIDE 2 MG/ML
4 INJECTION, SOLUTION INTRAVENOUS EVERY 6 HOURS PRN
Status: DISCONTINUED | OUTPATIENT
Start: 2025-02-14 | End: 2025-02-15 | Stop reason: HOSPADM

## 2025-02-14 RX ORDER — POTASSIUM CHLORIDE 14.9 MG/ML
20 INJECTION INTRAVENOUS ONCE
Status: COMPLETED | OUTPATIENT
Start: 2025-02-14 | End: 2025-02-14

## 2025-02-14 RX ORDER — HYDROMORPHONE HYDROCHLORIDE 1 MG/ML
0.4 INJECTION, SOLUTION INTRAMUSCULAR; INTRAVENOUS; SUBCUTANEOUS
Status: DISCONTINUED | OUTPATIENT
Start: 2025-02-14 | End: 2025-02-15 | Stop reason: HOSPADM

## 2025-02-14 RX ORDER — HALOPERIDOL 2 MG/ML
1 SOLUTION ORAL EVERY 4 HOURS PRN
Status: DISCONTINUED | OUTPATIENT
Start: 2025-02-14 | End: 2025-02-15 | Stop reason: HOSPADM

## 2025-02-14 RX ORDER — GLYCOPYRROLATE 0.2 MG/ML
0.2 INJECTION INTRAMUSCULAR; INTRAVENOUS EVERY 4 HOURS PRN
Status: DISCONTINUED | OUTPATIENT
Start: 2025-02-14 | End: 2025-02-15 | Stop reason: HOSPADM

## 2025-02-14 RX ORDER — HALOPERIDOL 2 MG/ML
1 SOLUTION ORAL EVERY 8 HOURS PRN
Status: DISCONTINUED | OUTPATIENT
Start: 2025-02-14 | End: 2025-02-14

## 2025-02-14 RX ORDER — LORAZEPAM 0.5 MG/1
0.5 TABLET ORAL EVERY 4 HOURS PRN
Status: DISCONTINUED | OUTPATIENT
Start: 2025-02-14 | End: 2025-02-14

## 2025-02-14 RX ORDER — LOPERAMIDE HCL 1MG/7.5ML
2 LIQUID (ML) ORAL 4 TIMES DAILY PRN
Status: DISCONTINUED | OUTPATIENT
Start: 2025-02-14 | End: 2025-02-15 | Stop reason: HOSPADM

## 2025-02-14 RX ORDER — POTASSIUM CHLORIDE 20 MEQ/1
20 TABLET, EXTENDED RELEASE ORAL ONCE
Status: COMPLETED | OUTPATIENT
Start: 2025-02-14 | End: 2025-02-14

## 2025-02-14 RX ORDER — HYOSCYAMINE SULFATE 0.12 MG/1
0.12 TABLET, ORALLY DISINTEGRATING ORAL EVERY 4 HOURS PRN
Status: DISCONTINUED | OUTPATIENT
Start: 2025-02-14 | End: 2025-02-15 | Stop reason: HOSPADM

## 2025-02-14 RX ORDER — NALOXONE HYDROCHLORIDE 0.4 MG/ML
0.2 INJECTION, SOLUTION INTRAMUSCULAR; INTRAVENOUS; SUBCUTANEOUS AS NEEDED
Status: DISCONTINUED | OUTPATIENT
Start: 2025-02-14 | End: 2025-02-15

## 2025-02-14 RX ORDER — SODIUM CHLORIDE 9 MG/ML
75 INJECTION, SOLUTION INTRAVENOUS CONTINUOUS
Status: DISCONTINUED | OUTPATIENT
Start: 2025-02-14 | End: 2025-02-15 | Stop reason: HOSPADM

## 2025-02-14 RX ADMIN — HYDROXYZINE HYDROCHLORIDE 25 MG: 25 TABLET, FILM COATED ORAL at 17:33

## 2025-02-14 RX ADMIN — POTASSIUM CHLORIDE 20 MEQ: 14.9 INJECTION, SOLUTION INTRAVENOUS at 05:24

## 2025-02-14 RX ADMIN — POTASSIUM CHLORIDE 20 MEQ: 1500 TABLET, EXTENDED RELEASE ORAL at 05:24

## 2025-02-14 RX ADMIN — Medication: at 12:09

## 2025-02-14 RX ADMIN — HYDROMORPHONE HYDROCHLORIDE 0.4 MG: 1 INJECTION, SOLUTION INTRAMUSCULAR; INTRAVENOUS; SUBCUTANEOUS at 13:29

## 2025-02-14 RX ADMIN — PETROLATUM: 420 OINTMENT TOPICAL at 08:16

## 2025-02-14 RX ADMIN — SODIUM CHLORIDE 75 ML/HR: 9 INJECTION, SOLUTION INTRAVENOUS at 23:29

## 2025-02-14 RX ADMIN — ATORVASTATIN CALCIUM 40 MG: 40 TABLET, FILM COATED ORAL at 20:25

## 2025-02-14 RX ADMIN — PIPERACILLIN SODIUM AND TAZOBACTAM SODIUM 3.38 G: 3; .375 INJECTION, SOLUTION INTRAVENOUS at 04:33

## 2025-02-14 RX ADMIN — LOPERAMIDE HYDROCHLORIDE 2 MG: 2 SOLUTION ORAL at 17:33

## 2025-02-14 RX ADMIN — HYDROMORPHONE HYDROCHLORIDE 0.4 MG: 1 INJECTION, SOLUTION INTRAMUSCULAR; INTRAVENOUS; SUBCUTANEOUS at 18:21

## 2025-02-14 RX ADMIN — PIPERACILLIN SODIUM AND TAZOBACTAM SODIUM 3.38 G: 3; .375 INJECTION, SOLUTION INTRAVENOUS at 23:29

## 2025-02-14 RX ADMIN — PIPERACILLIN SODIUM AND TAZOBACTAM SODIUM 3.38 G: 3; .375 INJECTION, SOLUTION INTRAVENOUS at 10:41

## 2025-02-14 RX ADMIN — OXYCODONE HYDROCHLORIDE 10 MG: 10 TABLET ORAL at 03:47

## 2025-02-14 RX ADMIN — SODIUM CHLORIDE 75 ML/HR: 9 INJECTION, SOLUTION INTRAVENOUS at 08:34

## 2025-02-14 RX ADMIN — HYDROMORPHONE HYDROCHLORIDE 0.4 MG: 0.5 INJECTION, SOLUTION INTRAMUSCULAR; INTRAVENOUS; SUBCUTANEOUS at 10:23

## 2025-02-14 RX ADMIN — SODIUM CHLORIDE 75 ML/HR: 9 INJECTION, SOLUTION INTRAVENOUS at 12:09

## 2025-02-14 RX ADMIN — FERROUS SULFATE TAB 325 MG (65 MG ELEMENTAL FE) 325 MG: 325 (65 FE) TAB at 08:16

## 2025-02-14 RX ADMIN — PIPERACILLIN SODIUM AND TAZOBACTAM SODIUM 3.38 G: 3; .375 INJECTION, SOLUTION INTRAVENOUS at 17:33

## 2025-02-14 RX ADMIN — OXYCODONE 5 MG: 5 TABLET ORAL at 08:16

## 2025-02-14 ASSESSMENT — PAIN SCALES - PAIN ASSESSMENT IN ADVANCED DEMENTIA (PAINAD)
TOTALSCORE: 2
BREATHING: OCCASIONAL LABORED BREATHING, SHORT PERIOD OF HYPERVENTILATION
FACIALEXPRESSION: FACIAL GRIMACING
BREATHING: OCCASIONAL LABORED BREATHING, SHORT PERIOD OF HYPERVENTILATION
BODYLANGUAGE: RELAXED
FACIALEXPRESSION: SMILING OR INEXPRESSIVE
TOTALSCORE: 9
BREATHING: OCCASIONAL LABORED BREATHING, SHORT PERIOD OF HYPERVENTILATION
TOTALSCORE: 0
CONSOLABILITY: UNABLE TO CONSOLE, DISTRACT OR REASSURE
BODYLANGUAGE: RELAXED
CONSOLABILITY: UNABLE TO CONSOLE, DISTRACT OR REASSURE
CONSOLABILITY: NO NEED TO CONSOLE
CONSOLABILITY: NO NEED TO CONSOLE
BREATHING: NORMAL
FACIALEXPRESSION: SMILING OR INEXPRESSIVE
BREATHING: NORMAL
FACIALEXPRESSION: SMILING OR INEXPRESSIVE
TOTALSCORE: 8
NEGVOCALIZATION: REPEATED TROUBLED CALLING OUT, LOUD MOANING/GROANING, CRYING
NEGVOCALIZATION: REPEATED TROUBLED CALLING OUT, LOUD MOANING/GROANING, CRYING
BODYLANGUAGE: TENSE, DISTRESSED PACING, FIDGETING
FACIALEXPRESSION: FACIAL GRIMACING
TOTALSCORE: 0
BODYLANGUAGE: RIGID, FISTS CLENCHED, KNEES UP, PUSHING/PULLING AWAY, STRIKES OUT
CONSOLABILITY: NO NEED TO CONSOLE
BODYLANGUAGE: TENSE, DISTRESSED PACING, FIDGETING
TOTALSCORE: MEDICATION (SEE MAR)

## 2025-02-14 ASSESSMENT — PAIN DESCRIPTION - LOCATION
LOCATION: BACK
LOCATION: BACK

## 2025-02-14 ASSESSMENT — COGNITIVE AND FUNCTIONAL STATUS - GENERAL
MOBILITY SCORE: 6
DRESSING REGULAR UPPER BODY CLOTHING: TOTAL
MOVING FROM LYING ON BACK TO SITTING ON SIDE OF FLAT BED WITH BEDRAILS: TOTAL
HELP NEEDED FOR BATHING: TOTAL
MOVING TO AND FROM BED TO CHAIR: TOTAL
PERSONAL GROOMING: TOTAL
EATING MEALS: TOTAL
DAILY ACTIVITIY SCORE: 6
MOVING TO AND FROM BED TO CHAIR: TOTAL
TOILETING: TOTAL
HELP NEEDED FOR BATHING: TOTAL
TOILETING: TOTAL
DRESSING REGULAR LOWER BODY CLOTHING: TOTAL
WALKING IN HOSPITAL ROOM: TOTAL
STANDING UP FROM CHAIR USING ARMS: TOTAL
DRESSING REGULAR UPPER BODY CLOTHING: TOTAL
CLIMB 3 TO 5 STEPS WITH RAILING: TOTAL
DRESSING REGULAR LOWER BODY CLOTHING: TOTAL
CLIMB 3 TO 5 STEPS WITH RAILING: TOTAL
PERSONAL GROOMING: TOTAL
DAILY ACTIVITIY SCORE: 6
MOBILITY SCORE: 6
TURNING FROM BACK TO SIDE WHILE IN FLAT BAD: TOTAL
EATING MEALS: TOTAL
STANDING UP FROM CHAIR USING ARMS: TOTAL
MOVING FROM LYING ON BACK TO SITTING ON SIDE OF FLAT BED WITH BEDRAILS: TOTAL
WALKING IN HOSPITAL ROOM: TOTAL
TURNING FROM BACK TO SIDE WHILE IN FLAT BAD: TOTAL

## 2025-02-14 ASSESSMENT — PAIN DESCRIPTION - ORIENTATION: ORIENTATION: MID;LOWER

## 2025-02-14 ASSESSMENT — PAIN SCALES - GENERAL
PAINLEVEL_OUTOF10: 10 - WORST POSSIBLE PAIN
PAINLEVEL_OUTOF10: 6
PAINLEVEL_OUTOF10: 10 - WORST POSSIBLE PAIN
PAINLEVEL_OUTOF10: 9
PAINLEVEL_OUTOF10: 10 - WORST POSSIBLE PAIN

## 2025-02-14 ASSESSMENT — PAIN - FUNCTIONAL ASSESSMENT
PAIN_FUNCTIONAL_ASSESSMENT: 0-10
PAIN_FUNCTIONAL_ASSESSMENT: PAINAD (PAIN ASSESSMENT IN ADVANCED DEMENTIA SCALE)
PAIN_FUNCTIONAL_ASSESSMENT: UNABLE TO SELF-REPORT
PAIN_FUNCTIONAL_ASSESSMENT: UNABLE TO SELF-REPORT
PAIN_FUNCTIONAL_ASSESSMENT: 0-10
PAIN_FUNCTIONAL_ASSESSMENT: 0-10
PAIN_FUNCTIONAL_ASSESSMENT: PAINAD (PAIN ASSESSMENT IN ADVANCED DEMENTIA SCALE)

## 2025-02-14 NOTE — CARE PLAN
The patient's goals for the shift include  manage pain    The clinical goals for the shift include monitoring for active s/s of bleeding. Pt will remain HDS, VSS through end of shift 2/14/25 @ 0700      Problem: Skin  Goal: Decreased wound size/increased tissue granulation at next dressing change  Flowsheets (Taken 2/13/2025 2318)  Decreased wound size/increased tissue granulation at next dressing change:   Promote sleep for wound healing   Protective dressings over bony prominences   Utilize specialty bed per algorithm  Goal: Participates in plan/prevention/treatment measures  Flowsheets (Taken 2/13/2025 2318)  Participates in plan/prevention/treatment measures:   Discuss with provider PT/OT consult   Elevate heels   Increase activity/out of bed for meals  Goal: Prevent/manage excess moisture  Flowsheets (Taken 2/13/2025 2318)  Prevent/manage excess moisture:   Cleanse incontinence/protect with barrier cream   Follow provider orders for dressing changes   Moisturize dry skin   Monitor for/manage infection if present   Use wicking fabric (obtain order)  Goal: Prevent/minimize sheer/friction injuries  Flowsheets (Taken 2/13/2025 2318)  Prevent/minimize sheer/friction injuries:   Complete micro-shifts as needed if patient unable. Adjust patient position to relieve pressure points, not a full turn   HOB 30 degrees or less   Increase activity/out of bed for meals   Turn/reposition every 2 hours/use positioning/transfer devices   Use pull sheet   Utilize specialty bed per algorithm  Goal: Promote/optimize nutrition  Flowsheets (Taken 2/13/2025 2318)  Promote/optimize nutrition:   Assist with feeding   Consume > 50% meals/supplements   Discuss with provider if NPO > 2 days   Monitor/record intake including meals   Offer water/supplements/favorite foods   Reassess MST if dietician not consulted  Goal: Promote skin healing  Flowsheets (Taken 2/13/2025 2318)  Promote skin healing:   Assess skin/pad under line(s)/device(s)    Ensure correct size (line/device) and apply per  instructions   Protective dressings over bony prominences   Rotate device position/do not position patient on device   Turn/reposition every 2 hours/use positioning/transfer devices

## 2025-02-14 NOTE — CARE PLAN
The patient's goals for the shift include      The clinical goals for the shift include pt will remain HDS and VSS through end of shift 2/14/25 @ 1900      Problem: Pain - Adult  Goal: Verbalizes/displays adequate comfort level or baseline comfort level  Outcome: Progressing     Problem: Safety - Adult  Goal: Free from fall injury  Outcome: Progressing     Problem: Discharge Planning  Goal: Discharge to home or other facility with appropriate resources  Outcome: Progressing     Problem: Chronic Conditions and Co-morbidities  Goal: Patient's chronic conditions and co-morbidity symptoms are monitored and maintained or improved  Outcome: Progressing     Problem: Pain  Goal: Takes deep breaths with improved pain control throughout the shift  Outcome: Progressing  Goal: Turns in bed with improved pain control throughout the shift  Outcome: Progressing     Problem: Skin  Goal: Decreased wound size/increased tissue granulation at next dressing change  Flowsheets (Taken 2/14/2025 1842)  Decreased wound size/increased tissue granulation at next dressing change: Protective dressings over bony prominences  Goal: Participates in plan/prevention/treatment measures  Flowsheets (Taken 2/14/2025 1842)  Participates in plan/prevention/treatment measures: Increase activity/out of bed for meals

## 2025-02-14 NOTE — PROGRESS NOTES
"Abdi Retana \"Matt\" is a 74 y.o. male on day 8 of admission presenting with Wound dehiscence.    Subjective   Seen this morning at bedside, blood noted in hendrickson catheter. Pt AxO x1-2, unable to obtain ROS due to AMS.     Had discussion with sister (BAO Hinojosa) over the phone, updated her on overall clinical status of Matt. Micaela and brothers all agree that they do not want any further interventions for Matt and would like to focus on comfort. We discussed code status and Micaela would like to change code status to DNR-comfort measures only. Would like to speak to hospice today to sign.     Objective     Physical Exam  Constitutional:       Appearance: Normal appearance.      Comments: pale   HENT:      Head: Normocephalic.      Mouth/Throat:      Mouth: Mucous membranes are moist.   Eyes:      Pupils: Pupils are equal, round, and reactive to light.   Cardiovascular:      Rate and Rhythm: Normal rate and regular rhythm.      Pulses: Normal pulses.      Heart sounds: Normal heart sounds.   Pulmonary:      Effort: Pulmonary effort is normal.      Breath sounds: Normal breath sounds.   Abdominal:      General: Abdomen is flat. Bowel sounds are normal.      Palpations: Abdomen is soft.   Musculoskeletal:      Cervical back: Normal range of motion and neck supple.      Comments: Drains in place with bloody output    Skin:     General: Skin is warm.   Neurological:      General: No focal deficit present.      Mental Status: He is alert.   Psychiatric:         Mood and Affect: Mood normal.         Last Recorded Vitals  Blood pressure 132/66, pulse 88, temperature 36 °C (96.8 °F), temperature source Temporal, resp. rate 18, height 1.88 m (6' 2\"), weight 121 kg (266 lb), SpO2 95%.  Intake/Output last 3 Shifts:  I/O last 3 completed shifts:  In: 7371.2 (61.1 mL/kg) [P.O.:50; I.V.:2786.2 (23.1 mL/kg); Blood:4535]  Out: 1700 (14.1 mL/kg) [Urine:1700 (0.4 mL/kg/hr)]  Weight: 120.7 kg     Relevant Results  Scheduled " medications  [Held by provider] aspirin, 81 mg, oral, Daily  atorvastatin, 40 mg, oral, Nightly  ferrous sulfate (325 mg ferrous sulfate), 1 tablet, oral, q24h  [Held by provider] heparin (porcine), 5,000 Units, subcutaneous, q8h  lidocaine, 5 mL, infiltration, Once  piperacillin-tazobactam, 3.375 g, intravenous, q6h  polyethylene glycol, 17 g, oral, Daily  sennosides-docusate sodium, 2 tablet, oral, BID  [Held by provider] torsemide, 20 mg, oral, Daily  white petrolatum, , Topical, Daily      Continuous medications  sodium chloride 0.9%, 75 mL/hr, Last Rate: 75 mL/hr (02/14/25 1001)      PRN medications  PRN medications: alteplase, cyclobenzaprine, haloperidol, HYDROmorphone, hydrOXYzine HCL, hyoscyamine, LORazepam, magnesium hydroxide, ondansetron, oxyCODONE, oxyCODONE      This patient has a central line   Reason for the central line remaining today? Hemodynamic monitoring    Assessment/Plan   Assessment & Plan  Wound dehiscence    Wound dehiscence, surgical    Acute kidney injury (nontraumatic) (CMS-HCC)    Abdi Retana is a 74 year old Male PMH newly dx metastatic RCC (dx Jan 2025, has not started treatment yet), HTN, GERD, SAMIA, chronic lymphedema, venous insufficiency, OA, CKD III (BL~ 1.2), hx new acute stroke dx 1/8/25 (on BID Lovenox) and recent cord compression L4-L5 lesion (s/p L3-L5 laminectomies with L2-S1 lumbar tumor debulking 1/17 with Ortho surg) who presented to ED 2/6 after being sent from his first postop visit with Ortho for c/f lumbar wound dehiscence. Ortho consulted, s/p I&D and wound closure with ortho on 2/7. Coagulated hematoma noted by ortho in OR, wound cultures taken, prelim +gram negative bacilli. Initiated on broad spectrum antibiotics vanc + zosyn (2/7-) until would cultures finalized. ID consulted on 2/8 for antibiotics treatment and duration recommendations, rec continue vanc (2/7-2/11) + zosyn (2/7-) until final wound cultures result. Final cultures resulted 2/10 +Proteus and  Elidia, ID rec Zosyn w/ tentative 8-week stop date 4/4, vancomycin discontinued (2/11). Overall clinical status discussed with sister (POA) and brothers, decided to focus on comfort, would like to change code status to DNR-comfort measures only and would like to sign with hospice.     Updates 2/14:  - Overall clinical status discussed with sister (POA) and brothers, decided to pursue comfort measure only  - Code status changed to DNR-comfort measure only  - End of life/supportive orders placed  - [facility] to be notified, sister would like Ray to be discharged to a SNF.  - Plan to keep IV abx until family signs with hospice    # DNR-comfort measures only  - Overall clinical status discussed with sister (POA) and brothers, decided to pursue comfort measure only (2/14)  - Code status changed to DNR-comfort measure only by POA (2/14)  - End of life/supportive orders placed (2/14)  - start IV haldol 1mg q4h PRN for agitation, dPCA with basal rate 0.2mg/hr with nursing bolus dose 0.4mg q1h PRN if RDOS > or equal to 3, robinul 0.2mg q4h PRN for secretions, ativan 0.5mg q4h PRN for anxiety/agitation  - [facility] to be notified, sister would like Ray to be discharged to a SNF  - Plan to keep IV abx until family signs with hospice  - benefit/ risk conversation had with sister about therapeutic heparin, sister understands the risk and would like to stop all anticoagulation therapy     # lumbar wound dehiscence s/p 3-L5 laminectomy and L2-S1 fusion decompression  - (1/17-1/24) 1/17 s/p spinal artery embolization with NSGY and lumbar tumor debulkin L3-L5 laminectomiues with L2-S1 PSIF with Dr. Atkins, surgery c/b acute bloos loss requiring RBCs, discharged to SNF to establish Onc and Rad/Onc plan    - 2/6 Returned for first postop visit and found to have dehisced wound, sent by Dr. Atkins to ED   - 2/6 Xray lumbar spine showed re demonstration significant pathologic destruction L4, screws for  L5 appear superior to level of vertebral body poss outside of body related to shifting of hardware   - Ortho consulted on admit, s/p I&D and wound closure with Dr. Atkins on 2/7  - Coagulated hematoma noted by ortho in OR  - Intraoperative cultures x3 collected, prelim +Proteus and Morganella  - ID consulted 2/8 for antibiotic tx and duration, rec continue vanc (2/7-2/11) + zosyn (2/7- )  - Final wound cultures +Proteus and Morganella, final ID rec Zosyn w/ tentative 8-week stop date 4/4  - PT / OT consulted, rec moderate intensity, POA provided with SNF choices (2/10)     # newly dx metastatic R RCC   - Plan to establish with Dr. Wynn 2/12 and Dr. Moncada 2/27 (Rad/Onc) for OP care - both emailed 2/7   - Nov 2024 Presented for fall and BLE weakness, Xray lumbar spine showing large lytic lesion L4 c/f neoplasm - was due to get outpatient bx with IR in Dec but was unable d/t facility mixing up transportation and missed appt   - 11/30 CT C/A/P showed large infiltrative R renal mass c/w RCC with mets adrenal glands, osseous lesions, pulm nodules  - 1/7 s/p s/p bx L4 lesion, 1/13 confrmed carcinoma involving dense fibroconnective tissue    - 1/16 s/p IR for tumor embolization from b/l L4 segmental arteries  - 1/17 s/p OR for lumbar tumor debulking L3-L4 and L4-L5 laminectomies with L2-S1 PSIF with Dr. Atkins   - 1/24 Seen by Rad/Onc   - Per Dr. 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 1.38 (2/9)--> 1.70 (2/11)-->2.84  - continue to hold home torsemide 2/7  - UA +leuks, WBC, on broad spectrum antibiotics as above  "  - FeNa 0.3% pre-renal   - 2/10 resumed home lovenox and ASA per ortho  - s/p 500ml LR soft bolus (2/11)   - LR@75mL X1 day (2/12)     # Altered mood  # Anxiety  - pt expressing hopelessness, anxiety while inpatient s/p wound closure stating he was \"feeling trapped\" that he cannot move around/ambulate like he wants  - start Hydroxyzine 25mg Q8 PRN for anxiety (2/10)  - ordered Music, Pet and Art therapy (2/10)     # hx small acute stroke  - 1/8/25 MRI Brain showed c/f small acute stroke in R parietal lobe  - Neuro consulted, rec Lovenox 1mg/kg q12h and outpatient follow up   - held home Lovenox I/s/o surgery 2/7, resumed 2/10 per ortho  - all AC stopped in setting of comfort care measures only      # hx recent proteus mirabilis complicated UTI and bacteremia   - 1/5 developed UTI with + Proteus bacteremia s/p Cefepime and Augmentin PO completed 1/23   - Seen by ID inpatient during previous admission   - UA (2/6) + leuks, WBC, casts - no reports of dysuria, flank pain, fever/chills  - urine culture (2/6) negative     # chronic venous insufficiency  # chronic lymphedema   - Sees podiatry at [facility]  - Wound care consulted on admit, recs appreciated  - held home torsemide 20mg daily   - ASA restarted 2/10      # HTN/HLD  - c/w home lipitor 40mg daily      # GERD  - c/w home pepcid daily      # dispo  - DNR-comfort measures only, confirmed by sister  - DC to SNF pending hospice meeting  - NOK: sister Hinojosa, [phone]   - FUV 2/11 Urology, 2/26 Dr. Wynn,  2/27 w/ Ortho Surg ([facility]), 2/27 w/ Rad Onc, 4/4 w/ ID    I spent 60 minutes in the professional and overall care of this patient.    Assessment and plan as above discussed with attending physician, Dr. Peraza.     Sarah Lehman, CRISTOBAL  "

## 2025-02-14 NOTE — PROGRESS NOTES
"Music Therapy Note    Abdi \"Ray\" Mazin was referred by Care Team     Therapy Session  Referral Type: New referral this admission  Visit Type: New visit  Session Start Time: 1132  Session End Time: 1132  Intervention Delivery: In-person  Conflict of Service: Working with other staff               Treatment/Interventions       Post-assessment  Total Session Time (min): 0 minutes    Narrative  Assessment Detail: Pt seen working with staff at time MT rounding.  Plan: Music therapist attempted to provided music therapy services with a focus on coping and pain management. Pt with staff.  Intervention: No intervention at this time. Pt with staff.  Evaluation: NA  Follow-up: If applicable, will follow up at a later time.    Education Documentation  No documentation found.          "

## 2025-02-14 NOTE — PROGRESS NOTES
"Physical Therapy                 Therapy Communication Note    Patient Name: Abdi Retana \"Matt\"  MRN: [id number]  Department: [facility]  Room: [address] [place], [place] [address]  Today's Date: 2/14/2025     Discipline: Physical Therapy    PT Missed Visit: Yes     Missed Visit Reason: Missed Visit Reason: Other (Comment) (per chart, pt is now comfort care. Communicated with Sarah Proctor PT who confirmed it is appropriate to DC PT orders at this time.)    Missed Time: Attempt    Comment: 12:13pm  "

## 2025-02-14 NOTE — NURSING NOTE
PICC team consulted for a PICC line assessment, however, d/t abnormal labs, the line was originally delayed and then cancelled by provider on 2/14/2025.

## 2025-02-14 NOTE — PROGRESS NOTES
"Art Therapy Note    Abdi \"Ray\" Mazin     Therapy Session  Referral Type: New referral this admission  Visit Type: Follow-up visit  Session Start Time: 1633  Session End Time: 1634  Intervention Delivery: In-person  Conflict of Service: Working with other staff  Number of family members present: 0              Treatment/Interventions       Post-assessment  Total Session Time (min): 1 minutes    Narrative  Assessment Detail: AT time of visit Pt receeving care from RN.  ATR willfollow up another day to revisit and offer services again.    Education Documentation  No documentation found.      "

## 2025-02-15 PROCEDURE — 2500000004 HC RX 250 GENERAL PHARMACY W/ HCPCS (ALT 636 FOR OP/ED)

## 2025-02-15 PROCEDURE — 2500000001 HC RX 250 WO HCPCS SELF ADMINISTERED DRUGS (ALT 637 FOR MEDICARE OP)

## 2025-02-15 PROCEDURE — 99239 HOSP IP/OBS DSCHRG MGMT >30: CPT | Performed by: STUDENT IN AN ORGANIZED HEALTH CARE EDUCATION/TRAINING PROGRAM

## 2025-02-15 PROCEDURE — 2500000004 HC RX 250 GENERAL PHARMACY W/ HCPCS (ALT 636 FOR OP/ED): Mod: TB

## 2025-02-15 RX ORDER — HYDROMORPHONE HYDROCHLORIDE 2 MG/1
2 TABLET ORAL EVERY 4 HOURS PRN
Qty: 10 TABLET | Refills: 0 | Status: SHIPPED | OUTPATIENT
Start: 2025-02-15 | End: 2025-02-15 | Stop reason: HOSPADM

## 2025-02-15 RX ORDER — OXYCODONE HYDROCHLORIDE 5 MG/1
5 TABLET ORAL
Qty: 15 TABLET | Refills: 0 | Status: SHIPPED | OUTPATIENT
Start: 2025-02-15

## 2025-02-15 RX ORDER — LOPERAMIDE HCL 1MG/7.5ML
2 LIQUID (ML) ORAL 4 TIMES DAILY PRN
Qty: 150 ML | Refills: 0 | Status: SHIPPED | OUTPATIENT
Start: 2025-02-15

## 2025-02-15 RX ORDER — HYDROXYZINE HYDROCHLORIDE 25 MG/1
25 TABLET, FILM COATED ORAL EVERY 8 HOURS PRN
Start: 2025-02-15

## 2025-02-15 RX ORDER — HYOSCYAMINE SULFATE 0.12 MG/1
0.12 TABLET, ORALLY DISINTEGRATING ORAL EVERY 4 HOURS PRN
Start: 2025-02-15

## 2025-02-15 RX ORDER — PETROLATUM 420 MG/G
1 OINTMENT TOPICAL DAILY
Start: 2025-02-16

## 2025-02-15 RX ADMIN — PIPERACILLIN SODIUM AND TAZOBACTAM SODIUM 3.38 G: 3; .375 INJECTION, SOLUTION INTRAVENOUS at 11:29

## 2025-02-15 RX ADMIN — HYDROMORPHONE HYDROCHLORIDE 0.4 MG: 1 INJECTION, SOLUTION INTRAMUSCULAR; INTRAVENOUS; SUBCUTANEOUS at 06:04

## 2025-02-15 RX ADMIN — HYDROMORPHONE HYDROCHLORIDE 0.4 MG: 1 INJECTION, SOLUTION INTRAMUSCULAR; INTRAVENOUS; SUBCUTANEOUS at 18:03

## 2025-02-15 RX ADMIN — PIPERACILLIN SODIUM AND TAZOBACTAM SODIUM 3.38 G: 3; .375 INJECTION, SOLUTION INTRAVENOUS at 17:05

## 2025-02-15 RX ADMIN — HYDROMORPHONE HYDROCHLORIDE 0.4 MG: 1 INJECTION, SOLUTION INTRAMUSCULAR; INTRAVENOUS; SUBCUTANEOUS at 09:49

## 2025-02-15 RX ADMIN — FERROUS SULFATE TAB 325 MG (65 MG ELEMENTAL FE) 325 MG: 325 (65 FE) TAB at 08:46

## 2025-02-15 RX ADMIN — PETROLATUM: 420 OINTMENT TOPICAL at 08:46

## 2025-02-15 RX ADMIN — LORAZEPAM 0.5 MG: 2 INJECTION INTRAMUSCULAR; INTRAVENOUS at 06:04

## 2025-02-15 RX ADMIN — PIPERACILLIN SODIUM AND TAZOBACTAM SODIUM 3.38 G: 3; .375 INJECTION, SOLUTION INTRAVENOUS at 05:50

## 2025-02-15 RX ADMIN — HYDROMORPHONE HYDROCHLORIDE 0.4 MG: 1 INJECTION, SOLUTION INTRAMUSCULAR; INTRAVENOUS; SUBCUTANEOUS at 14:47

## 2025-02-15 ASSESSMENT — RESPIRATORY DISTRESS OBSERVATION SCALE (RDOS)
ACCESSORY MUSCLE RISE IN CLAVICLE DURING INSPIRATION: 0 - NONE
RDOS TOTAL SCORE: 4
RESPIRATORY RATE PER MINUTE: 0 - <19 BREATHS
HEART RATE PER MINUTE: 0 - <90 BEATS
GRUNTING AT END OF EXPIRATION: 0 - NONE
RDOS TOTAL SCORE: 0
RDOS TOTAL SCORE: 5
PARADOXICAL BREATHING PATTERN: 0 - NONE
PARADOXICAL BREATHING PATTERN: 0 - NONE
HEART RATE PER MINUTE: 0 - <90 BEATS
ACCESSORY MUSCLE RISE IN CLAVICLE DURING INSPIRATION: 0 - NONE
GRUNTING AT END OF EXPIRATION: 0 - NONE
RDOS TOTAL SCORE: 0
LOOK OF FEAR: 0 - NONE
GRUNTING AT END OF EXPIRATION: 0 - NONE
LOOK OF FEAR: 2 - EYES WIDE OPEN, FACIAL MUSCLES TENSE, BROW FURROWED, MOUTH OPEN
ACCESSORY MUSCLE RISE IN CLAVICLE DURING INSPIRATION: 0 - NONE
PARADOXICAL BREATHING PATTERN: 0 - NONE
ACCESSORY MUSCLE RISE IN CLAVICLE DURING INSPIRATION: 0 - NONE
RESPIRATORY RATE PER MINUTE: 0 - <19 BREATHS
GRUNTING AT END OF EXPIRATION: 0 - NONE
RESTLESS NONPURPOSEFUL MOVEMENTS: 1 - OCCASIONAL, SLIGHT MOVEMENTS
RESPIRATORY RATE PER MINUTE: 0 - <19 BREATHS
INVOLUNTARY NASAL FLARING: 0 - NONE
RESTLESS NONPURPOSEFUL MOVEMENTS: 0 - NONE
RESTLESS NONPURPOSEFUL MOVEMENTS: 0 - NONE
LOOK OF FEAR: 0 - NONE
INVOLUNTARY NASAL FLARING: 0 - NONE
LOOK OF FEAR: 2 - EYES WIDE OPEN, FACIAL MUSCLES TENSE, BROW FURROWED, MOUTH OPEN
INVOLUNTARY NASAL FLARING: 0 - NONE
INVOLUNTARY NASAL FLARING: 0 - NONE
RESPIRATORY RATE PER MINUTE: 1 - 19-30 BREATHS
HEART RATE PER MINUTE: 1 - 90-109 BEATS
LOOK OF FEAR: 2 - EYES WIDE OPEN, FACIAL MUSCLES TENSE, BROW FURROWED, MOUTH OPEN
RESTLESS NONPURPOSEFUL MOVEMENTS: 1 - OCCASIONAL, SLIGHT MOVEMENTS
RESPIRATORY RATE PER MINUTE: 0 - <19 BREATHS
PARADOXICAL BREATHING PATTERN: 0 - NONE
RESTLESS NONPURPOSEFUL MOVEMENTS: 1 - OCCASIONAL, SLIGHT MOVEMENTS
PARADOXICAL BREATHING PATTERN: 0 - NONE
RDOS TOTAL SCORE: 4
INVOLUNTARY NASAL FLARING: 0 - NONE
GRUNTING AT END OF EXPIRATION: 0 - NONE
ACCESSORY MUSCLE RISE IN CLAVICLE DURING INSPIRATION: 0 - NONE

## 2025-02-15 ASSESSMENT — COGNITIVE AND FUNCTIONAL STATUS - GENERAL
WALKING IN HOSPITAL ROOM: TOTAL
CLIMB 3 TO 5 STEPS WITH RAILING: TOTAL
MOVING FROM LYING ON BACK TO SITTING ON SIDE OF FLAT BED WITH BEDRAILS: TOTAL
MOBILITY SCORE: 6
TURNING FROM BACK TO SIDE WHILE IN FLAT BAD: TOTAL
TOILETING: TOTAL
STANDING UP FROM CHAIR USING ARMS: TOTAL
DRESSING REGULAR UPPER BODY CLOTHING: TOTAL
DRESSING REGULAR LOWER BODY CLOTHING: TOTAL
MOVING TO AND FROM BED TO CHAIR: TOTAL
HELP NEEDED FOR BATHING: TOTAL
PERSONAL GROOMING: TOTAL
EATING MEALS: TOTAL
DAILY ACTIVITIY SCORE: 6

## 2025-02-15 ASSESSMENT — PAIN SCALES - PAIN ASSESSMENT IN ADVANCED DEMENTIA (PAINAD)
NEGVOCALIZATION: REPEATED TROUBLED CALLING OUT, LOUD MOANING/GROANING, CRYING
BREATHING: NORMAL
TOTALSCORE: MEDICATION (SEE MAR)
FACIALEXPRESSION: SAD, FRIGHTENED, FROWN
NEGVOCALIZATION: REPEATED TROUBLED CALLING OUT, LOUD MOANING/GROANING, CRYING
TOTALSCORE: 9
BREATHING: NOISY LABORED BREATHING, LONG PERIODS OF HYPERVENTILATION, CHEYNE-STOKES RESPIRATIONS
BODYLANGUAGE: RIGID, FISTS CLENCHED, KNEES UP, PUSHING/PULLING AWAY, STRIKES OUT
FACIALEXPRESSION: FACIAL GRIMACING
CONSOLABILITY: DISTRACTED OR REASSURED BY VOICE/TOUCH
BODYLANGUAGE: RIGID, FISTS CLENCHED, KNEES UP, PUSHING/PULLING AWAY, STRIKES OUT
TOTALSCORE: MEDICATION (SEE MAR)
CONSOLABILITY: DISTRACTED OR REASSURED BY VOICE/TOUCH
TOTALSCORE: 6

## 2025-02-15 NOTE — PROGRESS NOTES
02/15/25 1400   Discharge Planning   Living Arrangements Alone   Support Systems Family members   Type of Residence Skilled nursing facility;Private residence   Home or Post Acute Services Post acute facilities (Rehab/SNF/etc);In home services   Type of Post Acute Facility Services Skilled nursing;Long term care   Type of Home Care Services Hospice   Expected Discharge Disposition SNF  ([facility]- RN Report: [phone])     02/15/2025: Patient to discharge today to [facility].  [facility] so sign patient up for [facility] services. PCA has been discontinued and patient will be sent with a written RX for pain medication [facility] Per the supervisor at [facility], patient will remain with right CVC. Will also continue with IV abx therapy until signed with Hospice. Wound Vac discontinued by Ortho and dressing placed. TCC scheduled transportation for 4:00pm today via [facility]([phone]). TCC also spoke with sister, Saskia regarding current discharge plan. Saskia receptive to latest updates and confirms meeting with Hospice on Monday. TCC to continue to follow for any additional needs. Tania HUGHES TCC

## 2025-02-15 NOTE — CARE PLAN
Problem: Pain  Goal: Takes deep breaths with improved pain control throughout the shift  2/14/2025 2150 by Migdalia Ochoa RN  Outcome: Progressing  2/14/2025 2149 by Migdalia Ochoa RN  Outcome: Progressing  Goal: Turns in bed with improved pain control throughout the shift  2/14/2025 2150 by Migdalia Ochoa RN  Outcome: Progressing  2/14/2025 2149 by Migdalia Ochoa RN  Outcome: Progressing  Goal: Walks with improved pain control throughout the shift  2/14/2025 2150 by Migdalia Ochoa RN  Outcome: Progressing  2/14/2025 2149 by Migdalia Ochoa RN  Outcome: Progressing  Goal: Performs ADL's with improved pain control throughout shift  2/14/2025 2150 by Migdalia Ocoha RN  Outcome: Progressing  2/14/2025 2149 by Migdalia Ochoa RN  Outcome: Progressing  Goal: Participates in PT with improved pain control throughout the shift  2/14/2025 2150 by Migdalia Ochoa RN  Outcome: Progressing  2/14/2025 2149 by Migdalia Ochoa RN  Outcome: Progressing  Goal: Free from opioid side effects throughout the shift  2/14/2025 2150 by Migdalia Ochoa RN  Outcome: Progressing  2/14/2025 2149 by Migdalia Ochoa RN  Outcome: Progressing  Goal: Free from acute confusion related to pain meds throughout the shift  2/14/2025 2150 by Migdalia Ochoa RN  Outcome: Progressing  2/14/2025 2149 by Migdalia Ochoa RN  Outcome: Progressing     Problem: Skin  Goal: Decreased wound size/increased tissue granulation at next dressing change  2/14/2025 2150 by Migdalia Ochoa RN  Outcome: Progressing  2/14/2025 2149 by Migdalia Ochoa RN  Flowsheets (Taken 2/14/2025 2149)  Decreased wound size/increased tissue granulation at next dressing change: Promote sleep for wound healing  2/14/2025 2149 by Migdalia Ochoa RN  Outcome: Progressing  Flowsheets (Taken 2/14/2025 2149)  Decreased wound size/increased tissue granulation at next dressing change: Promote sleep for wound healing  Goal: Participates in plan/prevention/treatment measures  2/14/2025 2150 by Migdalia Ochoa  RN  Outcome: Progressing  2/14/2025 2149 by Migdalia Ochoa RN  Flowsheets (Taken 2/14/2025 2149)  Participates in plan/prevention/treatment measures: Elevate heels  2/14/2025 2149 by Migdalia Ochoa RN  Outcome: Progressing  Flowsheets (Taken 2/14/2025 2149)  Participates in plan/prevention/treatment measures: Elevate heels  Goal: Prevent/manage excess moisture  2/14/2025 2150 by Migdalia Ochoa RN  Outcome: Progressing  2/14/2025 2149 by Migdalia Ochoa RN  Flowsheets (Taken 2/14/2025 2149)  Prevent/manage excess moisture: Moisturize dry skin  2/14/2025 2149 by Migdalia Ochoa RN  Outcome: Progressing  Flowsheets (Taken 2/14/2025 2149)  Prevent/manage excess moisture: Moisturize dry skin  Goal: Prevent/minimize sheer/friction injuries  2/14/2025 2150 by Migdalia Ochoa RN  Outcome: Progressing  2/14/2025 2149 by Migdalia Ochoa RN  Flowsheets (Taken 2/14/2025 2149)  Prevent/minimize sheer/friction injuries: Turn/reposition every 2 hours/use positioning/transfer devices  2/14/2025 2149 by Migdalia Ochoa RN  Outcome: Progressing  Flowsheets (Taken 2/14/2025 2149)  Prevent/minimize sheer/friction injuries: Turn/reposition every 2 hours/use positioning/transfer devices  Goal: Promote/optimize nutrition  2/14/2025 2150 by Migdalia Ochoa RN  Outcome: Progressing  2/14/2025 2149 by Migdalia Ochoa RN  Flowsheets (Taken 2/14/2025 2149)  Promote/optimize nutrition: Monitor/record intake including meals  2/14/2025 2149 by Migdalia Ochoa RN  Outcome: Progressing  Flowsheets (Taken 2/14/2025 2149)  Promote/optimize nutrition: Monitor/record intake including meals  Goal: Promote skin healing  2/14/2025 2150 by Migdalia Ochoa RN  Outcome: Progressing  2/14/2025 2149 by Migdalia Ochoa RN  Flowsheets (Taken 2/14/2025 2149)  Promote skin healing: Assess skin/pad under line(s)/device(s)  2/14/2025 2149 by Migdalia Ochoa RN  Outcome: Progressing  Flowsheets (Taken 2/14/2025 2149)  Promote skin healing: Assess skin/pad under  line(s)/device(s)   The patient's goals for the shift include      The clinical goals for the shift include remain free from fall

## 2025-02-15 NOTE — CARE PLAN
The patient's goals for the shift include      The clinical goals for the shift include Pt will remain safe and free of injury through end of shift 2/15/2025 1900      Problem: Pain - Adult  Goal: Verbalizes/displays adequate comfort level or baseline comfort level  Outcome: Progressing     Problem: Safety - Adult  Goal: Free from fall injury  Outcome: Progressing     Problem: Discharge Planning  Goal: Discharge to home or other facility with appropriate resources  Outcome: Progressing     Problem: Chronic Conditions and Co-morbidities  Goal: Patient's chronic conditions and co-morbidity symptoms are monitored and maintained or improved  Outcome: Progressing     Problem: Nutrition  Goal: Nutrient intake appropriate for maintaining nutritional needs  Outcome: Progressing     Problem: Pain  Goal: Takes deep breaths with improved pain control throughout the shift  Outcome: Progressing  Goal: Turns in bed with improved pain control throughout the shift  Outcome: Progressing  Goal: Walks with improved pain control throughout the shift  Outcome: Progressing  Goal: Performs ADL's with improved pain control throughout shift  Outcome: Progressing  Goal: Participates in PT with improved pain control throughout the shift  Outcome: Progressing  Goal: Free from opioid side effects throughout the shift  Outcome: Progressing  Goal: Free from acute confusion related to pain meds throughout the shift  Outcome: Progressing     Problem: Skin  Goal: Decreased wound size/increased tissue granulation at next dressing change  Outcome: Progressing  Flowsheets (Taken 2/15/2025 1027)  Decreased wound size/increased tissue granulation at next dressing change: Promote sleep for wound healing  Goal: Participates in plan/prevention/treatment measures  Outcome: Progressing  Flowsheets (Taken 2/15/2025 1027)  Participates in plan/prevention/treatment measures: Elevate heels  Goal: Prevent/manage excess moisture  Outcome: Progressing  Flowsheets  (Taken 2/15/2025 1027)  Prevent/manage excess moisture:   Cleanse incontinence/protect with barrier cream   Monitor for/manage infection if present  Goal: Prevent/minimize sheer/friction injuries  Outcome: Progressing  Flowsheets (Taken 2/15/2025 1027)  Prevent/minimize sheer/friction injuries:   Use pull sheet   Turn/reposition every 2 hours/use positioning/transfer devices  Goal: Promote/optimize nutrition  Outcome: Progressing  Flowsheets (Taken 2/15/2025 1027)  Promote/optimize nutrition:   Consume > 50% meals/supplements   Offer water/supplements/favorite foods  Goal: Promote skin healing  Outcome: Progressing  Flowsheets (Taken 2/15/2025 1027)  Promote skin healing:   Turn/reposition every 2 hours/use positioning/transfer devices   Assess skin/pad under line(s)/device(s)

## 2025-02-15 NOTE — DISCHARGE SUMMARY
Discharge Diagnosis  Wound dehiscence    Issues Requiring Follow-Up  - Wound dressing changed on 2/14 by Orthopedic surgery. Ortho also provided additional self adhesive antimicrobial soft silicone dressing than can be replaced on 2/22 for another week. On 3/1, can switch to daily dry/soft dressings  - Planned for Hospice on Monday 2/17  - Discharged with oral oxycodone 5 mg every three hours as needed for pain control   - Will discontinue IV antibiotics after hospice meeting on Monday 2/17    Test Results Pending At Discharge  Pending Labs       Order Current Status    Fungal Culture/Smear Preliminary result    Fungal Culture/Smear Preliminary result    Fungal Culture/Smear Preliminary result            Hospital Course   Abdi Retana is a 74 year old Male PMH newly dx metastatic RCC (dx Jan 2025, has not started treatment yet), HTN, GERD, SAMIA, chronic lymphedema, venous insufficiency, OA, CKD III (BL~ 1.2), hx new acute stroke dx 1/8/25 (on BID Lovenox) and recent cord compression L4-L5 lesion (s/p L3-L5 laminectomies with L2-S1 lumbar tumor debulking 1/17 with Ortho surg) who presented to ED 2/6 after being sent from his first postop visit with Ortho for c/f lumbar wound dehiscence. Ortho consulted, s/p I&D and wound closure with ortho on 2/7. Coagulated hematoma noted by ortho in OR, wound cultures taken, prelim +gram negative bacilli. Initiated on broad spectrum antibiotics vanc + zosyn (2/7-) until would cultures finalized. ID consulted on 2/8 for antibiotics treatment and duration recommendations, rec continue vanc (2/7-2/11) + zosyn (2/7-) until final wound cultures result. Final cultures resulted 2/10 +Proteus and Morganella, ID rec Zosyn w/ tentative 8-week stop date 4/4, vancomycin discontinued (2/11). Overall clinical status discussed with sister (POA) and brothers, decided to focus on comfort, code status changed to DNR-comfort measures only.     Pertinent Physical Exam At Time of Discharge  Physical  Exam  Constitutional:       Appearance: He is not ill-appearing.   HENT:      Mouth/Throat:      Mouth: Mucous membranes are moist.   Eyes:      Pupils: Pupils are equal, round, and reactive to light.   Cardiovascular:      Rate and Rhythm: Regular rhythm.   Abdominal:      General: Bowel sounds are normal.      Palpations: Abdomen is soft.   Musculoskeletal:      Comments: Unable to assess full range of motion while laying in bed   Skin:     General: Skin is warm.   Neurological:      Mental Status: Mental status is at baseline.         Home Medications     Medication List      START taking these medications     hydrOXYzine HCL 25 mg tablet; Commonly known as: Atarax; Take 1 tablet   (25 mg) by mouth every 8 hours if needed for anxiety.   hyoscyamine 0.125 mg disintegrating tablet; Commonly known as: Anaspaz;   Place 1 tablet (0.125 mg) under the tongue every 4 hours if needed (excess   secretions).   loperamide 1 mg/7.5 mL liquid; Commonly known as: Imodium A-D; Take 15   mL (2 mg) by mouth 4 times a day as needed for diarrhea.   piperacillin-tazobactam 3.375 gram/50 mL IV; Commonly known as: Zosyn;   Infuse 50 mL (3.375 g) over 0.5 hours into a venous catheter every 6   hours. Family would like antibiotics administered until sign with hospice   on Monday 2/17   white petrolatum 41 % ointment ointment; Commonly known as: Aquaphor;   Apply 1 Application topically once daily.; Start taking on: February 16, 2025     CHANGE how you take these medications     acetaminophen 650 mg ER tablet; Commonly known as: Tylenol 8 HOUR; What   changed: Another medication with the same name was removed. Continue   taking this medication, and follow the directions you see here.   oxyCODONE 5 mg immediate release tablet; Commonly known as: Roxicodone;   Take 1 tablet (5 mg) by mouth every 3 hours if needed for moderate pain (4   - 6) or severe pain (7 - 10).; What changed: when to take this, reasons to   take this     CONTINUE  taking these medications     atorvastatin 40 mg tablet; Commonly known as: Lipitor; Take 1 tablet (40   mg) by mouth once daily at bedtime.   cyclobenzaprine 5 mg tablet; Commonly known as: Flexeril; Take 1 tablet   (5 mg) by mouth 3 times a day as needed for muscle spasms.   eucerin cream; Apply topically if needed for dry skin.   famotidine 20 mg tablet; Commonly known as: Pepcid   ferrous sulfate (325 mg ferrous sulfate) tablet; Commonly known as:   FeroSuL; Take 1 tablet (325 mg) by mouth once every 24 hours.   Gentle Laxative (bisacodyl) 10 mg suppository; Generic drug: bisacodyl   magnesium hydroxide 400 mg/5 mL suspension; Commonly known as: Milk of   Magnesia   mineral oil enema   multivitamin with minerals tablet; Take 1 tablet by mouth once daily.   polyethylene glycol 17 gram packet; Commonly known as: Glycolax,   Miralax; Take 17 g by mouth 2 times a day as needed (constipation).   potassium chloride CR 10 mEq ER tablet; Commonly known as: Klor-Con;   TAKE 1 TABLET BY MOUTH ONCE  DAILY DO NOT CRUSH, CHEW, OR  SPLIT   vitamin D3-folic acid 2,500 unit- 1 mg tablet; Take 1 mg by mouth once   every 24 hours.     STOP taking these medications     aspirin 81 mg chewable tablet   aspirin 81 mg EC tablet   enoxaparin 120 mg/0.8 mL syringe; Commonly known as: Lovenox   torsemide 20 mg tablet; Commonly known as: Demadex       Outpatient Follow-Up  Future Appointments   Date Time Provider Department [place]   2/26/2025 11:20 AM Mick Wynn MD [id number] [facility]   2/27/2025 10:45 AM Yana Atkins MD [id number] [facility]   2/27/2025  1:00 PM Beverly Moncada MD [id number] [facility]   2/28/2025  9:00 AM [facility] FLUORO DSP 1 [facility]   2/28/2025 11:30 AM [facility] BB CT SIMULATOR [id number] [facility]   4/4/2025 10:00 AM Ivan Grajeda MD [id number] [facility]       Jessica Arteaga MD

## 2025-02-15 NOTE — DISCHARGE SUMMARY
Discharge Diagnosis  Wound dehiscence    Issues Requiring Follow-Up  ***    Test Results Pending At Discharge  Pending Labs       Order Current Status    Fungal Culture/Smear Preliminary result    Fungal Culture/Smear Preliminary result    Fungal Culture/Smear Preliminary result            Hospital Course   ***    Pertinent Physical Exam At Time of Discharge  Physical Exam    Home Medications     Medication List      CHANGE how you take these medications    • acetaminophen 650 mg ER tablet; Commonly known as: Tylenol 8 HOUR; What   changed: Another medication with the same name was removed. Continue   taking this medication, and follow the directions you see here.     CONTINUE taking these medications    • atorvastatin 40 mg tablet; Commonly known as: Lipitor; Take 1 tablet (40   mg) by mouth once daily at bedtime.  • eucerin cream; Apply topically if needed for dry skin.  • famotidine 20 mg tablet; Commonly known as: Pepcid  • ferrous sulfate (325 mg ferrous sulfate) tablet; Commonly known as:   FeroSuL; Take 1 tablet (325 mg) by mouth once every 24 hours.  • Gentle Laxative (bisacodyl) 10 mg suppository; Generic drug: bisacodyl  • magnesium hydroxide 400 mg/5 mL suspension; Commonly known as: Milk of   Magnesia  • mineral oil enema  • multivitamin with minerals tablet; Take 1 tablet by mouth once daily.  • polyethylene glycol 17 gram packet; Commonly known as: Glycolax,   Miralax; Take 17 g by mouth 2 times a day as needed (constipation).  • potassium chloride CR 10 mEq ER tablet; Commonly known as: Klor-Con;   TAKE 1 TABLET BY MOUTH ONCE  DAILY DO NOT CRUSH, CHEW, OR  SPLIT  • vitamin D3-folic acid 2,500 unit- 1 mg tablet; Take 1 mg by mouth once   every 24 hours.     ASK your doctor about these medications    • * aspirin 81 mg EC tablet  • * aspirin 81 mg chewable tablet; Chew 1 tablet (81 mg) once daily.  • cyclobenzaprine 5 mg tablet; Commonly known as: Flexeril; Take 1 tablet   (5 mg) by mouth 3 times a day  as needed for muscle spasms.  • enoxaparin 120 mg/0.8 mL syringe; Commonly known as: Lovenox; Inject 0.8   mL (120 mg) under the skin 2 times a day.  • oxyCODONE 5 mg immediate release tablet; Commonly known as: Roxicodone;   Take 1 tablet (5 mg) by mouth every 4 hours if needed for moderate pain (4   - 6) for up to 10 days.; Ask about: Should I take this medication?  • torsemide 20 mg tablet; Commonly known as: Demadex; TAKE 1 TABLET BY   MOUTH ONCE  DAILY  * This list has 2 medication(s) that are the same as other medications   prescribed for you. Read the directions carefully, and ask your doctor or   other care provider to review them with you.       Outpatient Follow-Up  Future Appointments   Date Time Provider Department [place]   2/26/2025 11:20 AM Mick Wynn MD [id number] [facility]   2/27/2025 10:45 AM Yana Atkins MD [id number] [facility]   2/27/2025  1:00 PM Beverly Moncada MD [id number] [facility]   2/28/2025  9:00 AM [facility] FLUORO DSP 1 [facility]   2/28/2025 11:30 AM [facility] BB CT SIMULATOR [id number]   4/4/2025 10:00 AM Ivan Grajeda MD [id number] [facility]       Jessica Arteaga MD

## 2025-02-15 NOTE — PROGRESS NOTES
02/15/25 1100   Discharge Planning   Living Arrangements Alone   Support Systems Family members   Type of Residence Skilled nursing facility;Private residence   Home or Post Acute Services Post acute facilities (Rehab/SNF/etc);In home services   Type of Post Acute Facility Services Skilled nursing;Long term care   Type of Home Care Services Hospice   Expected Discharge Disposition SNF  ([facility]- RN Report: [phone])   Does the patient need discharge transport arranged? Yes   RoundTrip coordination needed? Yes   Has discharge transport been arranged? No   What day is the transport expected? 02/15/25   Patient Choice   Provider Choice list and CMS website (https://medicare.gov/care-compare#search) for post-acute Quality and Resource Measure Data were provided and reviewed with: Family     1112-Pt has had change and his family would now like to pursue hospice/ LTC. Family has already met with [facility] and plan to use them. Pt has skilled auth to [facility], discussed plan with them and they can take pt under skilled auth and transition pt to hospice after family meets with business office on Monday to arrange LTC payment. [facility] updated on plan and will follow up on Monday. [facility] to arrange transport. [id number] and goldenrod sent via [employer]. LSW to cont to follow through discharge.    1138- Transport scheduled for 4pm today.

## 2025-02-17 LAB
FUNGUS SPEC CULT: NORMAL
FUNGUS SPEC FUNGUS STN: NORMAL

## 2025-02-24 LAB
FUNGUS SPEC CULT: NORMAL
FUNGUS SPEC FUNGUS STN: NORMAL

## 2025-02-26 ENCOUNTER — APPOINTMENT (OUTPATIENT)
Dept: HEMATOLOGY/ONCOLOGY | Facility: CLINIC | Age: 75
End: 2025-02-26
Payer: MEDICARE

## 2025-02-27 ENCOUNTER — APPOINTMENT (OUTPATIENT)
Dept: ORTHOPEDIC SURGERY | Facility: HOSPITAL | Age: 75
End: 2025-02-27
Payer: MEDICARE

## 2025-02-27 ENCOUNTER — APPOINTMENT (OUTPATIENT)
Dept: RADIATION ONCOLOGY | Facility: HOSPITAL | Age: 75
End: 2025-02-27
Payer: MEDICARE

## 2025-02-28 ENCOUNTER — APPOINTMENT (OUTPATIENT)
Dept: RADIATION ONCOLOGY | Facility: HOSPITAL | Age: 75
End: 2025-02-28
Payer: MEDICARE

## 2025-02-28 ENCOUNTER — APPOINTMENT (OUTPATIENT)
Dept: RADIOLOGY | Facility: HOSPITAL | Age: 75
End: 2025-02-28
Payer: MEDICARE

## 2025-04-01 ENCOUNTER — TELEPHONE (OUTPATIENT)
Dept: SURGICAL ONCOLOGY | Facility: CLINIC | Age: 75
End: 2025-04-01
Payer: MEDICARE

## 2025-04-04 ENCOUNTER — APPOINTMENT (OUTPATIENT)
Dept: INFECTIOUS DISEASES | Facility: CLINIC | Age: 75
End: 2025-04-04
Payer: MEDICARE

## (undated) DEVICE — COVER, CART, 45 X 27 X 48 IN, CLEAR

## (undated) DEVICE — EVACUATOR, WOUND, CLOSED, 3 SPRING, 400 CC, Y CONNECTING TUBE

## (undated) DEVICE — DRESSING, PREVENA, PEEL AND PLACE, 20CM

## (undated) DEVICE — COVER, PLASTIC, MAYO STAND, 29.5IN X 55.5IN

## (undated) DEVICE — Device

## (undated) DEVICE — TUBING, SUCTION, CONNECTING, STERILE 0.25 X 120 IN., LF

## (undated) DEVICE — SUTURE, VICRYL, 0, 18 IN, CT-1, UNDYED

## (undated) DEVICE — APPLICATOR, PREP, CHLORAPREP, W/ORANGE TINT, 10.5ML

## (undated) DEVICE — SUTURE, VICRYL, 2-0, 18 IN CP-2, UNDYED

## (undated) DEVICE — CATHETER TRAY, SURESTEP, 16FR, URINE METER W/STATLOCK

## (undated) DEVICE — MANIFOLD, 4 PORT NEPTUNE STANDARD

## (undated) DEVICE — FLOSEAL, MATRIX, HEMOSTATIC, FULL STERILE PREP, 5ML

## (undated) DEVICE — SPONGE, HEMOSTATIC, GELATIN, SURGIFOAM, 8 X 12.5 CM X 10 MM

## (undated) DEVICE — DRESSING, ADHESIVE, ISLAND, TELFA, 2 X 3.75 IN, LF

## (undated) DEVICE — CLIPPER, SURGICAL BLADE ASSEMBLY, GENERAL PURPOSE, SINGLE USE

## (undated) DEVICE — LEGEND, LUB DIFFUSER PACK

## (undated) DEVICE — DRAIN, WOUND, ROUND, W/TROCAR, HOLE PATTERN, 10 IN, MEDIUM/LARGE, 3/16 X 49 IN

## (undated) DEVICE — DRESSING, NON-ADHERENT, OIL EMULSION, CURITY, 3 X 8 IN, STERILE

## (undated) DEVICE — ELECTRODE, ELECTROSURGICAL, BLADE, INSULATED, ENT/IMA, STERILE

## (undated) DEVICE — DRESSING, GAUZE, WASHED FLUFF, LARGE, STERILE

## (undated) DEVICE — COVER, TABLE, UHC

## (undated) DEVICE — DRAPE, SHEET, FAN FOLDED, HALF, 44 X 58 IN, DISPOSABLE, LF, STERILE

## (undated) DEVICE — DRESSING, PREVENA, PEEL AND PLACE, 35CM

## (undated) DEVICE — TIP, SUCTION, FRAZIER, W/CONTROL VENT, 12 FR

## (undated) DEVICE — SUTURE, ETHILON, 3-0, 30 IN, FS-1, BLACK

## (undated) DEVICE — ADHESIVE, SKIN, DERMABOND ADVANCED, 15CM, PEN-STYLE

## (undated) DEVICE — CANISTER, PREVENA PLUS, 150ML

## (undated) DEVICE — THERAPY UNIT, PREVENA PLUS 125

## (undated) DEVICE — DRESSING, ISLAND, ADHESIVE, TELFA, 4 X 8 IN

## (undated) DEVICE — SEALANT, HEMOSTATIC, FLOSEAL, 10 ML

## (undated) DEVICE — COLLECTION/DELIVERY SYSTEM, COPAN ESWAB, REG SIZE SWAB

## (undated) DEVICE — DRAPE, INSTRUMENT, W/POUCH, STERI DRAPE, 7 X 11 IN, DISPOSABLE, STERILE

## (undated) DEVICE — PAD, GROUNDING, ELECTROSURGICAL, W/9 FT CABLE, POLYHESIVE II, ADULT, LF

## (undated) DEVICE — DRAIN, WOUND, ROUND, W/TROCAR, HOLE PATTERN, 10 IN, MEDIUM, 1/8 X 49 IN

## (undated) DEVICE — DRAPE, INCISE, ANTIMICROBIAL, IOBAN 2, STERI DRAPE, 23 X 33 IN, DISPOSABLE, STERILE

## (undated) DEVICE — SPONGE, GAUZE, XRAY DECT, 16 PLY, 4 X 4, W/MASTER DMT,STERILE

## (undated) DEVICE — STAPLER, SKIN PROXIMATE, 35 WIDE

## (undated) DEVICE — KIT, PATIENT CARE, JACKSON TABLE W/PRONE-SAFE HEADREST

## (undated) DEVICE — SPHERE, STEALTHSTATION, 5-PK

## (undated) DEVICE — SEALER, BIPOLAR, AQUA MANTYS 6.0